# Patient Record
Sex: MALE | Race: WHITE | NOT HISPANIC OR LATINO | Employment: OTHER | URBAN - METROPOLITAN AREA
[De-identification: names, ages, dates, MRNs, and addresses within clinical notes are randomized per-mention and may not be internally consistent; named-entity substitution may affect disease eponyms.]

---

## 2024-05-20 ENCOUNTER — APPOINTMENT (OUTPATIENT)
Dept: GENERAL RADIOLOGY | Facility: HOSPITAL | Age: 44
DRG: 477 | End: 2024-05-20
Payer: COMMERCIAL

## 2024-05-20 ENCOUNTER — HOSPITAL ENCOUNTER (INPATIENT)
Facility: HOSPITAL | Age: 44
LOS: 33 days | Discharge: NURSING, REHAB OR LONG-TERM CARE | DRG: 477 | End: 2024-06-23
Attending: EMERGENCY MEDICINE | Admitting: STUDENT IN AN ORGANIZED HEALTH CARE EDUCATION/TRAINING PROGRAM
Payer: COMMERCIAL

## 2024-05-20 DIAGNOSIS — R13.12 DYSPHAGIA, OROPHARYNGEAL: ICD-10-CM

## 2024-05-20 DIAGNOSIS — R91.1 PULMONARY NODULE: ICD-10-CM

## 2024-05-20 DIAGNOSIS — Z78.9 DECREASED ACTIVITIES OF DAILY LIVING (ADL): ICD-10-CM

## 2024-05-20 DIAGNOSIS — R26.2 DIFFICULTY IN WALKING: ICD-10-CM

## 2024-05-20 DIAGNOSIS — S72.141A CLOSED DISPLACED INTERTROCHANTERIC FRACTURE OF RIGHT FEMUR, INITIAL ENCOUNTER: Primary | ICD-10-CM

## 2024-05-20 DIAGNOSIS — R26.2 DIFFICULTY WALKING: ICD-10-CM

## 2024-05-20 LAB
ALBUMIN SERPL-MCNC: 3.3 G/DL (ref 3.5–5.2)
ALBUMIN/GLOB SERPL: 0.7 G/DL
ALP SERPL-CCNC: 142 U/L (ref 39–117)
ALT SERPL W P-5'-P-CCNC: 23 U/L (ref 1–41)
ANION GAP SERPL CALCULATED.3IONS-SCNC: 17.6 MMOL/L (ref 5–15)
APTT PPP: 34.7 SECONDS (ref 24.2–34.2)
AST SERPL-CCNC: 20 U/L (ref 1–40)
BASOPHILS # BLD AUTO: 0.02 10*3/MM3 (ref 0–0.2)
BASOPHILS NFR BLD AUTO: 0.1 % (ref 0–1.5)
BILIRUB SERPL-MCNC: 0.4 MG/DL (ref 0–1.2)
BUN SERPL-MCNC: 21 MG/DL (ref 6–20)
BUN/CREAT SERPL: 12.2 (ref 7–25)
CALCIUM SPEC-SCNC: 10.5 MG/DL (ref 8.6–10.5)
CHLORIDE SERPL-SCNC: 95 MMOL/L (ref 98–107)
CK SERPL-CCNC: 269 U/L (ref 20–200)
CO2 SERPL-SCNC: 24.4 MMOL/L (ref 22–29)
CREAT SERPL-MCNC: 1.72 MG/DL (ref 0.76–1.27)
DEPRECATED RDW RBC AUTO: 40.4 FL (ref 37–54)
EGFRCR SERPLBLD CKD-EPI 2021: 49.6 ML/MIN/1.73
EOSINOPHIL # BLD AUTO: 0.01 10*3/MM3 (ref 0–0.4)
EOSINOPHIL NFR BLD AUTO: 0.1 % (ref 0.3–6.2)
ERYTHROCYTE [DISTWIDTH] IN BLOOD BY AUTOMATED COUNT: 12.7 % (ref 12.3–15.4)
GLOBULIN UR ELPH-MCNC: 4.8 GM/DL
GLUCOSE SERPL-MCNC: 183 MG/DL (ref 65–99)
HCT VFR BLD AUTO: 37 % (ref 37.5–51)
HGB BLD-MCNC: 11.9 G/DL (ref 13–17.7)
HOLD SPECIMEN: NORMAL
HOLD SPECIMEN: NORMAL
IMM GRANULOCYTES # BLD AUTO: 0.1 10*3/MM3 (ref 0–0.05)
IMM GRANULOCYTES NFR BLD AUTO: 0.5 % (ref 0–0.5)
INR PPP: 1.38 (ref 0.86–1.15)
LYMPHOCYTES # BLD AUTO: 0.52 10*3/MM3 (ref 0.7–3.1)
LYMPHOCYTES NFR BLD AUTO: 2.7 % (ref 19.6–45.3)
MAGNESIUM SERPL-MCNC: 2.4 MG/DL (ref 1.6–2.6)
MCH RBC QN AUTO: 28.1 PG (ref 26.6–33)
MCHC RBC AUTO-ENTMCNC: 32.2 G/DL (ref 31.5–35.7)
MCV RBC AUTO: 87.5 FL (ref 79–97)
MONOCYTES # BLD AUTO: 1.05 10*3/MM3 (ref 0.1–0.9)
MONOCYTES NFR BLD AUTO: 5.4 % (ref 5–12)
NEUTROPHILS NFR BLD AUTO: 17.84 10*3/MM3 (ref 1.7–7)
NEUTROPHILS NFR BLD AUTO: 91.2 % (ref 42.7–76)
NRBC BLD AUTO-RTO: 0 /100 WBC (ref 0–0.2)
PHOSPHATE SERPL-MCNC: 4 MG/DL (ref 2.5–4.5)
PLATELET # BLD AUTO: 410 10*3/MM3 (ref 140–450)
PMV BLD AUTO: 9.9 FL (ref 6–12)
POTASSIUM SERPL-SCNC: 3.9 MMOL/L (ref 3.5–5.2)
PROT SERPL-MCNC: 8.1 G/DL (ref 6–8.5)
PROTHROMBIN TIME: 17.2 SECONDS (ref 11.8–14.9)
RBC # BLD AUTO: 4.23 10*6/MM3 (ref 4.14–5.8)
SODIUM SERPL-SCNC: 137 MMOL/L (ref 136–145)
WBC NRBC COR # BLD AUTO: 19.54 10*3/MM3 (ref 3.4–10.8)
WHOLE BLOOD HOLD COAG: NORMAL
WHOLE BLOOD HOLD SPECIMEN: NORMAL

## 2024-05-20 PROCEDURE — 72170 X-RAY EXAM OF PELVIS: CPT

## 2024-05-20 PROCEDURE — 85025 COMPLETE CBC W/AUTO DIFF WBC: CPT | Performed by: EMERGENCY MEDICINE

## 2024-05-20 PROCEDURE — 83735 ASSAY OF MAGNESIUM: CPT | Performed by: STUDENT IN AN ORGANIZED HEALTH CARE EDUCATION/TRAINING PROGRAM

## 2024-05-20 PROCEDURE — 71045 X-RAY EXAM CHEST 1 VIEW: CPT

## 2024-05-20 PROCEDURE — 73552 X-RAY EXAM OF FEMUR 2/>: CPT

## 2024-05-20 PROCEDURE — 25010000002 MORPHINE PER 10 MG: Performed by: EMERGENCY MEDICINE

## 2024-05-20 PROCEDURE — 99222 1ST HOSP IP/OBS MODERATE 55: CPT | Performed by: STUDENT IN AN ORGANIZED HEALTH CARE EDUCATION/TRAINING PROGRAM

## 2024-05-20 PROCEDURE — 99285 EMERGENCY DEPT VISIT HI MDM: CPT

## 2024-05-20 PROCEDURE — 84100 ASSAY OF PHOSPHORUS: CPT | Performed by: STUDENT IN AN ORGANIZED HEALTH CARE EDUCATION/TRAINING PROGRAM

## 2024-05-20 PROCEDURE — 85610 PROTHROMBIN TIME: CPT | Performed by: EMERGENCY MEDICINE

## 2024-05-20 PROCEDURE — 85730 THROMBOPLASTIN TIME PARTIAL: CPT | Performed by: EMERGENCY MEDICINE

## 2024-05-20 PROCEDURE — 80053 COMPREHEN METABOLIC PANEL: CPT | Performed by: EMERGENCY MEDICINE

## 2024-05-20 PROCEDURE — 25810000003 SODIUM CHLORIDE 0.9 % SOLUTION: Performed by: EMERGENCY MEDICINE

## 2024-05-20 PROCEDURE — 82550 ASSAY OF CK (CPK): CPT | Performed by: STUDENT IN AN ORGANIZED HEALTH CARE EDUCATION/TRAINING PROGRAM

## 2024-05-20 RX ORDER — SODIUM CHLORIDE 0.9 % (FLUSH) 0.9 %
10 SYRINGE (ML) INJECTION AS NEEDED
Status: DISCONTINUED | OUTPATIENT
Start: 2024-05-20 | End: 2024-06-23 | Stop reason: HOSPADM

## 2024-05-20 RX ADMIN — MORPHINE SULFATE 4 MG: 4 INJECTION, SOLUTION INTRAMUSCULAR; INTRAVENOUS at 23:15

## 2024-05-20 RX ADMIN — SODIUM CHLORIDE 1000 ML: 9 INJECTION, SOLUTION INTRAVENOUS at 23:15

## 2024-05-21 ENCOUNTER — APPOINTMENT (OUTPATIENT)
Dept: CT IMAGING | Facility: HOSPITAL | Age: 44
DRG: 477 | End: 2024-05-21
Payer: COMMERCIAL

## 2024-05-21 ENCOUNTER — APPOINTMENT (OUTPATIENT)
Dept: GENERAL RADIOLOGY | Facility: HOSPITAL | Age: 44
DRG: 477 | End: 2024-05-21
Payer: COMMERCIAL

## 2024-05-21 ENCOUNTER — ANESTHESIA (OUTPATIENT)
Dept: PERIOP | Facility: HOSPITAL | Age: 44
End: 2024-05-21
Payer: COMMERCIAL

## 2024-05-21 ENCOUNTER — ANESTHESIA EVENT (OUTPATIENT)
Dept: PERIOP | Facility: HOSPITAL | Age: 44
End: 2024-05-21
Payer: COMMERCIAL

## 2024-05-21 ENCOUNTER — PREP FOR SURGERY (OUTPATIENT)
Dept: OTHER | Facility: HOSPITAL | Age: 44
End: 2024-05-21
Payer: COMMERCIAL

## 2024-05-21 DIAGNOSIS — S72.141A CLOSED DISPLACED INTERTROCHANTERIC FRACTURE OF RIGHT FEMUR, INITIAL ENCOUNTER: Primary | ICD-10-CM

## 2024-05-21 LAB
ABO GROUP BLD: NORMAL
ABO GROUP BLD: NORMAL
ALBUMIN SERPL-MCNC: 2.9 G/DL (ref 3.5–5.2)
ALBUMIN/GLOB SERPL: 0.7 G/DL
ALP SERPL-CCNC: 122 U/L (ref 39–117)
ALT SERPL W P-5'-P-CCNC: 19 U/L (ref 1–41)
ANION GAP SERPL CALCULATED.3IONS-SCNC: 12 MMOL/L (ref 5–15)
AST SERPL-CCNC: 19 U/L (ref 1–40)
BASOPHILS # BLD AUTO: 0.01 10*3/MM3 (ref 0–0.2)
BASOPHILS NFR BLD AUTO: 0.1 % (ref 0–1.5)
BILIRUB SERPL-MCNC: 0.3 MG/DL (ref 0–1.2)
BLD GP AB SCN SERPL QL: NEGATIVE
BUN SERPL-MCNC: 22 MG/DL (ref 6–20)
BUN/CREAT SERPL: 24.2 (ref 7–25)
CALCIUM SPEC-SCNC: 9.2 MG/DL (ref 8.6–10.5)
CHLORIDE SERPL-SCNC: 100 MMOL/L (ref 98–107)
CO2 SERPL-SCNC: 24 MMOL/L (ref 22–29)
CREAT SERPL-MCNC: 0.91 MG/DL (ref 0.76–1.27)
DEPRECATED RDW RBC AUTO: 40.8 FL (ref 37–54)
EGFRCR SERPLBLD CKD-EPI 2021: 106.6 ML/MIN/1.73
EOSINOPHIL # BLD AUTO: 0 10*3/MM3 (ref 0–0.4)
EOSINOPHIL NFR BLD AUTO: 0 % (ref 0.3–6.2)
ERYTHROCYTE [DISTWIDTH] IN BLOOD BY AUTOMATED COUNT: 12.6 % (ref 12.3–15.4)
GLOBULIN UR ELPH-MCNC: 3.9 GM/DL
GLUCOSE BLDC GLUCOMTR-MCNC: 138 MG/DL (ref 70–99)
GLUCOSE SERPL-MCNC: 125 MG/DL (ref 65–99)
HCT VFR BLD AUTO: 31.3 % (ref 37.5–51)
HGB BLD-MCNC: 10 G/DL (ref 13–17.7)
IMM GRANULOCYTES # BLD AUTO: 0.07 10*3/MM3 (ref 0–0.05)
IMM GRANULOCYTES NFR BLD AUTO: 0.5 % (ref 0–0.5)
LYMPHOCYTES # BLD AUTO: 1.07 10*3/MM3 (ref 0.7–3.1)
LYMPHOCYTES NFR BLD AUTO: 7.3 % (ref 19.6–45.3)
MAGNESIUM SERPL-MCNC: 2.3 MG/DL (ref 1.6–2.6)
MCH RBC QN AUTO: 28.4 PG (ref 26.6–33)
MCHC RBC AUTO-ENTMCNC: 31.9 G/DL (ref 31.5–35.7)
MCV RBC AUTO: 88.9 FL (ref 79–97)
MONOCYTES # BLD AUTO: 1.08 10*3/MM3 (ref 0.1–0.9)
MONOCYTES NFR BLD AUTO: 7.4 % (ref 5–12)
NEUTROPHILS NFR BLD AUTO: 12.43 10*3/MM3 (ref 1.7–7)
NEUTROPHILS NFR BLD AUTO: 84.7 % (ref 42.7–76)
NRBC BLD AUTO-RTO: 0 /100 WBC (ref 0–0.2)
PHOSPHATE SERPL-MCNC: 3.5 MG/DL (ref 2.5–4.5)
PLATELET # BLD AUTO: 351 10*3/MM3 (ref 140–450)
PMV BLD AUTO: 9.9 FL (ref 6–12)
POTASSIUM SERPL-SCNC: 3.5 MMOL/L (ref 3.5–5.2)
PROT SERPL-MCNC: 6.8 G/DL (ref 6–8.5)
QT INTERVAL: 361 MS
QTC INTERVAL: 476 MS
RBC # BLD AUTO: 3.52 10*6/MM3 (ref 4.14–5.8)
RH BLD: POSITIVE
RH BLD: POSITIVE
SODIUM SERPL-SCNC: 136 MMOL/L (ref 136–145)
T&S EXPIRATION DATE: NORMAL
WBC NRBC COR # BLD AUTO: 14.66 10*3/MM3 (ref 3.4–10.8)

## 2024-05-21 PROCEDURE — 27245 TREAT THIGH FRACTURE: CPT | Performed by: ORTHOPAEDIC SURGERY

## 2024-05-21 PROCEDURE — 25810000003 LACTATED RINGERS PER 1000 ML

## 2024-05-21 PROCEDURE — 94799 UNLISTED PULMONARY SVC/PX: CPT

## 2024-05-21 PROCEDURE — 86901 BLOOD TYPING SEROLOGIC RH(D): CPT | Performed by: STUDENT IN AN ORGANIZED HEALTH CARE EDUCATION/TRAINING PROGRAM

## 2024-05-21 PROCEDURE — 71260 CT THORAX DX C+: CPT

## 2024-05-21 PROCEDURE — 25010000002 ONDANSETRON PER 1 MG

## 2024-05-21 PROCEDURE — 25010000002 CEFAZOLIN PER 500 MG

## 2024-05-21 PROCEDURE — 25510000001 IOPAMIDOL PER 1 ML: Performed by: INTERNAL MEDICINE

## 2024-05-21 PROCEDURE — C1713 ANCHOR/SCREW BN/BN,TIS/BN: HCPCS | Performed by: ORTHOPAEDIC SURGERY

## 2024-05-21 PROCEDURE — 25810000003 SODIUM CHLORIDE 0.9 % SOLUTION: Performed by: STUDENT IN AN ORGANIZED HEALTH CARE EDUCATION/TRAINING PROGRAM

## 2024-05-21 PROCEDURE — C1769 GUIDE WIRE: HCPCS | Performed by: ORTHOPAEDIC SURGERY

## 2024-05-21 PROCEDURE — 25010000002 DEXAMETHASONE PER 1 MG

## 2024-05-21 PROCEDURE — 84100 ASSAY OF PHOSPHORUS: CPT | Performed by: STUDENT IN AN ORGANIZED HEALTH CARE EDUCATION/TRAINING PROGRAM

## 2024-05-21 PROCEDURE — 93005 ELECTROCARDIOGRAM TRACING: CPT | Performed by: STUDENT IN AN ORGANIZED HEALTH CARE EDUCATION/TRAINING PROGRAM

## 2024-05-21 PROCEDURE — 80053 COMPREHEN METABOLIC PANEL: CPT | Performed by: STUDENT IN AN ORGANIZED HEALTH CARE EDUCATION/TRAINING PROGRAM

## 2024-05-21 PROCEDURE — 86900 BLOOD TYPING SEROLOGIC ABO: CPT | Performed by: STUDENT IN AN ORGANIZED HEALTH CARE EDUCATION/TRAINING PROGRAM

## 2024-05-21 PROCEDURE — 86901 BLOOD TYPING SEROLOGIC RH(D): CPT

## 2024-05-21 PROCEDURE — 76000 FLUOROSCOPY <1 HR PHYS/QHP: CPT

## 2024-05-21 PROCEDURE — 25010000002 MORPHINE PER 10 MG: Performed by: STUDENT IN AN ORGANIZED HEALTH CARE EDUCATION/TRAINING PROGRAM

## 2024-05-21 PROCEDURE — 0QS804Z REPOSITION RIGHT FEMORAL SHAFT WITH INTERNAL FIXATION DEVICE, OPEN APPROACH: ICD-10-PCS | Performed by: ORTHOPAEDIC SURGERY

## 2024-05-21 PROCEDURE — 82948 REAGENT STRIP/BLOOD GLUCOSE: CPT

## 2024-05-21 PROCEDURE — 99222 1ST HOSP IP/OBS MODERATE 55: CPT | Performed by: ORTHOPAEDIC SURGERY

## 2024-05-21 PROCEDURE — 93010 ELECTROCARDIOGRAM REPORT: CPT | Performed by: INTERNAL MEDICINE

## 2024-05-21 PROCEDURE — 86850 RBC ANTIBODY SCREEN: CPT | Performed by: STUDENT IN AN ORGANIZED HEALTH CARE EDUCATION/TRAINING PROGRAM

## 2024-05-21 PROCEDURE — 25010000002 SUGAMMADEX 200 MG/2ML SOLUTION

## 2024-05-21 PROCEDURE — 25810000003 SODIUM CHLORIDE 0.9 % SOLUTION: Performed by: ORTHOPAEDIC SURGERY

## 2024-05-21 PROCEDURE — 99233 SBSQ HOSP IP/OBS HIGH 50: CPT | Performed by: INTERNAL MEDICINE

## 2024-05-21 PROCEDURE — 85025 COMPLETE CBC W/AUTO DIFF WBC: CPT | Performed by: STUDENT IN AN ORGANIZED HEALTH CARE EDUCATION/TRAINING PROGRAM

## 2024-05-21 PROCEDURE — 83735 ASSAY OF MAGNESIUM: CPT | Performed by: STUDENT IN AN ORGANIZED HEALTH CARE EDUCATION/TRAINING PROGRAM

## 2024-05-21 PROCEDURE — 86900 BLOOD TYPING SEROLOGIC ABO: CPT

## 2024-05-21 PROCEDURE — 25010000002 THIAMINE PER 100 MG: Performed by: STUDENT IN AN ORGANIZED HEALTH CARE EDUCATION/TRAINING PROGRAM

## 2024-05-21 PROCEDURE — 25010000002 FENTANYL CITRATE (PF) 50 MCG/ML SOLUTION

## 2024-05-21 PROCEDURE — 25010000002 PROPOFOL 10 MG/ML EMULSION

## 2024-05-21 DEVICE — ZNN CMN NAIL 11.5MMX40CM 125 R
Type: IMPLANTABLE DEVICE | Site: HIP | Status: FUNCTIONAL
Brand: ZIMMER® NATURAL NAIL® SYSTEM

## 2024-05-21 DEVICE — IMPLANTABLE DEVICE: Type: IMPLANTABLE DEVICE | Site: HIP | Status: FUNCTIONAL

## 2024-05-21 DEVICE — ZNN CMN LAG SCREW 10.5X105
Type: IMPLANTABLE DEVICE | Site: HIP | Status: FUNCTIONAL
Brand: ZIMMER® NATURAL NAIL® SYSTEM

## 2024-05-21 RX ORDER — NICOTINE POLACRILEX 4 MG
15 LOZENGE BUCCAL
Status: DISCONTINUED | OUTPATIENT
Start: 2024-05-21 | End: 2024-06-23 | Stop reason: HOSPADM

## 2024-05-21 RX ORDER — BISACODYL 10 MG
10 SUPPOSITORY, RECTAL RECTAL DAILY PRN
Status: DISCONTINUED | OUTPATIENT
Start: 2024-05-21 | End: 2024-05-24 | Stop reason: SDUPTHER

## 2024-05-21 RX ORDER — ACETAMINOPHEN 325 MG/1
650 TABLET ORAL EVERY 6 HOURS PRN
Status: DISCONTINUED | OUTPATIENT
Start: 2024-05-21 | End: 2024-06-23 | Stop reason: HOSPADM

## 2024-05-21 RX ORDER — SODIUM CHLORIDE, SODIUM LACTATE, POTASSIUM CHLORIDE, CALCIUM CHLORIDE 600; 310; 30; 20 MG/100ML; MG/100ML; MG/100ML; MG/100ML
INJECTION, SOLUTION INTRAVENOUS CONTINUOUS PRN
Status: DISCONTINUED | OUTPATIENT
Start: 2024-05-21 | End: 2024-05-21 | Stop reason: SURG

## 2024-05-21 RX ORDER — SODIUM CHLORIDE 9 MG/ML
100 INJECTION, SOLUTION INTRAVENOUS CONTINUOUS
Status: ACTIVE | OUTPATIENT
Start: 2024-05-21 | End: 2024-05-21

## 2024-05-21 RX ORDER — PROPOFOL 10 MG/ML
VIAL (ML) INTRAVENOUS AS NEEDED
Status: DISCONTINUED | OUTPATIENT
Start: 2024-05-21 | End: 2024-05-21 | Stop reason: SURG

## 2024-05-21 RX ORDER — CALCIUM CARBONATE 500 MG/1
1 TABLET, CHEWABLE ORAL 2 TIMES DAILY PRN
Status: DISCONTINUED | OUTPATIENT
Start: 2024-05-21 | End: 2024-06-23 | Stop reason: HOSPADM

## 2024-05-21 RX ORDER — ONDANSETRON 2 MG/ML
4 INJECTION INTRAMUSCULAR; INTRAVENOUS EVERY 6 HOURS PRN
Status: DISCONTINUED | OUTPATIENT
Start: 2024-05-21 | End: 2024-05-24 | Stop reason: SDUPTHER

## 2024-05-21 RX ORDER — NALOXONE HCL 0.4 MG/ML
0.4 VIAL (ML) INJECTION
Status: DISCONTINUED | OUTPATIENT
Start: 2024-05-21 | End: 2024-05-21

## 2024-05-21 RX ORDER — ONDANSETRON 2 MG/ML
INJECTION INTRAMUSCULAR; INTRAVENOUS AS NEEDED
Status: DISCONTINUED | OUTPATIENT
Start: 2024-05-21 | End: 2024-05-21 | Stop reason: SURG

## 2024-05-21 RX ORDER — HYDROCODONE BITARTRATE AND ACETAMINOPHEN 5; 325 MG/1; MG/1
1 TABLET ORAL EVERY 6 HOURS PRN
Status: DISPENSED | OUTPATIENT
Start: 2024-05-21 | End: 2024-06-03

## 2024-05-21 RX ORDER — AMOXICILLIN 250 MG
2 CAPSULE ORAL 2 TIMES DAILY
Status: DISCONTINUED | OUTPATIENT
Start: 2024-05-21 | End: 2024-06-23 | Stop reason: HOSPADM

## 2024-05-21 RX ORDER — SODIUM CHLORIDE 9 MG/ML
40 INJECTION, SOLUTION INTRAVENOUS AS NEEDED
Status: DISCONTINUED | OUTPATIENT
Start: 2024-05-21 | End: 2024-06-04

## 2024-05-21 RX ORDER — SODIUM CHLORIDE 0.9 % (FLUSH) 0.9 %
10 SYRINGE (ML) INJECTION AS NEEDED
Status: DISCONTINUED | OUTPATIENT
Start: 2024-05-21 | End: 2024-05-21

## 2024-05-21 RX ORDER — NALOXONE HCL 0.4 MG/ML
0.4 VIAL (ML) INJECTION
Status: DISCONTINUED | OUTPATIENT
Start: 2024-05-21 | End: 2024-06-23 | Stop reason: HOSPADM

## 2024-05-21 RX ORDER — ALUMINA, MAGNESIA, AND SIMETHICONE 2400; 2400; 240 MG/30ML; MG/30ML; MG/30ML
15 SUSPENSION ORAL EVERY 6 HOURS PRN
Status: DISCONTINUED | OUTPATIENT
Start: 2024-05-21 | End: 2024-06-23 | Stop reason: HOSPADM

## 2024-05-21 RX ORDER — OXYCODONE HYDROCHLORIDE 5 MG/1
5 TABLET ORAL
Status: DISCONTINUED | OUTPATIENT
Start: 2024-05-21 | End: 2024-05-21

## 2024-05-21 RX ORDER — FENTANYL CITRATE 50 UG/ML
INJECTION, SOLUTION INTRAMUSCULAR; INTRAVENOUS AS NEEDED
Status: DISCONTINUED | OUTPATIENT
Start: 2024-05-21 | End: 2024-05-21 | Stop reason: SURG

## 2024-05-21 RX ORDER — ONDANSETRON 2 MG/ML
4 INJECTION INTRAMUSCULAR; INTRAVENOUS ONCE AS NEEDED
Status: DISCONTINUED | OUTPATIENT
Start: 2024-05-21 | End: 2024-05-21

## 2024-05-21 RX ORDER — HYDROCODONE BITARTRATE AND ACETAMINOPHEN 10; 325 MG/1; MG/1
1 TABLET ORAL EVERY 6 HOURS PRN
Status: DISPENSED | OUTPATIENT
Start: 2024-05-21 | End: 2024-06-03

## 2024-05-21 RX ORDER — SODIUM CHLORIDE 9 MG/ML
40 INJECTION, SOLUTION INTRAVENOUS AS NEEDED
Status: DISCONTINUED | OUTPATIENT
Start: 2024-05-21 | End: 2024-06-23 | Stop reason: HOSPADM

## 2024-05-21 RX ORDER — MORPHINE SULFATE 2 MG/ML
1 INJECTION, SOLUTION INTRAMUSCULAR; INTRAVENOUS EVERY 4 HOURS PRN
Status: DISCONTINUED | OUTPATIENT
Start: 2024-05-21 | End: 2024-05-27

## 2024-05-21 RX ORDER — DEXMEDETOMIDINE HYDROCHLORIDE 100 UG/ML
INJECTION, SOLUTION INTRAVENOUS AS NEEDED
Status: DISCONTINUED | OUTPATIENT
Start: 2024-05-21 | End: 2024-05-21 | Stop reason: SURG

## 2024-05-21 RX ORDER — UREA 10 %
5 LOTION (ML) TOPICAL NIGHTLY PRN
Status: DISCONTINUED | OUTPATIENT
Start: 2024-05-21 | End: 2024-06-23 | Stop reason: HOSPADM

## 2024-05-21 RX ORDER — CEFAZOLIN SODIUM 1 G/3ML
INJECTION, POWDER, FOR SOLUTION INTRAMUSCULAR; INTRAVENOUS AS NEEDED
Status: DISCONTINUED | OUTPATIENT
Start: 2024-05-21 | End: 2024-05-21 | Stop reason: SURG

## 2024-05-21 RX ORDER — POLYETHYLENE GLYCOL 3350 17 G/17G
17 POWDER, FOR SOLUTION ORAL DAILY PRN
Status: DISCONTINUED | OUTPATIENT
Start: 2024-05-21 | End: 2024-05-24 | Stop reason: SDUPTHER

## 2024-05-21 RX ORDER — THIAMINE HYDROCHLORIDE 100 MG/ML
100 INJECTION, SOLUTION INTRAMUSCULAR; INTRAVENOUS DAILY
Status: DISCONTINUED | OUTPATIENT
Start: 2024-05-21 | End: 2024-05-22

## 2024-05-21 RX ORDER — SODIUM CHLORIDE 0.9 % (FLUSH) 0.9 %
10 SYRINGE (ML) INJECTION EVERY 12 HOURS SCHEDULED
Status: DISCONTINUED | OUTPATIENT
Start: 2024-05-21 | End: 2024-05-22

## 2024-05-21 RX ORDER — PROMETHAZINE HYDROCHLORIDE 12.5 MG/1
25 TABLET ORAL ONCE AS NEEDED
Status: DISCONTINUED | OUTPATIENT
Start: 2024-05-21 | End: 2024-05-21

## 2024-05-21 RX ORDER — BISACODYL 10 MG
10 SUPPOSITORY, RECTAL RECTAL DAILY PRN
Status: DISCONTINUED | OUTPATIENT
Start: 2024-05-21 | End: 2024-06-23 | Stop reason: HOSPADM

## 2024-05-21 RX ORDER — IBUPROFEN 600 MG/1
1 TABLET ORAL
Status: DISCONTINUED | OUTPATIENT
Start: 2024-05-21 | End: 2024-06-23 | Stop reason: HOSPADM

## 2024-05-21 RX ORDER — NICOTINE 21 MG/24HR
1 PATCH, TRANSDERMAL 24 HOURS TRANSDERMAL DAILY PRN
Status: DISCONTINUED | OUTPATIENT
Start: 2024-05-21 | End: 2024-06-23 | Stop reason: HOSPADM

## 2024-05-21 RX ORDER — ONDANSETRON 4 MG/1
4 TABLET, ORALLY DISINTEGRATING ORAL EVERY 6 HOURS PRN
Status: DISCONTINUED | OUTPATIENT
Start: 2024-05-21 | End: 2024-06-23 | Stop reason: HOSPADM

## 2024-05-21 RX ORDER — SODIUM CHLORIDE 0.9 % (FLUSH) 0.9 %
10 SYRINGE (ML) INJECTION AS NEEDED
Status: DISCONTINUED | OUTPATIENT
Start: 2024-05-21 | End: 2024-06-04

## 2024-05-21 RX ORDER — LIDOCAINE HYDROCHLORIDE 20 MG/ML
INJECTION, SOLUTION EPIDURAL; INFILTRATION; INTRACAUDAL; PERINEURAL AS NEEDED
Status: DISCONTINUED | OUTPATIENT
Start: 2024-05-21 | End: 2024-05-21 | Stop reason: SURG

## 2024-05-21 RX ORDER — PROMETHAZINE HYDROCHLORIDE 25 MG/1
25 SUPPOSITORY RECTAL ONCE AS NEEDED
Status: DISCONTINUED | OUTPATIENT
Start: 2024-05-21 | End: 2024-05-21

## 2024-05-21 RX ORDER — ONDANSETRON 2 MG/ML
4 INJECTION INTRAMUSCULAR; INTRAVENOUS EVERY 4 HOURS PRN
Status: DISCONTINUED | OUTPATIENT
Start: 2024-05-21 | End: 2024-06-23 | Stop reason: HOSPADM

## 2024-05-21 RX ORDER — DEXTROSE MONOHYDRATE 25 G/50ML
25 INJECTION, SOLUTION INTRAVENOUS
Status: DISCONTINUED | OUTPATIENT
Start: 2024-05-21 | End: 2024-06-23 | Stop reason: HOSPADM

## 2024-05-21 RX ORDER — BISACODYL 5 MG/1
5 TABLET, DELAYED RELEASE ORAL DAILY PRN
Status: DISCONTINUED | OUTPATIENT
Start: 2024-05-21 | End: 2024-05-24 | Stop reason: SDUPTHER

## 2024-05-21 RX ORDER — DEXAMETHASONE SODIUM PHOSPHATE 4 MG/ML
INJECTION, SOLUTION INTRA-ARTICULAR; INTRALESIONAL; INTRAMUSCULAR; INTRAVENOUS; SOFT TISSUE AS NEEDED
Status: DISCONTINUED | OUTPATIENT
Start: 2024-05-21 | End: 2024-05-21 | Stop reason: SURG

## 2024-05-21 RX ORDER — SODIUM CHLORIDE 9 MG/ML
100 INJECTION, SOLUTION INTRAVENOUS CONTINUOUS
Status: DISCONTINUED | OUTPATIENT
Start: 2024-05-21 | End: 2024-05-22

## 2024-05-21 RX ORDER — AMOXICILLIN 250 MG
2 CAPSULE ORAL 2 TIMES DAILY PRN
Status: DISCONTINUED | OUTPATIENT
Start: 2024-05-21 | End: 2024-05-24 | Stop reason: SDUPTHER

## 2024-05-21 RX ORDER — MORPHINE SULFATE 2 MG/ML
2 INJECTION, SOLUTION INTRAMUSCULAR; INTRAVENOUS
Status: DISCONTINUED | OUTPATIENT
Start: 2024-05-21 | End: 2024-05-21 | Stop reason: SDUPTHER

## 2024-05-21 RX ORDER — MAGNESIUM HYDROXIDE 1200 MG/15ML
LIQUID ORAL AS NEEDED
Status: DISCONTINUED | OUTPATIENT
Start: 2024-05-21 | End: 2024-05-21 | Stop reason: HOSPADM

## 2024-05-21 RX ORDER — MEPERIDINE HYDROCHLORIDE 25 MG/ML
12.5 INJECTION INTRAMUSCULAR; INTRAVENOUS; SUBCUTANEOUS
Status: DISCONTINUED | OUTPATIENT
Start: 2024-05-21 | End: 2024-05-21

## 2024-05-21 RX ORDER — HYDROXYZINE HYDROCHLORIDE 25 MG/1
25 TABLET, FILM COATED ORAL 3 TIMES DAILY PRN
Status: DISCONTINUED | OUTPATIENT
Start: 2024-05-21 | End: 2024-06-23 | Stop reason: HOSPADM

## 2024-05-21 RX ORDER — ROCURONIUM BROMIDE 10 MG/ML
INJECTION, SOLUTION INTRAVENOUS AS NEEDED
Status: DISCONTINUED | OUTPATIENT
Start: 2024-05-21 | End: 2024-05-21 | Stop reason: SURG

## 2024-05-21 RX ORDER — LIDOCAINE 4 G/G
1 PATCH TOPICAL DAILY PRN
Status: DISCONTINUED | OUTPATIENT
Start: 2024-05-21 | End: 2024-06-23 | Stop reason: HOSPADM

## 2024-05-21 RX ORDER — NALOXONE HCL 0.4 MG/ML
0.4 VIAL (ML) INJECTION
Status: DISCONTINUED | OUTPATIENT
Start: 2024-05-21 | End: 2024-05-24 | Stop reason: SDUPTHER

## 2024-05-21 RX ORDER — PROCHLORPERAZINE EDISYLATE 5 MG/ML
5 INJECTION INTRAMUSCULAR; INTRAVENOUS EVERY 6 HOURS PRN
Status: DISCONTINUED | OUTPATIENT
Start: 2024-05-21 | End: 2024-06-23 | Stop reason: HOSPADM

## 2024-05-21 RX ORDER — MORPHINE SULFATE 2 MG/ML
1 INJECTION, SOLUTION INTRAMUSCULAR; INTRAVENOUS EVERY 4 HOURS PRN
Status: DISCONTINUED | OUTPATIENT
Start: 2024-05-21 | End: 2024-05-21

## 2024-05-21 RX ORDER — SODIUM CHLORIDE 0.9 % (FLUSH) 0.9 %
10 SYRINGE (ML) INJECTION EVERY 12 HOURS SCHEDULED
Status: DISCONTINUED | OUTPATIENT
Start: 2024-05-21 | End: 2024-06-23 | Stop reason: HOSPADM

## 2024-05-21 RX ORDER — PHENYLEPHRINE HCL IN 0.9% NACL 1 MG/10 ML
SYRINGE (ML) INTRAVENOUS AS NEEDED
Status: DISCONTINUED | OUTPATIENT
Start: 2024-05-21 | End: 2024-05-21 | Stop reason: SURG

## 2024-05-21 RX ORDER — MULTIPLE VITAMINS W/ MINERALS TAB 9MG-400MCG
1 TAB ORAL DAILY
Status: DISCONTINUED | OUTPATIENT
Start: 2024-05-21 | End: 2024-06-23 | Stop reason: HOSPADM

## 2024-05-21 RX ORDER — POLYETHYLENE GLYCOL 3350 17 G/17G
17 POWDER, FOR SOLUTION ORAL DAILY PRN
Status: DISCONTINUED | OUTPATIENT
Start: 2024-05-21 | End: 2024-06-23 | Stop reason: HOSPADM

## 2024-05-21 RX ORDER — BISACODYL 5 MG/1
5 TABLET, DELAYED RELEASE ORAL DAILY PRN
Status: DISCONTINUED | OUTPATIENT
Start: 2024-05-21 | End: 2024-06-23 | Stop reason: HOSPADM

## 2024-05-21 RX ADMIN — DEXMEDETOMIDINE 10 MCG: 100 INJECTION, SOLUTION INTRAVENOUS at 16:03

## 2024-05-21 RX ADMIN — Medication 1 TABLET: at 09:34

## 2024-05-21 RX ADMIN — SODIUM CHLORIDE, POTASSIUM CHLORIDE, SODIUM LACTATE AND CALCIUM CHLORIDE: 600; 310; 30; 20 INJECTION, SOLUTION INTRAVENOUS at 15:09

## 2024-05-21 RX ADMIN — ONDANSETRON HYDROCHLORIDE 4 MG: 2 SOLUTION INTRAMUSCULAR; INTRAVENOUS at 15:32

## 2024-05-21 RX ADMIN — Medication 10 ML: at 02:02

## 2024-05-21 RX ADMIN — THIAMINE HYDROCHLORIDE 100 MG: 100 INJECTION, SOLUTION INTRAMUSCULAR; INTRAVENOUS at 09:33

## 2024-05-21 RX ADMIN — FENTANYL CITRATE 50 MCG: 50 INJECTION, SOLUTION INTRAMUSCULAR; INTRAVENOUS at 15:13

## 2024-05-21 RX ADMIN — Medication 10 ML: at 09:34

## 2024-05-21 RX ADMIN — MORPHINE SULFATE 2 MG: 2 INJECTION, SOLUTION INTRAMUSCULAR; INTRAVENOUS at 02:01

## 2024-05-21 RX ADMIN — DEXAMETHASONE SODIUM PHOSPHATE 4 MG: 4 INJECTION, SOLUTION INTRAMUSCULAR; INTRAVENOUS at 15:32

## 2024-05-21 RX ADMIN — Medication 10 ML: at 21:23

## 2024-05-21 RX ADMIN — PROPOFOL 180 MG: 10 INJECTION, EMULSION INTRAVENOUS at 15:13

## 2024-05-21 RX ADMIN — LIDOCAINE HYDROCHLORIDE 60 MG: 20 INJECTION, SOLUTION INTRAVENOUS at 15:13

## 2024-05-21 RX ADMIN — DEXMEDETOMIDINE 10 MCG: 100 INJECTION, SOLUTION INTRAVENOUS at 15:49

## 2024-05-21 RX ADMIN — IOPAMIDOL 75 ML: 755 INJECTION, SOLUTION INTRAVENOUS at 21:07

## 2024-05-21 RX ADMIN — MORPHINE SULFATE 2 MG: 2 INJECTION, SOLUTION INTRAMUSCULAR; INTRAVENOUS at 06:01

## 2024-05-21 RX ADMIN — CEFAZOLIN 2 G: 1 INJECTION, POWDER, FOR SOLUTION INTRAMUSCULAR; INTRAVENOUS at 15:18

## 2024-05-21 RX ADMIN — SODIUM CHLORIDE 100 ML/HR: 9 INJECTION, SOLUTION INTRAVENOUS at 18:41

## 2024-05-21 RX ADMIN — SENNOSIDES AND DOCUSATE SODIUM 2 TABLET: 50; 8.6 TABLET ORAL at 21:22

## 2024-05-21 RX ADMIN — ROCURONIUM BROMIDE 20 MG: 10 INJECTION, SOLUTION INTRAVENOUS at 15:40

## 2024-05-21 RX ADMIN — ROCURONIUM BROMIDE 50 MG: 10 INJECTION, SOLUTION INTRAVENOUS at 15:14

## 2024-05-21 RX ADMIN — FOLIC ACID 1 MG: 5 INJECTION, SOLUTION INTRAMUSCULAR; INTRAVENOUS; SUBCUTANEOUS at 10:18

## 2024-05-21 RX ADMIN — SODIUM CHLORIDE 100 ML/HR: 9 INJECTION, SOLUTION INTRAVENOUS at 12:33

## 2024-05-21 RX ADMIN — SODIUM CHLORIDE 100 ML/HR: 9 INJECTION, SOLUTION INTRAVENOUS at 02:18

## 2024-05-21 RX ADMIN — SUGAMMADEX 200 MG: 100 INJECTION, SOLUTION INTRAVENOUS at 16:06

## 2024-05-21 RX ADMIN — Medication 100 MCG: at 15:52

## 2024-05-21 RX ADMIN — Medication 100 MCG: at 15:37

## 2024-05-21 RX ADMIN — FENTANYL CITRATE 50 MCG: 50 INJECTION, SOLUTION INTRAMUSCULAR; INTRAVENOUS at 15:40

## 2024-05-21 RX ADMIN — OXYCODONE 5 MG: 5 TABLET ORAL at 16:47

## 2024-05-21 NOTE — ANESTHESIA PREPROCEDURE EVALUATION
Anesthesia Evaluation     Patient summary reviewed and Nursing notes reviewed   no history of anesthetic complications:   NPO Solid Status: > 8 hours  NPO Liquid Status: > 2 hours           Airway   Mallampati: I  TM distance: >3 FB  Neck ROM: full  No difficulty expected  Dental      Pulmonary - normal exam    breath sounds clear to auscultation  (+) a smoker Current, Abstained day of surgery, cigarettes,  Cardiovascular - negative cardio ROS and normal exam  Exercise tolerance: good (4-7 METS)    Rhythm: regular  Rate: normal        Neuro/Psych  (+) psychiatric history Depression  GI/Hepatic/Renal/Endo - negative ROS     Musculoskeletal (-) negative ROS    Abdominal    Substance History - negative use     OB/GYN negative ob/gyn ROS         Other - negative ROS       ROS/Med Hx Other: PAT Nursing Notes unavailable.          Cxr 5/20/24   Impression:        Impression:     1. Questionable 14 mm nodule projecting in the left perihilar region.  Follow-up CT scan of the chest would be recommended when clinically  feasible.     BORDERLINE ECG -  Sinus tachycardia  Borderline prolonged QT interval     Latest Reference Range & Units 05/21/24 05:28   Sodium 136 - 145 mmol/L 136   Potassium 3.5 - 5.2 mmol/L 3.5   Chloride 98 - 107 mmol/L 100   CO2 22.0 - 29.0 mmol/L 24.0   Anion Gap 5.0 - 15.0 mmol/L 12.0   BUN 6 - 20 mg/dL 22 (H)   Creatinine 0.76 - 1.27 mg/dL 0.91   BUN/Creatinine Ratio 7.0 - 25.0  24.2   eGFR >60.0 mL/min/1.73 106.6   Glucose 65 - 99 mg/dL 125 (H)   Calcium 8.6 - 10.5 mg/dL 9.2   Magnesium 1.6 - 2.6 mg/dL 2.3   Phosphorus 2.5 - 4.5 mg/dL 3.5   Alkaline Phosphatase 39 - 117 U/L 122 (H)   Total Protein 6.0 - 8.5 g/dL 6.8   Albumin 3.5 - 5.2 g/dL 2.9 (L)   Globulin gm/dL 3.9   A/G Ratio g/dL 0.7   AST (SGOT) 1 - 40 U/L 19   ALT (SGPT) 1 - 41 U/L 19   Total Bilirubin 0.0 - 1.2 mg/dL 0.3   (H): Data is abnormally high  (L): Data is abnormally low     Latest Reference Range & Units 05/21/24 05:28   Hemoglobin  13.0 - 17.7 g/dL 10.0 (L)   Hematocrit 37.5 - 51.0 % 31.3 (L)   (L): Data is abnormally low           Anesthesia Plan    ASA 3 - emergent     general     Reason for not using neuraxial anesthesia or peripheral nerve block: Patient Preference  intravenous induction     Anesthetic plan, risks, benefits, and alternatives have been provided, discussed and informed consent has been obtained with: patient.    CODE STATUS:    Level Of Support Discussed With: Patient  Code Status (Patient has no pulse and is not breathing): CPR (Attempt to Resuscitate)  Medical Interventions (Patient has pulse or is breathing): Full Support

## 2024-05-21 NOTE — PLAN OF CARE
Problem: Adult Inpatient Plan of Care  Goal: Plan of Care Review  Outcome: Ongoing, Progressing  Flowsheets  Taken 5/21/2024 1808 by Danae Benitez RN  Progress: improving  Outcome Evaluation: VSS. pt npo today for scheduled surgery of right hip. pt rested well during shift. no new concerns at this time.  Taken 5/21/2024 1700 by Jeanette Du RN  Plan of Care Reviewed With: patient  Goal: Patient-Specific Goal (Individualized)  Outcome: Ongoing, Progressing  Goal: Absence of Hospital-Acquired Illness or Injury  Outcome: Ongoing, Progressing  Intervention: Identify and Manage Fall Risk  Recent Flowsheet Documentation  Taken 5/21/2024 1734 by Danae Benitez RN  Safety Promotion/Fall Prevention:   safety round/check completed   clutter free environment maintained   assistive device/personal items within reach   fall prevention program maintained  Taken 5/21/2024 1302 by Danae Benitez RN  Safety Promotion/Fall Prevention:   safety round/check completed   clutter free environment maintained   assistive device/personal items within reach   fall prevention program maintained  Taken 5/21/2024 1126 by Danae Benitez RN  Safety Promotion/Fall Prevention:   safety round/check completed   clutter free environment maintained   assistive device/personal items within reach   fall prevention program maintained  Taken 5/21/2024 0952 by Danae Benitez RN  Safety Promotion/Fall Prevention:   safety round/check completed   assistive device/personal items within reach   clutter free environment maintained   fall prevention program maintained  Taken 5/21/2024 0749 by Danae Benitez RN  Safety Promotion/Fall Prevention:   safety round/check completed   assistive device/personal items within reach   clutter free environment maintained   fall prevention program maintained  Intervention: Prevent and Manage VTE (Venous Thromboembolism) Risk  Recent Flowsheet Documentation  Taken 5/21/2024 0749 by Emmanuel  GRIS Fink  Range of Motion: active ROM (range of motion) encouraged  Intervention: Prevent Infection  Recent Flowsheet Documentation  Taken 5/21/2024 1734 by Danae Benitez RN  Infection Prevention:   rest/sleep promoted   personal protective equipment utilized   equipment surfaces disinfected   hand hygiene promoted  Taken 5/21/2024 1302 by Danae Benitez RN  Infection Prevention:   rest/sleep promoted   personal protective equipment utilized  Taken 5/21/2024 1126 by Danae Benitez RN  Infection Prevention:   rest/sleep promoted   personal protective equipment utilized   hand hygiene promoted   equipment surfaces disinfected  Taken 5/21/2024 0952 by Danae Benitez RN  Infection Prevention:   rest/sleep promoted   personal protective equipment utilized   hand hygiene promoted   equipment surfaces disinfected  Taken 5/21/2024 0749 by Danae Benitez RN  Infection Prevention:   rest/sleep promoted   personal protective equipment utilized   hand hygiene promoted   equipment surfaces disinfected  Goal: Optimal Comfort and Wellbeing  Outcome: Ongoing, Progressing  Goal: Readiness for Transition of Care  Outcome: Ongoing, Progressing     Problem: Skin Injury Risk Increased  Goal: Skin Health and Integrity  Outcome: Ongoing, Progressing     Problem: Fall Injury Risk  Goal: Absence of Fall and Fall-Related Injury  Outcome: Ongoing, Progressing  Intervention: Promote Injury-Free Environment  Recent Flowsheet Documentation  Taken 5/21/2024 1734 by Danae Benitez RN  Safety Promotion/Fall Prevention:   safety round/check completed   clutter free environment maintained   assistive device/personal items within reach   fall prevention program maintained  Taken 5/21/2024 1302 by Danae Benitez RN  Safety Promotion/Fall Prevention:   safety round/check completed   clutter free environment maintained   assistive device/personal items within reach   fall prevention program maintained  Taken  5/21/2024 1126 by Benitez, Danae, RN  Safety Promotion/Fall Prevention:   safety round/check completed   clutter free environment maintained   assistive device/personal items within reach   fall prevention program maintained  Taken 5/21/2024 0952 by Danae Benitez RN  Safety Promotion/Fall Prevention:   safety round/check completed   assistive device/personal items within reach   clutter free environment maintained   fall prevention program maintained  Taken 5/21/2024 0749 by Danae Benitez RN  Safety Promotion/Fall Prevention:   safety round/check completed   assistive device/personal items within reach   clutter free environment maintained   fall prevention program maintained   Goal Outcome Evaluation:           Progress: improving  Outcome Evaluation: VSS. pt npo today for scheduled surgery of right hip. pt rested well during shift. no new concerns at this time.

## 2024-05-21 NOTE — OP NOTE
HIP INTERTROCHANTERIC NAILING  Procedure Report    Patient Name:  Oswaldo Bowen  YOB: 1980    Date of Surgery:  5/21/2024       Pre-op Diagnosis:   Closed displaced intertrochanteric fracture of right femur, initial encounter [S72.141A]       Post-Op Diagnosis Codes:     * Closed displaced intertrochanteric fracture of right femur, initial encounter [S72.141A]    Procedure/CPT® Codes:      Procedure(s):  Right HIP IsubtrochantericNAILING    Staff:  Surgeon(s):  Molina Clayton MD    Assistant: Chelsey Garnett    Anesthesia: General    Estimated Blood Loss: 100ml    Implants:    Implant Name Type Inv. Item Serial No.  Lot No. LRB No. Used Action   NAIL IM/FEM NATURALNAIL LNG TI 11.5MM 40CM 125DEG LT - [id number] Implant NAIL IM/FEM NATURALNAIL LNG TI 11.5MM 40CM 125DEG LT  [employer] [id number] Right 1 Implanted   SCRW LAG CEPH TI [id number] - [id number] Implant SCRW LAG CEPH TI [id number]  [employer] [id number] Right 1 Implanted   SCRW CASIMIRO FA FUL/THRD HEX3.5 TI 5X55MM - [id number] Implant SCRW CASIMIRO FA FUL/THRD HEX3.5 TI 5X55MM  [employer] [id number] Right 1 Implanted       Specimen:          None          Complications: none    Description of Procedure: See H&P for risks and benefits. The patient was taken to the operating room and placed supine on the fracture table. After general anesthesia was established, a closed reduction was done of the right hip using traction and internal rotation.  Then the right hip and lower extremity were prepped and draped in a standard fashion using alcohol and ChloraPrep in a standard fashion.  An incision was made for the gamma nail, just proximal to the greater trochanter, 3 cm in length. A soft tissue portal was created down to the tip of the greater trochanter, and the awl was placed in the appropriate position, followed by passage of the guide wire, down to the distal femur. The correct size nail was measured and the canal was sequentially reamed to  accommodate the appropriate length long CM nail, which was passed over the guide wire.  Then, the gamma CM screw was placed through a separate stab incision using a standard technique, after making sure that the drill pins were in appropriate position on AP and lateral views. Then, the target guide was removed after placing the set screw and then the nail was locked distally with a free hand technique with appropriate length screw through a separate stab incision.  C-arm shots showed anatomic reduction and fixation of the fracture with gamma CM nail with no complication from the implants and all three wounds were copiously irrigated and closed with deep 0-Vicryl, subcutaneous 2-0 Vicryl and all the skin was closed with staples.  The incisions were all washed and dried and sterile dressings were applied.  The patient tolerated the procedure well and was taken off the fracture table safely, and transferred back to the hospital bed, extubated and taken to the recovery room.      Molina Clayton MD     Date: 5/21/2024  Time: 16:05 EDT

## 2024-05-21 NOTE — CONSULTS
[facility]   Consult Note    Patient Name: Oswaldo Bowen  : 1980  MRN: [id number]  Primary Care Physician:  Provider, No Known  Referring Physician: No ref. provider found  Date of admission: 2024    Subjective   Subjective     Reason for Consult/ Chief Complaint: Right hip fracture    HPI:  Oswaldo Bowen is a 44 y.o. male history of depression intellectual disability had a fall at home and sustained a right hip fracture not sure when the injury actually occurred or occurred according to the family.  Was consulted by hospitalist for surgical intervention    Review of Systems   14 Point ROS is negative except as noted above.     Personal History     History reviewed. No pertinent past medical history.    History reviewed. No pertinent surgical history.    Family History: family history is not on file. Otherwise pertinent FHx was reviewed and not pertinent to current issue.    Social History:  reports that he has been smoking cigarettes. He started smoking about 24 years ago. He has a 36.6 pack-year smoking history. He has quit using smokeless tobacco. He reports that he does not drink alcohol and does not use drugs.    Home Medications:       Allergies:  No Known Allergies    Objective    Objective     Vitals:   Temp:  [97.4 °F (36.3 °C)-98.1 °F (36.7 °C)] 98.1 °F (36.7 °C)  Heart Rate:  [] 103  Resp:  [16-18] 18  BP: (112-132)/(71-89) 118/71    Physical Exam:   Constitutional: Awake, alert   HENT: Atraumatic, Normocephalic   Respiratory: Nonlabored respirations    Cardiovascular: Intact peripheral pulses    Musculoskeletal: Apical shoulders elbows wrist and hand are nontender full range of motion right lower extremity shortened externally rotated compared to left left hip knee and ankles nontender right knee and ankles nontender calves are soft no signs of DVT     Imaging:  Imaging Results (Last 24 Hours)       Procedure Component Value Units Date/Time    XR Chest 1 View [[id number]]  Collected: 05/20/24 2220     Updated: 05/20/24 2225    Narrative:      XR CHEST 1 VW-     Date of Exam: 5/20/2024 10:00 PM     Indication: Cough, persistent     Comparison: None available.     Findings:  Heart size and pulmonary vessels are within normal limits. There is a 14  mm left perihilar nodular density. Follow-up CT scan of the chest would  be recommended when clinically feasible. The lungs are otherwise clear.  No pleural effusion. No pneumothorax. Bony structures are unremarkable.       Impression:      Impression:     1. Questionable 14 mm nodule projecting in the left perihilar region.  Follow-up CT scan of the chest would be recommended when clinically  feasible.        Electronically Signed By-Eliecer Jara MD On:5/20/2024 10:23 PM       XR Femur 2 View Right [[id number]] Collected: 05/20/24 2215     Updated: 05/20/24 2223    Narrative:      XR FEMUR 2 VW RIGHT-, XR PELVIS 1 OR 2 VW-     (COMBINED REPORT)     Date of Exam: 5/20/2024 10:00 PM     Indication: 44-year-old male who fell; c/o pelvic & right leg pain;  trauma/injury; initial encounter.     Comparison: None available.     Findings:     PELVIS (1V): A single AP view of the pelvis reveals an acute displaced  comminuted intertrochanteric extra-articular probably closed fracture of  the proximal right femur (probably a two-part fracture). There is varus  (medial) displacement and angulation of large distal fracture fragment.  No other fractures are seen. No dislocation. External artifacts obscure  detail. Bowel gas and formed stool also obscure detail.     RIGHT FEMUR (5V): 5 views of the right femur reveal an acute displaced  comminuted extra-articular closed intertrochanteric fracture of the  right femur. It is probably a 2-part fracture. There is approximately  1.5 cm of medial, or varus, displacement of the large distal right  femoral fracture fragment. No other fractures are seen. No dislocation.  External artifacts obscure detail, limiting  assessment for retained  foreign bodies.          Impression:      (COMBINED)  Acute comminuted displaced intertrochanteric right femoral fracture is  noted, as discussed. No dislocation.              Please note that portions of this note were completed with a voice  recognition program.                 Electronically Signed By-Panda Howard MD On:5/20/2024 10:21 PM       XR Pelvis 1 or 2 View [[id number]] Collected: 05/20/24 2215     Updated: 05/20/24 2223    Narrative:      XR FEMUR 2 VW RIGHT-, XR PELVIS 1 OR 2 VW-     (COMBINED REPORT)     Date of Exam: 5/20/2024 10:00 PM     Indication: 44-year-old male who fell; c/o pelvic & right leg pain;  trauma/injury; initial encounter.     Comparison: None available.     Findings:     PELVIS (1V): A single AP view of the pelvis reveals an acute displaced  comminuted intertrochanteric extra-articular probably closed fracture of  the proximal right femur (probably a two-part fracture). There is varus  (medial) displacement and angulation of large distal fracture fragment.  No other fractures are seen. No dislocation. External artifacts obscure  detail. Bowel gas and formed stool also obscure detail.     RIGHT FEMUR (5V): 5 views of the right femur reveal an acute displaced  comminuted extra-articular closed intertrochanteric fracture of the  right femur. It is probably a 2-part fracture. There is approximately  1.5 cm of medial, or varus, displacement of the large distal right  femoral fracture fragment. No other fractures are seen. No dislocation.  External artifacts obscure detail, limiting assessment for retained  foreign bodies.          Impression:      (COMBINED)  Acute comminuted displaced intertrochanteric right femoral fracture is  noted, as discussed. No dislocation.              Please note that portions of this note were completed with a voice  recognition program.                 Electronically Signed By-aPnda Howard MD On:5/20/2024 10:21 PM                 Result Review    Result Review:  I have personally reviewed the results from the time of this admission to 5/21/2024 12:47 EDT and agree with these findings:  []  Laboratory  []  Microbiology  []  Radiology  []  EKG/Telemetry   []  Cardiology/Vascular   []  Pathology  []  Old records  []  Other:      Assessment & Plan   Assessment / Plan     Brief Patient Summary:  Oswaldo Bowen is a 44 y.o. male who sustained a displaced right intertrochanteric fracture of the femur    Active Hospital Problems:  Active Hospital Problems    Diagnosis     **Closed intertrochanteric fracture of right femur        Plan: Discussed with him the risk and benefits of proceeding with reduction of fixation bleeding infection death blood clots lung problems heart attacks possible need for future surgery possible damage neurovascular structures among others and he wished to proceed.      Electronically signed by Molina Clayton MD, 05/21/24, 12:47 PM EDT.

## 2024-05-21 NOTE — SIGNIFICANT NOTE
05/21/24 1300   Physical Therapy Time and Intention   Session Not Performed other (see comments)  (pending ortho surgery)

## 2024-05-21 NOTE — PLAN OF CARE
Goal Outcome Evaluation:                              Admit from ED overnight. Surgical consult with possible surgery this morning. NPO since midnight. Alert and oriented x4. No family at bedside. Pain control per MAR.

## 2024-05-21 NOTE — ANESTHESIA POSTPROCEDURE EVALUATION
Patient: Oswaldo Bowen    Procedure Summary       Date: 05/21/24 Room / Location: [facility] OR 03 / [facility] MAIN OR    Anesthesia Start: 1509 Anesthesia Stop: 1609    Procedure: HIP INTERTROCHANTERIC NAILING (Right: Hip) Diagnosis:       Closed displaced intertrochanteric fracture of right femur, initial encounter      (Closed displaced intertrochanteric fracture of right femur, initial encounter [S72.141A])    Surgeons: Molina Clayton MD Provider: Vicente Reynolds MD    Anesthesia Type: general ASA Status: 3 - Emergent            Anesthesia Type: No value filed.    Vitals  Vitals Value Taken Time   /77 05/21/24 1634   Temp 36.2 °C (97.2 °F) 05/21/24 1615   Pulse 92 05/21/24 1635   Resp 16 05/21/24 1630   SpO2 96 % 05/21/24 1635   Vitals shown include unfiled device data.        Post Anesthesia Care and Evaluation    Patient location during evaluation: bedside  Patient participation: complete - patient participated  Level of consciousness: awake    Airway patency: patent  PONV Status: none  Cardiovascular status: acceptable  Respiratory status: acceptable  Hydration status: acceptable

## 2024-05-21 NOTE — H&P
[facility]  Date: 2024   Patient Name: Oswaldo Bowen  : 1980  MRN: [id number]  Primary Care Physician:  Provider, No Known  Date of admission: 2024    Subjective   Subjective     Chief Complaint: Fall, right hip pain    HPI:    Oswaldo Bowen is a 44 y.o. male  with a past medical history of depression, intellectual disability presented to the ED for evaluation of right leg pain after a fall.  Most of the history has been presented by the patient's family members at the bedside, states that patient has been limping due to pain in his right lower extremity since last 1 month.  Family member states that patient had a fall on the ramp outside of the house at some point but they were not sure when it was and thinks that probably he has been limping from the pain from that.  Today patient was outside in the yard when he was turning around and slipped and fell onto the concrete floor, did not hit his head or lost consciousness.  After the fall he could not get up and has severe pain in his right lower extremity.    As per the family members patient has very low appetite and eats very less.  Smokes up to 1 to 2 packs a day since many years.  Has lost significant amount of weight in the last few months.  Patient appears to be cachectic.  Denies any other complaints.  Patient has mild dysarthria and as per the family members that appears to be normal.    In the ED, vital signs temperature 97.8, pulse 113, respiratory 18, blood pressure 112/88 on room air saturating around 99%.  Labs showed sodium 137, potassium 3.9, chloride 95, creatinine 1.72, unknown baseline, , rest of the CMP with no significant findings, WBC 19.54, hemoglobin 11.9, platelets 410.  Chest x-ray showed questionable 14 mm nodule projecting in the left perihilar region.  Recommended follow-up CT.  X-ray pelvis showed acute comminuted displaced intertrochanteric right femoral fracture.  Case has  been discussed by the ED physician with orthopedic surgeon on-call, agreed to consult.  Patient has been admitted for further evaluation and management of acute comminuted displaced intertrochanteric right femoral fracture, elevated creatinine      Personal History     Past medical history  Depression  Intellectual disability    No family history on file.      Social History     Socioeconomic History    Marital status: Single         Home Medications:       Allergies:  No Known Allergies    Review of Systems   Unable to obtain the complete review of systems as the patient not able to answer all the questions appropriately.    Objective   Objective     Vitals:   Temp:  [97.8 °F (36.6 °C)] 97.8 °F (36.6 °C)  Heart Rate:  [102-115] 102  Resp:  [16-18] 16  BP: (112-132)/(87-89) 124/87    Physical Exam    Constitutional: Awake, alert, no acute distress, cachectic with muscle loss, disheveled   Eyes: Pupils equal, sclerae anicteric, no conjunctival injection   HENT: NCAT, mucous membranes dry   Respiratory: Clear to auscultation bilaterally, nonlabored respirations    Cardiovascular: RRR, no murmurs   Gastrointestinal: Positive bowel sounds, soft, nontender, nondistended   Musculoskeletal: No bilateral ankle edema, no clubbing or cyanosis to extremities, right lower extremity warm to touch, externally rotated   Neurologic: Oriented x 3, mild dysarthria(at baseline)   Skin: No rashes     Result Review    Result Review:  I have personally reviewed the results from the time of this admission to 5/21/2024 00:58 EDT and agree with these findings:  [x]  Laboratory  []  Microbiology  [x]  Radiology  [x]  EKG/Telemetry   []  Cardiology/Vascular   []  Pathology  []  Old records  []  Other:        Imaging Results (Last 24 Hours)       Procedure Component Value Units Date/Time    XR Chest 1 View [[id number]] Collected: 05/20/24 2220     Updated: 05/20/24 2225    Narrative:      XR CHEST 1 VW-     Date of Exam: 5/20/2024 10:00 PM      Indication: Cough, persistent     Comparison: None available.     Findings:  Heart size and pulmonary vessels are within normal limits. There is a 14  mm left perihilar nodular density. Follow-up CT scan of the chest would  be recommended when clinically feasible. The lungs are otherwise clear.  No pleural effusion. No pneumothorax. Bony structures are unremarkable.       Impression:      Impression:     1. Questionable 14 mm nodule projecting in the left perihilar region.  Follow-up CT scan of the chest would be recommended when clinically  feasible.        Electronically Signed By-Eliecer Jara MD On:5/20/2024 10:23 PM       XR Femur 2 View Right [[id number]] Collected: 05/20/24 2215     Updated: 05/20/24 2223    Narrative:      XR FEMUR 2 VW RIGHT-, XR PELVIS 1 OR 2 VW-     (COMBINED REPORT)     Date of Exam: 5/20/2024 10:00 PM     Indication: 44-year-old male who fell; c/o pelvic & right leg pain;  trauma/injury; initial encounter.     Comparison: None available.     Findings:     PELVIS (1V): A single AP view of the pelvis reveals an acute displaced  comminuted intertrochanteric extra-articular probably closed fracture of  the proximal right femur (probably a two-part fracture). There is varus  (medial) displacement and angulation of large distal fracture fragment.  No other fractures are seen. No dislocation. External artifacts obscure  detail. Bowel gas and formed stool also obscure detail.     RIGHT FEMUR (5V): 5 views of the right femur reveal an acute displaced  comminuted extra-articular closed intertrochanteric fracture of the  right femur. It is probably a 2-part fracture. There is approximately  1.5 cm of medial, or varus, displacement of the large distal right  femoral fracture fragment. No other fractures are seen. No dislocation.  External artifacts obscure detail, limiting assessment for retained  foreign bodies.          Impression:      (COMBINED)  Acute comminuted displaced intertrochanteric  right femoral fracture is  noted, as discussed. No dislocation.              Please note that portions of this note were completed with a voice  recognition program.                 Electronically Signed By-Panda Howard MD On:5/20/2024 10:21 PM       XR Pelvis 1 or 2 View [[id number]] Collected: 05/20/24 2215     Updated: 05/20/24 2223    Narrative:      XR FEMUR 2 VW RIGHT-, XR PELVIS 1 OR 2 VW-     (COMBINED REPORT)     Date of Exam: 5/20/2024 10:00 PM     Indication: 44-year-old male who fell; c/o pelvic & right leg pain;  trauma/injury; initial encounter.     Comparison: None available.     Findings:     PELVIS (1V): A single AP view of the pelvis reveals an acute displaced  comminuted intertrochanteric extra-articular probably closed fracture of  the proximal right femur (probably a two-part fracture). There is varus  (medial) displacement and angulation of large distal fracture fragment.  No other fractures are seen. No dislocation. External artifacts obscure  detail. Bowel gas and formed stool also obscure detail.     RIGHT FEMUR (5V): 5 views of the right femur reveal an acute displaced  comminuted extra-articular closed intertrochanteric fracture of the  right femur. It is probably a 2-part fracture. There is approximately  1.5 cm of medial, or varus, displacement of the large distal right  femoral fracture fragment. No other fractures are seen. No dislocation.  External artifacts obscure detail, limiting assessment for retained  foreign bodies.          Impression:      (COMBINED)  Acute comminuted displaced intertrochanteric right femoral fracture is  noted, as discussed. No dislocation.              Please note that portions of this note were completed with a voice  recognition program.                 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 after midnight  Pain control with IV analgesics  IV fluids  Orthopedics consulted Dr. Clayton  Type and screen  EKG preop  Monitor electrolytes, renal function with a.m. labs  Monitor urine output  Once the baseline creatinine is established, get CT chest abdomen pelvis with contrast to further evaluate for nodule noted in the left perihilar region and for any underlying malignancy  Nursing dysphagia screen  IV thiamine  IV folic acid  Multivitamin  Hypoglycemic protocol  Precautions  PT OT consult    DVT prophylaxis:  Mechanical DVT prophylaxis orders are signed and held.      CODE STATUS:    Level Of Support Discussed With: Patient  Code Status (Patient has no pulse and is not breathing): CPR (Attempt to Resuscitate)  Medical Interventions (Patient has pulse or is breathing): Full Support      Admission Status:  I believe this patient meets inpatient status.    Part of this note may be an electronic transcription/translation of spoken language to printed text using the Dragon Dictation System    Charlene Justice MD

## 2024-05-21 NOTE — PROGRESS NOTES
[facility]   Hospitalist Progress Note    Date of admission: 5/20/2024  Patient Name: Oswaldo Bowen  1980  Date: 5/21/2024      Subjective     Chief Complaint   Patient presents with    Fall    Leg Pain       Interval Followup: tired postop, wants to be left alone/to sleep      Objective     Vitals:   Temp:  [97.4 °F (36.3 °C)-97.8 °F (36.6 °C)] 97.4 °F (36.3 °C)  Heart Rate:  [] 97  Resp:  [16-18] 18  BP: (112-132)/(76-89) 116/76    Physical Exam  Tired, in bed, appears older than stated age  Nonlabored respiration, no acute wheezing  Elev rate, rr  Abd soft  Dressing in place, distal perfusion appears intact    Result Review:  Vital signs, labs and recent relevant imaging reviewed.      CBC          5/20/2024    21:34 5/21/2024    05:28   CBC   WBC 19.54  14.66    RBC 4.23  3.52    Hemoglobin 11.9  10.0    Hematocrit 37.0  31.3    MCV 87.5  88.9    MCH 28.1  28.4    MCHC 32.2  31.9    RDW 12.7  12.6    Platelets 410  351      CMP          5/20/2024    21:34 5/21/2024    05:28   CMP   Glucose 183  125    BUN 21  22    Creatinine 1.72  0.91    EGFR 49.6  106.6    Sodium 137  136    Potassium 3.9  3.5    Chloride 95  100    Calcium 10.5  9.2    Total Protein 8.1  6.8    Albumin 3.3  2.9    Globulin 4.8  3.9    Total Bilirubin 0.4  0.3    Alkaline Phosphatase 142  122    AST (SGOT) 20  19    ALT (SGPT) 23  19    Albumin/Globulin Ratio 0.7  0.7    BUN/Creatinine Ratio 12.2  24.2    Anion Gap 17.6  12.0          acetaminophen    aluminum-magnesium hydroxide-simethicone    senna-docusate sodium **AND** polyethylene glycol **AND** bisacodyl **AND** bisacodyl    dextrose    dextrose    Diclofenac Sodium    glucagon (human recombinant)    HYDROcodone-acetaminophen    HYDROcodone-acetaminophen    hydrOXYzine    Lidocaine    melatonin    Morphine **AND** [DISCONTINUED] naloxone    Morphine **AND** naloxone    nicotine    ondansetron    prochlorperazine    sodium chloride    sodium chloride    folic acid 1 mg in  sodium chloride 0.9 % 50 mL IVPB, 1 mg, Intravenous, Daily  multivitamin with minerals, 1 tablet, Oral, Daily  sodium chloride, 10 mL, Intravenous, Q12H  thiamine (B-1) IV, 100 mg, Intravenous, Daily        XR Chest 1 View    Result Date: 5/20/2024  Impression:  1. Questionable 14 mm nodule projecting in the left perihilar region. Follow-up CT scan of the chest would be recommended when clinically feasible.   Electronically Signed By-Eliecer Jara MD On:5/20/2024 10:23 PM      XR Femur 2 View Right    Result Date: 5/20/2024  (COMBINED) Acute comminuted displaced intertrochanteric right femoral fracture is noted, as discussed. No dislocation.     Please note that portions of this note were completed with a voice recognition program.      Electronically Signed By-Panda Howard MD On:5/20/2024 10:21 PM      XR Pelvis 1 or 2 View    Result Date: 5/20/2024  (COMBINED) Acute comminuted displaced intertrochanteric right femoral fracture is noted, as discussed. No dislocation.     Please note that portions of this note were completed with a voice recognition program.      Electronically Signed By-Panda Howard MD On:5/20/2024 10:21 PM       Assessment / Plan     Summary: 44M with intellectual disability, 1-2PPD smoking, depression, who presented after a fall (possibly 1 month ago but may have been more recently), found to have right femoral fracture and VLAD.  Ortho assisting     Assessment/Plan (clinically significant if listed here)  Mechanical fall with acute comminuted displaced intertrochanteric right femoral fracture  S/p 5/21 right hip subtrochanteric nailing of closed displaced right femur fracture by Dr. Nelson  VLAD creatinine 1.7 leukocytosis suspect reactive in setting of acute fracture  14 mm nodule left perihilar region in setting of smoking history  1 to 2 pack/day smoker, chronic  Intellectual disability  Depression  Normocytic anemia    Orthopedic surgery consult for fracture, s/p surgery today   Iv and po  prn pain meds, monitor sedation closely, schedule and prn bowel regimen  Pt/ot  Periop abx/dvt ppx as per ortho  check anemia studies  repeat inr in am  Nrt prn / smoking cessation   check ct chest given lung nodule / smoking hx   Iv fluids   Assess for rehab needs postop   PT/OT  Check a.m. CBC, BMP, magnesium, phosphorus and as above  Continue hospitalization at current level of care    Dispo: possibly rehab once stable  D/w clinical      DVT prophylaxis:  Mechanical DVT prophylaxis orders are present.      Level Of Support Discussed With: Patient  Code Status (Patient has no pulse and is not breathing): CPR (Attempt to Resuscitate)  Medical Interventions (Patient has pulse or is breathing): Full Support

## 2024-05-21 NOTE — ED PROVIDER NOTES
"Time: 11:10 PM EDT  Date of encounter:  5/20/2024  Independent Historian/Clinical History and Information was obtained by:   Patient    History is limited by: N/A    Chief Complaint: hip pain      History of Present Illness:  Patient is a 44 y.o. year old male who presents to the emergency department for evaluation of Right hip pain after a fall.  Family said he has been having some leg pain for quite a while and then today had a fall and was unable to bear weight.  Family said he has not seen a doctor in several years.  They have also noticed that he is lost some weight.  They states he is not eating well.  He denies any other injury.  He did not hit his head or lose consciousness.    HPI    Patient Care Team  Primary Care Provider: Provider, No Known    Past Medical History:     No Known Allergies  No past medical history on file.  No past surgical history on file.  No family history on file.    Home Medications:  Prior to Admission medications    Not on File        Social History:          Review of Systems:  Review of Systems   Constitutional:  Positive for appetite change and unexpected weight change. Negative for chills and fever.   HENT:  Negative for congestion, ear pain and sore throat.    Eyes:  Negative for pain.   Respiratory:  Negative for cough, chest tightness and shortness of breath.    Cardiovascular:  Negative for chest pain.   Gastrointestinal:  Negative for abdominal pain, diarrhea, nausea and vomiting.   Genitourinary:  Negative for flank pain and hematuria.   Musculoskeletal:  Positive for arthralgias. Negative for joint swelling.   Skin:  Negative for pallor.   Neurological:  Negative for seizures and headaches.   All other systems reviewed and are negative.       Physical Exam:  /88 (BP Location: Left arm, Patient Position: Sitting)   Pulse 115   Temp 97.8 °F (36.6 °C) (Oral)   Resp 18   Ht 182.9 cm (72\")   Wt 66 kg (145 lb 8.1 oz)   SpO2 99%   BMI 19.73 kg/m²     Physical " Exam  Constitutional:       Appearance: Normal appearance.   HENT:      Head: Normocephalic and atraumatic.      Nose: Nose normal.      Mouth/Throat:      Mouth: Mucous membranes are moist.   Eyes:      Extraocular Movements: Extraocular movements intact.      Conjunctiva/sclera: Conjunctivae normal.      Pupils: Pupils are equal, round, and reactive to light.   Cardiovascular:      Rate and Rhythm: Normal rate and regular rhythm.      Pulses: Normal pulses.      Heart sounds: Normal heart sounds.   Pulmonary:      Effort: Pulmonary effort is normal.      Breath sounds: Normal breath sounds.   Abdominal:      General: There is no distension.      Palpations: Abdomen is soft.      Tenderness: There is no abdominal tenderness.   Musculoskeletal:         General: Normal range of motion.      Cervical back: Normal range of motion.      Comments: Unable to range right hip secondary to pain.  Right leg neurovascularly intact.  No pain over knee or ankle.   Skin:     General: Skin is warm and dry.      Capillary Refill: Capillary refill takes less than 2 seconds.   Neurological:      General: No focal deficit present.      Mental Status: He is alert and oriented to person, place, and time. Mental status is at baseline.   Psychiatric:         Mood and Affect: Mood normal.         Behavior: Behavior normal.                  Procedures:  Procedures      Medical Decision Making:      Comorbidities that affect care:    None    External Notes reviewed:    None      The following orders were placed and all results were independently analyzed by me:  Orders Placed This Encounter   Procedures    XR Femur 2 View Right    XR Pelvis 1 or 2 View    XR Chest 1 View    [place] Draw    Comprehensive Metabolic Panel    CBC Auto Differential    Protime-INR    aPTT    Magnesium    Phosphorus    CK    Inpatient Hospitalist Consult    IP Consult to Orthopedic Surgery    Insert peripheral IV    CBC & Differential    Green Top (Gel)    Lavender  Top    Gold Top - SST    Light Blue Top       Medications Given in the Emergency Department:  Medications   sodium chloride 0.9 % flush 10 mL (has no administration in time range)   morphine injection 4 mg (has no administration in time range)   sodium chloride 0.9 % bolus 1,000 mL (has no administration in time range)        ED Course:         Labs:    Lab Results (last 24 hours)       Procedure Component Value Units Date/Time    CBC & Differential [[id number]]  (Abnormal) Collected: 05/20/24 2134    Specimen: Blood Updated: 05/20/24 2152    Narrative:      The following orders were created for panel order CBC & Differential.  Procedure                               Abnormality         Status                     ---------                               -----------         ------                     CBC Auto Differential[[id number]]        Abnormal            Final result                 Please view results for these tests on the individual orders.    Comprehensive Metabolic Panel [[id number]]  (Abnormal) Collected: 05/20/24 2134    Specimen: Blood Updated: 05/20/24 2211     Glucose 183 mg/dL      BUN 21 mg/dL      Creatinine 1.72 mg/dL      Sodium 137 mmol/L      Potassium 3.9 mmol/L      Chloride 95 mmol/L      CO2 24.4 mmol/L      Calcium 10.5 mg/dL      Total Protein 8.1 g/dL      Albumin 3.3 g/dL      ALT (SGPT) 23 U/L      AST (SGOT) 20 U/L      Alkaline Phosphatase 142 U/L      Total Bilirubin 0.4 mg/dL      Globulin 4.8 gm/dL      A/G Ratio 0.7 g/dL      BUN/Creatinine Ratio 12.2     Anion Gap 17.6 mmol/L      eGFR 49.6 mL/min/1.73     Narrative:      GFR Normal >60  Chronic Kidney Disease <60  Kidney Failure <15      CBC Auto Differential [[id number]]  (Abnormal) Collected: 05/20/24 2134    Specimen: Blood Updated: 05/20/24 2152     WBC 19.54 10*3/mm3      RBC 4.23 10*6/mm3      Hemoglobin 11.9 g/dL      Hematocrit 37.0 %      MCV 87.5 fL      MCH 28.1 pg      MCHC 32.2 g/dL      RDW 12.7 %      RDW-SD 40.4 fl       MPV 9.9 fL      Platelets 410 10*3/mm3      Neutrophil % 91.2 %      Lymphocyte % 2.7 %      Monocyte % 5.4 %      Eosinophil % 0.1 %      Basophil % 0.1 %      Immature Grans % 0.5 %      Neutrophils, Absolute 17.84 10*3/mm3      Lymphocytes, Absolute 0.52 10*3/mm3      Monocytes, Absolute 1.05 10*3/mm3      Eosinophils, Absolute 0.01 10*3/mm3      Basophils, Absolute 0.02 10*3/mm3      Immature Grans, Absolute 0.10 10*3/mm3      nRBC 0.0 /100 WBC     Protime-INR [[id number]] Collected: 05/20/24 2134    Specimen: Blood Updated: 05/20/24 2259    aPTT [[id number]] Collected: 05/20/24 2134    Specimen: Blood Updated: 05/20/24 2259    Magnesium [[id number]] Collected: 05/20/24 2134    Specimen: Blood Updated: 05/20/24 2310    Phosphorus [[id number]] Collected: 05/20/24 2134    Specimen: Blood Updated: 05/20/24 2310    CK [[id number]] Collected: 05/20/24 2134    Specimen: Blood Updated: 05/20/24 2310             Imaging:    XR Chest 1 View    Result Date: 5/20/2024  XR CHEST 1 VW-  Date of Exam: 5/20/2024 10:00 PM  Indication: Cough, persistent  Comparison: None available.  Findings: Heart size and pulmonary vessels are within normal limits. There is a 14 mm left perihilar nodular density. Follow-up CT scan of the chest would be recommended when clinically feasible. The lungs are otherwise clear. No pleural effusion. No pneumothorax. Bony structures are unremarkable.      Impression:  1. Questionable 14 mm nodule projecting in the left perihilar region. Follow-up CT scan of the chest would be recommended when clinically feasible.   Electronically Signed By-Eliecer Jara MD On:5/20/2024 10:23 PM      XR Femur 2 View Right, XR Pelvis 1 or 2 View    Result Date: 5/20/2024  XR FEMUR 2 VW RIGHT-, XR PELVIS 1 OR 2 VW-  (COMBINED REPORT)  Date of Exam: 5/20/2024 10:00 PM  Indication: 44-year-old male who fell; c/o pelvic & right leg pain; trauma/injury; initial encounter.  Comparison: None available.  Findings:  PELVIS (1V): A  single AP view of the pelvis reveals an acute displaced comminuted intertrochanteric extra-articular probably closed fracture of the proximal right femur (probably a two-part fracture). There is varus (medial) displacement and angulation of large distal fracture fragment. No other fractures are seen. No dislocation. External artifacts obscure detail. Bowel gas and formed stool also obscure detail.  RIGHT FEMUR (5V): 5 views of the right femur reveal an acute displaced comminuted extra-articular closed intertrochanteric fracture of the right femur. It is probably a 2-part fracture. There is approximately 1.5 cm of medial, or varus, displacement of the large distal right femoral fracture fragment. No other fractures are seen. No dislocation. External artifacts obscure detail, limiting assessment for retained foreign bodies.       (COMBINED) Acute comminuted displaced intertrochanteric right femoral fracture is noted, as discussed. No dislocation.     Please note that portions of this note were completed with a voice recognition program.      Electronically Signed By-Panda Howard MD On:5/20/2024 10:21 PM         Differential Diagnosis and Discussion:    Orthopedic Injuries: Differential diagnosis includes but is not limited to fractures, soft tissue injuries, dislocations, contusions, ligamentous injuries, tendon injuries, nerve injuries, compartment syndrome, bursitis, and vascular injuries.    All labs were reviewed and interpreted by me.  All X-rays impressions were independently interpreted by me.    MDM  Number of Diagnoses or Management Options  Diagnosis management comments: Patient presented to the emergency department after a fall.  X-ray was obtained that showed a right intertrochanteric fracture.  I discussed this with orthopedic surgeon Dr. Clayton who will see him in the hospital.  Discussed patient with hospitalist and will admit for further care.       Amount and/or Complexity of Data Reviewed  Clinical  lab tests: reviewed  Tests in the radiology section of CPT®: reviewed  Review and summarize past medical records: yes  Independent visualization of images, tracings, or specimens: yes    Risk of Complications, Morbidity, and/or Mortality  Presenting problems: moderate  Management options: moderate                 Patient Care Considerations:    CT HEAD: I considered ordering a noncontrast CT of the head, however no head injury      Consultants/Shared Management Plan:    Hospitalist: I have discussed the case with Dr Justice who agrees to accept the patient for admission.  Consultant: I have discussed the case with Dr Clayton who agrees to consult on the patient.    Social Determinants of Health:    Patient has presented with family members who are responsible, reliable and will ensure follow up care.      Disposition and Care Coordination:    Admit:   Through independent evaluation of the patient's history, physical, and imperical data, the patient meets criteria for inpatient admission to the hospital.        Final diagnoses:   Closed displaced intertrochanteric fracture of right femur, initial encounter   Pulmonary nodule        ED Disposition       ED Disposition   Decision to Admit    Condition   --    Comment   --               This medical record created using voice recognition software.             Prakash Watts MD  05/20/24 0940

## 2024-05-21 NOTE — PAYOR COMM NOTE
"UR DEPARTMENT    Shahla Comer RN  Phone [phone]  Fax [phone]    [address] [address]    NPI [id number]  TAX ID [id number]    PHYSICIAN NAME AND NPI    PABLODANIEL ROSASDE   [id number]    BED TYPE  INPATIENT MEDICAL    TYPE OF ADMISSION: ED ADMISSION MEDICAL    ICD 10 CODE  S72.141A    DATE OF ADMISSION 05/21/2024      Beba Bowen (44 y.o. Male)       Date of Birth   1980    Social Security Number       Address   [address] [place] [id number]    Home Phone       MRN   [id number]       Rastafari   None    Marital Status   Single                            Admission Date   5/20/24    Admission Type   Emergency    Admitting Provider   Charlene Justice MD    Attending Provider   Michael Benitez MD    Department, Room/Bed   [facility], 3009/1       Discharge Date       Discharge Disposition       Discharge Destination                                 Attending Provider: Michael Benitez MD    Allergies: No Known Allergies    Isolation: None   Infection: None   Code Status: CPR    Ht: 182.9 cm (72.01\")   Wt: 65 kg (143 lb 4.8 oz)    Admission Cmt: None   Principal Problem: Closed intertrochanteric fracture of right femur [S72.141A]                   Active Insurance as of 5/20/2024       Primary Coverage       Payor Plan Insurance Group Employer/Plan Group    [facility] BY JUAN ALBERTO [id number]       Payor Plan Address Payor Plan Phone Number Payor Plan Fax Number Effective Dates    [address] [address]   1/1/2021 - None Entered    [facility] [address]         Subscriber Name Subscriber Birth Date Member ID       BEBA BOWEN 1980 [id number]                     Emergency Contacts        (Rel.) Home Phone Work Phone Mobile Phone    jimbo ramos (Other) -- -- [phone]    faye ramos (Relative) -- -- [phone]           Hip Fracture, Open Repair RRG Clinical Indications for " Procedure and Care       Indications Met   Last updated by hSahla Comer on 5/21/2024 1014     Review Status Created By   Primary Completed Shahla Comer      Criteria Review   Hip Fracture, Open Repair RRG Clinical Indications for Procedure and Care     Overall Determination: Indications Met     Criteria:  [×] Procedure is indicated for  1 or more  of the following :      [×] Hip fracture [A]          5/21/2024 10:14 AM              -- 5/21/2024 10:14 AM by Shahla Comer --                  Acute comminuted displaced intertrochanteric right femoral fracture     Notes:  -- 5/21/2024 10:14 AM by Shahla Comer --      44 y.o. male with a past medical history of depression, intellectual disability presented to the ED for evaluation of right leg pain after a fall. Most of the history has been presented by the patient's family members at the bedside, states that patient has been limping due to pain in his right lower extremity since last 1 month. Family member states that patient had a fall on the ramp outside of the house at some point but they were not sure when it was and thinks that probably he has been limping from the pain from that. Today patient was outside in the yard when he was turning around and slipped and fell onto the concrete floor, did not hit his head or lost consciousness. After the fall he could not get up and has severe pain in his right lower extremity.  Chest x-ray showed questionable 14 mm nodule projecting in the left perihilar region. Recommended follow-up CT. X-ray pelvis showed acute comminuted displaced intertrochanteric right femoral fracture. Case has been discussed by the ED physician with orthopedic surgeon on-call, agreed to consult. Patient has been admitted for further evaluation and management of acute comminuted displaced intertrochanteric right femoral fracture, elevated creatinine. Creatinine 1.72.  -115. Received Morphine IV, NS IV fluid bolus in ER.                                     Plan      Admit to inpatient, remote telemetry      N.p.o. after midnight      Pain control with IV analgesics      IV fluids      Orthopedics consulted Dr. Clayton      Type and screen      EKG preop      Monitor electrolytes, renal function with a.m. labs      Monitor urine output      Once the baseline creatinine is established, get CT chest abdomen pelvis with contrast to further evaluate for nodule noted in the left perihilar region and for any underlying malignancy      Nursing dysphagia screen      IV thiamine      IV folic acid      Multivitamin      Hypoglycemic protocol      Precautions      PT OT consult                           Sellers: NPI [id number] Tax ID [id number]     History & Physical        Charlene Justice MD at 24 2307           [facility] HISTORY AND PHYSICAL  Date: 2024   Patient Name: Oswaldo Bowen  : 1980  MRN: [id number]  Primary Care Physician:  Provider, No Known  Date of admission: 2024    Subjective  Subjective     Chief Complaint: Fall, right hip pain    HPI:    Oswaldo Bowen is a 44 y.o. male  with a past medical history of depression, intellectual disability presented to the ED for evaluation of right leg pain after a fall.  Most of the history has been presented by the patient's family members at the bedside, states that patient has been limping due to pain in his right lower extremity since last 1 month.  Family member states that patient had a fall on the ramp outside of the house at some point but they were not sure when it was and thinks that probably he has been limping from the pain from that.  Today patient was outside in the yard when he was turning around and slipped and fell onto the concrete floor, did not hit his head or lost consciousness.  After the fall he could not get up and has severe pain in his right lower extremity.    As per the family members patient has very low appetite and eats very less.  Smokes up to 1 to 2 packs a  day since many years.  Has lost significant amount of weight in the last few months.  Patient appears to be cachectic.  Denies any other complaints.  Patient has mild dysarthria and as per the family members that appears to be normal.    In the ED, vital signs temperature 97.8, pulse 113, respiratory 18, blood pressure 112/88 on room air saturating around 99%.  Labs showed sodium 137, potassium 3.9, chloride 95, creatinine 1.72, unknown baseline, , rest of the CMP with no significant findings, WBC 19.54, hemoglobin 11.9, platelets 410.  Chest x-ray showed questionable 14 mm nodule projecting in the left perihilar region.  Recommended follow-up CT.  X-ray pelvis showed acute comminuted displaced intertrochanteric right femoral fracture.  Case has been discussed by the ED physician with orthopedic surgeon on-call, agreed to consult.  Patient has been admitted for further evaluation and management of acute comminuted displaced intertrochanteric right femoral fracture, elevated creatinine      Personal History     Past medical history  Depression  Intellectual disability    No family history on file.      Social History     Socioeconomic History   • Marital status: Single         Home Medications:       Allergies:  No Known Allergies    Review of Systems   Unable to obtain the complete review of systems as the patient not able to answer all the questions appropriately.    Objective  Objective     Vitals:   Temp:  [97.8 °F (36.6 °C)] 97.8 °F (36.6 °C)  Heart Rate:  [102-115] 102  Resp:  [16-18] 16  BP: (112-132)/(87-89) 124/87    Physical Exam    Constitutional: Awake, alert, no acute distress, cachectic with muscle loss, disheveled   Eyes: Pupils equal, sclerae anicteric, no conjunctival injection   HENT: NCAT, mucous membranes dry   Respiratory: Clear to auscultation bilaterally, nonlabored respirations    Cardiovascular: RRR, no murmurs   Gastrointestinal: Positive bowel sounds, soft, nontender,  nondistended   Musculoskeletal: No bilateral ankle edema, no clubbing or cyanosis to extremities, right lower extremity warm to touch, externally rotated   Neurologic: Oriented x 3, mild dysarthria(at baseline)   Skin: No rashes     Result Review   Result Review:  I have personally reviewed the results from the time of this admission to 5/21/2024 00:58 EDT and agree with these findings:  [x]  Laboratory  []  Microbiology  [x]  Radiology  [x]  EKG/Telemetry   []  Cardiology/Vascular   []  Pathology  []  Old records  []  Other:        Imaging Results (Last 24 Hours)       Procedure Component Value Units Date/Time    XR Chest 1 View [[id number]] Collected: 05/20/24 2220     Updated: 05/20/24 2225    Narrative:      XR CHEST 1 VW-     Date of Exam: 5/20/2024 10:00 PM     Indication: Cough, persistent     Comparison: None available.     Findings:  Heart size and pulmonary vessels are within normal limits. There is a 14  mm left perihilar nodular density. Follow-up CT scan of the chest would  be recommended when clinically feasible. The lungs are otherwise clear.  No pleural effusion. No pneumothorax. Bony structures are unremarkable.       Impression:      Impression:     1. Questionable 14 mm nodule projecting in the left perihilar region.  Follow-up CT scan of the chest would be recommended when clinically  feasible.        Electronically Signed By-Eliecer Jara MD On:5/20/2024 10:23 PM       XR Femur 2 View Right [[id number]] Collected: 05/20/24 2215     Updated: 05/20/24 2223    Narrative:      XR FEMUR 2 VW RIGHT-, XR PELVIS 1 OR 2 VW-     (COMBINED REPORT)     Date of Exam: 5/20/2024 10:00 PM     Indication: 44-year-old male who fell; c/o pelvic & right leg pain;  trauma/injury; initial encounter.     Comparison: None available.     Findings:     PELVIS (1V): A single AP view of the pelvis reveals an acute displaced  comminuted intertrochanteric extra-articular probably closed fracture of  the proximal right femur  (probably a two-part fracture). There is varus  (medial) displacement and angulation of large distal fracture fragment.  No other fractures are seen. No dislocation. External artifacts obscure  detail. Bowel gas and formed stool also obscure detail.     RIGHT FEMUR (5V): 5 views of the right femur reveal an acute displaced  comminuted extra-articular closed intertrochanteric fracture of the  right femur. It is probably a 2-part fracture. There is approximately  1.5 cm of medial, or varus, displacement of the large distal right  femoral fracture fragment. No other fractures are seen. No dislocation.  External artifacts obscure detail, limiting assessment for retained  foreign bodies.          Impression:      (COMBINED)  Acute comminuted displaced intertrochanteric right femoral fracture is  noted, as discussed. No dislocation.              Please note that portions of this note were completed with a voice  recognition program.                 Electronically Signed By-Panda Howard MD On:5/20/2024 10:21 PM       XR Pelvis 1 or 2 View [[id number]] Collected: 05/20/24 2215     Updated: 05/20/24 2223    Narrative:      XR FEMUR 2 VW RIGHT-, XR PELVIS 1 OR 2 VW-     (COMBINED REPORT)     Date of Exam: 5/20/2024 10:00 PM     Indication: 44-year-old male who fell; c/o pelvic & right leg pain;  trauma/injury; initial encounter.     Comparison: None available.     Findings:     PELVIS (1V): A single AP view of the pelvis reveals an acute displaced  comminuted intertrochanteric extra-articular probably closed fracture of  the proximal right femur (probably a two-part fracture). There is varus  (medial) displacement and angulation of large distal fracture fragment.  No other fractures are seen. No dislocation. External artifacts obscure  detail. Bowel gas and formed stool also obscure detail.     RIGHT FEMUR (5V): 5 views of the right femur reveal an acute displaced  comminuted extra-articular closed intertrochanteric fracture  of the  right femur. It is probably a 2-part fracture. There is approximately  1.5 cm of medial, or varus, displacement of the large distal right  femoral fracture fragment. No other fractures are seen. No dislocation.  External artifacts obscure detail, limiting assessment for retained  foreign bodies.          Impression:      (COMBINED)  Acute comminuted displaced intertrochanteric right femoral fracture is  noted, as discussed. No dislocation.              Please note that portions of this note were completed with a voice  recognition program.                 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 after midnight  Pain control with IV analgesics  IV fluids  Orthopedics consulted Dr. Clayton  Type and screen  EKG preop  Monitor electrolytes, renal function with a.m. labs  Monitor urine output  Once the baseline creatinine is established, get CT chest abdomen pelvis with contrast to further evaluate for nodule noted in the left perihilar region and for any underlying malignancy  Nursing dysphagia screen  IV thiamine  IV folic acid  Multivitamin  Hypoglycemic protocol  Precautions  PT OT consult    DVT prophylaxis:  Mechanical DVT prophylaxis orders are signed and held.      CODE STATUS:    Level Of Support Discussed With: Patient  Code Status (Patient has no pulse and is not breathing): CPR (Attempt to Resuscitate)  Medical Interventions (Patient has pulse or is breathing): Full Support      Admission Status:  I believe this patient meets inpatient  status.    Part of this note may be an electronic transcription/translation of spoken language to printed text using the Dragon Dictation System    Charlene Justice MD               Electronically signed by Charlene Justice MD at 05/21/24 0058          Emergency Department Notes        Prakash Watts MD at 05/20/24 8757          Time: 11:10 PM EDT  Date of encounter:  5/20/2024  Independent Historian/Clinical History and Information was obtained by:   Patient    History is limited by: N/A    Chief Complaint: hip pain      History of Present Illness:  Patient is a 44 y.o. year old male who presents to the emergency department for evaluation of Right hip pain after a fall.  Family said he has been having some leg pain for quite a while and then today had a fall and was unable to bear weight.  Family said he has not seen a doctor in several years.  They have also noticed that he is lost some weight.  They states he is not eating well.  He denies any other injury.  He did not hit his head or lose consciousness.    HPI    Patient Care Team  Primary Care Provider: Provider, No Known    Past Medical History:     No Known Allergies  No past medical history on file.  No past surgical history on file.  No family history on file.    Home Medications:  Prior to Admission medications    Not on File        Social History:          Review of Systems:  Review of Systems   Constitutional:  Positive for appetite change and unexpected weight change. Negative for chills and fever.   HENT:  Negative for congestion, ear pain and sore throat.    Eyes:  Negative for pain.   Respiratory:  Negative for cough, chest tightness and shortness of breath.    Cardiovascular:  Negative for chest pain.   Gastrointestinal:  Negative for abdominal pain, diarrhea, nausea and vomiting.   Genitourinary:  Negative for flank pain and hematuria.   Musculoskeletal:  Positive for arthralgias. Negative for joint swelling.   Skin:  Negative for  "pallor.   Neurological:  Negative for seizures and headaches.   All other systems reviewed and are negative.       Physical Exam:  /88 (BP Location: Left arm, Patient Position: Sitting)   Pulse 115   Temp 97.8 °F (36.6 °C) (Oral)   Resp 18   Ht 182.9 cm (72\")   Wt 66 kg (145 lb 8.1 oz)   SpO2 99%   BMI 19.73 kg/m²     Physical Exam  Constitutional:       Appearance: Normal appearance.   HENT:      Head: Normocephalic and atraumatic.      Nose: Nose normal.      Mouth/Throat:      Mouth: Mucous membranes are moist.   Eyes:      Extraocular Movements: Extraocular movements intact.      Conjunctiva/sclera: Conjunctivae normal.      Pupils: Pupils are equal, round, and reactive to light.   Cardiovascular:      Rate and Rhythm: Normal rate and regular rhythm.      Pulses: Normal pulses.      Heart sounds: Normal heart sounds.   Pulmonary:      Effort: Pulmonary effort is normal.      Breath sounds: Normal breath sounds.   Abdominal:      General: There is no distension.      Palpations: Abdomen is soft.      Tenderness: There is no abdominal tenderness.   Musculoskeletal:         General: Normal range of motion.      Cervical back: Normal range of motion.      Comments: Unable to range right hip secondary to pain.  Right leg neurovascularly intact.  No pain over knee or ankle.   Skin:     General: Skin is warm and dry.      Capillary Refill: Capillary refill takes less than 2 seconds.   Neurological:      General: No focal deficit present.      Mental Status: He is alert and oriented to person, place, and time. Mental status is at baseline.   Psychiatric:         Mood and Affect: Mood normal.         Behavior: Behavior normal.                  Procedures:  Procedures      Medical Decision Making:      Comorbidities that affect care:    None    External Notes reviewed:    None      The following orders were placed and all results were independently analyzed by me:  Orders Placed This Encounter   Procedures "   • XR Femur 2 View Right   • XR Pelvis 1 or 2 View   • XR Chest 1 View   • [place] Draw   • Comprehensive Metabolic Panel   • CBC Auto Differential   • Protime-INR   • aPTT   • Magnesium   • Phosphorus   • CK   • Inpatient Hospitalist Consult   • IP Consult to Orthopedic Surgery   • Insert peripheral IV   • CBC & Differential   • Green Top (Gel)   • Lavender Top   • Gold Top - SST   • Light Blue Top       Medications Given in the Emergency Department:  Medications   sodium chloride 0.9 % flush 10 mL (has no administration in time range)   morphine injection 4 mg (has no administration in time range)   sodium chloride 0.9 % bolus 1,000 mL (has no administration in time range)        ED Course:         Labs:    Lab Results (last 24 hours)       Procedure Component Value Units Date/Time    CBC & Differential [[id number]]  (Abnormal) Collected: 05/20/24 2134    Specimen: Blood Updated: 05/20/24 2152    Narrative:      The following orders were created for panel order CBC & Differential.  Procedure                               Abnormality         Status                     ---------                               -----------         ------                     CBC Auto Differential[[id number]]        Abnormal            Final result                 Please view results for these tests on the individual orders.    Comprehensive Metabolic Panel [[id number]]  (Abnormal) Collected: 05/20/24 2134    Specimen: Blood Updated: 05/20/24 2211     Glucose 183 mg/dL      BUN 21 mg/dL      Creatinine 1.72 mg/dL      Sodium 137 mmol/L      Potassium 3.9 mmol/L      Chloride 95 mmol/L      CO2 24.4 mmol/L      Calcium 10.5 mg/dL      Total Protein 8.1 g/dL      Albumin 3.3 g/dL      ALT (SGPT) 23 U/L      AST (SGOT) 20 U/L      Alkaline Phosphatase 142 U/L      Total Bilirubin 0.4 mg/dL      Globulin 4.8 gm/dL      A/G Ratio 0.7 g/dL      BUN/Creatinine Ratio 12.2     Anion Gap 17.6 mmol/L      eGFR 49.6 mL/min/1.73     Narrative:      GFR  Normal >60  Chronic Kidney Disease <60  Kidney Failure <15      CBC Auto Differential [[id number]]  (Abnormal) Collected: 05/20/24 2134    Specimen: Blood Updated: 05/20/24 2152     WBC 19.54 10*3/mm3      RBC 4.23 10*6/mm3      Hemoglobin 11.9 g/dL      Hematocrit 37.0 %      MCV 87.5 fL      MCH 28.1 pg      MCHC 32.2 g/dL      RDW 12.7 %      RDW-SD 40.4 fl      MPV 9.9 fL      Platelets 410 10*3/mm3      Neutrophil % 91.2 %      Lymphocyte % 2.7 %      Monocyte % 5.4 %      Eosinophil % 0.1 %      Basophil % 0.1 %      Immature Grans % 0.5 %      Neutrophils, Absolute 17.84 10*3/mm3      Lymphocytes, Absolute 0.52 10*3/mm3      Monocytes, Absolute 1.05 10*3/mm3      Eosinophils, Absolute 0.01 10*3/mm3      Basophils, Absolute 0.02 10*3/mm3      Immature Grans, Absolute 0.10 10*3/mm3      nRBC 0.0 /100 WBC     Protime-INR [[id number]] Collected: 05/20/24 2134    Specimen: Blood Updated: 05/20/24 2259    aPTT [[id number]] Collected: 05/20/24 2134    Specimen: Blood Updated: 05/20/24 2259    Magnesium [[id number]] Collected: 05/20/24 2134    Specimen: Blood Updated: 05/20/24 2310    Phosphorus [[id number]] Collected: 05/20/24 2134    Specimen: Blood Updated: 05/20/24 2310    CK [[id number]] Collected: 05/20/24 2134    Specimen: Blood Updated: 05/20/24 2310             Imaging:    XR Chest 1 View    Result Date: 5/20/2024  XR CHEST 1 VW-  Date of Exam: 5/20/2024 10:00 PM  Indication: Cough, persistent  Comparison: None available.  Findings: Heart size and pulmonary vessels are within normal limits. There is a 14 mm left perihilar nodular density. Follow-up CT scan of the chest would be recommended when clinically feasible. The lungs are otherwise clear. No pleural effusion. No pneumothorax. Bony structures are unremarkable.      Impression:  1. Questionable 14 mm nodule projecting in the left perihilar region. Follow-up CT scan of the chest would be recommended when clinically feasible.   Electronically Signed ByRenee  MD Marek On:5/20/2024 10:23 PM      XR Femur 2 View Right, XR Pelvis 1 or 2 View    Result Date: 5/20/2024  XR FEMUR 2 VW RIGHT-, XR PELVIS 1 OR 2 VW-  (COMBINED REPORT)  Date of Exam: 5/20/2024 10:00 PM  Indication: 44-year-old male who fell; c/o pelvic & right leg pain; trauma/injury; initial encounter.  Comparison: None available.  Findings:  PELVIS (1V): A single AP view of the pelvis reveals an acute displaced comminuted intertrochanteric extra-articular probably closed fracture of the proximal right femur (probably a two-part fracture). There is varus (medial) displacement and angulation of large distal fracture fragment. No other fractures are seen. No dislocation. External artifacts obscure detail. Bowel gas and formed stool also obscure detail.  RIGHT FEMUR (5V): 5 views of the right femur reveal an acute displaced comminuted extra-articular closed intertrochanteric fracture of the right femur. It is probably a 2-part fracture. There is approximately 1.5 cm of medial, or varus, displacement of the large distal right femoral fracture fragment. No other fractures are seen. No dislocation. External artifacts obscure detail, limiting assessment for retained foreign bodies.       (COMBINED) Acute comminuted displaced intertrochanteric right femoral fracture is noted, as discussed. No dislocation.     Please note that portions of this note were completed with a voice recognition program.      Electronically Signed By-Panda Howard MD On:5/20/2024 10:21 PM         Differential Diagnosis and Discussion:    Orthopedic Injuries: Differential diagnosis includes but is not limited to fractures, soft tissue injuries, dislocations, contusions, ligamentous injuries, tendon injuries, nerve injuries, compartment syndrome, bursitis, and vascular injuries.    All labs were reviewed and interpreted by me.  All X-rays impressions were independently interpreted by me.    MDM  Number of Diagnoses or Management  Options  Diagnosis management comments: Patient presented to the emergency department after a fall.  X-ray was obtained that showed a right intertrochanteric fracture.  I discussed this with orthopedic surgeon Dr. Clayton who will see him in the hospital.  Discussed patient with hospitalist and will admit for further care.       Amount and/or Complexity of Data Reviewed  Clinical lab tests: reviewed  Tests in the radiology section of CPT®: reviewed  Review and summarize past medical records: yes  Independent visualization of images, tracings, or specimens: yes    Risk of Complications, Morbidity, and/or Mortality  Presenting problems: moderate  Management options: moderate                 Patient Care Considerations:    CT HEAD: I considered ordering a noncontrast CT of the head, however no head injury      Consultants/Shared Management Plan:    Hospitalist: I have discussed the case with Dr Justice who agrees to accept the patient for admission.  Consultant: I have discussed the case with Dr Clayton who agrees to consult on the patient.    Social Determinants of Health:    Patient has presented with family members who are responsible, reliable and will ensure follow up care.      Disposition and Care Coordination:    Admit:   Through independent evaluation of the patient's history, physical, and imperical data, the patient meets criteria for inpatient admission to the hospital.        Final diagnoses:   Closed displaced intertrochanteric fracture of right femur, initial encounter   Pulmonary nodule        ED Disposition       ED Disposition   Decision to Admit    Condition   --    Comment   --               This medical record created using voice recognition software.             Prakash Watts MD  05/20/24 [id number]      Electronically signed by Prakash Watts MD at 05/20/24 2374       Current Facility-Administered Medications   Medication Dose Route Frequency Provider Last Rate Last Admin   • sennosides-docusate  (PERICOLACE) 8.6-50 MG per tablet 2 tablet  2 tablet Oral BID PRN Charlene Justice MD        And   • polyethylene glycol (MIRALAX) packet 17 g  17 g Oral Daily PRN Charlene Justice MD        And   • bisacodyl (DULCOLAX) EC tablet 5 mg  5 mg Oral Daily PRN Charlene Justice MD        And   • bisacodyl (DULCOLAX) suppository 10 mg  10 mg Rectal Daily PRN Charlene Justice MD       • dextrose (D50W) (25 g/50 mL) IV injection 25 g  25 g Intravenous Q15 Min PRN Charlene Justice MD       • dextrose (GLUTOSE) oral gel 15 g  15 g Oral Q15 Min PRN Charlene Justice MD       • folic acid 1 mg in sodium chloride 0.9 % 50 mL IVPB  1 mg Intravenous Daily Charlene Justice  mL/hr at 05/21/24 1018 1 mg at 05/21/24 1018   • glucagon (GLUCAGEN) injection 1 mg  1 mg Intramuscular Q15 Min PRN Charlene Justice MD       • morphine injection 1 mg  1 mg Intravenous Q4H PRN Charlene Justice MD        And   • naloxone (NARCAN) injection 0.4 mg  0.4 mg Intravenous Q5 Min PRN Charlene Justice MD       • morphine injection 2 mg  2 mg Intravenous Q3H PRN Charlene Justice MD   2 mg at 05/21/24 0601    And   • naloxone (NARCAN) injection 0.4 mg  0.4 mg Intravenous Q5 Min PRN Charlene Justice MD       • multivitamin with minerals 1 tablet  1 tablet Oral Daily Charlene Justice MD   1 tablet at 05/21/24 0934   • sodium chloride 0.9 % flush 10 mL  10 mL Intravenous PRN Charlene Justice MD       • sodium chloride 0.9 % flush 10 mL  10 mL Intravenous Q12H Charlene Justice MD   10 mL at 05/21/24 0934   • sodium chloride 0.9 % flush 10 mL  10 mL Intravenous PRN Charlene Justice MD       • sodium chloride 0.9 % infusion 40 mL  40 mL Intravenous PRN Charlene Justice MD       • sodium chloride 0.9 % infusion  100 mL/hr Intravenous Continuous Charlene Justice  mL/hr at 05/21/24 0218 100 mL/hr at 05/21/24 0218   • thiamine (B-1) injection 100 mg  100 mg Intravenous Daily Charlene Justice MD    100 mg at 05/21/24 0933     Physician Progress Notes (last 24 hours)  Notes from 05/20/24 1019 through 05/21/24 1019   No notes of this type exist for this encounter.       Consult Notes (last 24 hours)  Notes from 05/20/24 1019 through 05/21/24 1019   No notes of this type exist for this encounter.

## 2024-05-22 ENCOUNTER — APPOINTMENT (OUTPATIENT)
Dept: MRI IMAGING | Facility: HOSPITAL | Age: 44
DRG: 477 | End: 2024-05-22
Payer: COMMERCIAL

## 2024-05-22 ENCOUNTER — APPOINTMENT (OUTPATIENT)
Dept: CT IMAGING | Facility: HOSPITAL | Age: 44
DRG: 477 | End: 2024-05-22
Payer: COMMERCIAL

## 2024-05-22 PROBLEM — R91.1 PULMONARY NODULE: Status: ACTIVE | Noted: 2024-05-20

## 2024-05-22 LAB
ANION GAP SERPL CALCULATED.3IONS-SCNC: 8.8 MMOL/L (ref 5–15)
B2 MICROGLOB SERPL-MCNC: 1.5 MG/L (ref 0.8–2.2)
BASOPHILS # BLD AUTO: 0.01 10*3/MM3 (ref 0–0.2)
BASOPHILS NFR BLD AUTO: 0.1 % (ref 0–1.5)
BUN SERPL-MCNC: 16 MG/DL (ref 6–20)
BUN/CREAT SERPL: 29.1 (ref 7–25)
CALCIUM SPEC-SCNC: 8.4 MG/DL (ref 8.6–10.5)
CHLORIDE SERPL-SCNC: 99 MMOL/L (ref 98–107)
CO2 SERPL-SCNC: 25.2 MMOL/L (ref 22–29)
CREAT SERPL-MCNC: 0.55 MG/DL (ref 0.76–1.27)
DEPRECATED RDW RBC AUTO: 41.2 FL (ref 37–54)
EGFRCR SERPLBLD CKD-EPI 2021: 125.3 ML/MIN/1.73
EOSINOPHIL # BLD AUTO: 0 10*3/MM3 (ref 0–0.4)
EOSINOPHIL NFR BLD AUTO: 0 % (ref 0.3–6.2)
ERYTHROCYTE [DISTWIDTH] IN BLOOD BY AUTOMATED COUNT: 12.7 % (ref 12.3–15.4)
FERRITIN SERPL-MCNC: 2768 NG/ML (ref 30–400)
FOLATE SERPL-MCNC: 7.7 NG/ML (ref 4.78–24.2)
GLUCOSE SERPL-MCNC: 125 MG/DL (ref 65–99)
HCT VFR BLD AUTO: 29.3 % (ref 37.5–51)
HGB BLD-MCNC: 9.2 G/DL (ref 13–17.7)
IMM GRANULOCYTES # BLD AUTO: 0.04 10*3/MM3 (ref 0–0.05)
IMM GRANULOCYTES NFR BLD AUTO: 0.3 % (ref 0–0.5)
INR PPP: 1.26 (ref 0.86–1.15)
IRON 24H UR-MRATE: 16 MCG/DL (ref 59–158)
IRON SATN MFR SERPL: 11 % (ref 20–50)
LDH SERPL-CCNC: 736 U/L (ref 135–225)
LYMPHOCYTES # BLD AUTO: 0.97 10*3/MM3 (ref 0.7–3.1)
LYMPHOCYTES NFR BLD AUTO: 7.6 % (ref 19.6–45.3)
MAGNESIUM SERPL-MCNC: 2.1 MG/DL (ref 1.6–2.6)
MCH RBC QN AUTO: 28 PG (ref 26.6–33)
MCHC RBC AUTO-ENTMCNC: 31.4 G/DL (ref 31.5–35.7)
MCV RBC AUTO: 89.1 FL (ref 79–97)
MONOCYTES # BLD AUTO: 0.82 10*3/MM3 (ref 0.1–0.9)
MONOCYTES NFR BLD AUTO: 6.4 % (ref 5–12)
NEUTROPHILS NFR BLD AUTO: 11 10*3/MM3 (ref 1.7–7)
NEUTROPHILS NFR BLD AUTO: 85.6 % (ref 42.7–76)
NRBC BLD AUTO-RTO: 0 /100 WBC (ref 0–0.2)
PHOSPHATE SERPL-MCNC: 2.7 MG/DL (ref 2.5–4.5)
PLATELET # BLD AUTO: 359 10*3/MM3 (ref 140–450)
PMV BLD AUTO: 10.2 FL (ref 6–12)
POTASSIUM SERPL-SCNC: 3.4 MMOL/L (ref 3.5–5.2)
PROTHROMBIN TIME: 16 SECONDS (ref 11.8–14.9)
RBC # BLD AUTO: 3.29 10*6/MM3 (ref 4.14–5.8)
RETICS # AUTO: 0.04 10*6/MM3 (ref 0.02–0.13)
RETICS/RBC NFR AUTO: 1.26 % (ref 0.7–1.9)
SODIUM SERPL-SCNC: 133 MMOL/L (ref 136–145)
TIBC SERPL-MCNC: 150 MCG/DL (ref 298–536)
TRANSFERRIN SERPL-MCNC: 101 MG/DL (ref 200–360)
VIT B12 BLD-MCNC: 265 PG/ML (ref 211–946)
WBC NRBC COR # BLD AUTO: 12.84 10*3/MM3 (ref 3.4–10.8)

## 2024-05-22 PROCEDURE — 82232 ASSAY OF BETA-2 PROTEIN: CPT | Performed by: INTERNAL MEDICINE

## 2024-05-22 PROCEDURE — 82746 ASSAY OF FOLIC ACID SERUM: CPT | Performed by: ORTHOPAEDIC SURGERY

## 2024-05-22 PROCEDURE — 97165 OT EVAL LOW COMPLEX 30 MIN: CPT

## 2024-05-22 PROCEDURE — 83615 LACTATE (LD) (LDH) ENZYME: CPT | Performed by: INTERNAL MEDICINE

## 2024-05-22 PROCEDURE — 85610 PROTHROMBIN TIME: CPT | Performed by: ORTHOPAEDIC SURGERY

## 2024-05-22 PROCEDURE — 0 GADOBENATE DIMEGLUMINE 529 MG/ML SOLUTION: Performed by: INTERNAL MEDICINE

## 2024-05-22 PROCEDURE — 82728 ASSAY OF FERRITIN: CPT | Performed by: ORTHOPAEDIC SURGERY

## 2024-05-22 PROCEDURE — 83521 IG LIGHT CHAINS FREE EACH: CPT | Performed by: INTERNAL MEDICINE

## 2024-05-22 PROCEDURE — 0 IOHEXOL 12 MG/ML SOLUTION: Performed by: INTERNAL MEDICINE

## 2024-05-22 PROCEDURE — 74177 CT ABD & PELVIS W/CONTRAST: CPT

## 2024-05-22 PROCEDURE — 99222 1ST HOSP IP/OBS MODERATE 55: CPT | Performed by: INTERNAL MEDICINE

## 2024-05-22 PROCEDURE — A9577 INJ MULTIHANCE: HCPCS | Performed by: INTERNAL MEDICINE

## 2024-05-22 PROCEDURE — 82607 VITAMIN B-12: CPT | Performed by: ORTHOPAEDIC SURGERY

## 2024-05-22 PROCEDURE — 86334 IMMUNOFIX E-PHORESIS SERUM: CPT | Performed by: INTERNAL MEDICINE

## 2024-05-22 PROCEDURE — 99255 IP/OBS CONSLTJ NEW/EST HI 80: CPT | Performed by: INTERNAL MEDICINE

## 2024-05-22 PROCEDURE — 25510000001 IOPAMIDOL PER 1 ML: Performed by: INTERNAL MEDICINE

## 2024-05-22 PROCEDURE — 97161 PT EVAL LOW COMPLEX 20 MIN: CPT

## 2024-05-22 PROCEDURE — 70553 MRI BRAIN STEM W/O & W/DYE: CPT

## 2024-05-22 PROCEDURE — 85045 AUTOMATED RETICULOCYTE COUNT: CPT | Performed by: ORTHOPAEDIC SURGERY

## 2024-05-22 PROCEDURE — 25810000003 SODIUM CHLORIDE 0.9 % SOLUTION: Performed by: ORTHOPAEDIC SURGERY

## 2024-05-22 PROCEDURE — 83735 ASSAY OF MAGNESIUM: CPT | Performed by: ORTHOPAEDIC SURGERY

## 2024-05-22 PROCEDURE — 82784 ASSAY IGA/IGD/IGG/IGM EACH: CPT | Performed by: INTERNAL MEDICINE

## 2024-05-22 PROCEDURE — 84165 PROTEIN E-PHORESIS SERUM: CPT | Performed by: INTERNAL MEDICINE

## 2024-05-22 PROCEDURE — 84100 ASSAY OF PHOSPHORUS: CPT | Performed by: ORTHOPAEDIC SURGERY

## 2024-05-22 PROCEDURE — 80048 BASIC METABOLIC PNL TOTAL CA: CPT | Performed by: ORTHOPAEDIC SURGERY

## 2024-05-22 PROCEDURE — 25010000002 THIAMINE PER 100 MG: Performed by: ORTHOPAEDIC SURGERY

## 2024-05-22 PROCEDURE — 84466 ASSAY OF TRANSFERRIN: CPT | Performed by: ORTHOPAEDIC SURGERY

## 2024-05-22 PROCEDURE — 83540 ASSAY OF IRON: CPT | Performed by: ORTHOPAEDIC SURGERY

## 2024-05-22 PROCEDURE — 97530 THERAPEUTIC ACTIVITIES: CPT

## 2024-05-22 PROCEDURE — 99233 SBSQ HOSP IP/OBS HIGH 50: CPT | Performed by: INTERNAL MEDICINE

## 2024-05-22 PROCEDURE — 85025 COMPLETE CBC W/AUTO DIFF WBC: CPT | Performed by: ORTHOPAEDIC SURGERY

## 2024-05-22 PROCEDURE — 84155 ASSAY OF PROTEIN SERUM: CPT | Performed by: INTERNAL MEDICINE

## 2024-05-22 PROCEDURE — 25010000002 MORPHINE PER 10 MG: Performed by: ORTHOPAEDIC SURGERY

## 2024-05-22 RX ORDER — UREA 10 %
1000 LOTION (ML) TOPICAL DAILY
Status: DISCONTINUED | OUTPATIENT
Start: 2024-05-23 | End: 2024-06-23 | Stop reason: HOSPADM

## 2024-05-22 RX ORDER — UREA 10 %
1000 LOTION (ML) TOPICAL DAILY
Status: DISCONTINUED | OUTPATIENT
Start: 2024-05-22 | End: 2024-05-22

## 2024-05-22 RX ADMIN — HYDROXYZINE HYDROCHLORIDE 25 MG: 25 TABLET, FILM COATED ORAL at 20:46

## 2024-05-22 RX ADMIN — SODIUM CHLORIDE 100 ML/HR: 9 INJECTION, SOLUTION INTRAVENOUS at 17:53

## 2024-05-22 RX ADMIN — HYDROCODONE BITARTRATE AND ACETAMINOPHEN 1 TABLET: 10; 325 TABLET ORAL at 20:46

## 2024-05-22 RX ADMIN — SODIUM CHLORIDE 100 ML/HR: 9 INJECTION, SOLUTION INTRAVENOUS at 13:25

## 2024-05-22 RX ADMIN — THIAMINE HYDROCHLORIDE 100 MG: 100 INJECTION, SOLUTION INTRAMUSCULAR; INTRAVENOUS at 09:00

## 2024-05-22 RX ADMIN — HYDROCODONE BITARTRATE AND ACETAMINOPHEN 1 TABLET: 5; 325 TABLET ORAL at 00:18

## 2024-05-22 RX ADMIN — IOPAMIDOL 80 ML: 755 INJECTION, SOLUTION INTRAVENOUS at 21:10

## 2024-05-22 RX ADMIN — HYDROCODONE BITARTRATE AND ACETAMINOPHEN 1 TABLET: 10; 325 TABLET ORAL at 13:17

## 2024-05-22 RX ADMIN — CALCIUM CARBONATE 1 TABLET: 500 TABLET, CHEWABLE ORAL at 16:19

## 2024-05-22 RX ADMIN — SENNOSIDES AND DOCUSATE SODIUM 2 TABLET: 50; 8.6 TABLET ORAL at 20:46

## 2024-05-22 RX ADMIN — SENNOSIDES AND DOCUSATE SODIUM 2 TABLET: 50; 8.6 TABLET ORAL at 09:00

## 2024-05-22 RX ADMIN — NICOTINE 1 PATCH: 21 PATCH, EXTENDED RELEASE TRANSDERMAL at 13:17

## 2024-05-22 RX ADMIN — FOLIC ACID 1 MG: 5 INJECTION, SOLUTION INTRAMUSCULAR; INTRAVENOUS; SUBCUTANEOUS at 10:22

## 2024-05-22 RX ADMIN — GADOBENATE DIMEGLUMINE 13 ML: 529 INJECTION, SOLUTION INTRAVENOUS at 22:18

## 2024-05-22 RX ADMIN — Medication 1 TABLET: at 09:00

## 2024-05-22 RX ADMIN — IOHEXOL 500 ML: 12 SOLUTION ORAL at 16:08

## 2024-05-22 RX ADMIN — MORPHINE SULFATE 1 MG: 2 INJECTION, SOLUTION INTRAMUSCULAR; INTRAVENOUS at 05:41

## 2024-05-22 RX ADMIN — Medication 10 ML: at 20:46

## 2024-05-22 RX ADMIN — Medication 10 ML: at 09:01

## 2024-05-22 RX ADMIN — SODIUM CHLORIDE 100 ML/HR: 9 INJECTION, SOLUTION INTRAVENOUS at 03:44

## 2024-05-22 NOTE — PLAN OF CARE
Goal Outcome Evaluation:  Plan of Care Reviewed With: patient           Outcome Evaluation: Patient VSS. Patient on regular diet. Pt resting well throughout. Prn norco given once for c/o hip pain 5/10.Continue plan of care.

## 2024-05-22 NOTE — PLAN OF CARE
Goal Outcome Evaluation:           Progress: no change  Outcome Evaluation: VSS. pt sat up in chair during shift. family at bedside during shift. contrast given per orders. pt A&Ox4. pt rested well during shift. no new concerns at this time. Prn tums, nicotine patch, and norco given x1 during shift.    Problem: Adult Inpatient Plan of Care  Goal: Plan of Care Review  5/22/2024 1714 by Danae Benitez RN  Flowsheets (Taken 5/22/2024 1712)  Outcome Evaluation: VSS. pt sat up in chair during shift. family at bedside during shift. contrast given per orders. pt A&Ox4. pt rested well during shift. no new concerns at this time.  5/22/2024 1712 by Danae Benitez RN  Outcome: Ongoing, Progressing  Flowsheets  Taken 5/22/2024 1712 by Danae Benitez RN  Progress: no change  Outcome Evaluation: VSS. pt sat up in chair during shift. family at bedside during shift. contrast given per orders. pt A&Ox4. pt rested well during shift. no new concerns at this time.  Taken 5/22/2024 0915 by Felix Villasenor OT  Plan of Care Reviewed With: patient  Goal: Patient-Specific Goal (Individualized)  Outcome: Ongoing, Progressing  Goal: Absence of Hospital-Acquired Illness or Injury  Outcome: Ongoing, Progressing  Intervention: Identify and Manage Fall Risk  Recent Flowsheet Documentation  Taken 5/22/2024 1712 by Danae Benitez RN  Safety Promotion/Fall Prevention:   safety round/check completed   clutter free environment maintained   assistive device/personal items within reach   fall prevention program maintained  Taken 5/22/2024 1536 by Danae Benitez RN  Safety Promotion/Fall Prevention:   safety round/check completed   clutter free environment maintained   assistive device/personal items within reach   fall prevention program maintained  Taken 5/22/2024 1325 by Danae Benitez RN  Safety Promotion/Fall Prevention:   safety round/check completed   clutter free environment maintained   assistive device/personal  items within reach   fall prevention program maintained  Taken 5/22/2024 1101 by Danae Benitez RN  Safety Promotion/Fall Prevention:   safety round/check completed   assistive device/personal items within reach   clutter free environment maintained   fall prevention program maintained  Taken 5/22/2024 0912 by Danae Benitez RN  Safety Promotion/Fall Prevention:   safety round/check completed   clutter free environment maintained   assistive device/personal items within reach   fall prevention program maintained  Taken 5/22/2024 0707 by aDnae Benitez RN  Safety Promotion/Fall Prevention:   safety round/check completed   clutter free environment maintained   assistive device/personal items within reach   fall prevention program maintained  Intervention: Prevent and Manage VTE (Venous Thromboembolism) Risk  Recent Flowsheet Documentation  Taken 5/22/2024 0707 by Danae Benitez RN  Range of Motion: ROM (range of motion) performed  Intervention: Prevent Infection  Recent Flowsheet Documentation  Taken 5/22/2024 1712 by Danae Benitez RN  Infection Prevention:   rest/sleep promoted   personal protective equipment utilized   hand hygiene promoted   equipment surfaces disinfected  Taken 5/22/2024 1536 by Danae Benitez RN  Infection Prevention:   rest/sleep promoted   personal protective equipment utilized   hand hygiene promoted   equipment surfaces disinfected  Taken 5/22/2024 1325 by Danae Benitez RN  Infection Prevention:   rest/sleep promoted   personal protective equipment utilized   hand hygiene promoted   equipment surfaces disinfected  Taken 5/22/2024 1101 by Danae Benitez RN  Infection Prevention:   rest/sleep promoted   personal protective equipment utilized   hand hygiene promoted   equipment surfaces disinfected  Taken 5/22/2024 0912 by Danae Benitez RN  Infection Prevention:   rest/sleep promoted   personal protective equipment utilized   hand hygiene  promoted   equipment surfaces disinfected  Taken 5/22/2024 0707 by Danae Benitez RN  Infection Prevention:   rest/sleep promoted   personal protective equipment utilized   hand hygiene promoted   equipment surfaces disinfected  Goal: Optimal Comfort and Wellbeing  Outcome: Ongoing, Progressing  Goal: Readiness for Transition of Care  Outcome: Ongoing, Progressing     Problem: Skin Injury Risk Increased  Goal: Skin Health and Integrity  Outcome: Ongoing, Progressing     Problem: Fall Injury Risk  Goal: Absence of Fall and Fall-Related Injury  Outcome: Ongoing, Progressing  Intervention: Promote Injury-Free Environment  Recent Flowsheet Documentation  Taken 5/22/2024 1712 by Danae Benitez RN  Safety Promotion/Fall Prevention:   safety round/check completed   clutter free environment maintained   assistive device/personal items within reach   fall prevention program maintained  Taken 5/22/2024 1536 by Danae Benitez RN  Safety Promotion/Fall Prevention:   safety round/check completed   clutter free environment maintained   assistive device/personal items within reach   fall prevention program maintained  Taken 5/22/2024 1325 by Danae Benitez RN  Safety Promotion/Fall Prevention:   safety round/check completed   clutter free environment maintained   assistive device/personal items within reach   fall prevention program maintained  Taken 5/22/2024 1101 by Danae Benitez RN  Safety Promotion/Fall Prevention:   safety round/check completed   assistive device/personal items within reach   clutter free environment maintained   fall prevention program maintained  Taken 5/22/2024 0912 by Danae Benitez RN  Safety Promotion/Fall Prevention:   safety round/check completed   clutter free environment maintained   assistive device/personal items within reach   fall prevention program maintained  Taken 5/22/2024 0707 by Danae Benitez RN  Safety Promotion/Fall Prevention:   safety round/check  completed   clutter free environment maintained   assistive device/personal items within reach   fall prevention program maintained

## 2024-05-22 NOTE — PROGRESS NOTES
[facility]   Hospitalist Progress Note    Date of admission: 5/20/2024  Patient Name: Oswaldo Bowen  1980  Date: 5/22/2024      Subjective     Chief Complaint   Patient presents with    Fall    Leg Pain       Interval Followup: Had extended discussion with patient and his sister at bedside today regarding cancer diagnosis and the workup/evaluation with biopsy.  Very anxious about biopsy but also want to undergo treatment and is now with the diagnosis is.  Ultimately agrees to further biopsy  Pain doing okay from surgical site of things like wanting to rehab       Objective     Vitals:   Temp:  [97.7 °F (36.5 °C)-98.8 °F (37.1 °C)] 98.8 °F (37.1 °C)  Heart Rate:  [] 113  Resp:  [16] 16  BP: (114-137)/(75-89) 119/75    Physical Exam  Awake conversant, poor insight/poor health literacy consistent with patient's history of intellectual disability, intermittently anxious  No rhonchi breathing comfortably on room air  elevated rate regular rhythm  Abdomen soft  Dressing in place      Result Review:  Vital signs, labs and recent relevant imaging reviewed.      CBC          5/20/2024    21:34 5/21/2024    05:28 5/22/2024    05:04   CBC   WBC 19.54  14.66  12.84    RBC 4.23  3.52  3.29    Hemoglobin 11.9  10.0  9.2    Hematocrit 37.0  31.3  29.3    MCV 87.5  88.9  89.1    MCH 28.1  28.4  28.0    MCHC 32.2  31.9  31.4    RDW 12.7  12.6  12.7    Platelets 410  351  359      CMP          5/20/2024    21:34 5/21/2024    05:28 5/22/2024    05:04   CMP   Glucose 183  125  125    BUN 21  22  16    Creatinine 1.72  0.91  0.55    EGFR 49.6  106.6  125.3    Sodium 137  136  133    Potassium 3.9  3.5  3.4    Chloride 95  100  99    Calcium 10.5  9.2  8.4    Total Protein 8.1  6.8     Albumin 3.3  2.9     Globulin 4.8  3.9     Total Bilirubin 0.4  0.3     Alkaline Phosphatase 142  122     AST (SGOT) 20  19     ALT (SGPT) 23  19     Albumin/Globulin Ratio 0.7  0.7     BUN/Creatinine Ratio 12.2  24.2  29.1    Anion Gap 17.6   12.0  8.8          acetaminophen    aluminum-magnesium hydroxide-simethicone    senna-docusate sodium **AND** polyethylene glycol **AND** bisacodyl **AND** bisacodyl    senna-docusate sodium **AND** polyethylene glycol **AND** bisacodyl **AND** bisacodyl    calcium carbonate    dextrose    dextrose    Diclofenac Sodium    glucagon (human recombinant)    HYDROcodone-acetaminophen    HYDROcodone-acetaminophen    hydrOXYzine    Lidocaine    melatonin    Morphine **AND** naloxone    Morphine **AND** naloxone    nicotine    ondansetron    ondansetron ODT **OR** ondansetron    prochlorperazine    sodium chloride    sodium chloride    sodium chloride    sodium chloride    folic acid 1 mg in sodium chloride 0.9 % 50 mL IVPB, 1 mg, Intravenous, Daily  multivitamin with minerals, 1 tablet, Oral, Daily  senna-docusate sodium, 2 tablet, Oral, BID  sodium chloride, 10 mL, Intravenous, Q12H  sodium chloride, 10 mL, Intravenous, Q12H  thiamine (B-1) IV, 100 mg, Intravenous, Daily        CT Chest With Contrast Diagnostic    Result Date: 5/21/2024  Impression:  1. Left upper lobe noncalcified pulmonary nodule measuring 2.1 cm in size suspicious for primary malignancy. There is mild bilateral hilar adenopathy. Additionally there are multiple lytic bone lesions throughout the spine ribs and scapula consistent with metastatic disease.    Electronically Signed By-Eliecer Jara MD On:5/21/2024 10:11 PM      XR Chest 1 View    Result Date: 5/20/2024  Impression:  1. Questionable 14 mm nodule projecting in the left perihilar region. Follow-up CT scan of the chest would be recommended when clinically feasible.   Electronically Signed By-Eliecer Jara MD On:5/20/2024 10:23 PM      XR Femur 2 View Right    Result Date: 5/20/2024  (COMBINED) Acute comminuted displaced intertrochanteric right femoral fracture is noted, as discussed. No dislocation.     Please note that portions of this note were completed with a voice recognition program.       Electronically Signed By-Panda Howard MD On:5/20/2024 10:21 PM      XR Pelvis 1 or 2 View    Result Date: 5/20/2024  (COMBINED) Acute comminuted displaced intertrochanteric right femoral fracture is noted, as discussed. No dislocation.     Please note that portions of this note were completed with a voice recognition program.      Electronically Signed By-Panda Howard MD On:5/20/2024 10:21 PM       Assessment / Plan     Summary: 44M with intellectual disability, 1-2PPD smoking, depression, who presented after a fall (possibly 1 month ago but may have been more recently), found to have right femoral fracture and VLAD.  Ortho assisting     Assessment/Plan (clinically significant if listed here)  Mechanical fall with acute comminuted displaced intertrochanteric right femoral fracture  S/p 5/21 right hip subtrochanteric nailing of closed displaced right femur fracture by Dr. Nelson  VLAD creatinine 1.7 leukocytosis suspect reactive in setting of acute fracture  Suspected new metastatic lung cancer with mets to the bone  Mediastinal lymphadenopathy  14 mm nodule left perihilar region in setting of smoking history  1 to 2 pack/day smoker, chronic  Intellectual disability  Depression  Normocytic anemia    Imaging results with metastatic disease suspected, consulted pulmonology for lung nodule biopsy appreciate assistance, consulted oncology for establishment of care and further treatment workup, additional imaging pending for staging  Tolerated surgery well, monitor postoperative pain, bowel regimen  As needed pain medications IV and p.o., bowel regimen, PT and OT  Additional postoperative care as per orthopedic surgery appreciate assistance  Hemoglobin slight downtrend, likely postoperative blood loss IntraOp blood draws, anemia studies with B12 on the lower end, placed on multivitamin, TSAT low with notably elevated ferritin  Nrt prn / smoking cessation   VLAD improving with fluids, can discontinue encouraging  p.o.  PT/OT  Check a.m. CBC, BMP, magnesium, phosphorus and as above  Continue hospitalization at current level of care    Dispo: possibly rehab once stable obese.  D/w clinical      DVT prophylaxis:  Mechanical DVT prophylaxis orders are present.      Level Of Support Discussed With: Patient  Code Status (Patient has no pulse and is not breathing): CPR (Attempt to Resuscitate)  Medical Interventions (Patient has pulse or is breathing): Full Support      Greater than 50 minutes on this encounter including review of labs, imaging, documentation, orders, vitals, monitoring and adjusting treatment and orders as indicated, discussion with the patient pulm and ortho, and documenting.

## 2024-05-22 NOTE — CONSULTS
[facility]   Hematology/Oncology  Consult Note    Patient Name: Oswaldo Bowen  : 1980  MRN: [id number]  Primary Care Physician:  Provider, No Known  Referring Physician: No ref. provider found  Date of admission: 2024    Subjective   Subjective     Reason for Consult/ Chief Complaint:     HPI:  Oswaldo Bowen is a 44 y.o. male with past medical history of intellectual disability, tobacco abuse who presented to [facility] on 24 after a fall and several weeks of right lower leg pain associated with change in gait. He then slipped and fell onto concrete the day of presentation and was not able to get up. He was taken to the ED here and was found to have closed  intertrochanteric fracture of the right femur. 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 He was evaluated by orthopedic surgery and taken for right hip subtrochanteric nailing of fracture on 24.  Admission CXR showed 14 mm left perihilar nodule. Follow up CT chest with contrast showed 2.1 cm RUL nodule with bilateral hilar adenopathy. Also seen were multiple lytic bone lesions throughout the spine, ribs and scapula consistent with metastatic disease. He has been seen by pulmonology with plans for bronchoscopy with biopsy and lymph node sampling, likely tomorrow. He denies any major pain currently. His sister is present at bedside. Per notes, patient has not been eating well at home with associated weight loss over the past several months. No fevers, chills, night sweats noted.    Review of Systems   All systems were reviewed and negative except for as mentioned above.     Personal History     History reviewed. No pertinent past medical history.    Past Surgical History:   Procedure Laterality Date    HIP INTERTROCHANTERIC NAILING Right 2024    Procedure: HIP INTERTROCHANTERIC NAILING;  Surgeon: Molina Clayton MD;  Location: [facility] MAIN OR;  Service: Orthopedics;  Laterality: Right;       Family History: family history is not on file. Otherwise pertinent  FHx was reviewed and not pertinent to current issue.    Social History:  reports that he has been smoking cigarettes. He started smoking about 24 years ago. He has a 36.6 pack-year smoking history. He has quit using smokeless tobacco. He reports that he does not drink alcohol and does not use drugs.    Home Medications:       Allergies:  No Known Allergies    Objective    Objective     Vitals:   Temp:  [97.1 °F (36.2 °C)-98.8 °F (37.1 °C)] 98.8 °F (37.1 °C)  Heart Rate:  [] 116  Resp:  [16-18] 16  BP: ()/(72-89) 114/78  Flow (L/min):  [4-6] 4    Physical Exam:   Constitutional: Awake, alert, sitting in bedside chair, appears older than stated age, slightly cachectic   Eyes: PERRLA, sclerae anicteric, no conjunctival injection   HENT: NCAT, mucous membranes moist   Respiratory: Clear to auscultation bilaterally, nonlabored respirations    Cardiovascular: RRR, no murmurs, no edema in the extremities   Gastrointestinal: Positive bowel sounds, soft, nontender, nondistended   Musculoskeletal: Normal strength and tone, no joint swelling   Psychiatric: Appropriate affect, cooperative   Neurologic: Awake, alert, speech clear   Skin: Normal tone, no rashes    Result Review    Result Review:  I have personally reviewed the results from the time of this admission to 5/22/2024 13:13 EDT and agree with these findings:  [x]  Laboratory  []  Microbiology  [x]  Radiology  []  EKG/Telemetry   []  Cardiology/Vascular   []  Pathology  []  Old records  []  Other:  Most notable findings include: elevated creatinine on presentation now resolved, improving leukocytosis, worsening anemia, elevated ferritin, low iron sat, normal B12 and folate. CXR personally reviewed and notable for 1.5 mm left sided pulmonary nodule. H and P personally reviewed, pulmonary note personally reviewed      Assessment & Plan   Assessment / Plan     Brief Patient Summary:  Oswaldo Bowen is a 44 y.o. male intellectual disability, 1-2PPD smoking,  depression, who presented after a fall (possibly 1 month ago but may have been more recently), found to have right femoral fracture and VLAD. S/p fixation in OR on 5/21/24. CT chest showed 2.1 cm RUL nodule, bilateral hilar adenopathy and multiple lytic lesions throughout the spine, ribs and scapula consistent with metastatic disease.     Active Hospital Problems:  Active Hospital Problems    Diagnosis     **Closed intertrochanteric fracture of right femur        Plan:   Metastatic lung cancer, likely  CT chest obtained due to left sided pulmonary nodule on chest x-ray showed 2.1 cm RUL nodule, bilateral hilar adenopathy and multiple lytic lesions throughout the spine, ribs and scapula consistent with metastatic disease.  Plan for CT abdomen/pelvis with contrast while admitted to complete staging; ordered.   Will also get MRI brain w/o contrast. Will send for multiple myeloma labs to rule this out as cause of lytic lesions in setting of anemia.   Pulm planning biopsy. This will need to be sent for PDL1, EGFR, ROS, ALK as well as NGS testing if positive.   Recommend follow up with me in clinic after rehab.     Anemia  Worse after surgery likely related to blood loss. With elevated ferritin, low iron sat and low TIBC, labs consistent with anemia of chronic disease. Recommend to trend daily. B12 and folate wnl.     Right femoral fracture  After fall. S/p right subtrochanteric nailing 5/21/24. Ortho following, appreciate assistance. Plan for bone modifying agent as outpatient due to metastatic cancer to bone. Recommend pain medication provided on discharge.         Electronically signed by Sukhwinder Johnson MD, 05/22/24, 1:13 PM EDT.

## 2024-05-22 NOTE — THERAPY EVALUATION
Acute Care - Physical Therapy Initial Evaluation   [place]     Patient Name: Oswaldo Bowen  : 1980  MRN: [id number]  Today's Date: 2024      Visit Dx:     ICD-10-CM ICD-9-CM   1. Closed displaced intertrochanteric fracture of right femur, initial encounter  S72.141A 820.21   2. Pulmonary nodule  R91.1 793.11   3. Difficulty walking  R26.2 719.7     Patient Active Problem List   Diagnosis    Closed intertrochanteric fracture of right femur     History reviewed. No pertinent past medical history.  Past Surgical History:   Procedure Laterality Date    HIP INTERTROCHANTERIC NAILING Right 2024    Procedure: HIP INTERTROCHANTERIC NAILING;  Surgeon: Molina Clayton MD;  Location: [facility] MAIN OR;  Service: Orthopedics;  Laterality: Right;     PT Assessment (Last 12 Hours)       PT Evaluation and Treatment       Row Name 24 0815          Physical Therapy Time and Intention    Document Type evaluation  -AV     Mode of Treatment individual therapy;physical therapy  -AV       Row Name 24 0815          General Information    Patient Profile Reviewed yes  -AV     Prior Level of Function --  Reports (I) with ADLs. Ambulated with STC. No home O2. Hx of intellectual disability. No family at bedside to verify prior level.  -AV     Existing Precautions/Restrictions fall;weight bearing  WBAT RLE  -AV       Row Name 24 0815          Living Environment    Current Living Arrangements home  -AV     Home Accessibility stairs to enter home  -AV     People in Home sibling(s)  Sister and brother-in-law  -AV       Row Name 24 0815          Home Main Entrance    Number of Stairs, Main Entrance other (see comments)  Ramp  -AV       Row Name 24 0815          Range of Motion (ROM)    Range of Motion bilateral lower extremities;ROM is WFL  -AV       Row Name 24 0815          Mobility    Extremity Weight-bearing Status right lower extremity  -AV     Right Lower Extremity (Weight-bearing Status)  weight-bearing as tolerated (WBAT)  -AV       Row Name 05/22/24 0815          Bed Mobility    Bed Mobility supine-sit  -AV     Supine-Sit [place] (Bed Mobility) maximum assist (25% patient effort);2 person assist  -AV     Comment, (Bed Mobility) Patient initially very anxious/fearful regarding activity. Max encouragement for bed mobility. Patient demonstrated less anxiety regarding mobility once seated EOB.  -AV       Row Name 05/22/24 0815          Transfers    Transfers sit-stand transfer;stand-sit transfer  -AV       Row Name 05/22/24 0815          Sit-Stand Transfer    Sit-Stand [place] (Transfers) contact guard;minimum assist (75% patient effort);2 person assist  -AV     Assistive Device (Sit-Stand Transfers) walker, front-wheeled  -AV       Row Name 05/22/24 0815          Stand-Sit Transfer    Stand-Sit [place] (Transfers) contact guard;minimum assist (75% patient effort);2 person assist  -AV     Assistive Device (Stand-Sit Transfers) walker, front-wheeled  -AV       Row Name 05/22/24 0815          Gait/Stairs (Locomotion)    Gait/Stairs Locomotion gait/ambulation independence;gait/ambulation assistive device;distance ambulated  -AV     [place] Level (Gait) contact guard  -AV     Assistive Device (Gait) walker, front-wheeled  -AV     Patient was able to Ambulate yes  -AV     Distance in Feet (Gait) 3  bed to recliner; patient deferred further  -AV       Row Name 05/22/24 0815          Safety Issues, Functional Mobility    Impairments Affecting Function (Mobility) balance;cognition;endurance/activity tolerance;pain;strength  -AV       Row Name 05/22/24 0815          Balance    Balance Assessment standing dynamic balance  -AV     Dynamic Standing Balance contact guard  -AV     Position/Device Used, Standing Balance supported;walker, front-wheeled  -AV       Row Name             Wound 05/21/24 0251 Bilateral coccyx    Wound - Properties Group Placement Date: 05/21/24  -SR Placement Time: 0251   -SR Present on Original Admission: Y  -SR Side: Bilateral  -SR Location: coccyx  -SR    Retired Wound - Properties Group Placement Date: 05/21/24  -SR Placement Time: 0251 -SR Present on Original Admission: Y  -SR Side: Bilateral  -SR Location: coccyx  -SR    Retired Wound - Properties Group Date first assessed: 05/21/24  -SR Time first assessed: 0251  -SR Present on Original Admission: Y  -SR Side: Bilateral  -SR Location: coccyx  -SR      Row Name             Wound 05/21/24 Right lateral thigh Incision    Wound - Properties Group Placement Date: 05/21/24  -SF Present on Original Admission: N  -SF Side: Right  -SF Orientation: lateral  -SF Location: thigh  -SF Primary Wound Type: Incision  -SF Additional Comments: incision sites x 3 to lateral right thigh  -SF    Retired Wound - Properties Group Placement Date: 05/21/24  -SF Present on Original Admission: N  -SF Side: Right  -SF Orientation: lateral  -SF Location: thigh  -SF Primary Wound Type: Incision  -SF Additional Comments: incision sites x 3 to lateral right thigh  -SF    Retired Wound - Properties Group Date first assessed: 05/21/24  -SF Present on Original Admission: N  -SF Side: Right  -SF Location: thigh  -SF Primary Wound Type: Incision  -SF Additional Comments: incision sites x 3 to lateral right thigh  -SF      Row Name 05/22/24 0815          Plan of Care Review    Plan of Care Reviewed With patient  -AV     Progress no change  -AV     Outcome Evaluation Patient presents with deficits in balance, strength, transfers, and ambulation. Patient will benefit from skilled PT services to address these mobility deficits and decrease risk of falls.  -AV       Row Name 05/22/24 0815          Positioning and Restraints    Pre-Treatment Position in bed  -AV     Post Treatment Position chair  -AV     In Chair reclined;call light within reach;encouraged to call for assist;exit alarm on  -AV       Row Name 05/22/24 0815          Therapy Assessment/Plan (PT)    Rehab  Potential (PT) good, to achieve stated therapy goals  -AV     Criteria for Skilled Interventions Met (PT) yes;meets criteria  -AV     Therapy Frequency (PT) daily  -AV     Predicted Duration of Therapy Intervention (PT) 10 days  -AV     Problem List (PT) problems related to;balance;cognition;mobility;strength;pain  -AV     Activity Limitations Related to Problem List (PT) unable to transfer safely;unable to ambulate safely  -AV       Row Name 05/22/24 0815          PT Evaluation Complexity    History, PT Evaluation Complexity 1-2 personal factors and/or comorbidities  -AV     Examination of Body Systems (PT Eval Complexity) total of 4 or more elements  -AV     Clinical Presentation (PT Evaluation Complexity) stable  -AV     Clinical Decision Making (PT Evaluation Complexity) low complexity  -AV     Overall Complexity (PT Evaluation Complexity) low complexity  -AV       Row Name 05/22/24 0815          Therapy Plan Review/Discharge Plan (PT)    Therapy Plan Review (PT) evaluation/treatment results reviewed;patient  -AV       Row Name 05/22/24 0815          Physical Therapy Goals    Bed Mobility Goal Selection (PT) bed mobility, PT goal 1  -AV     Transfer Goal Selection (PT) transfer, PT goal 1  -AV     Gait Training Goal Selection (PT) gait training, PT goal 1  -AV       Row Name 05/22/24 0815          Bed Mobility Goal 1 (PT)    Activity/Assistive Device (Bed Mobility Goal 1, PT) sit to supine/supine to sit  -AV     [place] Level/Cues Needed (Bed Mobility Goal 1, PT) modified independence  -AV     Time Frame (Bed Mobility Goal 1, PT) 10 days  -AV       Row Name 05/22/24 0815          Transfer Goal 1 (PT)    Activity/Assistive Device (Transfer Goal 1, PT) sit-to-stand/stand-to-sit;bed-to-chair/chair-to-bed;walker, rolling  -AV     [place] Level/Cues Needed (Transfer Goal 1, PT) modified independence  -AV     Time Frame (Transfer Goal 1, PT) 10 days  -AV       Row Name 05/22/24 0815          Gait Training  Goal 1 (PT)    Activity/Assistive Device (Gait Training Goal 1, PT) gait (walking locomotion);assistive device use;walker, rolling  -AV     [place] Level (Gait Training Goal 1, PT) modified independence  -AV     Distance (Gait Training Goal 1, PT) 200  -AV     Time Frame (Gait Training Goal 1, PT) 10 days  -AV               User Key  (r) = Recorded By, (t) = Taken By, (c) = Cosigned By      Initials Name Provider Type    SF Saritha Ramirez, RN Registered Nurse    Eh Zepeda, JERROD Physical Therapist    Saritha Becker RN Registered Nurse                    Physical Therapy Education       Title: PT OT SLP Therapies (In Progress)       Topic: Physical Therapy (In Progress)       Point: Mobility training (Done)       Learning Progress Summary             Patient Acceptance, E,TB, VU by AV at 5/22/2024 1339                         Point: Home exercise program (Not Started)       Learner Progress:  Not documented in this visit.              Point: Body mechanics (Done)       Learning Progress Summary             Patient Acceptance, E,TB, VU by AV at 5/22/2024 1339                         Point: Precautions (Done)       Learning Progress Summary             Patient Acceptance, E,TB, VU by AV at 5/22/2024 1339                                         User Key       Initials Effective Dates Name Provider Type Discipline    AV 06/11/21 -  Eh Martínez, JERROD Physical Therapist PT                  PT Recommendation and Plan  Anticipated Discharge Disposition (PT): inpatient rehabilitation facility  Planned Therapy Interventions (PT): balance training, bed mobility training, gait training, home exercise program, neuromuscular re-education, strengthening, transfer training  Therapy Frequency (PT): daily  Plan of Care Reviewed With: patient  Progress: no change  Outcome Evaluation: Patient presents with deficits in balance, strength, transfers, and ambulation. Patient will benefit from skilled PT services to  address these mobility deficits and decrease risk of falls.   Outcome Measures       Row Name 05/22/24 0815             How much help from another person do you currently need...    Turning from your back to your side while in flat bed without using bedrails? 2  -AV      Moving from lying on back to sitting on the side of a flat bed without bedrails? 2  -AV      Moving to and from a bed to a chair (including a wheelchair)? 3  -AV      Standing up from a chair using your arms (e.g., wheelchair, bedside chair)? 3  -AV      Climbing 3-5 steps with a railing? 2  -AV      To walk in hospital room? 3  -AV      AM-PAC 6 Clicks Score (PT) 15  -AV      Highest Level of Mobility Goal 4 --> Transfer to chair/commode  -AV         Functional Assessment    Outcome Measure Options AM-PAC 6 Clicks Basic Mobility (PT)  -AV                User Key  (r) = Recorded By, (t) = Taken By, (c) = Cosigned By      Initials Name Provider Type    AV Eh Martínez, PT Physical Therapist                     Time Calculation:    PT Charges       Row Name 05/22/24 1338             Time Calculation    PT Received On 05/22/24  -AV      PT Goal Re-Cert Due Date 05/31/24  -AV         Untimed Charges    PT Eval/Re-eval Minutes 38  -AV         Total Minutes    Untimed Charges Total Minutes 38  -AV       Total Minutes 38  -AV                User Key  (r) = Recorded By, (t) = Taken By, (c) = Cosigned By      Initials Name Provider Type    AV Eh Martínez, PT Physical Therapist                  Therapy Charges for Today       Code Description Service Date Service Provider Modifiers Qty    57800804610 HC PT EVAL LOW COMPLEXITY 3 5/22/2024 Eh Martínez, PT GP 1            PT G-Codes  Outcome Measure Options: AM-PAC 6 Clicks Daily Activity (OT), Optimal Instrument  AM-PAC 6 Clicks Score (PT): 15  AM-PAC 6 Clicks Score (OT): 12    Eh Martínez, JERROD  5/22/2024

## 2024-05-22 NOTE — CONSULTS
Pulmonary / Critical Care Consult Note      Patient Name: Oswaldo Bowen  : 1980  MRN: [id number]  Primary Care Physician:  Provider, No Known  Referring Physician: No ref. provider found  Date of admission: 2024    Subjective   Subjective     Reason for Consult/ Chief Complaint: Lung nodule, mediastinal lymphadenopathy    HPI:  Oswaldo Bowen is a 44 y.o. male with history of depression, intellectual disability, presented to the hospital with right leg pain after a fall.  He was found to have acute comminuted displaced intertrochanteric right femoral fracture.  He was admitted for further evaluation and management of right femoral fracture and was found to have renal failure.  He underwent hip intertrochanteric nailing procedure yesterday.  CT scan of the chest done yesterday showed left upper lobe noncalcified pulmonary nodule 2.1 cm in size suspicious for primary malignancy.  There is mild bilateral hilar adenopathy.  There was also multiple lytic bone lesions throughout the spine and ribs and scapula consistent with metastatic disease.  Pulmonary services consulted for extensive management of his abnormal CT scan findings.  He is a chronic heavy smoker, smoking 2 packs a day for more than 30 years.  He has been losing weight, more than 30 pounds over the last several months.  I discussed the CT scan finding with him.  He wants to talk to his family regarding possible biopsy.    Review of Systems  General:  No Fatigue, No Fever  HEENT: No dysphagia, No Visual Changes, no rhinorrhea  Respiratory:  + cough,+Dyspnea, + phlegm, No Pleuritic Pain, + wheezing, no hemoptysis  Cardiovascular: Denies chest pain, denies palpitations,+ALEXANDRA, No Chest Pressure  Gastrointestinal:  No Abdominal Pain, No Nausea, No Vomiting, No Diarrhea  Genitourinary:  No Dysuria, No Frequency, No Hesitancy  Musculoskeletal: No muscle pain or swelling, right hip pain status post fracture, now postsurgery  Endocrine:  No Heat  Intolerance, No Cold Intolerance  Hematologic:  No Bleeding, No Bruising  Psychiatric:  No Anxiety, No Depression  Neurologic:  No Confusion, no Dysarthria, No Headaches  Skin:  No Rash, No Open Wounds        Personal History     History reviewed. No pertinent past medical history.    History reviewed. No pertinent surgical history.    Family History: No known family history of chronic lung disease or lung cancer.    Social History:  reports that he has been smoking cigarettes. He started smoking about 24 years ago. He has a 36.6 pack-year smoking history. He has quit using smokeless tobacco. He reports that he does not drink alcohol and does not use drugs.    Home Medications:       Allergies:  No Known Allergies    Objective    Objective     Vitals:   Temp:  [97.1 °F (36.2 °C)-98.3 °F (36.8 °C)] 98.3 °F (36.8 °C)  Heart Rate:  [] 101  Resp:  [16-18] 16  BP: ()/(71-85) 123/83  Flow (L/min):  [4-6] 4    Physical Exam:  Vital Signs Reviewed   General: In recliner, mild distress, has conversational dyspnea, tangential in conversation  HEENT:  PERRL, EOMI.  OP, nares clear, no sinus tenderness  Neck:  Supple, no JVD, no thyromegaly  Lymph: no axillary, cervical, supraclavicular lymphadenopathy noted bilaterally  Chest: Poor aeration, bilateral and expiratory wheezing, no crackles or rhonchi, tympanic to percussion bilaterally, no work of breathing noted  CV: RRR, no MGR, pulses 2+, equal.  Abd:  Soft, NT, ND, + BS, no HSM  EXT:  no clubbing, no cyanosis, no edema, no joint tenderness  Neuro:  A&Ox2, CN grossly intact, no focal deficits.  Skin: No rashes or lesions noted      Result Review    Result Review:  I have personally reviewed the results from the time of this admission to 5/22/2024 08:08 EDT and agree with these findings:  [x]  Laboratory  [x]  Microbiology  [x]  Radiology  [x]  EKG/Telemetry   [x]  Cardiology/Vascular   []  Pathology  []  Old records  []  Other:  Most notable findings include:          Lab 05/22/24  0504 05/21/24  0528 05/20/24  2134   WBC 12.84* 14.66* 19.54*   HEMOGLOBIN 9.2* 10.0* 11.9*   HEMATOCRIT 29.3* 31.3* 37.0*   PLATELETS 359 351 410   SODIUM 133* 136 137   POTASSIUM 3.4* 3.5 3.9   CHLORIDE 99 100 95*   CO2 25.2 24.0 24.4   BUN 16 22* 21*   CREATININE 0.55* 0.91 1.72*   GLUCOSE 125* 125* 183*   CALCIUM 8.4* 9.2 10.5   PHOSPHORUS 2.7 3.5 4.0   TOTAL PROTEIN  --  6.8 8.1   ALBUMIN  --  2.9* 3.3*   GLOBULIN  --  3.9 4.8     XR Chest 1 View    Result Date: 5/20/2024  XR CHEST 1 VW-  Date of Exam: 5/20/2024 10:00 PM  Indication: Cough, persistent  Comparison: None available.  Findings: Heart size and pulmonary vessels are within normal limits. There is a 14 mm left perihilar nodular density. Follow-up CT scan of the chest would be recommended when clinically feasible. The lungs are otherwise clear. No pleural effusion. No pneumothorax. Bony structures are unremarkable.      Impression: Impression:  1. Questionable 14 mm nodule projecting in the left perihilar region. Follow-up CT scan of the chest would be recommended when clinically feasible.   Electronically Signed By-Eliecer Jara MD On:5/20/2024 10:23 PM       CT Chest With Contrast Diagnostic    Result Date: 5/21/2024  CT CHEST W CONTRAST DIAGNOSTIC-  Date of Exam: 5/21/2024 9:01 PM  Indication: 14mm perihilar nodule, 1-2 ppd extensive smoking hx; S72.141A-Displaced intertrochanteric fracture of right femur, initial encounter for closed fracture; R91.1-Solitary pulmonary nodule.  Comparison: Chest radiograph 5/20/2024  Technique: Axial CT images were obtained of the chest after the uneventful intravenous administration of 75 cc Isovue-370 . Reconstructed coronal and sagittal images were also obtained. Automated exposure control and iterative construction methods were used.   Findings: No coronary artery calcification. There is no mediastinal adenopathy. There is a right hilar node measuring 2.5 cm and a left hilar node measuring 2.1  cm. No axillary adenopathy. There are no pleural effusions. Within the perihilar region of the left upper lobe there is a noncalcified macro lobular 2.1 x 1.9 cm noncalcified pulmonary nodule suspicious for malignancy. Within the posterior right lower lobe there is a subpleural 12 x 5 mm nodule which is nonspecific. No other pulmonary nodules. No other focal airspace consolidation.  Bilateral adrenal glands are within normal limits.  There are multiple lytic bone lesions throughout the spine and ribs. Findings would be consistent with metastatic disease or myeloma. There are additional lytic lesions within both scapula.      Impression: Impression:  1. Left upper lobe noncalcified pulmonary nodule measuring 2.1 cm in size suspicious for primary malignancy. There is mild bilateral hilar adenopathy. Additionally there are multiple lytic bone lesions throughout the spine ribs and scapula consistent with metastatic disease.    Electronically Signed By-Eliecer Jara MD On:5/21/2024 10:11 PM         Assessment & Plan   Assessment / Plan     Active Hospital Problems:  Active Hospital Problems    Diagnosis     **Closed intertrochanteric fracture of right femur          Impression:  Acute comminuted displaced intertrochanteric right femoral fracture  Status post IM nailing  Left upper lobe lung nodule with mediastinal lymphadenopathy  Chronic heavy smoking  Unintentional weight loss      Plan:  Discussed bronchoscopy with endobronchial ultrasound-guided lymph node biopsy, bronchoalveolar lavage, bronchial washing, possible transbronchial biopsy, endobronchial brushing, airway inspection.  Risks and benefits of the procedure were discussed in detail.  Alternatives and options were reviewed.  Risks including pneumothorax, pneumonia, bleeding, respiratory depression and that it could be fatal were all reviewed.  Patient understands, however wants to discuss with his family regarding whether he wants to proceed with  bronchoscopy.  Start Symbicort and Spiriva.  As needed albuterol.  Pain control per primary service.  Postop surgical care per Ortho service.    I have reviewed labs, imaging, pertinent clinical data and provider notes.   I have discussed with bedside nurse and primary service.         Electronically signed by Kendell Stern MD, 5/22/2024, 08:08 EDT.

## 2024-05-22 NOTE — NURSING NOTE
Prior to consent being signed for pt surgery, faye ramos was called by nurse and informed of the pts status and that surgery was scheduled for pt and the consent needed to be signed for the pt before performing procedure. Family informed rn that pt did not have a POA and that if pt was in sound mind pt could sign for himself. Rn went and assessed pt and pt was alert and oriented x4, rn educated pt on status and procedure that was scheduled. Pt stated he understood his condition and he understood the procedure and pt signed consent.

## 2024-05-22 NOTE — THERAPY EVALUATION
Patient Name: Oswaldo Bowen  : 1980    MRN: [id number]                              Today's Date: 2024       Admit Date: 2024    Visit Dx:     ICD-10-CM ICD-9-CM   1. Closed displaced intertrochanteric fracture of right femur, initial encounter  S72.141A 820.21   2. Pulmonary nodule  R91.1 793.11     Patient Active Problem List   Diagnosis    Closed intertrochanteric fracture of right femur     History reviewed. No pertinent past medical history.  History reviewed. No pertinent surgical history.   General Information       Row Name 24 0917 24       OT Time and Intention    Document Type therapy note (daily note)  -PG evaluation  -PG    Mode of Treatment individual therapy;occupational therapy  -PG individual therapy;occupational therapy  -PG      Row Name 24 09          General Information    Patient Profile Reviewed yes  Patient reports he lives with his sister and brother-in-law.  Patient reports he has been independent with functional mobility and dressing/bathing activities  -PG     Prior Level of Function independent:;transfer;ADL's  -PG     Existing Precautions/Restrictions fall  -PG     Barriers to Rehab none identified  -PG       Row Name 24          Occupational Profile    Reason for Services/Referral (Occupational Profile) Patient is a 44-year-old male who fell at home and sustained a closed intertrochanteric fracture of the right femur.  Patient being evaluated by Occupational Therapy due to recent decline in ADL function.  No previous OT services identified  -PG       Row Name 24          Living Environment    People in Home sibling(s)  -PG       Row Name 24          Cognition    Orientation Status (Cognition) oriented x 3  -PG       Row Name 24          Safety Issues, Functional Mobility    Impairments Affecting Function (Mobility) balance;cognition;endurance/activity tolerance;strength;pain;range of motion (ROM)  -PG      Cognitive Impairments, Mobility Safety/Performance judgment;problem-solving/reasoning;insight into deficits/self-awareness  -PG               User Key  (r) = Recorded By, (t) = Taken By, (c) = Cosigned By      Initials Name Provider Type    PG Felix Villasenor, OT Occupational Therapist                     Mobility/ADL's       Row Name 05/22/24 0917 05/22/24 0913       Bed Mobility    Bed Mobility supine-sit  -PG supine-sit  -PG    Supine-Sit [place] (Bed Mobility) maximum assist (25% patient effort);2 person assist  -PG maximum assist (25% patient effort);2 person assist  -PG      Row Name 05/22/24 0913          Transfers    Transfers bed-chair transfer  -PG       Row Name 05/22/24 0917 05/22/24 0913       Bed-Chair Transfer    Bed-Chair [place] (Transfers) contact guard;minimum assist (75% patient effort);verbal cues;2 person assist  -PG contact guard;minimum assist (75% patient effort);verbal cues;2 person assist  -PG    Assistive Device (Bed-Chair Transfers) walker, front-wheeled  -PG walker, front-wheeled  -PG      Row Name 05/22/24 0913          Activities of Daily Living    BADL Assessment/Intervention bathing;upper body dressing;lower body dressing;grooming;toileting  -PG       Row Name 05/22/24 0913          Bathing Assessment/Intervention    [place] Level (Bathing) bathing skills;maximum assist (25% patient effort)  -PG       Row Name 05/22/24 0913          Upper Body Dressing Assessment/Training    [place] Level (Upper Body Dressing) upper body dressing skills;set up;moderate assist (50% patient effort)  -PG       Row Name 05/22/24 0913          Lower Body Dressing Assessment/Training    [place] Level (Lower Body Dressing) lower body dressing skills;dependent (less than 25% patient effort)  -PG       Row Name 05/22/24 0913          Grooming Assessment/Training    [place] Level (Grooming) grooming skills;set up  -PG       Row Name 05/22/24 0913          Toileting  Assessment/Training    [place] Level (Toileting) toileting skills;dependent (less than 25% patient effort)  -PG               User Key  (r) = Recorded By, (t) = Taken By, (c) = Cosigned By      Initials Name Provider Type    Felix Taylor OT Occupational Therapist                   Obj/Interventions       Row Name 05/22/24 0914          Sensory Assessment (Somatosensory)    Sensory Assessment (Somatosensory) sensation intact  -PG       Row Name 05/22/24 0914          Vision Assessment/Intervention    Visual Impairment/Limitations WFL  -PG       Row Name 05/22/24 0914          Range of Motion Comprehensive    General Range of Motion no range of motion deficits identified  -PG       Row Name 05/22/24 0914          Strength Comprehensive (MMT)    General Manual Muscle Testing (MMT) Assessment no strength deficits identified  -PG       Row Name 05/22/24 0914          Motor Skills    Motor Skills coordination;functional endurance  -PG     Coordination WFL  -PG     Functional Endurance Fair minus  -PG               User Key  (r) = Recorded By, (t) = Taken By, (c) = Cosigned By      Initials Name Provider Type    Felix Taylor OT Occupational Therapist                   Goals/Plan       Row Name 05/22/24 0916          Transfer Goal 1 (OT)    Activity/Assistive Device (Transfer Goal 1, OT) transfers, all  -PG     [place] Level/Cues Needed (Transfer Goal 1, OT) modified independence  -PG     Time Frame (Transfer Goal 1, OT) long term goal (LTG);10 days  -PG       Row Name 05/22/24 0916          Bathing Goal 1 (OT)    Activity/Device (Bathing Goal 1, OT) bathing skills, all  -PG     [place] Level/Cues Needed (Bathing Goal 1, OT) modified independence  -PG     Time Frame (Bathing Goal 1, OT) long term goal (LTG);10 days  -PG       Row Name 05/22/24 0916          Dressing Goal 1 (OT)    Activity/Device (Dressing Goal 1, OT) dressing skills, all  -PG     [place]/Cues Needed (Dressing Goal 1, OT)  modified independence  -PG     Time Frame (Dressing Goal 1, OT) long term goal (LTG);10 days  -PG       Row Name 05/22/24 0916          Toileting Goal 1 (OT)    Activity/Device (Toileting Goal 1, OT) toileting skills, all  -PG     [place] Level/Cues Needed (Toileting Goal 1, OT) modified independence  -PG     Time Frame (Toileting Goal 1, OT) long term goal (LTG);10 days  -PG       Row Name 05/22/24 0916          Grooming Goal 1 (OT)    Activity/Device (Grooming Goal 1, OT) grooming skills, all  -PG     [place] (Grooming Goal 1, OT) modified independence  -PG     Time Frame (Grooming Goal 1, OT) long term goal (LTG);10 days  -PG       Row Name 05/22/24 0916          Problem Specific Goal 1 (OT)    Problem Specific Goal 1 (OT) Patient will improve activity tolerance to fair plus to support independence and engagement with ADL activities  -PG     Time Frame (Problem Specific Goal 1, OT) long term goal (LTG);10 days  -PG       Row Name 05/22/24 0916          Therapy Assessment/Plan (OT)    Planned Therapy Interventions (OT) activity tolerance training;BADL retraining;strengthening exercise;transfer/mobility retraining;occupation/activity based interventions;patient/caregiver education/training  -PG               User Key  (r) = Recorded By, (t) = Taken By, (c) = Cosigned By      Initials Name Provider Type    PG Felix Villasenor OT Occupational Therapist                   Clinical Impression       Row Name 05/22/24 0915          Pain Assessment    Pain Location - Side/Orientation Right  -PG     Pain Location - hip  -PG     Pain Intervention(s) Nursing Notified  -PG     Additional Documentation Pain Scale: FACES Pre/Post-Treatment (Group)  -PG       Row Name 05/22/24 0915          Pain Scale: FACES Pre/Post-Treatment    Pain: FACES Scale, Pretreatment 8-->hurts whole lot  -PG     Posttreatment Pain Rating 8-->hurts whole lot  -PG     Pain Location - Side/Orientation Right  -PG     Pain Location - hip  -PG        Row Name 05/22/24 0915          Plan of Care Review    Plan of Care Reviewed With patient  -PG     Progress no change  -PG     Outcome Evaluation Patient presents with limitations affecting strength, activity tolerance, and balance impacting patient's ability to return home safely and independently.  The skills of a therapist will be required to safely and effectively implement the following treatment plan to restore maximal level of function  -PG       Row Name 05/22/24 0915          Therapy Assessment/Plan (OT)    Patient/Family Therapy Goal Statement (OT) Get stronger and return home with his sister  -PG     Rehab Potential (OT) good, to achieve stated therapy goals  -PG     Criteria for Skilled Therapeutic Interventions Met (OT) yes;meets criteria;skilled treatment is necessary  -PG     Therapy Frequency (OT) 5 times/wk  -PG       Row Name 05/22/24 0915          Therapy Plan Review/Discharge Plan (OT)    Anticipated Discharge Disposition (OT) inpatient rehabilitation facility;sub acute care setting  -PG               User Key  (r) = Recorded By, (t) = Taken By, (c) = Cosigned By      Initials Name Provider Type    PG Felix Villasenor, OT Occupational Therapist                   Outcome Measures       Row Name 05/22/24 0916          How much help from another is currently needed...    Putting on and taking off regular lower body clothing? 1  -PG     Bathing (including washing, rinsing, and drying) 1  -PG     Toileting (which includes using toilet bed pan or urinal) 1  -PG     Putting on and taking off regular upper body clothing 2  -PG     Taking care of personal grooming (such as brushing teeth) 3  -PG     Eating meals 4  -PG     AM-PAC 6 Clicks Score (OT) 12  -PG       Row Name 05/22/24 0916          Functional Assessment    Outcome Measure Options AM-PAC 6 Clicks Daily Activity (OT);Optimal Instrument  -PG       Row Name 05/22/24 0916          Optimal Instrument    Optimal Instrument Optimal - 3  -PG      Bending/Stooping 4  -PG     Standing 3  -PG     Reaching 2  -PG     From the list, choose the 3 activities you would most like to be able to do without any difficulty Bending/stooping;Standing;Reaching  -PG     Total Score Optimal - 3 9  -PG               User Key  (r) = Recorded By, (t) = Taken By, (c) = Cosigned By      Initials Name Provider Type    PG Felix Villasenor OT Occupational Therapist                    Occupational Therapy Education       Title: PT OT SLP Therapies (In Progress)       Topic: Occupational Therapy (In Progress)       Point: ADL training (In Progress)       Description:   Instruct learner(s) on proper safety adaptation and remediation techniques during self care or transfers.   Instruct in proper use of assistive devices.                  Learning Progress Summary             Patient Acceptance, E,D, NR by PG at 5/22/2024 0917                         Point: Home exercise program (In Progress)       Description:   Instruct learner(s) on appropriate technique for monitoring, assisting and/or progressing therapeutic exercises/activities.                  Learning Progress Summary             Patient Acceptance, E,D, NR by PG at 5/22/2024 0917                         Point: Precautions (In Progress)       Description:   Instruct learner(s) on prescribed precautions during self-care and functional transfers.                  Learning Progress Summary             Patient Acceptance, E,D, NR by PG at 5/22/2024 0917                         Point: Body mechanics (In Progress)       Description:   Instruct learner(s) on proper positioning and spine alignment during self-care, functional mobility activities and/or exercises.                  Learning Progress Summary             Patient Acceptance, E,D, NR by PG at 5/22/2024 0917                                         User Key       Initials Effective Dates Name Provider Type Discipline    PG 06/16/21 -  Felix Villasenor OT Occupational Therapist OT                   OT Recommendation and Plan  Planned Therapy Interventions (OT): activity tolerance training, BADL retraining, strengthening exercise, transfer/mobility retraining, occupation/activity based interventions, patient/caregiver education/training  Therapy Frequency (OT): 5 times/wk  Plan of Care Review  Plan of Care Reviewed With: patient  Progress: no change  Outcome Evaluation: Patient presents with limitations affecting strength, activity tolerance, and balance impacting patient's ability to return home safely and independently.  The skills of a therapist will be required to safely and effectively implement the following treatment plan to restore maximal level of function     Time Calculation:   Evaluation Complexity (OT)  Review Occupational Profile/Medical/Therapy History Complexity: brief/low complexity  Assessment, Occupational Performance/Identification of Deficit Complexity: 3-5 performance deficits  Clinical Decision Making Complexity (OT): problem focused assessment/low complexity  Overall Complexity of Evaluation (OT): low complexity     Time Calculation- OT       Row Name 05/22/24 0918             Time Calculation- OT    OT Received On 05/22/24  -PG      OT Goal Re-Cert Due Date 05/31/24  -PG         Timed Charges    92151 - OT Therapeutic Activity Minutes 10  -PG         Untimed Charges    OT Eval/Re-eval Minutes 35  -PG         Total Minutes    Timed Charges Total Minutes 10  -PG      Untimed Charges Total Minutes 35  -PG       Total Minutes 45  -PG                User Key  (r) = Recorded By, (t) = Taken By, (c) = Cosigned By      Initials Name Provider Type    PG Felix Villasenor OT Occupational Therapist                  Therapy Charges for Today       Code Description Service Date Service Provider Modifiers Qty    [id number]  OT THERAPEUTIC ACT EA 15 MIN 5/22/2024 Felix Villasenor OT GO 1    [id number] HC OT EVAL LOW COMPLEXITY 3 5/22/2024 Felix Villasenor OT GO 1                 Felix  Hamilton, OT  5/22/2024

## 2024-05-22 NOTE — PLAN OF CARE
Goal Outcome Evaluation:  Plan of Care Reviewed With: patient        Progress: no change  Outcome Evaluation: Patient presents with limitations affecting strength, activity tolerance, and balance impacting patient's ability to return home safely and independently.  The skills of a therapist will be required to safely and effectively implement the following treatment plan to restore maximal level of function      Anticipated Discharge Disposition (OT): inpatient rehabilitation facility, sub acute care setting

## 2024-05-23 ENCOUNTER — ANESTHESIA (OUTPATIENT)
Dept: GASTROENTEROLOGY | Facility: HOSPITAL | Age: 44
End: 2024-05-23
Payer: COMMERCIAL

## 2024-05-23 ENCOUNTER — ANESTHESIA EVENT (OUTPATIENT)
Dept: GASTROENTEROLOGY | Facility: HOSPITAL | Age: 44
End: 2024-05-23
Payer: COMMERCIAL

## 2024-05-23 LAB
ACB CMPLX DNA BAL NAA+NON-PRB-NCNCRNG: NOT DETECTED
ALBUMIN SERPL ELPH-MCNC: 2.2 G/DL (ref 2.9–4.4)
ALBUMIN/GLOB SERPL: 0.7 {RATIO} (ref 0.7–1.7)
ALPHA1 GLOB SERPL ELPH-MCNC: 0.6 G/DL (ref 0–0.4)
ALPHA2 GLOB SERPL ELPH-MCNC: 1.1 G/DL (ref 0.4–1)
ANION GAP SERPL CALCULATED.3IONS-SCNC: 11.6 MMOL/L (ref 5–15)
B-GLOBULIN SERPL ELPH-MCNC: 1 G/DL (ref 0.7–1.3)
BASOPHILS # BLD AUTO: 0.02 10*3/MM3 (ref 0–0.2)
BASOPHILS NFR BLD AUTO: 0.2 % (ref 0–1.5)
BLACTX-M ISLT/SPM QL: ABNORMAL
BLAIMP ISLT/SPM QL: ABNORMAL
BLAKPC ISLT/SPM QL: ABNORMAL
BLAOXA-48-LIKE ISLT/SPM QL: ABNORMAL
BLAVIM ISLT/SPM QL: ABNORMAL
BUN SERPL-MCNC: 9 MG/DL (ref 6–20)
BUN/CREAT SERPL: 18.4 (ref 7–25)
C PNEUM DNA NPH QL NAA+NON-PROBE: NOT DETECTED
CALCIUM SPEC-SCNC: 8.3 MG/DL (ref 8.6–10.5)
CHLORIDE SERPL-SCNC: 98 MMOL/L (ref 98–107)
CILIATED BAL QL: 17 %
CO2 SERPL-SCNC: 25.4 MMOL/L (ref 22–29)
CREAT SERPL-MCNC: 0.49 MG/DL (ref 0.76–1.27)
DEPRECATED RDW RBC AUTO: 41.1 FL (ref 37–54)
E CLOAC COMP DNA BAL NAA+NON-PRB-NCNCRNG: NOT DETECTED
E COLI DNA BAL NAA+NON-PRB-NCNCRNG: NOT DETECTED
EGFRCR SERPLBLD CKD-EPI 2021: 129.8 ML/MIN/1.73
EOSINOPHIL # BLD AUTO: 0.03 10*3/MM3 (ref 0–0.4)
EOSINOPHIL NFR BLD AUTO: 0.3 % (ref 0.3–6.2)
ERYTHROCYTE [DISTWIDTH] IN BLOOD BY AUTOMATED COUNT: 12.6 % (ref 12.3–15.4)
FLUAV SUBTYP SPEC NAA+PROBE: NOT DETECTED
FLUBV RNA ISLT QL NAA+PROBE: NOT DETECTED
GAMMA GLOB SERPL ELPH-MCNC: 0.8 G/DL (ref 0.4–1.8)
GLOBULIN SER-MCNC: 3.4 G/DL (ref 2.2–3.9)
GLUCOSE SERPL-MCNC: 112 MG/DL (ref 65–99)
GP B STREP DNA BAL NAA+NON-PRB-NCNCRNG: NOT DETECTED
GRAM STN SPEC: NORMAL
HADV DNA SPEC NAA+PROBE: NOT DETECTED
HAEM INFLU DNA BAL NAA+NON-PRB-NCNCRNG: DETECTED
HCT VFR BLD AUTO: 29.9 % (ref 37.5–51)
HGB BLD-MCNC: 9.5 G/DL (ref 13–17.7)
HMPV RNA NPH QL NAA+NON-PROBE: NOT DETECTED
HPIV RNA LOWER RESP QL NAA+NON-PROBE: NOT DETECTED
IGA SERPL-MCNC: 276 MG/DL (ref 90–386)
IGG SERPL-MCNC: 957 MG/DL (ref 603–1613)
IGM SERPL-MCNC: 66 MG/DL (ref 20–172)
IMM GRANULOCYTES # BLD AUTO: 0.05 10*3/MM3 (ref 0–0.05)
IMM GRANULOCYTES NFR BLD AUTO: 0.5 % (ref 0–0.5)
INTERPRETATION SERPL IEP-IMP: ABNORMAL
K AEROGENES DNA BAL NAA+NON-PRB-NCNCRNG: NOT DETECTED
K OXYTOCA DNA BAL NAA+NON-PRB-NCNCRNG: NOT DETECTED
K PNEU GRP DNA BAL NAA+NON-PRB-NCNCRNG: NOT DETECTED
KAPPA LC FREE SER-MCNC: 35.9 MG/L (ref 3.3–19.4)
KAPPA LC FREE/LAMBDA FREE SER: 0.68 {RATIO} (ref 0.26–1.65)
L PNEUMO DNA LOWER RESP QL NAA+NON-PROBE: NOT DETECTED
LABORATORY COMMENT REPORT: ABNORMAL
LAMBDA LC FREE SERPL-MCNC: 53 MG/L (ref 5.7–26.3)
LYMPHOCYTES # BLD AUTO: 1.3 10*3/MM3 (ref 0.7–3.1)
LYMPHOCYTES NFR BLD AUTO: 13.6 % (ref 19.6–45.3)
LYMPHOCYTES NFR FLD MANUAL: 17 %
M CATARRHALIS DNA BAL NAA+NON-PRB-NCNCRNG: NOT DETECTED
M PNEUMO IGG SER IA-ACNC: NOT DETECTED
M PROTEIN SERPL ELPH-MCNC: ABNORMAL G/DL
MACROPHAGE FLUID: 9 %
MAGNESIUM SERPL-MCNC: 1.8 MG/DL (ref 1.6–2.6)
MCH RBC QN AUTO: 28.2 PG (ref 26.6–33)
MCHC RBC AUTO-ENTMCNC: 31.8 G/DL (ref 31.5–35.7)
MCV RBC AUTO: 88.7 FL (ref 79–97)
MECA+MECC ISLT/SPM QL: ABNORMAL
MONOCYTES # BLD AUTO: 0.67 10*3/MM3 (ref 0.1–0.9)
MONOCYTES NFR BLD AUTO: 7 % (ref 5–12)
NDM GENE: ABNORMAL
NEUTROPHILS NFR BLD AUTO: 7.46 10*3/MM3 (ref 1.7–7)
NEUTROPHILS NFR BLD AUTO: 78.4 % (ref 42.7–76)
NEUTROPHILS NFR FLD MANUAL: 57 %
NRBC BLD AUTO-RTO: 0 /100 WBC (ref 0–0.2)
P AERUGINOSA DNA BAL NAA+NON-PRB-NCNCRNG: NOT DETECTED
PHOSPHATE SERPL-MCNC: 3.4 MG/DL (ref 2.5–4.5)
PLATELET # BLD AUTO: 357 10*3/MM3 (ref 140–450)
PMV BLD AUTO: 9.8 FL (ref 6–12)
POTASSIUM SERPL-SCNC: 3.1 MMOL/L (ref 3.5–5.2)
PROT SERPL-MCNC: 5.6 G/DL (ref 6–8.5)
PROTEUS SP DNA BAL NAA+NON-PRB-NCNCRNG: NOT DETECTED
RBC # BLD AUTO: 3.37 10*6/MM3 (ref 4.14–5.8)
RHINOVIRUS RNA SPEC NAA+PROBE: NOT DETECTED
RSV RNA NPH QL NAA+NON-PROBE: NOT DETECTED
S AUREUS DNA BAL NAA+NON-PRB-NCNCRNG: NOT DETECTED
S MARCESCENS DNA BAL NAA+NON-PRB-NCNCRNG: NOT DETECTED
S PNEUM DNA BAL NAA+NON-PRB-NCNCRNG: NOT DETECTED
S PYO DNA BAL NAA+NON-PRB-NCNCRNG: NOT DETECTED
SODIUM SERPL-SCNC: 135 MMOL/L (ref 136–145)
VISUAL PRESENCE OF BLOOD: NORMAL
WBC NRBC COR # BLD AUTO: 9.53 10*3/MM3 (ref 3.4–10.8)

## 2024-05-23 PROCEDURE — 88104 CYTOPATH FL NONGYN SMEARS: CPT | Performed by: INTERNAL MEDICINE

## 2024-05-23 PROCEDURE — 94799 UNLISTED PULMONARY SVC/PX: CPT

## 2024-05-23 PROCEDURE — 31653 BRONCH EBUS SAMPLNG 3/> NODE: CPT | Performed by: INTERNAL MEDICINE

## 2024-05-23 PROCEDURE — 87633 RESP VIRUS 12-25 TARGETS: CPT | Performed by: INTERNAL MEDICINE

## 2024-05-23 PROCEDURE — 0B9G8ZX DRAINAGE OF LEFT UPPER LUNG LOBE, VIA NATURAL OR ARTIFICIAL OPENING ENDOSCOPIC, DIAGNOSTIC: ICD-10-PCS | Performed by: INTERNAL MEDICINE

## 2024-05-23 PROCEDURE — 83735 ASSAY OF MAGNESIUM: CPT | Performed by: ORTHOPAEDIC SURGERY

## 2024-05-23 PROCEDURE — 85025 COMPLETE CBC W/AUTO DIFF WBC: CPT | Performed by: ORTHOPAEDIC SURGERY

## 2024-05-23 PROCEDURE — C1726 CATH, BAL DIL, NON-VASCULAR: HCPCS | Performed by: INTERNAL MEDICINE

## 2024-05-23 PROCEDURE — 87071 CULTURE AEROBIC QUANT OTHER: CPT | Performed by: INTERNAL MEDICINE

## 2024-05-23 PROCEDURE — 84100 ASSAY OF PHOSPHORUS: CPT | Performed by: ORTHOPAEDIC SURGERY

## 2024-05-23 PROCEDURE — 25010000002 ACETAMINOPHEN 10 MG/ML SOLUTION

## 2024-05-23 PROCEDURE — 87070 CULTURE OTHR SPECIMN AEROBIC: CPT | Performed by: INTERNAL MEDICINE

## 2024-05-23 PROCEDURE — 88173 CYTOPATH EVAL FNA REPORT: CPT | Performed by: INTERNAL MEDICINE

## 2024-05-23 PROCEDURE — 25810000003 LACTATED RINGERS PER 1000 ML

## 2024-05-23 PROCEDURE — 87205 SMEAR GRAM STAIN: CPT | Performed by: INTERNAL MEDICINE

## 2024-05-23 PROCEDURE — 89051 BODY FLUID CELL COUNT: CPT | Performed by: INTERNAL MEDICINE

## 2024-05-23 PROCEDURE — 87102 FUNGUS ISOLATION CULTURE: CPT | Performed by: INTERNAL MEDICINE

## 2024-05-23 PROCEDURE — 31645 BRNCHSC W/THER ASPIR 1ST: CPT | Performed by: INTERNAL MEDICINE

## 2024-05-23 PROCEDURE — 97110 THERAPEUTIC EXERCISES: CPT

## 2024-05-23 PROCEDURE — 31624 DX BRONCHOSCOPE/LAVAGE: CPT | Performed by: INTERNAL MEDICINE

## 2024-05-23 PROCEDURE — 0BBJ8ZX EXCISION OF LEFT LOWER LUNG LOBE, VIA NATURAL OR ARTIFICIAL OPENING ENDOSCOPIC, DIAGNOSTIC: ICD-10-PCS | Performed by: INTERNAL MEDICINE

## 2024-05-23 PROCEDURE — 94640 AIRWAY INHALATION TREATMENT: CPT

## 2024-05-23 PROCEDURE — 87206 SMEAR FLUORESCENT/ACID STAI: CPT | Performed by: INTERNAL MEDICINE

## 2024-05-23 PROCEDURE — 88108 CYTOPATH CONCENTRATE TECH: CPT | Performed by: INTERNAL MEDICINE

## 2024-05-23 PROCEDURE — 31623 DX BRONCHOSCOPE/BRUSH: CPT | Performed by: INTERNAL MEDICINE

## 2024-05-23 PROCEDURE — 99233 SBSQ HOSP IP/OBS HIGH 50: CPT | Performed by: INTERNAL MEDICINE

## 2024-05-23 PROCEDURE — 80048 BASIC METABOLIC PNL TOTAL CA: CPT | Performed by: ORTHOPAEDIC SURGERY

## 2024-05-23 PROCEDURE — 63710000001 LEVALBUTEROL PER 0.5 MG: Performed by: NURSE ANESTHETIST, CERTIFIED REGISTERED

## 2024-05-23 PROCEDURE — 87116 MYCOBACTERIA CULTURE: CPT | Performed by: INTERNAL MEDICINE

## 2024-05-23 PROCEDURE — 31628 BRONCHOSCOPY/LUNG BX EACH: CPT | Performed by: INTERNAL MEDICINE

## 2024-05-23 PROCEDURE — 88305 TISSUE EXAM BY PATHOLOGIST: CPT | Performed by: INTERNAL MEDICINE

## 2024-05-23 PROCEDURE — 25010000002 PROPOFOL 500 MG/50ML EMULSION

## 2024-05-23 RX ORDER — MAGNESIUM HYDROXIDE 1200 MG/15ML
LIQUID ORAL AS NEEDED
Status: DISCONTINUED | OUTPATIENT
Start: 2024-05-23 | End: 2024-05-23 | Stop reason: HOSPADM

## 2024-05-23 RX ORDER — BUDESONIDE AND FORMOTEROL FUMARATE DIHYDRATE 160; 4.5 UG/1; UG/1
2 AEROSOL RESPIRATORY (INHALATION)
Status: DISCONTINUED | OUTPATIENT
Start: 2024-05-23 | End: 2024-06-23 | Stop reason: HOSPADM

## 2024-05-23 RX ORDER — SODIUM CHLORIDE, SODIUM LACTATE, POTASSIUM CHLORIDE, CALCIUM CHLORIDE 600; 310; 30; 20 MG/100ML; MG/100ML; MG/100ML; MG/100ML
INJECTION, SOLUTION INTRAVENOUS CONTINUOUS PRN
Status: DISCONTINUED | OUTPATIENT
Start: 2024-05-23 | End: 2024-05-23 | Stop reason: SURG

## 2024-05-23 RX ORDER — LIDOCAINE HYDROCHLORIDE 40 MG/ML
INJECTION, SOLUTION RETROBULBAR AS NEEDED
Status: DISCONTINUED | OUTPATIENT
Start: 2024-05-23 | End: 2024-05-23 | Stop reason: HOSPADM

## 2024-05-23 RX ORDER — PHENYLEPHRINE HCL IN 0.9% NACL 1 MG/10 ML
SYRINGE (ML) INTRAVENOUS AS NEEDED
Status: DISCONTINUED | OUTPATIENT
Start: 2024-05-23 | End: 2024-05-23 | Stop reason: SURG

## 2024-05-23 RX ORDER — ACETAMINOPHEN 500 MG
1000 TABLET ORAL 3 TIMES DAILY
Status: DISPENSED | OUTPATIENT
Start: 2024-05-23 | End: 2024-05-28

## 2024-05-23 RX ORDER — POTASSIUM CHLORIDE 750 MG/1
40 CAPSULE, EXTENDED RELEASE ORAL ONCE
Status: DISCONTINUED | OUTPATIENT
Start: 2024-05-23 | End: 2024-05-25

## 2024-05-23 RX ORDER — DEXMEDETOMIDINE HYDROCHLORIDE 100 UG/ML
INJECTION, SOLUTION INTRAVENOUS AS NEEDED
Status: DISCONTINUED | OUTPATIENT
Start: 2024-05-23 | End: 2024-05-23 | Stop reason: SURG

## 2024-05-23 RX ORDER — ACETAMINOPHEN 10 MG/ML
1000 INJECTION, SOLUTION INTRAVENOUS ONCE
Status: COMPLETED | OUTPATIENT
Start: 2024-05-23 | End: 2024-05-23

## 2024-05-23 RX ORDER — PROPOFOL 10 MG/ML
INJECTION, EMULSION INTRAVENOUS AS NEEDED
Status: DISCONTINUED | OUTPATIENT
Start: 2024-05-23 | End: 2024-05-23 | Stop reason: SURG

## 2024-05-23 RX ORDER — POLYETHYLENE GLYCOL 3350 17 G/17G
17 POWDER, FOR SOLUTION ORAL 2 TIMES DAILY
Status: DISCONTINUED | OUTPATIENT
Start: 2024-05-23 | End: 2024-05-25

## 2024-05-23 RX ORDER — OXYCODONE HYDROCHLORIDE 5 MG/1
5 TABLET ORAL
Status: DISCONTINUED | OUTPATIENT
Start: 2024-05-23 | End: 2024-05-23

## 2024-05-23 RX ORDER — ALBUTEROL SULFATE 2.5 MG/3ML
0.62 SOLUTION RESPIRATORY (INHALATION) EVERY 6 HOURS PRN
Status: DISCONTINUED | OUTPATIENT
Start: 2024-05-23 | End: 2024-05-23

## 2024-05-23 RX ORDER — LEVALBUTEROL INHALATION SOLUTION 0.63 MG/3ML
0.63 SOLUTION RESPIRATORY (INHALATION) EVERY 6 HOURS PRN
Status: DISCONTINUED | OUTPATIENT
Start: 2024-05-23 | End: 2024-06-23 | Stop reason: HOSPADM

## 2024-05-23 RX ORDER — LIDOCAINE 4 G/G
1 PATCH TOPICAL
Status: DISCONTINUED | OUTPATIENT
Start: 2024-05-23 | End: 2024-06-23 | Stop reason: HOSPADM

## 2024-05-23 RX ADMIN — LEVALBUTEROL HYDROCHLORIDE 0.63 MG: 0.63 SOLUTION RESPIRATORY (INHALATION) at 14:01

## 2024-05-23 RX ADMIN — BUDESONIDE AND FORMOTEROL FUMARATE DIHYDRATE 2 PUFF: 160; 4.5 AEROSOL RESPIRATORY (INHALATION) at 19:41

## 2024-05-23 RX ADMIN — DEXMEDETOMIDINE 10 MCG: 100 INJECTION, SOLUTION INTRAVENOUS at 15:46

## 2024-05-23 RX ADMIN — POLYETHYLENE GLYCOL 3350 17 G: 17 POWDER, FOR SOLUTION ORAL at 20:31

## 2024-05-23 RX ADMIN — ACETAMINOPHEN 650 MG: 325 TABLET ORAL at 23:43

## 2024-05-23 RX ADMIN — Medication 10 ML: at 08:30

## 2024-05-23 RX ADMIN — Medication 200 MCG: at 16:09

## 2024-05-23 RX ADMIN — Medication 10 ML: at 20:36

## 2024-05-23 RX ADMIN — ACETAMINOPHEN 1000 MG: 10 INJECTION INTRAVENOUS at 14:46

## 2024-05-23 RX ADMIN — ACETAMINOPHEN 1000 MG: 500 TABLET ORAL at 20:31

## 2024-05-23 RX ADMIN — PROPOFOL 150 MCG/KG/MIN: 10 INJECTION, EMULSION INTRAVENOUS at 14:53

## 2024-05-23 RX ADMIN — SENNOSIDES AND DOCUSATE SODIUM 2 TABLET: 50; 8.6 TABLET ORAL at 20:32

## 2024-05-23 RX ADMIN — DEXMEDETOMIDINE 20 MCG: 100 INJECTION, SOLUTION INTRAVENOUS at 14:49

## 2024-05-23 RX ADMIN — SODIUM CHLORIDE, POTASSIUM CHLORIDE, SODIUM LACTATE AND CALCIUM CHLORIDE: 600; 310; 30; 20 INJECTION, SOLUTION INTRAVENOUS at 14:45

## 2024-05-23 RX ADMIN — PROPOFOL 150 MG: 10 INJECTION, EMULSION INTRAVENOUS at 14:51

## 2024-05-23 NOTE — CONSULTS
Palliative care visit for goals of care discussions- at time of visit, patient in endo. Palliative care will follow up tomorrow.    Kimmie AGUIRRE, RN, CHPN  Palliative Care

## 2024-05-23 NOTE — DISCHARGE PLACEMENT REQUEST
"Beba Bowen (44 y.o. Male)       Date of Birth   1980    Social Security Number       Address   [address] [facility] [address]    Home Phone       MRN   [id number]       Taoism   None    Marital Status   Single                            Admission Date   24    Admission Type   Emergency    Admitting Provider   Charlene Justice MD    Attending Provider   Michael Benitez MD    Department, Room/Bed   [address], 3009/1       Discharge Date       Discharge Disposition       Discharge Destination                                 Attending Provider: Michael Benitez MD    Allergies: No Known Allergies    Isolation: None   Infection: None   Code Status: CPR    Ht: 182.9 cm (72.01\")   Wt: 65 kg (143 lb 4.8 oz)    Admission Cmt: None   Principal Problem: Closed intertrochanteric fracture of right femur [S72.141A]                   Active Insurance as of 2024       Primary Coverage       Payor Plan Insurance Group Employer/Plan Group    [email or web address] BY AVENDANO [id number]       Payor Plan Address Payor Plan Phone Number Payor Plan Fax Number Effective Dates    [address] [address]   2021 - None Entered    [facility] [id number]         Subscriber Name Subscriber Birth Date Member ID       BEBA BOWEN 1980 [id number]                     Emergency Contacts        (Rel.) Home Phone Work Phone Mobile Phone    jimbo ramos (Other) -- -- [phone]    faye ramos (Relative) -- -- [phone]              Insurance Information                  [employer] BY JUAN ALBERTO[employer] BY JUAN ALBERTO Phone: --    Subscriber: Beba Bowen ROSA M Subscriber#: [id number]    Group#: [id number] Precert#: [id number]             History & Physical        Charlene Justice MD at 24 2307           [facility] HISTORY AND PHYSICAL  Date: 2024   Patient Name: Beba Bowen  : 1980  MRN: [id number]  Primary " Care Physician:  Provider, No Known  Date of admission: 5/20/2024    Subjective  Subjective     Chief Complaint: Fall, right hip pain    HPI:    Oswaldo Bowen is a 44 y.o. male  with a past medical history of depression, intellectual disability presented to the ED for evaluation of right leg pain after a fall.  Most of the history has been presented by the patient's family members at the bedside, states that patient has been limping due to pain in his right lower extremity since last 1 month.  Family member states that patient had a fall on the ramp outside of the house at some point but they were not sure when it was and thinks that probably he has been limping from the pain from that.  Today patient was outside in the yard when he was turning around and slipped and fell onto the concrete floor, did not hit his head or lost consciousness.  After the fall he could not get up and has severe pain in his right lower extremity.    As per the family members patient has very low appetite and eats very less.  Smokes up to 1 to 2 packs a day since many years.  Has lost significant amount of weight in the last few months.  Patient appears to be cachectic.  Denies any other complaints.  Patient has mild dysarthria and as per the family members that appears to be normal.    In the ED, vital signs temperature 97.8, pulse 113, respiratory 18, blood pressure 112/88 on room air saturating around 99%.  Labs showed sodium 137, potassium 3.9, chloride 95, creatinine 1.72, unknown baseline, , rest of the CMP with no significant findings, WBC 19.54, hemoglobin 11.9, platelets 410.  Chest x-ray showed questionable 14 mm nodule projecting in the left perihilar region.  Recommended follow-up CT.  X-ray pelvis showed acute comminuted displaced intertrochanteric right femoral fracture.  Case has been discussed by the ED physician with orthopedic surgeon on-call, agreed to consult.  Patient has been admitted for further evaluation and  management of acute comminuted displaced intertrochanteric right femoral fracture, elevated creatinine      Personal History     Past medical history  Depression  Intellectual disability    No family history on file.      Social History     Socioeconomic History    Marital status: Single         Home Medications:       Allergies:  No Known Allergies    Review of Systems   Unable to obtain the complete review of systems as the patient not able to answer all the questions appropriately.    Objective  Objective     Vitals:   Temp:  [97.8 °F (36.6 °C)] 97.8 °F (36.6 °C)  Heart Rate:  [102-115] 102  Resp:  [16-18] 16  BP: (112-132)/(87-89) 124/87    Physical Exam    Constitutional: Awake, alert, no acute distress, cachectic with muscle loss, disheveled   Eyes: Pupils equal, sclerae anicteric, no conjunctival injection   HENT: NCAT, mucous membranes dry   Respiratory: Clear to auscultation bilaterally, nonlabored respirations    Cardiovascular: RRR, no murmurs   Gastrointestinal: Positive bowel sounds, soft, nontender, nondistended   Musculoskeletal: No bilateral ankle edema, no clubbing or cyanosis to extremities, right lower extremity warm to touch, externally rotated   Neurologic: Oriented x 3, mild dysarthria(at baseline)   Skin: No rashes     Result Review   Result Review:  I have personally reviewed the results from the time of this admission to 5/21/2024 00:58 EDT and agree with these findings:  [x]  Laboratory  []  Microbiology  [x]  Radiology  [x]  EKG/Telemetry   []  Cardiology/Vascular   []  Pathology  []  Old records  []  Other:        Imaging Results (Last 24 Hours)       Procedure Component Value Units Date/Time    XR Chest 1 View [[id number]] Collected: 05/20/24 2220     Updated: 05/20/24 2225    Narrative:      XR CHEST 1 VW-     Date of Exam: 5/20/2024 10:00 PM     Indication: Cough, persistent     Comparison: None available.     Findings:  Heart size and pulmonary vessels are within normal limits. There  is a 14  mm left perihilar nodular density. Follow-up CT scan of the chest would  be recommended when clinically feasible. The lungs are otherwise clear.  No pleural effusion. No pneumothorax. Bony structures are unremarkable.       Impression:      Impression:     1. Questionable 14 mm nodule projecting in the left perihilar region.  Follow-up CT scan of the chest would be recommended when clinically  feasible.        Electronically Signed By-Eliecer Jara MD On:5/20/2024 10:23 PM       XR Femur 2 View Right [[id number]] Collected: 05/20/24 2215     Updated: 05/20/24 2223    Narrative:      XR FEMUR 2 VW RIGHT-, XR PELVIS 1 OR 2 VW-     (COMBINED REPORT)     Date of Exam: 5/20/2024 10:00 PM     Indication: 44-year-old male who fell; c/o pelvic & right leg pain;  trauma/injury; initial encounter.     Comparison: None available.     Findings:     PELVIS (1V): A single AP view of the pelvis reveals an acute displaced  comminuted intertrochanteric extra-articular probably closed fracture of  the proximal right femur (probably a two-part fracture). There is varus  (medial) displacement and angulation of large distal fracture fragment.  No other fractures are seen. No dislocation. External artifacts obscure  detail. Bowel gas and formed stool also obscure detail.     RIGHT FEMUR (5V): 5 views of the right femur reveal an acute displaced  comminuted extra-articular closed intertrochanteric fracture of the  right femur. It is probably a 2-part fracture. There is approximately  1.5 cm of medial, or varus, displacement of the large distal right  femoral fracture fragment. No other fractures are seen. No dislocation.  External artifacts obscure detail, limiting assessment for retained  foreign bodies.          Impression:      (COMBINED)  Acute comminuted displaced intertrochanteric right femoral fracture is  noted, as discussed. No dislocation.              Please note that portions of this note were completed with a  voice  recognition program.                 Electronically Signed By-Panda Howard MD On:5/20/2024 10:21 PM       XR Pelvis 1 or 2 View [[id number]] Collected: 05/20/24 2215     Updated: 05/20/24 2223    Narrative:      XR FEMUR 2 VW RIGHT-, XR PELVIS 1 OR 2 VW-     (COMBINED REPORT)     Date of Exam: 5/20/2024 10:00 PM     Indication: 44-year-old male who fell; c/o pelvic & right leg pain;  trauma/injury; initial encounter.     Comparison: None available.     Findings:     PELVIS (1V): A single AP view of the pelvis reveals an acute displaced  comminuted intertrochanteric extra-articular probably closed fracture of  the proximal right femur (probably a two-part fracture). There is varus  (medial) displacement and angulation of large distal fracture fragment.  No other fractures are seen. No dislocation. External artifacts obscure  detail. Bowel gas and formed stool also obscure detail.     RIGHT FEMUR (5V): 5 views of the right femur reveal an acute displaced  comminuted extra-articular closed intertrochanteric fracture of the  right femur. It is probably a 2-part fracture. There is approximately  1.5 cm of medial, or varus, displacement of the large distal right  femoral fracture fragment. No other fractures are seen. No dislocation.  External artifacts obscure detail, limiting assessment for retained  foreign bodies.          Impression:      (COMBINED)  Acute comminuted displaced intertrochanteric right femoral fracture is  noted, as discussed. No dislocation.              Please note that portions of this note were completed with a voice  recognition program.                 Electronically Signed By-Panda Howard MD On:5/20/2024 10:21 PM                        Assessment & Plan  Assessment / Plan     Assessment/Plan:   Fall  Acute comminuted displaced intertrochanteric right femoral fracture  Elevated creatinine likely VLAD, unclear baseline  14 mm nodule in the left perihilar region  Mild intellectual  disability  Depression  Previous history of suicide attempts, currently no ideations  At risk for refeeding syndrome     Plan  Admit to inpatient, remote telemetry  N.p.o. after midnight  Pain control with IV analgesics  IV fluids  Orthopedics consulted Dr. Clayton  Type and screen  EKG preop  Monitor electrolytes, renal function with a.m. labs  Monitor urine output  Once the baseline creatinine is established, get CT chest abdomen pelvis with contrast to further evaluate for nodule noted in the left perihilar region and for any underlying malignancy  Nursing dysphagia screen  IV thiamine  IV folic acid  Multivitamin  Hypoglycemic protocol  Precautions  PT OT consult    DVT prophylaxis:  Mechanical DVT prophylaxis orders are signed and held.      CODE STATUS:    Level Of Support Discussed With: Patient  Code Status (Patient has no pulse and is not breathing): CPR (Attempt to Resuscitate)  Medical Interventions (Patient has pulse or is breathing): Full Support      Admission Status:  I believe this patient meets inpatient status.    Part of this note may be an electronic transcription/translation of spoken language to printed text using the Dragon Dictation System    Charlene Justice MD               Electronically signed by Charlene Justice MD at 05/21/24 0058       Current Facility-Administered Medications   Medication Dose Route Frequency Provider Last Rate Last Admin    acetaminophen (TYLENOL) tablet 1,000 mg  1,000 mg Oral TID Michael Benitez MD        acetaminophen (TYLENOL) tablet 650 mg  650 mg Oral Q6H PRN Molina Clayton MD        aluminum-magnesium hydroxide-simethicone (MAALOX MAX) 400-400-40 MG/5ML suspension 15 mL  15 mL Oral Q6H PRN Molina Clayton MD        sennosides-docusate (PERICOLACE) 8.6-50 MG per tablet 2 tablet  2 tablet Oral BID PRN Molina Clayton MD        And    polyethylene glycol (MIRALAX) packet 17 g  17 g Oral Daily PRN Molina Clayton MD        And    bisacodyl (DULCOLAX) EC  tablet 5 mg  5 mg Oral Daily PRN Been, Molina JUSTICE MD        And    bisacodyl (DULCOLAX) suppository 10 mg  10 mg Rectal Daily PRN Been, Molina JUSTICE MD        sennosides-docusate (PERICOLACE) 8.6-50 MG per tablet 2 tablet  2 tablet Oral BID Been, Molina JUSTICE MD   2 tablet at 05/22/24 2046    And    polyethylene glycol (MIRALAX) packet 17 g  17 g Oral Daily PRN Been, Molina JUSTICE MD        And    bisacodyl (DULCOLAX) EC tablet 5 mg  5 mg Oral Daily PRN Been, Molina JUSTICE MD        And    bisacodyl (DULCOLAX) suppository 10 mg  10 mg Rectal Daily PRN Been, Molina JUSTICE MD        calcium carbonate (TUMS) chewable tablet 500 mg (200 mg elemental)  1 tablet Oral BID PRN Been, Molina JUSTICE MD   1 tablet at 05/22/24 1619    dextrose (D50W) (25 g/50 mL) IV injection 25 g  25 g Intravenous Q15 Min PRN Been, Molina JUSTICE MD        dextrose (GLUTOSE) oral gel 15 g  15 g Oral Q15 Min PRN Been, Molina JUSTICE MD        Diclofenac Sodium (VOLTAREN) 1 % gel 2 g  2 g Topical 4x Daily PRN Been, Molina JUSTICE MD        glucagon (GLUCAGEN) injection 1 mg  1 mg Intramuscular Q15 Min PRN Been, Molina JUSTICE MD        HYDROcodone-acetaminophen (NORCO)  MG per tablet 1 tablet  1 tablet Oral Q6H PRN Been, Molina JUSTICE MD   1 tablet at 05/22/24 2046    HYDROcodone-acetaminophen (NORCO) 5-325 MG per tablet 1 tablet  1 tablet Oral Q6H PRN Been, Molina JUSTICE MD   1 tablet at 05/22/24 0018    hydrOXYzine (ATARAX) tablet 25 mg  25 mg Oral TID PRN Been, Molina JUSTICE MD   25 mg at 05/22/24 2046    Lidocaine 4 % 1 patch  1 patch Transdermal Daily PRN Been, Molina JUSTICE MD        Lidocaine 4 % 1 patch  1 patch Transdermal Q24H Michael Benitez MD        magnesium oxide (MAG-OX) tablet 400 mg  400 mg Oral Daily Michael Benitez MD        melatonin tablet 5 mg  5 mg Oral Nightly PRN Been, Molina JUSTICE MD        morphine injection 1 mg  1 mg Intravenous Q4H PRN Molina Clayton MD   1 mg at 05/22/24 0541    And    naloxone (NARCAN) injection 0.4 mg  0.4 mg Intravenous Q5 Min PRN  Been, Molina JUSTICE MD        morphine injection 3 mg  3 mg Intravenous Q4H PRN Been, Molina JUSTICE MD        And    naloxone (NARCAN) injection 0.4 mg  0.4 mg Intravenous Q5 Min PRN Been, Molina JUSTICE MD        multivitamin with minerals 1 tablet  1 tablet Oral Daily Been, Molina JUSTICE MD   1 tablet at 05/22/24 0900    nicotine (NICODERM CQ) 21 MG/24HR patch 1 patch  1 patch Transdermal Daily PRN Been, Molina JUSTICE MD   1 patch at 05/22/24 1317    ondansetron (ZOFRAN) injection 4 mg  4 mg Intravenous Q4H PRN Been, Molina JUSTICE MD        ondansetron ODT (ZOFRAN-ODT) disintegrating tablet 4 mg  4 mg Oral Q6H PRN Been, Molina JUSTICE MD        Or    ondansetron (ZOFRAN) injection 4 mg  4 mg Intravenous Q6H PRN Been, Molina JUSTICE MD        polyethylene glycol (MIRALAX) packet 17 g  17 g Oral BID Michael Benitez MD        potassium chloride (MICRO-K/KLOR-CON) CR capsule  40 mEq Oral Once Michael Benitez MD        prochlorperazine (COMPAZINE) injection 5 mg  5 mg Intravenous Q6H PRN Been, Molina JUSTICE MD        sodium chloride 0.9 % flush 10 mL  10 mL Intravenous PRN BeenMolina MD        sodium chloride 0.9 % flush 10 mL  10 mL Intravenous Q12H Been, Molina JUSTICE MD   10 mL at 05/22/24 2046    sodium chloride 0.9 % flush 10 mL  10 mL Intravenous PRN BeenMolina MD        sodium chloride 0.9 % infusion 40 mL  40 mL Intravenous PRN BeenMolina MD        sodium chloride 0.9 % infusion 40 mL  40 mL Intravenous PRN Been, Molina JUSTICE MD        thiamine (VITAMIN B-1) tablet 100 mg  100 mg Oral Daily Michael Benitez MD        vitamin B-12 (CYANOCOBALAMIN) tablet 1,000 mcg  1,000 mcg Oral Daily Michael Benitez MD         Lab Results (last 24 hours)       Procedure Component Value Units Date/Time    Phosphorus [[id number]]  (Normal) Collected: 05/23/24 0656    Specimen: Blood from Arm, Left Updated: 05/23/24 0734     Phosphorus 3.4 mg/dL     Basic Metabolic Panel [[id number]]  (Abnormal) Collected: 05/23/24 0656    Specimen: Blood from Arm,  Left Updated: 05/23/24 0732     Glucose 112 mg/dL      BUN 9 mg/dL      Creatinine 0.49 mg/dL      Sodium 135 mmol/L      Potassium 3.1 mmol/L      Chloride 98 mmol/L      CO2 25.4 mmol/L      Calcium 8.3 mg/dL      BUN/Creatinine Ratio 18.4     Anion Gap 11.6 mmol/L      eGFR 129.8 mL/min/1.73     Narrative:      GFR Normal >60  Chronic Kidney Disease <60  Kidney Failure <15      Magnesium [[id number]]  (Normal) Collected: 05/23/24 0656    Specimen: Blood from Arm, Left Updated: 05/23/24 0732     Magnesium 1.8 mg/dL     CBC & Differential [[id number]]  (Abnormal) Collected: 05/23/24 0656    Specimen: Blood from Arm, Left Updated: 05/23/24 0714    Narrative:      The following orders were created for panel order CBC & Differential.  Procedure                               Abnormality         Status                     ---------                               -----------         ------                     CBC Auto Differential[[id number]]        Abnormal            Final result                 Please view results for these tests on the individual orders.    CBC Auto Differential [[id number]]  (Abnormal) Collected: 05/23/24 0656    Specimen: Blood from Arm, Left Updated: 05/23/24 0714     WBC 9.53 10*3/mm3      RBC 3.37 10*6/mm3      Hemoglobin 9.5 g/dL      Hematocrit 29.9 %      MCV 88.7 fL      MCH 28.2 pg      MCHC 31.8 g/dL      RDW 12.6 %      RDW-SD 41.1 fl      MPV 9.8 fL      Platelets 357 10*3/mm3      Neutrophil % 78.4 %      Lymphocyte % 13.6 %      Monocyte % 7.0 %      Eosinophil % 0.3 %      Basophil % 0.2 %      Immature Grans % 0.5 %      Neutrophils, Absolute 7.46 10*3/mm3      Lymphocytes, Absolute 1.30 10*3/mm3      Monocytes, Absolute 0.67 10*3/mm3      Eosinophils, Absolute 0.03 10*3/mm3      Basophils, Absolute 0.02 10*3/mm3      Immature Grans, Absolute 0.05 10*3/mm3      nRBC 0.0 /100 WBC     Beta 2 Microglobulin, Serum [[id number]]  (Normal) Collected: 05/22/24 1535    Specimen: Blood from Arm,  Left Updated: 05/22/24 2239     Beta-2 Microglobulin 1.5 mg/L     MARTINEZ,PE and FLC, Serum [[id number]] Collected: 05/22/24 1535    Specimen: Blood from Arm, Left Updated: 05/22/24 1555    Lactate Dehydrogenase [[id number]]  (Abnormal) Collected: 05/22/24 0504    Specimen: Blood from Arm, Left Updated: 05/22/24 1448      U/L     Vitamin B12 [[id number]]  (Normal) Collected: 05/22/24 0504    Specimen: Blood from Arm, Left Updated: 05/22/24 1050     Vitamin B-12 265 pg/mL     Narrative:      Results may be falsely increased if patient taking Biotin.      Folate [[id number]]  (Normal) Collected: 05/22/24 0504    Specimen: Blood from Arm, Left Updated: 05/22/24 1050     Folate 7.70 ng/mL     Narrative:      Results may be falsely increased if patient taking Biotin.               Physician Progress Notes (most recent note)        Michael Benitez MD at 05/22/24 1846           [facility]    Date of admission: 5/20/2024  Patient Name: Oswaldo Bowen  1980  Date: 5/22/2024      Subjective     Chief Complaint   Patient presents with    Fall    Leg Pain       Interval Followup: Had extended discussion with patient and his sister at bedside today regarding cancer diagnosis and the workup/evaluation with biopsy.  Very anxious about biopsy but also want to undergo treatment and is now with the diagnosis is.  Ultimately agrees to further biopsy  Pain doing okay from surgical site of things like wanting to rehab       Objective     Vitals:   Temp:  [97.7 °F (36.5 °C)-98.8 °F (37.1 °C)] 98.8 °F (37.1 °C)  Heart Rate:  [] 113  Resp:  [16] 16  BP: (114-137)/(75-89) 119/75    Physical Exam  Awake conversant, poor insight/poor health literacy consistent with patient's history of intellectual disability, intermittently anxious  No rhonchi breathing comfortably on room air  elevated rate regular rhythm  Abdomen soft  Dressing in place      Result Review:  Vital signs, labs and recent relevant  imaging reviewed.      CBC          5/20/2024    21:34 5/21/2024    05:28 5/22/2024    05:04   CBC   WBC 19.54  14.66  12.84    RBC 4.23  3.52  3.29    Hemoglobin 11.9  10.0  9.2    Hematocrit 37.0  31.3  29.3    MCV 87.5  88.9  89.1    MCH 28.1  28.4  28.0    MCHC 32.2  31.9  31.4    RDW 12.7  12.6  12.7    Platelets 410  351  359      CMP          5/20/2024    21:34 5/21/2024    05:28 5/22/2024    05:04   CMP   Glucose 183  125  125    BUN 21  22  16    Creatinine 1.72  0.91  0.55    EGFR 49.6  106.6  125.3    Sodium 137  136  133    Potassium 3.9  3.5  3.4    Chloride 95  100  99    Calcium 10.5  9.2  8.4    Total Protein 8.1  6.8     Albumin 3.3  2.9     Globulin 4.8  3.9     Total Bilirubin 0.4  0.3     Alkaline Phosphatase 142  122     AST (SGOT) 20  19     ALT (SGPT) 23  19     Albumin/Globulin Ratio 0.7  0.7     BUN/Creatinine Ratio 12.2  24.2  29.1    Anion Gap 17.6  12.0  8.8          acetaminophen    aluminum-magnesium hydroxide-simethicone    senna-docusate sodium **AND** polyethylene glycol **AND** bisacodyl **AND** bisacodyl    senna-docusate sodium **AND** polyethylene glycol **AND** bisacodyl **AND** bisacodyl    calcium carbonate    dextrose    dextrose    Diclofenac Sodium    glucagon (human recombinant)    HYDROcodone-acetaminophen    HYDROcodone-acetaminophen    hydrOXYzine    Lidocaine    melatonin    Morphine **AND** naloxone    Morphine **AND** naloxone    nicotine    ondansetron    ondansetron ODT **OR** ondansetron    prochlorperazine    sodium chloride    sodium chloride    sodium chloride    sodium chloride    folic acid 1 mg in sodium chloride 0.9 % 50 mL IVPB, 1 mg, Intravenous, Daily  multivitamin with minerals, 1 tablet, Oral, Daily  senna-docusate sodium, 2 tablet, Oral, BID  sodium chloride, 10 mL, Intravenous, Q12H  sodium chloride, 10 mL, Intravenous, Q12H  thiamine (B-1) IV, 100 mg, Intravenous, Daily        CT Chest With Contrast Diagnostic    Result Date: 5/21/2024  Impression:   1. Left upper lobe noncalcified pulmonary nodule measuring 2.1 cm in size suspicious for primary malignancy. There is mild bilateral hilar adenopathy. Additionally there are multiple lytic bone lesions throughout the spine ribs and scapula consistent with metastatic disease.    Electronically Signed By-Eliecer Jara MD On:5/21/2024 10:11 PM      XR Chest 1 View    Result Date: 5/20/2024  Impression:  1. Questionable 14 mm nodule projecting in the left perihilar region. Follow-up CT scan of the chest would be recommended when clinically feasible.   Electronically Signed By-Eliecer Jara MD On:5/20/2024 10:23 PM      XR Femur 2 View Right    Result Date: 5/20/2024  (COMBINED) Acute comminuted displaced intertrochanteric right femoral fracture is noted, as discussed. No dislocation.     Please note that portions of this note were completed with a voice recognition program.      Electronically Signed By-Panda Howard MD On:5/20/2024 10:21 PM      XR Pelvis 1 or 2 View    Result Date: 5/20/2024  (COMBINED) Acute comminuted displaced intertrochanteric right femoral fracture is noted, as discussed. No dislocation.     Please note that portions of this note were completed with a voice recognition program.      Electronically Signed BySyl Howard MD On:5/20/2024 10:21 PM       Assessment / Plan     Summary: 44M with intellectual disability, 1-2PPD smoking, depression, who presented after a fall (possibly 1 month ago but may have been more recently), found to have right femoral fracture and VLAD.  Ortho assisting     Assessment/Plan (clinically significant if listed here)  Mechanical fall with acute comminuted displaced intertrochanteric right femoral fracture  S/p 5/21 right hip subtrochanteric nailing of closed displaced right femur fracture by Dr. Nelson  VLAD creatinine 1.7 leukocytosis suspect reactive in setting of acute fracture  Suspected new metastatic lung cancer with mets to the bone  Mediastinal  lymphadenopathy  14 mm nodule left perihilar region in setting of smoking history  1 to 2 pack/day smoker, chronic  Intellectual disability  Depression  Normocytic anemia    Imaging results with metastatic disease suspected, consulted pulmonology for lung nodule biopsy appreciate assistance, consulted oncology for establishment of care and further treatment workup, additional imaging pending for staging  Tolerated surgery well, monitor postoperative pain, bowel regimen  As needed pain medications IV and p.o., bowel regimen, PT and OT  Additional postoperative care as per orthopedic surgery appreciate assistance  Hemoglobin slight downtrend, likely postoperative blood loss IntraOp blood draws, anemia studies with B12 on the lower end, placed on multivitamin, TSAT low with notably elevated ferritin  Nrt prn / smoking cessation   VLAD improving with fluids, can discontinue encouraging p.o.  PT/OT  Check a.m. CBC, BMP, magnesium, phosphorus and as above  Continue hospitalization at current level of care    Dispo: possibly rehab once stable obese.  D/w clinical      DVT prophylaxis:  Mechanical DVT prophylaxis orders are present.      Level Of Support Discussed With: Patient  Code Status (Patient has no pulse and is not breathing): CPR (Attempt to Resuscitate)  Medical Interventions (Patient has pulse or is breathing): Full Support      Greater than 50 minutes on this encounter including review of labs, imaging, documentation, orders, vitals, monitoring and adjusting treatment and orders as indicated, discussion with the patient pulm and ortho, and documenting.             Electronically signed by Michael Benitez MD at 05/22/24 8301          Consult Notes (most recent note)        Sukhwinder Johnson MD at 05/22/24 1313        Consult Orders    1. Hematology & Oncology Inpatient Consult [[id number]] ordered by Michael Benitez MD at 05/22/24 0730                   [facility]    Hematology/Oncology  Consult Note    Patient Name: Oswaldo Bowen  : 1980  MRN: [id number]  Primary Care Physician:  Provider, No Known  Referring Physician: No ref. provider found  Date of admission: 2024    Subjective   Subjective     Reason for Consult/ Chief Complaint:     HPI:  Oswaldo Bowen is a 44 y.o. male with past medical history of intellectual disability, tobacco abuse who presented to [facility] on 24 after a fall and several weeks of right lower leg pain associated with change in gait. He then slipped and fell onto concrete the day of presentation and was not able to get up. He was taken to the ED here and was found to have closed  intertrochanteric fracture of the right femur. 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 He was evaluated by orthopedic surgery and taken for right hip subtrochanteric nailing of fracture on 24.  Admission CXR showed 14 mm left perihilar nodule. Follow up CT chest with contrast showed 2.1 cm RUL nodule with bilateral hilar adenopathy. Also seen were multiple lytic bone lesions throughout the spine, ribs and scapula consistent with metastatic disease. He has been seen by pulmonology with plans for bronchoscopy with biopsy and lymph node sampling, likely tomorrow. He denies any major pain currently. His sister is present at bedside. Per notes, patient has not been eating well at home with associated weight loss over the past several months. No fevers, chills, night sweats noted.    Review of Systems   All systems were reviewed and negative except for as mentioned above.     Personal History     History reviewed. No pertinent past medical history.    Past Surgical History:   Procedure Laterality Date    HIP INTERTROCHANTERIC NAILING Right 2024    Procedure: HIP INTERTROCHANTERIC NAILING;  Surgeon: Molina Clayton MD;  Location: [facility] MAIN OR;  Service: Orthopedics;  Laterality: Right;       Family History: family history is not on file. Otherwise pertinent FHx was reviewed  and not pertinent to current issue.    Social History:  reports that he has been smoking cigarettes. He started smoking about 24 years ago. He has a 36.6 pack-year smoking history. He has quit using smokeless tobacco. He reports that he does not drink alcohol and does not use drugs.    Home Medications:       Allergies:  No Known Allergies    Objective    Objective     Vitals:   Temp:  [97.1 °F (36.2 °C)-98.8 °F (37.1 °C)] 98.8 °F (37.1 °C)  Heart Rate:  [] 116  Resp:  [16-18] 16  BP: ()/(72-89) 114/78  Flow (L/min):  [4-6] 4    Physical Exam:   Constitutional: Awake, alert, sitting in bedside chair, appears older than stated age, slightly cachectic   Eyes: PERRLA, sclerae anicteric, no conjunctival injection   HENT: NCAT, mucous membranes moist   Respiratory: Clear to auscultation bilaterally, nonlabored respirations    Cardiovascular: RRR, no murmurs, no edema in the extremities   Gastrointestinal: Positive bowel sounds, soft, nontender, nondistended   Musculoskeletal: Normal strength and tone, no joint swelling   Psychiatric: Appropriate affect, cooperative   Neurologic: Awake, alert, speech clear   Skin: Normal tone, no rashes    Result Review    Result Review:  I have personally reviewed the results from the time of this admission to 5/22/2024 13:13 EDT and agree with these findings:  [x]  Laboratory  []  Microbiology  [x]  Radiology  []  EKG/Telemetry   []  Cardiology/Vascular   []  Pathology  []  Old records  []  Other:  Most notable findings include: elevated creatinine on presentation now resolved, improving leukocytosis, worsening anemia, elevated ferritin, low iron sat, normal B12 and folate. CXR personally reviewed and notable for 1.5 mm left sided pulmonary nodule. H and P personally reviewed, pulmonary note personally reviewed      Assessment & Plan   Assessment / Plan     Brief Patient Summary:  Oswaldo Bowen is a 44 y.o. male intellectual disability, 1-2PPD smoking, depression, who  presented after a fall (possibly 1 month ago but may have been more recently), found to have right femoral fracture and VLAD. S/p fixation in OR on 24. CT chest showed 2.1 cm RUL nodule, bilateral hilar adenopathy and multiple lytic lesions throughout the spine, ribs and scapula consistent with metastatic disease.     Active Hospital Problems:  Active Hospital Problems    Diagnosis     **Closed intertrochanteric fracture of right femur        Plan:   Metastatic lung cancer, likely  CT chest obtained due to left sided pulmonary nodule on chest x-ray showed 2.1 cm RUL nodule, bilateral hilar adenopathy and multiple lytic lesions throughout the spine, ribs and scapula consistent with metastatic disease.  Plan for CT abdomen/pelvis with contrast while admitted to complete staging; ordered.   Will also get MRI brain w/o contrast. Will send for multiple myeloma labs to rule this out as cause of lytic lesions in setting of anemia.   Pulm planning biopsy. This will need to be sent for PDL1, EGFR, ROS, ALK as well as NGS testing if positive.   Recommend follow up with me in clinic after rehab.     Anemia  Worse after surgery likely related to blood loss. With elevated ferritin, low iron sat and low TIBC, labs consistent with anemia of chronic disease. Recommend to trend daily. B12 and folate wnl.     Right femoral fracture  After fall. S/p right subtrochanteric nailing 24. Ortho following, appreciate assistance. Plan for bone modifying agent as outpatient due to metastatic cancer to bone. Recommend pain medication provided on discharge.         Electronically signed by Sukhwinder Johnson MD, 24, 1:13 PM EDT.      Electronically signed by Sukhwinder Johnson MD at 24 1440          Physical Therapy Notes (most recent note)        Eh Martínez, PT at 24 1340  Version 1 of 1         Acute Care - Physical Therapy Initial Evaluation  MAIKEL Sellers     Patient Name: Oswaldo Bowen  :  1980  MRN: [id number]  Today's Date: 5/22/2024      Visit Dx:     ICD-10-CM ICD-9-CM   1. Closed displaced intertrochanteric fracture of right femur, initial encounter  S72.141A 820.21   2. Pulmonary nodule  R91.1 793.11   3. Difficulty walking  R26.2 719.7     Patient Active Problem List   Diagnosis    Closed intertrochanteric fracture of right femur     History reviewed. No pertinent past medical history.  Past Surgical History:   Procedure Laterality Date    HIP INTERTROCHANTERIC NAILING Right 5/21/2024    Procedure: HIP INTERTROCHANTERIC NAILING;  Surgeon: Molina Clayton MD;  Location: [facility] MAIN OR;  Service: Orthopedics;  Laterality: Right;     PT Assessment (Last 12 Hours)       PT Evaluation and Treatment       Row Name 05/22/24 0815          Physical Therapy Time and Intention    Document Type evaluation  -AV     Mode of Treatment individual therapy;physical therapy  -AV       Row Name 05/22/24 0815          General Information    Patient Profile Reviewed yes  -AV     Prior Level of Function --  Reports (I) with ADLs. Ambulated with STC. No home O2. Hx of intellectual disability. No family at bedside to verify prior level.  -AV     Existing Precautions/Restrictions fall;weight bearing  WBAT RLE  -AV       Row Name 05/22/24 0815          Living Environment    Current Living Arrangements home  -AV     Home Accessibility stairs to enter home  -AV     People in Home sibling(s)  Sister and brother-in-law  -AV       Row Name 05/22/24 0815          Home Main Entrance    Number of Stairs, Main Entrance other (see comments)  Ramp  -AV       Row Name 05/22/24 0815          Range of Motion (ROM)    Range of Motion bilateral lower extremities;ROM is WFL  -AV       Row Name 05/22/24 0815          Mobility    Extremity Weight-bearing Status right lower extremity  -AV     Right Lower Extremity (Weight-bearing Status) weight-bearing as tolerated (WBAT)  -AV       Row Name 05/22/24 0815          Bed Mobility    Bed  Mobility supine-sit  -AV     Supine-Sit [place] (Bed Mobility) maximum assist (25% patient effort);2 person assist  -AV     Comment, (Bed Mobility) Patient initially very anxious/fearful regarding activity. Max encouragement for bed mobility. Patient demonstrated less anxiety regarding mobility once seated EOB.  -AV       Row Name 05/22/24 0815          Transfers    Transfers sit-stand transfer;stand-sit transfer  -AV       Row Name 05/22/24 0815          Sit-Stand Transfer    Sit-Stand [place] (Transfers) contact guard;minimum assist (75% patient effort);2 person assist  -AV     Assistive Device (Sit-Stand Transfers) walker, front-wheeled  -AV       Row Name 05/22/24 0815          Stand-Sit Transfer    Stand-Sit [place] (Transfers) contact guard;minimum assist (75% patient effort);2 person assist  -AV     Assistive Device (Stand-Sit Transfers) walker, front-wheeled  -AV       Row Name 05/22/24 0815          Gait/Stairs (Locomotion)    Gait/Stairs Locomotion gait/ambulation independence;gait/ambulation assistive device;distance ambulated  -AV     [place] Level (Gait) contact guard  -AV     Assistive Device (Gait) walker, front-wheeled  -AV     Patient was able to Ambulate yes  -AV     Distance in Feet (Gait) 3  bed to recliner; patient deferred further  -AV       Row Name 05/22/24 0815          Safety Issues, Functional Mobility    Impairments Affecting Function (Mobility) balance;cognition;endurance/activity tolerance;pain;strength  -AV       Row Name 05/22/24 0815          Balance    Balance Assessment standing dynamic balance  -AV     Dynamic Standing Balance contact guard  -AV     Position/Device Used, Standing Balance supported;walker, front-wheeled  -AV       Row Name             Wound 05/21/24 0251 Bilateral coccyx    Wound - Properties Group Placement Date: 05/21/24  -SR Placement Time: 0251  -SR Present on Original Admission: Y  -SR Side: Bilateral  -SR Location: coccyx  -SR    Retired  Wound - Properties Group Placement Date: 05/21/24  -SR Placement Time: 0251 -SR Present on Original Admission: Y  -SR Side: Bilateral  -SR Location: coccyx  -SR    Retired Wound - Properties Group Date first assessed: 05/21/24  -SR Time first assessed: 0251  -SR Present on Original Admission: Y  -SR Side: Bilateral  -SR Location: coccyx  -SR      Row Name             Wound 05/21/24 Right lateral thigh Incision    Wound - Properties Group Placement Date: 05/21/24  -SF Present on Original Admission: N  -SF Side: Right  -SF Orientation: lateral  -SF Location: thigh  -SF Primary Wound Type: Incision  -SF Additional Comments: incision sites x 3 to lateral right thigh  -SF    Retired Wound - Properties Group Placement Date: 05/21/24  -SF Present on Original Admission: N  -SF Side: Right  -SF Orientation: lateral  -SF Location: thigh  -SF Primary Wound Type: Incision  -SF Additional Comments: incision sites x 3 to lateral right thigh  -SF    Retired Wound - Properties Group Date first assessed: 05/21/24  -SF Present on Original Admission: N  -SF Side: Right  -SF Location: thigh  -SF Primary Wound Type: Incision  -SF Additional Comments: incision sites x 3 to lateral right thigh  -SF      Row Name 05/22/24 0815          Plan of Care Review    Plan of Care Reviewed With patient  -AV     Progress no change  -AV     Outcome Evaluation Patient presents with deficits in balance, strength, transfers, and ambulation. Patient will benefit from skilled PT services to address these mobility deficits and decrease risk of falls.  -AV       Row Name 05/22/24 0815          Positioning and Restraints    Pre-Treatment Position in bed  -AV     Post Treatment Position chair  -AV     In Chair reclined;call light within reach;encouraged to call for assist;exit alarm on  -AV       Row Name 05/22/24 0815          Therapy Assessment/Plan (PT)    Rehab Potential (PT) good, to achieve stated therapy goals  -AV     Criteria for Skilled  Interventions Met (PT) yes;meets criteria  -AV     Therapy Frequency (PT) daily  -AV     Predicted Duration of Therapy Intervention (PT) 10 days  -AV     Problem List (PT) problems related to;balance;cognition;mobility;strength;pain  -AV     Activity Limitations Related to Problem List (PT) unable to transfer safely;unable to ambulate safely  -AV       Row Name 05/22/24 0815          PT Evaluation Complexity    History, PT Evaluation Complexity 1-2 personal factors and/or comorbidities  -AV     Examination of Body Systems (PT Eval Complexity) total of 4 or more elements  -AV     Clinical Presentation (PT Evaluation Complexity) stable  -AV     Clinical Decision Making (PT Evaluation Complexity) low complexity  -AV     Overall Complexity (PT Evaluation Complexity) low complexity  -AV       Row Name 05/22/24 0815          Therapy Plan Review/Discharge Plan (PT)    Therapy Plan Review (PT) evaluation/treatment results reviewed;patient  -AV       Row Name 05/22/24 0815          Physical Therapy Goals    Bed Mobility Goal Selection (PT) bed mobility, PT goal 1  -AV     Transfer Goal Selection (PT) transfer, PT goal 1  -AV     Gait Training Goal Selection (PT) gait training, PT goal 1  -AV       Row Name 05/22/24 0815          Bed Mobility Goal 1 (PT)    Activity/Assistive Device (Bed Mobility Goal 1, PT) sit to supine/supine to sit  -AV     [place] Level/Cues Needed (Bed Mobility Goal 1, PT) modified independence  -AV     Time Frame (Bed Mobility Goal 1, PT) 10 days  -AV       Row Name 05/22/24 0815          Transfer Goal 1 (PT)    Activity/Assistive Device (Transfer Goal 1, PT) sit-to-stand/stand-to-sit;bed-to-chair/chair-to-bed;walker, rolling  -AV     [place] Level/Cues Needed (Transfer Goal 1, PT) modified independence  -AV     Time Frame (Transfer Goal 1, PT) 10 days  -AV       Row Name 05/22/24 0815          Gait Training Goal 1 (PT)    Activity/Assistive Device (Gait Training Goal 1, PT) gait (walking  locomotion);assistive device use;walker, rolling  -AV     [place] Level (Gait Training Goal 1, PT) modified independence  -AV     Distance (Gait Training Goal 1, PT) 200  -AV     Time Frame (Gait Training Goal 1, PT) 10 days  -AV               User Key  (r) = Recorded By, (t) = Taken By, (c) = Cosigned By      Initials Name Provider Type    SF Saritha Ramirez, RN Registered Nurse    Eh Zepeda, JERROD Physical Therapist    Saritha Becker RN Registered Nurse                    Physical Therapy Education       Title: PT OT SLP Therapies (In Progress)       Topic: Physical Therapy (In Progress)       Point: Mobility training (Done)       Learning Progress Summary             Patient Acceptance, E,TB, VU by AV at 5/22/2024 1339                         Point: Home exercise program (Not Started)       Learner Progress:  Not documented in this visit.              Point: Body mechanics (Done)       Learning Progress Summary             Patient Acceptance, E,TB, VU by AV at 5/22/2024 1339                         Point: Precautions (Done)       Learning Progress Summary             Patient Acceptance, E,TB, VU by AV at 5/22/2024 1339                                         User Key       Initials Effective Dates Name Provider Type Discipline    AV 06/11/21 -  Eh Martínez, PT Physical Therapist PT                  PT Recommendation and Plan  Anticipated Discharge Disposition (PT): inpatient rehabilitation facility  Planned Therapy Interventions (PT): balance training, bed mobility training, gait training, home exercise program, neuromuscular re-education, strengthening, transfer training  Therapy Frequency (PT): daily  Plan of Care Reviewed With: patient  Progress: no change  Outcome Evaluation: Patient presents with deficits in balance, strength, transfers, and ambulation. Patient will benefit from skilled PT services to address these mobility deficits and decrease risk of falls.   Outcome Measures        Row Name 05/22/24 0815             How much help from another person do you currently need...    Turning from your back to your side while in flat bed without using bedrails? 2  -AV      Moving from lying on back to sitting on the side of a flat bed without bedrails? 2  -AV      Moving to and from a bed to a chair (including a wheelchair)? 3  -AV      Standing up from a chair using your arms (e.g., wheelchair, bedside chair)? 3  -AV      Climbing 3-5 steps with a railing? 2  -AV      To walk in hospital room? 3  -AV      AM-PAC 6 Clicks Score (PT) 15  -AV      Highest Level of Mobility Goal 4 --> Transfer to chair/commode  -AV         Functional Assessment    Outcome Measure Options AM-PAC 6 Clicks Basic Mobility (PT)  -AV                User Key  (r) = Recorded By, (t) = Taken By, (c) = Cosigned By      Initials Name Provider Type    AV Eh Martínez, PT Physical Therapist                     Time Calculation:    PT Charges       Row Name 05/22/24 1338             Time Calculation    PT Received On 05/22/24  -AV      PT Goal Re-Cert Due Date 05/31/24  -AV         Untimed Charges    PT Eval/Re-eval Minutes 38  -AV         Total Minutes    Untimed Charges Total Minutes 38  -AV       Total Minutes 38  -AV                User Key  (r) = Recorded By, (t) = Taken By, (c) = Cosigned By      Initials Name Provider Type    AV Eh Martínez, PT Physical Therapist                  Therapy Charges for Today       Code Description Service Date Service Provider Modifiers Qty    [id number] HC PT EVAL LOW COMPLEXITY 3 5/22/2024 Eh Martínez, PT GP 1            PT G-Codes  Outcome Measure Options: AM-PAC 6 Clicks Daily Activity (OT), Optimal Instrument  AM-PAC 6 Clicks Score (PT): 15  AM-PAC 6 Clicks Score (OT): 12    Eh Martínez PT  5/22/2024      Electronically signed by Eh Martínez, PT at 05/22/24 1340          Occupational Therapy Notes (most recent note)        Felix Villasenor, OT at 05/22/24 0925           Patient Name: Oswaldo Bowen  : 1980    MRN: [id number]                              Today's Date: 2024       Admit Date: 2024    Visit Dx:     ICD-10-CM ICD-9-CM   1. Closed displaced intertrochanteric fracture of right femur, initial encounter  S72.141A 820.21   2. Pulmonary nodule  R91.1 793.11     Patient Active Problem List   Diagnosis    Closed intertrochanteric fracture of right femur     History reviewed. No pertinent past medical history.  History reviewed. No pertinent surgical history.   General Information       Row Name 24 0917 24       OT Time and Intention    Document Type therapy note (daily note)  -PG evaluation  -PG    Mode of Treatment individual therapy;occupational therapy  -PG individual therapy;occupational therapy  -PG      Row Name 24 09          General Information    Patient Profile Reviewed yes  Patient reports he lives with his sister and brother-in-law.  Patient reports he has been independent with functional mobility and dressing/bathing activities  -PG     Prior Level of Function independent:;transfer;ADL's  -PG     Existing Precautions/Restrictions fall  -PG     Barriers to Rehab none identified  -PG       Row Name 24          Occupational Profile    Reason for Services/Referral (Occupational Profile) Patient is a 44-year-old male who fell at home and sustained a closed intertrochanteric fracture of the right femur.  Patient being evaluated by Occupational Therapy due to recent decline in ADL function.  No previous OT services identified  -PG       Row Name 24          Living Environment    People in Home sibling(s)  -PG       Row Name 24          Cognition    Orientation Status (Cognition) oriented x 3  -PG       Row Name 24          Safety Issues, Functional Mobility    Impairments Affecting Function (Mobility) balance;cognition;endurance/activity tolerance;strength;pain;range of motion  (ROM)  -PG     Cognitive Impairments, Mobility Safety/Performance judgment;problem-solving/reasoning;insight into deficits/self-awareness  -PG               User Key  (r) = Recorded By, (t) = Taken By, (c) = Cosigned By      Initials Name Provider Type    PG Felix Villasenor, OT Occupational Therapist                     Mobility/ADL's       Row Name 05/22/24 0917 05/22/24 0913       Bed Mobility    Bed Mobility supine-sit  -PG supine-sit  -PG    Supine-Sit Person (Bed Mobility) maximum assist (25% patient effort);2 person assist  -PG maximum assist (25% patient effort);2 person assist  -PG      Row Name 05/22/24 0913          Transfers    Transfers bed-chair transfer  -PG       Row Name 05/22/24 0917 05/22/24 0913       Bed-Chair Transfer    Bed-Chair Person (Transfers) contact guard;minimum assist (75% patient effort);verbal cues;2 person assist  -PG contact guard;minimum assist (75% patient effort);verbal cues;2 person assist  -PG    Assistive Device (Bed-Chair Transfers) walker, front-wheeled  -PG walker, front-wheeled  -PG      Row Name 05/22/24 0913          Activities of Daily Living    BADL Assessment/Intervention bathing;upper body dressing;lower body dressing;grooming;toileting  -PG       Row Name 05/22/24 0913          Bathing Assessment/Intervention    Person Level (Bathing) bathing skills;maximum assist (25% patient effort)  -PG       Row Name 05/22/24 0913          Upper Body Dressing Assessment/Training    Person Level (Upper Body Dressing) upper body dressing skills;set up;moderate assist (50% patient effort)  -PG       Row Name 05/22/24 0913          Lower Body Dressing Assessment/Training    Person Level (Lower Body Dressing) lower body dressing skills;dependent (less than 25% patient effort)  -PG       Row Name 05/22/24 0913          Grooming Assessment/Training    Person Level (Grooming) grooming skills;set up  -PG       Row Name 05/22/24 0913          Toileting  Assessment/Training    [place] Level (Toileting) toileting skills;dependent (less than 25% patient effort)  -PG               User Key  (r) = Recorded By, (t) = Taken By, (c) = Cosigned By      Initials Name Provider Type    Felix Taylor OT Occupational Therapist                   Obj/Interventions       Row Name 05/22/24 0914          Sensory Assessment (Somatosensory)    Sensory Assessment (Somatosensory) sensation intact  -PG       Row Name 05/22/24 0914          Vision Assessment/Intervention    Visual Impairment/Limitations WFL  -PG       Row Name 05/22/24 0914          Range of Motion Comprehensive    General Range of Motion no range of motion deficits identified  -PG       Row Name 05/22/24 0914          Strength Comprehensive (MMT)    General Manual Muscle Testing (MMT) Assessment no strength deficits identified  -PG       Row Name 05/22/24 0914          Motor Skills    Motor Skills coordination;functional endurance  -PG     Coordination WFL  -PG     Functional Endurance Fair minus  -PG               User Key  (r) = Recorded By, (t) = Taken By, (c) = Cosigned By      Initials Name Provider Type    Felix Taylor OT Occupational Therapist                   Goals/Plan       Row Name 05/22/24 0916          Transfer Goal 1 (OT)    Activity/Assistive Device (Transfer Goal 1, OT) transfers, all  -PG     [place] Level/Cues Needed (Transfer Goal 1, OT) modified independence  -PG     Time Frame (Transfer Goal 1, OT) long term goal (LTG);10 days  -PG       Row Name 05/22/24 0916          Bathing Goal 1 (OT)    Activity/Device (Bathing Goal 1, OT) bathing skills, all  -PG     [place] Level/Cues Needed (Bathing Goal 1, OT) modified independence  -PG     Time Frame (Bathing Goal 1, OT) long term goal (LTG);10 days  -PG       Row Name 05/22/24 0916          Dressing Goal 1 (OT)    Activity/Device (Dressing Goal 1, OT) dressing skills, all  -PG     [place]/Cues Needed (Dressing Goal 1, OT)  modified independence  -PG     Time Frame (Dressing Goal 1, OT) long term goal (LTG);10 days  -PG       Row Name 05/22/24 0916          Toileting Goal 1 (OT)    Activity/Device (Toileting Goal 1, OT) toileting skills, all  -PG     [place] Level/Cues Needed (Toileting Goal 1, OT) modified independence  -PG     Time Frame (Toileting Goal 1, OT) long term goal (LTG);10 days  -PG       Row Name 05/22/24 0916          Grooming Goal 1 (OT)    Activity/Device (Grooming Goal 1, OT) grooming skills, all  -PG     [place] (Grooming Goal 1, OT) modified independence  -PG     Time Frame (Grooming Goal 1, OT) long term goal (LTG);10 days  -PG       Row Name 05/22/24 0916          Problem Specific Goal 1 (OT)    Problem Specific Goal 1 (OT) Patient will improve activity tolerance to fair plus to support independence and engagement with ADL activities  -PG     Time Frame (Problem Specific Goal 1, OT) long term goal (LTG);10 days  -PG       Row Name 05/22/24 0916          Therapy Assessment/Plan (OT)    Planned Therapy Interventions (OT) activity tolerance training;BADL retraining;strengthening exercise;transfer/mobility retraining;occupation/activity based interventions;patient/caregiver education/training  -PG               User Key  (r) = Recorded By, (t) = Taken By, (c) = Cosigned By      Initials Name Provider Type    PG Felix Villasenor OT Occupational Therapist                   Clinical Impression       Row Name 05/22/24 0915          Pain Assessment    Pain Location - Side/Orientation Right  -PG     Pain Location - hip  -PG     Pain Intervention(s) Nursing Notified  -PG     Additional Documentation Pain Scale: FACES Pre/Post-Treatment (Group)  -PG       Row Name 05/22/24 0915          Pain Scale: FACES Pre/Post-Treatment    Pain: FACES Scale, Pretreatment 8-->hurts whole lot  -PG     Posttreatment Pain Rating 8-->hurts whole lot  -PG     Pain Location - Side/Orientation Right  -PG     Pain Location - hip  -PG        Row Name 05/22/24 0915          Plan of Care Review    Plan of Care Reviewed With patient  -PG     Progress no change  -PG     Outcome Evaluation Patient presents with limitations affecting strength, activity tolerance, and balance impacting patient's ability to return home safely and independently.  The skills of a therapist will be required to safely and effectively implement the following treatment plan to restore maximal level of function  -PG       Row Name 05/22/24 0915          Therapy Assessment/Plan (OT)    Patient/Family Therapy Goal Statement (OT) Get stronger and return home with his sister  -PG     Rehab Potential (OT) good, to achieve stated therapy goals  -PG     Criteria for Skilled Therapeutic Interventions Met (OT) yes;meets criteria;skilled treatment is necessary  -PG     Therapy Frequency (OT) 5 times/wk  -PG       Row Name 05/22/24 0915          Therapy Plan Review/Discharge Plan (OT)    Anticipated Discharge Disposition (OT) inpatient rehabilitation facility;sub acute care setting  -PG               User Key  (r) = Recorded By, (t) = Taken By, (c) = Cosigned By      Initials Name Provider Type    PG Felix Villaseonr, OT Occupational Therapist                   Outcome Measures       Row Name 05/22/24 0916          How much help from another is currently needed...    Putting on and taking off regular lower body clothing? 1  -PG     Bathing (including washing, rinsing, and drying) 1  -PG     Toileting (which includes using toilet bed pan or urinal) 1  -PG     Putting on and taking off regular upper body clothing 2  -PG     Taking care of personal grooming (such as brushing teeth) 3  -PG     Eating meals 4  -PG     AM-PAC 6 Clicks Score (OT) 12  -PG       Row Name 05/22/24 0916          Functional Assessment    Outcome Measure Options AM-PAC 6 Clicks Daily Activity (OT);Optimal Instrument  -PG       Row Name 05/22/24 0916          Optimal Instrument    Optimal Instrument Optimal - 3  -PG      Bending/Stooping 4  -PG     Standing 3  -PG     Reaching 2  -PG     From the list, choose the 3 activities you would most like to be able to do without any difficulty Bending/stooping;Standing;Reaching  -PG     Total Score Optimal - 3 9  -PG               User Key  (r) = Recorded By, (t) = Taken By, (c) = Cosigned By      Initials Name Provider Type    PG Felix Villasenor OT Occupational Therapist                    Occupational Therapy Education       Title: PT OT SLP Therapies (In Progress)       Topic: Occupational Therapy (In Progress)       Point: ADL training (In Progress)       Description:   Instruct learner(s) on proper safety adaptation and remediation techniques during self care or transfers.   Instruct in proper use of assistive devices.                  Learning Progress Summary             Patient Acceptance, E,D, NR by PG at 5/22/2024 0917                         Point: Home exercise program (In Progress)       Description:   Instruct learner(s) on appropriate technique for monitoring, assisting and/or progressing therapeutic exercises/activities.                  Learning Progress Summary             Patient Acceptance, E,D, NR by PG at 5/22/2024 0917                         Point: Precautions (In Progress)       Description:   Instruct learner(s) on prescribed precautions during self-care and functional transfers.                  Learning Progress Summary             Patient Acceptance, E,D, NR by PG at 5/22/2024 0917                         Point: Body mechanics (In Progress)       Description:   Instruct learner(s) on proper positioning and spine alignment during self-care, functional mobility activities and/or exercises.                  Learning Progress Summary             Patient Acceptance, E,D, NR by PG at 5/22/2024 0917                                         User Key       Initials Effective Dates Name Provider Type Discipline    PG 06/16/21 -  Felix Villasenor OT Occupational Therapist OT                   OT Recommendation and Plan  Planned Therapy Interventions (OT): activity tolerance training, BADL retraining, strengthening exercise, transfer/mobility retraining, occupation/activity based interventions, patient/caregiver education/training  Therapy Frequency (OT): 5 times/wk  Plan of Care Review  Plan of Care Reviewed With: patient  Progress: no change  Outcome Evaluation: Patient presents with limitations affecting strength, activity tolerance, and balance impacting patient's ability to return home safely and independently.  The skills of a therapist will be required to safely and effectively implement the following treatment plan to restore maximal level of function     Time Calculation:   Evaluation Complexity (OT)  Review Occupational Profile/Medical/Therapy History Complexity: brief/low complexity  Assessment, Occupational Performance/Identification of Deficit Complexity: 3-5 performance deficits  Clinical Decision Making Complexity (OT): problem focused assessment/low complexity  Overall Complexity of Evaluation (OT): low complexity     Time Calculation- OT       Row Name 05/22/24 0918             Time Calculation- OT    OT Received On 05/22/24  -PG      OT Goal Re-Cert Due Date 05/31/24  -PG         Timed Charges    40949 - OT Therapeutic Activity Minutes 10  -PG         Untimed Charges    OT Eval/Re-eval Minutes 35  -PG         Total Minutes    Timed Charges Total Minutes 10  -PG      Untimed Charges Total Minutes 35  -PG       Total Minutes 45  -PG                User Key  (r) = Recorded By, (t) = Taken By, (c) = Cosigned By      Initials Name Provider Type    PG Felix Villasenor OT Occupational Therapist                  Therapy Charges for Today       Code Description Service Date Service Provider Modifiers Qty    21718780137  OT THERAPEUTIC ACT EA 15 MIN 5/22/2024 Felix Villasenor OT GO 1    61424802116 HC OT EVAL LOW COMPLEXITY 3 5/22/2024 Felix Villasenor OT GO 1                 Felix  RAJIV Villasenor  5/22/2024    Electronically signed by Felix Villasenor OT at 05/22/24 0919       Speech Language Pathology Notes (most recent note)    No notes exist for this encounter.

## 2024-05-23 NOTE — PLAN OF CARE
Goal Outcome Evaluation:  Plan of Care Reviewed With: patient        Progress: no change  Outcome Evaluation: vss, though HR remains tachy, 105-1teens. Brain MRI and abdominal CT completed tonight-results to chart. pt c/o right hip pain x 1 tonight. norco given with relief. pt will not allow staff to get him oob or elevate head of bed more than about 20 degrees. says this hurts his right hip and refuses. npo since mn for planned Bronch with Dr Stern today, 5/23. slept well.

## 2024-05-23 NOTE — ANESTHESIA POSTPROCEDURE EVALUATION
Patient: Oswaldo Bowen    Procedure Summary       Date: 05/23/24 Room / Location: [facility] ENDOSCOPY 3 / [facility] ENDOSCOPY    Anesthesia Start: 1445 Anesthesia Stop: 1610    Procedure: BRONCHOSCOPY WITH ENDOBRONCHIAL ULTRASOUND, FINE NEEDLE ASPIRATE, BIOPSIES, BRUSHINGS, BRONCHOALVEOLAR LAVAGE (Bronchus) Diagnosis:       Closed displaced intertrochanteric fracture of right femur, initial encounter      Pulmonary nodule      (Closed displaced intertrochanteric fracture of right femur, initial encounter [S72.141A])      (Pulmonary nodule [R91.1])    Surgeons: Kendell Stern MD Provider: Feli Morgan CRNA    Anesthesia Type: general ASA Status: 3            Anesthesia Type: general    Vitals  Vitals Value Taken Time   BP 91/64 05/23/24 1623   Temp 36.5 °C (97.7 °F) 05/23/24 1623   Pulse 105 05/23/24 1625   Resp 23 05/23/24 1623   SpO2 93 % 05/23/24 1625   Vitals shown include unfiled device data.        Post Anesthesia Care and Evaluation    Post-procedure mental status: acceptable.  Pain management: satisfactory to patient    Airway patency: patent  Anesthetic complications: No anesthetic complications    Cardiovascular status: acceptable  Respiratory status: acceptable    Comments: Per chart review

## 2024-05-23 NOTE — SIGNIFICANT NOTE
05/23/24 1331   OTHER   Discipline occupational therapist   Rehab Time/Intention   Session Not Performed patient unavailable for treatment  (endo)

## 2024-05-23 NOTE — PLAN OF CARE
Goal Outcome Evaluation:           Progress: no change  Outcome Evaluation: VSS. Bronchoscopy done this shift. Patient tolerated well with no issues. No complaints of pain at, but continues with minimal movement of right hip while in bed.  Bladder scanned this shift, PVR 200ml after unination. No issues at this time, plan of care continues.                                Airway patent, Nasal mucosa clear. Mouth with normal mucosa. Throat has no vesicles, no oropharyngeal exudates and uvula is midline.

## 2024-05-23 NOTE — PROGRESS NOTES
Pulmonary / Critical Care Progress Note      Patient Name: Oswaldo Bowen  : 1980  MRN: [id number]  Primary Care Physician:  Provider, No Known  Date of admission: 2024    Subjective   Subjective   Follow-up for left upper lobe lung nodule with mediastinal adenopathy, chronic heavy smoking, unintentional weight loss.    Over past 24 hours:  Patient had CT scan of the chest.  Reviewed the finding of CT scan with the patient.  Was started on Symbicort and Spiriva.    No acute events overnight.     This morning,   Patient is in bed, in no acute distress.  Has mild conversational dyspnea.  Has cough, does not produce much phlegm.  No fever or chills.  No nausea or vomiting.      Review of Systems  General:  Fatigue, No Fever  HEENT: No dysphagia, No Visual Changes, no rhinorrhea  Respiratory:  +cough,+Dyspnea, scant phlegm, No Pleuritic Pain, + wheezing  Cardiovascular: Denies chest pain, denies palpitations,+ALEXANDRA, + Chest Pressure  Gastrointestinal:  No Abdominal Pain, No Nausea, No Vomiting, No Diarrhea  Genitourinary:  No Dysuria, No Frequency, No Hesitancy  Musculoskeletal: No muscle pain or swelling  Endocrine:  No Heat Intolerance, No Cold Intolerance  Hematologic:  No Bleeding, No Bruising  Psychiatric:  No Anxiety, No Depression  Neurologic:  No Confusion, no Dysarthria, No Headaches  Skin:  No Rash, No Open Wounds          Objective   Objective     Vitals:   Temp:  [98.3 °F (36.8 °C)-98.9 °F (37.2 °C)] 98.3 °F (36.8 °C)  Heart Rate:  [106-116] 112  Resp:  [16-22] 22  BP: (112-137)/(69-89) 112/75  Physical Exam   Vital Signs Reviewed   General:  WDWN, Alert, in mild distress, has conversational dyspnea  HEENT:  PERRL, EOMI.  OP, nares clear, no sinus tenderness  Neck:  Supple, no JVD, no thyromegaly  Chest:  good aeration, clear to auscultation bilaterally, tympanic to percussion bilaterally, no work of breathing noted  CV: RRR, no MGR, pulses 2+, equal.  Abd:  Soft, NT, ND, + BS, no HSM  EXT:  no  clubbing, no cyanosis, no edema  Neuro:  A&Ox3, CN grossly intact, no focal deficits.  Skin: No rashes or lesions noted      Result Review    Result Review:  I have personally reviewed the results from the time of this admission to 5/23/2024 07:46 EDT and agree with these findings:  [x]  Laboratory  [x]  Microbiology  [x]  Radiology  [x]  EKG/Telemetry   [x]  Cardiology/Vascular   []  Pathology  []  Old records  []  Other:  Most notable findings include:         Lab 05/23/24  0656 05/22/24  0504 05/21/24  0528 05/20/24  2134   WBC 9.53 12.84* 14.66* 19.54*   HEMOGLOBIN 9.5* 9.2* 10.0* 11.9*   HEMATOCRIT 29.9* 29.3* 31.3* 37.0*   PLATELETS 357 359 351 410   SODIUM 135* 133* 136 137   POTASSIUM 3.1* 3.4* 3.5 3.9   CHLORIDE 98 99 100 95*   CO2 25.4 25.2 24.0 24.4   BUN 9 16 22* 21*   CREATININE 0.49* 0.55* 0.91 1.72*   GLUCOSE 112* 125* 125* 183*   CALCIUM 8.3* 8.4* 9.2 10.5   PHOSPHORUS 3.4 2.7 3.5 4.0   TOTAL PROTEIN  --   --  6.8 8.1   ALBUMIN  --   --  2.9* 3.3*   GLOBULIN  --   --  3.9 4.8       XR Chest 1 View    Result Date: 5/20/2024  XR CHEST 1 VW-  Date of Exam: 5/20/2024 10:00 PM  Indication: Cough, persistent  Comparison: None available.  Findings: Heart size and pulmonary vessels are within normal limits. There is a 14 mm left perihilar nodular density. Follow-up CT scan of the chest would be recommended when clinically feasible. The lungs are otherwise clear. No pleural effusion. No pneumothorax. Bony structures are unremarkable.      Impression: Impression:  1. Questionable 14 mm nodule projecting in the left perihilar region. Follow-up CT scan of the chest would be recommended when clinically feasible.   Electronically Signed By-Eliecer Jara MD On:5/20/2024 10:23 PM       CT Chest With Contrast Diagnostic    Result Date: 5/21/2024  CT CHEST W CONTRAST DIAGNOSTIC-  Date of Exam: 5/21/2024 9:01 PM  Indication: 14mm perihilar nodule, 1-2 ppd extensive smoking hx; S72.141A-Displaced intertrochanteric  fracture of right femur, initial encounter for closed fracture; R91.1-Solitary pulmonary nodule.  Comparison: Chest radiograph 5/20/2024  Technique: Axial CT images were obtained of the chest after the uneventful intravenous administration of 75 cc Isovue-370 . Reconstructed coronal and sagittal images were also obtained. Automated exposure control and iterative construction methods were used.   Findings: No coronary artery calcification. There is no mediastinal adenopathy. There is a right hilar node measuring 2.5 cm and a left hilar node measuring 2.1 cm. No axillary adenopathy. There are no pleural effusions. Within the perihilar region of the left upper lobe there is a noncalcified macro lobular 2.1 x 1.9 cm noncalcified pulmonary nodule suspicious for malignancy. Within the posterior right lower lobe there is a subpleural 12 x 5 mm nodule which is nonspecific. No other pulmonary nodules. No other focal airspace consolidation.  Bilateral adrenal glands are within normal limits.  There are multiple lytic bone lesions throughout the spine and ribs. Findings would be consistent with metastatic disease or myeloma. There are additional lytic lesions within both scapula.      Impression: Impression:  1. Left upper lobe noncalcified pulmonary nodule measuring 2.1 cm in size suspicious for primary malignancy. There is mild bilateral hilar adenopathy. Additionally there are multiple lytic bone lesions throughout the spine ribs and scapula consistent with metastatic disease.    Electronically Signed By-Eliecer Jara MD On:5/21/2024 10:11 PM         Assessment & Plan   Assessment / Plan     Active Hospital Problems:  Active Hospital Problems    Diagnosis     **Closed intertrochanteric fracture of right femur     Pulmonary nodule          Impression:   Acute comminuted displaced intertrochanteric right femoral fracture  Status post IM nailing  Left upper lobe lung nodule with mediastinal lymphadenopathy  Chronic heavy  smoking  Unintentional weight loss    Plan:   Discussed bronchoscopy with endobronchial ultrasound-guided lymph node biopsy, bronchoalveolar lavage, bronchial washing, possible transbronchial biopsy, endobronchial brushing, airway inspection.  Risks and benefits of the procedure were discussed in detail.  Alternatives and options were reviewed.  Risks including pneumothorax, pneumonia, bleeding, respiratory depression and that it could be fatal were all reviewed.    Agreeable for bronchoscopy today.  We will proceed with EBUS today.  Start Symbicort and Spiriva.  As needed albuterol.  Pain control per primary service.  Postop surgical care per Ortho service.       DVT prophylaxis:  Mechanical DVT prophylaxis orders are present.        CODE STATUS:   Level Of Support Discussed With: Patient  Code Status (Patient has no pulse and is not breathing): CPR (Attempt to Resuscitate)  Medical Interventions (Patient has pulse or is breathing): Full Support      I personally reviewed pertinent labs, imaging and provider notes. Discussed with bedside nurse and will discuss with primary service.       Electronically signed by Kendell Stern MD, 5/23/2024, 07:46 EDT.

## 2024-05-23 NOTE — ANESTHESIA PREPROCEDURE EVALUATION
Anesthesia Evaluation     Patient summary reviewed and Nursing notes reviewed   NPO Solid Status: > 8 hours  NPO Liquid Status: > 2 hours           Airway   Mallampati: III  Neck ROM: full  Dental    (+) poor dentition    Pulmonary    (+) a smoker Current, Abstained day of surgery,wheezes, rales  Cardiovascular     ECG reviewed  Rhythm: regular  Rate: normal        Neuro/Psych  (+) psychiatric history Depression  GI/Hepatic/Renal/Endo      Musculoskeletal     Abdominal  - normal exam    Abdomen: soft.  Bowel sounds: normal.   Substance History      OB/GYN          Other        ROS/Med Hx Other: EKG:  HEART RATE= 104  bpm  RR Interval= 576  ms  [place] Interval= 128  ms  P Horizontal Axis= -16  deg  P Front Axis= 62  deg  QRSD Interval= 95  ms  QT Interval= 361  ms  QTcB= 476  ms  QRS Axis= 32  deg  T Wave Axis= 61  deg  - BORDERLINE ECG -  Sinus tachycardia  Borderline prolonged QT interval  No previous ECG available for comparison  Electronically Signed By: Joseph Kumar ([facility]) 21-May-2024 17:16:44  Date and Time of Study: 2024-05-21 02:22:24    Questionable 14 mm nodule projecting in the left perihilar region.    CT Scan 5/21/24:  Left upper lobe noncalcified pulmonary nodule measuring 2.1 cm in size suspicious for primary malignancy. There is mild bilateral hilar adenopathy. Additionally there are multiple lytic bone lesions throughout the spine ribs and scapula consistent with metastatic disease.     S/P IM nail Right Hip 5/21/24      Phys Exam Other: Xopenex nebulizer tx ordered d/t tachycardia and rales/wheezing.                   Anesthesia Plan    ASA 3     general   total IV anesthesia  (Total IV Anesthesia    Patient understands anesthesia not responsible for dental damage.  )  intravenous induction     Anesthetic plan, risks, benefits, and alternatives have been provided, discussed and informed consent has been obtained with: patient.  Pre-procedure education provided  Plan discussed with CRNA.        CODE  STATUS:    Level Of Support Discussed With: Patient  Code Status (Patient has no pulse and is not breathing): CPR (Attempt to Resuscitate)  Medical Interventions (Patient has pulse or is breathing): Full Support

## 2024-05-23 NOTE — OP NOTE
Bronchoscopy Procedure Note  Procedure:    1. Endobronchial ultrasound guided lymph node aspiration.  2. Transbronchial biopsy left upper lobe  3.  Endobronchial brushing left upper lobe  4. Bronchoalveolar lavage 40 lobe  5. Bronchial washings tracheobronchial tree  6. Airway inspection    Pre-Operative Diagnosis: Lung nodule, mediastinal adenopathy    Post-Operative Diagnosis: Same, significant mucous plugging    Indication: Lung nodule, mediastinal lymphadenopathy    Anesthesia: MAC anesthesia.    Procedure Details: Patient was consented for the procedure with all risks and benefits of the procedure explained in detail. Patient was given the opportunity to ask questions and all concerns were answered.  Regular bronchoscope was used to clear mucus in multiple attempts.  The Endobronchial ultrasound bronchoscope (EBUS) was inserted into the main airway via the mouth. An anatomical and mediastinal survey was done of the main airways and the subsegmental bronchus with EBUS scope. Enlarged left hilar, right paratracheal and right hilar lymph nodes were seen. Transbronchial aspiration biopsy of lymph nodes were done starting at right hilar (10 R station) with 4 passes, then right paratracheal (4R station) with 5 passes, then left hilar (10 L station) with 13 passes. Then scope was changed to regular bronchoscope, airway inspection was done.  Some mucus plugging was suctioned out again.  A bronchoalveolar lavage was performed using 60 cc aliquot of normal saline x 2, with 40 ml serosanguineous fluid in return from left upper lobe, anterior segment of lung. An endobronchial brushing and transbronchial biopsy of left upper lobe, anterior segment was done with several blind passes each.  Post procedure no active bleeding was seen.    Estimated Blood Loss: Insignificant      Specimens:  - Cytology specimen from right upper, right paratracheal and left hilar lymph node stations  - Transbronchial biopsy left upper lobe  -  Endobronchial brushing left upper lobe  -40 cc bronchoalveolar lavage left upper lobe  - Bronchial washing      Complications:  No complications during or after the procedure.    Disposition: To [facility], once stable in recovery.    Patient tolerated the procedure well.      Electronically signed by Kendell Stern MD, 5/23/2024, 15:58 EDT.

## 2024-05-23 NOTE — THERAPY TREATMENT NOTE
Acute Care - Physical Therapy Treatment Note   [facility]     Patient Name: Oswaldo Bowen  : 1980  MRN: [id number]  Today's Date: 2024      Visit Dx:     ICD-10-CM ICD-9-CM   1. Closed displaced intertrochanteric fracture of right femur, initial encounter  S72.141A 820.21   2. Pulmonary nodule  R91.1 793.11   3. Difficulty walking  R26.2 719.7     Patient Active Problem List   Diagnosis    Closed intertrochanteric fracture of right femur    Pulmonary nodule     History reviewed. No pertinent past medical history.  Past Surgical History:   Procedure Laterality Date    HIP INTERTROCHANTERIC NAILING Right 2024    Procedure: HIP INTERTROCHANTERIC NAILING;  Surgeon: Molina Clayton MD;  Location: [facility] MAIN OR;  Service: Orthopedics;  Laterality: Right;     PT Assessment (Last 12 Hours)       PT Evaluation and Treatment       Row Name 24 1106          Physical Therapy Time and Intention    Subjective Information complains of;weakness;fatigue;pain  -DK     Document Type therapy note (daily note)  -DK     Mode of Treatment individual therapy;physical therapy  -DK     Patient Effort adequate  -DK     Symptoms Noted During/After Treatment fatigue;increased pain  -DK     Comment Pt is rather confused, slurred speech. Stiffens with attempts at movement, but was able to relax somewhat with cues.  He declined transfers this session.  -DK       Row Name 24 1106          Pain    Pretreatment Pain Rating 0/10 - no pain  -DK     Posttreatment Pain Rating 3/10  -DK     Pain Location - Side/Orientation Right  -DK     Pain Location generalized  -DK     Pain Location - hip  -DK     Pain Intervention(s) Distraction;Therapeutic presence  -DK       Row Name 24 1106          Cognition    Affect/Mental Status (Cognition) confused;anxious;flat/blunted affect  -DK     Behavioral Issues (Cognition) overwhelmed easily;difficulty managing stress  -DK     Orientation Status (Cognition) oriented to;person  -DK      Follows Commands (Cognition) physical/tactile prompts required;repetition of directions required;verbal cues/prompting required  -     Cognitive Function attention deficit  -DK     Attention Deficit (Cognition) minimal deficit;concentration;focused/sustained attention  -DK     Personal Safety Interventions nonskid shoes/slippers when out of bed;supervised activity;gait belt  -       Row Name 05/23/24 1106          Motor Skills    Motor Skills --  therapeutic exercises  -DK     Coordination WFL  -     Therapeutic Exercise hip;knee;ankle  -     Additional Documentation --  Pt declined transfers.  He does tend to stiffen with RLE movements.  -       Row Name 05/23/24 1106          Hip (Therapeutic Exercise)    Hip (Therapeutic Exercise) AAROM (active assistive range of motion)  -     Hip AAROM (Therapeutic Exercise) bilateral;flexion;extension;aBduction;aDduction;supine;10 repetitions;2 sets  -       Row Name 05/23/24 1106          Knee (Therapeutic Exercise)    Knee (Therapeutic Exercise) AAROM (active assistive range of motion)  -     Knee AAROM (Therapeutic Exercise) bilateral;flexion;extension;supine;10 repetitions;2 sets  -       Row Name 05/23/24 1106          Ankle (Therapeutic Exercise)    Ankle (Therapeutic Exercise) AAROM (active assistive range of motion)  -     Ankle AAROM (Therapeutic Exercise) bilateral;dorsiflexion;plantarflexion;supine;10 repetitions;2 sets  -       Row Name             Wound 05/21/24 0251 Bilateral coccyx    Wound - Properties Group Placement Date: 05/21/24  -SR Placement Time: 0251  -SR Present on Original Admission: Y  -SR Side: Bilateral  -SR Location: coccyx  -SR    Retired Wound - Properties Group Placement Date: 05/21/24  -SR Placement Time: 0251  -SR Present on Original Admission: Y  -SR Side: Bilateral  -SR Location: coccyx  -SR    Retired Wound - Properties Group Date first assessed: 05/21/24  -SR Time first assessed: 0251  -SR Present on Original  Admission: Y  -SR Side: Bilateral  -SR Location: coccyx  -SR      Row Name             Wound 05/21/24 Right lateral thigh Incision    Wound - Properties Group Placement Date: 05/21/24  -SF Present on Original Admission: N  -SF Side: Right  -SF Orientation: lateral  -SF Location: thigh  -SF Primary Wound Type: Incision  -SF Additional Comments: incision sites x 3 to lateral right thigh  -SF    Retired Wound - Properties Group Placement Date: 05/21/24  -SF Present on Original Admission: N  -SF Side: Right  -SF Orientation: lateral  -SF Location: thigh  -SF Primary Wound Type: Incision  -SF Additional Comments: incision sites x 3 to lateral right thigh  -SF    Retired Wound - Properties Group Date first assessed: 05/21/24  -SF Present on Original Admission: N  -SF Side: Right  -SF Location: thigh  -SF Primary Wound Type: Incision  -SF Additional Comments: incision sites x 3 to lateral right thigh  -SF      Row Name 05/23/24 1106          Plan of Care Review    Plan of Care Reviewed With patient  -DK     Progress no change  -DK       Row Name 05/23/24 1106          Positioning and Restraints    Pre-Treatment Position in bed  -DK     Post Treatment Position bed  -DK     In Bed supine;call light within reach;encouraged to call for assist;exit alarm on;side rails up x3;legs elevated  -DK       Row Name 05/23/24 1106          Therapy Assessment/Plan (PT)    Rehab Potential (PT) fair, will monitor progress closely  -DK     Criteria for Skilled Interventions Met (PT) skilled treatment is necessary  -DK     Therapy Frequency (PT) daily  -DK     Problem List (PT) problems related to;balance;cognition;mobility;range of motion (ROM);strength;pain;hearing;communication  -DK     Activity Limitations Related to Problem List (PT) unable to ambulate safely;unable to transfer safely  -DK       Row Name 05/23/24 1106          Progress Summary (PT)    Progress Toward Functional Goals (PT) progress toward functional goals is fair  -DK                User Key  (r) = Recorded By, (t) = Taken By, (c) = Cosigned By      Initials Name Provider Type    SF Saritha Ramirez, RN Registered Nurse    Sheila Maharaj PTA Physical Therapist Assistant    Saritha Becker, RN Registered Nurse                    Physical Therapy Education       Title: PT OT SLP Therapies (In Progress)       Topic: Physical Therapy (In Progress)       Point: Mobility training (Done)       Learning Progress Summary             Patient Acceptance, E,TB, VU by AV at 5/22/2024 1339                         Point: Home exercise program (Not Started)       Learner Progress:  Not documented in this visit.              Point: Body mechanics (Done)       Learning Progress Summary             Patient Acceptance, E,TB, VU by AV at 5/22/2024 1339                         Point: Precautions (Done)       Learning Progress Summary             Patient Acceptance, E,TB, VU by AV at 5/22/2024 1339                                         User Key       Initials Effective Dates Name Provider Type Discipline    AV 06/11/21 -  Eh Martínez, PT Physical Therapist PT                  PT Recommendation and Plan  Planned Therapy Interventions (PT): balance training, bed mobility training, gait training, strengthening, transfer training  Therapy Frequency (PT): daily  Progress Summary (PT)  Progress Toward Functional Goals (PT): progress toward functional goals is fair  Plan of Care Reviewed With: patient  Progress: no change   Outcome Measures       Row Name 05/23/24 1105 05/22/24 0815          How much help from another person do you currently need...    Turning from your back to your side while in flat bed without using bedrails? 2  -DK 2  -AV     Moving from lying on back to sitting on the side of a flat bed without bedrails? 2  -DK 2  -AV     Moving to and from a bed to a chair (including a wheelchair)? 3  -DK 3  -AV     Standing up from a chair using your arms (e.g., wheelchair, bedside  chair)? 3  -DK 3  -AV     Climbing 3-5 steps with a railing? 2  -DK 2  -AV     To walk in hospital room? 3  -DK 3  -AV     AM-PAC 6 Clicks Score (PT) 15  -DK 15  -AV     Highest Level of Mobility Goal 4 --> Transfer to chair/commode  -DK 4 --> Transfer to chair/commode  -AV        Functional Assessment    Outcome Measure Options AM-PAC 6 Clicks Basic Mobility (PT)  -DK AM-PAC 6 Clicks Basic Mobility (PT)  -AV               User Key  (r) = Recorded By, (t) = Taken By, (c) = Cosigned By      Initials Name Provider Type    Sheila Maharaj PTA Physical Therapist Assistant    AV Eh Martínez PT Physical Therapist                     Time Calculation:    PT Charges       Row Name 05/23/24 1110             Time Calculation    PT Received On 05/23/24  -DK      PT Goal Re-Cert Due Date 05/31/24  -DK         Timed Charges    28968 - PT Therapeutic Exercise Minutes 14  -DK      14454 - PT Therapeutic Activity Minutes 4  -DK         Total Minutes    Timed Charges Total Minutes 18  -DK       Total Minutes 18  -DK                User Key  (r) = Recorded By, (t) = Taken By, (c) = Cosigned By      Initials Name Provider Type    Sheila Maharaj PTA Physical Therapist Assistant                  Therapy Charges for Today       Code Description Service Date Service Provider Modifiers Qty    73322384301 HC PT THER PROC EA 15 MIN 5/23/2024 Sheila Nicole PTA GP 1            PT G-Codes  Outcome Measure Options: AM-PAC 6 Clicks Basic Mobility (PT)  AM-PAC 6 Clicks Score (PT): 15  AM-PAC 6 Clicks Score (OT): 12    Sheila Nicole PTA  5/23/2024

## 2024-05-24 LAB
ANION GAP SERPL CALCULATED.3IONS-SCNC: 10.3 MMOL/L (ref 5–15)
BASOPHILS # BLD AUTO: 0.02 10*3/MM3 (ref 0–0.2)
BASOPHILS NFR BLD AUTO: 0.2 % (ref 0–1.5)
BUN SERPL-MCNC: 9 MG/DL (ref 6–20)
BUN/CREAT SERPL: 18.4 (ref 7–25)
CALCIUM SPEC-SCNC: 8.6 MG/DL (ref 8.6–10.5)
CHLORIDE SERPL-SCNC: 98 MMOL/L (ref 98–107)
CO2 SERPL-SCNC: 27.7 MMOL/L (ref 22–29)
CREAT SERPL-MCNC: 0.49 MG/DL (ref 0.76–1.27)
D-LACTATE SERPL-SCNC: 1.7 MMOL/L (ref 0.5–2)
DEPRECATED RDW RBC AUTO: 41.3 FL (ref 37–54)
EGFRCR SERPLBLD CKD-EPI 2021: 129.8 ML/MIN/1.73
EOSINOPHIL # BLD AUTO: 0.04 10*3/MM3 (ref 0–0.4)
EOSINOPHIL NFR BLD AUTO: 0.3 % (ref 0.3–6.2)
ERYTHROCYTE [DISTWIDTH] IN BLOOD BY AUTOMATED COUNT: 12.8 % (ref 12.3–15.4)
GLUCOSE SERPL-MCNC: 131 MG/DL (ref 65–99)
HCT VFR BLD AUTO: 27.8 % (ref 37.5–51)
HGB BLD-MCNC: 8.8 G/DL (ref 13–17.7)
IMM GRANULOCYTES # BLD AUTO: 0.07 10*3/MM3 (ref 0–0.05)
IMM GRANULOCYTES NFR BLD AUTO: 0.6 % (ref 0–0.5)
LYMPHOCYTES # BLD AUTO: 0.98 10*3/MM3 (ref 0.7–3.1)
LYMPHOCYTES NFR BLD AUTO: 8.3 % (ref 19.6–45.3)
MAGNESIUM SERPL-MCNC: 1.7 MG/DL (ref 1.6–2.6)
MCH RBC QN AUTO: 27.8 PG (ref 26.6–33)
MCHC RBC AUTO-ENTMCNC: 31.7 G/DL (ref 31.5–35.7)
MCV RBC AUTO: 87.7 FL (ref 79–97)
MONOCYTES # BLD AUTO: 0.57 10*3/MM3 (ref 0.1–0.9)
MONOCYTES NFR BLD AUTO: 4.8 % (ref 5–12)
NEUTROPHILS NFR BLD AUTO: 10.08 10*3/MM3 (ref 1.7–7)
NEUTROPHILS NFR BLD AUTO: 85.8 % (ref 42.7–76)
NIGHT BLUE STAIN TISS: NORMAL
NRBC BLD AUTO-RTO: 0 /100 WBC (ref 0–0.2)
PHOSPHATE SERPL-MCNC: 2.7 MG/DL (ref 2.5–4.5)
PLATELET # BLD AUTO: 383 10*3/MM3 (ref 140–450)
PMV BLD AUTO: 9.8 FL (ref 6–12)
POTASSIUM SERPL-SCNC: 3.8 MMOL/L (ref 3.5–5.2)
PROCALCITONIN SERPL-MCNC: 1.09 NG/ML (ref 0–0.25)
RBC # BLD AUTO: 3.17 10*6/MM3 (ref 4.14–5.8)
SODIUM SERPL-SCNC: 136 MMOL/L (ref 136–145)
WBC NRBC COR # BLD AUTO: 11.76 10*3/MM3 (ref 3.4–10.8)

## 2024-05-24 PROCEDURE — 25010000002 AMPICILLIN-SULBACTAM PER 1.5 G: Performed by: INTERNAL MEDICINE

## 2024-05-24 PROCEDURE — 94664 DEMO&/EVAL PT USE INHALER: CPT

## 2024-05-24 PROCEDURE — 99233 SBSQ HOSP IP/OBS HIGH 50: CPT | Performed by: INTERNAL MEDICINE

## 2024-05-24 PROCEDURE — 84145 PROCALCITONIN (PCT): CPT | Performed by: INTERNAL MEDICINE

## 2024-05-24 PROCEDURE — 97110 THERAPEUTIC EXERCISES: CPT

## 2024-05-24 PROCEDURE — 83735 ASSAY OF MAGNESIUM: CPT | Performed by: INTERNAL MEDICINE

## 2024-05-24 PROCEDURE — 99232 SBSQ HOSP IP/OBS MODERATE 35: CPT | Performed by: INTERNAL MEDICINE

## 2024-05-24 PROCEDURE — 94799 UNLISTED PULMONARY SVC/PX: CPT

## 2024-05-24 PROCEDURE — 97530 THERAPEUTIC ACTIVITIES: CPT

## 2024-05-24 PROCEDURE — 83605 ASSAY OF LACTIC ACID: CPT | Performed by: INTERNAL MEDICINE

## 2024-05-24 PROCEDURE — 84100 ASSAY OF PHOSPHORUS: CPT | Performed by: INTERNAL MEDICINE

## 2024-05-24 PROCEDURE — 80048 BASIC METABOLIC PNL TOTAL CA: CPT | Performed by: INTERNAL MEDICINE

## 2024-05-24 PROCEDURE — 85025 COMPLETE CBC W/AUTO DIFF WBC: CPT | Performed by: INTERNAL MEDICINE

## 2024-05-24 PROCEDURE — 87040 BLOOD CULTURE FOR BACTERIA: CPT | Performed by: INTERNAL MEDICINE

## 2024-05-24 RX ADMIN — BUDESONIDE AND FORMOTEROL FUMARATE DIHYDRATE 2 PUFF: 160; 4.5 AEROSOL RESPIRATORY (INHALATION) at 21:18

## 2024-05-24 RX ADMIN — AMPICILLIN SODIUM, SULBACTAM SODIUM 3 G: 2; 1 INJECTION, POWDER, FOR SOLUTION INTRAMUSCULAR; INTRAVENOUS at 22:31

## 2024-05-24 RX ADMIN — Medication 1 TABLET: at 09:25

## 2024-05-24 RX ADMIN — AMPICILLIN SODIUM, SULBACTAM SODIUM 3 G: 2; 1 INJECTION, POWDER, FOR SOLUTION INTRAMUSCULAR; INTRAVENOUS at 17:19

## 2024-05-24 RX ADMIN — TIOTROPIUM BROMIDE INHALATION SPRAY 2 PUFF: 3.12 SPRAY, METERED RESPIRATORY (INHALATION) at 09:10

## 2024-05-24 RX ADMIN — Medication 10 ML: at 09:25

## 2024-05-24 RX ADMIN — Medication 10 ML: at 22:30

## 2024-05-24 RX ADMIN — ACETAMINOPHEN 1000 MG: 500 TABLET ORAL at 17:19

## 2024-05-24 RX ADMIN — HYDROXYZINE HYDROCHLORIDE 25 MG: 25 TABLET, FILM COATED ORAL at 22:30

## 2024-05-24 RX ADMIN — Medication 400 MG: at 09:25

## 2024-05-24 RX ADMIN — ACETAMINOPHEN 1000 MG: 500 TABLET ORAL at 09:24

## 2024-05-24 RX ADMIN — ACETAMINOPHEN 1000 MG: 500 TABLET ORAL at 22:29

## 2024-05-24 RX ADMIN — HYDROXYZINE HYDROCHLORIDE 25 MG: 25 TABLET, FILM COATED ORAL at 02:21

## 2024-05-24 RX ADMIN — POLYETHYLENE GLYCOL 3350 17 G: 17 POWDER, FOR SOLUTION ORAL at 22:29

## 2024-05-24 RX ADMIN — AMPICILLIN SODIUM, SULBACTAM SODIUM 3 G: 2; 1 INJECTION, POWDER, FOR SOLUTION INTRAMUSCULAR; INTRAVENOUS at 11:58

## 2024-05-24 RX ADMIN — Medication 100 MG: at 09:25

## 2024-05-24 RX ADMIN — CYANOCOBALAMIN TAB 500 MCG 1000 MCG: 500 TAB at 09:25

## 2024-05-24 RX ADMIN — LIDOCAINE 1 PATCH: 560 PATCH PERCUTANEOUS; TOPICAL; TRANSDERMAL at 09:25

## 2024-05-24 RX ADMIN — BUDESONIDE AND FORMOTEROL FUMARATE DIHYDRATE 2 PUFF: 160; 4.5 AEROSOL RESPIRATORY (INHALATION) at 09:10

## 2024-05-24 RX ADMIN — SENNOSIDES AND DOCUSATE SODIUM 2 TABLET: 50; 8.6 TABLET ORAL at 22:29

## 2024-05-24 NOTE — THERAPY TREATMENT NOTE
Patient Name: Oswaldo Bowen  : 1980    MRN: [id number]                              Today's Date: 2024       Admit Date: 2024    Visit Dx:     ICD-10-CM ICD-9-CM   1. Closed displaced intertrochanteric fracture of right femur, initial encounter  S72.141A 820.21   2. Pulmonary nodule  R91.1 793.11   3. Difficulty walking  R26.2 719.7     Patient Active Problem List   Diagnosis    Closed intertrochanteric fracture of right femur    Pulmonary nodule     History reviewed. No pertinent past medical history.  Past Surgical History:   Procedure Laterality Date    HIP INTERTROCHANTERIC NAILING Right 2024    Procedure: HIP INTERTROCHANTERIC NAILING;  Surgeon: Molina Clayton MD;  Location: [facility] MAIN OR;  Service: Orthopedics;  Laterality: Right;      General Information       Row Name 24 0927          OT Time and Intention    Document Type therapy note (daily note)  -PG     Mode of Treatment occupational therapy;individual therapy  -PG               User Key  (r) = Recorded By, (t) = Taken By, (c) = Cosigned By      Initials Name Provider Type    PG Felix Villasenor OT Occupational Therapist                     Mobility/ADL's       Row Name 2427          Bed Mobility    Supine-Sit [place] (Bed Mobility) maximum assist (25% patient effort);2 person assist;moderate assist (50% patient effort);verbal cues  -PG       Row Name 2427          Transfers    Transfers bed-chair transfer  -PG       Row Name 2427          Bed-Chair Transfer    Bed-Chair [place] (Transfers) contact guard;verbal cues;2 person assist  -PG               User Key  (r) = Recorded By, (t) = Taken By, (c) = Cosigned By      Initials Name Provider Type    PG Felix Villasenor OT Occupational Therapist                   Obj/Interventions    No documentation.                  Goals/Plan    No documentation.                  Clinical Impression       Row Name 24 0928          Plan of Care Review     Progress no change  -PG               User Key  (r) = Recorded By, (t) = Taken By, (c) = Cosigned By      Initials Name Provider Type    PG Felix Villasenor OT Occupational Therapist                   Outcome Measures       Row Name 05/24/24 0928          How much help from another is currently needed...    Putting on and taking off regular lower body clothing? 1  -PG     Bathing (including washing, rinsing, and drying) 1  -PG     Toileting (which includes using toilet bed pan or urinal) 1  -PG     Putting on and taking off regular upper body clothing 2  -PG     Taking care of personal grooming (such as brushing teeth) 3  -PG     Eating meals 3  -PG     AM-PAC 6 Clicks Score (OT) 11  -PG       Row Name 05/23/24 2200          How much help from another person do you currently need...    Turning from your back to your side while in flat bed without using bedrails? 2  -AH     Moving from lying on back to sitting on the side of a flat bed without bedrails? 2  -AH     Moving to and from a bed to a chair (including a wheelchair)? 2  -AH     Standing up from a chair using your arms (e.g., wheelchair, bedside chair)? 2  -AH     Climbing 3-5 steps with a railing? 2  -AH     To walk in hospital room? 2  -AH     AM-PAC 6 Clicks Score (PT) 12  -AH     Highest Level of Mobility Goal 4 --> Transfer to chair/commode  -AH       Row Name 05/24/24 0928          Functional Assessment    Outcome Measure Options AM-PAC 6 Clicks Daily Activity (OT);Optimal Instrument  -PG       Row Name 05/24/24 0928          Optimal Instrument    Optimal Instrument Optimal - 3  -PG     Bending/Stooping 4  -PG     Standing 2  -PG     Reaching 2  -PG               User Key  (r) = Recorded By, (t) = Taken By, (c) = Cosigned By      Initials Name Provider Type    PG Felix Villasenor OT Occupational Therapist    Angela Lindquist RN Registered Nurse                    Occupational Therapy Education       Title: PT OT SLP Therapies (In Progress)        Topic: Occupational Therapy (Done)       Point: ADL training (Done)       Description:   Instruct learner(s) on proper safety adaptation and remediation techniques during self care or transfers.   Instruct in proper use of assistive devices.                  Learning Progress Summary             Patient Eager, E, VU by  at 5/23/2024 2223    Acceptance, E,D, NR by PG at 5/22/2024 0917                         Point: Home exercise program (Done)       Description:   Instruct learner(s) on appropriate technique for monitoring, assisting and/or progressing therapeutic exercises/activities.                  Learning Progress Summary             Patient Eager, E, VU by  at 5/23/2024 2223    Acceptance, E,D, NR by PG at 5/22/2024 0917                         Point: Precautions (Done)       Description:   Instruct learner(s) on prescribed precautions during self-care and functional transfers.                  Learning Progress Summary             Patient Eager, E, VU by  at 5/23/2024 2223    Acceptance, E,D, NR by PG at 5/22/2024 0917                         Point: Body mechanics (Done)       Description:   Instruct learner(s) on proper positioning and spine alignment during self-care, functional mobility activities and/or exercises.                  Learning Progress Summary             Patient Eager, E, VU by  at 5/23/2024 2223    Acceptance, E,D, NR by PG at 5/22/2024 0917                                         User Key       Initials Effective Dates Name Provider Type Discipline     06/16/21 -  Felix Villasenor OT Occupational Therapist OT     05/31/23 -  Angela Casillas RN Registered Nurse Nurse                  OT Recommendation and Plan  Planned Therapy Interventions (OT): activity tolerance training, BADL retraining, strengthening exercise, transfer/mobility retraining, occupation/activity based interventions, patient/caregiver education/training  Therapy Frequency (OT): 5 times/wk  Plan of Care  Review  Plan of Care Reviewed With: patient  Progress: no change  Outcome Evaluation: Patient presents with limitations affecting strength, activity tolerance, and balance impacting patient's ability to return home safely and independently.  The skills of a therapist will be required to safely and effectively implement the following treatment plan to restore maximal level of function     Time Calculation:   Evaluation Complexity (OT)  Review Occupational Profile/Medical/Therapy History Complexity: brief/low complexity  Assessment, Occupational Performance/Identification of Deficit Complexity: 3-5 performance deficits  Clinical Decision Making Complexity (OT): problem focused assessment/low complexity  Overall Complexity of Evaluation (OT): low complexity     Time Calculation- OT       Row Name 05/24/24 0928             Time Calculation- OT    OT Received On 05/24/24  -PG      OT Goal Re-Cert Due Date 05/31/24  -PG         Timed Charges    20890 - OT Therapeutic Activity Minutes 23  -PG         Total Minutes    Timed Charges Total Minutes 23  -PG       Total Minutes 23  -PG                User Key  (r) = Recorded By, (t) = Taken By, (c) = Cosigned By      Initials Name Provider Type    PG Felix Villasenor OT Occupational Therapist                  Therapy Charges for Today       Code Description Service Date Service Provider Modifiers Qty    52740804986  OT THERAPEUTIC ACT EA 15 MIN 5/24/2024 Felix Villasenor OT GO 2                 Felix Villasenor OT  5/24/2024

## 2024-05-24 NOTE — PLAN OF CARE
Goal Outcome Evaluation:  Plan of Care Reviewed With: patient           Outcome Evaluation: Patient  resting well. Prn tylenol given for temp 101.9  once this shift. Atarax given once for anxiety. HR tachy on flex. Patient on regular diet and tolerated well.  Continue plan of care.

## 2024-05-24 NOTE — PROGRESS NOTES
Pulmonary / Critical Care Progress Note      Patient Name: Oswaldo Bowen  : 1980  MRN: [id number]  Primary Care Physician:  Provider, No Known  Date of admission: 2024    Subjective   Subjective   Follow-up for left upper lobe lung nodule with mediastinal adenopathy, chronic heavy smoking, unintentional weight loss.    Over past 24 hours:  Patient underwent bronchoscopy with EBUS.  Continued with Symbicort and Spiriva.  Remained on antibiotics.    No acute events overnight.     This morning,   Patient is in bed, in no acute distress.  Has mild conversational dyspnea.  Has cough, does not produce much phlegm.  No fever or chills.  No nausea or vomiting.  Reviewed pneumonia panel results with patient.      Review of Systems  General:  Fatigue, No Fever  HEENT: No dysphagia, No Visual Changes, no rhinorrhea  Respiratory:  +cough,+Dyspnea, scant phlegm, No Pleuritic Pain, + wheezing  Cardiovascular: Denies chest pain, denies palpitations,+ALEXANDRA, + Chest Pressure  Gastrointestinal:  No Abdominal Pain, No Nausea, No Vomiting, No Diarrhea  Genitourinary:  No Dysuria, No Frequency, No Hesitancy  Musculoskeletal: No muscle pain or swelling  Neurologic:  No Confusion, no Dysarthria, No Headaches  Skin:  No Rash, No Open Wounds          Objective   Objective     Vitals:   Temp:  [97.4 °F (36.3 °C)-101.9 °F (38.8 °C)] 98.9 °F (37.2 °C)  Heart Rate:  [101-124] 124  Resp:  [12-24] 22  BP: ()/(56-93) 119/86  Flow (L/min):  [2-5] 2  Physical Exam   Vital Signs Reviewed   General:  WDWN, Alert, in mild distress, has conversational dyspnea  HEENT:  PERRL, EOMI.  OP, nares clear, no sinus tenderness  Neck:  Supple, no JVD, no thyromegaly  Chest:  good aeration, clear to auscultation bilaterally, tympanic to percussion bilaterally, no work of breathing noted  CV: RRR, no MGR, pulses 2+, equal.  Abd:  Soft, NT, ND, + BS, no HSM  EXT:  no clubbing, no cyanosis, no edema  Neuro:  A&Ox3, CN grossly intact, no focal  deficits.  Skin: No rashes or lesions noted      Result Review    Result Review:  I have personally reviewed the results from the time of this admission to 5/24/2024 07:13 EDT and agree with these findings:  [x]  Laboratory  [x]  Microbiology  [x]  Radiology  [x]  EKG/Telemetry   [x]  Cardiology/Vascular   []  Pathology  []  Old records  []  Other:  Most notable findings include:         Lab 05/23/24  0656 05/22/24  1535 05/22/24  0504 05/21/24  0528 05/20/24  2134   WBC 9.53  --  12.84* 14.66* 19.54*   HEMOGLOBIN 9.5*  --  9.2* 10.0* 11.9*   HEMATOCRIT 29.9*  --  29.3* 31.3* 37.0*   PLATELETS 357  --  359 351 410   SODIUM 135*  --  133* 136 137   POTASSIUM 3.1*  --  3.4* 3.5 3.9   CHLORIDE 98  --  99 100 95*   CO2 25.4  --  25.2 24.0 24.4   BUN 9  --  16 22* 21*   CREATININE 0.49*  --  0.55* 0.91 1.72*   GLUCOSE 112*  --  125* 125* 183*   CALCIUM 8.3*  --  8.4* 9.2 10.5   PHOSPHORUS 3.4  --  2.7 3.5 4.0   TOTAL PROTEIN  --   --   --  6.8 8.1   ALBUMIN  --  2.2*  --  2.9* 3.3*   GLOBULIN  --   --   --  3.9 4.8            CT Chest With Contrast Diagnostic    Result Date: 5/21/2024  CT CHEST W CONTRAST DIAGNOSTIC-  Date of Exam: 5/21/2024 9:01 PM  Indication: 14mm perihilar nodule, 1-2 ppd extensive smoking hx; S72.141A-Displaced intertrochanteric fracture of right femur, initial encounter for closed fracture; R91.1-Solitary pulmonary nodule.  Comparison: Chest radiograph 5/20/2024  Technique: Axial CT images were obtained of the chest after the uneventful intravenous administration of 75 cc Isovue-370 . Reconstructed coronal and sagittal images were also obtained. Automated exposure control and iterative construction methods were used.   Findings: No coronary artery calcification. There is no mediastinal adenopathy. There is a right hilar node measuring 2.5 cm and a left hilar node measuring 2.1 cm. No axillary adenopathy. There are no pleural effusions. Within the perihilar region of the left upper lobe there is a  noncalcified macro lobular 2.1 x 1.9 cm noncalcified pulmonary nodule suspicious for malignancy. Within the posterior right lower lobe there is a subpleural 12 x 5 mm nodule which is nonspecific. No other pulmonary nodules. No other focal airspace consolidation.  Bilateral adrenal glands are within normal limits.  There are multiple lytic bone lesions throughout the spine and ribs. Findings would be consistent with metastatic disease or myeloma. There are additional lytic lesions within both scapula.      Impression: Impression:  1. Left upper lobe noncalcified pulmonary nodule measuring 2.1 cm in size suspicious for primary malignancy. There is mild bilateral hilar adenopathy. Additionally there are multiple lytic bone lesions throughout the spine ribs and scapula consistent with metastatic disease.    Electronically Signed By-Eliecer Jara MD On:5/21/2024 10:11 PM         Assessment & Plan   Assessment / Plan     Active Hospital Problems:  Active Hospital Problems    Diagnosis     **Closed intertrochanteric fracture of right femur     Pulmonary nodule          Impression:   Acute comminuted displaced intertrochanteric right femoral fracture  Status post IM nailing  Left upper lobe lung nodule with mediastinal lymphadenopathy  Chronic heavy smoking  Unintentional weight loss    Plan:   Status post bronchoscopy with EBUS.  Pathology is pending.  Pneumonia panel is showing haemophilus influenza.  Continue with Unasyn IV.  Continue Symbicort and Spiriva.  As needed albuterol.  Pain control per primary service.  Postop surgical care per Ortho service.       DVT prophylaxis:  Mechanical DVT prophylaxis orders are present.        CODE STATUS:   Level Of Support Discussed With: Patient  Code Status (Patient has no pulse and is not breathing): CPR (Attempt to Resuscitate)  Medical Interventions (Patient has pulse or is breathing): Full Support      I personally reviewed pertinent labs, imaging and provider notes.  Discussed with bedside nurse and will discuss with primary service.       Electronically signed by Kendell Stern MD, 5/24/2024, 07:13 EDT.

## 2024-05-24 NOTE — PROGRESS NOTES
[facility]   Hematology/Oncology  Progress Note    Patient Name: Oswaldo Bowen  : 1980  MRN: [id number]  Primary Care Physician:  Provider, No Known  Date of admission: 2024    Subjective   Subjective     Chief Complaint: fall    HPI:  Patient Reports being tired. Bronchoscopy performed yesterday. No fevers or chills. No acute events overnight. Has some mild shortness of breath. Cough is minimal. No nausea. CT scan and MRI results discussed with patient. Not on supplemental oxygen.     Review of Systems   All systems were reviewed and negative except for: as stated above    Objective   Objective     Vitals:   Temp:  [97.3 °F (36.3 °C)-101.9 °F (38.8 °C)] 97.3 °F (36.3 °C)  Heart Rate:  [101-124] 116  Resp:  [16-24] 18  BP: ()/(62-86) 109/81  Flow (L/min):  [2] 2  Physical Exam    Constitutional: lethargic, lying in bed, appears older than stated age, slightly cachectic              Eyes: PERRLA, sclerae anicteric, no conjunctival injection              HENT: NCAT, mucous membranes moist              Respiratory: nonlabored respirations               Cardiovascular: no edema in the extremities              Gastrointestinal: nontender, nondistended              Musculoskeletal: Normal strength and tone, no joint swelling              Psychiatric: Appropriate affect, cooperative              Neurologic: lethargic, speech dysarthric              Skin: Normal tone, no rashes    Result Review    Result Review:  I have personally reviewed the results from the time of this admission to 2024 16:10 EDT and agree with these findings:  [x]  Laboratory  []  Microbiology  [x]  Radiology  []  EKG/Telemetry   []  Cardiology/Vascular   []  Pathology  []  Old records  []  Other:  Most notable findings include: CT abdomen without any visceral mets but bony mets seen per my review, anemia worse, WBC elevated, plts normal. Bronchoscopy results negative so far.     Assessment & Plan   Assessment / Plan      Brief Patient Summary:  Oswaldo Bowen is a 44 y.o. male with  intellectual disability, 1-2PPD smoking, depression, who presented after a fall (possibly 1 month ago but may have been more recently), found to have right femoral fracture and VLAD. S/p fixation in OR on 5/21/24. CT chest showed 2.1 cm RUL nodule, bilateral hilar adenopathy and multiple lytic lesions throughout the spine, ribs and scapula consistent with metastatic disease.     Active Hospital Problems:  Active Hospital Problems    Diagnosis     **Closed intertrochanteric fracture of right femur     Pulmonary nodule        Plan:   Metastatic lung cancer, likely  CT chest obtained due to left sided pulmonary nodule on chest x-ray showed 2.1 cm RUL nodule, bilateral hilar adenopathy and multiple lytic lesions throughout the spine, ribs and scapula consistent with metastatic disease.  CT abdomen/pelvis with contrast with multiple bony mets. No visceral mets seen. MRI brain without any intracranial pathology.  Multiple myeloma labs with elevated kappa and lambda FLC but normal ratio, so this is normal. M-spike not observed and no monoclonal protein on MARTINEZ. Therefore patient does not have any signs of multiple myeloma.   Pulm performed biopsy via bronchoscopy 5/23/24, results pending. This will need to be sent for PDL1, EGFR, ROS, ALK as well as NGS testing if positive.   Recommend follow up with me in clinic after rehab.      Anemia  Worse after surgery likely related to blood loss. With elevated ferritin, low iron sat and low TIBC, labs consistent with anemia of chronic disease. Recommend to trend daily. B12 and folate wnl.      Right femoral fracture  After fall. S/p right subtrochanteric nailing 5/21/24. Ortho following, appreciate assistance. Plan for bone modifying agent as outpatient due to metastatic cancer to bone. Recommend pain medication provided on discharge.    Electronically signed by Sukhwinder Johnson MD, 05/24/24, 4:10 PM EDT.

## 2024-05-24 NOTE — PLAN OF CARE
Goal Outcome Evaluation:           Progress: no change  Outcome Evaluation: VSS. Patient rested throughout shift without complaints of pain. Up to chair at beginning of shift. Patient tolerated well. No issues at this current time, plan of care continues.

## 2024-05-24 NOTE — NURSING NOTE
"Patient is currently on 3nt- at time of visit patient is alert and oriented to person, place, and situation during visit. No family at bedside. Updated by primary rn. Due to patients noted intelluctual disability, palliative care would prefer family to bedside to further discuss goals of care and care options. Call placed to patients sister listed. Introduced self and explained role and purpose of visit. Patients sister shares she is next of kin, patients mother and father have passed away, patient was never  or had children, and patient has only 1 sibling. 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 Discussed patients current condition. Patients sister reports awaiting for results of biopsy. Emotional support provided. Encouraged patients sister to have continued goals of care conversations with patient. Discussed patients documented guarded prognosis and discussed care options including education on Hospice services. Patients sister reports she will be coming to visit later this evening and will have discussions with patient in this regard. Emotional support provided. Provided palliative care contact information and encouraged to call with further questions/concerns. Palliative care will continue to follow to support and assist.    Kimmie AGUIRRE, RN, [facility]  Palliative Care    "

## 2024-05-24 NOTE — PROGRESS NOTES
[facility]   Hospitalist Progress Note    Date of admission: 5/20/2024  Patient Name: Oswaldo Bowen  1980  Date: 5/23/2024      Subjective     Chief Complaint   Patient presents with    Fall    Leg Pain       Interval Followup: Tired but still having pain in lower back as well.  Discussed results of additional testing.     Pending bronchoscopy    Objective     Vitals:   Temp:  [97.4 °F (36.3 °C)-98.9 °F (37.2 °C)] 98.9 °F (37.2 °C)  Heart Rate:  [101-112] 109  Resp:  [12-23] 22  BP: ()/(56-93) 91/64  Flow (L/min):  [2-5] 2    Physical Exam  Sleeping tired wakes fairly easily, conversant, poor insight/poor health literacy consistent with patient's history of intellectual disability  No rhonchi breathing comfortably on room air  elevated rate regular rhythm  Abdomen soft  Dressing in place      Result Review:  Vital signs, labs and recent relevant imaging reviewed.      CBC          5/21/2024    05:28 5/22/2024    05:04 5/23/2024    06:56   CBC   WBC 14.66  12.84  9.53    RBC 3.52  3.29  3.37    Hemoglobin 10.0  9.2  9.5    Hematocrit 31.3  29.3  29.9    MCV 88.9  89.1  88.7    MCH 28.4  28.0  28.2    MCHC 31.9  31.4  31.8    RDW 12.6  12.7  12.6    Platelets 351  359  357      CMP          5/21/2024    05:28 5/22/2024    05:04 5/22/2024    15:35 5/23/2024    06:56   CMP   Glucose 125  125   112    BUN 22  16   9    Creatinine 0.91  0.55   0.49    EGFR 106.6  125.3   129.8    Sodium 136  133   135    Potassium 3.5  3.4   3.1    Chloride 100  99   98    Calcium 9.2  8.4   8.3    Total Protein   5.6     Total Protein 6.8       Albumin 2.9   2.2     Globulin   3.4     Globulin 3.9       Total Bilirubin 0.3       Alkaline Phosphatase 122       AST (SGOT) 19       ALT (SGPT) 19       Albumin/Globulin Ratio 0.7       BUN/Creatinine Ratio 24.2  29.1   18.4    Anion Gap 12.0  8.8   11.6          acetaminophen    aluminum-magnesium hydroxide-simethicone    senna-docusate sodium **AND** polyethylene glycol  **AND** bisacodyl **AND** bisacodyl    senna-docusate sodium **AND** polyethylene glycol **AND** bisacodyl **AND** bisacodyl    calcium carbonate    dextrose    dextrose    Diclofenac Sodium    glucagon (human recombinant)    HYDROcodone-acetaminophen    HYDROcodone-acetaminophen    hydrOXYzine    levalbuterol    Lidocaine    melatonin    Morphine **AND** naloxone    Morphine **AND** naloxone    nicotine    ondansetron    ondansetron ODT **OR** ondansetron    prochlorperazine    sodium chloride    sodium chloride    sodium chloride    sodium chloride    acetaminophen, 1,000 mg, Oral, TID  budesonide-formoterol, 2 puff, Inhalation, BID - RT  Lidocaine, 1 patch, Transdermal, Q24H  magnesium oxide, 400 mg, Oral, Daily  multivitamin with minerals, 1 tablet, Oral, Daily  polyethylene glycol, 17 g, Oral, BID  potassium chloride, 40 mEq, Oral, Once  senna-docusate sodium, 2 tablet, Oral, BID  sodium chloride, 10 mL, Intravenous, Q12H  thiamine, 100 mg, Oral, Daily  tiotropium bromide monohydrate, 2 puff, Inhalation, Daily - RT  vitamin B-12, 1,000 mcg, Oral, Daily        MRI Brain With & Without Contrast    Result Date: 5/22/2024  Impression:  1. No acute intracranial abnormality. No evidence of intracranial metastatic disease. 2. No pathologic contrast enhancement. 3. Multiple enhancing lesions within the skull compatible with bony metastatic disease.    Electronically Signed By-Eliecer Jara MD On:5/22/2024 10:57 PM      CT Abdomen Pelvis With Contrast    Result Date: 5/22/2024   1. The study is ABNORMAL.  2. Diffuse osseous metastatic disease is seen. Impending pathologic fractures at L3 and involving the left femoral head are possible. Epidural extension of tumor involving the spine cannot be excluded. For instance, there may be epidural extension of tumor involving the spine, especially at T5, on the right at T11-12, on the left at L1, and probably at L3. Degenerative changes also involve the imaged spine, probably  greatest at L4-5 with moderate spinal canal narrowing.  3. There is a moderate-to-large stool burden. No mechanical bowel obstruction or pneumoperitoneum. No acute appendicitis. The oral contrast preparation is limited.  4. Bilateral nonobstructing renal calyceal stones are seen, which measure 5 mm or less in size. No obstructive uropathy is seen due to ureterolithiasis. No acute pyelonephritis. No definite suspicious renal mass.  5. There is nonspecific distention of the urinary bladder (566 mL estimated total volume). Please correlate clinically. Consider decompression of the bladder with catheter placement if clinically indicated. No urinary bladder wall thickening or urinary bladder calculi.  6. The patient has undergone recent open reduction and internal fixation (ORIF) of a comminuted intertrochanteric likely pathologic right femoral fracture with expected postoperative changes in the overlying soft tissues.  7. No definite hepatic metastases are seen. Hepatomegaly is suspected.  8. No adrenal mass is appreciated.  9. There are several artifacts on the study. The best possible images were obtained.  10. Please see above comments for further detail.    Please note that portions of this note were completed with a voice recognition program.        Electronically Signed By-Panda Howard MD On:5/22/2024 9:33 PM      CT Chest With Contrast Diagnostic    Result Date: 5/21/2024  Impression:  1. Left upper lobe noncalcified pulmonary nodule measuring 2.1 cm in size suspicious for primary malignancy. There is mild bilateral hilar adenopathy. Additionally there are multiple lytic bone lesions throughout the spine ribs and scapula consistent with metastatic disease.    Electronically Signed By-Eliecer Jara MD On:5/21/2024 10:11 PM      XR Chest 1 View    Result Date: 5/20/2024  Impression:  1. Questionable 14 mm nodule projecting in the left perihilar region. Follow-up CT scan of the chest would be recommended when  clinically feasible.   Electronically Signed By-Eliecer Jara MD On:5/20/2024 10:23 PM      XR Femur 2 View Right    Result Date: 5/20/2024  (COMBINED) Acute comminuted displaced intertrochanteric right femoral fracture is noted, as discussed. No dislocation.     Please note that portions of this note were completed with a voice recognition program.      Electronically Signed By-Panda Howard MD On:5/20/2024 10:21 PM      XR Pelvis 1 or 2 View    Result Date: 5/20/2024  (COMBINED) Acute comminuted displaced intertrochanteric right femoral fracture is noted, as discussed. No dislocation.     Please note that portions of this note were completed with a voice recognition program.      Electronically Signed By-Panda Howard MD On:5/20/2024 10:21 PM       Assessment / Plan     Summary: 44M with intellectual disability, 1-2PPD smoking, depression, who presented after a fall (possibly 1 month ago but may have been more recently), found to have right femoral fracture and VLAD.  Ortho assisting     Assessment/Plan (clinically significant if listed here)  Mechanical fall with acute comminuted displaced intertrochanteric right femoral fracture  S/p 5/21 right hip subtrochanteric nailing of closed displaced right femur fracture by Dr. Nelson  VLAD creatinine 1.7 leukocytosis suspect reactive in setting of acute fracture  Suspected new metastatic lung cancer with mets to the bone  Mediastinal lymphadenopathy  14 mm nodule left perihilar region in setting of smoking history  1 to 2 pack/day smoker, chronic  Intellectual disability  Depression  Normocytic anemia    Add scheduled Tylenol and lidocaine patch, may need scheduled doses throughout the day has diffuse metastatic disease noted,  CT abdomen pelvis with concern for impending pathologic fracture at L3 and involving the left femoral head possible line concern for possible epidural extension  scheduled bowel regimen notable stool burden on imaging  PVR distention on bladder on  MRI, check PVR, was 200 mL, monitor for retention did have VLAD on admission that improved with initial resuscitation  N.p.o. for bronchoscopy today, appreciate pulmonology assistance, initial case discussed  Appreciate oncology assistance, additional treatment monitoring depending on results of testing, may ultimately benefit from targeted radiation therapy depending  Inhalers added, respiratory hygiene  Replace potassium magnesium monitor electrolytes and refeeding  Continue postoperative monitoring for fracture appreciate orthopedic surgery assistance  Monitor hemoglobin, seems to be stabilizing near 9, as iron deficiency component with elevated ferritin  Continue B12 multivitamin  Nrt prn / smoking cessation   Palliative care consult, guarded prognosis given patient's advanced disease baseline debility, poor social support/intellectual disability  PT/OT  Check a.m. CBC, BMP, magnesium, phosphorus and as above  Continue hospitalization at current level of care    Dispo: possibly rehab once stable obese.  D/w clinical      DVT prophylaxis:  Mechanical DVT prophylaxis orders are present.      Level Of Support Discussed With: Patient  Code Status (Patient has no pulse and is not breathing): CPR (Attempt to Resuscitate)  Medical Interventions (Patient has pulse or is breathing): Full Support    Greater than 50 minutes on this encounter including review of labs, imaging, documentation, orders, vitals, monitoring and adjusting treatment and orders as indicated, discussion with the patient, pulm, stephie, rn, and documenting.

## 2024-05-24 NOTE — THERAPY TREATMENT NOTE
Acute Care - Physical Therapy Progress Note   [facility]     Patient Name: Oswaldo Bowen  : 1980  MRN: [id number]  Today's Date: 2024      Visit Dx:     ICD-10-CM ICD-9-CM   1. Closed displaced intertrochanteric fracture of right femur, initial encounter  S72.141A 820.21   2. Pulmonary nodule  R91.1 793.11   3. Difficulty walking  R26.2 719.7     Patient Active Problem List   Diagnosis    Closed intertrochanteric fracture of right femur    Pulmonary nodule     History reviewed. No pertinent past medical history.  Past Surgical History:   Procedure Laterality Date    BRONCHOSCOPY N/A 2024    Procedure: BRONCHOSCOPY WITH ENDOBRONCHIAL ULTRASOUND, FINE NEEDLE ASPIRATE, BIOPSIES, BRUSHINGS, BRONCHOALVEOLAR LAVAGE;  Surgeon: Kendell Stern MD;  Location: [facility] ENDOSCOPY;  Service: Pulmonary;  Laterality: N/A;  LUNG NODULE, MUCOUS PLUGGING    HIP INTERTROCHANTERIC NAILING Right 2024    Procedure: HIP INTERTROCHANTERIC NAILING;  Surgeon: Molina Clayton MD;  Location: [facility] MAIN OR;  Service: Orthopedics;  Laterality: Right;     PT Assessment (Last 12 Hours)       PT Evaluation and Treatment       Row Name 24 1600          Physical Therapy Time and Intention    Subjective Information complains of;fatigue  -CS     Document Type therapy note (daily note)  -CS     Mode of Treatment individual therapy;physical therapy  -CS     Patient Effort fair  -CS     Symptoms Noted During/After Treatment increased pain  -CS       Row Name 24 1600          Pain Scale: FACES Pre/Post-Treatment    Pain: FACES Scale, Pretreatment 0-->no hurt  -CS     Posttreatment Pain Rating 6-->hurts even more  -CS     Pain Location - Side/Orientation Right  -CS     Pain Location - hip  -CS       Row Name 24 1600          Bed Mobility    Comment, (Bed Mobility) Declined bed mobility today  -CS       Row Name 24 1600          Sit-Stand Transfer    Comment, (Sit-Stand Transfer) Declined transfers today  -CS        Row Name 05/24/24 1600          Gait/Stairs (Locomotion)    Comment, (Gait/Stairs) Declined ambulation today  -       Row Name 05/24/24 1600          Hip (Therapeutic Exercise)    Hip AAROM (Therapeutic Exercise) bilateral;supine;10 repetitions;3 sets  heel slides, hip abd/add  -       Row Name 05/24/24 1600          Knee (Therapeutic Exercise)    Knee AAROM (Therapeutic Exercise) bilateral;supine;10 repetitions;3 sets  SAQ's, SLR's  -       Row Name 05/24/24 1600          Ankle (Therapeutic Exercise)    Ankle AAROM (Therapeutic Exercise) bilateral;dorsiflexion;plantarflexion;supine;10 repetitions;3 sets  -       Row Name             Wound 05/21/24 0251 Bilateral coccyx    Wound - Properties Group Placement Date: 05/21/24  -SR Placement Time: 0251  -SR Present on Original Admission: Y  -SR Side: Bilateral  -SR Location: coccyx  -SR    Retired Wound - Properties Group Placement Date: 05/21/24  -SR Placement Time: 0251  -SR Present on Original Admission: Y  -SR Side: Bilateral  -SR Location: coccyx  -SR    Retired Wound - Properties Group Date first assessed: 05/21/24  -SR Time first assessed: 0251  -SR Present on Original Admission: Y  -SR Side: Bilateral  -SR Location: coccyx  -SR      Row Name             Wound 05/21/24 Right lateral thigh Incision    Wound - Properties Group Placement Date: 05/21/24  -SF Present on Original Admission: N  -SF Side: Right  -SF Orientation: lateral  -SF Location: thigh  -SF Primary Wound Type: Incision  -SF Additional Comments: incision sites x 3 to lateral right thigh  -SF    Retired Wound - Properties Group Placement Date: 05/21/24  -SF Present on Original Admission: N  -SF Side: Right  -SF Orientation: lateral  -SF Location: thigh  -SF Primary Wound Type: Incision  -SF Additional Comments: incision sites x 3 to lateral right thigh  -SF    Retired Wound - Properties Group Date first assessed: 05/21/24  -SF Present on Original Admission: N  -SF Side: Right  -SF  Location: thigh  -SF Primary Wound Type: Incision  -SF Additional Comments: incision sites x 3 to lateral right thigh  -SF      Row Name 05/24/24 1600          Positioning and Restraints    Pre-Treatment Position in bed  -CS     Post Treatment Position bed  -CS     In Bed fowlers;call light within reach;encouraged to call for assist;exit alarm on  -CS       Row Name 05/24/24 1600          Progress Summary (PT)    Progress Toward Functional Goals (PT) progress toward functional goals is gradual  -CS               User Key  (r) = Recorded By, (t) = Taken By, (c) = Cosigned By      Initials Name Provider Type    SF Saritha Ramirez, RN Registered Nurse    CS Phu Oquendo PTA Physical Therapist Assistant    SR Saritha Long, RN Registered Nurse                    Physical Therapy Education       Title: PT OT SLP Therapies (In Progress)       Topic: Physical Therapy (In Progress)       Point: Mobility training (Done)       Learning Progress Summary             Patient Eager, E, VU by  at 5/23/2024 2223    Acceptance, E,TB, VU by  at 5/22/2024 1339                         Point: Home exercise program (Not Started)       Learner Progress:  Not documented in this visit.              Point: Body mechanics (Done)       Learning Progress Summary             Patient Acceptance, E,TB, VU by  at 5/22/2024 1339                         Point: Precautions (Done)       Learning Progress Summary             Patient Acceptance, E,TB, VU by  at 5/22/2024 1339                                         User Key       Initials Effective Dates Name Provider Type Discipline     06/11/21 -  Eh Martínez, PT Physical Therapist PT     05/31/23 -  Angela Casillas, RN Registered Nurse Nurse                  PT Recommendation and Plan     Progress Summary (PT)  Progress Toward Functional Goals (PT): progress toward functional goals is gradual   Outcome Measures       Row Name 05/24/24 1600 05/23/24 1105 05/22/24 0815        How much help from another person do you currently need...    Turning from your back to your side while in flat bed without using bedrails? 2  -CS 2  -DK 2  -AV    Moving from lying on back to sitting on the side of a flat bed without bedrails? 2  -CS 2  -DK 2  -AV    Moving to and from a bed to a chair (including a wheelchair)? 2  -CS 3  -DK 3  -AV    Standing up from a chair using your arms (e.g., wheelchair, bedside chair)? 2  -CS 3  -DK 3  -AV    Climbing 3-5 steps with a railing? 1  -CS 2  -DK 2  -AV    To walk in hospital room? 2  -CS 3  -DK 3  -AV    AM-PAC 6 Clicks Score (PT) 11  -CS 15  -DK 15  -AV    Highest Level of Mobility Goal 4 --> Transfer to chair/commode  -CS 4 --> Transfer to chair/commode  -DK 4 --> Transfer to chair/commode  -AV       Functional Assessment    Outcome Measure Options AM-PAC 6 Clicks Basic Mobility (PT)  -CS AM-PAC 6 Clicks Basic Mobility (PT)  -DK AM-PAC 6 Clicks Basic Mobility (PT)  -AV              User Key  (r) = Recorded By, (t) = Taken By, (c) = Cosigned By      Initials Name Provider Type    Sheila Maharaj PTA Physical Therapist Assistant    AV Eh Martínez, PT Physical Therapist    Phu Hdez PTA Physical Therapist Assistant                     Time Calculation:    PT Charges       Row Name 05/24/24 1617             Time Calculation    Start Time 1339  -CS      PT Received On 05/24/24  -CS         Timed Charges    02968 - PT Therapeutic Exercise Minutes 18  -CS      87329 - PT Therapeutic Activity Minutes 5  -CS         Total Minutes    Timed Charges Total Minutes 23  -CS       Total Minutes 23  -CS                User Key  (r) = Recorded By, (t) = Taken By, (c) = Cosigned By      Initials Name Provider Type    Phu Hdez PTA Physical Therapist Assistant                  Therapy Charges for Today       Code Description Service Date Service Provider Modifiers Qty    95793277369 HC PT THER PROC EA 15 MIN 5/24/2024 Phu Oquendo PTA GP 1     [id number]  PT THERAPEUTIC ACT EA 15 MIN 5/24/2024 Phu Oquendo, RADHA GP 1            PT G-Codes  Outcome Measure Options: AM-PAC 6 Clicks Basic Mobility (PT)  AM-PAC 6 Clicks Score (PT): 11  AM-PAC 6 Clicks Score (OT): 11    Phu Oquendo PTA  5/24/2024

## 2024-05-25 LAB
ANION GAP SERPL CALCULATED.3IONS-SCNC: 11.2 MMOL/L (ref 5–15)
BACTERIA SPEC AEROBE CULT: NORMAL
BACTERIA SPEC RESP CULT: NORMAL
BASOPHILS # BLD AUTO: 0.04 10*3/MM3 (ref 0–0.2)
BASOPHILS NFR BLD AUTO: 0.4 % (ref 0–1.5)
BUN SERPL-MCNC: 10 MG/DL (ref 6–20)
BUN/CREAT SERPL: 20 (ref 7–25)
CALCIUM SPEC-SCNC: 8.5 MG/DL (ref 8.6–10.5)
CHLORIDE SERPL-SCNC: 97 MMOL/L (ref 98–107)
CO2 SERPL-SCNC: 27.8 MMOL/L (ref 22–29)
CREAT SERPL-MCNC: 0.5 MG/DL (ref 0.76–1.27)
DEPRECATED RDW RBC AUTO: 42.5 FL (ref 37–54)
EGFRCR SERPLBLD CKD-EPI 2021: 129 ML/MIN/1.73
EOSINOPHIL # BLD AUTO: 0.27 10*3/MM3 (ref 0–0.4)
EOSINOPHIL NFR BLD AUTO: 2.5 % (ref 0.3–6.2)
ERYTHROCYTE [DISTWIDTH] IN BLOOD BY AUTOMATED COUNT: 13.1 % (ref 12.3–15.4)
GLUCOSE SERPL-MCNC: 188 MG/DL (ref 65–99)
GRAM STN SPEC: NORMAL
HCT VFR BLD AUTO: 27.3 % (ref 37.5–51)
HGB BLD-MCNC: 8.6 G/DL (ref 13–17.7)
IMM GRANULOCYTES # BLD AUTO: 0.09 10*3/MM3 (ref 0–0.05)
IMM GRANULOCYTES NFR BLD AUTO: 0.8 % (ref 0–0.5)
LYMPHOCYTES # BLD AUTO: 1.11 10*3/MM3 (ref 0.7–3.1)
LYMPHOCYTES NFR BLD AUTO: 10.1 % (ref 19.6–45.3)
MAGNESIUM SERPL-MCNC: 1.8 MG/DL (ref 1.6–2.6)
MCH RBC QN AUTO: 28 PG (ref 26.6–33)
MCHC RBC AUTO-ENTMCNC: 31.5 G/DL (ref 31.5–35.7)
MCV RBC AUTO: 88.9 FL (ref 79–97)
MONOCYTES # BLD AUTO: 0.58 10*3/MM3 (ref 0.1–0.9)
MONOCYTES NFR BLD AUTO: 5.3 % (ref 5–12)
NEUTROPHILS NFR BLD AUTO: 8.93 10*3/MM3 (ref 1.7–7)
NEUTROPHILS NFR BLD AUTO: 80.9 % (ref 42.7–76)
NRBC BLD AUTO-RTO: 0 /100 WBC (ref 0–0.2)
PLATELET # BLD AUTO: 369 10*3/MM3 (ref 140–450)
PMV BLD AUTO: 9.9 FL (ref 6–12)
POTASSIUM SERPL-SCNC: 3.7 MMOL/L (ref 3.5–5.2)
RBC # BLD AUTO: 3.07 10*6/MM3 (ref 4.14–5.8)
SODIUM SERPL-SCNC: 136 MMOL/L (ref 136–145)
WBC NRBC COR # BLD AUTO: 11.02 10*3/MM3 (ref 3.4–10.8)

## 2024-05-25 PROCEDURE — 92610 EVALUATE SWALLOWING FUNCTION: CPT

## 2024-05-25 PROCEDURE — 94799 UNLISTED PULMONARY SVC/PX: CPT

## 2024-05-25 PROCEDURE — 94664 DEMO&/EVAL PT USE INHALER: CPT

## 2024-05-25 PROCEDURE — 97530 THERAPEUTIC ACTIVITIES: CPT

## 2024-05-25 PROCEDURE — 99232 SBSQ HOSP IP/OBS MODERATE 35: CPT | Performed by: INTERNAL MEDICINE

## 2024-05-25 PROCEDURE — 80048 BASIC METABOLIC PNL TOTAL CA: CPT | Performed by: INTERNAL MEDICINE

## 2024-05-25 PROCEDURE — 25010000002 AMPICILLIN-SULBACTAM PER 1.5 G: Performed by: INTERNAL MEDICINE

## 2024-05-25 PROCEDURE — 83735 ASSAY OF MAGNESIUM: CPT | Performed by: INTERNAL MEDICINE

## 2024-05-25 PROCEDURE — 85025 COMPLETE CBC W/AUTO DIFF WBC: CPT | Performed by: INTERNAL MEDICINE

## 2024-05-25 RX ADMIN — ACETAMINOPHEN 1000 MG: 500 TABLET ORAL at 18:27

## 2024-05-25 RX ADMIN — ACETAMINOPHEN 1000 MG: 500 TABLET ORAL at 22:41

## 2024-05-25 RX ADMIN — AMPICILLIN SODIUM, SULBACTAM SODIUM 3 G: 2; 1 INJECTION, POWDER, FOR SOLUTION INTRAMUSCULAR; INTRAVENOUS at 10:02

## 2024-05-25 RX ADMIN — AMPICILLIN SODIUM, SULBACTAM SODIUM 3 G: 2; 1 INJECTION, POWDER, FOR SOLUTION INTRAMUSCULAR; INTRAVENOUS at 22:40

## 2024-05-25 RX ADMIN — BUDESONIDE AND FORMOTEROL FUMARATE DIHYDRATE 2 PUFF: 160; 4.5 AEROSOL RESPIRATORY (INHALATION) at 19:19

## 2024-05-25 RX ADMIN — Medication 10 ML: at 09:45

## 2024-05-25 RX ADMIN — AMPICILLIN SODIUM, SULBACTAM SODIUM 3 G: 2; 1 INJECTION, POWDER, FOR SOLUTION INTRAMUSCULAR; INTRAVENOUS at 18:27

## 2024-05-25 RX ADMIN — Medication 400 MG: at 10:02

## 2024-05-25 RX ADMIN — Medication 10 ML: at 22:42

## 2024-05-25 RX ADMIN — HYDROCODONE BITARTRATE AND ACETAMINOPHEN 1 TABLET: 10; 325 TABLET ORAL at 10:30

## 2024-05-25 RX ADMIN — LIDOCAINE 1 PATCH: 560 PATCH PERCUTANEOUS; TOPICAL; TRANSDERMAL at 10:02

## 2024-05-25 RX ADMIN — Medication 100 MG: at 10:02

## 2024-05-25 RX ADMIN — ACETAMINOPHEN 1000 MG: 500 TABLET ORAL at 10:01

## 2024-05-25 RX ADMIN — AMPICILLIN SODIUM, SULBACTAM SODIUM 3 G: 2; 1 INJECTION, POWDER, FOR SOLUTION INTRAMUSCULAR; INTRAVENOUS at 05:57

## 2024-05-25 RX ADMIN — BUDESONIDE AND FORMOTEROL FUMARATE DIHYDRATE 2 PUFF: 160; 4.5 AEROSOL RESPIRATORY (INHALATION) at 10:10

## 2024-05-25 RX ADMIN — Medication 1 TABLET: at 10:01

## 2024-05-25 RX ADMIN — TIOTROPIUM BROMIDE INHALATION SPRAY 2 PUFF: 3.12 SPRAY, METERED RESPIRATORY (INHALATION) at 10:10

## 2024-05-25 RX ADMIN — CYANOCOBALAMIN TAB 500 MCG 1000 MCG: 500 TAB at 10:02

## 2024-05-25 NOTE — PLAN OF CARE
Goal Outcome Evaluation:                      FUNCTIONAL ASSESSMENT INSTRUMENT: Patient currently scored a level 5 of 7 on Functional Communication Measures for swallowing indicating a 20-39% limitation in function.    ASSESSMENT/ PLAN OF CARE:  Pt presents with limitations, noted below, that impede his ability to swallow safely. The skills of a therapist will be required to safely and effectively implement the following treatment plan to restore maximal level of function.    PROBLEMS:  1.  Dysphagia      TREATMENT: Dysphagia therapy to ensure diet tolerance, advance to least restrictive diet and analyze aspiration risk    FREQUENCY/DURATION: Daily 5 times a week    REHAB POTENTIAL:  Pt has good rehab potential.  The following limitations may influence improvement/ length of tx medical status.    RECOMMENDATIONS:   1.   DIET: Regular soft to chew, nectar thick liquids    2.  POSITION: 90 degrees upright for all intake    3.  COMPENSATORY STRATEGIES: Oral meds in applesauce, small bites/drinks, may have single sips of regular ice water between meals only    Pt/responsible party agrees with plan of care and has been informed of all alternatives, risks and benefits.    EDUCATION  The patient has been educated in the following areas:   Dysphagia (Swallowing Impairment).

## 2024-05-25 NOTE — THERAPY EVALUATION
Acute Care - Speech Language Pathology   Swallow Initial Evaluation  [place]     Patient Name: Oswaldo Bowen  : 1980  MRN: [id number]  Today's Date: 2024               Admit Date: 2024    Visit Dx:     ICD-10-CM ICD-9-CM   1. Closed displaced intertrochanteric fracture of right femur, initial encounter  S72.141A 820.21   2. Pulmonary nodule  R91.1 793.11   3. Difficulty walking  R26.2 719.7   4. Dysphagia, oropharyngeal  R13.12 787.22     Patient Active Problem List   Diagnosis    Closed intertrochanteric fracture of right femur    Pulmonary nodule     History reviewed. No pertinent past medical history.  Past Surgical History:   Procedure Laterality Date    BRONCHOSCOPY N/A 2024    Procedure: BRONCHOSCOPY WITH ENDOBRONCHIAL ULTRASOUND, FINE NEEDLE ASPIRATE, BIOPSIES, BRUSHINGS, BRONCHOALVEOLAR LAVAGE;  Surgeon: Kendell Stern MD;  Location: [facility] ENDOSCOPY;  Service: Pulmonary;  Laterality: N/A;  LUNG NODULE, MUCOUS PLUGGING    HIP INTERTROCHANTERIC NAILING Right 2024    Procedure: HIP INTERTROCHANTERIC NAILING;  Surgeon: Molina Clayton MD;  Location: [facility] MAIN OR;  Service: Orthopedics;  Laterality: Right;       PAIN SCALE: None noted    PRECAUTIONS/CONTRAINDICATIONS: None noted    SUSPECTED ABUSE/NEGLECT/EXPLOITATION: None noted    SOCIAL/PSYCHOLOGICAL NEEDS/BARRIERS: None noted    PAST SOCIAL HISTORY: Lives at home    PRIOR FUNCTION: Some dependence for care    PATIENT GOALS/EXPECTATIONS: Did not report     HISTORY: This patient is a 44-year-old male admitted with the above diagnosis.    CURRENT DIET LEVEL: Regular diet-thin liquids    OBJECTIVE:    TEST ADMINISTERED: Clinical dysphagia evaluation    COGNITION/SAFETY AWARENESS: Alert    BEHAVIORAL OBSERVATIONS: Cooperative    ORAL MOTOR EXAM: Lingual and labial strength and range of motion within functional limits    VOICE QUALITY: Within functional limits    REFLEX EXAM: Deferred    POSTURE: 90 degrees  upright    FEEDING/SWALLOWING FUNCTION: Assessed with thin liquids, nectar thick liquids puréed and soft solids    CLINICAL OBSERVATIONS: Oral stage is characterized by adequate bolus preparation and control for trials presented.  Pharyngeal phase appears mildly delayed with reduced laryngeal elevation per palpation.  Consistent wet throat clearing noted after completion of swallow with thin liquid trials.  Tolerated nectar thick liquids without clinical sign or symptom of aspiration tolerated purée and soft solids without clinical sign or symptom of aspiration.  Denies globus sensation after completion of swallow.    DYSPHAGIA CRITERIA: Risk of aspiration    FUNCTIONAL ASSESSMENT INSTRUMENT: Patient currently scored a level 5 of 7 on Functional Communication Measures for swallowing indicating a 20-39% limitation in function.    ASSESSMENT/ PLAN OF CARE:  Pt presents with limitations, noted below, that impede his ability to swallow safely. The skills of a therapist will be required to safely and effectively implement the following treatment plan to restore maximal level of function.    PROBLEMS:  1.  Dysphagia   LTG 1: (30 days) patient will increase ability to tolerate least restrictive diet and improve functional communication measure for swallowing to a 6 of 7   STG 1a: (14 days) patient will tolerate soft solids and nectar thick liquids without clinical sign or symptom of aspiration with 8 of 10 trials   STG 1b: (14 days) patient will tolerate therapeutic trials of thin liquids without clinical sign or symptom of aspiration with 8 of 10 trials.   STG 1c: (14 days) patient/family teaching regarding diet recommendations, safe swallow strategies and signs and symptoms of aspiration.      TREATMENT: Dysphagia therapy to ensure diet tolerance, advance to least restrictive diet and analyze aspiration risk    FREQUENCY/DURATION: Daily 5 times a week    REHAB POTENTIAL:  Pt has good rehab potential.  The following  limitations may influence improvement/ length of tx medical status.    RECOMMENDATIONS:   1.   DIET: Regular soft to chew, nectar thick liquids    2.  POSITION: 90 degrees upright for all intake    3.  COMPENSATORY STRATEGIES: Oral meds in applesauce, small bites/drinks, may have single sips of regular ice water between meals only    Pt/responsible party agrees with plan of care and has been informed of all alternatives, risks and benefits.    EDUCATION  The patient has been educated in the following areas:   Dysphagia (Swallowing Impairment).          Martha Abebe, SLP  5/25/2024

## 2024-05-25 NOTE — THERAPY TREATMENT NOTE
Acute Care - Physical Therapy Progress Note   [place]     Patient Name: Oswaldo Bowen  : 1980  MRN: [id number]  Today's Date: 2024      Visit Dx:     ICD-10-CM ICD-9-CM   1. Closed displaced intertrochanteric fracture of right femur, initial encounter  S72.141A 820.21   2. Pulmonary nodule  R91.1 793.11   3. Difficulty walking  R26.2 719.7   4. Dysphagia, oropharyngeal  R13.12 787.22     Patient Active Problem List   Diagnosis    Closed intertrochanteric fracture of right femur    Pulmonary nodule     History reviewed. No pertinent past medical history.  Past Surgical History:   Procedure Laterality Date    BRONCHOSCOPY N/A 2024    Procedure: BRONCHOSCOPY WITH ENDOBRONCHIAL ULTRASOUND, FINE NEEDLE ASPIRATE, BIOPSIES, BRUSHINGS, BRONCHOALVEOLAR LAVAGE;  Surgeon: Kendell Stern MD;  Location: [facility] ENDOSCOPY;  Service: Pulmonary;  Laterality: N/A;  LUNG NODULE, MUCOUS PLUGGING    HIP INTERTROCHANTERIC NAILING Right 2024    Procedure: HIP INTERTROCHANTERIC NAILING;  Surgeon: Molina Clayton MD;  Location: [facility] MAIN OR;  Service: Orthopedics;  Laterality: Right;     PT Assessment (Last 12 Hours)       PT Evaluation and Treatment       Row Name 24 1800          Physical Therapy Time and Intention    Subjective Information complains of;fatigue  -CS     Document Type therapy note (daily note)  -CS     Mode of Treatment individual therapy;physical therapy  -CS     Patient Effort adequate  -CS     Symptoms Noted During/After Treatment increased pain  -CS       Row Name 24 1800          Pain Scale: FACES Pre/Post-Treatment    Pain: FACES Scale, Pretreatment 0-->no hurt  -CS     Posttreatment Pain Rating 6-->hurts even more  -CS     Pain Location - Side/Orientation Right  -CS     Pain Location - hip  -CS       Row Name 24 1800          Bed Mobility    Supine-Sit [place] (Bed Mobility) verbal cues;maximum assist (25% patient effort);1 person assist  -CS     Bed Mobility, Safety  Issues cognitive deficits limit understanding;decreased use of arms for pushing/pulling;decreased use of legs for bridging/pushing  -     Assistive Device (Bed Mobility) bed rails;draw sheet;head of bed elevated  -CS       Row Name 05/25/24 1800          Transfers    Transfers stand pivot/stand step transfer  -CS       Row Name 05/25/24 1800          Sit-Stand Transfer    Sit-Stand [place] (Transfers) verbal cues;moderate assist (50% patient effort);1 person assist  bed elevated 6''  -CS     Assistive Device (Sit-Stand Transfers) walker, front-wheeled  -CS       Row Name 05/25/24 1800          Stand-Sit Transfer    Stand-Sit [place] (Transfers) verbal cues;minimum assist (75% patient effort);1 person assist  -CS     Assistive Device (Stand-Sit Transfers) walker, front-wheeled  -CS       Row Name 05/25/24 1800          Stand Pivot/Stand Step Transfer    Stand Pivot/Stand Step [place] (Transfers) verbal cues;moderate assist (50% patient effort);1 person assist  -CS     Assistive Device (Stand Pivot Stand Step Transfer) walker, front-wheeled  -CS       Row Name             Wound 05/21/24 0251 Bilateral coccyx    Wound - Properties Group Placement Date: 05/21/24  -SR Placement Time: 0251  -SR Present on Original Admission: Y  -SR Side: Bilateral  -SR Location: coccyx  -SR    Retired Wound - Properties Group Placement Date: 05/21/24  -SR Placement Time: 0251  -SR Present on Original Admission: Y  -SR Side: Bilateral  -SR Location: coccyx  -SR    Retired Wound - Properties Group Date first assessed: 05/21/24  -SR Time first assessed: 0251  -SR Present on Original Admission: Y  -SR Side: Bilateral  -SR Location: coccyx  -SR      Row Name             Wound 05/21/24 Right lateral thigh Incision    Wound - Properties Group Placement Date: 05/21/24  -SF Present on Original Admission: N  -SF Side: Right  -SF Orientation: lateral  -SF Location: thigh  -SF Primary Wound Type: Incision  -SF Additional Comments:  incision sites x 3 to lateral right thigh  -SF    Retired Wound - Properties Group Placement Date: 05/21/24  -SF Present on Original Admission: N  -SF Side: Right  -SF Orientation: lateral  -SF Location: thigh  -SF Primary Wound Type: Incision  -SF Additional Comments: incision sites x 3 to lateral right thigh  -SF    Retired Wound - Properties Group Date first assessed: 05/21/24  -SF Present on Original Admission: N  -SF Side: Right  -SF Location: thigh  -SF Primary Wound Type: Incision  -SF Additional Comments: incision sites x 3 to lateral right thigh  -SF      Row Name 05/25/24 1800          Positioning and Restraints    Pre-Treatment Position in bed  -CS     Post Treatment Position chair  -CS     In Chair reclined;call light within reach;encouraged to call for assist;exit alarm on;legs elevated;notified nsg  -CS       Row Name 05/25/24 1800          Progress Summary (PT)    Progress Toward Functional Goals (PT) progress toward functional goals is gradual  -CS               User Key  (r) = Recorded By, (t) = Taken By, (c) = Cosigned By      Initials Name Provider Type    SF Saritha Ramirez, RN Registered Nurse    Phu Hdez PTA Physical Therapist Assistant    SR Saritha Long, RN Registered Nurse                    Physical Therapy Education       Title: PT OT SLP Therapies (In Progress)       Topic: Physical Therapy (In Progress)       Point: Mobility training (Done)       Learning Progress Summary             Patient Eager, E, VU by AH at 5/23/2024 2223    Acceptance, E,TB, VU by AV at 5/22/2024 1339                         Point: Home exercise program (Not Started)       Learner Progress:  Not documented in this visit.              Point: Body mechanics (Done)       Learning Progress Summary             Patient Acceptance, E,TB, VU by AV at 5/22/2024 1339                         Point: Precautions (Done)       Learning Progress Summary             Patient Acceptance, E,TB, VU by AV at 5/22/2024  1339                                         User Key       Initials Effective Dates Name Provider Type Discipline    AV 06/11/21 -  Eh Martínez, PT Physical Therapist PT     05/31/23 -  Angela Casillas, RN Registered Nurse Nurse                  PT Recommendation and Plan     Progress Summary (PT)  Progress Toward Functional Goals (PT): progress toward functional goals is gradual   Outcome Measures       Row Name 05/25/24 1800 05/24/24 1600 05/23/24 1105       How much help from another person do you currently need...    Turning from your back to your side while in flat bed without using bedrails? 2  -CS 2  -CS 2  -DK    Moving from lying on back to sitting on the side of a flat bed without bedrails? 2  -CS 2  -CS 2  -DK    Moving to and from a bed to a chair (including a wheelchair)? 2  -CS 2  -CS 3  -DK    Standing up from a chair using your arms (e.g., wheelchair, bedside chair)? 2  -CS 2  -CS 3  -DK    Climbing 3-5 steps with a railing? 1  -CS 1  -CS 2  -DK    To walk in hospital room? 2  -CS 2  -CS 3  -DK    AM-PAC 6 Clicks Score (PT) 11  -CS 11  -CS 15  -DK    Highest Level of Mobility Goal 4 --> Transfer to chair/commode  -CS 4 --> Transfer to chair/commode  -CS 4 --> Transfer to chair/commode  -DK       Functional Assessment    Outcome Measure Options AM-PAC 6 Clicks Basic Mobility (PT)  -CS AM-PAC 6 Clicks Basic Mobility (PT)  -CS AM-PAC 6 Clicks Basic Mobility (PT)  -DK              User Key  (r) = Recorded By, (t) = Taken By, (c) = Cosigned By      Initials Name Provider Type    Sheila Maharaj PTA Physical Therapist Assistant    Phu Hdez PTA Physical Therapist Assistant                     Time Calculation:    PT Charges       Row Name 05/25/24 1803             Time Calculation    Start Time 1610  -CS      PT Received On 05/25/24  -CS         Timed Charges    87131 - PT Therapeutic Activity Minutes 16  -CS         Total Minutes    Timed Charges Total Minutes 16  -CS        Total Minutes 16  -CS                User Key  (r) = Recorded By, (t) = Taken By, (c) = Cosigned By      Initials Name Provider Type    CS Phu Oquendo PTA Physical Therapist Assistant                  Therapy Charges for Today       Code Description Service Date Service Provider Modifiers Qty    36402426810 HC PT THER PROC EA 15 MIN 5/24/2024 Phu Oquendo PTA GP 1    43481052545 HC PT THERAPEUTIC ACT EA 15 MIN 5/24/2024 Phu Oqeundo PTA GP 1    35103649784 HC PT THERAPEUTIC ACT EA 15 MIN 5/25/2024 Phu Oquendo PTA GP 1            PT G-Codes  Outcome Measure Options: AM-PAC 6 Clicks Basic Mobility (PT)  AM-PAC 6 Clicks Score (PT): 11  AM-PAC 6 Clicks Score (OT): 11    Phu Oquendo PTA  5/25/2024

## 2024-05-25 NOTE — PROGRESS NOTES
Pulmonary / Critical Care Progress Note      Patient Name: Oswaldo Bowen  : 1980  MRN: [id number]  Primary Care Physician:  Provider, No Known  Date of admission: 2024    Subjective   Subjective   Follow-up for left upper lobe lung nodule with mediastinal adenopathy, chronic heavy smoking, unintentional weight loss.    Bronchoscopy pathology pending  Has some mild dyspnea  No coughing  No fever or chills.  No nausea or vomiting.  On room air      Objective   Objective     Vitals:   Temp:  [97.3 °F (36.3 °C)-98.1 °F (36.7 °C)] 97.5 °F (36.4 °C)  Heart Rate:  [106-116] 114  Resp:  [16-18] 18  BP: (108-115)/(75-82) 114/82  Physical Exam   Vital Signs Reviewed   General:  WDWN, Alert, in no distress  Chest:  good aeration, clear to auscultation bilaterally, tympanic to percussion bilaterally, no work of breathing noted  CV: RRR, no MGR, pulses 2+, equal.  Abd:  Soft, NT, ND, + BS, no HSM  EXT:  no clubbing, no cyanosis, no edema  Neuro:  A&Ox3, CN grossly intact, no focal deficits.  Skin: No rashes or lesions noted      Result Review    Result Review:  I have personally reviewed the results from the time of this admission to 2024 11:56 EDT and agree with these findings:  [x]  Laboratory  [x]  Microbiology  [x]  Radiology  [x]  EKG/Telemetry   [x]  Cardiology/Vascular   []  Pathology  []  Old records  []  Other:  Most notable findings include:         Lab 24  0918 24  1030 24  0656 24  1535 24  0504 24  0528 24  2134   WBC 11.02* 11.76* 9.53  --  12.84* 14.66* 19.54*   HEMOGLOBIN 8.6* 8.8* 9.5*  --  9.2* 10.0* 11.9*   HEMATOCRIT 27.3* 27.8* 29.9*  --  29.3* 31.3* 37.0*   PLATELETS 369 383 357  --  359 351 410   SODIUM 136 136 135*  --  133* 136 137   POTASSIUM 3.7 3.8 3.1*  --  3.4* 3.5 3.9   CHLORIDE 97* 98 98  --  99 100 95*   CO2 27.8 27.7 25.4  --  25.2 24.0 24.4   BUN 10 9 9  --  16 22* 21*   CREATININE 0.50* 0.49* 0.49*  --  0.55* 0.91 1.72*   GLUCOSE 188*  131* 112*  --  125* 125* 183*   CALCIUM 8.5* 8.6 8.3*  --  8.4* 9.2 10.5   PHOSPHORUS  --  2.7 3.4  --  2.7 3.5 4.0   TOTAL PROTEIN  --   --   --   --   --  6.8 8.1   ALBUMIN  --   --   --  2.2*  --  2.9* 3.3*   GLOBULIN  --   --   --   --   --  3.9 4.8            CT Chest With Contrast Diagnostic    Result Date: 5/21/2024  CT CHEST W CONTRAST DIAGNOSTIC-  Date of Exam: 5/21/2024 9:01 PM  Indication: 14mm perihilar nodule, 1-2 ppd extensive smoking hx; S72.141A-Displaced intertrochanteric fracture of right femur, initial encounter for closed fracture; R91.1-Solitary pulmonary nodule.  Comparison: Chest radiograph 5/20/2024  Technique: Axial CT images were obtained of the chest after the uneventful intravenous administration of 75 cc Isovue-370 . Reconstructed coronal and sagittal images were also obtained. Automated exposure control and iterative construction methods were used.   Findings: No coronary artery calcification. There is no mediastinal adenopathy. There is a right hilar node measuring 2.5 cm and a left hilar node measuring 2.1 cm. No axillary adenopathy. There are no pleural effusions. Within the perihilar region of the left upper lobe there is a noncalcified macro lobular 2.1 x 1.9 cm noncalcified pulmonary nodule suspicious for malignancy. Within the posterior right lower lobe there is a subpleural 12 x 5 mm nodule which is nonspecific. No other pulmonary nodules. No other focal airspace consolidation.  Bilateral adrenal glands are within normal limits.  There are multiple lytic bone lesions throughout the spine and ribs. Findings would be consistent with metastatic disease or myeloma. There are additional lytic lesions within both scapula.      Impression: Impression:  1. Left upper lobe noncalcified pulmonary nodule measuring 2.1 cm in size suspicious for primary malignancy. There is mild bilateral hilar adenopathy. Additionally there are multiple lytic bone lesions throughout the spine ribs and  scapula consistent with metastatic disease.    Electronically Signed By-Eliecer Jara MD On:5/21/2024 10:11 PM         Assessment & Plan   Assessment / Plan     Active Hospital Problems:  Active Hospital Problems    Diagnosis     **Closed intertrochanteric fracture of right femur     Pulmonary nodule          Impression:   Acute comminuted displaced intertrochanteric right femoral fracture  Status post IM nailing  Left upper lobe lung nodule with mediastinal lymphadenopathy  Chronic heavy smoking  Unintentional weight loss    Plan:   Status post bronchoscopy with EBUS.  Pathology is pending.  Imaging consistent with metastatic cancer  Complete 5 days of Unasyn for haemophilus pneumonia growing on bronchoscopy BAL  Continue Symbicort and Spiriva with albuterol as needed  On room air  Encourage activity and incentive spirometer use  Pain control per primary service.  Postop surgical care per Ortho service.    DVT prophylaxis:  Mechanical DVT prophylaxis orders are present.    CODE STATUS:   Level Of Support Discussed With: Patient  Code Status (Patient has no pulse and is not breathing): CPR (Attempt to Resuscitate)  Medical Interventions (Patient has pulse or is breathing): Full Support      Labs, imaging, microbiology, notes and medications personally reviewed  Discussed with primary    Electronically signed by Manjinder Dash MD, 5/25/2024, 11:56 EDT.

## 2024-05-25 NOTE — PROGRESS NOTES
[facility]   Hospitalist Progress Note    Date of admission: 5/20/2024  Patient Name: Oswaldo Bowen  1980  Date: 5/25/2024      Subjective     Chief Complaint   Patient presents with    Fall    Leg Pain       Interval Followup: Tired, pain doing little better.  Denies overt shortness of air       Objective     Vitals:   Temp:  [97.5 °F (36.4 °C)-98.1 °F (36.7 °C)] 97.5 °F (36.4 °C)  Heart Rate:  [106-114] 114  Resp:  [16-18] 18  BP: (108-115)/(75-82) 114/82    Physical Exam  Sleeping tired wakes easily, answering basic questions okay, flat affect, baseline intellectual disability   Somewhat poor effort, no acute wheezing on room air  Elevated rate regular rhythm   Abdomen soft      Result Review:  Vital signs, labs and recent relevant imaging reviewed.      CBC          5/23/2024    06:56 5/24/2024    10:30 5/25/2024    09:18   CBC   WBC 9.53  11.76  11.02    RBC 3.37  3.17  3.07    Hemoglobin 9.5  8.8  8.6    Hematocrit 29.9  27.8  27.3    MCV 88.7  87.7  88.9    MCH 28.2  27.8  28.0    MCHC 31.8  31.7  31.5    RDW 12.6  12.8  13.1    Platelets 357  383  369      CMP          5/23/2024    06:56 5/24/2024    10:30 5/25/2024    09:18   CMP   Glucose 112  131  188    BUN 9  9  10    Creatinine 0.49  0.49  0.50    EGFR 129.8  129.8  129.0    Sodium 135  136  136    Potassium 3.1  3.8  3.7    Chloride 98  98  97    Calcium 8.3  8.6  8.5    BUN/Creatinine Ratio 18.4  18.4  20.0    Anion Gap 11.6  10.3  11.2          acetaminophen    aluminum-magnesium hydroxide-simethicone    senna-docusate sodium **AND** polyethylene glycol **AND** bisacodyl **AND** bisacodyl    calcium carbonate    dextrose    dextrose    Diclofenac Sodium    glucagon (human recombinant)    HYDROcodone-acetaminophen    HYDROcodone-acetaminophen    hydrOXYzine    levalbuterol    Lidocaine    melatonin    Morphine **AND** naloxone    nicotine    ondansetron    ondansetron ODT **OR** [DISCONTINUED] ondansetron    Pharmacy Consult - Pharmacy to  dose    prochlorperazine    sodium chloride    sodium chloride    sodium chloride    sodium chloride    acetaminophen, 1,000 mg, Oral, TID  ampicillin-sulbactam, 3 g, Intravenous, Q6H  budesonide-formoterol, 2 puff, Inhalation, BID - RT  Lidocaine, 1 patch, Transdermal, Q24H  magnesium oxide, 400 mg, Oral, Daily  multivitamin with minerals, 1 tablet, Oral, Daily  polyethylene glycol, 17 g, Oral, BID  potassium chloride, 40 mEq, Oral, Once  senna-docusate sodium, 2 tablet, Oral, BID  sodium chloride, 10 mL, Intravenous, Q12H  thiamine, 100 mg, Oral, Daily  tiotropium bromide monohydrate, 2 puff, Inhalation, Daily - RT  vitamin B-12, 1,000 mcg, Oral, Daily        MRI Brain With & Without Contrast    Result Date: 5/22/2024  Impression:  1. No acute intracranial abnormality. No evidence of intracranial metastatic disease. 2. No pathologic contrast enhancement. 3. Multiple enhancing lesions within the skull compatible with bony metastatic disease.    Electronically Signed By-Eliecer Jara MD On:5/22/2024 10:57 PM      CT Abdomen Pelvis With Contrast    Result Date: 5/22/2024   1. The study is ABNORMAL.  2. Diffuse osseous metastatic disease is seen. Impending pathologic fractures at L3 and involving the left femoral head are possible. Epidural extension of tumor involving the spine cannot be excluded. For instance, there may be epidural extension of tumor involving the spine, especially at T5, on the right at T11-12, on the left at L1, and probably at L3. Degenerative changes also involve the imaged spine, probably greatest at L4-5 with moderate spinal canal narrowing.  3. There is a moderate-to-large stool burden. No mechanical bowel obstruction or pneumoperitoneum. No acute appendicitis. The oral contrast preparation is limited.  4. Bilateral nonobstructing renal calyceal stones are seen, which measure 5 mm or less in size. No obstructive uropathy is seen due to ureterolithiasis. No acute pyelonephritis. No definite  suspicious renal mass.  5. There is nonspecific distention of the urinary bladder (566 mL estimated total volume). Please correlate clinically. Consider decompression of the bladder with catheter placement if clinically indicated. No urinary bladder wall thickening or urinary bladder calculi.  6. The patient has undergone recent open reduction and internal fixation (ORIF) of a comminuted intertrochanteric likely pathologic right femoral fracture with expected postoperative changes in the overlying soft tissues.  7. No definite hepatic metastases are seen. Hepatomegaly is suspected.  8. No adrenal mass is appreciated.  9. There are several artifacts on the study. The best possible images were obtained.  10. Please see above comments for further detail.    Please note that portions of this note were completed with a voice recognition program.        Electronically Signed By-Panda Howard MD On:5/22/2024 9:33 PM      CT Chest With Contrast Diagnostic    Result Date: 5/21/2024  Impression:  1. Left upper lobe noncalcified pulmonary nodule measuring 2.1 cm in size suspicious for primary malignancy. There is mild bilateral hilar adenopathy. Additionally there are multiple lytic bone lesions throughout the spine ribs and scapula consistent with metastatic disease.    Electronically Signed By-Eliecer Jara MD On:5/21/2024 10:11 PM      XR Chest 1 View    Result Date: 5/20/2024  Impression:  1. Questionable 14 mm nodule projecting in the left perihilar region. Follow-up CT scan of the chest would be recommended when clinically feasible.   Electronically Signed By-Eliecer Jara MD On:5/20/2024 10:23 PM      XR Femur 2 View Right    Result Date: 5/20/2024  (COMBINED) Acute comminuted displaced intertrochanteric right femoral fracture is noted, as discussed. No dislocation.     Please note that portions of this note were completed with a voice recognition program.      Electronically Signed BySyl Howard MD On:5/20/2024  10:21 PM      XR Pelvis 1 or 2 View    Result Date: 5/20/2024  (COMBINED) Acute comminuted displaced intertrochanteric right femoral fracture is noted, as discussed. No dislocation.     Please note that portions of this note were completed with a voice recognition program.      Electronically Signed By-Panda Howard MD On:5/20/2024 10:21 PM       Assessment / Plan     Summary: 44M with intellectual disability, 1-2PPD smoking, depression, who presented after a fall (possibly 1 month ago but may have been more recently), found to have right femoral fracture and VLAD.  Ortho assisting     Assessment/Plan (clinically significant if listed here)  Mechanical fall with acute comminuted displaced intertrochanteric right femoral fracture  S/p 5/21 right hip subtrochanteric nailing of closed displaced right femur fracture by Dr. Nelson  VLAD creatinine 1.7 leukocytosis suspect reactive in setting of acute fracture  Suspected new metastatic lung cancer with mets to the bone  Mediastinal lymphadenopathy  14 mm nodule left perihilar region in setting of smoking history  Haemophilus influenzae pneumonia   1 to 2 pack/day smoker, chronic  Intellectual disability  Depression  Normocytic anemia    Unasyn 5/24 for haemophilus influenza, treat for 5-day course, discussed with pulmonology  scheduled Tylenol and lidocaine patch, as needed IV and p.o. pain medication  Multiple myeloma labs negative, biopsy results still pending, will need follow-up with oncology after rehab   Monitor for urinary retention, has some distention on imaging mildly elevated PVRs  Monitor for aspiration  Appreciate oncology assistance  Inhalers, respiratory hygiene, aspiration precautions  Replace potassium magnesium monitor electrolytes and refeeding  Continue postoperative monitoring for fracture appreciate orthopedic surgery assistance  No overt bleeding, hb slight downtrend, Continue B12 multivitamin, recheck in am   Nrt prn / smoking cessation   Palliative  care consult, guarded prognosis given patient's advanced disease baseline debility, poor social support/intellectual disability  PT/OT  Check a.m. CBC, BMP, magnesium, phosphorus and as above  Continue hospitalization at current level of care    Dispo: Inpatient rehab referral sent   D/w clinical      DVT prophylaxis:  Mechanical DVT prophylaxis orders are present.      Level Of Support Discussed With: Patient  Code Status (Patient has no pulse and is not breathing): CPR (Attempt to Resuscitate)  Medical Interventions (Patient has pulse or is breathing): Full Support

## 2024-05-25 NOTE — PROGRESS NOTES
[facility]   Hospitalist Progress Note    Date of admission: 5/20/2024  Patient Name: Oswaldo Bowen  1980  Date: 5/24/2024      Subjective     Chief Complaint   Patient presents with    Fall    Leg Pain       Interval Followup: Tired, still with pain, denies overt fevers.  Pain mainly in his elbow today notes, does have some lower back pain still but now his main focus of concern.       Objective     Vitals:   Temp:  [97.3 °F (36.3 °C)-101.9 °F (38.8 °C)] 97.5 °F (36.4 °C)  Heart Rate:  [112-124] 112  Resp:  [16-24] 16  BP: (109-131)/(75-86) 115/81    Physical Exam  Sleeping tired wakes fairly easily, conversant, poor insight/poor health literacy consistent with patient's history of intellectual disability, appears older than stated age frail  Somewhat poor effort, no acute wheezing on room air  Elevated rate regular rhythm   elevated rate regular rhythm  Abdomen soft      Result Review:  Vital signs, labs and recent relevant imaging reviewed.      CBC          5/22/2024    05:04 5/23/2024    06:56 5/24/2024    10:30   CBC   WBC 12.84  9.53  11.76    RBC 3.29  3.37  3.17    Hemoglobin 9.2  9.5  8.8    Hematocrit 29.3  29.9  27.8    MCV 89.1  88.7  87.7    MCH 28.0  28.2  27.8    MCHC 31.4  31.8  31.7    RDW 12.7  12.6  12.8    Platelets 359  357  383      CMP          5/22/2024    05:04 5/22/2024    15:35 5/23/2024    06:56 5/24/2024    10:30   CMP   Glucose 125   112  131    BUN 16   9  9    Creatinine 0.55   0.49  0.49    EGFR 125.3   129.8  129.8    Sodium 133   135  136    Potassium 3.4   3.1  3.8    Chloride 99   98  98    Calcium 8.4   8.3  8.6    Total Protein  5.6      Albumin  2.2      Globulin  3.4      BUN/Creatinine Ratio 29.1   18.4  18.4    Anion Gap 8.8   11.6  10.3          acetaminophen    aluminum-magnesium hydroxide-simethicone    senna-docusate sodium **AND** polyethylene glycol **AND** bisacodyl **AND** bisacodyl    calcium carbonate    dextrose    dextrose    Diclofenac Sodium     glucagon (human recombinant)    HYDROcodone-acetaminophen    HYDROcodone-acetaminophen    hydrOXYzine    levalbuterol    Lidocaine    melatonin    Morphine **AND** naloxone    nicotine    ondansetron    ondansetron ODT **OR** [DISCONTINUED] ondansetron    Pharmacy Consult - Pharmacy to dose    prochlorperazine    sodium chloride    sodium chloride    sodium chloride    sodium chloride    acetaminophen, 1,000 mg, Oral, TID  ampicillin-sulbactam, 3 g, Intravenous, Q6H  budesonide-formoterol, 2 puff, Inhalation, BID - RT  Lidocaine, 1 patch, Transdermal, Q24H  magnesium oxide, 400 mg, Oral, Daily  multivitamin with minerals, 1 tablet, Oral, Daily  polyethylene glycol, 17 g, Oral, BID  potassium chloride, 40 mEq, Oral, Once  senna-docusate sodium, 2 tablet, Oral, BID  sodium chloride, 10 mL, Intravenous, Q12H  thiamine, 100 mg, Oral, Daily  tiotropium bromide monohydrate, 2 puff, Inhalation, Daily - RT  vitamin B-12, 1,000 mcg, Oral, Daily        MRI Brain With & Without Contrast    Result Date: 5/22/2024  Impression:  1. No acute intracranial abnormality. No evidence of intracranial metastatic disease. 2. No pathologic contrast enhancement. 3. Multiple enhancing lesions within the skull compatible with bony metastatic disease.    Electronically Signed By-Eliecer Jara MD On:5/22/2024 10:57 PM      CT Abdomen Pelvis With Contrast    Result Date: 5/22/2024   1. The study is ABNORMAL.  2. Diffuse osseous metastatic disease is seen. Impending pathologic fractures at L3 and involving the left femoral head are possible. Epidural extension of tumor involving the spine cannot be excluded. For instance, there may be epidural extension of tumor involving the spine, especially at T5, on the right at T11-12, on the left at L1, and probably at L3. Degenerative changes also involve the imaged spine, probably greatest at L4-5 with moderate spinal canal narrowing.  3. There is a moderate-to-large stool burden. No mechanical bowel  obstruction or pneumoperitoneum. No acute appendicitis. The oral contrast preparation is limited.  4. Bilateral nonobstructing renal calyceal stones are seen, which measure 5 mm or less in size. No obstructive uropathy is seen due to ureterolithiasis. No acute pyelonephritis. No definite suspicious renal mass.  5. There is nonspecific distention of the urinary bladder (566 mL estimated total volume). Please correlate clinically. Consider decompression of the bladder with catheter placement if clinically indicated. No urinary bladder wall thickening or urinary bladder calculi.  6. The patient has undergone recent open reduction and internal fixation (ORIF) of a comminuted intertrochanteric likely pathologic right femoral fracture with expected postoperative changes in the overlying soft tissues.  7. No definite hepatic metastases are seen. Hepatomegaly is suspected.  8. No adrenal mass is appreciated.  9. There are several artifacts on the study. The best possible images were obtained.  10. Please see above comments for further detail.    Please note that portions of this note were completed with a voice recognition program.        Electronically Signed By-Panda Howard MD On:5/22/2024 9:33 PM      CT Chest With Contrast Diagnostic    Result Date: 5/21/2024  Impression:  1. Left upper lobe noncalcified pulmonary nodule measuring 2.1 cm in size suspicious for primary malignancy. There is mild bilateral hilar adenopathy. Additionally there are multiple lytic bone lesions throughout the spine ribs and scapula consistent with metastatic disease.    Electronically Signed By-Eliecer Jara MD On:5/21/2024 10:11 PM      XR Chest 1 View    Result Date: 5/20/2024  Impression:  1. Questionable 14 mm nodule projecting in the left perihilar region. Follow-up CT scan of the chest would be recommended when clinically feasible.   Electronically Signed By-Eliecer Jara MD On:5/20/2024 10:23 PM      XR Femur 2 View Right    Result  Date: 5/20/2024  (COMBINED) Acute comminuted displaced intertrochanteric right femoral fracture is noted, as discussed. No dislocation.     Please note that portions of this note were completed with a voice recognition program.      Electronically Signed BySyl Howard MD On:5/20/2024 10:21 PM      XR Pelvis 1 or 2 View    Result Date: 5/20/2024  (COMBINED) Acute comminuted displaced intertrochanteric right femoral fracture is noted, as discussed. No dislocation.     Please note that portions of this note were completed with a voice recognition program.      Electronically Signed By-Panda Howard MD On:5/20/2024 10:21 PM       Assessment / Plan     Summary: 44M with intellectual disability, 1-2PPD smoking, depression, who presented after a fall (possibly 1 month ago but may have been more recently), found to have right femoral fracture and VLAD.  Ortho assisting     Assessment/Plan (clinically significant if listed here)  Mechanical fall with acute comminuted displaced intertrochanteric right femoral fracture  S/p 5/21 right hip subtrochanteric nailing of closed displaced right femur fracture by Dr. Nelson  VLAD creatinine 1.7 leukocytosis suspect reactive in setting of acute fracture  Suspected new metastatic lung cancer with mets to the bone  Mediastinal lymphadenopathy  14 mm nodule left perihilar region in setting of smoking history  Haemophilus influenzae pneumonia   1 to 2 pack/day smoker, chronic  Intellectual disability  Depression  Normocytic anemia    Start Unasyn 5/24 for haemophilus this and results of bronchoscopy   scheduled Tylenol and lidocaine patch, as needed IV and p.o. pain medication  Multiple myeloma labs negative, biopsy results still pending, will need follow-up with oncology after rehab   Monitor for urinary retention, has some distention on imaging mildly elevated PVRs  Monitor for aspiration  Appreciate pulmonology oncology assistance  Inhalers, respiratory hygiene, aspiration  precautions  Replace potassium magnesium monitor electrolytes and refeeding  Continue postoperative monitoring for fracture appreciate orthopedic surgery assistance  No overt bleeding, hb slight downtrend, Continue B12 multivitamin, recheck in am   Nrt prn / smoking cessation   Palliative care consult, guarded prognosis given patient's advanced disease baseline debility, poor social support/intellectual disability  PT/OT  Check a.m. CBC, BMP, magnesium, phosphorus and as above  Continue hospitalization at current level of care    Dispo: possibly rehab once stable obese.  D/w clinical      DVT prophylaxis:  Mechanical DVT prophylaxis orders are present.      Level Of Support Discussed With: Patient  Code Status (Patient has no pulse and is not breathing): CPR (Attempt to Resuscitate)  Medical Interventions (Patient has pulse or is breathing): Full Support    Greater than 50 minutes on this encounter including review of labs, imaging, documentation, orders, vitals, monitoring and adjusting treatment and orders as indicated, discussion with the patient stephie, rn, pulm, and documenting.

## 2024-05-26 LAB
ANION GAP SERPL CALCULATED.3IONS-SCNC: 9.4 MMOL/L (ref 5–15)
BASOPHILS # BLD AUTO: 0.04 10*3/MM3 (ref 0–0.2)
BASOPHILS NFR BLD AUTO: 0.4 % (ref 0–1.5)
BUN SERPL-MCNC: 12 MG/DL (ref 6–20)
BUN/CREAT SERPL: 30.8 (ref 7–25)
CALCIUM SPEC-SCNC: 8.6 MG/DL (ref 8.6–10.5)
CHLORIDE SERPL-SCNC: 100 MMOL/L (ref 98–107)
CO2 SERPL-SCNC: 27.6 MMOL/L (ref 22–29)
CREAT SERPL-MCNC: 0.39 MG/DL (ref 0.76–1.27)
DEPRECATED RDW RBC AUTO: 43.9 FL (ref 37–54)
EGFRCR SERPLBLD CKD-EPI 2021: 139 ML/MIN/1.73
EOSINOPHIL # BLD AUTO: 0.3 10*3/MM3 (ref 0–0.4)
EOSINOPHIL NFR BLD AUTO: 2.7 % (ref 0.3–6.2)
ERYTHROCYTE [DISTWIDTH] IN BLOOD BY AUTOMATED COUNT: 13.2 % (ref 12.3–15.4)
GLUCOSE SERPL-MCNC: 113 MG/DL (ref 65–99)
HCT VFR BLD AUTO: 26.9 % (ref 37.5–51)
HGB BLD-MCNC: 8.2 G/DL (ref 13–17.7)
IMM GRANULOCYTES # BLD AUTO: 0.08 10*3/MM3 (ref 0–0.05)
IMM GRANULOCYTES NFR BLD AUTO: 0.7 % (ref 0–0.5)
LYMPHOCYTES # BLD AUTO: 1.14 10*3/MM3 (ref 0.7–3.1)
LYMPHOCYTES NFR BLD AUTO: 10.3 % (ref 19.6–45.3)
MAGNESIUM SERPL-MCNC: 2 MG/DL (ref 1.6–2.6)
MCH RBC QN AUTO: 27.7 PG (ref 26.6–33)
MCHC RBC AUTO-ENTMCNC: 30.5 G/DL (ref 31.5–35.7)
MCV RBC AUTO: 90.9 FL (ref 79–97)
MONOCYTES # BLD AUTO: 0.78 10*3/MM3 (ref 0.1–0.9)
MONOCYTES NFR BLD AUTO: 7 % (ref 5–12)
NEUTROPHILS NFR BLD AUTO: 78.9 % (ref 42.7–76)
NEUTROPHILS NFR BLD AUTO: 8.74 10*3/MM3 (ref 1.7–7)
NRBC BLD AUTO-RTO: 0 /100 WBC (ref 0–0.2)
PHOSPHATE SERPL-MCNC: 3.8 MG/DL (ref 2.5–4.5)
PLATELET # BLD AUTO: 394 10*3/MM3 (ref 140–450)
PMV BLD AUTO: 9.7 FL (ref 6–12)
POTASSIUM SERPL-SCNC: 3.9 MMOL/L (ref 3.5–5.2)
RBC # BLD AUTO: 2.96 10*6/MM3 (ref 4.14–5.8)
SODIUM SERPL-SCNC: 137 MMOL/L (ref 136–145)
WBC NRBC COR # BLD AUTO: 11.08 10*3/MM3 (ref 3.4–10.8)

## 2024-05-26 PROCEDURE — 94664 DEMO&/EVAL PT USE INHALER: CPT

## 2024-05-26 PROCEDURE — 25010000002 CYANOCOBALAMIN PER 1000 MCG: Performed by: INTERNAL MEDICINE

## 2024-05-26 PROCEDURE — 83735 ASSAY OF MAGNESIUM: CPT | Performed by: INTERNAL MEDICINE

## 2024-05-26 PROCEDURE — 85025 COMPLETE CBC W/AUTO DIFF WBC: CPT | Performed by: INTERNAL MEDICINE

## 2024-05-26 PROCEDURE — 99232 SBSQ HOSP IP/OBS MODERATE 35: CPT | Performed by: INTERNAL MEDICINE

## 2024-05-26 PROCEDURE — 94799 UNLISTED PULMONARY SVC/PX: CPT

## 2024-05-26 PROCEDURE — 25010000002 AMPICILLIN-SULBACTAM PER 1.5 G: Performed by: INTERNAL MEDICINE

## 2024-05-26 PROCEDURE — 84100 ASSAY OF PHOSPHORUS: CPT | Performed by: INTERNAL MEDICINE

## 2024-05-26 PROCEDURE — 80048 BASIC METABOLIC PNL TOTAL CA: CPT | Performed by: INTERNAL MEDICINE

## 2024-05-26 RX ORDER — CYANOCOBALAMIN 1000 UG/ML
1000 INJECTION, SOLUTION INTRAMUSCULAR; SUBCUTANEOUS
Status: COMPLETED | OUTPATIENT
Start: 2024-05-26 | End: 2024-05-26

## 2024-05-26 RX ADMIN — TIOTROPIUM BROMIDE INHALATION SPRAY 2 PUFF: 3.12 SPRAY, METERED RESPIRATORY (INHALATION) at 09:46

## 2024-05-26 RX ADMIN — ACETAMINOPHEN 1000 MG: 500 TABLET ORAL at 10:19

## 2024-05-26 RX ADMIN — Medication 100 MG: at 10:20

## 2024-05-26 RX ADMIN — AMPICILLIN SODIUM, SULBACTAM SODIUM 3 G: 2; 1 INJECTION, POWDER, FOR SOLUTION INTRAMUSCULAR; INTRAVENOUS at 22:33

## 2024-05-26 RX ADMIN — AMPICILLIN SODIUM, SULBACTAM SODIUM 3 G: 2; 1 INJECTION, POWDER, FOR SOLUTION INTRAMUSCULAR; INTRAVENOUS at 04:29

## 2024-05-26 RX ADMIN — ACETAMINOPHEN 1000 MG: 500 TABLET ORAL at 18:30

## 2024-05-26 RX ADMIN — ALUMINUM HYDROXIDE, MAGNESIUM HYDROXIDE, AND DIMETHICONE 15 ML: 400; 400; 40 SUSPENSION ORAL at 16:46

## 2024-05-26 RX ADMIN — Medication 10 ML: at 21:01

## 2024-05-26 RX ADMIN — BUDESONIDE AND FORMOTEROL FUMARATE DIHYDRATE 2 PUFF: 160; 4.5 AEROSOL RESPIRATORY (INHALATION) at 21:19

## 2024-05-26 RX ADMIN — AMPICILLIN SODIUM, SULBACTAM SODIUM 3 G: 2; 1 INJECTION, POWDER, FOR SOLUTION INTRAMUSCULAR; INTRAVENOUS at 16:40

## 2024-05-26 RX ADMIN — ACETAMINOPHEN 1000 MG: 500 TABLET ORAL at 20:59

## 2024-05-26 RX ADMIN — LIDOCAINE 1 PATCH: 560 PATCH PERCUTANEOUS; TOPICAL; TRANSDERMAL at 10:17

## 2024-05-26 RX ADMIN — CYANOCOBALAMIN TAB 500 MCG 1000 MCG: 500 TAB at 10:20

## 2024-05-26 RX ADMIN — Medication 400 MG: at 10:20

## 2024-05-26 RX ADMIN — SENNOSIDES AND DOCUSATE SODIUM 2 TABLET: 50; 8.6 TABLET ORAL at 21:00

## 2024-05-26 RX ADMIN — CYANOCOBALAMIN 1000 MCG: 1000 INJECTION, SOLUTION INTRAMUSCULAR at 10:30

## 2024-05-26 RX ADMIN — Medication 10 ML: at 09:00

## 2024-05-26 RX ADMIN — HYDROCODONE BITARTRATE AND ACETAMINOPHEN 1 TABLET: 10; 325 TABLET ORAL at 10:30

## 2024-05-26 RX ADMIN — AMPICILLIN SODIUM, SULBACTAM SODIUM 3 G: 2; 1 INJECTION, POWDER, FOR SOLUTION INTRAMUSCULAR; INTRAVENOUS at 10:20

## 2024-05-26 RX ADMIN — Medication 1 TABLET: at 10:20

## 2024-05-26 RX ADMIN — BUDESONIDE AND FORMOTEROL FUMARATE DIHYDRATE 2 PUFF: 160; 4.5 AEROSOL RESPIRATORY (INHALATION) at 09:46

## 2024-05-26 RX ADMIN — HYDROCODONE BITARTRATE AND ACETAMINOPHEN 1 TABLET: 10; 325 TABLET ORAL at 04:29

## 2024-05-26 NOTE — PROGRESS NOTES
[facility]   Hospitalist Progress Note    Date of admission: 5/20/2024  Patient Name: Oswaldo Bowen  1980  Date: 5/26/2024      Subjective     Chief Complaint   Patient presents with   • Fall   • Leg Pain       Interval Followup: Pain doing okay overall.  Is asking about having some vanilla on ice cream.  Is anxious about the results of his testing         Objective     Vitals:   Temp:  [97.5 °F (36.4 °C)-98 °F (36.7 °C)] 97.9 °F (36.6 °C)  Heart Rate:  [103-113] 103  Resp:  [14-18] 18  BP: (112-132)/(77-92) 118/85    Physical Exam  Wakes easily, answering basic questions okay, flat affect, baseline intellectual disability   Somewhat poor effort, no acute wheezing on room air  Elevated rate regular rhythm   Abdomen soft      Result Review:  Vital signs, labs and recent relevant imaging reviewed.      CBC          5/24/2024    10:30 5/25/2024    09:18 5/26/2024    05:44   CBC   WBC 11.76  11.02  11.08    RBC 3.17  3.07  2.96    Hemoglobin 8.8  8.6  8.2    Hematocrit 27.8  27.3  26.9    MCV 87.7  88.9  90.9    MCH 27.8  28.0  27.7    MCHC 31.7  31.5  30.5    RDW 12.8  13.1  13.2    Platelets 383  369  394      CMP          5/24/2024    10:30 5/25/2024    09:18 5/26/2024    05:44   CMP   Glucose 131  188  113    BUN 9  10  12    Creatinine 0.49  0.50  0.39    EGFR 129.8  129.0  139.0    Sodium 136  136  137    Potassium 3.8  3.7  3.9    Chloride 98  97  100    Calcium 8.6  8.5  8.6    BUN/Creatinine Ratio 18.4  20.0  30.8    Anion Gap 10.3  11.2  9.4        •  acetaminophen  •  aluminum-magnesium hydroxide-simethicone  •  senna-docusate sodium **AND** polyethylene glycol **AND** bisacodyl **AND** bisacodyl  •  calcium carbonate  •  dextrose  •  dextrose  •  Diclofenac Sodium  •  glucagon (human recombinant)  •  HYDROcodone-acetaminophen  •  HYDROcodone-acetaminophen  •  hydrOXYzine  •  levalbuterol  •  Lidocaine  •  melatonin  •  Morphine **AND** naloxone  •  nicotine  •  ondansetron  •  ondansetron ODT **OR**  [DISCONTINUED] ondansetron  •  Pharmacy Consult - Pharmacy to dose  •  prochlorperazine  •  sodium chloride  •  sodium chloride  •  sodium chloride  •  sodium chloride    acetaminophen, 1,000 mg, Oral, TID  ampicillin-sulbactam, 3 g, Intravenous, Q6H  budesonide-formoterol, 2 puff, Inhalation, BID - RT  Lidocaine, 1 patch, Transdermal, Q24H  magnesium oxide, 400 mg, Oral, Daily  multivitamin with minerals, 1 tablet, Oral, Daily  senna-docusate sodium, 2 tablet, Oral, BID  sodium chloride, 10 mL, Intravenous, Q12H  thiamine, 100 mg, Oral, Daily  tiotropium bromide monohydrate, 2 puff, Inhalation, Daily - RT  vitamin B-12, 1,000 mcg, Oral, Daily        MRI Brain With & Without Contrast    Result Date: 5/22/2024  Impression:  1. No acute intracranial abnormality. No evidence of intracranial metastatic disease. 2. No pathologic contrast enhancement. 3. Multiple enhancing lesions within the skull compatible with bony metastatic disease.    Electronically Signed By-Eliecer Jara MD On:5/22/2024 10:57 PM      CT Abdomen Pelvis With Contrast    Result Date: 5/22/2024   1. The study is ABNORMAL.  2. Diffuse osseous metastatic disease is seen. Impending pathologic fractures at L3 and involving the left femoral head are possible. Epidural extension of tumor involving the spine cannot be excluded. For instance, there may be epidural extension of tumor involving the spine, especially at T5, on the right at T11-12, on the left at L1, and probably at L3. Degenerative changes also involve the imaged spine, probably greatest at L4-5 with moderate spinal canal narrowing.  3. There is a moderate-to-large stool burden. No mechanical bowel obstruction or pneumoperitoneum. No acute appendicitis. The oral contrast preparation is limited.  4. Bilateral nonobstructing renal calyceal stones are seen, which measure 5 mm or less in size. No obstructive uropathy is seen due to ureterolithiasis. No acute pyelonephritis. No definite suspicious  renal mass.  5. There is nonspecific distention of the urinary bladder (566 mL estimated total volume). Please correlate clinically. Consider decompression of the bladder with catheter placement if clinically indicated. No urinary bladder wall thickening or urinary bladder calculi.  6. The patient has undergone recent open reduction and internal fixation (ORIF) of a comminuted intertrochanteric likely pathologic right femoral fracture with expected postoperative changes in the overlying soft tissues.  7. No definite hepatic metastases are seen. Hepatomegaly is suspected.  8. No adrenal mass is appreciated.  9. There are several artifacts on the study. The best possible images were obtained.  10. Please see above comments for further detail.    Please note that portions of this note were completed with a voice recognition program.        Electronically Signed By-Panda Howard MD On:5/22/2024 9:33 PM      CT Chest With Contrast Diagnostic    Result Date: 5/21/2024  Impression:  1. Left upper lobe noncalcified pulmonary nodule measuring 2.1 cm in size suspicious for primary malignancy. There is mild bilateral hilar adenopathy. Additionally there are multiple lytic bone lesions throughout the spine ribs and scapula consistent with metastatic disease.    Electronically Signed By-Eliecer Jara MD On:5/21/2024 10:11 PM      XR Chest 1 View    Result Date: 5/20/2024  Impression:  1. Questionable 14 mm nodule projecting in the left perihilar region. Follow-up CT scan of the chest would be recommended when clinically feasible.   Electronically Signed By-Eliecer Jara MD On:5/20/2024 10:23 PM      XR Femur 2 View Right    Result Date: 5/20/2024  (COMBINED) Acute comminuted displaced intertrochanteric right femoral fracture is noted, as discussed. No dislocation.     Please note that portions of this note were completed with a voice recognition program.      Electronically Signed BySyl Howard MD On:5/20/2024 10:21 PM       XR Pelvis 1 or 2 View    Result Date: 5/20/2024  (COMBINED) Acute comminuted displaced intertrochanteric right femoral fracture is noted, as discussed. No dislocation.     Please note that portions of this note were completed with a voice recognition program.      Electronically Signed By-Panda Howard MD On:5/20/2024 10:21 PM       Assessment / Plan     Summary: 44M with intellectual disability, 1-2PPD smoking, depression, who presented after a fall (possibly 1 month ago but may have been more recently), found to have right femoral fracture and VLAD.  Ortho assisting     Assessment/Plan (clinically significant if listed here)  Mechanical fall with acute comminuted displaced intertrochanteric right femoral fracture  S/p 5/21 right hip subtrochanteric nailing of closed displaced right femur fracture by Dr. Nelson  VLAD creatinine 1.7 leukocytosis suspect reactive in setting of acute fracture  Suspected new metastatic lung cancer with mets to the bone  Mediastinal lymphadenopathy  14 mm nodule left perihilar region in setting of smoking history  Haemophilus influenzae pneumonia   1 to 2 pack/day smoker, chronic  Intellectual disability  Depression  Normocytic anemia    Unasyn 5/24 for haemophilus influenza, treat for 5-day course, discussed with pulmonology.  I counseled mildly elevated.  Needs increased activity as able respiratory hygiene  scheduled Tylenol and lidocaine patch, as needed and p.o. pain medication  Multiple myeloma labs negative, biopsy results still pending, will need follow-up with oncology after rehab   Monitor for aspiration  Appreciate oncology assistance  Inhalers, respiratory hygiene, aspiration precautions  Replace potassium magnesium monitor electrolytes and refeeding  Continue postoperative monitoring for fracture appreciate orthopedic surgery assistance  No overt bleeding, hb slight downtrend, Continue B12 multivitamin, recheck in am   Nrt prn / smoking cessation   Palliative care consult,  guarded prognosis given patient's advanced disease baseline debility, poor social support/intellectual disability  PT/OT  Check a.m. CBC, BMP, magnesium, phosphorus and as above  Continue hospitalization at current level of care    Dispo: Inpatient rehab referral sent   D/w clinical      DVT prophylaxis:  Mechanical DVT prophylaxis orders are present.      Level Of Support Discussed With: Patient  Code Status (Patient has no pulse and is not breathing): CPR (Attempt to Resuscitate)  Medical Interventions (Patient has pulse or is breathing): Full Support

## 2024-05-26 NOTE — PROGRESS NOTES
Pulmonary / Critical Care Progress Note      Patient Name: Oswaldo Bowen  : 1980  MRN: [id number]  Primary Care Physician:  Provider, No Known  Date of admission: 2024    Subjective   Subjective   Follow-up for left upper lobe lung nodule with mediastinal adenopathy, chronic heavy smoking, unintentional weight loss.    Bronchoscopy pathology pending  Minimal dyspnea at baseline  No coughing  No fever or chills.  No nausea or vomiting.  On room air      Objective   Objective     Vitals:   Temp:  [97.5 °F (36.4 °C)-98 °F (36.7 °C)] 97.5 °F (36.4 °C)  Heart Rate:  [106-113] 108  Resp:  [14-18] 18  BP: (112-132)/(77-92) 132/92  Physical Exam   Vital Signs Reviewed   General:  WDWN, Alert, in no distress  Chest:  good aeration, clear to auscultation bilaterally, tympanic to percussion bilaterally, no work of breathing noted  CV: RRR, no MGR, pulses 2+, equal.  Abd:  Soft, NT, ND, + BS, no HSM  EXT:  no clubbing, no cyanosis, no edema  Neuro:  A&Ox3, CN grossly intact, no focal deficits.  Skin: No rashes or lesions noted      Result Review    Result Review:  I have personally reviewed the results from the time of this admission to 2024 11:32 EDT and agree with these findings:  [x]  Laboratory  [x]  Microbiology  [x]  Radiology  [x]  EKG/Telemetry   [x]  Cardiology/Vascular   []  Pathology  []  Old records  []  Other:  Most notable findings include:         Lab 24  0544 24  0918 24  1030 24  0656 24  1535 24  0504 24  0528 24  2134   WBC 11.08* 11.02* 11.76* 9.53  --  12.84* 14.66* 19.54*   HEMOGLOBIN 8.2* 8.6* 8.8* 9.5*  --  9.2* 10.0* 11.9*   HEMATOCRIT 26.9* 27.3* 27.8* 29.9*  --  29.3* 31.3* 37.0*   PLATELETS 394 369 383 357  --  359 351 410   SODIUM 137 136 136 135*  --  133* 136 137   POTASSIUM 3.9 3.7 3.8 3.1*  --  3.4* 3.5 3.9   CHLORIDE 100 97* 98 98  --  99 100 95*   CO2 27.6 27.8 27.7 25.4  --  25.2 24.0 24.4   BUN 12 10 9 9  --  16 22* 21*    CREATININE 0.39* 0.50* 0.49* 0.49*  --  0.55* 0.91 1.72*   GLUCOSE 113* 188* 131* 112*  --  125* 125* 183*   CALCIUM 8.6 8.5* 8.6 8.3*  --  8.4* 9.2 10.5   PHOSPHORUS 3.8  --  2.7 3.4  --  2.7 3.5 4.0   TOTAL PROTEIN  --   --   --   --   --   --  6.8 8.1   ALBUMIN  --   --   --   --  2.2*  --  2.9* 3.3*   GLOBULIN  --   --   --   --   --   --  3.9 4.8            CT Chest With Contrast Diagnostic    Result Date: 5/21/2024  CT CHEST W CONTRAST DIAGNOSTIC-  Date of Exam: 5/21/2024 9:01 PM  Indication: 14mm perihilar nodule, 1-2 ppd extensive smoking hx; S72.141A-Displaced intertrochanteric fracture of right femur, initial encounter for closed fracture; R91.1-Solitary pulmonary nodule.  Comparison: Chest radiograph 5/20/2024  Technique: Axial CT images were obtained of the chest after the uneventful intravenous administration of 75 cc Isovue-370 . Reconstructed coronal and sagittal images were also obtained. Automated exposure control and iterative construction methods were used.   Findings: No coronary artery calcification. There is no mediastinal adenopathy. There is a right hilar node measuring 2.5 cm and a left hilar node measuring 2.1 cm. No axillary adenopathy. There are no pleural effusions. Within the perihilar region of the left upper lobe there is a noncalcified macro lobular 2.1 x 1.9 cm noncalcified pulmonary nodule suspicious for malignancy. Within the posterior right lower lobe there is a subpleural 12 x 5 mm nodule which is nonspecific. No other pulmonary nodules. No other focal airspace consolidation.  Bilateral adrenal glands are within normal limits.  There are multiple lytic bone lesions throughout the spine and ribs. Findings would be consistent with metastatic disease or myeloma. There are additional lytic lesions within both scapula.      Impression: Impression:  1. Left upper lobe noncalcified pulmonary nodule measuring 2.1 cm in size suspicious for primary malignancy. There is mild bilateral  hilar adenopathy. Additionally there are multiple lytic bone lesions throughout the spine ribs and scapula consistent with metastatic disease.    Electronically Signed By-Eliecer Jara MD On:5/21/2024 10:11 PM         Assessment & Plan   Assessment / Plan     Active Hospital Problems:  Active Hospital Problems    Diagnosis     **Closed intertrochanteric fracture of right femur     Pulmonary nodule          Impression:   Acute comminuted displaced intertrochanteric right femoral fracture  Status post IM nailing  Left upper lobe lung nodule with mediastinal lymphadenopathy  Chronic heavy smoking  Unintentional weight loss    Plan:   Status post bronchoscopy with EBUS.  Pathology is pending.  Imaging consistent with metastatic cancer  Complete 5 days of Unasyn for haemophilus pneumonia growing on bronchoscopy BAL  Continue Symbicort and Spiriva with albuterol as needed  On room air  Encourage activity and incentive spirometer use  Pain control per primary service.  Postop surgical care per Ortho service.    DVT prophylaxis:  Mechanical DVT prophylaxis orders are present.    CODE STATUS:   Level Of Support Discussed With: Patient  Code Status (Patient has no pulse and is not breathing): CPR (Attempt to Resuscitate)  Medical Interventions (Patient has pulse or is breathing): Full Support      Labs, imaging, microbiology, notes and medications personally reviewed  Discussed with primary    Electronically signed by Manjinder Dash MD, 5/26/2024, 11:32 EDT.

## 2024-05-26 NOTE — PLAN OF CARE
Goal Outcome Evaluation:              Outcome Evaluation: VSS. Pt had c/o pain (see eMAR). Pt rested well throughout shift. No significant changes. Continue plan of care.

## 2024-05-26 NOTE — PLAN OF CARE
Goal Outcome Evaluation:PT with no issues or concerns this shift dsg intact to rt hip prn norco given times 1 with effective results cont current POC

## 2024-05-27 LAB
ANION GAP SERPL CALCULATED.3IONS-SCNC: 8.7 MMOL/L (ref 5–15)
BASOPHILS # BLD AUTO: 0.05 10*3/MM3 (ref 0–0.2)
BASOPHILS NFR BLD AUTO: 0.5 % (ref 0–1.5)
BUN SERPL-MCNC: 11 MG/DL (ref 6–20)
BUN/CREAT SERPL: 28.9 (ref 7–25)
CALCIUM SPEC-SCNC: 8.8 MG/DL (ref 8.6–10.5)
CHLORIDE SERPL-SCNC: 98 MMOL/L (ref 98–107)
CO2 SERPL-SCNC: 28.3 MMOL/L (ref 22–29)
CREAT SERPL-MCNC: 0.38 MG/DL (ref 0.76–1.27)
DEPRECATED RDW RBC AUTO: 43.5 FL (ref 37–54)
EGFRCR SERPLBLD CKD-EPI 2021: 140.1 ML/MIN/1.73
EOSINOPHIL # BLD AUTO: 0.22 10*3/MM3 (ref 0–0.4)
EOSINOPHIL NFR BLD AUTO: 2.3 % (ref 0.3–6.2)
ERYTHROCYTE [DISTWIDTH] IN BLOOD BY AUTOMATED COUNT: 13.2 % (ref 12.3–15.4)
GLUCOSE SERPL-MCNC: 100 MG/DL (ref 65–99)
HCT VFR BLD AUTO: 27.4 % (ref 37.5–51)
HGB BLD-MCNC: 8.4 G/DL (ref 13–17.7)
IMM GRANULOCYTES # BLD AUTO: 0.08 10*3/MM3 (ref 0–0.05)
IMM GRANULOCYTES NFR BLD AUTO: 0.8 % (ref 0–0.5)
LYMPHOCYTES # BLD AUTO: 1.21 10*3/MM3 (ref 0.7–3.1)
LYMPHOCYTES NFR BLD AUTO: 12.4 % (ref 19.6–45.3)
MAGNESIUM SERPL-MCNC: 2 MG/DL (ref 1.6–2.6)
MCH RBC QN AUTO: 27.5 PG (ref 26.6–33)
MCHC RBC AUTO-ENTMCNC: 30.7 G/DL (ref 31.5–35.7)
MCV RBC AUTO: 89.5 FL (ref 79–97)
MONOCYTES # BLD AUTO: 0.84 10*3/MM3 (ref 0.1–0.9)
MONOCYTES NFR BLD AUTO: 8.6 % (ref 5–12)
NEUTROPHILS NFR BLD AUTO: 7.32 10*3/MM3 (ref 1.7–7)
NEUTROPHILS NFR BLD AUTO: 75.4 % (ref 42.7–76)
NRBC BLD AUTO-RTO: 0 /100 WBC (ref 0–0.2)
PHOSPHATE SERPL-MCNC: 4 MG/DL (ref 2.5–4.5)
PLATELET # BLD AUTO: 418 10*3/MM3 (ref 140–450)
PMV BLD AUTO: 9.9 FL (ref 6–12)
POTASSIUM SERPL-SCNC: 3.9 MMOL/L (ref 3.5–5.2)
RBC # BLD AUTO: 3.06 10*6/MM3 (ref 4.14–5.8)
SODIUM SERPL-SCNC: 135 MMOL/L (ref 136–145)
WBC NRBC COR # BLD AUTO: 9.72 10*3/MM3 (ref 3.4–10.8)

## 2024-05-27 PROCEDURE — 97110 THERAPEUTIC EXERCISES: CPT

## 2024-05-27 PROCEDURE — 80048 BASIC METABOLIC PNL TOTAL CA: CPT | Performed by: INTERNAL MEDICINE

## 2024-05-27 PROCEDURE — 85025 COMPLETE CBC W/AUTO DIFF WBC: CPT | Performed by: INTERNAL MEDICINE

## 2024-05-27 PROCEDURE — 83735 ASSAY OF MAGNESIUM: CPT | Performed by: INTERNAL MEDICINE

## 2024-05-27 PROCEDURE — 94664 DEMO&/EVAL PT USE INHALER: CPT

## 2024-05-27 PROCEDURE — 84100 ASSAY OF PHOSPHORUS: CPT | Performed by: INTERNAL MEDICINE

## 2024-05-27 PROCEDURE — 94799 UNLISTED PULMONARY SVC/PX: CPT

## 2024-05-27 PROCEDURE — 25010000002 AMPICILLIN-SULBACTAM PER 1.5 G: Performed by: INTERNAL MEDICINE

## 2024-05-27 PROCEDURE — 97530 THERAPEUTIC ACTIVITIES: CPT

## 2024-05-27 PROCEDURE — 99232 SBSQ HOSP IP/OBS MODERATE 35: CPT | Performed by: INTERNAL MEDICINE

## 2024-05-27 RX ORDER — AMOXICILLIN AND CLAVULANATE POTASSIUM 400; 57 MG/5ML; MG/5ML
800 POWDER, FOR SUSPENSION ORAL EVERY 12 HOURS SCHEDULED
Status: COMPLETED | OUTPATIENT
Start: 2024-05-27 | End: 2024-05-28

## 2024-05-27 RX ADMIN — Medication 1 TABLET: at 10:20

## 2024-05-27 RX ADMIN — BUDESONIDE AND FORMOTEROL FUMARATE DIHYDRATE 2 PUFF: 160; 4.5 AEROSOL RESPIRATORY (INHALATION) at 08:48

## 2024-05-27 RX ADMIN — LIDOCAINE 1 PATCH: 560 PATCH PERCUTANEOUS; TOPICAL; TRANSDERMAL at 10:18

## 2024-05-27 RX ADMIN — Medication 400 MG: at 10:20

## 2024-05-27 RX ADMIN — BUDESONIDE AND FORMOTEROL FUMARATE DIHYDRATE 2 PUFF: 160; 4.5 AEROSOL RESPIRATORY (INHALATION) at 20:09

## 2024-05-27 RX ADMIN — ALUMINUM HYDROXIDE, MAGNESIUM HYDROXIDE, AND DIMETHICONE 15 ML: 400; 400; 40 SUSPENSION ORAL at 22:14

## 2024-05-27 RX ADMIN — CYANOCOBALAMIN TAB 500 MCG 1000 MCG: 500 TAB at 10:20

## 2024-05-27 RX ADMIN — SENNOSIDES AND DOCUSATE SODIUM 2 TABLET: 50; 8.6 TABLET ORAL at 10:19

## 2024-05-27 RX ADMIN — ACETAMINOPHEN 1000 MG: 500 TABLET ORAL at 22:13

## 2024-05-27 RX ADMIN — AMOXICILLIN AND CLAVULANATE POTASSIUM 800 MG: 400; 57 POWDER, FOR SUSPENSION ORAL at 10:17

## 2024-05-27 RX ADMIN — HYDROCODONE BITARTRATE AND ACETAMINOPHEN 1 TABLET: 5; 325 TABLET ORAL at 23:34

## 2024-05-27 RX ADMIN — Medication 10 ML: at 22:15

## 2024-05-27 RX ADMIN — TIOTROPIUM BROMIDE INHALATION SPRAY 2 PUFF: 3.12 SPRAY, METERED RESPIRATORY (INHALATION) at 08:48

## 2024-05-27 RX ADMIN — Medication 5 MG: at 22:14

## 2024-05-27 RX ADMIN — HYDROCODONE BITARTRATE AND ACETAMINOPHEN 1 TABLET: 5; 325 TABLET ORAL at 13:24

## 2024-05-27 RX ADMIN — SENNOSIDES AND DOCUSATE SODIUM 2 TABLET: 50; 8.6 TABLET ORAL at 22:14

## 2024-05-27 RX ADMIN — Medication 100 MG: at 10:20

## 2024-05-27 RX ADMIN — Medication 10 ML: at 10:20

## 2024-05-27 RX ADMIN — AMPICILLIN SODIUM, SULBACTAM SODIUM 3 G: 2; 1 INJECTION, POWDER, FOR SOLUTION INTRAMUSCULAR; INTRAVENOUS at 04:20

## 2024-05-27 RX ADMIN — HYDROCODONE BITARTRATE AND ACETAMINOPHEN 1 TABLET: 5; 325 TABLET ORAL at 04:55

## 2024-05-27 RX ADMIN — AMOXICILLIN AND CLAVULANATE POTASSIUM 800 MG: 400; 57 POWDER, FOR SUSPENSION ORAL at 23:30

## 2024-05-27 NOTE — PLAN OF CARE
Goal Outcome Evaluation:  Plan of Care Reviewed With: patient           Outcome Evaluation: Patient VSS. AAOX4. Prn norco given once for c/o 6/10 hip pain. Patient verbalized feeling weak. Abx administered as ordered. No acute events to report overnight. Continue plan of care.

## 2024-05-27 NOTE — PROGRESS NOTES
[facility]   Hospitalist Progress Note    Date of admission: 5/20/2024  Patient Name: Oswaldo Bowen  1980  Date: 5/27/2024      Subjective     Chief Complaint   Patient presents with   • Fall   • Leg Pain       Interval Followup: Still very weak and intermittent levels of pain.  Requiring 2 person assist with physical therapy to get up to the chair    Objective     Vitals:   Temp:  [98 °F (36.7 °C)-98.2 °F (36.8 °C)] 98.2 °F (36.8 °C)  Heart Rate:  [104-115] 104  Resp:  [16-18] 16  BP: (115-129)/(77-86) 115/77    Physical Exam  Thin frail ill-appearing , answering basic questions okay, flat affect, baseline intellectual disability   Rhonchorous room air  Elevated rate regular rhythm   Abdomen soft  Generalized weakness/debility    Result Review:  Vital signs, labs and recent relevant imaging reviewed.      CBC          5/25/2024    09:18 5/26/2024    05:44 5/27/2024    05:48   CBC   WBC 11.02  11.08  9.72    RBC 3.07  2.96  3.06    Hemoglobin 8.6  8.2  8.4    Hematocrit 27.3  26.9  27.4    MCV 88.9  90.9  89.5    MCH 28.0  27.7  27.5    MCHC 31.5  30.5  30.7    RDW 13.1  13.2  13.2    Platelets 369  394  418      CMP          5/25/2024    09:18 5/26/2024    05:44 5/27/2024    05:48   CMP   Glucose 188  113  100    BUN 10  12  11    Creatinine 0.50  0.39  0.38    EGFR 129.0  139.0  140.1    Sodium 136  137  135    Potassium 3.7  3.9  3.9    Chloride 97  100  98    Calcium 8.5  8.6  8.8    BUN/Creatinine Ratio 20.0  30.8  28.9    Anion Gap 11.2  9.4  8.7        •  acetaminophen  •  aluminum-magnesium hydroxide-simethicone  •  senna-docusate sodium **AND** polyethylene glycol **AND** bisacodyl **AND** bisacodyl  •  calcium carbonate  •  dextrose  •  dextrose  •  Diclofenac Sodium  •  glucagon (human recombinant)  •  HYDROcodone-acetaminophen  •  HYDROcodone-acetaminophen  •  hydrOXYzine  •  levalbuterol  •  Lidocaine  •  melatonin  •  [DISCONTINUED] Morphine **AND** naloxone  •  nicotine  •  ondansetron  •   ondansetron ODT **OR** [DISCONTINUED] ondansetron  •  prochlorperazine  •  sodium chloride  •  sodium chloride  •  sodium chloride  •  sodium chloride    acetaminophen, 1,000 mg, Oral, TID  amoxicillin-clavulanate, 800 mg, Oral, Q12H  budesonide-formoterol, 2 puff, Inhalation, BID - RT  Lidocaine, 1 patch, Transdermal, Q24H  magnesium oxide, 400 mg, Oral, Daily  multivitamin with minerals, 1 tablet, Oral, Daily  senna-docusate sodium, 2 tablet, Oral, BID  sodium chloride, 10 mL, Intravenous, Q12H  thiamine, 100 mg, Oral, Daily  tiotropium bromide monohydrate, 2 puff, Inhalation, Daily - RT  vitamin B-12, 1,000 mcg, Oral, Daily        MRI Brain With & Without Contrast    Result Date: 5/22/2024  Impression:  1. No acute intracranial abnormality. No evidence of intracranial metastatic disease. 2. No pathologic contrast enhancement. 3. Multiple enhancing lesions within the skull compatible with bony metastatic disease.    Electronically Signed By-Eliecer Jara MD On:5/22/2024 10:57 PM      CT Abdomen Pelvis With Contrast    Result Date: 5/22/2024   1. The study is ABNORMAL.  2. Diffuse osseous metastatic disease is seen. Impending pathologic fractures at L3 and involving the left femoral head are possible. Epidural extension of tumor involving the spine cannot be excluded. For instance, there may be epidural extension of tumor involving the spine, especially at T5, on the right at T11-12, on the left at L1, and probably at L3. Degenerative changes also involve the imaged spine, probably greatest at L4-5 with moderate spinal canal narrowing.  3. There is a moderate-to-large stool burden. No mechanical bowel obstruction or pneumoperitoneum. No acute appendicitis. The oral contrast preparation is limited.  4. Bilateral nonobstructing renal calyceal stones are seen, which measure 5 mm or less in size. No obstructive uropathy is seen due to ureterolithiasis. No acute pyelonephritis. No definite suspicious renal mass.  5.  There is nonspecific distention of the urinary bladder (566 mL estimated total volume). Please correlate clinically. Consider decompression of the bladder with catheter placement if clinically indicated. No urinary bladder wall thickening or urinary bladder calculi.  6. The patient has undergone recent open reduction and internal fixation (ORIF) of a comminuted intertrochanteric likely pathologic right femoral fracture with expected postoperative changes in the overlying soft tissues.  7. No definite hepatic metastases are seen. Hepatomegaly is suspected.  8. No adrenal mass is appreciated.  9. There are several artifacts on the study. The best possible images were obtained.  10. Please see above comments for further detail.    Please note that portions of this note were completed with a voice recognition program.        Electronically Signed By-Panda Howard MD On:5/22/2024 9:33 PM      CT Chest With Contrast Diagnostic    Result Date: 5/21/2024  Impression:  1. Left upper lobe noncalcified pulmonary nodule measuring 2.1 cm in size suspicious for primary malignancy. There is mild bilateral hilar adenopathy. Additionally there are multiple lytic bone lesions throughout the spine ribs and scapula consistent with metastatic disease.    Electronically Signed By-Eliecer Jara MD On:5/21/2024 10:11 PM      XR Chest 1 View    Result Date: 5/20/2024  Impression:  1. Questionable 14 mm nodule projecting in the left perihilar region. Follow-up CT scan of the chest would be recommended when clinically feasible.   Electronically Signed By-Eliecer Jara MD On:5/20/2024 10:23 PM      XR Femur 2 View Right    Result Date: 5/20/2024  (COMBINED) Acute comminuted displaced intertrochanteric right femoral fracture is noted, as discussed. No dislocation.     Please note that portions of this note were completed with a voice recognition program.      Electronically Signed By-Panda Howard MD On:5/20/2024 10:21 PM      XR Pelvis 1  or 2 View    Result Date: 5/20/2024  (COMBINED) Acute comminuted displaced intertrochanteric right femoral fracture is noted, as discussed. No dislocation.     Please note that portions of this note were completed with a voice recognition program.      Electronically Signed By-Panda Howard MD On:5/20/2024 10:21 PM       Assessment / Plan     Summary: 44M with intellectual disability, 1-2PPD smoking, depression, who presented after a fall (possibly 1 month ago but may have been more recently), found to have right femoral fracture and VLAD.  Ortho assisting with initial surgical treatment.  Initial lung mass concerning on chest x-ray additional CT imaging with suspected new metastatic colon cancer with mets to the bone, pulmonology consulted patient had a biopsy results pending.  Bronchoscopy also with findings of Haemophilus influenzae treatment with antibiotics.  Palliative assisting.  Working on rehab placement guarded long-term prognosis    Assessment/Plan (clinically significant if listed here)  Mechanical fall with acute comminuted displaced intertrochanteric right femoral fracture  S/p 5/21 right hip subtrochanteric nailing of closed displaced right femur fracture by Dr. Nelson  VLDA creatinine 1.7 leukocytosis suspect reactive in setting of acute fracture  Suspected new metastatic lung cancer with mets to the bone  Mediastinal lymphadenopathy  Haemophilus influenzae pneumonia   1 to 2 pack/day smoker, chronic  Intellectual disability  Depression  Normocytic anemia  Poor social support    Unasyn 5/24 for haemophilus influenza, transition to Augmentin to complete course.  White count normalized  Continue respiratory hygiene  Scheduled Tylenol and lidocaine patch, as needed and p.o. pain medication for breakthrough pain  Multiple myeloma labs negative, biopsy results still pending, will need follow-up with oncology/Dr. Johnson after rehab   Monitor for aspiration  Appreciate oncology assistance  Inhalers,  respiratory hygiene, aspiration precautions  Replace potassium magnesium monitor electrolytes and refeeding  Continue postoperative monitoring for fracture appreciate orthopedic surgery assistance  No overt bleeding, hb stabilized postoperatively near mid 8, cont mvi   Nrt prn / smoking cessation   Palliative care consult, guarded prognosis given patient's advanced disease baseline debility, poor social support/intellectual disability  PT/OT  Continue hospitalization at current level of care    Dispo: Pending rehab placement   D/w clinical      DVT prophylaxis:  Mechanical DVT prophylaxis orders are present.      Level Of Support Discussed With: Patient  Code Status (Patient has no pulse and is not breathing): CPR (Attempt to Resuscitate)  Medical Interventions (Patient has pulse or is breathing): Full Support

## 2024-05-27 NOTE — THERAPY TREATMENT NOTE
Acute Care - Physical Therapy Treatment Note   [facility]     Patient Name: Oswaldo Bowen  : 1980  MRN: [id number]  Today's Date: 2024      Visit Dx:     ICD-10-CM ICD-9-CM   1. Closed displaced intertrochanteric fracture of right femur, initial encounter  S72.141A 820.21   2. Pulmonary nodule  R91.1 793.11   3. Difficulty walking  R26.2 719.7   4. Dysphagia, oropharyngeal  R13.12 787.22     Patient Active Problem List   Diagnosis    Closed intertrochanteric fracture of right femur    Pulmonary nodule     History reviewed. No pertinent past medical history.  Past Surgical History:   Procedure Laterality Date    BRONCHOSCOPY N/A 2024    Procedure: BRONCHOSCOPY WITH ENDOBRONCHIAL ULTRASOUND, FINE NEEDLE ASPIRATE, BIOPSIES, BRUSHINGS, BRONCHOALVEOLAR LAVAGE;  Surgeon: Kendell Stern MD;  Location: [facility] ENDOSCOPY;  Service: Pulmonary;  Laterality: N/A;  LUNG NODULE, MUCOUS PLUGGING    HIP INTERTROCHANTERIC NAILING Right 2024    Procedure: HIP INTERTROCHANTERIC NAILING;  Surgeon: Molina Clayton MD;  Location: [facility] MAIN OR;  Service: Orthopedics;  Laterality: Right;     PT Assessment (Last 12 Hours)       PT Evaluation and Treatment       Row Name 24 1009          Physical Therapy Time and Intention    Subjective Information complains of;weakness;fatigue;pain  -DK     Document Type therapy note (daily note)  -DK     Mode of Treatment individual therapy;physical therapy  -DK     Patient Effort good  -DK     Symptoms Noted During/After Treatment fatigue;increased pain  -DK       Row Name 24 1009          Pain    Pretreatment Pain Rating 0/10 - no pain  -DK     Posttreatment Pain Rating 3/10  -DK     Pain Location - Side/Orientation Right  -DK     Pain Location generalized  -DK     Pain Location - hip  -DK     Pain Intervention(s) Repositioned;Ambulation/increased activity;Distraction;Therapeutic presence  -DK       Row Name 24 1009          Cognition    Affect/Mental Status  (Cognition) confused;anxious;flat/blunted affect  -DK     Behavioral Issues (Cognition) overwhelmed easily;difficulty managing stress  -DK     Orientation Status (Cognition) oriented to;person  -DK     Follows Commands (Cognition) physical/tactile prompts required;repetition of directions required;verbal cues/prompting required  -DK     Cognitive Function attention deficit  -DK     Attention Deficit (Cognition) minimal deficit;concentration;focused/sustained attention  -DK     Personal Safety Interventions fall prevention program maintained;muscle strengthening facilitated;nonskid shoes/slippers when out of bed  -       Row Name 05/27/24 1009          Mobility    Extremity Weight-bearing Status right lower extremity  -DK     Right Lower Extremity (Weight-bearing Status) weight-bearing as tolerated (WBAT)  -       Row Name 05/27/24 1009          Bed Mobility    Bed Mobility supine-sit  -DK     Supine-Sit [place] (Bed Mobility) minimum assist (75% patient effort);moderate assist (50% patient effort);1 person assist  -     Bed Mobility, Safety Issues decreased use of legs for bridging/pushing  -DK     Assistive Device (Bed Mobility) bed rails;draw sheet  -       Row Name 05/27/24 1009          Transfers    Transfers sit-stand transfer;stand-sit transfer  -       Row Name 05/27/24 1009          Bed-Chair Transfer    Bed-Chair [place] (Transfers) minimum assist (75% patient effort);moderate assist (50% patient effort);1 person assist  -     Assistive Device (Bed-Chair Transfers) walker, front-wheeled  -       Row Name 05/27/24 1009          Sit-Stand Transfer    Sit-Stand [place] (Transfers) minimum assist (75% patient effort);moderate assist (50% patient effort);1 person assist  -     Assistive Device (Sit-Stand Transfers) walker, front-wheeled  -       Row Name 05/27/24 1009          Stand-Sit Transfer    Stand-Sit [place] (Transfers) minimum assist (75% patient effort);moderate  assist (50% patient effort);1 person assist  -DK     Assistive Device (Stand-Sit Transfers) walker, front-wheeled  -DK       Row Name 05/27/24 1009          Stand Pivot/Stand Step Transfer    Stand Pivot/Stand Step [place] (Transfers) minimum assist (75% patient effort);moderate assist (50% patient effort);1 person assist  -DK     Assistive Device (Stand Pivot Stand Step Transfer) walker, front-wheeled  -DK       Row Name 05/27/24 1009          Gait/Stairs (Locomotion)    Gait/Stairs Locomotion gait/ambulation independence;gait/ambulation assistive device;distance ambulated;gait pattern  -DK     [place] Level (Gait) minimum assist (75% patient effort);moderate assist (50% patient effort);1 person assist  -DK     Assistive Device (Gait) walker, front-wheeled  -DK     Distance in Feet (Gait) 4  -DK     Pattern (Gait) step-to  -DK     Deviations/Abnormal Patterns (Gait) shannon decreased;festinating/shuffling;gait speed decreased;stride length decreased  -DK     Bilateral Gait Deviations forward flexed posture  -DK     Comment, (Gait/Stairs) Pt stood from a slightly elevated bed level and ambulated bed to chair with assist x 1 (with PCA in the room), rolling walker, no O2.  He was given cues to  the right foot versus inch it forward with his toes.  Pt was left in the recliner on alert post treatment.  -DK       Row Name 05/27/24 1009          Safety Issues, Functional Mobility    Safety Issues Affecting Function (Mobility) judgment;impulsivity;safety precaution awareness  -DK     Impairments Affecting Function (Mobility) balance;cognition;endurance/activity tolerance;pain;range of motion (ROM);strength  -DK     Cognitive Impairments, Mobility Safety/Performance attention;awareness, need for assistance;impulsivity;judgment;safety precaution awareness  -DK       Row Name 05/27/24 1009          Balance    Balance Assessment sitting static balance;sitting dynamic balance;standing static balance;standing  dynamic balance  -DK     Static Sitting Balance standby assist;contact guard;1-person assist  -DK     Dynamic Sitting Balance standby assist;contact guard;1-person assist  -DK     Position, Sitting Balance unsupported;sitting in chair;sitting edge of bed  -DK     Static Standing Balance minimal assist;moderate assist;1-person assist  -DK     Dynamic Standing Balance minimal assist;moderate assist;1-person assist  -DK     Position/Device Used, Standing Balance walker, front-wheeled  -DK     Balance Interventions standing;dynamic;tandem gait  -       Row Name 05/27/24 1009          Motor Skills    Motor Skills --  therapeutic exercises  -     Coordination WFL  -     Therapeutic Exercise hip;knee;ankle  -       Row Name 05/27/24 1009          Hip (Therapeutic Exercise)    Hip (Therapeutic Exercise) AAROM (active assistive range of motion)  -     Hip AAROM (Therapeutic Exercise) bilateral;flexion;extension;aBduction;aDduction;supine;10 repetitions;2 sets  -       Row Name 05/27/24 1009          Knee (Therapeutic Exercise)    Knee (Therapeutic Exercise) AAROM (active assistive range of motion)  -     Knee AAROM (Therapeutic Exercise) bilateral;flexion;extension;supine;10 repetitions;2 sets  -       Row Name 05/27/24 1009          Ankle (Therapeutic Exercise)    Ankle (Therapeutic Exercise) AAROM (active assistive range of motion)  -     Ankle AAROM (Therapeutic Exercise) bilateral;dorsiflexion;plantarflexion;supine;10 repetitions;2 sets  -       Row Name             Wound 05/21/24 0251 Bilateral coccyx    Wound - Properties Group Placement Date: 05/21/24  -SR Placement Time: 0251  -SR Present on Original Admission: Y  -SR Side: Bilateral  -SR Location: coccyx  -SR    Retired Wound - Properties Group Placement Date: 05/21/24  -SR Placement Time: 0251  -SR Present on Original Admission: Y  -SR Side: Bilateral  -SR Location: coccyx  -SR    Retired Wound - Properties Group Date first assessed: 05/21/24   -SR Time first assessed: 0251  -SR Present on Original Admission: Y  -SR Side: Bilateral  -SR Location: coccyx  -SR      Row Name             Wound 05/21/24 Right lateral thigh Incision    Wound - Properties Group Placement Date: 05/21/24  -SF Present on Original Admission: N  -SF Side: Right  -SF Orientation: lateral  -SF Location: thigh  -SF Primary Wound Type: Incision  -SF Additional Comments: incision sites x 3 to lateral right thigh  -SF    Retired Wound - Properties Group Placement Date: 05/21/24  -SF Present on Original Admission: N  -SF Side: Right  -SF Orientation: lateral  -SF Location: thigh  -SF Primary Wound Type: Incision  -SF Additional Comments: incision sites x 3 to lateral right thigh  -SF    Retired Wound - Properties Group Date first assessed: 05/21/24  -SF Present on Original Admission: N  -SF Side: Right  -SF Location: thigh  -SF Primary Wound Type: Incision  -SF Additional Comments: incision sites x 3 to lateral right thigh  -SF      Row Name 05/27/24 1009          Plan of Care Review    Plan of Care Reviewed With patient  -DK     Progress improving  -DK       Row Name 05/27/24 1009          Positioning and Restraints    Pre-Treatment Position in bed  -DK     Post Treatment Position chair  -DK     In Chair reclined;call light within reach;encouraged to call for assist;exit alarm on;with other staff;legs elevated;waffle cushion;heels elevated  -DK       Row Name 05/27/24 1009          Therapy Assessment/Plan (PT)    Rehab Potential (PT) fair, will monitor progress closely  -DK     Criteria for Skilled Interventions Met (PT) skilled treatment is necessary  -DK     Therapy Frequency (PT) daily  -DK     Problem List (PT) problems related to;balance;cognition;mobility;range of motion (ROM);strength;pain;hearing;communication  -DK     Activity Limitations Related to Problem List (PT) unable to ambulate safely;unable to transfer safely  -DK       Row Name 05/27/24 1009          Progress Summary (PT)     Progress Toward Functional Goals (PT) progress toward functional goals is fair  -DK               User Key  (r) = Recorded By, (t) = Taken By, (c) = Cosigned By      Initials Name Provider Type    SF Saritha Ramirez, RN Registered Nurse    Sheila Maharaj PTA Physical Therapist Assistant    SR Saritha Long, RN Registered Nurse                    Physical Therapy Education       Title: PT OT SLP Therapies (In Progress)       Topic: Physical Therapy (In Progress)       Point: Mobility training (Done)       Learning Progress Summary             Patient Eager, E, VU by  at 5/23/2024 2223    Acceptance, E,TB, VU by  at 5/22/2024 1339                         Point: Home exercise program (Not Started)       Learner Progress:  Not documented in this visit.              Point: Body mechanics (Done)       Learning Progress Summary             Patient Acceptance, E,TB, VU by  at 5/22/2024 1339                         Point: Precautions (Done)       Learning Progress Summary             Patient Acceptance, E,TB, VU by  at 5/22/2024 1339                                         User Key       Initials Effective Dates Name Provider Type Discipline     06/11/21 -  Eh Martínez, PT Physical Therapist PT     05/31/23 -  Angela Casillas, RN Registered Nurse Nurse                  PT Recommendation and Plan  Planned Therapy Interventions (PT): balance training, bed mobility training, gait training, strengthening, transfer training  Therapy Frequency (PT): daily  Progress Summary (PT)  Progress Toward Functional Goals (PT): progress toward functional goals is fair  Plan of Care Reviewed With: patient  Progress: improving   Outcome Measures       Row Name 05/27/24 1009 05/26/24 1024 05/25/24 1800       How much help from another person do you currently need...    Turning from your back to your side while in flat bed without using bedrails? 3  -DK 2  -DK 2  -CS    Moving from lying on back to sitting on the  side of a flat bed without bedrails? 3  -DK 2  -DK 2  -CS    Moving to and from a bed to a chair (including a wheelchair)? 2  -DK 2  -DK 2  -CS    Standing up from a chair using your arms (e.g., wheelchair, bedside chair)? 2  -DK 2  -DK 2  -CS    Climbing 3-5 steps with a railing? 1  -DK 1  -DK 1  -CS    To walk in hospital room? 2  -DK 2  -DK 2  -CS    AM-PAC 6 Clicks Score (PT) 13  -DK 11  -DK 11  -CS    Highest Level of Mobility Goal 4 --> Transfer to chair/commode  -DK 4 --> Transfer to chair/commode  -DK 4 --> Transfer to chair/commode  -CS       Functional Assessment    Outcome Measure Options AM-PAC 6 Clicks Basic Mobility (PT)  -DK AM-PAC 6 Clicks Basic Mobility (PT)  -DK AM-PAC 6 Clicks Basic Mobility (PT)  -CS      Row Name 05/24/24 1600             How much help from another person do you currently need...    Turning from your back to your side while in flat bed without using bedrails? 2  -CS      Moving from lying on back to sitting on the side of a flat bed without bedrails? 2  -CS      Moving to and from a bed to a chair (including a wheelchair)? 2  -CS      Standing up from a chair using your arms (e.g., wheelchair, bedside chair)? 2  -CS      Climbing 3-5 steps with a railing? 1  -CS      To walk in hospital room? 2  -CS      AM-PAC 6 Clicks Score (PT) 11  -CS      Highest Level of Mobility Goal 4 --> Transfer to chair/commode  -CS         Functional Assessment    Outcome Measure Options AM-PAC 6 Clicks Basic Mobility (PT)  -CS                User Key  (r) = Recorded By, (t) = Taken By, (c) = Cosigned By      Initials Name Provider Type    Sheila Maharaj PTA Physical Therapist Assistant    Phu Hdez PTA Physical Therapist Assistant                     Time Calculation:    PT Charges       Row Name 05/27/24 1014             Time Calculation    PT Received On 05/27/24  -DK      PT Goal Re-Cert Due Date 05/31/24  -DK         Timed Charges    98951 - PT Therapeutic Exercise Minutes 14  -DK       59722 - Gait Training Minutes  3  -DK      15637 - PT Therapeutic Activity Minutes 8  -DK         Total Minutes    Timed Charges Total Minutes 25  -DK       Total Minutes 25  -DK                User Key  (r) = Recorded By, (t) = Taken By, (c) = Cosigned By      Initials Name Provider Type    Sheila Maharaj PTA Physical Therapist Assistant                  Therapy Charges for Today       Code Description Service Date Service Provider Modifiers Qty    40636453312 HC PT THER PROC EA 15 MIN 5/27/2024 Sheila Nicole PTA GP 1    22869255455 HC PT THERAPEUTIC ACT EA 15 MIN 5/27/2024 Sheila Nicole PTA GP 1            PT G-Codes  Outcome Measure Options: AM-PAC 6 Clicks Basic Mobility (PT)  AM-PAC 6 Clicks Score (PT): 13  AM-PAC 6 Clicks Score (OT): 11    Sheila Nicole PTA  5/27/2024

## 2024-05-28 LAB
CYTO UR: NORMAL
LAB AP CASE REPORT: NORMAL
LAB AP CLINICAL INFORMATION: NORMAL
LAB AP DIAGNOSIS COMMENT: NORMAL
PATH REPORT.FINAL DX SPEC: NORMAL
PATH REPORT.GROSS SPEC: NORMAL

## 2024-05-28 PROCEDURE — 99232 SBSQ HOSP IP/OBS MODERATE 35: CPT | Performed by: INTERNAL MEDICINE

## 2024-05-28 PROCEDURE — 94799 UNLISTED PULMONARY SVC/PX: CPT

## 2024-05-28 PROCEDURE — 97110 THERAPEUTIC EXERCISES: CPT

## 2024-05-28 PROCEDURE — 97530 THERAPEUTIC ACTIVITIES: CPT

## 2024-05-28 PROCEDURE — 94664 DEMO&/EVAL PT USE INHALER: CPT

## 2024-05-28 RX ADMIN — BUDESONIDE AND FORMOTEROL FUMARATE DIHYDRATE 2 PUFF: 160; 4.5 AEROSOL RESPIRATORY (INHALATION) at 20:41

## 2024-05-28 RX ADMIN — Medication 1 TABLET: at 08:25

## 2024-05-28 RX ADMIN — HYDROCODONE BITARTRATE AND ACETAMINOPHEN 1 TABLET: 10; 325 TABLET ORAL at 21:22

## 2024-05-28 RX ADMIN — SENNOSIDES AND DOCUSATE SODIUM 2 TABLET: 50; 8.6 TABLET ORAL at 21:10

## 2024-05-28 RX ADMIN — CYANOCOBALAMIN TAB 500 MCG 1000 MCG: 500 TAB at 08:25

## 2024-05-28 RX ADMIN — Medication 5 MG: at 21:10

## 2024-05-28 RX ADMIN — Medication 100 MG: at 08:25

## 2024-05-28 RX ADMIN — Medication 10 ML: at 21:10

## 2024-05-28 RX ADMIN — AMOXICILLIN AND CLAVULANATE POTASSIUM 800 MG: 400; 57 POWDER, FOR SUSPENSION ORAL at 09:47

## 2024-05-28 RX ADMIN — Medication 400 MG: at 08:25

## 2024-05-28 RX ADMIN — BUDESONIDE AND FORMOTEROL FUMARATE DIHYDRATE 2 PUFF: 160; 4.5 AEROSOL RESPIRATORY (INHALATION) at 09:27

## 2024-05-28 RX ADMIN — TIOTROPIUM BROMIDE INHALATION SPRAY 2 PUFF: 3.12 SPRAY, METERED RESPIRATORY (INHALATION) at 09:27

## 2024-05-28 RX ADMIN — LIDOCAINE 1 PATCH: 560 PATCH PERCUTANEOUS; TOPICAL; TRANSDERMAL at 11:11

## 2024-05-28 RX ADMIN — Medication 10 ML: at 08:25

## 2024-05-28 NOTE — THERAPY TREATMENT NOTE
Acute Care - Physical Therapy Treatment Note   [facility]     Patient Name: Oswaldo Bowen  : 1980  MRN: [id number]  Today's Date: 2024      Visit Dx:     ICD-10-CM ICD-9-CM   1. Closed displaced intertrochanteric fracture of right femur, initial encounter  S72.141A 820.21   2. Pulmonary nodule  R91.1 793.11   3. Difficulty walking  R26.2 719.7   4. Dysphagia, oropharyngeal  R13.12 787.22     Patient Active Problem List   Diagnosis    Closed intertrochanteric fracture of right femur    Pulmonary nodule     History reviewed. No pertinent past medical history.  Past Surgical History:   Procedure Laterality Date    BRONCHOSCOPY N/A 2024    Procedure: BRONCHOSCOPY WITH ENDOBRONCHIAL ULTRASOUND, FINE NEEDLE ASPIRATE, BIOPSIES, BRUSHINGS, BRONCHOALVEOLAR LAVAGE;  Surgeon: Kendell Stern MD;  Location: [facility] ENDOSCOPY;  Service: Pulmonary;  Laterality: N/A;  LUNG NODULE, MUCOUS PLUGGING    HIP INTERTROCHANTERIC NAILING Right 2024    Procedure: HIP INTERTROCHANTERIC NAILING;  Surgeon: Molina Clayton MD;  Location: [facility] MAIN OR;  Service: Orthopedics;  Laterality: Right;     PT Assessment (Last 12 Hours)       PT Evaluation and Treatment       Row Name 24 1019          Physical Therapy Time and Intention    Subjective Information complains of;weakness;fatigue;pain  -DK     Document Type therapy note (daily note)  -DK     Mode of Treatment individual therapy;physical therapy  -DK     Patient Effort good  -DK     Symptoms Noted During/After Treatment fatigue;increased pain  -DK     Comment Pt reports feeling tired, wanting to go to sleep.  He was agreeable to exercises, but declined transfers at this time.  Pt was encouraged to get up with nursing staff to the chair for lunch.  -DK       Row Name 24 1019          Pain    Pretreatment Pain Rating 0/10 - no pain  -DK     Posttreatment Pain Rating 2/10  -DK     Pain Location - Side/Orientation Right  -DK     Pain Location generalized  -DK      Pain Location - hip  -DK     Pain Intervention(s) Distraction;Therapeutic presence  -       Row Name 05/28/24 1019          Cognition    Affect/Mental Status (Cognition) confused;flat/blunted affect  -DK     Behavioral Issues (Cognition) overwhelmed easily;difficulty managing stress  -DK     Orientation Status (Cognition) oriented to;person  -DK     Follows Commands (Cognition) physical/tactile prompts required;repetition of directions required;verbal cues/prompting required  -DK     Cognitive Function attention deficit  -DK     Attention Deficit (Cognition) minimal deficit;concentration;focused/sustained attention  -     Personal Safety Interventions gait belt;nonskid shoes/slippers when out of bed;supervised activity  -       Row Name 05/28/24 1019          Motor Skills    Motor Skills --  therapeutic exercises  -DK     Coordination WFL  -     Therapeutic Exercise hip;knee;ankle  -     Additional Documentation --  Pt declined transfers this session due to c/o fatigue.  -       Row Name 05/28/24 1019          Hip (Therapeutic Exercise)    Hip (Therapeutic Exercise) AAROM (active assistive range of motion)  -     Hip AAROM (Therapeutic Exercise) bilateral;flexion;extension;aBduction;aDduction;supine;10 repetitions;2 sets  -       Row Name 05/28/24 1019          Knee (Therapeutic Exercise)    Knee (Therapeutic Exercise) AAROM (active assistive range of motion)  -     Knee AAROM (Therapeutic Exercise) bilateral;flexion;extension;supine;10 repetitions;2 sets  -       Row Name 05/28/24 1019          Ankle (Therapeutic Exercise)    Ankle (Therapeutic Exercise) AAROM (active assistive range of motion)  -     Ankle AAROM (Therapeutic Exercise) bilateral;dorsiflexion;plantarflexion;supine;10 repetitions;2 sets  -       Row Name             Wound 05/21/24 0251 Bilateral coccyx    Wound - Properties Group Placement Date: 05/21/24  -SR Placement Time: 0251 -SR Present on Original Admission: Y  -SR  Side: Bilateral  -SR Location: coccyx  -SR    Retired Wound - Properties Group Placement Date: 05/21/24  -SR Placement Time: 0251 -SR Present on Original Admission: Y  -SR Side: Bilateral  -SR Location: coccyx  -SR    Retired Wound - Properties Group Date first assessed: 05/21/24  -SR Time first assessed: 0251  -SR Present on Original Admission: Y  -SR Side: Bilateral  -SR Location: coccyx  -SR      Row Name             Wound 05/21/24 Right lateral thigh Incision    Wound - Properties Group Placement Date: 05/21/24  -SF Present on Original Admission: N  -SF Side: Right  -SF Orientation: lateral  -SF Location: thigh  -SF Primary Wound Type: Incision  -SF Additional Comments: incision sites x 3 to lateral right thigh  -SF    Retired Wound - Properties Group Placement Date: 05/21/24  -SF Present on Original Admission: N  -SF Side: Right  -SF Orientation: lateral  -SF Location: thigh  -SF Primary Wound Type: Incision  -SF Additional Comments: incision sites x 3 to lateral right thigh  -SF    Retired Wound - Properties Group Date first assessed: 05/21/24  -SF Present on Original Admission: N  -SF Side: Right  -SF Location: thigh  -SF Primary Wound Type: Incision  -SF Additional Comments: incision sites x 3 to lateral right thigh  -SF      Row Name 05/28/24 1019          Plan of Care Review    Plan of Care Reviewed With patient  -DK     Progress no change  -DK       Row Name 05/28/24 1019          Positioning and Restraints    Pre-Treatment Position in bed  -DK     Post Treatment Position bed  -DK     In Bed supine;call light within reach;encouraged to call for assist;exit alarm on;side rails up x3;legs elevated;heels elevated  -DK       Row Name 05/28/24 1019          Therapy Assessment/Plan (PT)    Rehab Potential (PT) fair, will monitor progress closely  -DK     Criteria for Skilled Interventions Met (PT) skilled treatment is necessary  -DK     Therapy Frequency (PT) daily  -DK     Problem List (PT) problems related  to;balance;cognition;mobility;range of motion (ROM);strength;pain;hearing;communication  -DK     Activity Limitations Related to Problem List (PT) unable to ambulate safely;unable to transfer safely  -DK       Row Name 05/28/24 1019          Progress Summary (PT)    Progress Toward Functional Goals (PT) progress toward functional goals is fair  -DK               User Key  (r) = Recorded By, (t) = Taken By, (c) = Cosigned By      Initials Name Provider Type    SF Saritha Ramirez, RN Registered Nurse    Sheila Maharaj PTA Physical Therapist Assistant    Saritha Becker RN Registered Nurse                    Physical Therapy Education       Title: PT OT SLP Therapies (In Progress)       Topic: Physical Therapy (In Progress)       Point: Mobility training (Done)       Learning Progress Summary             Patient Eager, E, VU by  at 5/23/2024 2223    Acceptance, E,TB, VU by  at 5/22/2024 1339                         Point: Home exercise program (Not Started)       Learner Progress:  Not documented in this visit.              Point: Body mechanics (Done)       Learning Progress Summary             Patient Acceptance, E,TB, VU by  at 5/22/2024 1339                         Point: Precautions (Done)       Learning Progress Summary             Patient Acceptance, E,TB, VU by  at 5/22/2024 1339                                         User Key       Initials Effective Dates Name Provider Type Discipline     06/11/21 -  Eh Martínez, JERROD Physical Therapist PT     05/31/23 -  Angela Casillas, RN Registered Nurse Nurse                  PT Recommendation and Plan  Planned Therapy Interventions (PT): balance training, bed mobility training, gait training, strengthening, transfer training  Therapy Frequency (PT): daily  Progress Summary (PT)  Progress Toward Functional Goals (PT): progress toward functional goals is fair  Plan of Care Reviewed With: patient  Progress: no change   Outcome Measures        Row Name 05/28/24 1019 05/27/24 1009 05/26/24 1024       How much help from another person do you currently need...    Turning from your back to your side while in flat bed without using bedrails? 3  -DK 3  -DK 2  -DK    Moving from lying on back to sitting on the side of a flat bed without bedrails? 3  -DK 3  -DK 2  -DK    Moving to and from a bed to a chair (including a wheelchair)? 2  -DK 2  -DK 2  -DK    Standing up from a chair using your arms (e.g., wheelchair, bedside chair)? 2  -DK 2  -DK 2  -DK    Climbing 3-5 steps with a railing? 1  -DK 1  -DK 1  -DK    To walk in hospital room? 2  -DK 2  -DK 2  -DK    AM-PAC 6 Clicks Score (PT) 13  -DK 13  -DK 11  -DK    Highest Level of Mobility Goal 4 --> Transfer to chair/commode  -DK 4 --> Transfer to chair/commode  -DK 4 --> Transfer to chair/commode  -DK       Functional Assessment    Outcome Measure Options AM-PAC 6 Clicks Basic Mobility (PT)  -DK AM-PAC 6 Clicks Basic Mobility (PT)  -DK AM-PAC 6 Clicks Basic Mobility (PT)  -DK      Row Name 05/25/24 1800             How much help from another person do you currently need...    Turning from your back to your side while in flat bed without using bedrails? 2  -CS      Moving from lying on back to sitting on the side of a flat bed without bedrails? 2  -CS      Moving to and from a bed to a chair (including a wheelchair)? 2  -CS      Standing up from a chair using your arms (e.g., wheelchair, bedside chair)? 2  -CS      Climbing 3-5 steps with a railing? 1  -CS      To walk in hospital room? 2  -CS      AM-PAC 6 Clicks Score (PT) 11  -CS      Highest Level of Mobility Goal 4 --> Transfer to chair/commode  -CS         Functional Assessment    Outcome Measure Options AM-PAC 6 Clicks Basic Mobility (PT)  -CS                User Key  (r) = Recorded By, (t) = Taken By, (c) = Cosigned By      Initials Name Provider Type    Sheila Maharaj PTA Physical Therapist Assistant    Phu Hdez PTA Physical Therapist  Assistant                     Time Calculation:    PT Charges       Row Name 05/28/24 1022             Time Calculation    PT Received On 05/28/24  -DK      PT Goal Re-Cert Due Date 05/31/24  -DK         Timed Charges    79662 - PT Therapeutic Exercise Minutes 14  -DK      19039 - PT Therapeutic Activity Minutes 4  -DK         Total Minutes    Timed Charges Total Minutes 18  -DK       Total Minutes 18  -DK                User Key  (r) = Recorded By, (t) = Taken By, (c) = Cosigned By      Initials Name Provider Type    Sheila Maharaj PTA Physical Therapist Assistant                  Therapy Charges for Today       Code Description Service Date Service Provider Modifiers Qty    23099108361 HC PT THER PROC EA 15 MIN 5/27/2024 Sheila Nicole, PTA GP 1    43699520644 HC PT THERAPEUTIC ACT EA 15 MIN 5/27/2024 Sheila Nicole, RADHA GP 1    29292782594 HC PT THER PROC EA 15 MIN 5/28/2024 Sheila Nicole, RADHA GP 1            PT G-Codes  Outcome Measure Options: AM-PAC 6 Clicks Basic Mobility (PT)  AM-PAC 6 Clicks Score (PT): 13  AM-PAC 6 Clicks Score (OT): 11    Sheila Nicole PTA  5/28/2024

## 2024-05-28 NOTE — PROGRESS NOTES
Pulmonary / Critical Care Progress Note      Patient Name: Oswaldo Bowen  : 1980  MRN: [id number]  Primary Care Physician:  Provider, No Known  Date of admission: 2024    Subjective   Subjective   Follow-up for left upper lobe lung nodule with mediastinal adenopathy, chronic heavy smoking, unintentional weight loss.    Bronchoscopy pathology pending  Minimal dyspnea at baseline  No coughing  No fever or chills.  No nausea or vomiting.  On room air  Has been bedridden for most part.      Objective   Objective     Vitals:   Temp:  [98.1 °F (36.7 °C)-98.6 °F (37 °C)] 98.6 °F (37 °C)  Heart Rate:  [104-124] 106  Resp:  [16-18] 16  BP: (111-128)/(71-83) 117/83  Physical Exam   Vital Signs Reviewed   General:  WDWN, Alert, in no distress  Chest:  good aeration, clear to auscultation bilaterally, tympanic to percussion bilaterally, no work of breathing noted  CV: RRR, no MGR, pulses 2+, equal.  Abd:  Soft, NT, ND, + BS, no HSM  EXT:  no clubbing, no cyanosis, no edema  Neuro:  A&Ox3, CN grossly intact, no focal deficits.  Skin: No rashes or lesions noted      Result Review    Result Review:  I have personally reviewed the results from the time of this admission to 2024 07:26 EDT and agree with these findings:  [x]  Laboratory  [x]  Microbiology  [x]  Radiology  [x]  EKG/Telemetry   [x]  Cardiology/Vascular   []  Pathology  []  Old records  []  Other:  Most notable findings include:         Lab 24  0548 24  0544 24  0918 24  1030 24  0656 24  1535 24  0504   WBC 9.72 11.08* 11.02* 11.76* 9.53  --  12.84*   HEMOGLOBIN 8.4* 8.2* 8.6* 8.8* 9.5*  --  9.2*   HEMATOCRIT 27.4* 26.9* 27.3* 27.8* 29.9*  --  29.3*   PLATELETS 418 394 369 383 357  --  359   SODIUM 135* 137 136 136 135*  --  133*   POTASSIUM 3.9 3.9 3.7 3.8 3.1*  --  3.4*   CHLORIDE 98 100 97* 98 98  --  99   CO2 28.3 27.6 27.8 27.7 25.4  --  25.2   BUN 11 12 10 9 9  --  16   CREATININE 0.38* 0.39* 0.50* 0.49*  0.49*  --  0.55*   GLUCOSE 100* 113* 188* 131* 112*  --  125*   CALCIUM 8.8 8.6 8.5* 8.6 8.3*  --  8.4*   PHOSPHORUS 4.0 3.8  --  2.7 3.4  --  2.7   ALBUMIN  --   --   --   --   --  2.2*  --             CT Chest With Contrast Diagnostic    Result Date: 5/21/2024  CT CHEST W CONTRAST DIAGNOSTIC-  Date of Exam: 5/21/2024 9:01 PM  Indication: 14mm perihilar nodule, 1-2 ppd extensive smoking hx; S72.141A-Displaced intertrochanteric fracture of right femur, initial encounter for closed fracture; R91.1-Solitary pulmonary nodule.  Comparison: Chest radiograph 5/20/2024  Technique: Axial CT images were obtained of the chest after the uneventful intravenous administration of 75 cc Isovue-370 . Reconstructed coronal and sagittal images were also obtained. Automated exposure control and iterative construction methods were used.   Findings: No coronary artery calcification. There is no mediastinal adenopathy. There is a right hilar node measuring 2.5 cm and a left hilar node measuring 2.1 cm. No axillary adenopathy. There are no pleural effusions. Within the perihilar region of the left upper lobe there is a noncalcified macro lobular 2.1 x 1.9 cm noncalcified pulmonary nodule suspicious for malignancy. Within the posterior right lower lobe there is a subpleural 12 x 5 mm nodule which is nonspecific. No other pulmonary nodules. No other focal airspace consolidation.  Bilateral adrenal glands are within normal limits.  There are multiple lytic bone lesions throughout the spine and ribs. Findings would be consistent with metastatic disease or myeloma. There are additional lytic lesions within both scapula.      Impression: Impression:  1. Left upper lobe noncalcified pulmonary nodule measuring 2.1 cm in size suspicious for primary malignancy. There is mild bilateral hilar adenopathy. Additionally there are multiple lytic bone lesions throughout the spine ribs and scapula consistent with metastatic disease.    Electronically  Signed By-Eliecer Jara MD On:5/21/2024 10:11 PM         Assessment & Plan   Assessment / Plan     Active Hospital Problems:  Active Hospital Problems    Diagnosis     **Closed intertrochanteric fracture of right femur     Pulmonary nodule          Impression:   Acute comminuted displaced intertrochanteric right femoral fracture  Status post IM nailing  Left upper lobe lung nodule with mediastinal lymphadenopathy  Chronic heavy smoking  Unintentional weight loss    Plan:   Status post bronchoscopy with EBUS.  Pending pathology.  Imaging consistent with metastatic cancer  Complete 5 days of Unasyn for haemophilus pneumonia growing on bronchoscopy BAL  Continue Symbicort and Spiriva, albuterol as needed.    On room air  Encourage activity and incentive spirometer use  Pain control per primary service.  Postop surgical care per Ortho service.    DVT prophylaxis:  Mechanical DVT prophylaxis orders are present.    CODE STATUS:   Level Of Support Discussed With: Patient  Code Status (Patient has no pulse and is not breathing): CPR (Attempt to Resuscitate)  Medical Interventions (Patient has pulse or is breathing): Full Support    I have reviewed labs, imaging, pertinent clinical data and provider notes.   I have discussed with bedside nurse and primary service.     Electronically signed by Kendell Stern MD, 5/28/2024, 07:26 EDT.

## 2024-05-28 NOTE — THERAPY TREATMENT NOTE
Patient Name: Oswaldo Bowen  : 1980    MRN: [id number]                              Today's Date: 2024       Admit Date: 2024    Visit Dx:     ICD-10-CM ICD-9-CM   1. Closed displaced intertrochanteric fracture of right femur, initial encounter  S72.141A 820.21   2. Pulmonary nodule  R91.1 793.11   3. Difficulty walking  R26.2 719.7   4. Dysphagia, oropharyngeal  R13.12 787.22     Patient Active Problem List   Diagnosis    Closed intertrochanteric fracture of right femur    Pulmonary nodule     History reviewed. No pertinent past medical history.  Past Surgical History:   Procedure Laterality Date    BRONCHOSCOPY N/A 2024    Procedure: BRONCHOSCOPY WITH ENDOBRONCHIAL ULTRASOUND, FINE NEEDLE ASPIRATE, BIOPSIES, BRUSHINGS, BRONCHOALVEOLAR LAVAGE;  Surgeon: Kendell Stern MD;  Location: [facility] ENDOSCOPY;  Service: Pulmonary;  Laterality: N/A;  LUNG NODULE, MUCOUS PLUGGING    HIP INTERTROCHANTERIC NAILING Right 2024    Procedure: HIP INTERTROCHANTERIC NAILING;  Surgeon: Molina Clayton MD;  Location: [facility] MAIN OR;  Service: Orthopedics;  Laterality: Right;      General Information       Row Name 24 1117          OT Time and Intention    Document Type therapy note (daily note)  -PG     Mode of Treatment individual therapy;occupational therapy  -PG               User Key  (r) = Recorded By, (t) = Taken By, (c) = Cosigned By      Initials Name Provider Type    PG Felix Villasenor OT Occupational Therapist                     Mobility/ADL's       Row Name 24 1117          Bed Mobility    Bed Mobility supine-sit  -PG     Supine-Sit Trinity (Bed Mobility) standby assist  -PG       Row Name 24 1117          Transfers    Transfers bed-chair transfer  -PG       Row Name 24 1117          Bed-Chair Transfer    Bed-Chair Trinity (Transfers) contact guard;verbal cues  -PG     Assistive Device (Bed-Chair Transfers) walker, front-wheeled  -PG               User Key  (r) =  Recorded By, (t) = Taken By, (c) = Cosigned By      Initials Name Provider Type    PG Felix Villasenor, OT Occupational Therapist                   Obj/Interventions    No documentation.                  Goals/Plan    No documentation.                  Clinical Impression       Row Name 05/28/24 1118          Plan of Care Review    Progress no change  -PG               User Key  (r) = Recorded By, (t) = Taken By, (c) = Cosigned By      Initials Name Provider Type    PG Felix Villasenor OT Occupational Therapist                   Outcome Measures       Row Name 05/28/24 1118          How much help from another is currently needed...    Putting on and taking off regular lower body clothing? 1  -PG     Bathing (including washing, rinsing, and drying) 2  -PG     Toileting (which includes using toilet bed pan or urinal) 1  -PG     Putting on and taking off regular upper body clothing 3  -PG     Taking care of personal grooming (such as brushing teeth) 3  -PG     Eating meals 4  -PG     AM-PAC 6 Clicks Score (OT) 14  -PG       Row Name 05/28/24 1019 05/28/24 0825       How much help from another person do you currently need...    Turning from your back to your side while in flat bed without using bedrails? 3  -DK 3  -KG    Moving from lying on back to sitting on the side of a flat bed without bedrails? 3  -DK 3  -KG    Moving to and from a bed to a chair (including a wheelchair)? 2  -DK 2  -KG    Standing up from a chair using your arms (e.g., wheelchair, bedside chair)? 2  -DK 2  -KG    Climbing 3-5 steps with a railing? 1  -DK 1  -KG    To walk in hospital room? 2  -DK 2  -KG    AM-PAC 6 Clicks Score (PT) 13  -DK 13  -KG    Highest Level of Mobility Goal 4 --> Transfer to chair/commode  -DK 4 --> Transfer to chair/commode  -KG      Row Name 05/27/24 0361          How much help from another person do you currently need...    Turning from your back to your side while in flat bed without using bedrails? 3  -TB     Moving from  lying on back to sitting on the side of a flat bed without bedrails? 3  -TB     Moving to and from a bed to a chair (including a wheelchair)? 2  -TB     Standing up from a chair using your arms (e.g., wheelchair, bedside chair)? 2  -TB     Climbing 3-5 steps with a railing? 1  -TB     To walk in hospital room? 2  -TB     AM-PAC 6 Clicks Score (PT) 13  -TB     Highest Level of Mobility Goal 4 --> Transfer to chair/commode  -TB       Row Name 05/28/24 1118 05/28/24 1019       Functional Assessment    Outcome Measure Options AM-PAC 6 Clicks Daily Activity (OT);Optimal Instrument  -PG AM-PAC 6 Clicks Basic Mobility (PT)  -DK      Row Name 05/28/24 1118          Optimal Instrument    Optimal Instrument Optimal - 3  -PG     Bending/Stooping 3  -PG     Standing 2  -PG     Reaching 1  -PG               User Key  (r) = Recorded By, (t) = Taken By, (c) = Cosigned By      Initials Name Provider Type    Sheila Maharaj PTA Physical Therapist Assistant    Felix Taylor OT Occupational Therapist    Danae Jimenez RN Registered Nurse    TB Angie Vallejo, RN Registered Nurse                    Occupational Therapy Education       Title: PT OT SLP Therapies (In Progress)       Topic: Occupational Therapy (Done)       Point: ADL training (Done)       Description:   Instruct learner(s) on proper safety adaptation and remediation techniques during self care or transfers.   Instruct in proper use of assistive devices.                  Learning Progress Summary             Patient Eager, E, VU by  at 5/23/2024 2223    Acceptance, E,D, NR by PG at 5/22/2024 0917                         Point: Home exercise program (Done)       Description:   Instruct learner(s) on appropriate technique for monitoring, assisting and/or progressing therapeutic exercises/activities.                  Learning Progress Summary             Patient Eager, E, VU by  at 5/23/2024 2223    Acceptance, E,D, NR by PG at 5/22/2024 0917                          Point: Precautions (Done)       Description:   Instruct learner(s) on prescribed precautions during self-care and functional transfers.                  Learning Progress Summary             Patient Eager, E, VU by  at 5/23/2024 2223    Acceptance, E,D, NR by  at 5/22/2024 0917                         Point: Body mechanics (Done)       Description:   Instruct learner(s) on proper positioning and spine alignment during self-care, functional mobility activities and/or exercises.                  Learning Progress Summary             Patient Eager, E, VU by  at 5/23/2024 2223    Acceptance, E,D, NR by  at 5/22/2024 0917                                         User Key       Initials Effective Dates Name Provider Type Discipline     06/16/21 -  Felix Villasenor OT Occupational Therapist OT     05/31/23 -  Angela Casillas RN Registered Nurse Nurse                  OT Recommendation and Plan  Planned Therapy Interventions (OT): activity tolerance training, BADL retraining, strengthening exercise, transfer/mobility retraining, occupation/activity based interventions, patient/caregiver education/training  Therapy Frequency (OT): 5 times/wk  Plan of Care Review  Plan of Care Reviewed With: patient  Progress: no change  Outcome Evaluation: Patient presents with limitations affecting strength, activity tolerance, and balance impacting patient's ability to return home safely and independently.  The skills of a therapist will be required to safely and effectively implement the following treatment plan to restore maximal level of function     Time Calculation:   Evaluation Complexity (OT)  Review Occupational Profile/Medical/Therapy History Complexity: brief/low complexity  Assessment, Occupational Performance/Identification of Deficit Complexity: 3-5 performance deficits  Clinical Decision Making Complexity (OT): problem focused assessment/low complexity  Overall Complexity of Evaluation (OT): low  complexity     Time Calculation- OT       Row Name 05/28/24 1119             Time Calculation- OT    OT Received On 05/28/24  -PG      OT Goal Re-Cert Due Date 05/31/24  -PG         Timed Charges    64861 - OT Therapeutic Activity Minutes 15  -PG         Total Minutes    Timed Charges Total Minutes 15  -PG       Total Minutes 15  -PG                User Key  (r) = Recorded By, (t) = Taken By, (c) = Cosigned By      Initials Name Provider Type    PG Felix Villasenor OT Occupational Therapist                  Therapy Charges for Today       Code Description Service Date Service Provider Modifiers Qty    [id number]  OT THERAPEUTIC ACT EA 15 MIN 5/28/2024 Felix Villasenor OT GO 1                 Felix Villasenor OT  5/28/2024

## 2024-05-28 NOTE — PROGRESS NOTES
[facility]   Hospitalist Progress Note    Date of admission: 5/20/2024  Patient Name: Oswaldo Bowen  1980  Date: 5/28/2024      Subjective     Chief Complaint   Patient presents with    Fall    Leg Pain       Interval Followup: No acute events overnight, patient resting comfortably in bed    Objective     Vitals:   Temp:  [98.1 °F (36.7 °C)-98.7 °F (37.1 °C)] 98.7 °F (37.1 °C)  Heart Rate:  [101-124] 109  Resp:  [16-18] 18  BP: (111-128)/(69-83) 112/69    Physical Exam  GEN: No acute distress  HEENT: Moist mucous membranes  LUNGS: Equal chest rise bilaterally  CARDIAC: Regular rate and rhythm  NEURO: Moving all 4 extremities spontaneously  SKIN: No obvious breakdown    Result Review:  Vital signs, labs and recent relevant imaging reviewed.      CBC          5/25/2024    09:18 5/26/2024    05:44 5/27/2024    05:48   CBC   WBC 11.02  11.08  9.72    RBC 3.07  2.96  3.06    Hemoglobin 8.6  8.2  8.4    Hematocrit 27.3  26.9  27.4    MCV 88.9  90.9  89.5    MCH 28.0  27.7  27.5    MCHC 31.5  30.5  30.7    RDW 13.1  13.2  13.2    Platelets 369  394  418      CMP          5/25/2024    09:18 5/26/2024    05:44 5/27/2024    05:48   CMP   Glucose 188  113  100    BUN 10  12  11    Creatinine 0.50  0.39  0.38    EGFR 129.0  139.0  140.1    Sodium 136  137  135    Potassium 3.7  3.9  3.9    Chloride 97  100  98    Calcium 8.5  8.6  8.8    BUN/Creatinine Ratio 20.0  30.8  28.9    Anion Gap 11.2  9.4  8.7          acetaminophen    aluminum-magnesium hydroxide-simethicone    senna-docusate sodium **AND** polyethylene glycol **AND** bisacodyl **AND** bisacodyl    calcium carbonate    dextrose    dextrose    Diclofenac Sodium    glucagon (human recombinant)    HYDROcodone-acetaminophen    HYDROcodone-acetaminophen    hydrOXYzine    levalbuterol    Lidocaine    melatonin    [DISCONTINUED] Morphine **AND** naloxone    nicotine    ondansetron    ondansetron ODT **OR** [DISCONTINUED] ondansetron    prochlorperazine    sodium  chloride    sodium chloride    sodium chloride    sodium chloride    budesonide-formoterol, 2 puff, Inhalation, BID - RT  Lidocaine, 1 patch, Transdermal, Q24H  magnesium oxide, 400 mg, Oral, Daily  multivitamin with minerals, 1 tablet, Oral, Daily  senna-docusate sodium, 2 tablet, Oral, BID  sodium chloride, 10 mL, Intravenous, Q12H  thiamine, 100 mg, Oral, Daily  tiotropium bromide monohydrate, 2 puff, Inhalation, Daily - RT  vitamin B-12, 1,000 mcg, Oral, Daily        MRI Brain With & Without Contrast    Result Date: 5/22/2024  Impression:  1. No acute intracranial abnormality. No evidence of intracranial metastatic disease. 2. No pathologic contrast enhancement. 3. Multiple enhancing lesions within the skull compatible with bony metastatic disease.    Electronically Signed By-Eliecer Jara MD On:5/22/2024 10:57 PM      CT Abdomen Pelvis With Contrast    Result Date: 5/22/2024   1. The study is ABNORMAL.  2. Diffuse osseous metastatic disease is seen. Impending pathologic fractures at L3 and involving the left femoral head are possible. Epidural extension of tumor involving the spine cannot be excluded. For instance, there may be epidural extension of tumor involving the spine, especially at T5, on the right at T11-12, on the left at L1, and probably at L3. Degenerative changes also involve the imaged spine, probably greatest at L4-5 with moderate spinal canal narrowing.  3. There is a moderate-to-large stool burden. No mechanical bowel obstruction or pneumoperitoneum. No acute appendicitis. The oral contrast preparation is limited.  4. Bilateral nonobstructing renal calyceal stones are seen, which measure 5 mm or less in size. No obstructive uropathy is seen due to ureterolithiasis. No acute pyelonephritis. No definite suspicious renal mass.  5. There is nonspecific distention of the urinary bladder (566 mL estimated total volume). Please correlate clinically. Consider decompression of the bladder with catheter  placement if clinically indicated. No urinary bladder wall thickening or urinary bladder calculi.  6. The patient has undergone recent open reduction and internal fixation (ORIF) of a comminuted intertrochanteric likely pathologic right femoral fracture with expected postoperative changes in the overlying soft tissues.  7. No definite hepatic metastases are seen. Hepatomegaly is suspected.  8. No adrenal mass is appreciated.  9. There are several artifacts on the study. The best possible images were obtained.  10. Please see above comments for further detail.    Please note that portions of this note were completed with a voice recognition program.        Electronically Signed By-Panda Howard MD On:5/22/2024 9:33 PM      CT Chest With Contrast Diagnostic    Result Date: 5/21/2024  Impression:  1. Left upper lobe noncalcified pulmonary nodule measuring 2.1 cm in size suspicious for primary malignancy. There is mild bilateral hilar adenopathy. Additionally there are multiple lytic bone lesions throughout the spine ribs and scapula consistent with metastatic disease.    Electronically Signed By-Eliecer Jara MD On:5/21/2024 10:11 PM      XR Chest 1 View    Result Date: 5/20/2024  Impression:  1. Questionable 14 mm nodule projecting in the left perihilar region. Follow-up CT scan of the chest would be recommended when clinically feasible.   Electronically Signed By-Eliecer Jara MD On:5/20/2024 10:23 PM      XR Femur 2 View Right    Result Date: 5/20/2024  (COMBINED) Acute comminuted displaced intertrochanteric right femoral fracture is noted, as discussed. No dislocation.     Please note that portions of this note were completed with a voice recognition program.      Electronically Signed By-Panda Howard MD On:5/20/2024 10:21 PM      XR Pelvis 1 or 2 View    Result Date: 5/20/2024  (COMBINED) Acute comminuted displaced intertrochanteric right femoral fracture is noted, as discussed. No dislocation.     Please  note that portions of this note were completed with a voice recognition program.      Electronically Signed By-Panda Howard MD On:5/20/2024 10:21 PM       Assessment / Plan     Summary: 44M with intellectual disability, 1-2PPD smoking, depression, who presented after a fall (possibly 1 month ago but may have been more recently), found to have right femoral fracture and VLAD.  Ortho assisting with initial surgical treatment.  Initial lung mass concerning on chest x-ray additional CT imaging with suspected new metastatic colon cancer with mets to the bone, pulmonology consulted patient had a biopsy results pending.  Bronchoscopy also with findings of Haemophilus influenzae treatment with antibiotics.  Palliative assisting.  Working on rehab placement guarded long-term prognosis    Assessment/Plan (clinically significant if listed here)  Mechanical fall with acute comminuted displaced intertrochanteric right femoral fracture  S/p 5/21 right hip subtrochanteric nailing of closed displaced right femur fracture by Dr. Nelson  VLAD creatinine 1.7 leukocytosis suspect reactive in setting of acute fracture  Suspected new metastatic lung cancer with mets to the bone  Mediastinal lymphadenopathy  Haemophilus influenzae pneumonia   1 to 2 pack/day smoker, chronic  Intellectual disability  Depression  Normocytic anemia  Poor social support    Unasyn 5/24 for haemophilus influenza, transition to Augmentin to complete course.  White count normalized  Continue respiratory hygiene  Scheduled Tylenol and lidocaine patch, as needed and p.o. pain medication for breakthrough pain  Multiple myeloma labs negative, biopsy results still pending, will need follow-up with oncology/Dr. Johnson after rehab   Monitor for aspiration  Appreciate oncology assistance  Inhalers, respiratory hygiene, aspiration precautions  Replace potassium magnesium monitor electrolytes as needed  Continue postoperative monitoring for fracture appreciate orthopedic  surgery assistance  No overt bleeding, hb stabilized postoperatively near mid 8, cont mvi, continue to monitor closely  Nrt prn / smoking cessation   Palliative care consult, guarded prognosis given patient's advanced disease baseline debility, poor social support/intellectual disability  PT/OT  Continue hospitalization at current level of care  CBC, CMP reviewed  Repeat CBC, CMP, mag and Phos in a.m.    Dispo: Pending rehab placement   D/w clinical      DVT prophylaxis:  Mechanical DVT prophylaxis orders are present.      Level Of Support Discussed With: Patient  Code Status (Patient has no pulse and is not breathing): CPR (Attempt to Resuscitate)  Medical Interventions (Patient has pulse or is breathing): Full Support      Electronically signed by Molina Du MD, 5/28/2024, 13:28 EDT.

## 2024-05-28 NOTE — PLAN OF CARE
Goal Outcome Evaluation:  Plan of Care Reviewed With: patient        Progress: no change  Outcome Evaluation: PT vss. Alert and oriented x4. Up to the chair for a few hours today. No c/o pain. Continue plan of care

## 2024-05-28 NOTE — PLAN OF CARE
Goal Outcome Evaluation:      No acute events overnight, VSS, patient given Norco prior to sleep for pain 5/10. Patient tolerated it well. Patient voiced no concerns.

## 2024-05-29 ENCOUNTER — APPOINTMENT (OUTPATIENT)
Dept: CT IMAGING | Facility: HOSPITAL | Age: 44
DRG: 477 | End: 2024-05-29
Payer: COMMERCIAL

## 2024-05-29 LAB
ALBUMIN SERPL-MCNC: 2.8 G/DL (ref 3.5–5.2)
ALBUMIN/GLOB SERPL: 0.8 G/DL
ALP SERPL-CCNC: 157 U/L (ref 39–117)
ALT SERPL W P-5'-P-CCNC: 51 U/L (ref 1–41)
ANION GAP SERPL CALCULATED.3IONS-SCNC: 9.3 MMOL/L (ref 5–15)
AST SERPL-CCNC: 25 U/L (ref 1–40)
BACTERIA SPEC AEROBE CULT: NORMAL
BACTERIA SPEC AEROBE CULT: NORMAL
BASOPHILS # BLD AUTO: 0.03 10*3/MM3 (ref 0–0.2)
BASOPHILS NFR BLD AUTO: 0.3 % (ref 0–1.5)
BILIRUB SERPL-MCNC: 0.2 MG/DL (ref 0–1.2)
BUN SERPL-MCNC: 13 MG/DL (ref 6–20)
BUN/CREAT SERPL: 28.3 (ref 7–25)
CALCIUM SPEC-SCNC: 9 MG/DL (ref 8.6–10.5)
CHLORIDE SERPL-SCNC: 97 MMOL/L (ref 98–107)
CO2 SERPL-SCNC: 27.7 MMOL/L (ref 22–29)
CREAT SERPL-MCNC: 0.46 MG/DL (ref 0.76–1.27)
CYTO UR: NORMAL
DEPRECATED RDW RBC AUTO: 44.4 FL (ref 37–54)
EGFRCR SERPLBLD CKD-EPI 2021: 132.3 ML/MIN/1.73
EOSINOPHIL # BLD AUTO: 0.14 10*3/MM3 (ref 0–0.4)
EOSINOPHIL NFR BLD AUTO: 1.4 % (ref 0.3–6.2)
ERYTHROCYTE [DISTWIDTH] IN BLOOD BY AUTOMATED COUNT: 13.5 % (ref 12.3–15.4)
GLOBULIN UR ELPH-MCNC: 3.3 GM/DL
GLUCOSE SERPL-MCNC: 99 MG/DL (ref 65–99)
HCT VFR BLD AUTO: 28 % (ref 37.5–51)
HGB BLD-MCNC: 8.6 G/DL (ref 13–17.7)
IMM GRANULOCYTES # BLD AUTO: 0.13 10*3/MM3 (ref 0–0.05)
IMM GRANULOCYTES NFR BLD AUTO: 1.3 % (ref 0–0.5)
LAB AP CASE REPORT: NORMAL
LAB AP CLINICAL INFORMATION: NORMAL
LAB AP DIAGNOSIS COMMENT: NORMAL
LYMPHOCYTES # BLD AUTO: 1.54 10*3/MM3 (ref 0.7–3.1)
LYMPHOCYTES NFR BLD AUTO: 15.6 % (ref 19.6–45.3)
MAGNESIUM SERPL-MCNC: 2 MG/DL (ref 1.6–2.6)
MCH RBC QN AUTO: 27.8 PG (ref 26.6–33)
MCHC RBC AUTO-ENTMCNC: 30.7 G/DL (ref 31.5–35.7)
MCV RBC AUTO: 90.6 FL (ref 79–97)
MONOCYTES # BLD AUTO: 1.17 10*3/MM3 (ref 0.1–0.9)
MONOCYTES NFR BLD AUTO: 11.9 % (ref 5–12)
NEUTROPHILS NFR BLD AUTO: 6.85 10*3/MM3 (ref 1.7–7)
NEUTROPHILS NFR BLD AUTO: 69.5 % (ref 42.7–76)
NRBC BLD AUTO-RTO: 0 /100 WBC (ref 0–0.2)
PATH REPORT.FINAL DX SPEC: NORMAL
PATH REPORT.GROSS SPEC: NORMAL
PHOSPHATE SERPL-MCNC: 3.7 MG/DL (ref 2.5–4.5)
PLATELET # BLD AUTO: 458 10*3/MM3 (ref 140–450)
PMV BLD AUTO: 9.5 FL (ref 6–12)
POTASSIUM SERPL-SCNC: 4.2 MMOL/L (ref 3.5–5.2)
PROT SERPL-MCNC: 6.1 G/DL (ref 6–8.5)
RBC # BLD AUTO: 3.09 10*6/MM3 (ref 4.14–5.8)
SODIUM SERPL-SCNC: 134 MMOL/L (ref 136–145)
WBC NRBC COR # BLD AUTO: 9.86 10*3/MM3 (ref 3.4–10.8)

## 2024-05-29 PROCEDURE — 97530 THERAPEUTIC ACTIVITIES: CPT

## 2024-05-29 PROCEDURE — 99232 SBSQ HOSP IP/OBS MODERATE 35: CPT | Performed by: INTERNAL MEDICINE

## 2024-05-29 PROCEDURE — 94799 UNLISTED PULMONARY SVC/PX: CPT

## 2024-05-29 PROCEDURE — 84100 ASSAY OF PHOSPHORUS: CPT | Performed by: INTERNAL MEDICINE

## 2024-05-29 PROCEDURE — 80053 COMPREHEN METABOLIC PANEL: CPT | Performed by: INTERNAL MEDICINE

## 2024-05-29 PROCEDURE — 94664 DEMO&/EVAL PT USE INHALER: CPT

## 2024-05-29 PROCEDURE — 83735 ASSAY OF MAGNESIUM: CPT | Performed by: INTERNAL MEDICINE

## 2024-05-29 PROCEDURE — 0PB13ZX EXCISION OF 1 TO 2 RIBS, PERCUTANEOUS APPROACH, DIAGNOSTIC: ICD-10-PCS | Performed by: RADIOLOGY

## 2024-05-29 PROCEDURE — 85025 COMPLETE CBC W/AUTO DIFF WBC: CPT | Performed by: INTERNAL MEDICINE

## 2024-05-29 PROCEDURE — 97110 THERAPEUTIC EXERCISES: CPT

## 2024-05-29 PROCEDURE — 99233 SBSQ HOSP IP/OBS HIGH 50: CPT | Performed by: INTERNAL MEDICINE

## 2024-05-29 RX ADMIN — Medication 5 MG: at 22:23

## 2024-05-29 RX ADMIN — Medication 100 MG: at 08:21

## 2024-05-29 RX ADMIN — BUDESONIDE AND FORMOTEROL FUMARATE DIHYDRATE 2 PUFF: 160; 4.5 AEROSOL RESPIRATORY (INHALATION) at 20:29

## 2024-05-29 RX ADMIN — CYANOCOBALAMIN TAB 500 MCG 1000 MCG: 500 TAB at 08:21

## 2024-05-29 RX ADMIN — Medication 10 ML: at 20:24

## 2024-05-29 RX ADMIN — LIDOCAINE 1 PATCH: 560 PATCH PERCUTANEOUS; TOPICAL; TRANSDERMAL at 08:21

## 2024-05-29 RX ADMIN — SENNOSIDES AND DOCUSATE SODIUM 2 TABLET: 50; 8.6 TABLET ORAL at 20:24

## 2024-05-29 RX ADMIN — HYDROCODONE BITARTRATE AND ACETAMINOPHEN 1 TABLET: 5; 325 TABLET ORAL at 08:25

## 2024-05-29 RX ADMIN — Medication 400 MG: at 08:21

## 2024-05-29 RX ADMIN — HYDROCODONE BITARTRATE AND ACETAMINOPHEN 1 TABLET: 10; 325 TABLET ORAL at 22:15

## 2024-05-29 RX ADMIN — BUDESONIDE AND FORMOTEROL FUMARATE DIHYDRATE 2 PUFF: 160; 4.5 AEROSOL RESPIRATORY (INHALATION) at 09:01

## 2024-05-29 RX ADMIN — Medication 1 TABLET: at 08:21

## 2024-05-29 RX ADMIN — Medication 10 ML: at 08:21

## 2024-05-29 RX ADMIN — SENNOSIDES AND DOCUSATE SODIUM 2 TABLET: 50; 8.6 TABLET ORAL at 08:25

## 2024-05-29 RX ADMIN — TIOTROPIUM BROMIDE INHALATION SPRAY 2 PUFF: 3.12 SPRAY, METERED RESPIRATORY (INHALATION) at 09:01

## 2024-05-29 NOTE — PROGRESS NOTES
[facility]   Hospitalist Progress Note    Date of admission: 5/20/2024  Patient Name: Oswaldo Bowen  1980  Date: 5/29/2024      Subjective     Chief Complaint   Patient presents with   • Fall   • Leg Pain       Interval Followup: No acute events overnight, patient resting comfortably in bed, pathology returned negative for bronchoscopy, normal lymphoid tissue, being set up for bone biopsy today    Objective     Vitals:   Temp:  [97.5 °F (36.4 °C)-98.9 °F (37.2 °C)] 97.5 °F (36.4 °C)  Heart Rate:  [105-120] 105  Resp:  [16-18] 16  BP: (105-121)/(69-80) 105/80    Physical Exam  GEN: No acute distress  HEENT: Moist mucous membranes  LUNGS: Equal chest rise bilaterally  CARDIAC: Regular rate and rhythm  NEURO: Moving all 4 extremities spontaneously  SKIN: No obvious breakdown    Result Review:  Vital signs, labs and recent relevant imaging reviewed.      CBC          5/26/2024    05:44 5/27/2024    05:48 5/29/2024    06:22   CBC   WBC 11.08  9.72  9.86    RBC 2.96  3.06  3.09    Hemoglobin 8.2  8.4  8.6    Hematocrit 26.9  27.4  28.0    MCV 90.9  89.5  90.6    MCH 27.7  27.5  27.8    MCHC 30.5  30.7  30.7    RDW 13.2  13.2  13.5    Platelets 394  418  458      CMP          5/26/2024    05:44 5/27/2024    05:48 5/29/2024    06:22   CMP   Glucose 113  100  99    BUN 12  11  13    Creatinine 0.39  0.38  0.46    EGFR 139.0  140.1  132.3    Sodium 137  135  134    Potassium 3.9  3.9  4.2    Chloride 100  98  97    Calcium 8.6  8.8  9.0    Total Protein   6.1    Albumin   2.8    Globulin   3.3    Total Bilirubin   0.2    Alkaline Phosphatase   157    AST (SGOT)   25    ALT (SGPT)   51    Albumin/Globulin Ratio   0.8    BUN/Creatinine Ratio 30.8  28.9  28.3    Anion Gap 9.4  8.7  9.3        •  acetaminophen  •  aluminum-magnesium hydroxide-simethicone  •  senna-docusate sodium **AND** polyethylene glycol **AND** bisacodyl **AND** bisacodyl  •  calcium carbonate  •  dextrose  •  dextrose  •  Diclofenac Sodium  •   glucagon (human recombinant)  •  HYDROcodone-acetaminophen  •  HYDROcodone-acetaminophen  •  hydrOXYzine  •  levalbuterol  •  Lidocaine  •  melatonin  •  [DISCONTINUED] Morphine **AND** naloxone  •  nicotine  •  ondansetron  •  ondansetron ODT **OR** [DISCONTINUED] ondansetron  •  prochlorperazine  •  sodium chloride  •  sodium chloride  •  sodium chloride  •  sodium chloride    budesonide-formoterol, 2 puff, Inhalation, BID - RT  Lidocaine, 1 patch, Transdermal, Q24H  magnesium oxide, 400 mg, Oral, Daily  multivitamin with minerals, 1 tablet, Oral, Daily  senna-docusate sodium, 2 tablet, Oral, BID  sodium chloride, 10 mL, Intravenous, Q12H  thiamine, 100 mg, Oral, Daily  tiotropium bromide monohydrate, 2 puff, Inhalation, Daily - RT  vitamin B-12, 1,000 mcg, Oral, Daily        MRI Brain With & Without Contrast    Result Date: 5/22/2024  Impression:  1. No acute intracranial abnormality. No evidence of intracranial metastatic disease. 2. No pathologic contrast enhancement. 3. Multiple enhancing lesions within the skull compatible with bony metastatic disease.    Electronically Signed By-Eliecer Jara MD On:5/22/2024 10:57 PM      CT Abdomen Pelvis With Contrast    Result Date: 5/22/2024   1. The study is ABNORMAL.  2. Diffuse osseous metastatic disease is seen. Impending pathologic fractures at L3 and involving the left femoral head are possible. Epidural extension of tumor involving the spine cannot be excluded. For instance, there may be epidural extension of tumor involving the spine, especially at T5, on the right at T11-12, on the left at L1, and probably at L3. Degenerative changes also involve the imaged spine, probably greatest at L4-5 with moderate spinal canal narrowing.  3. There is a moderate-to-large stool burden. No mechanical bowel obstruction or pneumoperitoneum. No acute appendicitis. The oral contrast preparation is limited.  4. Bilateral nonobstructing renal calyceal stones are seen, which  measure 5 mm or less in size. No obstructive uropathy is seen due to ureterolithiasis. No acute pyelonephritis. No definite suspicious renal mass.  5. There is nonspecific distention of the urinary bladder (566 mL estimated total volume). Please correlate clinically. Consider decompression of the bladder with catheter placement if clinically indicated. No urinary bladder wall thickening or urinary bladder calculi.  6. The patient has undergone recent open reduction and internal fixation (ORIF) of a comminuted intertrochanteric likely pathologic right femoral fracture with expected postoperative changes in the overlying soft tissues.  7. No definite hepatic metastases are seen. Hepatomegaly is suspected.  8. No adrenal mass is appreciated.  9. There are several artifacts on the study. The best possible images were obtained.  10. Please see above comments for further detail.    Please note that portions of this note were completed with a voice recognition program.        Electronically Signed By-Panda Howard MD On:5/22/2024 9:33 PM      CT Chest With Contrast Diagnostic    Result Date: 5/21/2024  Impression:  1. Left upper lobe noncalcified pulmonary nodule measuring 2.1 cm in size suspicious for primary malignancy. There is mild bilateral hilar adenopathy. Additionally there are multiple lytic bone lesions throughout the spine ribs and scapula consistent with metastatic disease.    Electronically Signed By-Eliecer Jara MD On:5/21/2024 10:11 PM       Assessment / Plan     Summary: 44M with intellectual disability, 1-2PPD smoking, depression, who presented after a fall (possibly 1 month ago but may have been more recently), found to have right femoral fracture and VLAD.  Ortho assisting with initial surgical treatment.  Initial lung mass concerning on chest x-ray additional CT imaging with suspected new metastatic colon cancer with mets to the bone, pulmonology consulted patient had a biopsy results pending.   Bronchoscopy also with findings of Haemophilus influenzae treatment with antibiotics.  Palliative assisting.  Working on rehab placement guarded long-term prognosis    Assessment/Plan (clinically significant if listed here)  Mechanical fall with acute comminuted displaced intertrochanteric right femoral fracture  S/p 5/21 right hip subtrochanteric nailing of closed displaced right femur fracture by Dr. Nelson  VLAD creatinine 1.7 leukocytosis suspect reactive in setting of acute fracture  Suspected new metastatic lung cancer with mets to the bone  Mediastinal lymphadenopathy  Haemophilus influenzae pneumonia   1 to 2 pack/day smoker, chronic  Intellectual disability  Depression  Normocytic anemia    Completed antibiotics  Continue respiratory hygiene  Scheduled Tylenol and lidocaine patch, as needed and p.o. pain medication for breakthrough pain  Multiple myeloma labs negative, bronchoscopy/EBUS negative for malignancy, bone biopsy scheduled for today  Monitor for aspiration  Appreciate oncology assistance, will likely require outpatient PET scan  Inhalers, respiratory hygiene, aspiration precautions  Replace potassium magnesium monitor electrolytes as needed  Continue postoperative monitoring for fracture appreciate orthopedic surgery assistance  No overt bleeding, hb stabilized postoperatively near mid 8, cont mvi, continue to monitor closely  Nrt prn / smoking cessation   Continue PT/OT  Continue hospitalization at current level of care  CBC, CMP reviewed 5/29/2024   Repeat CBC, CMP, mag and Phos in a.m. 5/29/2024     Dispo: Pending rehab placement     DVT prophylaxis:  Mechanical DVT prophylaxis orders are present.      Level Of Support Discussed With: Patient  Code Status (Patient has no pulse and is not breathing): CPR (Attempt to Resuscitate)  Medical Interventions (Patient has pulse or is breathing): Full Support      Electronically signed by Molina Du MD, 5/29/2024, 13:52 EDT.

## 2024-05-29 NOTE — PLAN OF CARE
Goal Outcome Evaluation:  Plan of Care Reviewed With: patient        Progress: no change  Outcome Evaluation: Pt vss. Pt alert and oriented x4. Pt medicated for pain once with norco. Continue plan of care.

## 2024-05-29 NOTE — SIGNIFICANT NOTE
05/29/24 1000   Coping/Psychosocial   Observed Emotional State calm;cooperative   Verbalized Emotional State hopefulness   Trust Relationship/Rapport empathic listening provided   Involvement in Care interacting with patient   Additional Documentation Spiritual Care (Group)   Spiritual Care   Use of Spiritual Resources non-P[employer]ec[employer]al use of spiritual care   Spiritual Care Source  initiative   Spiritual Care Follow-Up follow-up, none required as presently assessed   Response to Spiritual Care receptive of support   Spiritual Care Interventions supportive conversation provided   Spiritual Care Visit Type initial   Receptivity to Spiritual Care visit welcomed

## 2024-05-29 NOTE — PROGRESS NOTES
Pulmonary / Critical Care Progress Note      Patient Name: Oswaldo Bowen  : 1980  MRN: [id number]  Primary Care Physician:  Provider, No Known  Date of admission: 2024    Subjective   Subjective   Follow-up for left upper lobe lung nodule with mediastinal adenopathy, chronic heavy smoking, unintentional weight loss.    Bronchoscopy pathology came back negative for malignancy.  Patient remains on room air.  Discussed the case with radiology.  Minimal dyspnea at baseline  No coughing  No fever or chills.  No nausea or vomiting.  Has been bedridden for most part.      Objective   Objective     Vitals:   Temp:  [98.1 °F (36.7 °C)-98.9 °F (37.2 °C)] 98.9 °F (37.2 °C)  Heart Rate:  [101-120] 111  Resp:  [18] 18  BP: (107-121)/(69-78) 112/73  Physical Exam   Vital Signs Reviewed   General:  WDWN, Alert, in no distress  Chest:  good aeration, clear to auscultation bilaterally, tympanic to percussion bilaterally, no work of breathing noted  CV: RRR, no MGR, pulses 2+, equal.  Abd:  Soft, NT, ND, + BS, no HSM  EXT:  no clubbing, no cyanosis, no edema  Neuro:  A&Ox3, CN grossly intact, no focal deficits.  Skin: No rashes or lesions noted      Result Review    Result Review:  I have personally reviewed the results from the time of this admission to 2024 07:36 EDT and agree with these findings:  [x]  Laboratory  [x]  Microbiology  [x]  Radiology  [x]  EKG/Telemetry   [x]  Cardiology/Vascular   []  Pathology  []  Old records  []  Other:  Most notable findings include:         Lab 24  0622 24  0548 24  0544 24  0918 24  1030 24  0656 24  1535   WBC 9.86 9.72 11.08* 11.02* 11.76* 9.53  --    HEMOGLOBIN 8.6* 8.4* 8.2* 8.6* 8.8* 9.5*  --    HEMATOCRIT 28.0* 27.4* 26.9* 27.3* 27.8* 29.9*  --    PLATELETS 458* 418 394 369 383 357  --    SODIUM  --  135* 137 136 136 135*  --    POTASSIUM  --  3.9 3.9 3.7 3.8 3.1*  --    CHLORIDE  --  98 100 97* 98 98  --    CO2  --  28.3 27.6  27.8 27.7 25.4  --    BUN  --  11 12 10 9 9  --    CREATININE  --  0.38* 0.39* 0.50* 0.49* 0.49*  --    GLUCOSE  --  100* 113* 188* 131* 112*  --    CALCIUM  --  8.8 8.6 8.5* 8.6 8.3*  --    PHOSPHORUS  --  4.0 3.8  --  2.7 3.4  --    ALBUMIN  --   --   --   --   --   --  2.2*            CT Chest With Contrast Diagnostic    Result Date: 5/21/2024  CT CHEST W CONTRAST DIAGNOSTIC-  Date of Exam: 5/21/2024 9:01 PM  Indication: 14mm perihilar nodule, 1-2 ppd extensive smoking hx; S72.141A-Displaced intertrochanteric fracture of right femur, initial encounter for closed fracture; R91.1-Solitary pulmonary nodule.  Comparison: Chest radiograph 5/20/2024  Technique: Axial CT images were obtained of the chest after the uneventful intravenous administration of 75 cc Isovue-[id number] . Reconstructed coronal and sagittal images were also obtained. Automated exposure control and iterative construction methods were used.   Findings: No coronary artery calcification. There is no mediastinal adenopathy. There is a right hilar node measuring 2.5 cm and a left hilar node measuring 2.1 cm. No axillary adenopathy. There are no pleural effusions. Within the perihilar region of the left upper lobe there is a noncalcified macro lobular 2.1 x 1.9 cm noncalcified pulmonary nodule suspicious for malignancy. Within the posterior right lower lobe there is a subpleural 12 x 5 mm nodule which is nonspecific. No other pulmonary nodules. No other focal airspace consolidation.  Bilateral adrenal glands are within normal limits.  There are multiple lytic bone lesions throughout the spine and ribs. Findings would be consistent with metastatic disease or myeloma. There are additional lytic lesions within both scapula.      Impression: Impression:  1. Left upper lobe noncalcified pulmonary nodule measuring 2.1 cm in size suspicious for primary malignancy. There is mild bilateral hilar adenopathy. Additionally there are multiple lytic bone lesions throughout  the spine ribs and scapula consistent with metastatic disease.    Electronically Signed By-Eliecer Jara MD On:5/21/2024 10:11 PM         Assessment & Plan   Assessment / Plan     Active Hospital Problems:  Active Hospital Problems    Diagnosis    • **Closed intertrochanteric fracture of right femur    • Pulmonary nodule          Impression:   Acute comminuted displaced intertrochanteric right femoral fracture  Status post IM nailing  Left upper lobe lung nodule with mediastinal lymphadenopathy  Chronic heavy smoking  Unintentional weight loss    Plan:   Status post bronchoscopy with EBUS.  Pathology from mediastinal lymph nodes were all negative for malignancy.  2.1 cm lung nodule could be possible target.  However patient has bony metastasis.  I discussed the case with radiology service.  Patient can have bone biopsy tomorrow.  We will try to schedule for bone biopsy with radiology service tomorrow.  Imaging still very concerning and consistent with metastatic cancer.  Check coags in the morning.  Completed 5 days of Unasyn for haemophilus pneumonia growing on bronchoscopy BAL  Continue Symbicort and Spiriva, albuterol as needed.    On room air  Encourage activity and incentive spirometer use  Pain control per primary service.  Postop surgical care per Ortho service.    DVT prophylaxis:  Mechanical DVT prophylaxis orders are present.    CODE STATUS:   Level Of Support Discussed With: Patient  Code Status (Patient has no pulse and is not breathing): CPR (Attempt to Resuscitate)  Medical Interventions (Patient has pulse or is breathing): Full Support    I have reviewed labs, imaging, pertinent clinical data and provider notes.   I have discussed with bedside nurse and primary service.     Electronically signed by Kendell Stern MD, 5/29/2024, 07:36 EDT.

## 2024-05-29 NOTE — THERAPY TREATMENT NOTE
Acute Care - Physical Therapy Treatment Note   [place]     Patient Name: Oswaldo Bowen  : 1980  MRN: [id number]  Today's Date: 2024      Visit Dx:     ICD-10-CM ICD-9-CM   1. Closed displaced intertrochanteric fracture of right femur, initial encounter  S72.141A 820.21   2. Pulmonary nodule  R91.1 793.11   3. Difficulty walking  R26.2 719.7   4. Dysphagia, oropharyngeal  R13.12 787.22     Patient Active Problem List   Diagnosis    Closed intertrochanteric fracture of right femur    Pulmonary nodule     History reviewed. No pertinent past medical history.  Past Surgical History:   Procedure Laterality Date    BRONCHOSCOPY N/A 2024    Procedure: BRONCHOSCOPY WITH ENDOBRONCHIAL ULTRASOUND, FINE NEEDLE ASPIRATE, BIOPSIES, BRUSHINGS, BRONCHOALVEOLAR LAVAGE;  Surgeon: Kendell Stern MD;  Location: [facility] ENDOSCOPY;  Service: Pulmonary;  Laterality: N/A;  LUNG NODULE, MUCOUS PLUGGING    HIP INTERTROCHANTERIC NAILING Right 2024    Procedure: HIP INTERTROCHANTERIC NAILING;  Surgeon: Molina Clayton MD;  Location: [facility] MAIN OR;  Service: Orthopedics;  Laterality: Right;     PT Assessment (Last 12 Hours)       PT Evaluation and Treatment       Row Name 24 1044          Physical Therapy Time and Intention    Subjective Information complains of;weakness;fatigue;pain  -DK     Document Type therapy note (daily note)  -DK     Mode of Treatment individual therapy;physical therapy  -DK     Patient Effort good  -DK     Symptoms Noted During/After Treatment fatigue;increased pain  -DK       Row Name 24 1044          Pain    Pretreatment Pain Rating 0/10 - no pain  -DK     Posttreatment Pain Rating 3/10  -DK     Pain Location - Side/Orientation Right  -DK     Pain Location generalized  -DK     Pain Location - hip;knee  -DK     Pain Intervention(s) Repositioned;Ambulation/increased activity;Distraction;Therapeutic presence  -       Row Name 24 1044          Cognition    Affect/Mental Status  (Cognition) confused;anxious;flat/blunted affect  -DK     Behavioral Issues (Cognition) overwhelmed easily;difficulty managing stress  -DK     Orientation Status (Cognition) oriented to;person  -DK     Follows Commands (Cognition) physical/tactile prompts required;repetition of directions required;verbal cues/prompting required  -DK     Cognitive Function attention deficit  -DK     Attention Deficit (Cognition) minimal deficit;concentration;focused/sustained attention  -DK     Personal Safety Interventions gait belt;nonskid shoes/slippers when out of bed;supervised activity  -       Row Name 05/29/24 1044          Mobility    Extremity Weight-bearing Status right lower extremity  -DK     Right Lower Extremity (Weight-bearing Status) weight-bearing as tolerated (WBAT)  -       Row Name 05/29/24 1044          Bed Mobility    Bed Mobility supine-sit  -DK     Supine-Sit [place] (Bed Mobility) minimum assist (75% patient effort);1 person assist;contact guard  -     Bed Mobility, Safety Issues decreased use of legs for bridging/pushing  -DK     Assistive Device (Bed Mobility) bed rails;draw sheet  -       Row Name 05/29/24 1044          Transfers    Transfers sit-stand transfer;stand-sit transfer  -       Row Name 05/29/24 1044          Bed-Chair Transfer    Bed-Chair [place] (Transfers) minimum assist (75% patient effort);1 person assist;contact guard  -     Assistive Device (Bed-Chair Transfers) walker, front-wheeled  -       Row Name 05/29/24 1044          Sit-Stand Transfer    Sit-Stand [place] (Transfers) minimum assist (75% patient effort);1 person assist;contact guard  -     Assistive Device (Sit-Stand Transfers) walker, front-wheeled  -       Row Name 05/29/24 1044          Stand-Sit Transfer    Stand-Sit [place] (Transfers) minimum assist (75% patient effort);1 person assist;contact guard  -     Assistive Device (Stand-Sit Transfers) walker, front-wheeled  -       Row  Name 05/29/24 1044          Stand Pivot/Stand Step Transfer    Stand Pivot/Stand Step [place] (Transfers) minimum assist (75% patient effort);1 person assist;contact guard  -DK     Assistive Device (Stand Pivot Stand Step Transfer) walker, front-wheeled  -DK       Row Name 05/29/24 1044          Gait/Stairs (Locomotion)    Gait/Stairs Locomotion gait/ambulation independence;gait/ambulation assistive device;distance ambulated;gait pattern  -DK     [place] Level (Gait) minimum assist (75% patient effort);1 person assist;contact guard  -DK     Assistive Device (Gait) walker, front-wheeled  -DK     Distance in Feet (Gait) 5  -DK     Pattern (Gait) step-to  -DK     Deviations/Abnormal Patterns (Gait) shannon decreased;festinating/shuffling;gait speed decreased;stride length decreased  -DK     Bilateral Gait Deviations forward flexed posture  -DK     Comment, (Gait/Stairs) Pt stood from a slightly elevated bed level and ambulated with a rolling walker on room air, with assist x 1 person.  He was left in the recliner on alert post treatment.  -DK       Row Name 05/29/24 1044          Safety Issues, Functional Mobility    Safety Issues Affecting Function (Mobility) judgment;safety precaution awareness  -DK     Impairments Affecting Function (Mobility) balance;cognition;endurance/activity tolerance;pain;range of motion (ROM);strength  -DK     Cognitive Impairments, Mobility Safety/Performance judgment;safety precaution awareness  -DK       Row Name 05/29/24 1044          Balance    Balance Assessment sitting static balance;sitting dynamic balance;standing static balance;standing dynamic balance  -DK     Static Sitting Balance standby assist  -DK     Dynamic Sitting Balance standby assist  -DK     Position, Sitting Balance unsupported;sitting edge of bed;sitting in chair  -DK     Static Standing Balance contact guard;1-person assist  -DK     Dynamic Standing Balance contact guard;minimal assist;1-person assist  -DK      Position/Device Used, Standing Balance walker, front-wheeled  -     Balance Interventions standing;dynamic;supported;tandem gait  -       Row Name 05/29/24 1044          Motor Skills    Motor Skills --  therapeutic exercises  -     Coordination WFL  -     Therapeutic Exercise hip;knee;ankle  -       Row Name 05/29/24 1044          Hip (Therapeutic Exercise)    Hip (Therapeutic Exercise) AAROM (active assistive range of motion)  -     Hip AAROM (Therapeutic Exercise) bilateral;flexion;extension;aBduction;aDduction;supine;10 repetitions;2 sets  -       Row Name 05/29/24 1044          Knee (Therapeutic Exercise)    Knee (Therapeutic Exercise) AAROM (active assistive range of motion)  -     Knee AAROM (Therapeutic Exercise) bilateral;flexion;extension;supine;10 repetitions;2 sets  -       Row Name 05/29/24 1044          Ankle (Therapeutic Exercise)    Ankle (Therapeutic Exercise) AAROM (active assistive range of motion)  -     Ankle AAROM (Therapeutic Exercise) dorsiflexion;bilateral;plantarflexion;supine;10 repetitions;2 sets  -       Row Name             Wound 05/21/24 0251 Bilateral coccyx    Wound - Properties Group Placement Date: 05/21/24  -SR Placement Time: 0251  -SR Present on Original Admission: Y  -SR Side: Bilateral  -SR Location: coccyx  -SR    Retired Wound - Properties Group Placement Date: 05/21/24  -SR Placement Time: 0251  -SR Present on Original Admission: Y  -SR Side: Bilateral  -SR Location: coccyx  -SR    Retired Wound - Properties Group Date first assessed: 05/21/24  -SR Time first assessed: 0251  -SR Present on Original Admission: Y  -SR Side: Bilateral  -SR Location: coccyx  -SR      Row Name             Wound 05/21/24 Right lateral thigh Incision    Wound - Properties Group Placement Date: 05/21/24  -SF Present on Original Admission: N  -SF Side: Right  -SF Orientation: lateral  -SF Location: thigh  -SF Primary Wound Type: Incision  -SF Additional Comments: incision  sites x 3 to lateral right thigh  -SF    Retired Wound - Properties Group Placement Date: 05/21/24  -SF Present on Original Admission: N  -SF Side: Right  -SF Orientation: lateral  -SF Location: thigh  -SF Primary Wound Type: Incision  -SF Additional Comments: incision sites x 3 to lateral right thigh  -SF    Retired Wound - Properties Group Date first assessed: 05/21/24  -SF Present on Original Admission: N  -SF Side: Right  -SF Location: thigh  -SF Primary Wound Type: Incision  -SF Additional Comments: incision sites x 3 to lateral right thigh  -SF      Row Name 05/29/24 1044          Plan of Care Review    Plan of Care Reviewed With patient  -DK     Progress improving  -DK       Row Name 05/29/24 1044          Positioning and Restraints    Pre-Treatment Position in bed  -DK     Post Treatment Position chair  -DK     In Chair reclined;call light within reach;encouraged to call for assist;exit alarm on;legs elevated;heels elevated;waffle cushion  -DK       Row Name 05/29/24 1044          Therapy Assessment/Plan (PT)    Rehab Potential (PT) fair, will monitor progress closely  -DK     Criteria for Skilled Interventions Met (PT) skilled treatment is necessary  -DK     Therapy Frequency (PT) daily  -DK     Problem List (PT) problems related to;balance;cognition;mobility;range of motion (ROM);strength;pain;hearing;communication  -DK     Activity Limitations Related to Problem List (PT) unable to ambulate safely;unable to transfer safely  -DK       Row Name 05/29/24 1044          Progress Summary (PT)    Progress Toward Functional Goals (PT) progress toward functional goals is fair  -DK               User Key  (r) = Recorded By, (t) = Taken By, (c) = Cosigned By      Initials Name Provider Type    Saritha Boudreaux, RN Registered Nurse    Sheila Maharaj PTA Physical Therapist Assistant    Saritha Becker RN Registered Nurse                    Physical Therapy Education       Title: PT OT SLP Therapies (In  Progress)       Topic: Physical Therapy (In Progress)       Point: Mobility training (Done)       Learning Progress Summary             Patient Eager, E, VU by  at 5/23/2024 2223    Acceptance, E,TB, VU by  at 5/22/2024 1339                         Point: Home exercise program (Not Started)       Learner Progress:  Not documented in this visit.              Point: Body mechanics (Done)       Learning Progress Summary             Patient Acceptance, E,TB, VU by  at 5/22/2024 1339                         Point: Precautions (Done)       Learning Progress Summary             Patient Acceptance, E,TB, VU by  at 5/22/2024 1339                                         User Key       Initials Effective Dates Name Provider Type Discipline     06/11/21 -  Eh Martínez, JERROD Physical Therapist PT     05/31/23 -  Angela Casillas, RN Registered Nurse Nurse                  PT Recommendation and Plan  Planned Therapy Interventions (PT): balance training, bed mobility training, gait training, strengthening, transfer training  Therapy Frequency (PT): daily  Progress Summary (PT)  Progress Toward Functional Goals (PT): progress toward functional goals is fair  Plan of Care Reviewed With: patient  Progress: improving   Outcome Measures       Row Name 05/29/24 1044 05/28/24 1019 05/27/24 1009       How much help from another person do you currently need...    Turning from your back to your side while in flat bed without using bedrails? 3  -DK 3  -DK 3  -DK    Moving from lying on back to sitting on the side of a flat bed without bedrails? 3  -DK 3  -DK 3  -DK    Moving to and from a bed to a chair (including a wheelchair)? 3  -DK 2  -DK 2  -DK    Standing up from a chair using your arms (e.g., wheelchair, bedside chair)? 3  -DK 2  -DK 2  -DK    Climbing 3-5 steps with a railing? 2  -DK 1  -DK 1  -DK    To walk in hospital room? 3  -DK 2  -DK 2  -DK    AM-PAC 6 Clicks Score (PT) 17  -DK 13  -DK 13  -DK    Highest  Level of Mobility Goal 5 --> Static standing  -DK 4 --> Transfer to chair/commode  -DK 4 --> Transfer to chair/commode  -DK       Functional Assessment    Outcome Measure Options AM-PAC 6 Clicks Basic Mobility (PT)  -DK AM-PAC 6 Clicks Basic Mobility (PT)  -DK AM-PAC 6 Clicks Basic Mobility (PT)  -DK              User Key  (r) = Recorded By, (t) = Taken By, (c) = Cosigned By      Initials Name Provider Type    Sheila Maharaj PTA Physical Therapist Assistant                     Time Calculation:    PT Charges       Row Name 05/29/24 1048             Time Calculation    PT Received On 05/29/24  -ROLANDO      PT Goal Re-Cert Due Date 05/31/24  -DK         Timed Charges    76023 - PT Therapeutic Exercise Minutes 14  -DK      92494 - Gait Training Minutes  3  -DK      83671 - PT Therapeutic Activity Minutes 6  -DK         Total Minutes    Timed Charges Total Minutes 23  -DK       Total Minutes 23  -DK                User Key  (r) = Recorded By, (t) = Taken By, (c) = Cosigned By      Initials Name Provider Type    Sheila Maharaj PTA Physical Therapist Assistant                  Therapy Charges for Today       Code Description Service Date Service Provider Modifiers Qty    40425819466 HC PT THER PROC EA 15 MIN 5/28/2024 Sheila Nicole, PTA GP 1    41404396898 HC PT THER PROC EA 15 MIN 5/29/2024 Sheila Nicole, PTA GP 1    49607501144 HC PT THERAPEUTIC ACT EA 15 MIN 5/29/2024 Sheila Nicole, RADHA GP 1            PT G-Codes  Outcome Measure Options: AM-PAC 6 Clicks Basic Mobility (PT)  AM-PAC 6 Clicks Score (PT): 17  AM-PAC 6 Clicks Score (OT): 14    Sheila Nicole PTA  5/29/2024

## 2024-05-29 NOTE — NURSING NOTE
Palliative care consult of goals of care/support- see previous notes. Patient pursuing biopsy and rehab, at this time palliative care will sign off to allow case management to proceed in placement options. If further needs arise please place new palliative care consult order.    Kimmie AGUIRRE, RN, CHPN  Palliative Care

## 2024-05-29 NOTE — THERAPY TREATMENT NOTE
Patient Name: Oswaldo Bowen  : 1980    MRN: [id number]                              Today's Date: 2024       Admit Date: 2024    Visit Dx:     ICD-10-CM ICD-9-CM   1. Closed displaced intertrochanteric fracture of right femur, initial encounter  S72.141A 820.21   2. Pulmonary nodule  R91.1 793.11   3. Difficulty walking  R26.2 719.7   4. Dysphagia, oropharyngeal  R13.12 787.22     Patient Active Problem List   Diagnosis    Closed intertrochanteric fracture of right femur    Pulmonary nodule     History reviewed. No pertinent past medical history.  Past Surgical History:   Procedure Laterality Date    BRONCHOSCOPY N/A 2024    Procedure: BRONCHOSCOPY WITH ENDOBRONCHIAL ULTRASOUND, FINE NEEDLE ASPIRATE, BIOPSIES, BRUSHINGS, BRONCHOALVEOLAR LAVAGE;  Surgeon: Kendell Stern MD;  Location: [facility] ENDOSCOPY;  Service: Pulmonary;  Laterality: N/A;  LUNG NODULE, MUCOUS PLUGGING    HIP INTERTROCHANTERIC NAILING Right 2024    Procedure: HIP INTERTROCHANTERIC NAILING;  Surgeon: Molina Clayton MD;  Location: [facility] MAIN OR;  Service: Orthopedics;  Laterality: Right;      General Information       Row Name 24 1117          OT Time and Intention    Document Type therapy note (daily note)  -PG     Mode of Treatment individual therapy;occupational therapy  -PG               User Key  (r) = Recorded By, (t) = Taken By, (c) = Cosigned By      Initials Name Provider Type    PG Felix Villasenor OT Occupational Therapist                     Mobility/ADL's    No documentation.                  Obj/Interventions       Row Name 24 1117          Shoulder (Therapeutic Exercise)    Shoulder (Therapeutic Exercise) strengthening exercise  -PG     Shoulder Strengthening (Therapeutic Exercise) 15 repititions;resistance band;green  -PG       Row Name 24 1117          Elbow/Forearm (Therapeutic Exercise)    Elbow/Forearm (Therapeutic Exercise) strengthening exercise  -PG     Elbow/Forearm Strengthening  (Therapeutic Exercise) 15 repititions;green;resistance band  -PG       Row Name 05/29/24 1117          Motor Skills    Therapeutic Exercise shoulder;elbow/forearm  -PG               User Key  (r) = Recorded By, (t) = Taken By, (c) = Cosigned By      Initials Name Provider Type    Felix Taylor OT Occupational Therapist                   Goals/Plan    No documentation.                  Clinical Impression       Row Name 05/29/24 1118          Plan of Care Review    Progress no change  -PG               User Key  (r) = Recorded By, (t) = Taken By, (c) = Cosigned By      Initials Name Provider Type    PG Felix Villasenor OT Occupational Therapist                   Outcome Measures       Row Name 05/29/24 1119          How much help from another is currently needed...    Putting on and taking off regular lower body clothing? 1  -PG     Bathing (including washing, rinsing, and drying) 2  -PG     Toileting (which includes using toilet bed pan or urinal) 1  -PG     Putting on and taking off regular upper body clothing 3  -PG     Taking care of personal grooming (such as brushing teeth) 3  -PG     Eating meals 4  -PG     AM-PAC 6 Clicks Score (OT) 14  -PG       Row Name 05/29/24 1044          How much help from another person do you currently need...    Turning from your back to your side while in flat bed without using bedrails? 3  -DK     Moving from lying on back to sitting on the side of a flat bed without bedrails? 3  -DK     Moving to and from a bed to a chair (including a wheelchair)? 3  -DK     Standing up from a chair using your arms (e.g., wheelchair, bedside chair)? 3  -DK     Climbing 3-5 steps with a railing? 2  -DK     To walk in hospital room? 3  -DK     AM-PAC 6 Clicks Score (PT) 17  -DK     Highest Level of Mobility Goal 5 --> Static standing  -DK       Row Name 05/29/24 1119 05/29/24 1044       Functional Assessment    Outcome Measure Options AM-PAC 6 Clicks Daily Activity (OT);Optimal Instrument  -PG  AM-PAC 6 Clicks Basic Mobility (PT)  -DK      Row Name 05/29/24 1119          Optimal Instrument    Optimal Instrument Optimal - 3  -PG     Bending/Stooping 3  -PG     Standing 2  -PG     Reaching 1  -PG               User Key  (r) = Recorded By, (t) = Taken By, (c) = Cosigned By      Initials Name Provider Type    Sheila Maharaj, PTA Physical Therapist Assistant    Felix Taylor OT Occupational Therapist                    Occupational Therapy Education       Title: PT OT SLP Therapies (In Progress)       Topic: Occupational Therapy (Done)       Point: ADL training (Done)       Description:   Instruct learner(s) on proper safety adaptation and remediation techniques during self care or transfers.   Instruct in proper use of assistive devices.                  Learning Progress Summary             Patient Eager, E, VU by  at 5/23/2024 2223    Acceptance, E,D, NR by PG at 5/22/2024 0917                         Point: Home exercise program (Done)       Description:   Instruct learner(s) on appropriate technique for monitoring, assisting and/or progressing therapeutic exercises/activities.                  Learning Progress Summary             Patient Eager, E, VU by  at 5/23/2024 2223    Acceptance, E,D, NR by PG at 5/22/2024 0917                         Point: Precautions (Done)       Description:   Instruct learner(s) on prescribed precautions during self-care and functional transfers.                  Learning Progress Summary             Patient Eager, E, VU by  at 5/23/2024 2223    Acceptance, E,D, NR by PG at 5/22/2024 0917                         Point: Body mechanics (Done)       Description:   Instruct learner(s) on proper positioning and spine alignment during self-care, functional mobility activities and/or exercises.                  Learning Progress Summary             Patient Eager, E, VU by  at 5/23/2024 2223    Acceptance, E,D, NR by PG at 5/22/2024 0917                                          User Key       Initials Effective Dates Name Provider Type Discipline    PG 06/16/21 -  Felix Villasenor OT Occupational Therapist OT     05/31/23 -  Angela Casillas RN Registered Nurse Nurse                  OT Recommendation and Plan  Planned Therapy Interventions (OT): activity tolerance training, BADL retraining, strengthening exercise, transfer/mobility retraining, occupation/activity based interventions, patient/caregiver education/training  Therapy Frequency (OT): 5 times/wk  Plan of Care Review  Plan of Care Reviewed With: patient  Progress: no change  Outcome Evaluation: Patient presents with limitations affecting strength, activity tolerance, and balance impacting patient's ability to return home safely and independently.  The skills of a therapist will be required to safely and effectively implement the following treatment plan to restore maximal level of function     Time Calculation:   Evaluation Complexity (OT)  Review Occupational Profile/Medical/Therapy History Complexity: brief/low complexity  Assessment, Occupational Performance/Identification of Deficit Complexity: 3-5 performance deficits  Clinical Decision Making Complexity (OT): problem focused assessment/low complexity  Overall Complexity of Evaluation (OT): low complexity     Time Calculation- OT       Row Name 05/29/24 1119 05/29/24 1048          Time Calculation- OT    OT Received On 05/29/24  -PG --     OT Goal Re-Cert Due Date 05/31/24  -PG --        Timed Charges    08527 - OT Therapeutic Exercise Minutes 10  -PG --     98253 - Gait Training Minutes  -- 3  -DK        Total Minutes    Timed Charges Total Minutes 10  -PG 3  -DK      Total Minutes 10  -PG 3  -DK               User Key  (r) = Recorded By, (t) = Taken By, (c) = Cosigned By      Initials Name Provider Type    Sheila Maharaj PTA Physical Therapist Assistant    PG Felix Villasenor OT Occupational Therapist                  Therapy Charges for Today       Code  Description Service Date Service Provider Modifiers Qty    32100834911  OT THERAPEUTIC ACT EA 15 MIN 5/28/2024 Felix Villasenor OT GO 1    [id number]  OT THER PROC EA 15 MIN 5/29/2024 Felix Villasenor OT GO 1                 Felix Villasenor OT  5/29/2024

## 2024-05-29 NOTE — PLAN OF CARE
Goal Outcome Evaluation:  Plan of Care Reviewed With: patient           Outcome Evaluation: Pt has been AAO. Medicated with [facility] for pain with relief noted.

## 2024-05-30 ENCOUNTER — APPOINTMENT (OUTPATIENT)
Dept: CT IMAGING | Facility: HOSPITAL | Age: 44
DRG: 477 | End: 2024-05-30
Payer: COMMERCIAL

## 2024-05-30 LAB
ALBUMIN SERPL-MCNC: 2.9 G/DL (ref 3.5–5.2)
ALBUMIN/GLOB SERPL: 0.9 G/DL
ALP SERPL-CCNC: 171 U/L (ref 39–117)
ALT SERPL W P-5'-P-CCNC: 88 U/L (ref 1–41)
ANION GAP SERPL CALCULATED.3IONS-SCNC: 11.9 MMOL/L (ref 5–15)
APTT PPP: 34.7 SECONDS (ref 24.2–34.2)
AST SERPL-CCNC: 50 U/L (ref 1–40)
BASOPHILS # BLD AUTO: 0.05 10*3/MM3 (ref 0–0.2)
BASOPHILS NFR BLD AUTO: 0.5 % (ref 0–1.5)
BILIRUB SERPL-MCNC: 0.2 MG/DL (ref 0–1.2)
BUN SERPL-MCNC: 15 MG/DL (ref 6–20)
BUN/CREAT SERPL: 31.3 (ref 7–25)
CALCIUM SPEC-SCNC: 9.4 MG/DL (ref 8.6–10.5)
CHLORIDE SERPL-SCNC: 97 MMOL/L (ref 98–107)
CO2 SERPL-SCNC: 27.1 MMOL/L (ref 22–29)
CREAT SERPL-MCNC: 0.48 MG/DL (ref 0.76–1.27)
DEPRECATED RDW RBC AUTO: 43.8 FL (ref 37–54)
EGFRCR SERPLBLD CKD-EPI 2021: 130.6 ML/MIN/1.73
EOSINOPHIL # BLD AUTO: 0.13 10*3/MM3 (ref 0–0.4)
EOSINOPHIL NFR BLD AUTO: 1.3 % (ref 0.3–6.2)
ERYTHROCYTE [DISTWIDTH] IN BLOOD BY AUTOMATED COUNT: 13.2 % (ref 12.3–15.4)
GLOBULIN UR ELPH-MCNC: 3.4 GM/DL
GLUCOSE SERPL-MCNC: 101 MG/DL (ref 65–99)
HCT VFR BLD AUTO: 28.8 % (ref 37.5–51)
HGB BLD-MCNC: 8.8 G/DL (ref 13–17.7)
IMM GRANULOCYTES # BLD AUTO: 0.14 10*3/MM3 (ref 0–0.05)
IMM GRANULOCYTES NFR BLD AUTO: 1.3 % (ref 0–0.5)
INR PPP: 1.03 (ref 0.86–1.15)
LYMPHOCYTES # BLD AUTO: 1.73 10*3/MM3 (ref 0.7–3.1)
LYMPHOCYTES NFR BLD AUTO: 16.7 % (ref 19.6–45.3)
MAGNESIUM SERPL-MCNC: 2 MG/DL (ref 1.6–2.6)
MCH RBC QN AUTO: 27.7 PG (ref 26.6–33)
MCHC RBC AUTO-ENTMCNC: 30.6 G/DL (ref 31.5–35.7)
MCV RBC AUTO: 90.6 FL (ref 79–97)
MONOCYTES # BLD AUTO: 1.06 10*3/MM3 (ref 0.1–0.9)
MONOCYTES NFR BLD AUTO: 10.2 % (ref 5–12)
NEUTROPHILS NFR BLD AUTO: 7.27 10*3/MM3 (ref 1.7–7)
NEUTROPHILS NFR BLD AUTO: 70 % (ref 42.7–76)
NRBC BLD AUTO-RTO: 0 /100 WBC (ref 0–0.2)
PHOSPHATE SERPL-MCNC: 4.4 MG/DL (ref 2.5–4.5)
PLATELET # BLD AUTO: 465 10*3/MM3 (ref 140–450)
PMV BLD AUTO: 9.5 FL (ref 6–12)
POTASSIUM SERPL-SCNC: 4.3 MMOL/L (ref 3.5–5.2)
PROT SERPL-MCNC: 6.3 G/DL (ref 6–8.5)
PROTHROMBIN TIME: 13.8 SECONDS (ref 11.8–14.9)
RBC # BLD AUTO: 3.18 10*6/MM3 (ref 4.14–5.8)
SODIUM SERPL-SCNC: 136 MMOL/L (ref 136–145)
WBC NRBC COR # BLD AUTO: 10.38 10*3/MM3 (ref 3.4–10.8)

## 2024-05-30 PROCEDURE — 97110 THERAPEUTIC EXERCISES: CPT

## 2024-05-30 PROCEDURE — 80053 COMPREHEN METABOLIC PANEL: CPT | Performed by: INTERNAL MEDICINE

## 2024-05-30 PROCEDURE — 94799 UNLISTED PULMONARY SVC/PX: CPT

## 2024-05-30 PROCEDURE — 83735 ASSAY OF MAGNESIUM: CPT | Performed by: INTERNAL MEDICINE

## 2024-05-30 PROCEDURE — 94664 DEMO&/EVAL PT USE INHALER: CPT

## 2024-05-30 PROCEDURE — 85025 COMPLETE CBC W/AUTO DIFF WBC: CPT | Performed by: INTERNAL MEDICINE

## 2024-05-30 PROCEDURE — 25010000002 MIDAZOLAM PER 1MG: Performed by: RADIOLOGY

## 2024-05-30 PROCEDURE — 85610 PROTHROMBIN TIME: CPT | Performed by: INTERNAL MEDICINE

## 2024-05-30 PROCEDURE — 88305 TISSUE EXAM BY PATHOLOGIST: CPT | Performed by: INTERNAL MEDICINE

## 2024-05-30 PROCEDURE — 99232 SBSQ HOSP IP/OBS MODERATE 35: CPT | Performed by: INTERNAL MEDICINE

## 2024-05-30 PROCEDURE — 25010000002 FENTANYL CITRATE (PF) 50 MCG/ML SOLUTION: Performed by: RADIOLOGY

## 2024-05-30 PROCEDURE — 88341 IMHCHEM/IMCYTCHM EA ADD ANTB: CPT | Performed by: INTERNAL MEDICINE

## 2024-05-30 PROCEDURE — 94760 N-INVAS EAR/PLS OXIMETRY 1: CPT

## 2024-05-30 PROCEDURE — 88312 SPECIAL STAINS GROUP 1: CPT | Performed by: INTERNAL MEDICINE

## 2024-05-30 PROCEDURE — 84100 ASSAY OF PHOSPHORUS: CPT | Performed by: INTERNAL MEDICINE

## 2024-05-30 PROCEDURE — 85730 THROMBOPLASTIN TIME PARTIAL: CPT | Performed by: INTERNAL MEDICINE

## 2024-05-30 PROCEDURE — 88173 CYTOPATH EVAL FNA REPORT: CPT | Performed by: INTERNAL MEDICINE

## 2024-05-30 PROCEDURE — 77012 CT SCAN FOR NEEDLE BIOPSY: CPT

## 2024-05-30 PROCEDURE — 88342 IMHCHEM/IMCYTCHM 1ST ANTB: CPT | Performed by: INTERNAL MEDICINE

## 2024-05-30 PROCEDURE — 99233 SBSQ HOSP IP/OBS HIGH 50: CPT | Performed by: INTERNAL MEDICINE

## 2024-05-30 RX ORDER — MIDAZOLAM HYDROCHLORIDE 2 MG/2ML
INJECTION, SOLUTION INTRAMUSCULAR; INTRAVENOUS AS NEEDED
Status: COMPLETED | OUTPATIENT
Start: 2024-05-30 | End: 2024-05-30

## 2024-05-30 RX ORDER — LIDOCAINE HYDROCHLORIDE 20 MG/ML
INJECTION, SOLUTION INFILTRATION; PERINEURAL
Status: DISPENSED
Start: 2024-05-30 | End: 2024-05-30

## 2024-05-30 RX ORDER — LIDOCAINE HYDROCHLORIDE 20 MG/ML
INJECTION, SOLUTION EPIDURAL; INFILTRATION; INTRACAUDAL; PERINEURAL
Status: DISPENSED
Start: 2024-05-30 | End: 2024-05-30

## 2024-05-30 RX ORDER — FENTANYL CITRATE 50 UG/ML
INJECTION, SOLUTION INTRAMUSCULAR; INTRAVENOUS AS NEEDED
Status: COMPLETED | OUTPATIENT
Start: 2024-05-30 | End: 2024-05-30

## 2024-05-30 RX ADMIN — CYANOCOBALAMIN TAB 500 MCG 1000 MCG: 500 TAB at 11:16

## 2024-05-30 RX ADMIN — ACETAMINOPHEN 650 MG: 325 TABLET ORAL at 05:47

## 2024-05-30 RX ADMIN — BUDESONIDE AND FORMOTEROL FUMARATE DIHYDRATE 2 PUFF: 160; 4.5 AEROSOL RESPIRATORY (INHALATION) at 07:46

## 2024-05-30 RX ADMIN — MIDAZOLAM HYDROCHLORIDE 0.5 MG: 1 INJECTION, SOLUTION INTRAMUSCULAR; INTRAVENOUS at 09:52

## 2024-05-30 RX ADMIN — BUDESONIDE AND FORMOTEROL FUMARATE DIHYDRATE 2 PUFF: 160; 4.5 AEROSOL RESPIRATORY (INHALATION) at 19:23

## 2024-05-30 RX ADMIN — Medication 1 TABLET: at 11:15

## 2024-05-30 RX ADMIN — FENTANYL CITRATE 25 MCG: 50 INJECTION, SOLUTION INTRAMUSCULAR; INTRAVENOUS at 09:40

## 2024-05-30 RX ADMIN — Medication 10 ML: at 21:29

## 2024-05-30 RX ADMIN — ALUMINUM HYDROXIDE, MAGNESIUM HYDROXIDE, AND DIMETHICONE 15 ML: 400; 400; 40 SUSPENSION ORAL at 23:55

## 2024-05-30 RX ADMIN — MIDAZOLAM HYDROCHLORIDE 0.5 MG: 1 INJECTION, SOLUTION INTRAMUSCULAR; INTRAVENOUS at 09:37

## 2024-05-30 RX ADMIN — FENTANYL CITRATE 25 MCG: 50 INJECTION, SOLUTION INTRAMUSCULAR; INTRAVENOUS at 09:55

## 2024-05-30 RX ADMIN — Medication 100 MG: at 11:16

## 2024-05-30 RX ADMIN — Medication 10 ML: at 11:16

## 2024-05-30 RX ADMIN — LIDOCAINE 1 PATCH: 560 PATCH PERCUTANEOUS; TOPICAL; TRANSDERMAL at 11:15

## 2024-05-30 RX ADMIN — HYDROCODONE BITARTRATE AND ACETAMINOPHEN 1 TABLET: 5; 325 TABLET ORAL at 21:29

## 2024-05-30 RX ADMIN — Medication 5 MG: at 21:29

## 2024-05-30 RX ADMIN — SENNOSIDES AND DOCUSATE SODIUM 2 TABLET: 50; 8.6 TABLET ORAL at 21:29

## 2024-05-30 RX ADMIN — TIOTROPIUM BROMIDE INHALATION SPRAY 2 PUFF: 3.12 SPRAY, METERED RESPIRATORY (INHALATION) at 07:46

## 2024-05-30 RX ADMIN — Medication 400 MG: at 11:16

## 2024-05-30 NOTE — PROGRESS NOTES
Pulmonary / Critical Care Progress Note      Patient Name: Oswaldo Bowen  : 1980  MRN: [id number]  Primary Care Physician:  Provider, No Known  Date of admission: 2024    Subjective   Subjective   Follow-up for left upper lobe lung nodule with mediastinal adenopathy, chronic heavy smoking, unintentional weight loss.    Bronchoscopy pathology came back negative for malignancy.  He is agreeable for bone biopsy.  Patient remains on room air.  Discussed the case with radiology.  Minimal dyspnea at baseline  No coughing  No fever or chills.  No nausea or vomiting.  Has been bedridden for most part.      Objective   Objective     Vitals:   Temp:  [97.3 °F (36.3 °C)-98.2 °F (36.8 °C)] 98.2 °F (36.8 °C)  Heart Rate:  [] 108  Resp:  [12-20] 16  BP: (105-126)/(68-86) 118/76  Physical Exam   Vital Signs Reviewed   General:  WDWN, Alert, in no distress  Chest:  good aeration, clear to auscultation bilaterally, tympanic to percussion bilaterally, no work of breathing noted  CV: RRR, no MGR, pulses 2+, equal.  Abd:  Soft, NT, ND, + BS, no HSM  EXT:  no clubbing, no cyanosis, no edema  Neuro:  A&Ox3, CN grossly intact, no focal deficits.  Skin: No rashes or lesions noted      Result Review    Result Review:  I have personally reviewed the results from the time of this admission to 2024 11:02 EDT and agree with these findings:  [x]  Laboratory  [x]  Microbiology  [x]  Radiology  [x]  EKG/Telemetry   [x]  Cardiology/Vascular   []  Pathology  []  Old records  []  Other:  Most notable findings include:         Lab 24  0546 24  0622 24  0548 24  0544 24  0918 24  1030   WBC 10.38 9.86 9.72 11.08* 11.02* 11.76*   HEMOGLOBIN 8.8* 8.6* 8.4* 8.2* 8.6* 8.8*   HEMATOCRIT 28.8* 28.0* 27.4* 26.9* 27.3* 27.8*   PLATELETS 465* 458* 418 394 369 383   SODIUM 136 134* 135* 137 136 136   POTASSIUM 4.3 4.2 3.9 3.9 3.7 3.8   CHLORIDE 97* 97* 98 100 97* 98   CO2 27.1 27.7 28.3 27.6 27.8 27.7    BUN 15 13 11 12 10 9   CREATININE 0.48* 0.46* 0.38* 0.39* 0.50* 0.49*   GLUCOSE 101* 99 100* 113* 188* 131*   CALCIUM 9.4 9.0 8.8 8.6 8.5* 8.6   PHOSPHORUS 4.4 3.7 4.0 3.8  --  2.7   TOTAL PROTEIN 6.3 6.1  --   --   --   --    ALBUMIN 2.9* 2.8*  --   --   --   --    GLOBULIN 3.4 3.3  --   --   --   --             CT Chest With Contrast Diagnostic    Result Date: 5/21/2024  CT CHEST W CONTRAST DIAGNOSTIC-  Date of Exam: 5/21/2024 9:01 PM  Indication: 14mm perihilar nodule, 1-2 ppd extensive smoking hx; S72.141A-Displaced intertrochanteric fracture of right femur, initial encounter for closed fracture; R91.1-Solitary pulmonary nodule.  Comparison: Chest radiograph 5/20/2024  Technique: Axial CT images were obtained of the chest after the uneventful intravenous administration of 75 cc Isovue-370 . Reconstructed coronal and sagittal images were also obtained. Automated exposure control and iterative construction methods were used.   Findings: No coronary artery calcification. There is no mediastinal adenopathy. There is a right hilar node measuring 2.5 cm and a left hilar node measuring 2.1 cm. No axillary adenopathy. There are no pleural effusions. Within the perihilar region of the left upper lobe there is a noncalcified macro lobular 2.1 x 1.9 cm noncalcified pulmonary nodule suspicious for malignancy. Within the posterior right lower lobe there is a subpleural 12 x 5 mm nodule which is nonspecific. No other pulmonary nodules. No other focal airspace consolidation.  Bilateral adrenal glands are within normal limits.  There are multiple lytic bone lesions throughout the spine and ribs. Findings would be consistent with metastatic disease or myeloma. There are additional lytic lesions within both scapula.      Impression: Impression:  1. Left upper lobe noncalcified pulmonary nodule measuring 2.1 cm in size suspicious for primary malignancy. There is mild bilateral hilar adenopathy. Additionally there are multiple  lytic bone lesions throughout the spine ribs and scapula consistent with metastatic disease.    Electronically Signed By-Eliecer Jara MD On:5/21/2024 10:11 PM         Assessment & Plan   Assessment / Plan     Active Hospital Problems:  Active Hospital Problems    Diagnosis    • **Closed intertrochanteric fracture of right femur    • Pulmonary nodule          Impression:   Acute comminuted displaced intertrochanteric right femoral fracture  Status post IM nailing  Left upper lobe lung nodule with mediastinal lymphadenopathy  Chronic heavy smoking  Unintentional weight loss    Plan:   Status post bronchoscopy with EBUS.  Pathology from mediastinal lymph nodes were all negative for malignancy.  2.1 cm lung nodule could be possible target.  However patient has bony metastasis.  I discussed the case with radiology service.  Patient to have 1 biopsy today with interventional radiology today.  Imaging still very concerning and consistent with metastatic cancer.  Coags normal.  Completed 5 days of Unasyn for haemophilus pneumonia growing on bronchoscopy BAL  Continue Symbicort and Spiriva, albuterol as needed.    Encourage activity and incentive spirometer use  Pain control per primary service.  Postop surgical care per Ortho service.    DVT prophylaxis:  Mechanical DVT prophylaxis orders are present.    CODE STATUS:   Level Of Support Discussed With: Patient  Code Status (Patient has no pulse and is not breathing): CPR (Attempt to Resuscitate)  Medical Interventions (Patient has pulse or is breathing): Full Support    I have reviewed labs, imaging, pertinent clinical data and provider notes.   I have discussed with bedside nurse and primary service.     Electronically signed by Kendell Stern MD, 5/30/2024, 11:02 EDT.

## 2024-05-30 NOTE — PLAN OF CARE
Goal Outcome Evaluation:  Plan of Care Reviewed With: patient           Outcome Evaluation: Pt is aao.  Medicated for pain x2. Has been npo for procedure today,.

## 2024-05-30 NOTE — THERAPY TREATMENT NOTE
Acute Care - Physical Therapy Treatment Note   [facility]     Patient Name: Oswaldo Bowen  : 1980  MRN: [id number]  Today's Date: 2024      Visit Dx:     ICD-10-CM ICD-9-CM   1. Closed displaced intertrochanteric fracture of right femur, initial encounter  S72.141A 820.21   2. Pulmonary nodule  R91.1 793.11   3. Difficulty walking  R26.2 719.7   4. Dysphagia, oropharyngeal  R13.12 787.22     Patient Active Problem List   Diagnosis    Closed intertrochanteric fracture of right femur    Pulmonary nodule     History reviewed. No pertinent past medical history.  Past Surgical History:   Procedure Laterality Date    BRONCHOSCOPY N/A 2024    Procedure: BRONCHOSCOPY WITH ENDOBRONCHIAL ULTRASOUND, FINE NEEDLE ASPIRATE, BIOPSIES, BRUSHINGS, BRONCHOALVEOLAR LAVAGE;  Surgeon: Kendell Stern MD;  Location: [facility] ENDOSCOPY;  Service: Pulmonary;  Laterality: N/A;  LUNG NODULE, MUCOUS PLUGGING    HIP INTERTROCHANTERIC NAILING Right 2024    Procedure: HIP INTERTROCHANTERIC NAILING;  Surgeon: Molina Clayton MD;  Location: [facility] MAIN OR;  Service: Orthopedics;  Laterality: Right;     PT Assessment (Last 12 Hours)       PT Evaluation and Treatment       Row Name 24 1125          Physical Therapy Time and Intention    Subjective Information complains of;weakness;fatigue;pain  -DK     Document Type therapy note (daily note)  -DK     Mode of Treatment individual therapy;physical therapy  -DK     Patient Effort good  -DK     Symptoms Noted During/After Treatment fatigue;increased pain  -DK     Comment Pt was just returning from his bone biopsy, and was observed transferring with SBA of PCA, and ambulated 4' stretcher to chair with SBA and rolling walker.  -DK       Row Name 24 1125          Pain    Pretreatment Pain Rating 2/10  -DK     Posttreatment Pain Rating 3/10  -DK     Pain Location - Side/Orientation Right  -DK     Pain Location generalized  -DK     Pain Location - hip  -DK     Pain  Intervention(s) Repositioned;Ambulation/increased activity;Distraction;Therapeutic presence  -       Row Name 05/30/24 1125          Cognition    Affect/Mental Status (Cognition) confused  -     Behavioral Issues (Cognition) overwhelmed easily;difficulty managing stress  -     Orientation Status (Cognition) oriented to;person;situation;place  -DK     Follows Commands (Cognition) WFL  -     Cognitive Function WFL  -DK     Personal Safety Interventions gait belt;nonskid shoes/slippers when out of bed;supervised activity  -       Row Name 05/30/24 1125          Motor Skills    Motor Skills --  therapeutic exercises  -DK     Coordination WFL  -DK     Therapeutic Exercise hip;knee;ankle  -     Additional Documentation --  No transfers this session as pt had just ambulated stretcher to chair with PCA after bone biopsy.  -       Row Name 05/30/24 1125          Hip (Therapeutic Exercise)    Hip (Therapeutic Exercise) AAROM (active assistive range of motion)  -     Hip AAROM (Therapeutic Exercise) bilateral;flexion;extension;aBduction;aDduction;supine;10 repetitions;2 sets  -       Row Name 05/30/24 1125          Knee (Therapeutic Exercise)    Knee (Therapeutic Exercise) AAROM (active assistive range of motion)  -     Knee AAROM (Therapeutic Exercise) bilateral;flexion;extension;supine;10 repetitions;2 sets  -       Row Name 05/30/24 1125          Ankle (Therapeutic Exercise)    Ankle (Therapeutic Exercise) AAROM (active assistive range of motion)  -     Ankle AAROM (Therapeutic Exercise) bilateral;dorsiflexion;plantarflexion;supine;10 repetitions;2 sets  -       Row Name             Wound 05/21/24 0251 Bilateral coccyx    Wound - Properties Group Placement Date: 05/21/24  -SR Placement Time: 0251 -SR Present on Original Admission: Y  -SR Side: Bilateral  -SR Location: coccyx  -SR    Retired Wound - Properties Group Placement Date: 05/21/24  -SR Placement Time: 0251 -SR Present on Original  Admission: Y  -SR Side: Bilateral  -SR Location: coccyx  -SR    Retired Wound - Properties Group Date first assessed: 05/21/24  -SR Time first assessed: 0251  -SR Present on Original Admission: Y  -SR Side: Bilateral  -SR Location: coccyx  -SR      Row Name             Wound 05/21/24 Right lateral thigh Incision    Wound - Properties Group Placement Date: 05/21/24  -SF Present on Original Admission: N  -SF Side: Right  -SF Orientation: lateral  -SF Location: thigh  -SF Primary Wound Type: Incision  -SF Additional Comments: incision sites x 3 to lateral right thigh  -SF    Retired Wound - Properties Group Placement Date: 05/21/24  -SF Present on Original Admission: N  -SF Side: Right  -SF Orientation: lateral  -SF Location: thigh  -SF Primary Wound Type: Incision  -SF Additional Comments: incision sites x 3 to lateral right thigh  -SF    Retired Wound - Properties Group Date first assessed: 05/21/24  -SF Present on Original Admission: N  -SF Side: Right  -SF Location: thigh  -SF Primary Wound Type: Incision  -SF Additional Comments: incision sites x 3 to lateral right thigh  -SF      Row Name 05/30/24 1125          Plan of Care Review    Plan of Care Reviewed With patient  -DK     Progress improving  -DK       Row Name 05/30/24 1125          Positioning and Restraints    Pre-Treatment Position sitting in chair/recliner  -DK     Post Treatment Position chair  -DK     In Chair reclined;call light within reach;encouraged to call for assist;exit alarm on;legs elevated;heels elevated  -DK       Row Name 05/30/24 1125          Therapy Assessment/Plan (PT)    Rehab Potential (PT) good, to achieve stated therapy goals  -DK     Criteria for Skilled Interventions Met (PT) skilled treatment is necessary  -DK     Therapy Frequency (PT) daily  -DK     Problem List (PT) problems related to;balance;cognition;mobility;strength;pain;hearing;communication  -DK     Activity Limitations Related to Problem List (PT) unable to ambulate  safely;unable to transfer safely  -DK       Row Name 05/30/24 1125          Progress Summary (PT)    Progress Toward Functional Goals (PT) progress toward functional goals is good  -DK               User Key  (r) = Recorded By, (t) = Taken By, (c) = Cosigned By      Initials Name Provider Type    SF Saritha Ramirez, RN Registered Nurse    Sheila Maharaj, PTA Physical Therapist Assistant    Saritha Becker, RN Registered Nurse                    Physical Therapy Education       Title: PT OT SLP Therapies (In Progress)       Topic: Physical Therapy (In Progress)       Point: Mobility training (Done)       Learning Progress Summary             Patient Eager, E, VU by  at 5/23/2024 2223    Acceptance, E,TB, VU by  at 5/22/2024 1339                         Point: Home exercise program (Not Started)       Learner Progress:  Not documented in this visit.              Point: Body mechanics (Done)       Learning Progress Summary             Patient Acceptance, E,TB, VU by  at 5/22/2024 1339                         Point: Precautions (Done)       Learning Progress Summary             Patient Acceptance, E,TB, VU by  at 5/22/2024 1339                                         User Key       Initials Effective Dates Name Provider Type Discipline     06/11/21 -  Eh Martínez, PT Physical Therapist PT     05/31/23 -  Angela Casillas, RN Registered Nurse Nurse                  PT Recommendation and Plan  Planned Therapy Interventions (PT): balance training, bed mobility training, gait training, strengthening, transfer training  Therapy Frequency (PT): daily  Progress Summary (PT)  Progress Toward Functional Goals (PT): progress toward functional goals is good  Plan of Care Reviewed With: patient  Progress: improving   Outcome Measures       Row Name 05/30/24 1125 05/29/24 1044 05/28/24 1019       How much help from another person do you currently need...    Turning from your back to your side while in  flat bed without using bedrails? 3  -DK 3  -DK 3  -DK    Moving from lying on back to sitting on the side of a flat bed without bedrails? 3  -DK 3  -DK 3  -DK    Moving to and from a bed to a chair (including a wheelchair)? 3  -DK 3  -DK 2  -DK    Standing up from a chair using your arms (e.g., wheelchair, bedside chair)? 3  -DK 3  -DK 2  -DK    Climbing 3-5 steps with a railing? 2  -DK 2  -DK 1  -DK    To walk in hospital room? 3  -DK 3  -DK 2  -DK    AM-PAC 6 Clicks Score (PT) 17  -DK 17  -DK 13  -DK    Highest Level of Mobility Goal 5 --> Static standing  -DK 5 --> Static standing  -DK 4 --> Transfer to chair/commode  -DK       Functional Assessment    Outcome Measure Options AM-PAC 6 Clicks Basic Mobility (PT)  -DK AM-PAC 6 Clicks Basic Mobility (PT)  -DK AM-PAC 6 Clicks Basic Mobility (PT)  -DK              User Key  (r) = Recorded By, (t) = Taken By, (c) = Cosigned By      Initials Name Provider Type    Sheila Maharaj PTA Physical Therapist Assistant                     Time Calculation:    PT Charges       Row Name 05/30/24 1129             Time Calculation    PT Received On 05/30/24  -DK      PT Goal Re-Cert Due Date 05/31/24  -DK         Timed Charges    93486 - PT Therapeutic Exercise Minutes 14  -DK      68390 - PT Therapeutic Activity Minutes 6  -DK         Total Minutes    Timed Charges Total Minutes 20  -DK       Total Minutes 20  -DK                User Key  (r) = Recorded By, (t) = Taken By, (c) = Cosigned By      Initials Name Provider Type    Sheila Maharaj PTA Physical Therapist Assistant                  Therapy Charges for Today       Code Description Service Date Service Provider Modifiers Qty    23063422819 HC PT THER PROC EA 15 MIN 5/29/2024 Sheila Nicole, PTA GP 1    21916060894 HC PT THERAPEUTIC ACT EA 15 MIN 5/29/2024 Sheila Nicole, PTA GP 1    99715994568 HC PT THER PROC EA 15 MIN 5/30/2024 Sheila Nicole, PTA GP 1            PT G-Codes  Outcome Measure Options: AM-PAC 6 Clicks  Basic Mobility (PT)  AM-PAC 6 Clicks Score (PT): 17  AM-PAC 6 Clicks Score (OT): 14    Sheila Nicole, PTA  5/30/2024

## 2024-05-30 NOTE — PLAN OF CARE
Goal Outcome Evaluation:  Plan of Care Reviewed With: patient        Progress: no change  Outcome Evaluation: Pt vss. Pt had no c/o pain. Up in the chair a little today. Bone biopsy done today results pending. Continue plan of care.

## 2024-05-30 NOTE — PROGRESS NOTES
[facility]   Hospitalist Progress Note    Date of admission: 5/20/2024  Patient Name: Oswaldo Bowen  1980  Date: 5/30/2024      Subjective     Chief Complaint   Patient presents with   • Fall   • Leg Pain       Interval Followup: No acute events overnight, patient resting comfortably in bed, patient underwent bone biopsy this a.m., pathology is pending.  Patient requesting that his diet be upgraded from thickened liquids, have requested repeat speech therapy evaluation    Objective     Vitals:   Temp:  [97.3 °F (36.3 °C)-98.2 °F (36.8 °C)] 98.1 °F (36.7 °C)  Heart Rate:  [] 102  Resp:  [12-20] 16  BP: (109-126)/(68-86) 111/74    Physical Exam  GEN: No acute distress  HEENT: Moist mucous membranes  LUNGS: Equal chest rise bilaterally  CARDIAC: Regular rate and rhythm  NEURO: Moving all 4 extremities spontaneously  SKIN: No obvious breakdown    Result Review:  Vital signs, labs and recent relevant imaging reviewed.      CBC          5/27/2024    05:48 5/29/2024    06:22 5/30/2024    05:46   CBC   WBC 9.72  9.86  10.38    RBC 3.06  3.09  3.18    Hemoglobin 8.4  8.6  8.8    Hematocrit 27.4  28.0  28.8    MCV 89.5  90.6  90.6    MCH 27.5  27.8  27.7    MCHC 30.7  30.7  30.6    RDW 13.2  13.5  13.2    Platelets 418  458  465      CMP          5/27/2024    05:48 5/29/2024    06:22 5/30/2024    05:46   CMP   Glucose 100  99  101    BUN 11  13  15    Creatinine 0.38  0.46  0.48    EGFR 140.1  132.3  130.6    Sodium 135  134  136    Potassium 3.9  4.2  4.3    Chloride 98  97  97    Calcium 8.8  9.0  9.4    Total Protein  6.1  6.3    Albumin  2.8  2.9    Globulin  3.3  3.4    Total Bilirubin  0.2  0.2    Alkaline Phosphatase  157  171    AST (SGOT)  25  50    ALT (SGPT)  51  88    Albumin/Globulin Ratio  0.8  0.9    BUN/Creatinine Ratio 28.9  28.3  31.3    Anion Gap 8.7  9.3  11.9        •  acetaminophen  •  aluminum-magnesium hydroxide-simethicone  •  senna-docusate sodium **AND** polyethylene glycol **AND**  bisacodyl **AND** bisacodyl  •  calcium carbonate  •  dextrose  •  dextrose  •  Diclofenac Sodium  •  glucagon (human recombinant)  •  HYDROcodone-acetaminophen  •  HYDROcodone-acetaminophen  •  hydrOXYzine  •  levalbuterol  •  lidocaine  •  Lidocaine  •  lidocaine PF 2%  •  melatonin  •  [DISCONTINUED] Morphine **AND** naloxone  •  nicotine  •  ondansetron  •  ondansetron ODT **OR** [DISCONTINUED] ondansetron  •  prochlorperazine  •  sodium chloride  •  sodium chloride  •  sodium chloride  •  sodium chloride    budesonide-formoterol, 2 puff, Inhalation, BID - RT  lidocaine, , ,   Lidocaine, 1 patch, Transdermal, Q24H  lidocaine PF 2%, , ,   magnesium oxide, 400 mg, Oral, Daily  multivitamin with minerals, 1 tablet, Oral, Daily  senna-docusate sodium, 2 tablet, Oral, BID  sodium chloride, 10 mL, Intravenous, Q12H  thiamine, 100 mg, Oral, Daily  tiotropium bromide monohydrate, 2 puff, Inhalation, Daily - RT  vitamin B-12, 1,000 mcg, Oral, Daily        CT Needle Biopsy Bone Deep    Result Date: 5/30/2024  Impression: 1.  Successful CT-guided 18-gauge fine-needle biopsy of right seventh rib lesion without evidence of complication.           Electronically Signed By-EVAN SORIANO MD On:5/30/2024 11:22 AM      MRI Brain With & Without Contrast    Result Date: 5/22/2024  Impression:  1. No acute intracranial abnormality. No evidence of intracranial metastatic disease. 2. No pathologic contrast enhancement. 3. Multiple enhancing lesions within the skull compatible with bony metastatic disease.    Electronically Signed By-Eliecer Jara MD On:5/22/2024 10:57 PM      CT Abdomen Pelvis With Contrast    Result Date: 5/22/2024   1. The study is ABNORMAL.  2. Diffuse osseous metastatic disease is seen. Impending pathologic fractures at L3 and involving the left femoral head are possible. Epidural extension of tumor involving the spine cannot be excluded. For instance, there may be epidural extension of tumor involving the  spine, especially at T5, on the right at T11-12, on the left at L1, and probably at L3. Degenerative changes also involve the imaged spine, probably greatest at L4-5 with moderate spinal canal narrowing.  3. There is a moderate-to-large stool burden. No mechanical bowel obstruction or pneumoperitoneum. No acute appendicitis. The oral contrast preparation is limited.  4. Bilateral nonobstructing renal calyceal stones are seen, which measure 5 mm or less in size. No obstructive uropathy is seen due to ureterolithiasis. No acute pyelonephritis. No definite suspicious renal mass.  5. There is nonspecific distention of the urinary bladder (566 mL estimated total volume). Please correlate clinically. Consider decompression of the bladder with catheter placement if clinically indicated. No urinary bladder wall thickening or urinary bladder calculi.  6. The patient has undergone recent open reduction and internal fixation (ORIF) of a comminuted intertrochanteric likely pathologic right femoral fracture with expected postoperative changes in the overlying soft tissues.  7. No definite hepatic metastases are seen. Hepatomegaly is suspected.  8. No adrenal mass is appreciated.  9. There are several artifacts on the study. The best possible images were obtained.  10. Please see above comments for further detail.    Please note that portions of this note were completed with a voice recognition program.        Electronically Signed By-Panda Howard MD On:5/22/2024 9:33 PM       Assessment / Plan     Summary: 44M with intellectual disability, 1-2PPD smoking, depression, who presented after a fall (possibly 1 month ago but may have been more recently), found to have right femoral fracture and VLAD.  Ortho assisting with initial surgical treatment.  Initial lung mass concerning on chest x-ray additional CT imaging with suspected new metastatic colon cancer with mets to the bone, pulmonology consulted patient had a biopsy results  pending.  Bronchoscopy also with findings of Haemophilus influenzae treatment with antibiotics.  Palliative assisting.  Working on rehab placement guarded long-term prognosis    Assessment/Plan (clinically significant if listed here)  Mechanical fall with acute comminuted displaced intertrochanteric right femoral fracture  S/p 5/21 right hip subtrochanteric nailing of closed displaced right femur fracture by Dr. Nelson  VLAD creatinine 1.7 leukocytosis suspect reactive in setting of acute fracture  Suspected new metastatic lung cancer with mets to the bone  Mediastinal lymphadenopathy  Haemophilus influenzae pneumonia   1 to 2 pack/day smoker, chronic  Intellectual disability  Depression  Normocytic anemia    Completed antibiotics  Continue respiratory hygiene  Scheduled Tylenol and lidocaine patch, as needed and p.o. pain medication for breakthrough pain  Multiple myeloma labs negative, bronchoscopy/EBUS negative for malignancy, patient underwent bone biopsy on 5/30/2024, pathology is pending at this time  Monitor for aspiration  Appreciate oncology assistance, will likely require outpatient PET scan  Inhalers, respiratory hygiene, aspiration precautions  Replace potassium magnesium monitor electrolytes as needed  Continue postoperative monitoring for fracture appreciate orthopedic surgery assistance, patient is ambulating some with minimal assistance  No overt bleeding, hb stabilized postoperatively near mid 8, cont mvi, continue to monitor closely  Nrt prn / smoking cessation   Continue PT/OT  Continue hospitalization at current level of care  CBC, CMP reviewed 5/30/2024   Repeat CBC, CMP, mag and Phos in a.m. 5/30/2024     Dispo: Pending rehab placement     DVT prophylaxis:  Mechanical DVT prophylaxis orders are present.      Level Of Support Discussed With: Patient  Code Status (Patient has no pulse and is not breathing): CPR (Attempt to Resuscitate)  Medical Interventions (Patient has pulse or is breathing): Full  Support      Electronically signed by Molina Du MD, 5/30/2024, 12:38 EDT.

## 2024-05-31 LAB
ALBUMIN SERPL-MCNC: 2.9 G/DL (ref 3.5–5.2)
ALBUMIN/GLOB SERPL: 0.8 G/DL
ALP SERPL-CCNC: 181 U/L (ref 39–117)
ALT SERPL W P-5'-P-CCNC: 89 U/L (ref 1–41)
ANION GAP SERPL CALCULATED.3IONS-SCNC: 10.6 MMOL/L (ref 5–15)
AST SERPL-CCNC: 39 U/L (ref 1–40)
BASOPHILS # BLD AUTO: 0.04 10*3/MM3 (ref 0–0.2)
BASOPHILS NFR BLD AUTO: 0.4 % (ref 0–1.5)
BILIRUB SERPL-MCNC: 0.2 MG/DL (ref 0–1.2)
BUN SERPL-MCNC: 13 MG/DL (ref 6–20)
BUN/CREAT SERPL: 26 (ref 7–25)
CALCIUM SPEC-SCNC: 9.4 MG/DL (ref 8.6–10.5)
CHLORIDE SERPL-SCNC: 96 MMOL/L (ref 98–107)
CO2 SERPL-SCNC: 28.4 MMOL/L (ref 22–29)
CREAT SERPL-MCNC: 0.5 MG/DL (ref 0.76–1.27)
DEPRECATED RDW RBC AUTO: 43.4 FL (ref 37–54)
EGFRCR SERPLBLD CKD-EPI 2021: 129 ML/MIN/1.73
EOSINOPHIL # BLD AUTO: 0.1 10*3/MM3 (ref 0–0.4)
EOSINOPHIL NFR BLD AUTO: 0.9 % (ref 0.3–6.2)
ERYTHROCYTE [DISTWIDTH] IN BLOOD BY AUTOMATED COUNT: 13.2 % (ref 12.3–15.4)
GLOBULIN UR ELPH-MCNC: 3.5 GM/DL
GLUCOSE SERPL-MCNC: 110 MG/DL (ref 65–99)
HCT VFR BLD AUTO: 28.5 % (ref 37.5–51)
HGB BLD-MCNC: 8.7 G/DL (ref 13–17.7)
IMM GRANULOCYTES # BLD AUTO: 0.12 10*3/MM3 (ref 0–0.05)
IMM GRANULOCYTES NFR BLD AUTO: 1.1 % (ref 0–0.5)
LYMPHOCYTES # BLD AUTO: 1.68 10*3/MM3 (ref 0.7–3.1)
LYMPHOCYTES NFR BLD AUTO: 15.1 % (ref 19.6–45.3)
MAGNESIUM SERPL-MCNC: 2.1 MG/DL (ref 1.6–2.6)
MCH RBC QN AUTO: 27.4 PG (ref 26.6–33)
MCHC RBC AUTO-ENTMCNC: 30.5 G/DL (ref 31.5–35.7)
MCV RBC AUTO: 89.6 FL (ref 79–97)
MONOCYTES # BLD AUTO: 1.08 10*3/MM3 (ref 0.1–0.9)
MONOCYTES NFR BLD AUTO: 9.7 % (ref 5–12)
NEUTROPHILS NFR BLD AUTO: 72.8 % (ref 42.7–76)
NEUTROPHILS NFR BLD AUTO: 8.12 10*3/MM3 (ref 1.7–7)
NRBC BLD AUTO-RTO: 0 /100 WBC (ref 0–0.2)
PHOSPHATE SERPL-MCNC: 4.2 MG/DL (ref 2.5–4.5)
PLATELET # BLD AUTO: 504 10*3/MM3 (ref 140–450)
PMV BLD AUTO: 9.7 FL (ref 6–12)
POTASSIUM SERPL-SCNC: 3.9 MMOL/L (ref 3.5–5.2)
PROT SERPL-MCNC: 6.4 G/DL (ref 6–8.5)
RBC # BLD AUTO: 3.18 10*6/MM3 (ref 4.14–5.8)
SODIUM SERPL-SCNC: 135 MMOL/L (ref 136–145)
WBC NRBC COR # BLD AUTO: 11.14 10*3/MM3 (ref 3.4–10.8)

## 2024-05-31 PROCEDURE — 85025 COMPLETE CBC W/AUTO DIFF WBC: CPT | Performed by: INTERNAL MEDICINE

## 2024-05-31 PROCEDURE — 99232 SBSQ HOSP IP/OBS MODERATE 35: CPT | Performed by: INTERNAL MEDICINE

## 2024-05-31 PROCEDURE — 94799 UNLISTED PULMONARY SVC/PX: CPT

## 2024-05-31 PROCEDURE — 92526 ORAL FUNCTION THERAPY: CPT

## 2024-05-31 PROCEDURE — 94664 DEMO&/EVAL PT USE INHALER: CPT

## 2024-05-31 PROCEDURE — 97164 PT RE-EVAL EST PLAN CARE: CPT

## 2024-05-31 PROCEDURE — 80053 COMPREHEN METABOLIC PANEL: CPT | Performed by: INTERNAL MEDICINE

## 2024-05-31 PROCEDURE — 83735 ASSAY OF MAGNESIUM: CPT | Performed by: INTERNAL MEDICINE

## 2024-05-31 PROCEDURE — 84100 ASSAY OF PHOSPHORUS: CPT | Performed by: INTERNAL MEDICINE

## 2024-05-31 RX ADMIN — Medication 10 ML: at 09:12

## 2024-05-31 RX ADMIN — Medication 400 MG: at 09:11

## 2024-05-31 RX ADMIN — LIDOCAINE 1 PATCH: 560 PATCH PERCUTANEOUS; TOPICAL; TRANSDERMAL at 09:30

## 2024-05-31 RX ADMIN — Medication 10 ML: at 21:01

## 2024-05-31 RX ADMIN — HYDROCODONE BITARTRATE AND ACETAMINOPHEN 1 TABLET: 5; 325 TABLET ORAL at 17:48

## 2024-05-31 RX ADMIN — Medication 100 MG: at 09:11

## 2024-05-31 RX ADMIN — Medication 5 MG: at 22:41

## 2024-05-31 RX ADMIN — SENNOSIDES AND DOCUSATE SODIUM 2 TABLET: 50; 8.6 TABLET ORAL at 21:01

## 2024-05-31 RX ADMIN — CYANOCOBALAMIN TAB 500 MCG 1000 MCG: 500 TAB at 09:11

## 2024-05-31 RX ADMIN — TIOTROPIUM BROMIDE INHALATION SPRAY 2 PUFF: 3.12 SPRAY, METERED RESPIRATORY (INHALATION) at 09:48

## 2024-05-31 RX ADMIN — Medication 1 TABLET: at 09:12

## 2024-05-31 RX ADMIN — BUDESONIDE AND FORMOTEROL FUMARATE DIHYDRATE 2 PUFF: 160; 4.5 AEROSOL RESPIRATORY (INHALATION) at 09:48

## 2024-05-31 NOTE — THERAPY TREATMENT NOTE
Acute Care - Speech Language Pathology   Swallow Treatment Note  [place]     Patient Name: Oswaldo Bowen  : 1980  MRN: [id number]  Today's Date: 2024               Admit Date: 2024    Visit Dx:     ICD-10-CM ICD-9-CM   1. Closed displaced intertrochanteric fracture of right femur, initial encounter  S72.141A 820.21   2. Pulmonary nodule  R91.1 793.11   3. Difficulty walking  R26.2 719.7   4. Dysphagia, oropharyngeal  R13.12 787.22     Patient Active Problem List   Diagnosis    Closed intertrochanteric fracture of right femur    Pulmonary nodule     History reviewed. No pertinent past medical history.  Past Surgical History:   Procedure Laterality Date    BRONCHOSCOPY N/A 2024    Procedure: BRONCHOSCOPY WITH ENDOBRONCHIAL ULTRASOUND, FINE NEEDLE ASPIRATE, BIOPSIES, BRUSHINGS, BRONCHOALVEOLAR LAVAGE;  Surgeon: Kendell Stern MD;  Location: [facility] ENDOSCOPY;  Service: Pulmonary;  Laterality: N/A;  LUNG NODULE, MUCOUS PLUGGING    HIP INTERTROCHANTERIC NAILING Right 2024    Procedure: HIP INTERTROCHANTERIC NAILING;  Surgeon: Molina Clayton MD;  Location: [facility] MAIN OR;  Service: Orthopedics;  Laterality: Right;     SPEECH PATHOLOGY DYSPHAGIA TREATMENT    Subjective/Behavioral Observations: Patient seen for dysphagia therapy    Current Diet: Soft to chew with nectar thick liquids    Current Strategies: Slow rate, small bites/drinks        Treatment received: Patient seen at bedside.  Tolerating therapeutic trials of soft solids and nectar thick liquids without clinical sign or symptom of aspiration.  Therapeutic trials of thin liquids completed tolerated cup and single sips from straw without clinical sign or symptom of aspiration.  Patient challenged with large sequential straw trials of thin liquids with increased laryngeal congestion and wet voicing noted.  Encouraged patient to monitor bolus size and take single sips of liquids from straw.        Results of treatment: As  stated        Progress toward goals: Progressing        Barriers to Achieving goals: Medical status        Plan of care:/changes in plan: Upgrade diet to soft to chew and single sips of thin liquids  90 degrees upright for all intake  Slow rate, small bites/drinks  Intermittent supervision to encourage small single sips of liquids  Caution with mixed consistencies  Oral meds whole in applesauce      EDUCATION  The patient has been educated in the following areas:   Dysphagia (Swallowing Impairment).       Martha Abebe, SLP  5/31/2024

## 2024-05-31 NOTE — PLAN OF CARE
Goal Outcome Evaluation:  Plan of Care Reviewed With: (P) patient        Progress: (P) improving  Outcome Evaluation: (P) Pt presents with deficits in pain, endurance, balance, and strength. Pt succesfully amb w minimal c/o pain. PT recommended to improve above deficits and progress pt to PLOF      Anticipated Discharge Disposition (PT): (P) inpatient rehabilitation facility

## 2024-05-31 NOTE — PLAN OF CARE
Goal Outcome Evaluation:           Progress: no change  Outcome Evaluation: Vss. No change during shift. Rested well. Will continue plan of care.

## 2024-05-31 NOTE — PROGRESS NOTES
Pulmonary / Critical Care Progress Note      Patient Name: Oswaldo Bowen  : 1980  MRN: [id number]  Primary Care Physician:  Provider, No Known  Date of admission: 2024    Subjective   Subjective   Follow-up for left upper lobe lung nodule with mediastinal adenopathy, chronic heavy smoking, unintentional weight loss.    Bronchoscopy pathology came back negative for malignancy.    Underwent bone biopsy yesterday.  Remains in bed.  Minimal dyspnea at baseline  No coughing  No fever or chills.  No nausea or vomiting.      Objective   Objective     Vitals:   Temp:  [97.3 °F (36.3 °C)-98.3 °F (36.8 °C)] 97.6 °F (36.4 °C)  Heart Rate:  [102-125] 115  Resp:  [12-18] 16  BP: (106-126)/(69-86) 106/76  Physical Exam   Vital Signs Reviewed   General:  WDWN, Alert, in no distress  Chest:  good aeration, clear to auscultation bilaterally, tympanic to percussion bilaterally, no work of breathing noted  CV: RRR, no MGR, pulses 2+, equal.  Abd:  Soft, NT, ND, + BS, no HSM  EXT:  no clubbing, no cyanosis, no edema  Neuro:  A&Ox3, CN grossly intact, no focal deficits.  Skin: No rashes or lesions noted      Result Review    Result Review:  I have personally reviewed the results from the time of this admission to 2024 07:18 EDT and agree with these findings:  [x]  Laboratory  [x]  Microbiology  [x]  Radiology  [x]  EKG/Telemetry   [x]  Cardiology/Vascular   []  Pathology  []  Old records  []  Other:  Most notable findings include:         Lab 24  0520 24  0546 24  0622 24  0548 24  0544 24  0918 24  1030   WBC 11.14* 10.38 9.86 9.72 11.08* 11.02* 11.76*   HEMOGLOBIN 8.7* 8.8* 8.6* 8.4* 8.2* 8.6* 8.8*   HEMATOCRIT 28.5* 28.8* 28.0* 27.4* 26.9* 27.3* 27.8*   PLATELETS 504* 465* 458* 418 394 369 383   SODIUM 135* 136 134* 135* 137 136 136   POTASSIUM 3.9 4.3 4.2 3.9 3.9 3.7 3.8   CHLORIDE 96* 97* 97* 98 100 97* 98   CO2 28.4 27.1 27.7 28.3 27.6 27.8 27.7   BUN 13 15 13 11 12 10 9    CREATININE 0.50* 0.48* 0.46* 0.38* 0.39* 0.50* 0.49*   GLUCOSE 110* 101* 99 100* 113* 188* 131*   CALCIUM 9.4 9.4 9.0 8.8 8.6 8.5* 8.6   PHOSPHORUS 4.2 4.4 3.7 4.0 3.8  --  2.7   TOTAL PROTEIN 6.4 6.3 6.1  --   --   --   --    ALBUMIN 2.9* 2.9* 2.8*  --   --   --   --    GLOBULIN 3.5 3.4 3.3  --   --   --   --             CT Chest With Contrast Diagnostic    Result Date: 5/21/2024  CT CHEST W CONTRAST DIAGNOSTIC-  Date of Exam: 5/21/2024 9:01 PM  Indication: 14mm perihilar nodule, 1-2 ppd extensive smoking hx; S72.141A-Displaced intertrochanteric fracture of right femur, initial encounter for closed fracture; R91.1-Solitary pulmonary nodule.  Comparison: Chest radiograph 5/20/2024  Technique: Axial CT images were obtained of the chest after the uneventful intravenous administration of 75 cc Isovue-370 . Reconstructed coronal and sagittal images were also obtained. Automated exposure control and iterative construction methods were used.   Findings: No coronary artery calcification. There is no mediastinal adenopathy. There is a right hilar node measuring 2.5 cm and a left hilar node measuring 2.1 cm. No axillary adenopathy. There are no pleural effusions. Within the perihilar region of the left upper lobe there is a noncalcified macro lobular 2.1 x 1.9 cm noncalcified pulmonary nodule suspicious for malignancy. Within the posterior right lower lobe there is a subpleural 12 x 5 mm nodule which is nonspecific. No other pulmonary nodules. No other focal airspace consolidation.  Bilateral adrenal glands are within normal limits.  There are multiple lytic bone lesions throughout the spine and ribs. Findings would be consistent with metastatic disease or myeloma. There are additional lytic lesions within both scapula.      Impression: Impression:  1. Left upper lobe noncalcified pulmonary nodule measuring 2.1 cm in size suspicious for primary malignancy. There is mild bilateral hilar adenopathy. Additionally there are  multiple lytic bone lesions throughout the spine ribs and scapula consistent with metastatic disease.    Electronically Signed By-Eliecer Jara MD On:5/21/2024 10:11 PM         Assessment & Plan   Assessment / Plan     Active Hospital Problems:  Active Hospital Problems    Diagnosis     **Closed intertrochanteric fracture of right femur     Pulmonary nodule          Impression:   Acute comminuted displaced intertrochanteric right femoral fracture  Status post IM nailing  Left upper lobe lung nodule with mediastinal lymphadenopathy  Chronic heavy smoking  Unintentional weight loss    Plan:   Status post bronchoscopy with EBUS.  Pathology from mediastinal lymph nodes were all negative for malignancy.  2.1 cm lung nodule could be possible target.  However patient has bony metastasis.  Imaging still very concerning and consistent with metastatic cancer.  Status post bone biopsy yesterday  Coags normal.  Completed 5 days of Unasyn for haemophilus pneumonia growing on bronchoscopy BAL  Continue Symbicort and Spiriva, albuterol as needed.    Encourage activity and incentive spirometer use  Pain control per primary service.  Postop surgical care per Ortho service.    DVT prophylaxis:  Mechanical DVT prophylaxis orders are present.    CODE STATUS:   Level Of Support Discussed With: Patient  Code Status (Patient has no pulse and is not breathing): CPR (Attempt to Resuscitate)  Medical Interventions (Patient has pulse or is breathing): Full Support    I have reviewed labs, imaging, pertinent clinical data and provider notes.   I have discussed with bedside nurse and primary service.     Electronically signed by Kendell Stern MD, 5/31/2024, 07:18 EDT.

## 2024-05-31 NOTE — THERAPY RE-EVALUATION
Acute Care - Physical Therapy Re-Assessment   [place]     Patient Name: Oswaldo Bowen  : 1980  MRN: [id number]  Today's Date: 2024        Visit Dx:     ICD-10-CM ICD-9-CM   1. Closed displaced intertrochanteric fracture of right femur, initial encounter  S72.141A 820.21   2. Pulmonary nodule  R91.1 793.11   3. Difficulty walking  R26.2 719.7   4. Dysphagia, oropharyngeal  R13.12 787.22     Patient Active Problem List   Diagnosis    Closed intertrochanteric fracture of right femur    Pulmonary nodule     History reviewed. No pertinent past medical history.  Past Surgical History:   Procedure Laterality Date    BRONCHOSCOPY N/A 2024    Procedure: BRONCHOSCOPY WITH ENDOBRONCHIAL ULTRASOUND, FINE NEEDLE ASPIRATE, BIOPSIES, BRUSHINGS, BRONCHOALVEOLAR LAVAGE;  Surgeon: Kendell Stern MD;  Location: [facility] ENDOSCOPY;  Service: Pulmonary;  Laterality: N/A;  LUNG NODULE, MUCOUS PLUGGING    HIP INTERTROCHANTERIC NAILING Right 2024    Procedure: HIP INTERTROCHANTERIC NAILING;  Surgeon: Molina Clayton MD;  Location: [facility] MAIN OR;  Service: Orthopedics;  Laterality: Right;     PT Assessment (Last 12 Hours)       PT Evaluation and Treatment       Row Name 24 1400          Physical Therapy Time and Intention    Subjective Information complains of;fatigue;pain (P)   -TR     Document Type re-evaluation (P)   -TR     Mode of Treatment individual therapy;physical therapy (P)   -TR     Patient Effort good (P)   -TR     Symptoms Noted During/After Treatment increased pain (P)   -TR       Row Name 24 1400          General Information    Patient Profile Reviewed yes (P)   -TR     Patient Observations alert;cooperative;agree to therapy (P)   -TR     Prior Level of Function independent: (P)   -TR       Row Name 24 1400          Living Environment    Current Living Arrangements home (P)   -TR     People in Home sibling(s) (P)   -TR     Primary Care Provided by self (P)   -TR       Row Name  05/31/24 1400          Pain    Pretreatment Pain Rating 2/10 (P)   -TR     Posttreatment Pain Rating 4/10 (P)   -TR       [facility] Name 05/31/24 1400          Range of Motion (ROM)    Range of Motion bilateral lower extremities;ROM is WFL (P)   R LE: knee flex 0-90  -[facility] Name 05/31/24 1400          Strength (Manual Muscle Testing)    Strength (Manual Muscle Testing) bilateral lower extremities;strength is WFL (P)   R LE: hip flx 4-/5, ank dorsi 4/5  -TR       Row Name 05/31/24 1400          Mobility    Extremity Weight-bearing Status right lower extremity (P)   -TR     Right Lower Extremity (Weight-bearing Status) weight-bearing as tolerated (WBAT) (P)   -TR       Row Name 05/31/24 1400          Bed Mobility    Bed Mobility supine-sit;sit-supine (P)   -TR     Supine-Sit [place] (Bed Mobility) minimum assist (75% patient effort);1 person assist;contact guard (P)   -TR     Sit-Supine [place] (Bed Mobility) contact guard (P)   -TR     Bed Mobility, Safety Issues decreased use of legs for bridging/pushing (P)   -TR     Assistive Device (Bed Mobility) bed rails;draw sheet (P)   -TR       Row Name 05/31/24 1400          Transfers    Transfers sit-stand transfer;stand-sit transfer (P)   -TR     Maintains Weight-bearing Status (Transfers) able to maintain (P)   -TR       Row Name 05/31/24 1400          Sit-Stand Transfer    Sit-Stand [place] (Transfers) minimum assist (75% patient effort);1 person assist;contact guard;verbal cues (P)   -TR     Assistive Device (Sit-Stand Transfers) walker, front-wheeled (P)   -TR       Row Name 05/31/24 1400          Stand-Sit Transfer    Stand-Sit [place] (Transfers) minimum assist (75% patient effort);1 person assist;contact guard (P)   -TR     Assistive Device (Stand-Sit Transfers) walker, front-wheeled (P)   -TR       Row Name 05/31/24 1400          Gait/Stairs (Locomotion)    Gait/Stairs Locomotion gait/ambulation independence;gait/ambulation assistive  device;distance ambulated (P)   -TR     [place] Level (Gait) verbal cues;contact guard (P)   -TR     Assistive Device (Gait) walker, front-wheeled (P)   -TR     Patient was able to Ambulate yes (P)   -TR     Distance in Feet (Gait) 10 (P)   -TR     Pattern (Gait) step-through (P)   -TR       Row Name 05/31/24 1400          Balance    Balance Assessment standing dynamic balance (P)   -TR     Dynamic Standing Balance contact guard;verbal cues (P)   -TR     Position/Device Used, Standing Balance walker, front-wheeled (P)   -TR       Row Name 05/31/24 1400          Motor Skills    Motor Skills coordination;functional endurance (P)   -TR       Row Name             Wound 05/21/24 0251 Bilateral coccyx    Wound - Properties Group Placement Date: 05/21/24  -SR Placement Time: 0251  -SR Present on Original Admission: Y  -SR Side: Bilateral  -SR Location: coccyx  -SR    Retired Wound - Properties Group Placement Date: 05/21/24  -SR Placement Time: 0251  -SR Present on Original Admission: Y  -SR Side: Bilateral  -SR Location: coccyx  -SR    Retired Wound - Properties Group Date first assessed: 05/21/24  -SR Time first assessed: 0251  -SR Present on Original Admission: Y  -SR Side: Bilateral  -SR Location: coccyx  -SR      Row Name             Wound 05/21/24 Right lateral thigh Incision    Wound - Properties Group Placement Date: 05/21/24  -SF Present on Original Admission: N  -SF Side: Right  -SF Orientation: lateral  -SF Location: thigh  -SF Primary Wound Type: Incision  -SF Additional Comments: incision sites x 3 to lateral right thigh  -SF    Retired Wound - Properties Group Placement Date: 05/21/24  -SF Present on Original Admission: N  -SF Side: Right  -SF Orientation: lateral  -SF Location: thigh  -SF Primary Wound Type: Incision  -SF Additional Comments: incision sites x 3 to lateral right thigh  -SF    Retired Wound - Properties Group Date first assessed: 05/21/24  -SF Present on Original Admission: N  -SF Side:  Right  -SF Location: thigh  -SF Primary Wound Type: Incision  -SF Additional Comments: incision sites x 3 to lateral right thigh  -SF      Row Name 05/31/24 1400          Plan of Care Review    Plan of Care Reviewed With patient (P)   -TR     Progress improving (P)   -TR     Outcome Evaluation Pt presents with deficits in pain, endurance, balance, and strength. Pt succesfully amb w minimal c/o pain. PT recommended to improve above deficits and progress pt to PLOF (P)   -TR       Row Name 05/31/24 1400          Positioning and Restraints    Pre-Treatment Position in bed (P)   -TR     Post Treatment Position bed (P)   -TR     In Bed call light within reach;encouraged to call for assist;exit alarm on (P)   -TR       Row Name 05/31/24 1400          Therapy Assessment/Plan (PT)    Rehab Potential (PT) good, to achieve stated therapy goals (P)   -TR     Criteria for Skilled Interventions Met (PT) skilled treatment is necessary (P)   -TR     Therapy Frequency (PT) daily (P)   -TR     Predicted Duration of Therapy Intervention (PT) 10 days (P)   -TR     Problem List (PT) problems related to;balance;coordination;mobility;range of motion (ROM);strength;pain (P)   -TR     Activity Limitations Related to Problem List (PT) unable to ambulate safely;unable to transfer safely (P)   -TR       Row Name 05/31/24 1400          Progress Summary (PT)    Progress Toward Functional Goals (PT) progress toward functional goals is good (P)   -TR       Row Name 05/31/24 1400          Therapy Plan Review/Discharge Plan (PT)    Therapy Plan Review (PT) evaluation/treatment results reviewed (P)   -TR       Row Name 05/31/24 1400          Physical Therapy Goals    Gait Training Goal Selection (PT) gait training, PT goal 1 (P)   -TR     ROM Goal Selection (PT) ROM, PT goal 1 (P)   -TR     Balance Goal Selection (PT) balance, PT goal 1 (P)   -TR       Row Name 05/31/24 1400          Bed Mobility Goal 1 (PT)    Activity/Assistive Device (Bed  Mobility Goal 1, PT) sit to supine/supine to sit (P)   -TR     [place] Level/Cues Needed (Bed Mobility Goal 1, PT) modified independence (P)   -TR     Time Frame (Bed Mobility Goal 1, PT) 10 days (P)   -TR     Progress/Outcomes (Bed Mobility Goal 1, PT) good progress toward goal (P)   -TR       Row Name 05/31/24 1400          Transfer Goal 1 (PT)    Activity/Assistive Device (Transfer Goal 1, PT) sit-to-stand/stand-to-sit;bed-to-chair/chair-to-bed;walker, rolling (P)   -TR     [place] Level/Cues Needed (Transfer Goal 1, PT) modified independence (P)   -TR     Time Frame (Transfer Goal 1, PT) 10 days (P)   -TR     Progress/Outcome (Transfer Goal 1, PT) good progress toward goal (P)   -TR       Row Name 05/31/24 1400          Gait Training Goal 1 (PT)    Activity/Assistive Device (Gait Training Goal 1, PT) gait (walking locomotion);assistive device use;walker, rolling (P)   -TR     [place] Level (Gait Training Goal 1, PT) modified independence (P)   -TR     Distance (Gait Training Goal 1, PT) 200 (P)   -TR     Time Frame (Gait Training Goal 1, PT) long term goal (LTG);10 days (P)   -TR     Progress/Outcome (Gait Training Goal 1, PT) good progress toward goal (P)   -TR       Row Name 05/31/24 1400          ROM Goal 1 (PT)    ROM Goal 1 (PT) Improve R knee flexion to 0-120 (P)   -TR     Time Frame (ROM Goal 1, PT) long-term goal (LTG);10 days (P)   -TR               User Key  (r) = Recorded By, (t) = Taken By, (c) = Cosigned By      Initials Name Provider Type    Saritha Boudreaux, RN Registered Nurse    Saritha Becker, RN Registered Nurse    Aaron Cooney, PT Student PT Student                    Physical Therapy Education       Title: PT OT SLP Therapies (In Progress)       Topic: Physical Therapy (In Progress)       Point: Mobility training (Done)       Learning Progress Summary             Patient Acceptance, E,TB, VU by TR at 5/31/2024 1449    TREV Falcon, VU by  at 5/23/2024  2223    Acceptance, E,TB, VU by AV at 5/22/2024 1339                         Point: Home exercise program (Not Started)       Learner Progress:  Not documented in this visit.              Point: Body mechanics (Done)       Learning Progress Summary             Patient Acceptance, E,TB, VU by AV at 5/22/2024 1339                         Point: Precautions (Done)       Learning Progress Summary             Patient Acceptance, E,TB, VU by TR at 5/31/2024 1449    Acceptance, E,TB, VU by AV at 5/22/2024 1339                                         User Key       Initials Effective Dates Name Provider Type Discipline     06/11/21 -  Eh Martínez, PT Physical Therapist PT     05/31/23 -  Angela Casillas, RN Registered Nurse Nurse    TR 05/21/24 -  Aaron Fowler, JERROD Student PT Student PT                  PT Recommendation and Plan  Anticipated Discharge Disposition (PT): (P) inpatient rehabilitation facility  Planned Therapy Interventions (PT): (P) balance training, bed mobility training, gait training, ROM (range of motion), stair training, strengthening  Therapy Frequency (PT): (P) daily  Progress Summary (PT)  Progress Toward Functional Goals (PT): (P) progress toward functional goals is good  Plan of Care Reviewed With: (P) patient  Progress: (P) improving  Outcome Evaluation: (P) Pt presents with deficits in pain, endurance, balance, and strength. Pt succesfully amb w minimal c/o pain. PT recommended to improve above deficits and progress pt to PLOF   Outcome Measures       Row Name 05/31/24 1400 05/30/24 1125 05/29/24 1044       How much help from another person do you currently need...    Turning from your back to your side while in flat bed without using bedrails? 3 (P)   -TR 3  -DK 3  -DK    Moving from lying on back to sitting on the side of a flat bed without bedrails? 3 (P)   -TR 3  -DK 3  -DK    Moving to and from a bed to a chair (including a wheelchair)? 3 (P)   -TR 3  -DK 3  -DK     Standing up from a chair using your arms (e.g., wheelchair, bedside chair)? 3 (P)   -TR 3  -DK 3  -DK    Climbing 3-5 steps with a railing? 2 (P)   -TR 2  -DK 2  -DK    To walk in hospital room? 3 (P)   -TR 3  -DK 3  -DK    AM-PAC 6 Clicks Score (PT) 17 (P)   -TR 17  -DK 17  -DK    Highest Level of Mobility Goal 5 --> Static standing (P)   -TR 5 --> Static standing  -DK 5 --> Static standing  -DK       Functional Assessment    Outcome Measure Options AM-PAC 6 Clicks Basic Mobility (PT) (P)   -TR AM-PAC 6 Clicks Basic Mobility (PT)  -DK AM-PAC 6 Clicks Basic Mobility (PT)  -DK              User Key  (r) = Recorded By, (t) = Taken By, (c) = Cosigned By      Initials Name Provider Type    Sheial Maharaj, PTA Physical Therapist Assistant    Aaron Cooney, PT Student PT Student                     Time Calculation:    PT Charges       Row Name 05/31/24 1432             Time Calculation    PT Received On 05/31/24 (P)   -TR      PT Goal Re-Cert Due Date 06/09/24 (P)   -TR         Untimed Charges    PT Eval/Re-eval Minutes 35 (P)   -TR         Total Minutes    Untimed Charges Total Minutes 35 (P)   -TR       Total Minutes 35 (P)   -TR                User Key  (r) = Recorded By, (t) = Taken By, (c) = Cosigned By      Initials Name Provider Type    Aaron Cooney, PT Student PT Student                      PT G-Codes  Outcome Measure Options: (P) AM-PAC 6 Clicks Basic Mobility (PT)  AM-PAC 6 Clicks Score (PT): (P) 17  AM-PAC 6 Clicks Score (OT): 14    Aaron Fowler, JERROD Student  5/31/2024

## 2024-06-01 LAB
ALBUMIN SERPL-MCNC: 2.9 G/DL (ref 3.5–5.2)
ALBUMIN/GLOB SERPL: 0.8 G/DL
ALP SERPL-CCNC: 179 U/L (ref 39–117)
ALT SERPL W P-5'-P-CCNC: 77 U/L (ref 1–41)
ANION GAP SERPL CALCULATED.3IONS-SCNC: 10.1 MMOL/L (ref 5–15)
AST SERPL-CCNC: 30 U/L (ref 1–40)
BASOPHILS # BLD AUTO: 0.05 10*3/MM3 (ref 0–0.2)
BASOPHILS NFR BLD AUTO: 0.4 % (ref 0–1.5)
BILIRUB SERPL-MCNC: 0.2 MG/DL (ref 0–1.2)
BUN SERPL-MCNC: 13 MG/DL (ref 6–20)
BUN/CREAT SERPL: 27.1 (ref 7–25)
CALCIUM SPEC-SCNC: 9.3 MG/DL (ref 8.6–10.5)
CHLORIDE SERPL-SCNC: 95 MMOL/L (ref 98–107)
CO2 SERPL-SCNC: 28.9 MMOL/L (ref 22–29)
CREAT SERPL-MCNC: 0.48 MG/DL (ref 0.76–1.27)
DEPRECATED RDW RBC AUTO: 43.3 FL (ref 37–54)
EGFRCR SERPLBLD CKD-EPI 2021: 130.6 ML/MIN/1.73
EOSINOPHIL # BLD AUTO: 0.12 10*3/MM3 (ref 0–0.4)
EOSINOPHIL NFR BLD AUTO: 1 % (ref 0.3–6.2)
ERYTHROCYTE [DISTWIDTH] IN BLOOD BY AUTOMATED COUNT: 13.3 % (ref 12.3–15.4)
GLOBULIN UR ELPH-MCNC: 3.8 GM/DL
GLUCOSE BLDC GLUCOMTR-MCNC: 163 MG/DL (ref 70–99)
GLUCOSE SERPL-MCNC: 101 MG/DL (ref 65–99)
HCT VFR BLD AUTO: 28.1 % (ref 37.5–51)
HGB BLD-MCNC: 8.7 G/DL (ref 13–17.7)
IMM GRANULOCYTES # BLD AUTO: 0.12 10*3/MM3 (ref 0–0.05)
IMM GRANULOCYTES NFR BLD AUTO: 1 % (ref 0–0.5)
LYMPHOCYTES # BLD AUTO: 1.73 10*3/MM3 (ref 0.7–3.1)
LYMPHOCYTES NFR BLD AUTO: 15 % (ref 19.6–45.3)
MAGNESIUM SERPL-MCNC: 1.9 MG/DL (ref 1.6–2.6)
MCH RBC QN AUTO: 27.6 PG (ref 26.6–33)
MCHC RBC AUTO-ENTMCNC: 31 G/DL (ref 31.5–35.7)
MCV RBC AUTO: 89.2 FL (ref 79–97)
MONOCYTES # BLD AUTO: 1.16 10*3/MM3 (ref 0.1–0.9)
MONOCYTES NFR BLD AUTO: 10.1 % (ref 5–12)
NEUTROPHILS NFR BLD AUTO: 72.5 % (ref 42.7–76)
NEUTROPHILS NFR BLD AUTO: 8.32 10*3/MM3 (ref 1.7–7)
NRBC BLD AUTO-RTO: 0 /100 WBC (ref 0–0.2)
PHOSPHATE SERPL-MCNC: 4.1 MG/DL (ref 2.5–4.5)
PLATELET # BLD AUTO: 482 10*3/MM3 (ref 140–450)
PMV BLD AUTO: 9.2 FL (ref 6–12)
POTASSIUM SERPL-SCNC: 4.2 MMOL/L (ref 3.5–5.2)
PROT SERPL-MCNC: 6.7 G/DL (ref 6–8.5)
RBC # BLD AUTO: 3.15 10*6/MM3 (ref 4.14–5.8)
SODIUM SERPL-SCNC: 134 MMOL/L (ref 136–145)
WBC NRBC COR # BLD AUTO: 11.5 10*3/MM3 (ref 3.4–10.8)

## 2024-06-01 PROCEDURE — 80053 COMPREHEN METABOLIC PANEL: CPT | Performed by: INTERNAL MEDICINE

## 2024-06-01 PROCEDURE — 84100 ASSAY OF PHOSPHORUS: CPT | Performed by: INTERNAL MEDICINE

## 2024-06-01 PROCEDURE — 85025 COMPLETE CBC W/AUTO DIFF WBC: CPT | Performed by: INTERNAL MEDICINE

## 2024-06-01 PROCEDURE — 94799 UNLISTED PULMONARY SVC/PX: CPT

## 2024-06-01 PROCEDURE — 99232 SBSQ HOSP IP/OBS MODERATE 35: CPT | Performed by: INTERNAL MEDICINE

## 2024-06-01 PROCEDURE — 97530 THERAPEUTIC ACTIVITIES: CPT

## 2024-06-01 PROCEDURE — 82948 REAGENT STRIP/BLOOD GLUCOSE: CPT

## 2024-06-01 PROCEDURE — 63710000001 ONDANSETRON ODT 4 MG TABLET DISPERSIBLE: Performed by: INTERNAL MEDICINE

## 2024-06-01 PROCEDURE — 97116 GAIT TRAINING THERAPY: CPT

## 2024-06-01 PROCEDURE — 94664 DEMO&/EVAL PT USE INHALER: CPT

## 2024-06-01 PROCEDURE — 99232 SBSQ HOSP IP/OBS MODERATE 35: CPT | Performed by: STUDENT IN AN ORGANIZED HEALTH CARE EDUCATION/TRAINING PROGRAM

## 2024-06-01 PROCEDURE — 83735 ASSAY OF MAGNESIUM: CPT | Performed by: INTERNAL MEDICINE

## 2024-06-01 PROCEDURE — 25010000002 ONDANSETRON PER 1 MG: Performed by: INTERNAL MEDICINE

## 2024-06-01 RX ADMIN — HYDROCODONE BITARTRATE AND ACETAMINOPHEN 1 TABLET: 10; 325 TABLET ORAL at 20:51

## 2024-06-01 RX ADMIN — Medication 10 ML: at 08:09

## 2024-06-01 RX ADMIN — CALCIUM CARBONATE 1 TABLET: 500 TABLET, CHEWABLE ORAL at 20:51

## 2024-06-01 RX ADMIN — ALUMINUM HYDROXIDE, MAGNESIUM HYDROXIDE, AND DIMETHICONE 15 ML: 400; 400; 40 SUSPENSION ORAL at 21:05

## 2024-06-01 RX ADMIN — TIOTROPIUM BROMIDE INHALATION SPRAY 2 PUFF: 3.12 SPRAY, METERED RESPIRATORY (INHALATION) at 10:35

## 2024-06-01 RX ADMIN — Medication 400 MG: at 08:09

## 2024-06-01 RX ADMIN — LIDOCAINE 1 PATCH: 560 PATCH PERCUTANEOUS; TOPICAL; TRANSDERMAL at 08:09

## 2024-06-01 RX ADMIN — HYDROXYZINE HYDROCHLORIDE 25 MG: 25 TABLET, FILM COATED ORAL at 21:05

## 2024-06-01 RX ADMIN — CYANOCOBALAMIN TAB 500 MCG 1000 MCG: 500 TAB at 08:09

## 2024-06-01 RX ADMIN — Medication 5 MG: at 21:05

## 2024-06-01 RX ADMIN — HYDROCODONE BITARTRATE AND ACETAMINOPHEN 1 TABLET: 10; 325 TABLET ORAL at 10:29

## 2024-06-01 RX ADMIN — BUDESONIDE AND FORMOTEROL FUMARATE DIHYDRATE 2 PUFF: 160; 4.5 AEROSOL RESPIRATORY (INHALATION) at 10:35

## 2024-06-01 RX ADMIN — ONDANSETRON 4 MG: 4 TABLET, ORALLY DISINTEGRATING ORAL at 16:09

## 2024-06-01 RX ADMIN — BUDESONIDE AND FORMOTEROL FUMARATE DIHYDRATE 2 PUFF: 160; 4.5 AEROSOL RESPIRATORY (INHALATION) at 19:16

## 2024-06-01 RX ADMIN — Medication 1 TABLET: at 08:09

## 2024-06-01 RX ADMIN — Medication 100 MG: at 08:09

## 2024-06-01 RX ADMIN — HYDROCODONE BITARTRATE AND ACETAMINOPHEN 1 TABLET: 10; 325 TABLET ORAL at 00:04

## 2024-06-01 NOTE — CONSULTS
"Nutrition Services    Patient Name: Oswaldo Bowen  YOB: 1980  MRN: [id number]  Admission date: 5/20/2024      CLINICAL NUTRITION ASSESSMENT      Reason for Assessment  MST Score 2+     H&P:  History reviewed. No pertinent past medical history.     Current Problems:   Active Hospital Problems    Diagnosis     **Closed intertrochanteric fracture of right femur     Pulmonary nodule         Nutrition/Diet History         Narrative   Pt was resting at my visit, did not wake up to verbal stimuli. No family was in the room. Observed 100% of breakfast consumed. Per MST assessment, pt has lost 2-13#'s recently. Per visual assessment, pt has moderate temporal muscle wasting. Will order nutrition supplements and monitor acceptance/need for adjustments. Speech therapy recommends a soft to chew diet with thin liquids. BM 5/30, pt is on a scheduled bowel regimen.      Anthropometrics        Current Height, Weight Height: 182.9 cm (72.01\")  Weight: 65 kg (143 lb 4.8 oz)   Current BMI Body mass index is 19.43 kg/m².   BMI Classification Normal range   % IBW 84%    Adjusted Body Weight (ABW)    Weight Hx  Wt Readings from Last 30 Encounters:   05/21/24 0219 65 kg (143 lb 4.8 oz)   05/20/24 2134 66 kg (145 lb 8.1 oz)          Wt Change Observation None documented prior to this admission.      Estimated/Assessed Needs  Estimated Needs based on: Current Body Weight       Energy Requirements 30-35 kcal/kg    EST Needs (kcal/day) 3994-0517 kcal/day       Protein Requirements 1.2-1.5 g.kg   EST Daily Needs (g/day) 78-98 g/day        Fluid Requirements 1 ml/kcal    Estimated Needs (mL/day) 0128-6885 ml/day      Labs/Medications         Pertinent Labs Reviewed.   Results from last 7 days   Lab Units 06/01/24  0545 05/31/24  0520 05/30/24  0546   SODIUM mmol/L 134* 135* 136   POTASSIUM mmol/L 4.2 3.9 4.3   CHLORIDE mmol/L 95* 96* 97*   CO2 mmol/L 28.9 28.4 27.1   BUN mg/dL 13 13 15   CREATININE mg/dL 0.48* 0.50* 0.48*   CALCIUM " "mg/dL 9.3 9.4 9.4   BILIRUBIN mg/dL 0.2 0.2 0.2   ALK PHOS U/L 179* 181* 171*   ALT (SGPT) U/L 77* 89* 88*   AST (SGOT) U/L 30 39 50*   GLUCOSE mg/dL 101* 110* 101*     Results from last 7 days   Lab Units 06/01/24  0545 05/31/24  0520 05/30/24  0546   MAGNESIUM mg/dL 1.9 2.1 2.0   PHOSPHORUS mg/dL 4.1 4.2 4.4   HEMOGLOBIN g/dL 8.7* 8.7* 8.8*   HEMATOCRIT % 28.1* 28.5* 28.8*     No results found for: \"COVID19\"  No results found for: \"HGBA1C\"      Pertinent Medications Reviewed.     Malnutrition Severity Assessment              Nutrition Diagnosis         Nutrition Dx Problem 1 Unintentional weight loss related to  decreased intake  as evidenced by  reported recent weight loss of 2-13#'s recently      Nutrition Intervention           Current Nutrition Orders & Evaluation of Intake       Current PO Diet Diet: Regular/House; Texture: Soft to Chew (NDD 3); Soft to Chew: Whole Meat; Fluid Consistency: Thin (IDDSI 0)   Supplement No active supplement orders           Nutrition Intervention/Prescription        Order boost plus BID (Each supplement contains 360 kcal, 14g protein) - monitor acceptance/need for adjustments         Medical Nutrition Therapy/Nutrition Education          Learner     Readiness N/A Unable to discuss with pt today   N/A     Method     Response N/A  N/A     Monitor/Evaluation        Monitor PO intake, Supplement intake, Skin status, Swallow function     Nutrition Discharge Plan         Recommend to continue oral nutrition supplements on discharge.      Electronically signed by:  Kika Roy RD  06/01/24 09:44 EDT   "

## 2024-06-01 NOTE — PROGRESS NOTES
[facility]   Hospitalist Progress Note    Date of admission: 5/20/2024  Patient Name: Oswaldo Bowen  1980  Date: 6/1/2024      Subjective     Chief Complaint   Patient presents with    Fall    Leg Pain       Interval Followup: No acute events overnight, patient resting comfortably in bed, bone biopsy is still pending at this time, suspect here early next week, patient to continue working with physical therapy, pain mostly controlled    Objective     Vitals:   Temp:  [97.3 °F (36.3 °C)-98.6 °F (37 °C)] 98.6 °F (37 °C)  Heart Rate:  [109-111] 111  Resp:  [16-20] 16  BP: (104-116)/(60-80) 116/77    Physical Exam  GEN: No acute distress  HEENT: Moist mucous membranes  LUNGS: Equal chest rise bilaterally  CARDIAC: Regular rate and rhythm  NEURO: Moving all 4 extremities spontaneously  SKIN: No obvious breakdown    Result Review:  Vital signs, labs and recent relevant imaging reviewed.      CBC          5/30/2024    05:46 5/31/2024    05:20 6/1/2024    05:45   CBC   WBC 10.38  11.14  11.50    RBC 3.18  3.18  3.15    Hemoglobin 8.8  8.7  8.7    Hematocrit 28.8  28.5  28.1    MCV 90.6  89.6  89.2    MCH 27.7  27.4  27.6    MCHC 30.6  30.5  31.0    RDW 13.2  13.2  13.3    Platelets 465  504  482      CMP          5/30/2024    05:46 5/31/2024    05:20 6/1/2024    05:45   CMP   Glucose 101  110  101    BUN 15  13  13    Creatinine 0.48  0.50  0.48    EGFR 130.6  129.0  130.6    Sodium 136  135  134    Potassium 4.3  3.9  4.2    Chloride 97  96  95    Calcium 9.4  9.4  9.3    Total Protein 6.3  6.4  6.7    Albumin 2.9  2.9  2.9    Globulin 3.4  3.5  3.8    Total Bilirubin 0.2  0.2  0.2    Alkaline Phosphatase 171  181  179    AST (SGOT) 50  39  30    ALT (SGPT) 88  89  77    Albumin/Globulin Ratio 0.9  0.8  0.8    BUN/Creatinine Ratio 31.3  26.0  27.1    Anion Gap 11.9  10.6  10.1          acetaminophen    aluminum-magnesium hydroxide-simethicone    senna-docusate sodium **AND** polyethylene glycol **AND** bisacodyl  **AND** bisacodyl    calcium carbonate    dextrose    dextrose    Diclofenac Sodium    glucagon (human recombinant)    HYDROcodone-acetaminophen    HYDROcodone-acetaminophen    hydrOXYzine    levalbuterol    Lidocaine    melatonin    [DISCONTINUED] Morphine **AND** naloxone    nicotine    ondansetron    ondansetron ODT **OR** [DISCONTINUED] ondansetron    prochlorperazine    sodium chloride    sodium chloride    sodium chloride    sodium chloride    budesonide-formoterol, 2 puff, Inhalation, BID - RT  Lidocaine, 1 patch, Transdermal, Q24H  magnesium oxide, 400 mg, Oral, Daily  multivitamin with minerals, 1 tablet, Oral, Daily  senna-docusate sodium, 2 tablet, Oral, BID  sodium chloride, 10 mL, Intravenous, Q12H  thiamine, 100 mg, Oral, Daily  tiotropium bromide monohydrate, 2 puff, Inhalation, Daily - RT  vitamin B-12, 1,000 mcg, Oral, Daily        CT Needle Biopsy Bone Deep    Result Date: 5/30/2024  Impression: 1.  Successful CT-guided 18-gauge fine-needle biopsy of right seventh rib lesion without evidence of complication.           Electronically Signed By-EVAN SORIANO MD On:5/30/2024 11:22 AM       Assessment / Plan     Summary: 44M with intellectual disability, 1-2PPD smoking, depression, who presented after a fall (possibly 1 month ago but may have been more recently), found to have right femoral fracture and VLAD.  Ortho assisting with initial surgical treatment.  Initial lung mass concerning on chest x-ray additional CT imaging with suspected new metastatic colon cancer with mets to the bone, pulmonology consulted patient had a biopsy results pending.  Bronchoscopy also with findings of Haemophilus influenzae treatment with antibiotics.  Palliative assisting.  Working on rehab placement guarded long-term prognosis    Assessment/Plan (clinically significant if listed here)  Mechanical fall with acute comminuted displaced intertrochanteric right femoral fracture  S/p 5/21 right hip subtrochanteric nailing  of closed displaced right femur fracture by Dr. Nelson  VLAD creatinine 1.7 leukocytosis suspect reactive in setting of acute fracture  Suspected new metastatic lung cancer with mets to the bone  Mediastinal lymphadenopathy  Haemophilus influenzae pneumonia   1 to 2 pack/day smoker, chronic  Intellectual disability  Depression  Normocytic anemia    Completed antibiotics  Continue respiratory hygiene  Scheduled Tylenol and lidocaine patch, as needed and p.o. pain medication for breakthrough pain  Multiple myeloma labs negative, bronchoscopy/EBUS negative for malignancy, patient underwent bone biopsy on 5/30/2024, pathology is pending at this time  Monitor for aspiration  Appreciate oncology assistance, will likely require outpatient PET scan  Inhalers, respiratory hygiene, aspiration precaution  Continue postoperative monitoring for fracture appreciate orthopedic surgery assistance, patient is ambulating some with minimal assistance  No overt bleeding, hb stabilized postoperatively near mid 8, cont mvi, continue to monitor closely  Nrt prn / smoking cessation   Continue PT/OT  Continue hospitalization at current level of care  CBC, CMP reviewed 6/1/2024   Lab holiday tomorrow  Out of bed to chair 3 times daily, encourage ambulation with physical therapy    Dispo: Disposition is pending biopsy results, and treatment plans regarding patient's cancer versus rehab    DVT prophylaxis:  Mechanical DVT prophylaxis orders are present.      Level Of Support Discussed With: Patient  Code Status (Patient has no pulse and is not breathing): CPR (Attempt to Resuscitate)  Medical Interventions (Patient has pulse or is breathing): Full Support      Electronically signed by Molina Du MD, 6/1/2024, 12:24 EDT.

## 2024-06-01 NOTE — PLAN OF CARE
Goal Outcome Evaluation:            Vitals stable during shift. Pt complained of pain and nausea during shift. See MAR. Pt ambulated in room with PT. Is currently up in chair with call light within reach. Tolerating chair well. Continue care plan.

## 2024-06-01 NOTE — PLAN OF CARE
Goal Outcome Evaluation:  Plan of Care Reviewed With: patient        Progress: improving  Outcome Evaluation: Patient resting in bed, patient requesting pain meds, patient alert and oriented, call light within reach.

## 2024-06-01 NOTE — THERAPY TREATMENT NOTE
Acute Care - Physical Therapy Progress Note   [facility]     Patient Name: Oswaldo Bowen  : 1980  MRN: [id number]  Today's Date: 2024      Visit Dx:     ICD-10-CM ICD-9-CM   1. Closed displaced intertrochanteric fracture of right femur, initial encounter  S72.141A 820.21   2. Pulmonary nodule  R91.1 793.11   3. Difficulty walking  R26.2 719.7   4. Dysphagia, oropharyngeal  R13.12 787.22     Patient Active Problem List   Diagnosis    Closed intertrochanteric fracture of right femur    Pulmonary nodule     History reviewed. No pertinent past medical history.  Past Surgical History:   Procedure Laterality Date    BRONCHOSCOPY N/A 2024    Procedure: BRONCHOSCOPY WITH ENDOBRONCHIAL ULTRASOUND, FINE NEEDLE ASPIRATE, BIOPSIES, BRUSHINGS, BRONCHOALVEOLAR LAVAGE;  Surgeon: Kendell Stern MD;  Location: [facility] ENDOSCOPY;  Service: Pulmonary;  Laterality: N/A;  LUNG NODULE, MUCOUS PLUGGING    HIP INTERTROCHANTERIC NAILING Right 2024    Procedure: HIP INTERTROCHANTERIC NAILING;  Surgeon: Molina Clayton MD;  Location: [facility] MAIN OR;  Service: Orthopedics;  Laterality: Right;     PT Assessment (Last 12 Hours)       PT Evaluation and Treatment       Row Name 24 1600          Physical Therapy Time and Intention    Subjective Information complains of;fatigue  -CS     Document Type therapy note (daily note)  -CS     Mode of Treatment individual therapy;physical therapy  -CS     Patient Effort good  -CS     Symptoms Noted During/After Treatment fatigue  -CS       Row Name 24 1600          Pain Scale: FACES Pre/Post-Treatment    Pain: FACES Scale, Pretreatment 0-->no hurt  -CS     Posttreatment Pain Rating 4-->hurts little more  -CS     Pain Location - hip  R  -CS       Row Name 24 1600          Bed Mobility    Bed Mobility scooting/bridging  -CS     Scooting/Bridging Fords (Bed Mobility) verbal cues;moderate assist (50% patient effort);1 person assist  -CS     Supine-Sit Fords  (Bed Mobility) verbal cues;minimum assist (75% patient effort);1 person assist  -CS     Bed Mobility, Safety Issues decreased use of legs for bridging/pushing  -CS     Assistive Device (Bed Mobility) bed rails;draw sheet  -CS       Row Name 06/01/24 1600          Sit-Stand Transfer    Sit-Stand Bryant (Transfers) verbal cues;minimum assist (75% patient effort);1 person assist  -CS     Assistive Device (Sit-Stand Transfers) walker, front-wheeled  -CS       Row Name 06/01/24 1600          Stand-Sit Transfer    Stand-Sit Bryant (Transfers) verbal cues;minimum assist (75% patient effort);1 person assist  -CS     Assistive Device (Stand-Sit Transfers) walker, front-wheeled  -CS       Row Name 06/01/24 1600          Gait/Stairs (Locomotion)    Bryant Level (Gait) verbal cues;contact guard;1 person assist  -CS     Assistive Device (Gait) walker, front-wheeled  -CS     Distance in Feet (Gait) 40  -CS     Pattern (Gait) 4-point;step-to;step-through  -CS     Deviations/Abnormal Patterns (Gait) antalgic;shannon decreased;festinating/shuffling;gait speed decreased;stride length decreased  -CS     Bilateral Gait Deviations forward flexed posture  -CS     Right Sided Gait Deviations weight shift ability decreased  -CS       Row Name             Wound 05/21/24 0251 Bilateral coccyx    Wound - Properties Group Placement Date: 05/21/24  -SR Placement Time: 0251  -SR Present on Original Admission: Y  -SR Side: Bilateral  -SR Location: coccyx  -SR    Retired Wound - Properties Group Placement Date: 05/21/24  -SR Placement Time: 0251  -SR Present on Original Admission: Y  -SR Side: Bilateral  -SR Location: coccyx  -SR    Retired Wound - Properties Group Date first assessed: 05/21/24  -SR Time first assessed: 0251  -SR Present on Original Admission: Y  -SR Side: Bilateral  -SR Location: coccyx  -SR      Row Name             Wound 05/21/24 Right lateral thigh Incision    Wound - Properties Group Placement Date: 05/21/24   -SF Present on Original Admission: N  -SF Side: Right  -SF Orientation: lateral  -SF Location: thigh  -SF Primary Wound Type: Incision  -SF Additional Comments: incision sites x 3 to lateral right thigh  -SF    Retired Wound - Properties Group Placement Date: 05/21/24  -SF Present on Original Admission: N  -SF Side: Right  -SF Orientation: lateral  -SF Location: thigh  -SF Primary Wound Type: Incision  -SF Additional Comments: incision sites x 3 to lateral right thigh  -SF    Retired Wound - Properties Group Date first assessed: 05/21/24  -SF Present on Original Admission: N  -SF Side: Right  -SF Location: thigh  -SF Primary Wound Type: Incision  -SF Additional Comments: incision sites x 3 to lateral right thigh  -SF      Row Name 06/01/24 1600          Positioning and Restraints    Pre-Treatment Position in bed  -CS     Post Treatment Position chair  -CS     In Chair reclined;call light within reach;encouraged to call for assist;exit alarm on;with nsg  -CS       Row Name 06/01/24 1600          Progress Summary (PT)    Progress Toward Functional Goals (PT) progress toward functional goals is good  -CS               User Key  (r) = Recorded By, (t) = Taken By, (c) = Cosigned By      Initials Name Provider Type    SF Saritha Ramirez, RN Registered Nurse    CS Phu Oquendo PTA Physical Therapist Assistant    SR Saritha Long, RN Registered Nurse                    Physical Therapy Education       Title: PT OT SLP Therapies (In Progress)       Topic: Physical Therapy (In Progress)       Point: Mobility training (Done)       Learning Progress Summary             Patient Acceptance, E, VU by PS at 6/1/2024 0627    Acceptance, E,TB, VU by TR at 5/31/2024 1449    Eager, E, VU by AH at 5/23/2024 2223    Acceptance, E,TB, VU by AV at 5/22/2024 1339                         Point: Home exercise program (Not Started)       Learner Progress:  Not documented in this visit.              Point: Body mechanics (Done)        Learning Progress Summary             Patient Acceptance, E, VU by PS at 6/1/2024 0627    Acceptance, E,TB, VU by AV at 5/22/2024 1339                         Point: Precautions (Done)       Learning Progress Summary             Patient Acceptance, E,TB, VU by TR at 5/31/2024 1449    Acceptance, E,TB, VU by AV at 5/22/2024 1339                                         User Key       Initials Effective Dates Name Provider Type Discipline    PS 06/16/21 -  Darcie Fiore, RN Registered Nurse Nurse    AV 06/11/21 -  Eh Martínez, PT Physical Therapist PT     05/31/23 -  Angela Casillas, RN Registered Nurse Nurse    TR 05/21/24 -  Aaron Fowler, PT Student PT Student PT                  PT Recommendation and Plan     Progress Summary (PT)  Progress Toward Functional Goals (PT): progress toward functional goals is good   Outcome Measures       Row Name 06/01/24 1600 05/31/24 1400 05/30/24 1125       How much help from another person do you currently need...    Turning from your back to your side while in flat bed without using bedrails? 3  -CS 3  -JAYRO (r) TR (t) JAYRO (c) 3  -DK    Moving from lying on back to sitting on the side of a flat bed without bedrails? 3  -CS 3  -JAYRO (r) TR (t) JAYRO (c) 3  -DK    Moving to and from a bed to a chair (including a wheelchair)? 3  -CS 3  -JAYRO (r) TR (t) JAYRO (c) 3  -DK    Standing up from a chair using your arms (e.g., wheelchair, bedside chair)? 3  -CS 3  -JAYRO (r) TR (t) JAYRO (c) 3  -DK    Climbing 3-5 steps with a railing? 2  -CS 2  -JAYRO (r) TR (t) JAYRO (c) 2  -DK    To walk in hospital room? 3  -CS 3  -JAYRO (r) TR (t) JAYRO (c) 3  -DK    AM-PAC 6 Clicks Score (PT) 17  -CS 17  -JAYRO (r) TR (t) 17  -DK    Highest Level of Mobility Goal 5 --> Static standing  -CS 5 --> Static standing  -JAYRO (r) TR (t) 5 --> Static standing  -DK       Functional Assessment    Outcome Measure Options AM-PAC 6 Clicks Basic Mobility (PT)  -CS AM-PAC 6 Clicks Basic Mobility (PT)  -JAYRO (simi) SHAWNA (t) JAYRO (c)  AM-PAC 6 Clicks Basic Mobility (PT)  -DK              User Key  (r) = Recorded By, (t) = Taken By, (c) = Cosigned By      Initials Name Provider Type    Sheila Maharaj, RADHA Physical Therapist Assistant    Darius Srivastava, PT Physical Therapist    CS Phu Oquendo PTA Physical Therapist Assistant    Aaron Cooney, PT Student PT Student                     Time Calculation:    PT Charges       Row Name 06/01/24 1657             Time Calculation    Start Time 1541  -CS      PT Received On 06/01/24  -CS         Timed Charges    86639 - Gait Training Minutes  12  -CS      25362 - PT Therapeutic Activity Minutes 12  -CS         Total Minutes    Timed Charges Total Minutes 24  -CS       Total Minutes 24  -CS                User Key  (r) = Recorded By, (t) = Taken By, (c) = Cosigned By      Initials Name Provider Type    CS Phu Oquendo PTA Physical Therapist Assistant                  Therapy Charges for Today       Code Description Service Date Service Provider Modifiers Qty    13045022074 HC GAIT TRAINING EA 15 MIN 6/1/2024 Phu Oquendo PTA GP 1    39446567021 HC PT THERAPEUTIC ACT EA 15 MIN 6/1/2024 Phu Oquendo PTA GP 1            PT G-Codes  Outcome Measure Options: AM-PAC 6 Clicks Basic Mobility (PT)  AM-PAC 6 Clicks Score (PT): 17  AM-PAC 6 Clicks Score (OT): 14    Phu Oquendo PTA  6/1/2024

## 2024-06-01 NOTE — PROGRESS NOTES
Pulmonary / Critical Care Progress Note      Patient Name: Oswaldo Bowen  : 1980  MRN: [id number]  Primary Care Physician:  Provider, No Known  Date of admission: 2024    Subjective   Subjective   Follow-up for left upper lobe lung nodule with mediastinal adenopathy, chronic heavy smoking, unintentional weight loss.    Bronchoscopy pathology came back negative for malignancy.    Underwent bone biopsy   No coughing  No fever or chills.  No nausea or vomiting  On room air      Objective   Objective     Vitals:   Temp:  [97.3 °F (36.3 °C)-98.6 °F (37 °C)] 97.3 °F (36.3 °C)  Heart Rate:  [109-115] 115  Resp:  [16-20] 16  BP: (104-126)/(60-86) 126/86    Physical Exam   Vital Signs Reviewed   General:  WDWN, Alert, in no distress  Chest:  good aeration, clear to auscultation bilaterally, no work of breathing noted on room air  CV: RRR, no MGR, pulses 2+, equal.  Abd:  Soft, NT, ND, + BS, no HSM  EXT:  no clubbing, no cyanosis, no edema  Neuro:  A&Ox3, CN grossly intact, no focal deficits.  Skin: No rashes or lesions noted    Result Review    Result Review:  I have personally reviewed the results from the time of this admission to 2024 17:21 EDT and agree with these findings:  [x]  Laboratory  [x]  Microbiology  [x]  Radiology  [x]  EKG/Telemetry   [x]  Cardiology/Vascular   []  Pathology  []  Old records  []  Other:  Most notable findings include:         Lab 24  0545 24  0520 24  0546 24  0622 24  0548 24  0544   WBC 11.50* 11.14* 10.38 9.86 9.72 11.08*   HEMOGLOBIN 8.7* 8.7* 8.8* 8.6* 8.4* 8.2*   HEMATOCRIT 28.1* 28.5* 28.8* 28.0* 27.4* 26.9*   PLATELETS 482* 504* 465* 458* 418 394   SODIUM 134* 135* 136 134* 135* 137   POTASSIUM 4.2 3.9 4.3 4.2 3.9 3.9   CHLORIDE 95* 96* 97* 97* 98 100   CO2 28.9 28.4 27.1 27.7 28.3 27.6   BUN 13 13 15 13 11 12   CREATININE 0.48* 0.50* 0.48* 0.46* 0.38* 0.39*   GLUCOSE 101* 110* 101* 99 100* 113*   CALCIUM 9.3 9.4 9.4 9.0 8.8 8.6    PHOSPHORUS 4.1 4.2 4.4 3.7 4.0 3.8   TOTAL PROTEIN 6.7 6.4 6.3 6.1  --   --    ALBUMIN 2.9* 2.9* 2.9* 2.8*  --   --    GLOBULIN 3.8 3.5 3.4 3.3  --   --             CT Chest With Contrast Diagnostic    Result Date: 5/21/2024  CT CHEST W CONTRAST DIAGNOSTIC-  Date of Exam: 5/21/2024 9:01 PM  Indication: 14mm perihilar nodule, 1-2 ppd extensive smoking hx; S72.141A-Displaced intertrochanteric fracture of right femur, initial encounter for closed fracture; R91.1-Solitary pulmonary nodule.  Comparison: Chest radiograph 5/20/2024  Technique: Axial CT images were obtained of the chest after the uneventful intravenous administration of 75 cc Isovue-370 . Reconstructed coronal and sagittal images were also obtained. Automated exposure control and iterative construction methods were used.   Findings: No coronary artery calcification. There is no mediastinal adenopathy. There is a right hilar node measuring 2.5 cm and a left hilar node measuring 2.1 cm. No axillary adenopathy. There are no pleural effusions. Within the perihilar region of the left upper lobe there is a noncalcified macro lobular 2.1 x 1.9 cm noncalcified pulmonary nodule suspicious for malignancy. Within the posterior right lower lobe there is a subpleural 12 x 5 mm nodule which is nonspecific. No other pulmonary nodules. No other focal airspace consolidation.  Bilateral adrenal glands are within normal limits.  There are multiple lytic bone lesions throughout the spine and ribs. Findings would be consistent with metastatic disease or myeloma. There are additional lytic lesions within both scapula.      Impression: Impression:  1. Left upper lobe noncalcified pulmonary nodule measuring 2.1 cm in size suspicious for primary malignancy. There is mild bilateral hilar adenopathy. Additionally there are multiple lytic bone lesions throughout the spine ribs and scapula consistent with metastatic disease.    Electronically Signed By-Eliecer Jara MD On:5/21/2024  10:11 PM         Assessment & Plan   Assessment / Plan     Active Hospital Problems:  Active Hospital Problems    Diagnosis     **Closed intertrochanteric fracture of right femur     Pulmonary nodule        Impression:   Acute comminuted displaced intertrochanteric right femoral fracture  Status post IM nailing  Left upper lobe lung nodule with mediastinal lymphadenopathy  Chronic heavy smoking  Unintentional weight loss    Plan:   Status post bronchoscopy with EBUS.  Pathology from mediastinal lymph nodes were all negative for malignancy.  2.1 cm lung nodule could be possible target.  However patient has bony metastasis.  Imaging still very concerning and consistent with metastatic cancer.  Status post bone biopsy 5/31  Completed 5 days of Unasyn for haemophilus pneumonia growing on bronchoscopy BAL  Continue Symbicort and Spiriva, albuterol as needed.    Encourage activity and incentive spirometer use  Pain control per primary service.  Postop surgical care per Ortho service.  Pulmonary will sign off for now.  Please call with questions.    DVT prophylaxis:  Mechanical DVT prophylaxis orders are present.    CODE STATUS:   Level Of Support Discussed With: Patient  Code Status (Patient has no pulse and is not breathing): CPR (Attempt to Resuscitate)  Medical Interventions (Patient has pulse or is breathing): Full Support    I have reviewed labs, imaging, pertinent clinical data and provider notes.   I have discussed with bedside nurse and primary service.     Electronically signed by John Elizalde MD, 6/1/2024, 17:21 EDT.

## 2024-06-02 PROCEDURE — 99232 SBSQ HOSP IP/OBS MODERATE 35: CPT | Performed by: INTERNAL MEDICINE

## 2024-06-02 PROCEDURE — 94799 UNLISTED PULMONARY SVC/PX: CPT

## 2024-06-02 PROCEDURE — 94664 DEMO&/EVAL PT USE INHALER: CPT

## 2024-06-02 PROCEDURE — 97110 THERAPEUTIC EXERCISES: CPT

## 2024-06-02 PROCEDURE — 97530 THERAPEUTIC ACTIVITIES: CPT

## 2024-06-02 RX ADMIN — Medication 400 MG: at 09:26

## 2024-06-02 RX ADMIN — SENNOSIDES AND DOCUSATE SODIUM 2 TABLET: 50; 8.6 TABLET ORAL at 20:50

## 2024-06-02 RX ADMIN — LIDOCAINE 1 PATCH: 560 PATCH PERCUTANEOUS; TOPICAL; TRANSDERMAL at 09:26

## 2024-06-02 RX ADMIN — SENNOSIDES AND DOCUSATE SODIUM 2 TABLET: 50; 8.6 TABLET ORAL at 04:45

## 2024-06-02 RX ADMIN — HYDROCODONE BITARTRATE AND ACETAMINOPHEN 1 TABLET: 10; 325 TABLET ORAL at 11:21

## 2024-06-02 RX ADMIN — TIOTROPIUM BROMIDE INHALATION SPRAY 2 PUFF: 3.12 SPRAY, METERED RESPIRATORY (INHALATION) at 08:57

## 2024-06-02 RX ADMIN — BUDESONIDE AND FORMOTEROL FUMARATE DIHYDRATE 2 PUFF: 160; 4.5 AEROSOL RESPIRATORY (INHALATION) at 18:27

## 2024-06-02 RX ADMIN — Medication 1 TABLET: at 09:26

## 2024-06-02 RX ADMIN — HYDROCODONE BITARTRATE AND ACETAMINOPHEN 1 TABLET: 10; 325 TABLET ORAL at 04:38

## 2024-06-02 RX ADMIN — CYANOCOBALAMIN TAB 500 MCG 1000 MCG: 500 TAB at 09:26

## 2024-06-02 RX ADMIN — Medication 100 MG: at 09:26

## 2024-06-02 RX ADMIN — BUDESONIDE AND FORMOTEROL FUMARATE DIHYDRATE 2 PUFF: 160; 4.5 AEROSOL RESPIRATORY (INHALATION) at 08:57

## 2024-06-02 NOTE — PLAN OF CARE
Goal Outcome Evaluation:         Vitals stable during shift. Pt up in chair for most of shift. Tolerated well. Complaints of hip pain x1. See MAR. Pt more alert today verses yesterday. Pt able to express his needs better today. No complaints at this time. Call light within reach. Continue care plan.

## 2024-06-02 NOTE — THERAPY TREATMENT NOTE
Acute Care - Physical Therapy Treatment Note   [facility]     Patient Name: Oswaldo Bowen  : 1980  MRN: [id number]  Today's Date: 2024      Visit Dx:     ICD-10-CM ICD-9-CM   1. Closed displaced intertrochanteric fracture of right femur, initial encounter  S72.141A 820.21   2. Pulmonary nodule  R91.1 793.11   3. Difficulty walking  R26.2 719.7   4. Dysphagia, oropharyngeal  R13.12 787.22     Patient Active Problem List   Diagnosis    Closed intertrochanteric fracture of right femur    Pulmonary nodule     History reviewed. No pertinent past medical history.  Past Surgical History:   Procedure Laterality Date    BRONCHOSCOPY N/A 2024    Procedure: BRONCHOSCOPY WITH ENDOBRONCHIAL ULTRASOUND, FINE NEEDLE ASPIRATE, BIOPSIES, BRUSHINGS, BRONCHOALVEOLAR LAVAGE;  Surgeon: Kendell Stern MD;  Location: [facility] ENDOSCOPY;  Service: Pulmonary;  Laterality: N/A;  LUNG NODULE, MUCOUS PLUGGING    HIP INTERTROCHANTERIC NAILING Right 2024    Procedure: HIP INTERTROCHANTERIC NAILING;  Surgeon: Molina Clayton MD;  Location: [facility] MAIN OR;  Service: Orthopedics;  Laterality: Right;     PT Assessment (Last 12 Hours)       PT Evaluation and Treatment       Row Name 24 1116          Physical Therapy Time and Intention    Subjective Information complains of;weakness;fatigue;pain  -DK     Document Type therapy note (daily note)  -DK     Mode of Treatment individual therapy;physical therapy  -DK     Patient Effort good  -DK     Symptoms Noted During/After Treatment fatigue;increased pain  -DK     Comment Pt was able to transfer with less assist and ambulated with less assistance. He did c/o feeling tired and more sore today.  Speech garbled.  -DK       Row Name 24 1116          Pain    Pretreatment Pain Rating 3/10  -DK     Posttreatment Pain Rating 4/10  -DK     Pain Location - Side/Orientation Right  -DK     Pain Location generalized  -DK     Pain Location - hip;back  -DK     Pain Intervention(s)  Repositioned;Ambulation/increased activity;Distraction;Therapeutic presence  -       Row Name 06/02/24 1116          Cognition    Affect/Mental Status (Cognition) flat/blunted affect;low arousal/lethargic;confused  -DK     Behavioral Issues (Cognition) overwhelmed easily  -     Orientation Status (Cognition) oriented to;person;situation  -DK     Follows Commands (Cognition) physical/tactile prompts required;repetition of directions required;verbal cues/prompting required  -DK     Cognitive Function attention deficit  -DK     Attention Deficit (Cognition) minimal deficit;concentration;focused/sustained attention  -DK     Personal Safety Interventions gait belt;nonskid shoes/slippers when out of bed;supervised activity  -       Row Name 06/02/24 1116          Mobility    Extremity Weight-bearing Status right lower extremity  -     Right Lower Extremity (Weight-bearing Status) weight-bearing as tolerated (WBAT)  -       Row Name 06/02/24 1116          Bed Mobility    Bed Mobility supine-sit  -     Supine-Sit [place] (Bed Mobility) standby assist;contact guard;1 person assist  -     Bed Mobility, Safety Issues decreased use of arms for pushing/pulling;decreased use of legs for bridging/pushing  -DK     Assistive Device (Bed Mobility) bed rails  -       Row Name 06/02/24 1116          Transfers    Transfers sit-stand transfer;stand-sit transfer  -       Row Name 06/02/24 1116          Sit-Stand Transfer    Sit-Stand [place] (Transfers) contact guard;1 person assist  -     Assistive Device (Sit-Stand Transfers) walker, front-wheeled  -       Row Name 06/02/24 1116          Stand-Sit Transfer    Stand-Sit [place] (Transfers) contact guard;1 person assist  -     Assistive Device (Stand-Sit Transfers) walker, front-wheeled  -       Row Name 06/02/24 1116          Gait/Stairs (Locomotion)    Gait/Stairs Locomotion gait/ambulation independence;gait/ambulation assistive device;distance  ambulated;gait pattern  -DK     [place] Level (Gait) contact guard;1 person assist  -DK     Assistive Device (Gait) walker, front-wheeled  -DK     Distance in Feet (Gait) 45  -DK     Pattern (Gait) step-to  -DK     Deviations/Abnormal Patterns (Gait) shannno decreased;festinating/shuffling;gait speed decreased;stride length decreased  -DK     Bilateral Gait Deviations forward flexed posture  -DK     Comment, (Gait/Stairs) Pt ambulated on room air with a rolling walker.  He was mildly unsteady with gait.  Pt was left in the recliner on alert post treatment.  -       Row Name 06/02/24 1116          Safety Issues, Functional Mobility    Safety Issues Affecting Function (Mobility) awareness of need for assistance;impulsivity;judgment;safety precaution awareness  -DK     Impairments Affecting Function (Mobility) balance;cognition;endurance/activity tolerance;pain;range of motion (ROM);strength  -DK     Cognitive Impairments, Mobility Safety/Performance attention;impulsivity;judgment;safety precaution awareness  -DK       Row Name 06/02/24 1116          Balance    Balance Assessment sitting static balance;sitting dynamic balance;standing static balance;standing dynamic balance  -DK     Static Sitting Balance standby assist  -DK     Dynamic Sitting Balance standby assist  -DK     Position, Sitting Balance unsupported;sitting edge of bed;sitting in chair  -DK     Static Standing Balance contact guard;1-person assist  -DK     Dynamic Standing Balance contact guard;1-person assist  -DK     Position/Device Used, Standing Balance walker, front-wheeled  -DK     Balance Interventions standing;dynamic;tandem gait  -DK       Row Name 06/02/24 1116          Motor Skills    Motor Skills --  therapeutic exercises  -DK     Coordination WFL  -DK     Therapeutic Exercise hip;knee;ankle  -DK       Row Name 06/02/24 1116          Hip (Therapeutic Exercise)    Hip (Therapeutic Exercise) AAROM (active assistive range of motion)  -      Hip AAROM (Therapeutic Exercise) bilateral;flexion;extension;aBduction;aDduction;supine;10 repetitions;2 sets  -DK       Row Name 06/02/24 1116          Knee (Therapeutic Exercise)    Knee (Therapeutic Exercise) AAROM (active assistive range of motion)  -DK     Knee AAROM (Therapeutic Exercise) bilateral;flexion;extension;supine;10 repetitions;2 sets  -DK       Row Name 06/02/24 1116          Ankle (Therapeutic Exercise)    Ankle (Therapeutic Exercise) AAROM (active assistive range of motion)  -DK     Ankle AAROM (Therapeutic Exercise) bilateral;dorsiflexion;plantarflexion;supine;10 repetitions;2 sets  -DK       Row Name             Wound 05/21/24 0251 Bilateral coccyx    Wound - Properties Group Placement Date: 05/21/24  -SR Placement Time: 0251  -SR Present on Original Admission: Y  -SR Side: Bilateral  -SR Location: coccyx  -SR    Retired Wound - Properties Group Placement Date: 05/21/24  -SR Placement Time: 0251  -SR Present on Original Admission: Y  -SR Side: Bilateral  -SR Location: coccyx  -SR    Retired Wound - Properties Group Date first assessed: 05/21/24  -SR Time first assessed: 0251  -SR Present on Original Admission: Y  -SR Side: Bilateral  -SR Location: coccyx  -SR      Row Name             Wound 05/21/24 Right lateral thigh Incision    Wound - Properties Group Placement Date: 05/21/24  -SF Present on Original Admission: N  -SF Side: Right  -SF Orientation: lateral  -SF Location: thigh  -SF Primary Wound Type: Incision  -SF Additional Comments: incision sites x 3 to lateral right thigh  -SF    Retired Wound - Properties Group Placement Date: 05/21/24  -SF Present on Original Admission: N  -SF Side: Right  -SF Orientation: lateral  -SF Location: thigh  -SF Primary Wound Type: Incision  -SF Additional Comments: incision sites x 3 to lateral right thigh  -SF    Retired Wound - Properties Group Date first assessed: 05/21/24  -SF Present on Original Admission: N  -SF Side: Right  -SF Location: thigh   -SF Primary Wound Type: Incision  -SF Additional Comments: incision sites x 3 to lateral right thigh  -SF      Row Name 06/02/24 1116          Plan of Care Review    Plan of Care Reviewed With patient  -DK     Progress improving  -DK       Row Name 06/02/24 1116          Positioning and Restraints    Pre-Treatment Position in bed  -DK     Post Treatment Position chair  -DK     In Chair reclined;call light within reach;encouraged to call for assist;exit alarm on;legs elevated  -DK       Row Name 06/02/24 1116          Therapy Assessment/Plan (PT)    Rehab Potential (PT) good, to achieve stated therapy goals  -DK     Criteria for Skilled Interventions Met (PT) skilled treatment is necessary  -DK     Therapy Frequency (PT) daily  -DK     Problem List (PT) problems related to;balance;cognition;mobility;range of motion (ROM);strength;pain;communication  -DK     Activity Limitations Related to Problem List (PT) unable to ambulate safely;unable to transfer safely  -DK       Row Name 06/02/24 1116          Progress Summary (PT)    Progress Toward Functional Goals (PT) progress toward functional goals is good  -               User Key  (r) = Recorded By, (t) = Taken By, (c) = Cosigned By      Initials Name Provider Type    SF Saritha Ramirez, RN Registered Nurse    Sheila Maharaj PTA Physical Therapist Assistant    SR Saritha Long, RN Registered Nurse                    Physical Therapy Education       Title: PT OT SLP Therapies (Done)       Topic: Physical Therapy (Done)       Point: Mobility training (Done)       Learning Progress Summary             Patient Acceptance, E, Bed IU by DS at 6/2/2024 0830    Acceptance, E, VU by PS at 6/1/2024 0627    Acceptance, E,TB, VU by TR at 5/31/2024 1449    Eager, E, VU by AH at 5/23/2024 2223    Acceptance, E,TB, VU by AV at 5/22/2024 1339                         Point: Home exercise program (Done)       Learning Progress Summary             Patient Acceptance, E, Bed IU  by DS at 6/2/2024 0830                         Point: Body mechanics (Done)       Learning Progress Summary             Patient Acceptance, E, Bed IU by DS at 6/2/2024 0830    Acceptance, E, VU by PS at 6/1/2024 0627    Acceptance, E,TB, VU by AV at 5/22/2024 1339                         Point: Precautions (Done)       Learning Progress Summary             Patient Acceptance, E, Bed IU by DS at 6/2/2024 0830    Acceptance, E,TB, VU by TR at 5/31/2024 1449    Acceptance, E,TB, VU by AV at 5/22/2024 1339                                         User Key       Initials Effective Dates Name Provider Type Discipline    PS 06/16/21 -  Darcie Fiore, RN Registered Nurse Nurse    AV 06/11/21 -  Eh Martínez, PT Physical Therapist PT    DS 06/26/23 -  Immanuel Beck, GRIS Registered Nurse Nurse     05/31/23 -  Angela Casillas, RN Registered Nurse Nurse    TR 05/21/24 -  Aaron Fowler, JERROD Student PT Student PT                  PT Recommendation and Plan  Planned Therapy Interventions (PT): balance training, bed mobility training, gait training, strengthening, transfer training  Therapy Frequency (PT): daily  Progress Summary (PT)  Progress Toward Functional Goals (PT): progress toward functional goals is good  Plan of Care Reviewed With: patient  Progress: improving   Outcome Measures       Row Name 06/02/24 1116 06/01/24 1600 05/31/24 1400       How much help from another person do you currently need...    Turning from your back to your side while in flat bed without using bedrails? 4  -DK 3  -CS 3  -JAYRO (r) TR (t) JAYRO (c)    Moving from lying on back to sitting on the side of a flat bed without bedrails? 3  -DK 3  -CS 3  -JAYRO (r) TR (t) JAYRO (c)    Moving to and from a bed to a chair (including a wheelchair)? 3  -DK 3  -CS 3  -JAYRO (r) TR (t) JAYRO (c)    Standing up from a chair using your arms (e.g., wheelchair, bedside chair)? 3  -DK 3  -CS 3  -JAYRO (r) TR (t) JAYRO (c)    Climbing 3-5 steps with a railing? 2  -DK 2   -CS 2  -JAYRO (r) TR (t) JAYRO (c)    To walk in hospital room? 3  -DK 3  -CS 3  -JAYRO (r) TR (t) JAYRO (c)    AM-PAC 6 Clicks Score (PT) 18  -DK 17  -CS 17  -JAYRO (r) TR (t)    Highest Level of Mobility Goal 6 --> Walk 10 steps or more  -DK 5 --> Static standing  -CS 5 --> Static standing  -JAYRO (r) TR (t)       Functional Assessment    Outcome Measure Options AM-PAC 6 Clicks Basic Mobility (PT)  -DK AM-PAC 6 Clicks Basic Mobility (PT)  -CS AM-PAC 6 Clicks Basic Mobility (PT)  -JAYRO (r) TR (t) JAYRO (c)      Row Name 05/30/24 1125             How much help from another person do you currently need...    Turning from your back to your side while in flat bed without using bedrails? 3  -DK      Moving from lying on back to sitting on the side of a flat bed without bedrails? 3  -DK      Moving to and from a bed to a chair (including a wheelchair)? 3  -DK      Standing up from a chair using your arms (e.g., wheelchair, bedside chair)? 3  -DK      Climbing 3-5 steps with a railing? 2  -DK      To walk in hospital room? 3  -DK      AM-PAC 6 Clicks Score (PT) 17  -DK      Highest Level of Mobility Goal 5 --> Static standing  -DK         Functional Assessment    Outcome Measure Options AM-PAC 6 Clicks Basic Mobility (PT)  -DK                User Key  (r) = Recorded By, (t) = Taken By, (c) = Cosigned By      Initials Name Provider Type    Sheila Maharaj, PTA Physical Therapist Assistant    Darius Srivastava, PT Physical Therapist    Phu Hdez PTA Physical Therapist Assistant    Aaron Cooney, PT Student PT Student                     Time Calculation:    PT Charges       Row Name 06/02/24 1122             Time Calculation    PT Received On 06/02/24  -DK      PT Goal Re-Cert Due Date 06/09/24  -DK         Timed Charges    41188 - PT Therapeutic Exercise Minutes 14  -DK      79067 - Gait Training Minutes  6  -DK      96425 - PT Therapeutic Activity Minutes 8  -DK         Total Minutes    Timed Charges Total Minutes  28  -DK       Total Minutes 28  -DK                User Key  (r) = Recorded By, (t) = Taken By, (c) = Cosigned By      Initials Name Provider Type    Sheila Maharaj PTA Physical Therapist Assistant                  Therapy Charges for Today       Code Description Service Date Service Provider Modifiers Qty    [id number]  PT THER PROC EA 15 MIN 6/2/2024 Sheila Nicole PTA GP 1    61732811299 HC PT THERAPEUTIC ACT EA 15 MIN 6/2/2024 Sheila Nicole PTA GP 1            PT G-Codes  Outcome Measure Options: AM-PAC 6 Clicks Basic Mobility (PT)  AM-PAC 6 Clicks Score (PT): 18  AM-PAC 6 Clicks Score (OT): 14    Sheila Nicole PTA  6/2/2024

## 2024-06-02 NOTE — PLAN OF CARE
Goal Outcome Evaluation:  Plan of Care Reviewed With: patient        Progress: no change  Outcome Evaluation: Patient resting in bed, patient c/o pain to back and some soreness to abdomen, patient alert and oriented, call light within reach. Dressings to right leg, C/D/I.

## 2024-06-02 NOTE — PROGRESS NOTES
[facility]   Hospitalist Progress Note    Date of admission: 5/20/2024  Patient Name: Oswaldo Bowen  1980  Date: 6/2/2024      Subjective     Chief Complaint   Patient presents with    Fall    Leg Pain       Interval Followup: No acute events overnight, patient resting comfortably in bed, lab holiday today, awaiting pathology results, suspect they will be here tomorrow or Tuesday    Objective     Vitals:   Temp:  [97.3 °F (36.3 °C)-97.9 °F (36.6 °C)] 97.5 °F (36.4 °C)  Heart Rate:  [102-120] 102  Resp:  [16-20] 18  BP: (103-126)/(68-86) 103/68    Physical Exam  GEN: No acute distress  HEENT: Moist mucous membranes  LUNGS: Equal chest rise bilaterally  CARDIAC: Regular rate and rhythm  NEURO: Moving all 4 extremities spontaneously  SKIN: No obvious breakdown    Result Review:  Vital signs, labs and recent relevant imaging reviewed.      CBC          5/30/2024    05:46 5/31/2024    05:20 6/1/2024    05:45   CBC   WBC 10.38  11.14  11.50    RBC 3.18  3.18  3.15    Hemoglobin 8.8  8.7  8.7    Hematocrit 28.8  28.5  28.1    MCV 90.6  89.6  89.2    MCH 27.7  27.4  27.6    MCHC 30.6  30.5  31.0    RDW 13.2  13.2  13.3    Platelets 465  504  482      CMP          5/30/2024    05:46 5/31/2024    05:20 6/1/2024    05:45   CMP   Glucose 101  110  101    BUN 15  13  13    Creatinine 0.48  0.50  0.48    EGFR 130.6  129.0  130.6    Sodium 136  135  134    Potassium 4.3  3.9  4.2    Chloride 97  96  95    Calcium 9.4  9.4  9.3    Total Protein 6.3  6.4  6.7    Albumin 2.9  2.9  2.9    Globulin 3.4  3.5  3.8    Total Bilirubin 0.2  0.2  0.2    Alkaline Phosphatase 171  181  179    AST (SGOT) 50  39  30    ALT (SGPT) 88  89  77    Albumin/Globulin Ratio 0.9  0.8  0.8    BUN/Creatinine Ratio 31.3  26.0  27.1    Anion Gap 11.9  10.6  10.1          acetaminophen    aluminum-magnesium hydroxide-simethicone    senna-docusate sodium **AND** polyethylene glycol **AND** bisacodyl **AND** bisacodyl    calcium carbonate    dextrose     dextrose    Diclofenac Sodium    glucagon (human recombinant)    HYDROcodone-acetaminophen    HYDROcodone-acetaminophen    hydrOXYzine    levalbuterol    Lidocaine    melatonin    [DISCONTINUED] Morphine **AND** naloxone    nicotine    ondansetron    ondansetron ODT **OR** [DISCONTINUED] ondansetron    prochlorperazine    sodium chloride    sodium chloride    sodium chloride    sodium chloride    budesonide-formoterol, 2 puff, Inhalation, BID - RT  Lidocaine, 1 patch, Transdermal, Q24H  magnesium oxide, 400 mg, Oral, Daily  multivitamin with minerals, 1 tablet, Oral, Daily  senna-docusate sodium, 2 tablet, Oral, BID  sodium chloride, 10 mL, Intravenous, Q12H  thiamine, 100 mg, Oral, Daily  tiotropium bromide monohydrate, 2 puff, Inhalation, Daily - RT  vitamin B-12, 1,000 mcg, Oral, Daily        CT Needle Biopsy Bone Deep    Result Date: 5/30/2024  Impression: 1.  Successful CT-guided 18-gauge fine-needle biopsy of right seventh rib lesion without evidence of complication.           Electronically Signed By-EVAN SORIANO MD On:5/30/2024 11:22 AM       Assessment / Plan     Summary: 44M with intellectual disability, 1-2PPD smoking, depression, who presented after a fall (possibly 1 month ago but may have been more recently), found to have right femoral fracture and VLAD.  Ortho assisting with initial surgical treatment.  Initial lung mass concerning on chest x-ray additional CT imaging with suspected new metastatic colon cancer with mets to the bone, pulmonology consulted patient had a biopsy results pending.  Bronchoscopy also with findings of Haemophilus influenzae treatment with antibiotics.  Palliative assisting.  Working on rehab placement guarded long-term prognosis    Assessment/Plan (clinically significant if listed here)  Mechanical fall with acute comminuted displaced intertrochanteric right femoral fracture  S/p 5/21 right hip subtrochanteric nailing of closed displaced right femur fracture by   Nelson  VLAD creatinine 1.7 leukocytosis suspect reactive in setting of acute fracture  Suspected new metastatic lung cancer with mets to the bone  Mediastinal lymphadenopathy  Haemophilus influenzae pneumonia   1 to 2 pack/day smoker, chronic  Intellectual disability  Depression  Normocytic anemia    Completed antibiotics  Continue respiratory hygiene  Scheduled Tylenol and lidocaine patch, as needed and p.o. pain medication for breakthrough pain  Multiple myeloma labs negative, bronchoscopy/EBUS negative for malignancy, patient underwent bone biopsy on 5/30/2024, pathology is pending at this time  Monitor for aspiration  Appreciate oncology assistance, will likely require outpatient PET scan  Inhalers, respiratory hygiene, aspiration precaution  Continue postoperative monitoring for fracture appreciate orthopedic surgery assistance, patient is ambulating some with minimal assistance  No overt bleeding, hb stabilized postoperatively near mid 8, cont mvi, continue to monitor closely  Nrt prn / smoking cessation   Continue PT/OT  Continue hospitalization at current level of care  Lab holiday today  Repeat labs in a.m.  Out of bed to chair 3 times daily, encourage ambulation with physical therapy    Dispo: Disposition is pending biopsy results, and treatment plans regarding patient's cancer versus rehab    DVT prophylaxis:  Mechanical DVT prophylaxis orders are present.      Level Of Support Discussed With: Patient  Code Status (Patient has no pulse and is not breathing): CPR (Attempt to Resuscitate)  Medical Interventions (Patient has pulse or is breathing): Full Support      Electronically signed by Molina Du MD, 6/2/2024, 11:18 EDT.

## 2024-06-03 LAB
ALBUMIN SERPL-MCNC: 2.9 G/DL (ref 3.5–5.2)
ALBUMIN/GLOB SERPL: 0.8 G/DL
ALP SERPL-CCNC: 200 U/L (ref 39–117)
ALT SERPL W P-5'-P-CCNC: 74 U/L (ref 1–41)
ANION GAP SERPL CALCULATED.3IONS-SCNC: 8.5 MMOL/L (ref 5–15)
AST SERPL-CCNC: 33 U/L (ref 1–40)
BASOPHILS # BLD AUTO: 0.03 10*3/MM3 (ref 0–0.2)
BASOPHILS NFR BLD AUTO: 0.2 % (ref 0–1.5)
BILIRUB SERPL-MCNC: 0.2 MG/DL (ref 0–1.2)
BUN SERPL-MCNC: 12 MG/DL (ref 6–20)
BUN/CREAT SERPL: 25.5 (ref 7–25)
CALCIUM SPEC-SCNC: 9.3 MG/DL (ref 8.6–10.5)
CHLORIDE SERPL-SCNC: 97 MMOL/L (ref 98–107)
CO2 SERPL-SCNC: 29.5 MMOL/L (ref 22–29)
CREAT SERPL-MCNC: 0.47 MG/DL (ref 0.76–1.27)
DEPRECATED RDW RBC AUTO: 43.6 FL (ref 37–54)
EGFRCR SERPLBLD CKD-EPI 2021: 131.4 ML/MIN/1.73
EOSINOPHIL # BLD AUTO: 0.09 10*3/MM3 (ref 0–0.4)
EOSINOPHIL NFR BLD AUTO: 0.7 % (ref 0.3–6.2)
ERYTHROCYTE [DISTWIDTH] IN BLOOD BY AUTOMATED COUNT: 13.5 % (ref 12.3–15.4)
GLOBULIN UR ELPH-MCNC: 3.7 GM/DL
GLUCOSE SERPL-MCNC: 102 MG/DL (ref 65–99)
HCT VFR BLD AUTO: 28.2 % (ref 37.5–51)
HGB BLD-MCNC: 8.7 G/DL (ref 13–17.7)
IMM GRANULOCYTES # BLD AUTO: 0.14 10*3/MM3 (ref 0–0.05)
IMM GRANULOCYTES NFR BLD AUTO: 1 % (ref 0–0.5)
LYMPHOCYTES # BLD AUTO: 1.63 10*3/MM3 (ref 0.7–3.1)
LYMPHOCYTES NFR BLD AUTO: 11.9 % (ref 19.6–45.3)
MAGNESIUM SERPL-MCNC: 2 MG/DL (ref 1.6–2.6)
MCH RBC QN AUTO: 27.4 PG (ref 26.6–33)
MCHC RBC AUTO-ENTMCNC: 30.9 G/DL (ref 31.5–35.7)
MCV RBC AUTO: 89 FL (ref 79–97)
MONOCYTES # BLD AUTO: 1.18 10*3/MM3 (ref 0.1–0.9)
MONOCYTES NFR BLD AUTO: 8.6 % (ref 5–12)
NEUTROPHILS NFR BLD AUTO: 10.65 10*3/MM3 (ref 1.7–7)
NEUTROPHILS NFR BLD AUTO: 77.6 % (ref 42.7–76)
NRBC BLD AUTO-RTO: 0 /100 WBC (ref 0–0.2)
PHOSPHATE SERPL-MCNC: 4.2 MG/DL (ref 2.5–4.5)
PLATELET # BLD AUTO: 520 10*3/MM3 (ref 140–450)
PMV BLD AUTO: 9.4 FL (ref 6–12)
POTASSIUM SERPL-SCNC: 4.1 MMOL/L (ref 3.5–5.2)
PROT SERPL-MCNC: 6.6 G/DL (ref 6–8.5)
RBC # BLD AUTO: 3.17 10*6/MM3 (ref 4.14–5.8)
SODIUM SERPL-SCNC: 135 MMOL/L (ref 136–145)
WBC NRBC COR # BLD AUTO: 13.72 10*3/MM3 (ref 3.4–10.8)

## 2024-06-03 PROCEDURE — 25010000002 ONDANSETRON PER 1 MG: Performed by: INTERNAL MEDICINE

## 2024-06-03 PROCEDURE — 94664 DEMO&/EVAL PT USE INHALER: CPT

## 2024-06-03 PROCEDURE — 84100 ASSAY OF PHOSPHORUS: CPT | Performed by: INTERNAL MEDICINE

## 2024-06-03 PROCEDURE — 92526 ORAL FUNCTION THERAPY: CPT

## 2024-06-03 PROCEDURE — 80053 COMPREHEN METABOLIC PANEL: CPT | Performed by: INTERNAL MEDICINE

## 2024-06-03 PROCEDURE — 85025 COMPLETE CBC W/AUTO DIFF WBC: CPT | Performed by: INTERNAL MEDICINE

## 2024-06-03 PROCEDURE — 97116 GAIT TRAINING THERAPY: CPT

## 2024-06-03 PROCEDURE — 83735 ASSAY OF MAGNESIUM: CPT | Performed by: INTERNAL MEDICINE

## 2024-06-03 PROCEDURE — 94799 UNLISTED PULMONARY SVC/PX: CPT

## 2024-06-03 PROCEDURE — 99232 SBSQ HOSP IP/OBS MODERATE 35: CPT | Performed by: INTERNAL MEDICINE

## 2024-06-03 PROCEDURE — 97110 THERAPEUTIC EXERCISES: CPT

## 2024-06-03 RX ORDER — HYDROCODONE BITARTRATE AND ACETAMINOPHEN 5; 325 MG/1; MG/1
1 TABLET ORAL EVERY 6 HOURS PRN
Status: DISCONTINUED | OUTPATIENT
Start: 2024-06-03 | End: 2024-06-09

## 2024-06-03 RX ORDER — HYDROCODONE BITARTRATE AND ACETAMINOPHEN 10; 325 MG/1; MG/1
1 TABLET ORAL EVERY 6 HOURS PRN
Status: DISCONTINUED | OUTPATIENT
Start: 2024-06-03 | End: 2024-06-09

## 2024-06-03 RX ADMIN — BUDESONIDE AND FORMOTEROL FUMARATE DIHYDRATE 2 PUFF: 160; 4.5 AEROSOL RESPIRATORY (INHALATION) at 09:54

## 2024-06-03 RX ADMIN — Medication 10 ML: at 09:38

## 2024-06-03 RX ADMIN — Medication 100 MG: at 09:37

## 2024-06-03 RX ADMIN — LIDOCAINE 1 PATCH: 560 PATCH PERCUTANEOUS; TOPICAL; TRANSDERMAL at 09:37

## 2024-06-03 RX ADMIN — SENNOSIDES AND DOCUSATE SODIUM 2 TABLET: 50; 8.6 TABLET ORAL at 09:37

## 2024-06-03 RX ADMIN — Medication 10 ML: at 21:10

## 2024-06-03 RX ADMIN — HYDROCODONE BITARTRATE AND ACETAMINOPHEN 1 TABLET: 5; 325 TABLET ORAL at 12:03

## 2024-06-03 RX ADMIN — HYDROCODONE BITARTRATE AND ACETAMINOPHEN 1 TABLET: 10; 325 TABLET ORAL at 19:15

## 2024-06-03 RX ADMIN — Medication 1 TABLET: at 09:37

## 2024-06-03 RX ADMIN — ONDANSETRON 4 MG: 2 INJECTION INTRAMUSCULAR; INTRAVENOUS at 09:47

## 2024-06-03 RX ADMIN — ONDANSETRON 4 MG: 2 INJECTION INTRAMUSCULAR; INTRAVENOUS at 19:15

## 2024-06-03 RX ADMIN — BUDESONIDE AND FORMOTEROL FUMARATE DIHYDRATE 2 PUFF: 160; 4.5 AEROSOL RESPIRATORY (INHALATION) at 19:01

## 2024-06-03 RX ADMIN — Medication 400 MG: at 09:37

## 2024-06-03 RX ADMIN — CYANOCOBALAMIN TAB 500 MCG 1000 MCG: 500 TAB at 09:37

## 2024-06-03 RX ADMIN — SENNOSIDES AND DOCUSATE SODIUM 2 TABLET: 50; 8.6 TABLET ORAL at 21:09

## 2024-06-03 RX ADMIN — HYDROCODONE BITARTRATE AND ACETAMINOPHEN 1 TABLET: 10; 325 TABLET ORAL at 03:36

## 2024-06-03 RX ADMIN — TIOTROPIUM BROMIDE INHALATION SPRAY 2 PUFF: 3.12 SPRAY, METERED RESPIRATORY (INHALATION) at 09:54

## 2024-06-03 NOTE — PLAN OF CARE
Goal Outcome Evaluation:  Plan of Care Reviewed With: patient      Pt tachycardic through shift. Pt ambulated in [place] x1/walker this shift. C/o pain to right leg, PRN norco 5 administered once this shift. Continuing with plan of care.

## 2024-06-03 NOTE — THERAPY TREATMENT NOTE
Acute Care - Physical Therapy Treatment Note   [facility]     Patient Name: Oswaldo Bowen  : 1980  MRN: [id number]  Today's Date: 6/3/2024      Visit Dx:     ICD-10-CM ICD-9-CM   1. Closed displaced intertrochanteric fracture of right femur, initial encounter  S72.141A 820.21   2. Pulmonary nodule  R91.1 793.11   3. Difficulty walking  R26.2 719.7   4. Dysphagia, oropharyngeal  R13.12 787.22     Patient Active Problem List   Diagnosis    Closed intertrochanteric fracture of right femur    Pulmonary nodule     History reviewed. No pertinent past medical history.  Past Surgical History:   Procedure Laterality Date    BRONCHOSCOPY N/A 2024    Procedure: BRONCHOSCOPY WITH ENDOBRONCHIAL ULTRASOUND, FINE NEEDLE ASPIRATE, BIOPSIES, BRUSHINGS, BRONCHOALVEOLAR LAVAGE;  Surgeon: Kendell Stern MD;  Location: [facility] ENDOSCOPY;  Service: Pulmonary;  Laterality: N/A;  LUNG NODULE, MUCOUS PLUGGING    HIP INTERTROCHANTERIC NAILING Right 2024    Procedure: HIP INTERTROCHANTERIC NAILING;  Surgeon: Molina Clayton MD;  Location: [facility] MAIN OR;  Service: Orthopedics;  Laterality: Right;     PT Assessment (Last 12 Hours)       PT Evaluation and Treatment       Row Name 24 1122          Physical Therapy Time and Intention    Subjective Information complains of;weakness;fatigue;pain  -DK     Document Type therapy note (daily note)  -DK     Mode of Treatment individual therapy;physical therapy  -DK     Patient Effort good  -DK     Symptoms Noted During/After Treatment fatigue;increased pain  -DK       Row Name 24 1122          Pain    Pretreatment Pain Rating 3/10  -DK     Posttreatment Pain Rating 4/10  -DK     Pain Location - Side/Orientation Right  -DK     Pain Location generalized  -DK     Pain Location - hip;knee;back  -DK     Pain Intervention(s) Repositioned;Ambulation/increased activity;Distraction;Therapeutic presence  -DK       Row Name 24 1122          Cognition    Affect/Mental Status  (Cognition) flat/blunted affect;low arousal/lethargic;confused  -DK     Behavioral Issues (Cognition) overwhelmed easily  -DK     Orientation Status (Cognition) oriented to;person;situation  -DK     Follows Commands (Cognition) physical/tactile prompts required;repetition of directions required;verbal cues/prompting required  -DK     Cognitive Function attention deficit  -DK     Attention Deficit (Cognition) minimal deficit;concentration;focused/sustained attention  -DK     Personal Safety Interventions gait belt;nonskid shoes/slippers when out of bed;supervised activity  -       Row Name 06/03/24 1122          Mobility    Extremity Weight-bearing Status right lower extremity  -DK     Right Lower Extremity (Weight-bearing Status) weight-bearing as tolerated (WBAT)  -       Row Name 06/03/24 1122          Bed Mobility    Bed Mobility supine-sit  -DK     Scooting/Bridging Linn (Bed Mobility) standby assist  -     Supine-Sit [place] (Bed Mobility) standby assist;contact guard;1 person assist  -DK     Bed Mobility, Safety Issues decreased use of arms for pushing/pulling;decreased use of legs for bridging/pushing  -DK     Assistive Device (Bed Mobility) bed rails  -       Row Name 06/03/24 1122          Transfers    Transfers sit-stand transfer;stand-sit transfer  -       Row Name 06/03/24 1122          Sit-Stand Transfer    Sit-Stand [place] (Transfers) contact guard;1 person assist  -     Assistive Device (Sit-Stand Transfers) walker, front-wheeled  -       Row Name 06/03/24 1122          Stand-Sit Transfer    Stand-Sit [place] (Transfers) contact guard;1 person assist;standby assist  -     Assistive Device (Stand-Sit Transfers) walker, front-wheeled  -       Row Name 06/03/24 1122          Gait/Stairs (Locomotion)    Gait/Stairs Locomotion gait/ambulation independence;gait/ambulation assistive device;distance ambulated;gait pattern  -     [place] Level (Gait) contact  guard;1 person assist  -DK     Assistive Device (Gait) walker, front-wheeled  -DK     Distance in Feet (Gait) 60  -DK     Pattern (Gait) step-to  -DK     Deviations/Abnormal Patterns (Gait) shannon decreased;festinating/shuffling;gait speed decreased;stride length decreased  -DK     Bilateral Gait Deviations forward flexed posture  -DK     Comment, (Gait/Stairs) Pt ambulated on room air with a rolling walker.  He returned to bed on alert post treatment.  -       Row Name 06/03/24 1122          Safety Issues, Functional Mobility    Impairments Affecting Function (Mobility) balance;cognition;endurance/activity tolerance;pain;range of motion (ROM);strength  -     Cognitive Impairments, Mobility Safety/Performance judgment;safety precaution awareness  -       Row Name 06/03/24 1122          Balance    Balance Assessment sitting static balance;sitting dynamic balance;standing static balance;standing dynamic balance  -     Static Sitting Balance standby assist  -DK     Dynamic Sitting Balance standby assist  -DK     Position, Sitting Balance unsupported;sitting edge of bed  -DK     Static Standing Balance standby assist;contact guard;1-person assist  -DK     Dynamic Standing Balance contact guard;1-person assist  -DK     Position/Device Used, Standing Balance walker, front-wheeled  -DK     Balance Interventions standing;dynamic;tandem gait  -       Row Name 06/03/24 1122          Motor Skills    Motor Skills --  therapeutic exercises  -     Coordination WFL  -     Therapeutic Exercise hip;knee;ankle  -       Row Name 06/03/24 1122          Hip (Therapeutic Exercise)    Hip (Therapeutic Exercise) AAROM (active assistive range of motion)  -     Hip AAROM (Therapeutic Exercise) bilateral;flexion;extension;aBduction;aDduction;supine;10 repetitions;2 sets  -       Row Name 06/03/24 1122          Knee (Therapeutic Exercise)    Knee (Therapeutic Exercise) AAROM (active assistive range of motion)  -     Knee  AAROM (Therapeutic Exercise) bilateral;flexion;extension;supine;10 repetitions;2 sets  -DK       Row Name 06/03/24 1122          Ankle (Therapeutic Exercise)    Ankle (Therapeutic Exercise) AAROM (active assistive range of motion)  -ROLANDO     Ankle AAROM (Therapeutic Exercise) bilateral;dorsiflexion;plantarflexion;supine;10 repetitions;2 sets  -DK       Row Name             Wound 05/21/24 0251 Bilateral coccyx    Wound - Properties Group Placement Date: 05/21/24  -SR Placement Time: 0251  -SR Present on Original Admission: Y  -SR Side: Bilateral  -SR Location: coccyx  -SR    Retired Wound - Properties Group Placement Date: 05/21/24  -SR Placement Time: 0251  -SR Present on Original Admission: Y  -SR Side: Bilateral  -SR Location: coccyx  -SR    Retired Wound - Properties Group Date first assessed: 05/21/24  -SR Time first assessed: 0251  -SR Present on Original Admission: Y  -SR Side: Bilateral  -SR Location: coccyx  -SR      Row Name             Wound 05/21/24 Right lateral thigh Incision    Wound - Properties Group Placement Date: 05/21/24  -SF Present on Original Admission: N  -SF Side: Right  -SF Orientation: lateral  -SF Location: thigh  -SF Primary Wound Type: Incision  -SF Additional Comments: incision sites x 3 to lateral right thigh  -SF    Retired Wound - Properties Group Placement Date: 05/21/24  -SF Present on Original Admission: N  -SF Side: Right  -SF Orientation: lateral  -SF Location: thigh  -SF Primary Wound Type: Incision  -SF Additional Comments: incision sites x 3 to lateral right thigh  -SF    Retired Wound - Properties Group Date first assessed: 05/21/24  -SF Present on Original Admission: N  -SF Side: Right  -SF Location: thigh  -SF Primary Wound Type: Incision  -SF Additional Comments: incision sites x 3 to lateral right thigh  -SF      Row Name 06/03/24 1122          Plan of Care Review    Plan of Care Reviewed With patient  -ROLANDO     Progress improving  -ROLANDO       Row Name 06/03/24 1122           Positioning and Restraints    Pre-Treatment Position in bed  -DK     Post Treatment Position bed  -DK     In Bed supine;call light within reach;encouraged to call for assist;exit alarm on;side rails up x2;legs elevated;heels elevated  -DK       Row Name 06/03/24 1122          Therapy Assessment/Plan (PT)    Rehab Potential (PT) good, to achieve stated therapy goals  -DK     Criteria for Skilled Interventions Met (PT) skilled treatment is necessary  -DK     Therapy Frequency (PT) daily  -DK     Problem List (PT) problems related to;balance;cognition;mobility;range of motion (ROM);strength;pain;communication  -DK     Activity Limitations Related to Problem List (PT) unable to ambulate safely;unable to transfer safely  -DK       Row Name 06/03/24 1122          Progress Summary (PT)    Progress Toward Functional Goals (PT) progress toward functional goals is good  -DK               User Key  (r) = Recorded By, (t) = Taken By, (c) = Cosigned By      Initials Name Provider Type    Saritha Boudreaux, RN Registered Nurse    Sheila Maharaj PTA Physical Therapist Assistant    SR Saritha Long RN Registered Nurse                    Physical Therapy Education       Title: PT OT SLP Therapies (Done)       Topic: Physical Therapy (Done)       Point: Mobility training (Done)       Learning Progress Summary             Patient Acceptance, E, Bed IU by DS at 6/2/2024 0830    Acceptance, E, VU by PS at 6/1/2024 0627    Acceptance, E,TB, VU by TR at 5/31/2024 1449    Eager, E, VU by AH at 5/23/2024 2223    Acceptance, E,TB, VU by AV at 5/22/2024 1339                         Point: Home exercise program (Done)       Learning Progress Summary             Patient Acceptance, E, Bed IU by DS at 6/2/2024 0830                         Point: Body mechanics (Done)       Learning Progress Summary             Patient Acceptance, E, Bed IU by DS at 6/2/2024 0830    Acceptance, E, VU by PS at 6/1/2024 0627    Acceptance, E,TB, VU by AV  at 5/22/2024 1339                         Point: Precautions (Done)       Learning Progress Summary             Patient Acceptance, E, Bed IU by DS at 6/2/2024 0830    Acceptance, E,TB, VU by TR at 5/31/2024 1449    Acceptance, E,TB, VU by AV at 5/22/2024 1339                                         User Key       Initials Effective Dates Name Provider Type Discipline    PS 06/16/21 -  Darcie Fiore, RN Registered Nurse Nurse    AV 06/11/21 -  Eh Martínez, PT Physical Therapist PT    DS 06/26/23 -  Immanuel Beck, RN Registered Nurse Nurse    MICHAELA 05/31/23 -  Angela Casillas, RN Registered Nurse Nurse    SHAWNA 05/21/24 -  Aaron Fowler, JERROD Student PT Student PT                  PT Recommendation and Plan  Planned Therapy Interventions (PT): balance training, bed mobility training, gait training, strengthening, transfer training  Therapy Frequency (PT): daily  Progress Summary (PT)  Progress Toward Functional Goals (PT): progress toward functional goals is good  Plan of Care Reviewed With: patient  Progress: improving   Outcome Measures       Row Name 06/03/24 1122 06/02/24 1116 06/01/24 1600       How much help from another person do you currently need...    Turning from your back to your side while in flat bed without using bedrails? 3  -DK 4  -DK 3  -CS    Moving from lying on back to sitting on the side of a flat bed without bedrails? 3  -DK 3  -DK 3  -CS    Moving to and from a bed to a chair (including a wheelchair)? 3  -DK 3  -DK 3  -CS    Standing up from a chair using your arms (e.g., wheelchair, bedside chair)? 3  -DK 3  -DK 3  -CS    Climbing 3-5 steps with a railing? 1  -DK 2  -DK 2  -CS    To walk in hospital room? 3  -DK 3  -DK 3  -CS    AM-PAC 6 Clicks Score (PT) 16  -DK 18  -DK 17  -CS    Highest Level of Mobility Goal 5 --> Static standing  -DK 6 --> Walk 10 steps or more  -DK 5 --> Static standing  -CS       Functional Assessment    Outcome Measure Options AM-PAC 6 Clicks Basic  Mobility (PT)  -DK AM-PAC 6 Clicks Basic Mobility (PT)  -DK AM-PAC 6 Clicks Basic Mobility (PT)  -      Row Name 05/31/24 1400             How much help from another person do you currently need...    Turning from your back to your side while in flat bed without using bedrails? 3  -JAYRO (r) TR (t) JAYRO (c)      Moving from lying on back to sitting on the side of a flat bed without bedrails? 3  -JAYRO (r) TR (t) JAYRO (c)      Moving to and from a bed to a chair (including a wheelchair)? 3  -JAYRO (r) TR (t) JAYRO (c)      Standing up from a chair using your arms (e.g., wheelchair, bedside chair)? 3  -JAYRO (r) TR (t) JAYRO (c)      Climbing 3-5 steps with a railing? 2  -JAYRO (r) TR (t) JAYRO (c)      To walk in hospital room? 3  -JAYRO (r) TR (t) JAYRO (c)      AM-PAC 6 Clicks Score (PT) 17  -JAYRO (r) TR (t)      Highest Level of Mobility Goal 5 --> Static standing  -JAYRO (r) TR (t)         Functional Assessment    Outcome Measure Options AM-PAC 6 Clicks Basic Mobility (PT)  -JAYRO (r) TR (t) JAYRO (c)                User Key  (r) = Recorded By, (t) = Taken By, (c) = Cosigned By      Initials Name Provider Type    Shiela Maharaj, PTA Physical Therapist Assistant    Darius Srivastava, PT Physical Therapist    Phu Hdez PTA Physical Therapist Assistant    Aaron Cooney, JERROD Student PT Student                     Time Calculation:    PT Charges       Row Name 06/03/24 1125             Time Calculation    PT Received On 06/03/24  -DK      PT Goal Re-Cert Due Date 06/09/24  -DK         Timed Charges    77542 - PT Therapeutic Exercise Minutes 14  -DK      41808 - Gait Training Minutes  8  -DK      36082 - PT Therapeutic Activity Minutes 8  -DK         Total Minutes    Timed Charges Total Minutes 30  -DK       Total Minutes 30  -DK                User Key  (r) = Recorded By, (t) = Taken By, (c) = Cosigned By      Initials Name Provider Type    Sheila Maharaj, PTA Physical Therapist Assistant                  Therapy Charges for  Today       Code Description Service Date Service Provider Modifiers Qty    35624255829 HC PT THER PROC EA 15 MIN 6/2/2024 Sheila Nicole, PTA GP 1    92964245579 HC PT THERAPEUTIC ACT EA 15 MIN 6/2/2024 Sheila Nicole, PTA GP 1    69780122021 HC PT THER PROC EA 15 MIN 6/3/2024 Sheila Nicole, PTA GP 1    02517541459 HC GAIT TRAINING EA 15 MIN 6/3/2024 Sheila Nicole, PTA GP 1            PT G-Codes  Outcome Measure Options: AM-PAC 6 Clicks Basic Mobility (PT)  AM-PAC 6 Clicks Score (PT): 16  AM-PAC 6 Clicks Score (OT): 14    Sheila Nicole PTA  6/3/2024

## 2024-06-03 NOTE — PLAN OF CARE
Goal Outcome Evaluation:  Plan of Care Reviewed With: patient           Outcome Evaluation: Pt resting well.HR Sinus tachy on flex,otherwise VSS. Prn norco given once for c/o 8/10 aching back pain. skin care completed this shift. Staple intact dressing applied. Call light with reach. Continue plan of care.

## 2024-06-03 NOTE — PROGRESS NOTES
[facility]   Hospitalist Progress Note    Date of admission: 5/20/2024  Patient Name: Oswaldo Bowen  1980  Date: 6/3/2024      Subjective     Chief Complaint   Patient presents with   • Fall   • Leg Pain       Interval Followup: No acute events overnight, patient continues to rest comfortably in bed, working with physical therapy.  Awaiting pathology results from bone biopsy as patient's bronchoscopy was inconclusive    Objective     Vitals:   Temp:  [97.7 °F (36.5 °C)-99.3 °F (37.4 °C)] 97.8 °F (36.6 °C)  Heart Rate:  [110-129] 122  Resp:  [18-20] 18  BP: (104-125)/(62-72) 107/71    Physical Exam  GEN: No acute distress  HEENT: Moist mucous membranes  LUNGS: Equal chest rise bilaterally  CARDIAC: Regular rate and rhythm  NEURO: Moving all 4 extremities spontaneously  SKIN: No obvious breakdown    Result Review:  Vital signs, labs and recent relevant imaging reviewed.      CBC          5/31/2024    05:20 6/1/2024    05:45 6/3/2024    05:37   CBC   WBC 11.14  11.50  13.72    RBC 3.18  3.15  3.17    Hemoglobin 8.7  8.7  8.7    Hematocrit 28.5  28.1  28.2    MCV 89.6  89.2  89.0    MCH 27.4  27.6  27.4    MCHC 30.5  31.0  30.9    RDW 13.2  13.3  13.5    Platelets 504  482  520      CMP          5/31/2024    05:20 6/1/2024    05:45 6/3/2024    05:37   CMP   Glucose 110  101  102    BUN 13  13  12    Creatinine 0.50  0.48  0.47    EGFR 129.0  130.6  131.4    Sodium 135  134  135    Potassium 3.9  4.2  4.1    Chloride 96  95  97    Calcium 9.4  9.3  9.3    Total Protein 6.4  6.7  6.6    Albumin 2.9  2.9  2.9    Globulin 3.5  3.8  3.7    Total Bilirubin 0.2  0.2  0.2    Alkaline Phosphatase 181  179  200    AST (SGOT) 39  30  33    ALT (SGPT) 89  77  74    Albumin/Globulin Ratio 0.8  0.8  0.8    BUN/Creatinine Ratio 26.0  27.1  25.5    Anion Gap 10.6  10.1  8.5        •  acetaminophen  •  aluminum-magnesium hydroxide-simethicone  •  senna-docusate sodium **AND** polyethylene glycol **AND** bisacodyl **AND**  bisacodyl  •  calcium carbonate  •  dextrose  •  dextrose  •  Diclofenac Sodium  •  glucagon (human recombinant)  •  HYDROcodone-acetaminophen  •  HYDROcodone-acetaminophen  •  hydrOXYzine  •  levalbuterol  •  Lidocaine  •  melatonin  •  [DISCONTINUED] Morphine **AND** naloxone  •  nicotine  •  ondansetron  •  ondansetron ODT **OR** [DISCONTINUED] ondansetron  •  prochlorperazine  •  sodium chloride  •  sodium chloride  •  sodium chloride  •  sodium chloride    budesonide-formoterol, 2 puff, Inhalation, BID - RT  Lidocaine, 1 patch, Transdermal, Q24H  magnesium oxide, 400 mg, Oral, Daily  multivitamin with minerals, 1 tablet, Oral, Daily  senna-docusate sodium, 2 tablet, Oral, BID  sodium chloride, 10 mL, Intravenous, Q12H  thiamine, 100 mg, Oral, Daily  tiotropium bromide monohydrate, 2 puff, Inhalation, Daily - RT  vitamin B-12, 1,000 mcg, Oral, Daily        CT Needle Biopsy Bone Deep    Result Date: 5/30/2024  Impression: 1.  Successful CT-guided 18-gauge fine-needle biopsy of right seventh rib lesion without evidence of complication.           Electronically Signed By-EVAN SORIANO MD On:5/30/2024 11:22 AM       Assessment / Plan     Summary: 44M with intellectual disability, 1-2PPD smoking, depression, who presented after a fall (possibly 1 month ago but may have been more recently), found to have right femoral fracture and VLAD.  Ortho assisting with initial surgical treatment.  Initial lung mass concerning on chest x-ray additional CT imaging with suspected new metastatic colon cancer with mets to the bone, pulmonology consulted patient had a biopsy results pending.  Bronchoscopy also with findings of Haemophilus influenzae treatment with antibiotics.  Palliative assisting.  Awaiting pathology results from bone biopsy, this will dictate whether or not patient would go to rehab versus start chemotherapy if this is an aggressive cancer.  Patient is working better with physical therapy and is ambulating with  minimal assistance    Assessment/Plan (clinically significant if listed here)  Mechanical fall with acute comminuted displaced intertrochanteric right femoral fracture  S/p 5/21 right hip subtrochanteric nailing of closed displaced right femur fracture by Dr. Nelson  VLAD creatinine 1.7 leukocytosis suspect reactive in setting of acute fracture  Suspected new metastatic lung cancer with mets to the bone  Mediastinal lymphadenopathy  Haemophilus influenzae pneumonia   1 to 2 pack/day smoker, chronic  Intellectual disability  Depression  Normocytic anemia    Completed antibiotics  Continue respiratory hygiene  Scheduled Tylenol and lidocaine patch, as needed and p.o. pain medication for breakthrough pain  Multiple myeloma labs negative, bronchoscopy/EBUS negative for malignancy, patient underwent bone biopsy on 5/30/2024, pathology is still pending at this time  Monitor for aspiration  Appreciate oncology assistance, will likely require outpatient PET scan  Inhalers, respiratory hygiene, aspiration precaution  Continue postoperative monitoring for fracture appreciate orthopedic surgery assistance, patient is ambulating some with minimal assistance  No overt bleeding, hb stabilized postoperatively near mid 8, cont mvi, continue to monitor closely  Nrt prn / smoking cessation   Continue PT/OT  Continue hospitalization at current level of care  Repeat labs in a.m., CBC, CMP, mag and Phos  Out of bed to chair 3 times daily, encourage ambulation with physical therapy    Dispo: Disposition is pending biopsy results, and treatment plans regarding patient's cancer versus rehab    DVT prophylaxis:  Mechanical DVT prophylaxis orders are present.      Level Of Support Discussed With: Patient  Code Status (Patient has no pulse and is not breathing): CPR (Attempt to Resuscitate)  Medical Interventions (Patient has pulse or is breathing): Full Support      Electronically signed by Molina Du MD, 6/3/2024, 11:32 EDT.

## 2024-06-03 NOTE — THERAPY TREATMENT NOTE
Acute Care - Speech Language Pathology   Swallow Treatment Note  [place]     Patient Name: Oswaldo Bowen  : 1980  MRN: [id number]  Today's Date: 6/3/2024               Admit Date: 2024    Visit Dx:     ICD-10-CM ICD-9-CM   1. Closed displaced intertrochanteric fracture of right femur, initial encounter  S72.141A 820.21   2. Pulmonary nodule  R91.1 793.11   3. Difficulty walking  R26.2 719.7   4. Dysphagia, oropharyngeal  R13.12 787.22     Patient Active Problem List   Diagnosis    Closed intertrochanteric fracture of right femur    Pulmonary nodule     History reviewed. No pertinent past medical history.  Past Surgical History:   Procedure Laterality Date    BRONCHOSCOPY N/A 2024    Procedure: BRONCHOSCOPY WITH ENDOBRONCHIAL ULTRASOUND, FINE NEEDLE ASPIRATE, BIOPSIES, BRUSHINGS, BRONCHOALVEOLAR LAVAGE;  Surgeon: Kendell Stern MD;  Location: [facility] ENDOSCOPY;  Service: Pulmonary;  Laterality: N/A;  LUNG NODULE, MUCOUS PLUGGING    HIP INTERTROCHANTERIC NAILING Right 2024    Procedure: HIP INTERTROCHANTERIC NAILING;  Surgeon: Molina Clayton MD;  Location: [facility] MAIN OR;  Service: Orthopedics;  Laterality: Right;     SPEECH PATHOLOGY DYSPHAGIA TREATMENT    Subjective/Behavioral Observations: Patient seen for dysphagia therapy    Current Diet: Soft to chew with Thin liquids.     Current Strategies: Slow rate, small bites/drinks    Treatment received: Patient seen at bedside.  Tolerating therapeutic trials of soft solids and  thin liquids without clinical sign or symptom of aspiration.  Good intake.  No clinical signs or symptoms of aspiration noted.  Vocal quality remained clear to auscultation.  Denies difficulty with meds.  Results of treatment: As stated        Progress toward goals: Progressing        Barriers to Achieving goals: Medical status        Plan of care:/changes in plan: Upgrade diet to soft to chew and single sips of thin liquids  90 degrees upright for all intake  Slow rate,  small bites/drinks  Intermittent supervision to encourage small single sips of liquids  Caution with mixed consistencies  Oral meds whole in applesauce      No further dysphagia therapy is indicated at this time.  Available for reconsult should medical status warrant.    EDUCATION  The patient has been educated in the following areas:   Dysphagia (Swallowing Impairment).       Martha Abebe, SLP  6/3/2024

## 2024-06-04 ENCOUNTER — APPOINTMENT (OUTPATIENT)
Dept: CT IMAGING | Facility: HOSPITAL | Age: 44
DRG: 477 | End: 2024-06-04
Payer: COMMERCIAL

## 2024-06-04 LAB
ALBUMIN SERPL-MCNC: 2.9 G/DL (ref 3.5–5.2)
ALBUMIN/GLOB SERPL: 0.8 G/DL
ALP SERPL-CCNC: 199 U/L (ref 39–117)
ALT SERPL W P-5'-P-CCNC: 58 U/L (ref 1–41)
ANION GAP SERPL CALCULATED.3IONS-SCNC: 15.5 MMOL/L (ref 5–15)
AST SERPL-CCNC: 26 U/L (ref 1–40)
BASOPHILS # BLD AUTO: 0.07 10*3/MM3 (ref 0–0.2)
BASOPHILS NFR BLD AUTO: 0.4 % (ref 0–1.5)
BILIRUB SERPL-MCNC: 0.2 MG/DL (ref 0–1.2)
BUN SERPL-MCNC: 13 MG/DL (ref 6–20)
BUN/CREAT SERPL: 24.5 (ref 7–25)
CALCIUM SPEC-SCNC: 9.3 MG/DL (ref 8.6–10.5)
CHLORIDE SERPL-SCNC: 94 MMOL/L (ref 98–107)
CO2 SERPL-SCNC: 25.5 MMOL/L (ref 22–29)
CREAT SERPL-MCNC: 0.53 MG/DL (ref 0.76–1.27)
D-LACTATE SERPL-SCNC: 1.8 MMOL/L (ref 0.5–2)
DEPRECATED RDW RBC AUTO: 43.5 FL (ref 37–54)
EGFRCR SERPLBLD CKD-EPI 2021: 126.7 ML/MIN/1.73
EOSINOPHIL # BLD AUTO: 0.03 10*3/MM3 (ref 0–0.4)
EOSINOPHIL NFR BLD AUTO: 0.2 % (ref 0.3–6.2)
ERYTHROCYTE [DISTWIDTH] IN BLOOD BY AUTOMATED COUNT: 13.4 % (ref 12.3–15.4)
GLOBULIN UR ELPH-MCNC: 3.5 GM/DL
GLUCOSE SERPL-MCNC: 189 MG/DL (ref 65–99)
HCT VFR BLD AUTO: 27.4 % (ref 37.5–51)
HGB BLD-MCNC: 8.5 G/DL (ref 13–17.7)
IMM GRANULOCYTES # BLD AUTO: 0.14 10*3/MM3 (ref 0–0.05)
IMM GRANULOCYTES NFR BLD AUTO: 0.9 % (ref 0–0.5)
LYMPHOCYTES # BLD AUTO: 1.39 10*3/MM3 (ref 0.7–3.1)
LYMPHOCYTES NFR BLD AUTO: 8.8 % (ref 19.6–45.3)
MAGNESIUM SERPL-MCNC: 1.9 MG/DL (ref 1.6–2.6)
MCH RBC QN AUTO: 27.4 PG (ref 26.6–33)
MCHC RBC AUTO-ENTMCNC: 31 G/DL (ref 31.5–35.7)
MCV RBC AUTO: 88.4 FL (ref 79–97)
MONOCYTES # BLD AUTO: 1.08 10*3/MM3 (ref 0.1–0.9)
MONOCYTES NFR BLD AUTO: 6.8 % (ref 5–12)
NEUTROPHILS NFR BLD AUTO: 13.16 10*3/MM3 (ref 1.7–7)
NEUTROPHILS NFR BLD AUTO: 82.9 % (ref 42.7–76)
NRBC BLD AUTO-RTO: 0 /100 WBC (ref 0–0.2)
PHOSPHATE SERPL-MCNC: 3.9 MG/DL (ref 2.5–4.5)
PLATELET # BLD AUTO: 504 10*3/MM3 (ref 140–450)
PMV BLD AUTO: 9.6 FL (ref 6–12)
POTASSIUM SERPL-SCNC: 3.9 MMOL/L (ref 3.5–5.2)
PROCALCITONIN SERPL-MCNC: 0.27 NG/ML (ref 0–0.25)
PROT SERPL-MCNC: 6.4 G/DL (ref 6–8.5)
RBC # BLD AUTO: 3.1 10*6/MM3 (ref 4.14–5.8)
SODIUM SERPL-SCNC: 135 MMOL/L (ref 136–145)
WBC NRBC COR # BLD AUTO: 15.87 10*3/MM3 (ref 3.4–10.8)

## 2024-06-04 PROCEDURE — 83735 ASSAY OF MAGNESIUM: CPT | Performed by: INTERNAL MEDICINE

## 2024-06-04 PROCEDURE — 84145 PROCALCITONIN (PCT): CPT | Performed by: INTERNAL MEDICINE

## 2024-06-04 PROCEDURE — 94799 UNLISTED PULMONARY SVC/PX: CPT

## 2024-06-04 PROCEDURE — 25010000002 ENOXAPARIN PER 10 MG: Performed by: INTERNAL MEDICINE

## 2024-06-04 PROCEDURE — 25510000001 IOPAMIDOL PER 1 ML: Performed by: INTERNAL MEDICINE

## 2024-06-04 PROCEDURE — 85025 COMPLETE CBC W/AUTO DIFF WBC: CPT | Performed by: INTERNAL MEDICINE

## 2024-06-04 PROCEDURE — 97110 THERAPEUTIC EXERCISES: CPT

## 2024-06-04 PROCEDURE — 97530 THERAPEUTIC ACTIVITIES: CPT

## 2024-06-04 PROCEDURE — 99233 SBSQ HOSP IP/OBS HIGH 50: CPT | Performed by: INTERNAL MEDICINE

## 2024-06-04 PROCEDURE — 71260 CT THORAX DX C+: CPT

## 2024-06-04 PROCEDURE — 83605 ASSAY OF LACTIC ACID: CPT | Performed by: INTERNAL MEDICINE

## 2024-06-04 PROCEDURE — 80053 COMPREHEN METABOLIC PANEL: CPT | Performed by: INTERNAL MEDICINE

## 2024-06-04 PROCEDURE — 94664 DEMO&/EVAL PT USE INHALER: CPT

## 2024-06-04 PROCEDURE — 84100 ASSAY OF PHOSPHORUS: CPT | Performed by: INTERNAL MEDICINE

## 2024-06-04 RX ORDER — ENOXAPARIN SODIUM 100 MG/ML
40 INJECTION SUBCUTANEOUS EVERY 24 HOURS
Status: DISCONTINUED | OUTPATIENT
Start: 2024-06-04 | End: 2024-06-11

## 2024-06-04 RX ADMIN — BUDESONIDE AND FORMOTEROL FUMARATE DIHYDRATE 2 PUFF: 160; 4.5 AEROSOL RESPIRATORY (INHALATION) at 10:10

## 2024-06-04 RX ADMIN — Medication 5 MG: at 23:00

## 2024-06-04 RX ADMIN — LIDOCAINE 1 PATCH: 560 PATCH PERCUTANEOUS; TOPICAL; TRANSDERMAL at 08:38

## 2024-06-04 RX ADMIN — Medication 400 MG: at 08:37

## 2024-06-04 RX ADMIN — Medication 10 ML: at 08:38

## 2024-06-04 RX ADMIN — SENNOSIDES AND DOCUSATE SODIUM 2 TABLET: 50; 8.6 TABLET ORAL at 21:42

## 2024-06-04 RX ADMIN — IOPAMIDOL 100 ML: 755 INJECTION, SOLUTION INTRAVENOUS at 17:00

## 2024-06-04 RX ADMIN — BUDESONIDE AND FORMOTEROL FUMARATE DIHYDRATE 2 PUFF: 160; 4.5 AEROSOL RESPIRATORY (INHALATION) at 18:28

## 2024-06-04 RX ADMIN — HYDROCODONE BITARTRATE AND ACETAMINOPHEN 1 TABLET: 10; 325 TABLET ORAL at 10:58

## 2024-06-04 RX ADMIN — TIOTROPIUM BROMIDE INHALATION SPRAY 2 PUFF: 3.12 SPRAY, METERED RESPIRATORY (INHALATION) at 10:10

## 2024-06-04 RX ADMIN — HYDROCODONE BITARTRATE AND ACETAMINOPHEN 1 TABLET: 10; 325 TABLET ORAL at 21:42

## 2024-06-04 RX ADMIN — ACETAMINOPHEN 650 MG: 325 TABLET ORAL at 08:37

## 2024-06-04 RX ADMIN — SENNOSIDES AND DOCUSATE SODIUM 2 TABLET: 50; 8.6 TABLET ORAL at 08:37

## 2024-06-04 RX ADMIN — CYANOCOBALAMIN TAB 500 MCG 1000 MCG: 500 TAB at 08:37

## 2024-06-04 RX ADMIN — ENOXAPARIN SODIUM 40 MG: 100 INJECTION SUBCUTANEOUS at 19:11

## 2024-06-04 RX ADMIN — Medication 100 MG: at 08:37

## 2024-06-04 RX ADMIN — Medication 1 TABLET: at 08:37

## 2024-06-04 RX ADMIN — HYDROCODONE BITARTRATE AND ACETAMINOPHEN 1 TABLET: 5; 325 TABLET ORAL at 04:09

## 2024-06-04 RX ADMIN — Medication 10 ML: at 21:43

## 2024-06-04 NOTE — THERAPY TREATMENT NOTE
Acute Care - Physical Therapy Treatment Note   [place]     Patient Name: Oswaldo Bowen  : 1980  MRN: [id number]  Today's Date: 2024      Visit Dx:     ICD-10-CM ICD-9-CM   1. Closed displaced intertrochanteric fracture of right femur, initial encounter  S72.141A 820.21   2. Pulmonary nodule  R91.1 793.11   3. Difficulty walking  R26.2 719.7   4. Dysphagia, oropharyngeal  R13.12 787.22     Patient Active Problem List   Diagnosis    Closed intertrochanteric fracture of right femur    Pulmonary nodule     History reviewed. No pertinent past medical history.  Past Surgical History:   Procedure Laterality Date    BRONCHOSCOPY N/A 2024    Procedure: BRONCHOSCOPY WITH ENDOBRONCHIAL ULTRASOUND, FINE NEEDLE ASPIRATE, BIOPSIES, BRUSHINGS, BRONCHOALVEOLAR LAVAGE;  Surgeon: Kendell Stern MD;  Location: [facility] ENDOSCOPY;  Service: Pulmonary;  Laterality: N/A;  LUNG NODULE, MUCOUS PLUGGING    HIP INTERTROCHANTERIC NAILING Right 2024    Procedure: HIP INTERTROCHANTERIC NAILING;  Surgeon: Molina Clayton MD;  Location: [facility] MAIN OR;  Service: Orthopedics;  Laterality: Right;     PT Assessment (Last 12 Hours)       PT Evaluation and Treatment       Row Name 24 1058          Physical Therapy Time and Intention    Subjective Information complains of;weakness;fatigue;pain  -DK     Document Type therapy note (daily note)  -DK     Mode of Treatment individual therapy;physical therapy  -DK     Patient Effort good  -DK     Symptoms Noted During/After Treatment fatigue;increased pain  -DK     Comment Pt quieter today, dripping sweat in bed.  RN was advised post treatment.  -DK       Row Name 24 1058          Pain    Pretreatment Pain Rating 3/10  -DK     Posttreatment Pain Rating 4/10  -DK     Pain Location - Side/Orientation Right  -DK     Pain Location generalized  -DK     Pain Location - back;hip  -DK     Pain Intervention(s) Repositioned;Ambulation/increased activity;Distraction;Nursing  Notified;Therapeutic presence  -       Row Name 06/04/24 1058          Cognition    Affect/Mental Status (Cognition) flat/blunted affect;low arousal/lethargic;confused  -DK     Behavioral Issues (Cognition) overwhelmed easily  -DK     Orientation Status (Cognition) oriented to;person;situation  -DK     Follows Commands (Cognition) physical/tactile prompts required;repetition of directions required;verbal cues/prompting required  -DK     Cognitive Function attention deficit  -DK     Attention Deficit (Cognition) minimal deficit;concentration;focused/sustained attention  -DK     Personal Safety Interventions gait belt;nonskid shoes/slippers when out of bed;supervised activity  -       Row Name 06/04/24 1058          Mobility    Extremity Weight-bearing Status right lower extremity  -DK     Right Lower Extremity (Weight-bearing Status) weight-bearing as tolerated (WBAT)  -       Row Name 06/04/24 1058          Bed Mobility    Bed Mobility supine-sit  -DK     Scooting/Bridging [place] (Bed Mobility) standby assist  -     Supine-Sit [place] (Bed Mobility) standby assist;contact guard;1 person assist  -     Sit-Supine [place] (Bed Mobility) standby assist;contact guard;1 person assist  -DK     Bed Mobility, Safety Issues decreased use of arms for pushing/pulling;decreased use of legs for bridging/pushing  -DK     Assistive Device (Bed Mobility) bed rails  -       Row Name 06/04/24 1058          Transfers    Transfers sit-stand transfer;stand-sit transfer  -       Row Name 06/04/24 1058          Sit-Stand Transfer    Sit-Stand [place] (Transfers) contact guard;1 person assist  -     Assistive Device (Sit-Stand Transfers) walker, front-wheeled  -       Row Name 06/04/24 1058          Stand-Sit Transfer    Stand-Sit [place] (Transfers) contact guard;1 person assist;standby assist  -     Assistive Device (Stand-Sit Transfers) walker, front-wheeled  -       Row Name 06/04/24 1058           Gait/Stairs (Locomotion)    Gait/Stairs Locomotion gait/ambulation independence;gait/ambulation assistive device;distance ambulated;gait pattern  -DK     [place] Level (Gait) contact guard;1 person assist  -DK     Assistive Device (Gait) walker, front-wheeled  -DK     Distance in Feet (Gait) 40  -DK     Pattern (Gait) step-to  -DK     Deviations/Abnormal Patterns (Gait) shannon decreased;festinating/shuffling;gait speed decreased;stride length decreased  -DK     Bilateral Gait Deviations forward flexed posture  -DK     Comment, (Gait/Stairs) Pt ambulated on room air with a rolling walker.  He returned to bed on alert post treatment.  -       Row Name 06/04/24 1058          Safety Issues, Functional Mobility    Safety Issues Affecting Function (Mobility) ability to follow commands;awareness of need for assistance;impulsivity;judgment;safety precaution awareness  -DK     Impairments Affecting Function (Mobility) balance;cognition;endurance/activity tolerance;pain;range of motion (ROM);strength  -DK     Cognitive Impairments, Mobility Safety/Performance attention;awareness, need for assistance;impulsivity;judgment;safety precaution awareness  -       Row Name 06/04/24 1058          Balance    Balance Assessment sitting static balance;sitting dynamic balance;standing static balance;standing dynamic balance  -DK     Static Sitting Balance standby assist  -DK     Dynamic Sitting Balance standby assist  -DK     Position, Sitting Balance unsupported;sitting edge of bed  -DK     Static Standing Balance contact guard;1-person assist  -DK     Dynamic Standing Balance contact guard;1-person assist  -DK     Position/Device Used, Standing Balance walker, front-wheeled  -DK     Balance Interventions standing;dynamic;tandem gait  -       Row Name 06/04/24 1058          Motor Skills    Motor Skills --  therapeutic exercises  -DK     Coordination WFL  -DK     Therapeutic Exercise hip;knee;ankle  -       Row Name  06/04/24 1058          Hip (Therapeutic Exercise)    Hip (Therapeutic Exercise) AAROM (active assistive range of motion)  -     Hip AAROM (Therapeutic Exercise) bilateral;flexion;extension;aBduction;aDduction;supine;10 repetitions;2 sets  -DK       Row Name 06/04/24 1058          Knee (Therapeutic Exercise)    Knee (Therapeutic Exercise) AAROM (active assistive range of motion)  -DK     Knee AAROM (Therapeutic Exercise) bilateral;flexion;extension;supine;10 repetitions;2 sets  -DK       Row Name 06/04/24 1058          Ankle (Therapeutic Exercise)    Ankle (Therapeutic Exercise) AAROM (active assistive range of motion)  -DK     Ankle AAROM (Therapeutic Exercise) bilateral;dorsiflexion;plantarflexion;supine;10 repetitions;2 sets  -DK       Row Name             Wound 05/21/24 0251 Bilateral coccyx    Wound - Properties Group Placement Date: 05/21/24  -SR Placement Time: 0251  -SR Present on Original Admission: Y  -SR Side: Bilateral  -SR Location: coccyx  -SR    Retired Wound - Properties Group Placement Date: 05/21/24  -SR Placement Time: 0251  -SR Present on Original Admission: Y  -SR Side: Bilateral  -SR Location: coccyx  -SR    Retired Wound - Properties Group Date first assessed: 05/21/24  -SR Time first assessed: 0251  -SR Present on Original Admission: Y  -SR Side: Bilateral  -SR Location: coccyx  -SR      Row Name             Wound 05/21/24 Right lateral thigh Incision    Wound - Properties Group Placement Date: 05/21/24  -SF Present on Original Admission: N  -SF Side: Right  -SF Orientation: lateral  -SF Location: thigh  -SF Primary Wound Type: Incision  -SF Additional Comments: incision sites x 3 to lateral right thigh  -SF    Retired Wound - Properties Group Placement Date: 05/21/24  -SF Present on Original Admission: N  -SF Side: Right  -SF Orientation: lateral  -SF Location: thigh  -SF Primary Wound Type: Incision  -SF Additional Comments: incision sites x 3 to lateral right thigh  -SF    Retired Wound -  Properties Group Date first assessed: 05/21/24  -SF Present on Original Admission: N  -SF Side: Right  -SF Location: thigh  -SF Primary Wound Type: Incision  -SF Additional Comments: incision sites x 3 to lateral right thigh  -SF      Row Name 06/04/24 1058          Plan of Care Review    Plan of Care Reviewed With patient  -DK     Progress improving  -DK       Row Name 06/04/24 1058          Positioning and Restraints    Pre-Treatment Position in bed  -DK     Post Treatment Position bed  -DK     In Bed supine;call light within reach;encouraged to call for assist;exit alarm on;side rails up x2;legs elevated;heels elevated  -DK       Row Name 06/04/24 1058          Therapy Assessment/Plan (PT)    Rehab Potential (PT) good, to achieve stated therapy goals  -DK     Criteria for Skilled Interventions Met (PT) skilled treatment is necessary  -DK     Therapy Frequency (PT) daily  -DK     Problem List (PT) problems related to;balance;cognition;mobility;range of motion (ROM);strength;pain;communication  -DK     Activity Limitations Related to Problem List (PT) unable to ambulate safely;unable to transfer safely  -DK       Row Name 06/04/24 1058          Progress Summary (PT)    Progress Toward Functional Goals (PT) progress toward functional goals is good  -DK               User Key  (r) = Recorded By, (t) = Taken By, (c) = Cosigned By      Initials Name Provider Type    SF Saritha Ramirez, RN Registered Nurse    Sheila Maharaj PTA Physical Therapist Assistant    SR Saritha Long, RN Registered Nurse                    Physical Therapy Education       Title: PT OT SLP Therapies (Done)       Topic: Physical Therapy (Done)       Point: Mobility training (Done)       Learning Progress Summary             Patient Acceptance, E, Bed IU by DS at 6/2/2024 0830    Acceptance, E, VU by PS at 6/1/2024 0627    Acceptance, E,TB, VU by TR at 5/31/2024 1449    Eager, E, VU by AH at 5/23/2024 2223    Acceptance, E,TB, VU by AV at  5/22/2024 1339                         Point: Home exercise program (Done)       Learning Progress Summary             Patient Acceptance, E, Bed IU by DS at 6/2/2024 0830                         Point: Body mechanics (Done)       Learning Progress Summary             Patient Acceptance, E, Bed IU by DS at 6/2/2024 0830    Acceptance, E, VU by PS at 6/1/2024 0627    Acceptance, E,TB, VU by AV at 5/22/2024 1339                         Point: Precautions (Done)       Learning Progress Summary             Patient Acceptance, E, Bed IU by DS at 6/2/2024 0830    Acceptance, E,TB, VU by TR at 5/31/2024 1449    Acceptance, E,TB, VU by AV at 5/22/2024 1339                                         User Key       Initials Effective Dates Name Provider Type Discipline    PS 06/16/21 -  Darcie Fiore, RN Registered Nurse Nurse    AV 06/11/21 -  Eh Martínez, PT Physical Therapist PT    DS 06/26/23 -  Immanuel Beck, RN Registered Nurse Nurse     05/31/23 -  Angela Casillas, RN Registered Nurse Nurse    TR 05/21/24 -  Aaron Fowler, JERROD Student PT Student PT                  PT Recommendation and Plan  Planned Therapy Interventions (PT): balance training, bed mobility training, gait training, strengthening, transfer training  Therapy Frequency (PT): daily  Progress Summary (PT)  Progress Toward Functional Goals (PT): progress toward functional goals is good  Plan of Care Reviewed With: patient  Progress: improving   Outcome Measures       Row Name 06/04/24 1058 06/03/24 1122 06/02/24 1116       How much help from another person do you currently need...    Turning from your back to your side while in flat bed without using bedrails? 3  -DK 3  -DK 4  -DK    Moving from lying on back to sitting on the side of a flat bed without bedrails? 3  -DK 3  -DK 3  -DK    Moving to and from a bed to a chair (including a wheelchair)? 3  -DK 3  -DK 3  -DK    Standing up from a chair using your arms (e.g., wheelchair,  bedside chair)? 3  -DK 3  -DK 3  -DK    Climbing 3-5 steps with a railing? 2  -DK 1  -DK 2  -DK    To walk in hospital room? 3  -DK 3  -DK 3  -DK    AM-PAC 6 Clicks Score (PT) 17  -DK 16  -DK 18  -DK    Highest Level of Mobility Goal 5 --> Static standing  -DK 5 --> Static standing  -DK 6 --> Walk 10 steps or more  -DK       Functional Assessment    Outcome Measure Options AM-PAC 6 Clicks Basic Mobility (PT)  -DK AM-PAC 6 Clicks Basic Mobility (PT)  -DK AM-PAC 6 Clicks Basic Mobility (PT)  -DK      Row Name 06/01/24 1600             How much help from another person do you currently need...    Turning from your back to your side while in flat bed without using bedrails? 3  -CS      Moving from lying on back to sitting on the side of a flat bed without bedrails? 3  -CS      Moving to and from a bed to a chair (including a wheelchair)? 3  -CS      Standing up from a chair using your arms (e.g., wheelchair, bedside chair)? 3  -CS      Climbing 3-5 steps with a railing? 2  -CS      To walk in hospital room? 3  -CS      AM-PAC 6 Clicks Score (PT) 17  -CS      Highest Level of Mobility Goal 5 --> Static standing  -CS         Functional Assessment    Outcome Measure Options AM-PAC 6 Clicks Basic Mobility (PT)  -CS                User Key  (r) = Recorded By, (t) = Taken By, (c) = Cosigned By      Initials Name Provider Type    DK Sheila Nicole PTA Physical Therapist Assistant    Phu Hdez PTA Physical Therapist Assistant                     Time Calculation:    PT Charges       Row Name 06/04/24 1103             Time Calculation    PT Received On 06/04/24  -DK      PT Goal Re-Cert Due Date 06/09/24  -DK         Timed Charges    86396 - PT Therapeutic Exercise Minutes 14  -DK      78936 - Gait Training Minutes  6  -DK      50949 - PT Therapeutic Activity Minutes 8  -DK         Total Minutes    Timed Charges Total Minutes 28  -DK       Total Minutes 28  -DK                User Key  (r) = Recorded By, (t) = Taken  By, (c) = Cosigned By      Initials Name Provider Type    Sheila Maharaj PTA Physical Therapist Assistant                  Therapy Charges for Today       Code Description Service Date Service Provider Modifiers Qty    39767910270 HC PT THER PROC EA 15 MIN 6/3/2024 Sheila Nicole, RADHA GP 1    42587000315 HC GAIT TRAINING EA 15 MIN 6/3/2024 Sheila Nicole, RADHA GP 1    16520886137 HC PT THER PROC EA 15 MIN 6/4/2024 Sheila Nicole, RADHA GP 1    60850210019 HC PT THERAPEUTIC ACT EA 15 MIN 6/4/2024 Sheila Nicole, RADHA GP 1            PT G-Codes  Outcome Measure Options: AM-PAC 6 Clicks Basic Mobility (PT)  AM-PAC 6 Clicks Score (PT): 17  AM-PAC 6 Clicks Score (OT): 14    Sheila Nicole PTA  6/4/2024

## 2024-06-04 NOTE — PROGRESS NOTES
[facility]   Hospitalist Progress Note    Date of admission: 5/20/2024  Patient Name: Oswaldo Bowen  1980  Date: 6/4/2024      Subjective     Chief Complaint   Patient presents with    Fall    Leg Pain       Interval Followup: Pain okay had some trouble with working with PT due to pain at times.  Had some cold sweat type symptoms earlier denies any cough or increased trouble breathing      Objective     Vitals:   Temp:  [97.3 °F (36.3 °C)-98.4 °F (36.9 °C)] 97.7 °F (36.5 °C)  Heart Rate:  [109-132] 109  Resp:  [16-20] 16  BP: (105-127)/(64-88) 127/73    Physical Exam  Thin frail in bed appears slightly sweaty on his forehead  Elevated rate regular rhythm  Mild rhonchi no acute wheezing active chest rise on room air  Abdomen soft  Baseline intellectual disability/limited discussion of medical condition, answers basic questions reasonably sometimes requires prompting/redirection      Result Review:  Vital signs, labs and recent relevant imaging reviewed.      CBC          6/1/2024    05:45 6/3/2024    05:37 6/4/2024    06:35   CBC   WBC 11.50  13.72  15.87    RBC 3.15  3.17  3.10    Hemoglobin 8.7  8.7  8.5    Hematocrit 28.1  28.2  27.4    MCV 89.2  89.0  88.4    MCH 27.6  27.4  27.4    MCHC 31.0  30.9  31.0    RDW 13.3  13.5  13.4    Platelets 482  520  504      CMP          6/1/2024    05:45 6/3/2024    05:37 6/4/2024    06:35   CMP   Glucose 101  102  189    BUN 13  12  13    Creatinine 0.48  0.47  0.53    EGFR 130.6  131.4  126.7    Sodium 134  135  135    Potassium 4.2  4.1  3.9    Chloride 95  97  94    Calcium 9.3  9.3  9.3    Total Protein 6.7  6.6  6.4    Albumin 2.9  2.9  2.9    Globulin 3.8  3.7  3.5    Total Bilirubin 0.2  0.2  0.2    Alkaline Phosphatase 179  200  199    AST (SGOT) 30  33  26    ALT (SGPT) 77  74  58    Albumin/Globulin Ratio 0.8  0.8  0.8    BUN/Creatinine Ratio 27.1  25.5  24.5    Anion Gap 10.1  8.5  15.5          acetaminophen    aluminum-magnesium hydroxide-simethicone     senna-docusate sodium **AND** polyethylene glycol **AND** bisacodyl **AND** bisacodyl    calcium carbonate    dextrose    dextrose    Diclofenac Sodium    glucagon (human recombinant)    HYDROcodone-acetaminophen    HYDROcodone-acetaminophen    hydrOXYzine    levalbuterol    Lidocaine    melatonin    [DISCONTINUED] Morphine **AND** naloxone    nicotine    ondansetron    ondansetron ODT **OR** [DISCONTINUED] ondansetron    prochlorperazine    sodium chloride    sodium chloride    sodium chloride    sodium chloride    budesonide-formoterol, 2 puff, Inhalation, BID - RT  Lidocaine, 1 patch, Transdermal, Q24H  magnesium oxide, 400 mg, Oral, Daily  multivitamin with minerals, 1 tablet, Oral, Daily  senna-docusate sodium, 2 tablet, Oral, BID  sodium chloride, 10 mL, Intravenous, Q12H  thiamine, 100 mg, Oral, Daily  tiotropium bromide monohydrate, 2 puff, Inhalation, Daily - RT  vitamin B-12, 1,000 mcg, Oral, Daily        CT Chest With Contrast Diagnostic    Result Date: 6/4/2024  1. No evidence of pulmonary embolus 2. No evidence of pneumonia 3. 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 malignancy, with bilateral hilar and AP window adenopathy and extensive osteolytic metastatic disease as described Electronically Signed: Sree Hawthorne  6/4/2024 5:17 PM EDT  Workstation ID: [id number]    CT Needle Biopsy Bone Deep    Result Date: 5/30/2024  Impression: 1.  Successful CT-guided 18-gauge fine-needle biopsy of right seventh rib lesion without evidence of complication.           Electronically Signed By-EVAN SORIANO MD On:5/30/2024 11:22 AM       Assessment / Plan     Summary: 44 y.o. with intellectual disability, 1-2PPD smoking, depression, who presented after a fall (possibly 1 month ago but may have been more recently), found to have right femoral fracture and VLAD.  Ortho assisting with initial surgical treatment.  Initial lung mass concerning on chest x-ray additional CT imaging with suspected new  metastatic colon cancer with mets to the bone, pulmonology consulted patient had a biopsy results pending.  Bronchoscopy also with findings of Haemophilus influenzae treatment with antibiotics.  Palliative assisting.  Awaiting pathology results from bone biopsy, this will dictate whether or not patient would go to rehab versus start chemotherapy if this is an aggressive cancer.  Patient is working better with physical therapy and is ambulating with minimal assistance       Assessment/Plan (clinically significant if listed here)  Mechanical fall with acute comminuted displaced intertrochanteric right femoral fracture  S/p 5/21 right hip subtrochanteric nailing of closed displaced right femur fracture by Dr. Nelson  VLAD creatinine 1.7 leukocytosis suspect reactive in setting of acute fracture  Suspected new metastatic lung cancer with mets to the bone  Mediastinal lymphadenopathy  Haemophilus influenzae pneumonia   1 to 2 pack/day smoker, chronic  Intellectual disability  Depression  Normocytic anemia    Leukocytosis uptrending, Pro-Alvaro decreased from when he first came in, tachycardia seems a little worse recently, will check a CT PE to further reassess and evaluate for pulmonary embolism.  Anion gap elevated lactic checked within normal limits  Lung biopsy was negative for malignancy, bone marrow biopsy from 5/30 of the right seventh rib still pending -will need to follow-up after rehab with oncology for discussion of further management and intervention  Continue Symbicort, Spiriva, respiratory hygiene as needed inhalers with Xopenex  Continue bowel regimen magnesium  Continue lidocaine patch as needed medications for pain as needed [place]  Prn nrt, pt declines need currently  Cont mvi/b12, thiamine   PT/OT  Check a.m. CBC, BMP, magnesium, phosphorus  Continue hospitalization at current level of care    Dispo: Rehab once medically stable.   Discussed plan of care/dispo with clinical .     DVT  prophylaxis:  Medical and mechanical DVT prophylaxis orders are present.    Level Of Support Discussed With: Patient  Code Status (Patient has no pulse and is not breathing): CPR (Attempt to Resuscitate)  Medical Interventions (Patient has pulse or is breathing): Full Support    Greater than 50 minutes on this encounter including review of labs, imaging, documentation, orders, vitals, monitoring and adjusting treatment and orders as indicated, discussion with the patient, sw, rn, and documenting.

## 2024-06-04 NOTE — PLAN OF CARE
Goal Outcome Evaluation:              Outcome Evaluation: VSS. PT A&Ox4. Pt c/o pain (see eMAR). Pt had CT scan, tolerated well. Pt rested well throughout shift. Continue plan of care.

## 2024-06-04 NOTE — PLAN OF CARE
Goal Outcome Evaluation:  Plan of Care Reviewed With: patient           Outcome Evaluation: Patient resting well, AAOx4.VSS. Prn norco given 2x this shift. skin care completed. dressing clean, dry and intact. Continue Plan of care.

## 2024-06-05 LAB
ANION GAP SERPL CALCULATED.3IONS-SCNC: 10.5 MMOL/L (ref 5–15)
BASOPHILS # BLD AUTO: 0.04 10*3/MM3 (ref 0–0.2)
BASOPHILS NFR BLD AUTO: 0.3 % (ref 0–1.5)
BUN SERPL-MCNC: 14 MG/DL (ref 6–20)
BUN/CREAT SERPL: 29.2 (ref 7–25)
CALCIUM SPEC-SCNC: 9.3 MG/DL (ref 8.6–10.5)
CHLORIDE SERPL-SCNC: 93 MMOL/L (ref 98–107)
CO2 SERPL-SCNC: 28.5 MMOL/L (ref 22–29)
CREAT SERPL-MCNC: 0.48 MG/DL (ref 0.76–1.27)
DEPRECATED RDW RBC AUTO: 44.1 FL (ref 37–54)
EGFRCR SERPLBLD CKD-EPI 2021: 130.6 ML/MIN/1.73
EOSINOPHIL # BLD AUTO: 0.14 10*3/MM3 (ref 0–0.4)
EOSINOPHIL NFR BLD AUTO: 1 % (ref 0.3–6.2)
ERYTHROCYTE [DISTWIDTH] IN BLOOD BY AUTOMATED COUNT: 13.6 % (ref 12.3–15.4)
GLUCOSE SERPL-MCNC: 104 MG/DL (ref 65–99)
HCT VFR BLD AUTO: 28.1 % (ref 37.5–51)
HGB BLD-MCNC: 8.5 G/DL (ref 13–17.7)
IMM GRANULOCYTES # BLD AUTO: 0.1 10*3/MM3 (ref 0–0.05)
IMM GRANULOCYTES NFR BLD AUTO: 0.7 % (ref 0–0.5)
LYMPHOCYTES # BLD AUTO: 1.51 10*3/MM3 (ref 0.7–3.1)
LYMPHOCYTES NFR BLD AUTO: 11 % (ref 19.6–45.3)
MAGNESIUM SERPL-MCNC: 1.9 MG/DL (ref 1.6–2.6)
MCH RBC QN AUTO: 27 PG (ref 26.6–33)
MCHC RBC AUTO-ENTMCNC: 30.2 G/DL (ref 31.5–35.7)
MCV RBC AUTO: 89.2 FL (ref 79–97)
MONOCYTES # BLD AUTO: 1.43 10*3/MM3 (ref 0.1–0.9)
MONOCYTES NFR BLD AUTO: 10.4 % (ref 5–12)
NEUTROPHILS NFR BLD AUTO: 10.47 10*3/MM3 (ref 1.7–7)
NEUTROPHILS NFR BLD AUTO: 76.6 % (ref 42.7–76)
NRBC BLD AUTO-RTO: 0 /100 WBC (ref 0–0.2)
PHOSPHATE SERPL-MCNC: 3.7 MG/DL (ref 2.5–4.5)
PLATELET # BLD AUTO: 475 10*3/MM3 (ref 140–450)
PMV BLD AUTO: 9.6 FL (ref 6–12)
POTASSIUM SERPL-SCNC: 4.1 MMOL/L (ref 3.5–5.2)
PROCALCITONIN SERPL-MCNC: 0.36 NG/ML (ref 0–0.25)
RBC # BLD AUTO: 3.15 10*6/MM3 (ref 4.14–5.8)
SODIUM SERPL-SCNC: 132 MMOL/L (ref 136–145)
WBC NRBC COR # BLD AUTO: 13.69 10*3/MM3 (ref 3.4–10.8)

## 2024-06-05 PROCEDURE — 85025 COMPLETE CBC W/AUTO DIFF WBC: CPT | Performed by: INTERNAL MEDICINE

## 2024-06-05 PROCEDURE — 80048 BASIC METABOLIC PNL TOTAL CA: CPT | Performed by: INTERNAL MEDICINE

## 2024-06-05 PROCEDURE — 25010000002 ENOXAPARIN PER 10 MG: Performed by: INTERNAL MEDICINE

## 2024-06-05 PROCEDURE — 97110 THERAPEUTIC EXERCISES: CPT

## 2024-06-05 PROCEDURE — 99232 SBSQ HOSP IP/OBS MODERATE 35: CPT | Performed by: INTERNAL MEDICINE

## 2024-06-05 PROCEDURE — 94664 DEMO&/EVAL PT USE INHALER: CPT

## 2024-06-05 PROCEDURE — 84145 PROCALCITONIN (PCT): CPT | Performed by: INTERNAL MEDICINE

## 2024-06-05 PROCEDURE — 94799 UNLISTED PULMONARY SVC/PX: CPT

## 2024-06-05 PROCEDURE — 83735 ASSAY OF MAGNESIUM: CPT | Performed by: INTERNAL MEDICINE

## 2024-06-05 PROCEDURE — 97116 GAIT TRAINING THERAPY: CPT

## 2024-06-05 PROCEDURE — 84100 ASSAY OF PHOSPHORUS: CPT | Performed by: INTERNAL MEDICINE

## 2024-06-05 RX ADMIN — Medication 1 TABLET: at 08:27

## 2024-06-05 RX ADMIN — HYDROXYZINE HYDROCHLORIDE 25 MG: 25 TABLET, FILM COATED ORAL at 20:47

## 2024-06-05 RX ADMIN — METOPROLOL TARTRATE 12.5 MG: 25 TABLET, FILM COATED ORAL at 20:47

## 2024-06-05 RX ADMIN — LIDOCAINE 1 PATCH: 560 PATCH PERCUTANEOUS; TOPICAL; TRANSDERMAL at 08:26

## 2024-06-05 RX ADMIN — BUDESONIDE AND FORMOTEROL FUMARATE DIHYDRATE 2 PUFF: 160; 4.5 AEROSOL RESPIRATORY (INHALATION) at 09:58

## 2024-06-05 RX ADMIN — TIOTROPIUM BROMIDE INHALATION SPRAY 2 PUFF: 3.12 SPRAY, METERED RESPIRATORY (INHALATION) at 09:58

## 2024-06-05 RX ADMIN — BUDESONIDE AND FORMOTEROL FUMARATE DIHYDRATE 2 PUFF: 160; 4.5 AEROSOL RESPIRATORY (INHALATION) at 18:42

## 2024-06-05 RX ADMIN — SENNOSIDES AND DOCUSATE SODIUM 2 TABLET: 50; 8.6 TABLET ORAL at 08:27

## 2024-06-05 RX ADMIN — HYDROCODONE BITARTRATE AND ACETAMINOPHEN 1 TABLET: 10; 325 TABLET ORAL at 20:47

## 2024-06-05 RX ADMIN — Medication 10 ML: at 08:27

## 2024-06-05 RX ADMIN — Medication 400 MG: at 08:27

## 2024-06-05 RX ADMIN — Medication 10 ML: at 20:47

## 2024-06-05 RX ADMIN — CYANOCOBALAMIN TAB 500 MCG 1000 MCG: 500 TAB at 08:27

## 2024-06-05 RX ADMIN — SENNOSIDES AND DOCUSATE SODIUM 2 TABLET: 50; 8.6 TABLET ORAL at 20:47

## 2024-06-05 RX ADMIN — ENOXAPARIN SODIUM 40 MG: 100 INJECTION SUBCUTANEOUS at 18:01

## 2024-06-05 RX ADMIN — Medication 5 MG: at 21:41

## 2024-06-05 RX ADMIN — Medication 100 MG: at 08:27

## 2024-06-05 RX ADMIN — METOPROLOL TARTRATE 12.5 MG: 25 TABLET, FILM COATED ORAL at 12:24

## 2024-06-05 NOTE — THERAPY TREATMENT NOTE
Acute Care - Physical Therapy Treatment Note   [facility]     Patient Name: Oswaldo Bowen  : 1980  MRN: [id number]  Today's Date: 2024      Visit Dx:     ICD-10-CM ICD-9-CM   1. Closed displaced intertrochanteric fracture of right femur, initial encounter  S72.141A 820.21   2. Pulmonary nodule  R91.1 793.11   3. Difficulty walking  R26.2 719.7   4. Dysphagia, oropharyngeal  R13.12 787.22     Patient Active Problem List   Diagnosis    Closed intertrochanteric fracture of right femur    Pulmonary nodule     History reviewed. No pertinent past medical history.  Past Surgical History:   Procedure Laterality Date    BRONCHOSCOPY N/A 2024    Procedure: BRONCHOSCOPY WITH ENDOBRONCHIAL ULTRASOUND, FINE NEEDLE ASPIRATE, BIOPSIES, BRUSHINGS, BRONCHOALVEOLAR LAVAGE;  Surgeon: Kendell Stern MD;  Location: [facility] ENDOSCOPY;  Service: Pulmonary;  Laterality: N/A;  LUNG NODULE, MUCOUS PLUGGING    HIP INTERTROCHANTERIC NAILING Right 2024    Procedure: HIP INTERTROCHANTERIC NAILING;  Surgeon: Molina Clayton MD;  Location: [facility] MAIN OR;  Service: Orthopedics;  Laterality: Right;     PT Assessment (Last 12 Hours)       PT Evaluation and Treatment       Row Name 24 1018          Physical Therapy Time and Intention    Subjective Information complains of;fatigue;pain  -DK     Document Type therapy note (daily note)  -DK     Mode of Treatment individual therapy;physical therapy  -DK     Patient Effort good  -DK     Symptoms Noted During/After Treatment fatigue;increased pain  -DK     Comment Pt reports feeling some better today, not sweating profusely.  -DK       Row Name 24 1018          Pain    Pretreatment Pain Rating 3/10  -DK     Posttreatment Pain Rating 4/10  -DK     Pain Location - Side/Orientation Right  -DK     Pain Location generalized  -DK     Pain Location - back;hip  -DK     Pain Intervention(s) Repositioned;Ambulation/increased activity;Distraction;Therapeutic presence  -DK       Row  Name 06/05/24 1018          Cognition    Affect/Mental Status (Cognition) flat/blunted affect;low arousal/lethargic;confused  -DK     Behavioral Issues (Cognition) overwhelmed easily  -DK     Orientation Status (Cognition) oriented to;person;situation  -DK     Follows Commands (Cognition) physical/tactile prompts required;repetition of directions required;verbal cues/prompting required  -DK     Cognitive Function attention deficit  -DK     Attention Deficit (Cognition) minimal deficit;concentration;focused/sustained attention  -DK     Personal Safety Interventions gait belt;nonskid shoes/slippers when out of bed;supervised activity  -DK       Row Name 06/05/24 1018          Mobility    Extremity Weight-bearing Status right lower extremity  -DK     Right Lower Extremity (Weight-bearing Status) weight-bearing as tolerated (WBAT)  -       Row Name 06/05/24 1018          Bed Mobility    Bed Mobility supine-sit  -DK     Scooting/Bridging Ramsey (Bed Mobility) standby assist  -DK     Supine-Sit Ramsey (Bed Mobility) standby assist;contact guard;1 person assist  -DK     Sit-Supine [place] (Bed Mobility) standby assist;contact guard;1 person assist  -DK     Bed Mobility, Safety Issues decreased use of arms for pushing/pulling;decreased use of legs for bridging/pushing  -DK     Assistive Device (Bed Mobility) bed rails  -       Row Name 06/05/24 1018          Transfers    Transfers sit-stand transfer;stand-sit transfer  -       Row Name 06/05/24 1018          Sit-Stand Transfer    Sit-Stand Ramsey (Transfers) contact guard;1 person assist  -DK     Assistive Device (Sit-Stand Transfers) walker, front-wheeled  -       Row Name 06/05/24 1018          Stand-Sit Transfer    Stand-Sit Ramsey (Transfers) contact guard;1 person assist;standby assist  -DK     Assistive Device (Stand-Sit Transfers) walker, front-wheeled  -       Row Name 06/05/24 1018          Gait/Stairs (Locomotion)     Gait/Stairs Locomotion gait/ambulation independence;gait/ambulation assistive device;distance ambulated;gait pattern  -DK     [place] Level (Gait) contact guard;1 person assist  -DK     Assistive Device (Gait) walker, front-wheeled  -DK     Distance in Feet (Gait) 60  -DK     Pattern (Gait) step-to  -DK     Deviations/Abnormal Patterns (Gait) shannon decreased;festinating/shuffling;gait speed decreased;stride length decreased  -DK     Bilateral Gait Deviations forward flexed posture  -DK     Comment, (Gait/Stairs) Pt ambulated on room air with a rolling walker.  He was left in the recliner on alert post treatment.  -       Row Name 06/05/24 1018          Safety Issues, Functional Mobility    Safety Issues Affecting Function (Mobility) ability to follow commands;awareness of need for assistance;impulsivity;judgment;safety precaution awareness  -DK     Impairments Affecting Function (Mobility) balance;cognition;endurance/activity tolerance;pain;range of motion (ROM);strength  -DK     Cognitive Impairments, Mobility Safety/Performance attention;awareness, need for assistance;impulsivity;judgment;safety precaution awareness  -DK       Row Name 06/05/24 1018          Balance    Balance Assessment sitting static balance;sitting dynamic balance;standing static balance;standing dynamic balance  -DK     Static Sitting Balance standby assist  -DK     Dynamic Sitting Balance standby assist  -DK     Position, Sitting Balance unsupported;sitting in chair;sitting edge of bed  -DK     Static Standing Balance standby assist;contact guard;1-person assist  -DK     Dynamic Standing Balance contact guard;1-person assist  -DK     Position/Device Used, Standing Balance walker, front-wheeled  -DK     Balance Interventions standing;dynamic;tandem gait  -DK       Row Name 06/05/24 1018          Motor Skills    Motor Skills --  therapeutic exercises  -DK     Coordination WFL  -DK     Therapeutic Exercise hip;knee;ankle  -       Row  Name 06/05/24 1018          Hip (Therapeutic Exercise)    Hip (Therapeutic Exercise) AAROM (active assistive range of motion)  -DK     Hip AAROM (Therapeutic Exercise) bilateral;flexion;extension;aBduction;aDduction;supine;10 repetitions;2 sets  -DK       Row Name 06/05/24 1018          Knee (Therapeutic Exercise)    Knee (Therapeutic Exercise) AAROM (active assistive range of motion)  -DK     Knee AAROM (Therapeutic Exercise) bilateral;flexion;extension;supine;10 repetitions;2 sets  -DK       Row Name 06/05/24 1018          Ankle (Therapeutic Exercise)    Ankle (Therapeutic Exercise) AAROM (active assistive range of motion)  -DK     Ankle AAROM (Therapeutic Exercise) dorsiflexion;bilateral;plantarflexion;supine;10 repetitions;2 sets  -DK       Row Name             Wound 05/21/24 0251 Bilateral coccyx    Wound - Properties Group Placement Date: 05/21/24  -SR Placement Time: 0251  -SR Present on Original Admission: Y  -SR Side: Bilateral  -SR Location: coccyx  -SR    Retired Wound - Properties Group Placement Date: 05/21/24  -SR Placement Time: 0251  -SR Present on Original Admission: Y  -SR Side: Bilateral  -SR Location: coccyx  -SR    Retired Wound - Properties Group Date first assessed: 05/21/24  -SR Time first assessed: 0251  -SR Present on Original Admission: Y  -SR Side: Bilateral  -SR Location: coccyx  -SR      Row Name             Wound 05/21/24 Right lateral thigh Incision    Wound - Properties Group Placement Date: 05/21/24  -SF Present on Original Admission: N  -SF Side: Right  -SF Orientation: lateral  -SF Location: thigh  -SF Primary Wound Type: Incision  -SF Additional Comments: incision sites x 3 to lateral right thigh  -SF    Retired Wound - Properties Group Placement Date: 05/21/24  -SF Present on Original Admission: N  -SF Side: Right  -SF Orientation: lateral  -SF Location: thigh  -SF Primary Wound Type: Incision  -SF Additional Comments: incision sites x 3 to lateral right thigh  -SF    Retired  Wound - Properties Group Date first assessed: 05/21/24  -SF Present on Original Admission: N  -SF Side: Right  -SF Location: thigh  -SF Primary Wound Type: Incision  -SF Additional Comments: incision sites x 3 to lateral right thigh  -SF      Row Name 06/05/24 1018          Plan of Care Review    Plan of Care Reviewed With patient  -DK     Progress improving  -DK       Row Name 06/05/24 1018          Positioning and Restraints    Pre-Treatment Position in bed  -DK     Post Treatment Position chair  -DK     In Chair reclined;call light within reach;encouraged to call for assist;exit alarm on;legs elevated;waffle cushion;heels elevated  -DK       Row Name 06/05/24 1018          Therapy Assessment/Plan (PT)    Rehab Potential (PT) good, to achieve stated therapy goals  -DK     Criteria for Skilled Interventions Met (PT) skilled treatment is necessary  -DK     Therapy Frequency (PT) daily  -DK     Problem List (PT) problems related to;balance;cognition;mobility;range of motion (ROM);strength;pain;communication  -DK     Activity Limitations Related to Problem List (PT) unable to ambulate safely;unable to transfer safely  -DK       Row Name 06/05/24 1018          Progress Summary (PT)    Progress Toward Functional Goals (PT) progress toward functional goals is good  -DK               User Key  (r) = Recorded By, (t) = Taken By, (c) = Cosigned By      Initials Name Provider Type    SF Saritha Ramirez, RN Registered Nurse    Sheila Maharaj PTA Physical Therapist Assistant    SR Saritha Long, RN Registered Nurse                    Physical Therapy Education       Title: PT OT SLP Therapies (Done)       Topic: Physical Therapy (Done)       Point: Mobility training (Done)       Learning Progress Summary             Patient Acceptance, E, Bed IU by DS at 6/2/2024 0830    Acceptance, E, VU by PS at 6/1/2024 0627    Acceptance, E,TB, VU by TR at 5/31/2024 1449    Eager, E, VU by AH at 5/23/2024 2223    Acceptance, E,TB,  VU by AV at 5/22/2024 1339                         Point: Home exercise program (Done)       Learning Progress Summary             Patient Acceptance, E, Bed IU by DS at 6/2/2024 0830                         Point: Body mechanics (Done)       Learning Progress Summary             Patient Acceptance, E, Bed IU by DS at 6/2/2024 0830    Acceptance, E, VU by PS at 6/1/2024 0627    Acceptance, E,TB, VU by AV at 5/22/2024 1339                         Point: Precautions (Done)       Learning Progress Summary             Patient Acceptance, E, Bed IU by DS at 6/2/2024 0830    Acceptance, E,TB, VU by TR at 5/31/2024 1449    Acceptance, E,TB, VU by AV at 5/22/2024 1339                                         User Key       Initials Effective Dates Name Provider Type Discipline    PS 06/16/21 -  Darcie Fiore, RN Registered Nurse Nurse    AV 06/11/21 -  Eh Martínez, PT Physical Therapist PT    DS 06/26/23 -  Immanuel Beck, RN Registered Nurse Nurse     05/31/23 -  Angela Casillas, RN Registered Nurse Nurse    TR 05/21/24 -  Aaron Fowler, JERROD Student PT Student PT                  PT Recommendation and Plan  Planned Therapy Interventions (PT): balance training, bed mobility training, gait training, strengthening, transfer training  Therapy Frequency (PT): daily  Progress Summary (PT)  Progress Toward Functional Goals (PT): progress toward functional goals is good  Plan of Care Reviewed With: patient  Progress: improving   Outcome Measures       Row Name 06/05/24 1018 06/04/24 1058 06/03/24 1122       How much help from another person do you currently need...    Turning from your back to your side while in flat bed without using bedrails? 4  -DK 3  -DK 3  -DK    Moving from lying on back to sitting on the side of a flat bed without bedrails? 3  -DK 3  -DK 3  -DK    Moving to and from a bed to a chair (including a wheelchair)? 3  -DK 3  -DK 3  -DK    Standing up from a chair using your arms (e.g.,  wheelchair, bedside chair)? 3  -DK 3  -DK 3  -DK    Climbing 3-5 steps with a railing? 2  -DK 2  -DK 1  -DK    To walk in hospital room? 3  -DK 3  -DK 3  -DK    AM-PAC 6 Clicks Score (PT) 18  -DK 17  -DK 16  -DK    Highest Level of Mobility Goal 6 --> Walk 10 steps or more  -DK 5 --> Static standing  -DK 5 --> Static standing  -DK       Functional Assessment    Outcome Measure Options AM-PAC 6 Clicks Basic Mobility (PT)  -DK AM-PAC 6 Clicks Basic Mobility (PT)  -DK AM-PAC 6 Clicks Basic Mobility (PT)  -DK      Row Name 06/02/24 1116             How much help from another person do you currently need...    Turning from your back to your side while in flat bed without using bedrails? 4  -DK      Moving from lying on back to sitting on the side of a flat bed without bedrails? 3  -DK      Moving to and from a bed to a chair (including a wheelchair)? 3  -DK      Standing up from a chair using your arms (e.g., wheelchair, bedside chair)? 3  -DK      Climbing 3-5 steps with a railing? 2  -DK      To walk in hospital room? 3  -DK      AM-PAC 6 Clicks Score (PT) 18  -DK      Highest Level of Mobility Goal 6 --> Walk 10 steps or more  -DK         Functional Assessment    Outcome Measure Options AM-PAC 6 Clicks Basic Mobility (PT)  -DK                User Key  (r) = Recorded By, (t) = Taken By, (c) = Cosigned By      Initials Name Provider Type    Sheila Maharaj PTA Physical Therapist Assistant                     Time Calculation:    PT Charges       Row Name 06/05/24 1021             Time Calculation    PT Received On 06/05/24  -DK      PT Goal Re-Cert Due Date 06/09/24  -DK         Timed Charges    05858 - PT Therapeutic Exercise Minutes 14  -DK      02175 - Gait Training Minutes  8  -DK      87540 - PT Therapeutic Activity Minutes 8  -DK         Total Minutes    Timed Charges Total Minutes 30  -DK       Total Minutes 30  -DK                User Key  (r) = Recorded By, (t) = Taken By, (c) = Cosigned By      Initials  Name Provider Type    Sheila Maharaj PTA Physical Therapist Assistant                  Therapy Charges for Today       Code Description Service Date Service Provider Modifiers Qty    10112427455 HC PT THER PROC EA 15 MIN 6/4/2024 Sheila Nicole, PTA GP 1    43755375540 HC PT THERAPEUTIC ACT EA 15 MIN 6/4/2024 Sheila Nicole, RADHA GP 1    70595929077 HC PT THER PROC EA 15 MIN 6/5/2024 Sheila Nicole, RADHA GP 1    21653370351 HC GAIT TRAINING EA 15 MIN 6/5/2024 Sheila Nicole, RADHA GP 1            PT G-Codes  Outcome Measure Options: AM-PAC 6 Clicks Basic Mobility (PT)  AM-PAC 6 Clicks Score (PT): 18  AM-PAC 6 Clicks Score (OT): 14    Sheila Nicole PTA  6/5/2024

## 2024-06-05 NOTE — PROGRESS NOTES
[facility]   Hospitalist Progress Note    Date of admission: 5/20/2024  Patient Name: Oswaldo Bowen  1980  Date: 6/5/2024      Subjective     Chief Complaint   Patient presents with   • Fall   • Leg Pain       Interval Followup: Pain reasonable.  No acute symptoms.  Denies fevers or chills or shortness of air    Objective     Vitals:   Temp:  [97.3 °F (36.3 °C)-98.2 °F (36.8 °C)] 97.3 °F (36.3 °C)  Heart Rate:  [111-135] 120  Resp:  [16-20] 18  BP: (108-119)/(63-74) 108/63    Physical Exam  Thin frail in bed no acute distress  Elevated rate regular rhythm  Mild rhonchi no wheezing on room air no respiratory distress  Abdomen soft  Baseline intellectual disability/limited discussion of medical condition, answers basic questions reasonably sometimes requires prompting/redirection      Result Review:  Vital signs, labs and recent relevant imaging reviewed.      CBC          6/3/2024    05:37 6/4/2024    06:35 6/5/2024    06:03   CBC   WBC 13.72  15.87  13.69    RBC 3.17  3.10  3.15    Hemoglobin 8.7  8.5  8.5    Hematocrit 28.2  27.4  28.1    MCV 89.0  88.4  89.2    MCH 27.4  27.4  27.0    MCHC 30.9  31.0  30.2    RDW 13.5  13.4  13.6    Platelets 520  504  475      CMP          6/3/2024    05:37 6/4/2024    06:35 6/5/2024    06:03   CMP   Glucose 102  189  104    BUN 12  13  14    Creatinine 0.47  0.53  0.48    EGFR 131.4  126.7  130.6    Sodium 135  135  132    Potassium 4.1  3.9  4.1    Chloride 97  94  93    Calcium 9.3  9.3  9.3    Total Protein 6.6  6.4     Albumin 2.9  2.9     Globulin 3.7  3.5     Total Bilirubin 0.2  0.2     Alkaline Phosphatase 200  199     AST (SGOT) 33  26     ALT (SGPT) 74  58     Albumin/Globulin Ratio 0.8  0.8     BUN/Creatinine Ratio 25.5  24.5  29.2    Anion Gap 8.5  15.5  10.5        •  acetaminophen  •  aluminum-magnesium hydroxide-simethicone  •  senna-docusate sodium **AND** polyethylene glycol **AND** bisacodyl **AND** bisacodyl  •  calcium carbonate  •  dextrose  •   dextrose  •  Diclofenac Sodium  •  glucagon (human recombinant)  •  HYDROcodone-acetaminophen  •  HYDROcodone-acetaminophen  •  hydrOXYzine  •  levalbuterol  •  Lidocaine  •  melatonin  •  [DISCONTINUED] Morphine **AND** naloxone  •  nicotine  •  ondansetron  •  ondansetron ODT **OR** [DISCONTINUED] ondansetron  •  prochlorperazine  •  sodium chloride  •  sodium chloride    budesonide-formoterol, 2 puff, Inhalation, BID - RT  enoxaparin, 40 mg, Subcutaneous, Q24H  Lidocaine, 1 patch, Transdermal, Q24H  magnesium oxide, 400 mg, Oral, Daily  metoprolol tartrate, 12.5 mg, Oral, Q12H  multivitamin with minerals, 1 tablet, Oral, Daily  senna-docusate sodium, 2 tablet, Oral, BID  sodium chloride, 10 mL, Intravenous, Q12H  thiamine, 100 mg, Oral, Daily  tiotropium bromide monohydrate, 2 puff, Inhalation, Daily - RT  vitamin B-12, 1,000 mcg, Oral, Daily        CT Chest With Contrast Diagnostic    Result Date: 6/4/2024  1. No evidence of pulmonary embolus 2. No evidence of pneumonia 3. 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 malignancy, with bilateral hilar and AP window adenopathy and extensive osteolytic metastatic disease as described Electronically Signed: Sree Hawthorne  6/4/2024 5:17 PM EDT  Workstation ID: [id number]    CT Needle Biopsy Bone Deep    Result Date: 5/30/2024  Impression: 1.  Successful CT-guided 18-gauge fine-needle biopsy of right seventh rib lesion without evidence of complication.           Electronically Signed By-EVAN SORIANO MD On:5/30/2024 11:22 AM       Assessment / Plan     Summary: 44 y.o. with intellectual disability, 1-2PPD smoking, depression, who presented after a fall (possibly 1 month ago but may have been more recently), found to have right femoral fracture and VLAD.  Ortho assisting with initial surgical treatment.  Initial lung mass concerning on chest x-ray additional CT imaging with suspected new metastatic colon cancer with mets to the bone, pulmonology  consulted patient had a biopsy results pending.  Bronchoscopy also with findings of Haemophilus influenzae treatment with antibiotics.  Palliative assisting.  Awaiting pathology results from bone biopsy, this will dictate whether or not patient would go to rehab versus start chemotherapy if this is an aggressive cancer.  Patient is working better with physical therapy and is ambulating with minimal assistance       Assessment/Plan (clinically significant if listed here)  Mechanical fall with acute comminuted displaced intertrochanteric right femoral fracture  S/p 5/21 right hip subtrochanteric nailing of closed displaced right femur fracture by Dr. Nelson  VLAD creatinine 1.7 leukocytosis suspect reactive in setting of acute fracture  Suspected new metastatic lung cancer with mets to the bone  Mediastinal lymphadenopathy  Haemophilus influenzae pneumonia   1 to 2 pack/day smoker, chronic  Intellectual disability  Depression  Normocytic anemia    Afebrile white count improving Pro-Alvaro unremarkable  Add low-dose metoprolol for chronic sinus tachycardia monitor closely  CT PE is negative for pulmonary embolism no acute changes,   Lung biopsy was negative for malignancy, bone marrow biopsy from 5/30 of the right seventh rib still pending -will need to follow-up after rehab with oncology for discussion of further management and intervention  Continue Symbicort, Spiriva, respiratory hygiene as needed inhalers with Xopenex  Continue bowel regimen magnesium  Continue lidocaine patch as needed medications for pain as needed [place]  Prn nrt, pt declines need currently  Cont mvi/b12, thiamine   PT/OT  Continue hospitalization at current level of care    Dispo: Discussed with social work working on rehab placement.  Will need follow-up with oncology for further discussion and treatment after discharge and based on results from biopsy  Discussed plan of care/dispo with clinical .     DVT prophylaxis:  Medical and  mechanical DVT prophylaxis orders are present.    Level Of Support Discussed With: Patient  Code Status (Patient has no pulse and is not breathing): CPR (Attempt to Resuscitate)  Medical Interventions (Patient has pulse or is breathing): Full Support

## 2024-06-05 NOTE — PLAN OF CARE
Goal Outcome Evaluation:              Outcome Evaluation: VSS. Pt had no complaints of pain this shift. No acute changes. New dressing applied to right leg. Continue plan of care.

## 2024-06-06 PROCEDURE — 94799 UNLISTED PULMONARY SVC/PX: CPT

## 2024-06-06 PROCEDURE — 97116 GAIT TRAINING THERAPY: CPT

## 2024-06-06 PROCEDURE — 94664 DEMO&/EVAL PT USE INHALER: CPT

## 2024-06-06 PROCEDURE — 97535 SELF CARE MNGMENT TRAINING: CPT

## 2024-06-06 PROCEDURE — 97110 THERAPEUTIC EXERCISES: CPT

## 2024-06-06 PROCEDURE — 25010000002 ENOXAPARIN PER 10 MG: Performed by: INTERNAL MEDICINE

## 2024-06-06 PROCEDURE — 99232 SBSQ HOSP IP/OBS MODERATE 35: CPT | Performed by: INTERNAL MEDICINE

## 2024-06-06 RX ADMIN — SENNOSIDES AND DOCUSATE SODIUM 2 TABLET: 50; 8.6 TABLET ORAL at 20:48

## 2024-06-06 RX ADMIN — HYDROCODONE BITARTRATE AND ACETAMINOPHEN 1 TABLET: 5; 325 TABLET ORAL at 20:48

## 2024-06-06 RX ADMIN — HYDROCODONE BITARTRATE AND ACETAMINOPHEN 1 TABLET: 10; 325 TABLET ORAL at 04:47

## 2024-06-06 RX ADMIN — METOPROLOL TARTRATE 12.5 MG: 25 TABLET, FILM COATED ORAL at 09:34

## 2024-06-06 RX ADMIN — Medication 10 ML: at 09:35

## 2024-06-06 RX ADMIN — BUDESONIDE AND FORMOTEROL FUMARATE DIHYDRATE 2 PUFF: 160; 4.5 AEROSOL RESPIRATORY (INHALATION) at 09:43

## 2024-06-06 RX ADMIN — SENNOSIDES AND DOCUSATE SODIUM 2 TABLET: 50; 8.6 TABLET ORAL at 09:34

## 2024-06-06 RX ADMIN — Medication 1 TABLET: at 09:34

## 2024-06-06 RX ADMIN — POLYETHYLENE GLYCOL 3350 17 G: 17 POWDER, FOR SOLUTION ORAL at 17:32

## 2024-06-06 RX ADMIN — BUDESONIDE AND FORMOTEROL FUMARATE DIHYDRATE 2 PUFF: 160; 4.5 AEROSOL RESPIRATORY (INHALATION) at 20:38

## 2024-06-06 RX ADMIN — HYDROXYZINE HYDROCHLORIDE 25 MG: 25 TABLET, FILM COATED ORAL at 20:48

## 2024-06-06 RX ADMIN — Medication 400 MG: at 09:34

## 2024-06-06 RX ADMIN — Medication 5 MG: at 20:48

## 2024-06-06 RX ADMIN — ENOXAPARIN SODIUM 40 MG: 100 INJECTION SUBCUTANEOUS at 17:31

## 2024-06-06 RX ADMIN — LIDOCAINE 1 PATCH: 560 PATCH PERCUTANEOUS; TOPICAL; TRANSDERMAL at 09:34

## 2024-06-06 RX ADMIN — Medication 100 MG: at 09:34

## 2024-06-06 RX ADMIN — CYANOCOBALAMIN TAB 500 MCG 1000 MCG: 500 TAB at 09:34

## 2024-06-06 RX ADMIN — TIOTROPIUM BROMIDE INHALATION SPRAY 2 PUFF: 3.12 SPRAY, METERED RESPIRATORY (INHALATION) at 09:43

## 2024-06-06 RX ADMIN — Medication 10 ML: at 20:48

## 2024-06-06 RX ADMIN — METOPROLOL TARTRATE 12.5 MG: 25 TABLET, FILM COATED ORAL at 20:48

## 2024-06-06 NOTE — PLAN OF CARE
Goal Outcome Evaluation:  Plan of Care Reviewed With: patient         Pt is alert and orient.  VS Wnl for pt.  Pt denies any pain/discomfort.  Incision sites to right hip area cleaned.  No s/s of infection.  Well approx.

## 2024-06-06 NOTE — PROGRESS NOTES
[facility]   Hospitalist Progress Note    Date of admission: 5/20/2024  Patient Name: Oswaldo Bowen  1980  Date: 6/6/2024      Subjective     Chief Complaint   Patient presents with    Fall    Leg Pain       Interval Followup: Pain reasonable still about to work with pt. Denies fevers or chills or shortness of air    Objective     Vitals:   Temp:  [97.3 °F (36.3 °C)-98.6 °F (37 °C)] 98.1 °F (36.7 °C)  Heart Rate:  [113-134] 118  Resp:  [18-20] 20  BP: (105-116)/(63-76) 108/72  Flow (L/min):  [0] 0    Physical Exam  Thin frail in bed no acute distress  Elevated rate regular rhythm  Mild rhonchi no wheezing on room air no respiratory distress  Abdomen soft  Baseline intellectual disability/limited discussion of medical condition, answers basic questions reasonably sometimes requires prompting/redirection    Result Review:  Vital signs, labs and recent relevant imaging reviewed.      CBC          6/3/2024    05:37 6/4/2024    06:35 6/5/2024    06:03   CBC   WBC 13.72  15.87  13.69    RBC 3.17  3.10  3.15    Hemoglobin 8.7  8.5  8.5    Hematocrit 28.2  27.4  28.1    MCV 89.0  88.4  89.2    MCH 27.4  27.4  27.0    MCHC 30.9  31.0  30.2    RDW 13.5  13.4  13.6    Platelets 520  504  475      CMP          6/3/2024    05:37 6/4/2024    06:35 6/5/2024    06:03   CMP   Glucose 102  189  104    BUN 12  13  14    Creatinine 0.47  0.53  0.48    EGFR 131.4  126.7  130.6    Sodium 135  135  132    Potassium 4.1  3.9  4.1    Chloride 97  94  93    Calcium 9.3  9.3  9.3    Total Protein 6.6  6.4     Albumin 2.9  2.9     Globulin 3.7  3.5     Total Bilirubin 0.2  0.2     Alkaline Phosphatase 200  199     AST (SGOT) 33  26     ALT (SGPT) 74  58     Albumin/Globulin Ratio 0.8  0.8     BUN/Creatinine Ratio 25.5  24.5  29.2    Anion Gap 8.5  15.5  10.5          acetaminophen    aluminum-magnesium hydroxide-simethicone    senna-docusate sodium **AND** polyethylene glycol **AND** bisacodyl **AND** bisacodyl    calcium carbonate     dextrose    dextrose    Diclofenac Sodium    glucagon (human recombinant)    HYDROcodone-acetaminophen    HYDROcodone-acetaminophen    hydrOXYzine    levalbuterol    Lidocaine    melatonin    [DISCONTINUED] Morphine **AND** naloxone    nicotine    ondansetron    ondansetron ODT **OR** [DISCONTINUED] ondansetron    prochlorperazine    sodium chloride    sodium chloride    budesonide-formoterol, 2 puff, Inhalation, BID - RT  enoxaparin, 40 mg, Subcutaneous, Q24H  Lidocaine, 1 patch, Transdermal, Q24H  magnesium oxide, 400 mg, Oral, Daily  metoprolol tartrate, 12.5 mg, Oral, Q12H  multivitamin with minerals, 1 tablet, Oral, Daily  senna-docusate sodium, 2 tablet, Oral, BID  sodium chloride, 10 mL, Intravenous, Q12H  thiamine, 100 mg, Oral, Daily  tiotropium bromide monohydrate, 2 puff, Inhalation, Daily - RT  vitamin B-12, 1,000 mcg, Oral, Daily        CT Chest With Contrast Diagnostic    Result Date: 6/4/2024  1. No evidence of pulmonary embolus 2. No evidence of pneumonia 3. 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 malignancy, with bilateral hilar and AP window adenopathy and extensive osteolytic metastatic disease as described Electronically Signed: Sree Hawthorne  6/4/2024 5:17 PM EDT  Workstation ID: [id number]    CT Needle Biopsy Bone Deep    Result Date: 5/30/2024  Impression: 1.  Successful CT-guided 18-gauge fine-needle biopsy of right seventh rib lesion without evidence of complication.           Electronically Signed By-EVAN SORIANO MD On:5/30/2024 11:22 AM       Assessment / Plan     Summary: 44 y.o. with intellectual disability, 1-2PPD smoking, depression, who presented after a fall (possibly 1 month ago but may have been more recently), found to have right femoral fracture and VLAD.  Ortho assisting with initial surgical treatment.  Initial lung mass concerning on chest x-ray additional CT imaging with suspected new metastatic colon cancer with mets to the bone, pulmonology consulted  patient had a biopsy results pending.  Bronchoscopy also with findings of Haemophilus influenzae treatment with antibiotics.  Palliative assisting.  Awaiting pathology results from bone biopsy, this will dictate whether or not patient would go to rehab versus start chemotherapy if this is an aggressive cancer.  Patient is working better with physical therapy and is ambulating with minimal assistance       Assessment/Plan (clinically significant if listed here)  Mechanical fall with acute comminuted displaced intertrochanteric right femoral fracture  S/p 5/21 right hip subtrochanteric nailing of closed displaced right femur fracture by Dr. Nelson  VLAD creatinine 1.7 leukocytosis suspect reactive in setting of acute fracture  Suspected new metastatic lung cancer with mets to the bone  Mediastinal lymphadenopathy  Haemophilus influenzae pneumonia   1 to 2 pack/day smoker, chronic  Intellectual disability  Depression  Normocytic anemia    Cont metoprolol for chronic sinus tachycardia, monitor bp   CT PE is negative for pulmonary embolism no acute changes  Lung biopsy was negative for malignancy, bone marrow biopsy from 5/30 of the right seventh rib still pending (was sent to [facility] for excpert eval apparently)   will need to follow-up after rehab with oncology for discussion of further management and intervention  Continue Symbicort, Spiriva, respiratory hygiene as needed inhalers with Xopenex  Continue bowel regimen magnesium  Continue lidocaine patch as needed medications for pain as needed [place]  Prn nrt, pt declines need currently  Cont mvi/b12, thiamine   PT/OT  Continue hospitalization at current level of care    Dispo: Discussed with social work working on rehab placement.  Will need follow-up with oncology for further discussion and treatment after discharge and based on results from biopsy  Discussed plan of care/dispo with clinical .     DVT prophylaxis:  Medical and mechanical DVT prophylaxis orders  are present.    Level Of Support Discussed With: Patient  Code Status (Patient has no pulse and is not breathing): CPR (Attempt to Resuscitate)  Medical Interventions (Patient has pulse or is breathing): Full Support

## 2024-06-06 NOTE — THERAPY TREATMENT NOTE
Acute Care - Physical Therapy Treatment Note   [place]     Patient Name: Oswaldo Bowen  : 1980  MRN: [id number]  Today's Date: 2024      Visit Dx:     ICD-10-CM ICD-9-CM   1. Closed displaced intertrochanteric fracture of right femur, initial encounter  S72.141A 820.21   2. Pulmonary nodule  R91.1 793.11   3. Difficulty walking  R26.2 719.7   4. Dysphagia, oropharyngeal  R13.12 787.22     Patient Active Problem List   Diagnosis    Closed intertrochanteric fracture of right femur    Pulmonary nodule     History reviewed. No pertinent past medical history.  Past Surgical History:   Procedure Laterality Date    BRONCHOSCOPY N/A 2024    Procedure: BRONCHOSCOPY WITH ENDOBRONCHIAL ULTRASOUND, FINE NEEDLE ASPIRATE, BIOPSIES, BRUSHINGS, BRONCHOALVEOLAR LAVAGE;  Surgeon: Kendell Stern MD;  Location: [facility] ENDOSCOPY;  Service: Pulmonary;  Laterality: N/A;  LUNG NODULE, MUCOUS PLUGGING    HIP INTERTROCHANTERIC NAILING Right 2024    Procedure: HIP INTERTROCHANTERIC NAILING;  Surgeon: Molina Clayton MD;  Location: [facility] MAIN OR;  Service: Orthopedics;  Laterality: Right;     PT Assessment (Last 12 Hours)       PT Evaluation and Treatment       Row Name 24 1210          Physical Therapy Time and Intention    Subjective Information complains of;weakness;fatigue;pain  -DK     Document Type therapy note (daily note)  -DK     Mode of Treatment individual therapy;physical therapy  -DK     Patient Effort good  -DK     Symptoms Noted During/After Treatment fatigue  -DK     Comment Pt reports wanting to walk without the walker, but he is still too weak / unsteady currently.  -DK       Row Name 24 1210          Pain    Pretreatment Pain Rating 3/10  -DK     Posttreatment Pain Rating 4/10  -DK     Pain Location - Side/Orientation Right  -DK     Pain Location generalized  -DK     Pain Location - hip;back  -DK     Pain Intervention(s) Repositioned;Ambulation/increased activity;Distraction;Therapeutic  presence  -       Row Name 06/06/24 1210          Cognition    Affect/Mental Status (Cognition) flat/blunted affect;low arousal/lethargic;confused  -DK     Behavioral Issues (Cognition) overwhelmed easily  -DK     Orientation Status (Cognition) oriented to;person;situation  -DK     Follows Commands (Cognition) physical/tactile prompts required;repetition of directions required;verbal cues/prompting required  -DK     Cognitive Function attention deficit  -DK     Attention Deficit (Cognition) minimal deficit;concentration;focused/sustained attention  -DK     Personal Safety Interventions gait belt;nonskid shoes/slippers when out of bed;supervised activity  -       Row Name 06/06/24 1210          Mobility    Extremity Weight-bearing Status right lower extremity  -DK     Right Lower Extremity (Weight-bearing Status) weight-bearing as tolerated (WBAT)  -       Row Name 06/06/24 1210          Bed Mobility    Bed Mobility supine-sit  -DK     Scooting/Bridging [place] (Bed Mobility) standby assist  -     Supine-Sit [place] (Bed Mobility) standby assist;contact guard;1 person assist  -DK     Sit-Supine [place] (Bed Mobility) standby assist;contact guard;1 person assist  -DK     Bed Mobility, Safety Issues decreased use of arms for pushing/pulling;decreased use of legs for bridging/pushing  -DK     Assistive Device (Bed Mobility) bed rails  -       Row Name 06/06/24 1210          Transfers    Transfers sit-stand transfer;stand-sit transfer  -       Row Name 06/06/24 1210          Sit-Stand Transfer    Sit-Stand [place] (Transfers) contact guard;1 person assist  -DK     Assistive Device (Sit-Stand Transfers) walker, front-wheeled  -       Row Name 06/06/24 1210          Stand-Sit Transfer    Stand-Sit [place] (Transfers) contact guard;1 person assist;standby assist  -     Assistive Device (Stand-Sit Transfers) walker, front-wheeled  -       Row Name 06/06/24 1210          Gait/Stairs  (Locomotion)    Gait/Stairs Locomotion gait/ambulation independence;gait/ambulation assistive device;distance ambulated;gait pattern  -DK     [place] Level (Gait) contact guard;1 person assist  -DK     Assistive Device (Gait) walker, front-wheeled  -DK     Distance in Feet (Gait) 50  -DK     Pattern (Gait) step-to  -DK     Deviations/Abnormal Patterns (Gait) shannon decreased;festinating/shuffling;gait speed decreased;stride length decreased  -DK     Bilateral Gait Deviations forward flexed posture  -DK     Comment, (Gait/Stairs) Pt ambulated on room air with a rolling walker.  He was left in the recliner on alert post treatment.  -       Row Name 06/06/24 1210          Safety Issues, Functional Mobility    Safety Issues Affecting Function (Mobility) awareness of need for assistance;impulsivity;ability to follow commands;judgment;safety precaution awareness  -DK     Impairments Affecting Function (Mobility) balance;cognition;endurance/activity tolerance;pain;range of motion (ROM);strength  -DK     Cognitive Impairments, Mobility Safety/Performance attention;impulsivity;judgment;safety precaution awareness  -DK       Row Name 06/06/24 1210          Balance    Balance Assessment sitting static balance;sitting dynamic balance;standing static balance;standing dynamic balance  -DK     Static Sitting Balance standby assist  -DK     Dynamic Sitting Balance standby assist  -DK     Position, Sitting Balance unsupported;sitting in chair  -DK     Static Standing Balance standby assist;contact guard;1-person assist  -DK     Dynamic Standing Balance contact guard;1-person assist  -DK     Position/Device Used, Standing Balance walker, front-wheeled  -DK     Balance Interventions standing;dynamic;tandem gait  -DK       Row Name 06/06/24 1210          Motor Skills    Motor Skills --  therapeutic exercises  -DK     Coordination WFL  -DK     Therapeutic Exercise hip;knee;ankle  -       Row Name 06/06/24 1210          Hip  (Therapeutic Exercise)    Hip (Therapeutic Exercise) AAROM (active assistive range of motion)  -     Hip AAROM (Therapeutic Exercise) bilateral;flexion;extension;aBduction;aDduction;supine;10 repetitions;2 sets  -DK       Row Name 06/06/24 1210          Knee (Therapeutic Exercise)    Knee (Therapeutic Exercise) AAROM (active assistive range of motion)  -     Knee AAROM (Therapeutic Exercise) bilateral;flexion;extension;supine;10 repetitions;2 sets  -DK       Row Name 06/06/24 1210          Ankle (Therapeutic Exercise)    Ankle (Therapeutic Exercise) AAROM (active assistive range of motion)  -     Ankle AAROM (Therapeutic Exercise) bilateral;dorsiflexion;plantarflexion;supine;10 repetitions;2 sets  -DK       Row Name             Wound 05/21/24 0251 Bilateral coccyx    Wound - Properties Group Placement Date: 05/21/24  -SR Placement Time: 0251  -SR Present on Original Admission: Y  -SR Side: Bilateral  -SR Location: coccyx  -SR    Retired Wound - Properties Group Placement Date: 05/21/24  -SR Placement Time: 0251  -SR Present on Original Admission: Y  -SR Side: Bilateral  -SR Location: coccyx  -SR    Retired Wound - Properties Group Date first assessed: 05/21/24  -SR Time first assessed: 0251  -SR Present on Original Admission: Y  -SR Side: Bilateral  -SR Location: coccyx  -SR      Row Name             Wound 05/21/24 Right lateral thigh Incision    Wound - Properties Group Placement Date: 05/21/24  -SF Present on Original Admission: N  -SF Side: Right  -SF Orientation: lateral  -SF Location: thigh  -SF Primary Wound Type: Incision  -SF Additional Comments: incision sites x 3 to lateral right thigh  -SF    Retired Wound - Properties Group Placement Date: 05/21/24  -SF Present on Original Admission: N  -SF Side: Right  -SF Orientation: lateral  -SF Location: thigh  -SF Primary Wound Type: Incision  -SF Additional Comments: incision sites x 3 to lateral right thigh  -SF    Retired Wound - Properties Group Date  first assessed: 05/21/24  -SF Present on Original Admission: N  -SF Side: Right  -SF Location: thigh  -SF Primary Wound Type: Incision  -SF Additional Comments: incision sites x 3 to lateral right thigh  -SF      Row Name 06/06/24 1210          Plan of Care Review    Plan of Care Reviewed With patient  -DK     Progress improving  -DK       Row Name 06/06/24 1210          Positioning and Restraints    Pre-Treatment Position sitting in chair/recliner  -DK     Post Treatment Position chair  -DK     In Chair reclined;call light within reach;encouraged to call for assist;exit alarm on;legs elevated;heels elevated  -DK       Row Name 06/06/24 1210          Therapy Assessment/Plan (PT)    Rehab Potential (PT) good, to achieve stated therapy goals  -DK     Criteria for Skilled Interventions Met (PT) skilled treatment is necessary  -DK     Therapy Frequency (PT) daily  -DK     Problem List (PT) problems related to;balance;cognition;mobility;range of motion (ROM);strength;pain;communication  -DK     Activity Limitations Related to Problem List (PT) unable to ambulate safely;unable to transfer safely  -DK       Row Name 06/06/24 1210          Progress Summary (PT)    Progress Toward Functional Goals (PT) progress toward functional goals is good  -DK               User Key  (r) = Recorded By, (t) = Taken By, (c) = Cosigned By      Initials Name Provider Type    SF Saritha Ramirez, RN Registered Nurse    Sheila Maharaj PTA Physical Therapist Assistant    SR Saritha Long, RN Registered Nurse                    Physical Therapy Education       Title: PT OT SLP Therapies (Done)       Topic: Physical Therapy (Done)       Point: Mobility training (Done)       Learning Progress Summary             Patient Acceptance, E, Bed IU by DS at 6/2/2024 0830    Acceptance, E, VU by PS at 6/1/2024 0627    Acceptance, E,TB, VU by TR at 5/31/2024 1449    Eager, E, VU by AH at 5/23/2024 3233    Acceptance, E,TB, VU by AV at 5/22/2024 3587                          Point: Home exercise program (Done)       Learning Progress Summary             Patient Acceptance, E, Bed IU by DS at 6/2/2024 0830                         Point: Body mechanics (Done)       Learning Progress Summary             Patient Acceptance, E, Bed IU by DS at 6/2/2024 0830    Acceptance, E, VU by PS at 6/1/2024 0627    Acceptance, E,TB, VU by AV at 5/22/2024 1339                         Point: Precautions (Done)       Learning Progress Summary             Patient Acceptance, E, Bed IU by DS at 6/2/2024 0830    Acceptance, E,TB, VU by TR at 5/31/2024 1449    Acceptance, E,TB, VU by AV at 5/22/2024 1339                                         User Key       Initials Effective Dates Name Provider Type Discipline    PS 06/16/21 -  Darcie Fiore, RN Registered Nurse Nurse    AV 06/11/21 -  Eh Martínez, PT Physical Therapist PT    DS 06/26/23 -  Immanuel Beck, RN Registered Nurse Nurse     05/31/23 -  Angela Casillas RN Registered Nurse Nurse    TR 05/21/24 -  Aaron Fowler, JERROD Student PT Student PT                  PT Recommendation and Plan  Planned Therapy Interventions (PT): balance training, bed mobility training, gait training, strengthening, transfer training  Therapy Frequency (PT): daily  Progress Summary (PT)  Progress Toward Functional Goals (PT): progress toward functional goals is good  Plan of Care Reviewed With: patient  Progress: improving   Outcome Measures       Row Name 06/06/24 1210 06/05/24 1018 06/04/24 1058       How much help from another person do you currently need...    Turning from your back to your side while in flat bed without using bedrails? 4  -DK 4  -DK 3  -DK    Moving from lying on back to sitting on the side of a flat bed without bedrails? 3  -DK 3  -DK 3  -DK    Moving to and from a bed to a chair (including a wheelchair)? 3  -DK 3  -DK 3  -DK    Standing up from a chair using your arms (e.g., wheelchair, bedside chair)? 3   -DK 3  -DK 3  -DK    Climbing 3-5 steps with a railing? 2  -DK 2  -DK 2  -DK    To walk in hospital room? 3  -DK 3  -DK 3  -DK    AM-PAC 6 Clicks Score (PT) 18  -DK 18  -DK 17  -DK    Highest Level of Mobility Goal 6 --> Walk 10 steps or more  -DK 6 --> Walk 10 steps or more  -DK 5 --> Static standing  -DK       Functional Assessment    Outcome Measure Options AM-PAC 6 Clicks Basic Mobility (PT)  -DK AM-PAC 6 Clicks Basic Mobility (PT)  -DK AM-PAC 6 Clicks Basic Mobility (PT)  -DK              User Key  (r) = Recorded By, (t) = Taken By, (c) = Cosigned By      Initials Name Provider Type    Sheila Maharaj PTA Physical Therapist Assistant                     Time Calculation:    PT Charges       Row Name 06/06/24 1216             Time Calculation    PT Received On 06/06/24  -DK      PT Goal Re-Cert Due Date 06/09/24  -DK         Timed Charges    09437 - PT Therapeutic Exercise Minutes 14  -DK      46346 - Gait Training Minutes  6  -DK      14778 - PT Therapeutic Activity Minutes 6  -DK         Total Minutes    Timed Charges Total Minutes 26  -DK       Total Minutes 26  -DK                User Key  (r) = Recorded By, (t) = Taken By, (c) = Cosigned By      Initials Name Provider Type    Sheila Maharaj PTA Physical Therapist Assistant                  Therapy Charges for Today       Code Description Service Date Service Provider Modifiers Qty    62068479702 HC PT THER PROC EA 15 MIN 6/5/2024 Sheila Nicole, PTA GP 1    76247887294 HC GAIT TRAINING EA 15 MIN 6/5/2024 Sheila Nicole, RADHA GP 1    24773006974 HC PT THER PROC EA 15 MIN 6/6/2024 Sheila Nicole, PTA GP 1    37829851092 HC GAIT TRAINING EA 15 MIN 6/6/2024 Sheila Nicole, RADHA GP 1            PT G-Codes  Outcome Measure Options: AM-PAC 6 Clicks Basic Mobility (PT)  AM-PAC 6 Clicks Score (PT): 18  AM-PAC 6 Clicks Score (OT): 14    Sheila Nicole PTA  6/6/2024

## 2024-06-06 NOTE — THERAPY RE-EVALUATION
Patient Name: Oswaldo Bowen  : 1980    MRN: [id number]                              Today's Date: 2024       Admit Date: 2024    Visit Dx:     ICD-10-CM ICD-9-CM   1. Closed displaced intertrochanteric fracture of right femur, initial encounter  S72.141A 820.21   2. Pulmonary nodule  R91.1 793.11   3. Difficulty walking  R26.2 719.7   4. Dysphagia, oropharyngeal  R13.12 787.22     Patient Active Problem List   Diagnosis    Closed intertrochanteric fracture of right femur    Pulmonary nodule     History reviewed. No pertinent past medical history.  Past Surgical History:   Procedure Laterality Date    BRONCHOSCOPY N/A 2024    Procedure: BRONCHOSCOPY WITH ENDOBRONCHIAL ULTRASOUND, FINE NEEDLE ASPIRATE, BIOPSIES, BRUSHINGS, BRONCHOALVEOLAR LAVAGE;  Surgeon: Kendell Stern MD;  Location: [facility] ENDOSCOPY;  Service: Pulmonary;  Laterality: N/A;  LUNG NODULE, MUCOUS PLUGGING    HIP INTERTROCHANTERIC NAILING Right 2024    Procedure: HIP INTERTROCHANTERIC NAILING;  Surgeon: Molina Clayton MD;  Location: [facility] MAIN OR;  Service: Orthopedics;  Laterality: Right;      General Information       Row Name 24 1254          OT Time and Intention    Document Type re-evaluation  -PG     Mode of Treatment individual therapy;occupational therapy  -PG               User Key  (r) = Recorded By, (t) = Taken By, (c) = Cosigned By      Initials Name Provider Type    PG Felix Villasenor OT Occupational Therapist                     Mobility/ADL's       Row Name 24 1254          Transfers    Transfers bed-chair transfer  -PG       Row Name 24 1254          Bed-Chair Transfer    Bed-Chair [place] (Transfers) contact guard;minimum assist (75% patient effort);verbal cues  -PG     Assistive Device (Bed-Chair Transfers) walker, front-wheeled  -PG       Row Name 24 1254          Bathing Assessment/Intervention    [place] Level (Bathing) bathing skills;moderate assist (50% patient effort)   -PG       Row Name 06/06/24 1254          Upper Body Dressing Assessment/Training    [place] Level (Upper Body Dressing) upper body dressing skills;set up  -PG       Row Name 06/06/24 1254          Lower Body Dressing Assessment/Training    [place] Level (Lower Body Dressing) lower body dressing skills;maximum assist (25% patient effort)  -PG       Row Name 06/06/24 1254          Grooming Assessment/Training    [place] Level (Grooming) grooming skills;oral care regimen;contact guard assist;verbal cues  -PG       Row Name 06/06/24 1254          Toileting Assessment/Training    [place] Level (Toileting) toileting skills;moderate assist (50% patient effort)  -PG               User Key  (r) = Recorded By, (t) = Taken By, (c) = Cosigned By      Initials Name Provider Type    Felix Taylor OT Occupational Therapist                   Obj/Interventions    No documentation.                  Goals/Plan       Row Name 06/06/24 1256          Transfer Goal 1 (OT)    Progress/Outcome (Transfer Goal 1, OT) continuing progress toward goal  -PG       Row Name 06/06/24 1256          Bathing Goal 1 (OT)    Progress/Outcomes (Bathing Goal 1, OT) continuing progress toward goal  -PG       Row Name 06/06/24 1256          Dressing Goal 1 (OT)    Progress/Outcome (Dressing Goal 1, OT) continuing progress toward goal  -PG       Row Name 06/06/24 1256          Toileting Goal 1 (OT)    Progress/Outcome (Toileting Goal 1, OT) continuing progress toward goal  -PG       Row Name 06/06/24 1256          Grooming Goal 1 (OT)    Progress/Outcome (Grooming Goal 1, OT) continuing progress toward goal  -PG       Row Name 06/06/24 1256          Problem Specific Goal 1 (OT)    Progress/Outcome (Problem Specific Goal 1, OT) continuing progress toward goal  -PG               User Key  (r) = Recorded By, (t) = Taken By, (c) = Cosigned By      Initials Name Provider Type    Felix Taylor, OT Occupational Therapist                    Clinical Impression       Row Name 06/06/24 1255          Plan of Care Review    Plan of Care Reviewed With patient  -PG     Progress improving  -PG     Outcome Evaluation Patient making good progress in occupational therapy.  Patient now able to walk to the sink and complete grooming task in standing with contact-guard assist and rolling walker for safety.  Patient is performing functional transfers with contact-guard/minimal assist and lower body self-care with maximal assistance.  Recommend continued skilled OT services to maximize ADL performance and return patient to his prior level of function  -PG       Row Name 06/06/24 1255          Therapy Plan Review/Discharge Plan (OT)    Anticipated Discharge Disposition (OT) inpatient rehabilitation facility;sub acute care setting  -PG               User Key  (r) = Recorded By, (t) = Taken By, (c) = Cosigned By      Initials Name Provider Type    PG Felix Villasenor, OT Occupational Therapist                   Outcome Measures       Row Name 06/06/24 1257          How much help from another is currently needed...    Putting on and taking off regular lower body clothing? 2  -PG     Bathing (including washing, rinsing, and drying) 3  -PG     Toileting (which includes using toilet bed pan or urinal) 2  -PG     Putting on and taking off regular upper body clothing 3  -PG     Taking care of personal grooming (such as brushing teeth) 3  -PG     Eating meals 4  -PG     AM-PAC 6 Clicks Score (OT) 17  -PG       Row Name 06/06/24 1210 06/06/24 0742       How much help from another person do you currently need...    Turning from your back to your side while in flat bed without using bedrails? 4  -DK 4  -AS    Moving from lying on back to sitting on the side of a flat bed without bedrails? 3  -DK 3  -AS    Moving to and from a bed to a chair (including a wheelchair)? 3  -DK 3  -AS    Standing up from a chair using your arms (e.g., wheelchair, bedside chair)? 3  -DK 3  -AS     Climbing 3-5 steps with a railing? 2  -DK 2  -AS    To walk in hospital room? 3  -DK 3  -AS    AM-PAC 6 Clicks Score (PT) 18  -DK 18  -AS    Highest Level of Mobility Goal 6 --> Walk 10 steps or more  -DK 6 --> Walk 10 steps or more  -AS      Row Name 06/06/24 1257 06/06/24 1210       Functional Assessment    Outcome Measure Options AM-PAC 6 Clicks Daily Activity (OT);Optimal Instrument  -PG AM-PAC 6 Clicks Basic Mobility (PT)  -DK      Row Name 06/06/24 1257          Optimal Instrument    Optimal Instrument Optimal - 3  -PG     Bending/Stooping 3  -PG     Standing 2  -PG     Reaching 1  -PG               User Key  (r) = Recorded By, (t) = Taken By, (c) = Cosigned By      Initials Name Provider Type    Sheila Maharaj, PTA Physical Therapist Assistant    Felix Taylor OT Occupational Therapist    AS Sulma Zimmerman, RN Registered Nurse                    Occupational Therapy Education       Title: PT OT SLP Therapies (Done)       Topic: Occupational Therapy (Done)       Point: ADL training (Done)       Description:   Instruct learner(s) on proper safety adaptation and remediation techniques during self care or transfers.   Instruct in proper use of assistive devices.                  Learning Progress Summary             Patient Acceptance, E, Bed IU by DS at 6/2/2024 0830    TREV Falcon, VU by  at 5/23/2024 2223    Acceptance, E,D, NR by PG at 5/22/2024 0917                         Point: Home exercise program (Done)       Description:   Instruct learner(s) on appropriate technique for monitoring, assisting and/or progressing therapeutic exercises/activities.                  Learning Progress Summary             Patient Acceptance, E, Bed IU by DS at 6/2/2024 0830    Eagraghav, TREV, VU by  at 5/23/2024 2223    Acceptance, E,D, NR by PG at 5/22/2024 0917                         Point: Precautions (Done)       Description:   Instruct learner(s) on prescribed precautions during self-care and functional transfers.                   Learning Progress Summary             Patient Acceptance, E, Bed IU by  at 6/2/2024 0830    Eager, E, VU by  at 5/23/2024 2223    Acceptance, E,D, NR by  at 5/22/2024 0917                         Point: Body mechanics (Done)       Description:   Instruct learner(s) on proper positioning and spine alignment during self-care, functional mobility activities and/or exercises.                  Learning Progress Summary             Patient Acceptance, E, Bed IU by  at 6/2/2024 0830    Eager, E, VU by  at 5/23/2024 2223    Acceptance, E,D, NR by  at 5/22/2024 0917                                         User Key       Initials Effective Dates Name Provider Type Discipline     06/16/21 -  Felix Villasenor OT Occupational Therapist OT     06/26/23 -  Immanuel Beck, RN Registered Nurse Nurse     05/31/23 -  Angela Casillas RN Registered Nurse Nurse                  OT Recommendation and Plan  Planned Therapy Interventions (OT): activity tolerance training, BADL retraining, strengthening exercise, transfer/mobility retraining, occupation/activity based interventions, patient/caregiver education/training  Therapy Frequency (OT): 5 times/wk  Plan of Care Review  Plan of Care Reviewed With: patient  Progress: improving  Outcome Evaluation: Patient making good progress in occupational therapy.  Patient now able to walk to the sink and complete grooming task in standing with contact-guard assist and rolling walker for safety.  Patient is performing functional transfers with contact-guard/minimal assist and lower body self-care with maximal assistance.  Recommend continued skilled OT services to maximize ADL performance and return patient to his prior level of function     Time Calculation:   Evaluation Complexity (OT)  Review Occupational Profile/Medical/Therapy History Complexity: brief/low complexity  Assessment, Occupational Performance/Identification of Deficit Complexity: 3-5 performance  deficits  Clinical Decision Making Complexity (OT): problem focused assessment/low complexity  Overall Complexity of Evaluation (OT): low complexity     Time Calculation- OT       Row Name 06/06/24 1258 06/06/24 1216          Time Calculation- OT    OT Received On 06/06/24  -PG --     OT Goal Re-Cert Due Date 06/15/24  -PG --        Timed Charges    31346 - Gait Training Minutes  -- 6  -DK     43976 - OT Therapeutic Activity Minutes 5  -PG --     33595 - OT Self Care/Mgmt Minutes 10  -PG --        Total Minutes    Timed Charges Total Minutes 15  -PG 6  -DK      Total Minutes 15  -PG 6  -DK               User Key  (r) = Recorded By, (t) = Taken By, (c) = Cosigned By      Initials Name Provider Type    Sheila Maharaj, PTA Physical Therapist Assistant    PG Felix Villasenor OT Occupational Therapist                  Therapy Charges for Today       Code Description Service Date Service Provider Modifiers Qty    88163274206 HC OT SELF CARE/MGMT/TRAIN EA 15 MIN 6/6/2024 Felix Villasenor OT GO 1                 Felix Villasenor OT  6/6/2024

## 2024-06-06 NOTE — PLAN OF CARE
Goal Outcome Evaluation:  Plan of Care Reviewed With: patient        Progress: improving  Outcome Evaluation: Patient resting at this time, no c/o pain or discomfort at this time, pain med given early this shift. patient sister called and wanted update, and wanted to know results of biopsy, patient did have some anxiety, atarax given. Patient alert and oriented, bed alert on, call light within reach.

## 2024-06-07 ENCOUNTER — APPOINTMENT (OUTPATIENT)
Dept: GENERAL RADIOLOGY | Facility: HOSPITAL | Age: 44
DRG: 477 | End: 2024-06-07
Payer: COMMERCIAL

## 2024-06-07 LAB
CYTO UR: NORMAL
LAB AP CASE REPORT: NORMAL
LAB AP CLINICAL INFORMATION: NORMAL
LAB AP DIAGNOSIS COMMENT: NORMAL
LAB AP SPECIAL STAINS: NORMAL
PATH REPORT.FINAL DX SPEC: NORMAL
PATH REPORT.GROSS SPEC: NORMAL

## 2024-06-07 PROCEDURE — 99232 SBSQ HOSP IP/OBS MODERATE 35: CPT | Performed by: INTERNAL MEDICINE

## 2024-06-07 PROCEDURE — 97530 THERAPEUTIC ACTIVITIES: CPT

## 2024-06-07 PROCEDURE — 99233 SBSQ HOSP IP/OBS HIGH 50: CPT | Performed by: INTERNAL MEDICINE

## 2024-06-07 PROCEDURE — 94799 UNLISTED PULMONARY SVC/PX: CPT

## 2024-06-07 PROCEDURE — 97110 THERAPEUTIC EXERCISES: CPT

## 2024-06-07 PROCEDURE — 97116 GAIT TRAINING THERAPY: CPT

## 2024-06-07 PROCEDURE — 25010000002 ENOXAPARIN PER 10 MG: Performed by: INTERNAL MEDICINE

## 2024-06-07 PROCEDURE — 94664 DEMO&/EVAL PT USE INHALER: CPT

## 2024-06-07 PROCEDURE — 73552 X-RAY EXAM OF FEMUR 2/>: CPT

## 2024-06-07 RX ADMIN — HYDROCODONE BITARTRATE AND ACETAMINOPHEN 1 TABLET: 10; 325 TABLET ORAL at 04:34

## 2024-06-07 RX ADMIN — SENNOSIDES AND DOCUSATE SODIUM 2 TABLET: 50; 8.6 TABLET ORAL at 09:01

## 2024-06-07 RX ADMIN — BISACODYL 5 MG: 5 TABLET, COATED ORAL at 09:01

## 2024-06-07 RX ADMIN — LIDOCAINE 1 PATCH: 4 PATCH TOPICAL at 21:23

## 2024-06-07 RX ADMIN — CYANOCOBALAMIN TAB 500 MCG 1000 MCG: 500 TAB at 09:01

## 2024-06-07 RX ADMIN — LIDOCAINE 1 PATCH: 560 PATCH PERCUTANEOUS; TOPICAL; TRANSDERMAL at 21:20

## 2024-06-07 RX ADMIN — Medication 5 MG: at 22:30

## 2024-06-07 RX ADMIN — HYDROXYZINE HYDROCHLORIDE 25 MG: 25 TABLET, FILM COATED ORAL at 18:45

## 2024-06-07 RX ADMIN — METOPROLOL TARTRATE 12.5 MG: 25 TABLET, FILM COATED ORAL at 09:01

## 2024-06-07 RX ADMIN — METOPROLOL TARTRATE 12.5 MG: 25 TABLET, FILM COATED ORAL at 20:24

## 2024-06-07 RX ADMIN — LIDOCAINE 1 PATCH: 4 PATCH TOPICAL at 10:01

## 2024-06-07 RX ADMIN — ACETAMINOPHEN 650 MG: 325 TABLET ORAL at 01:53

## 2024-06-07 RX ADMIN — BUDESONIDE AND FORMOTEROL FUMARATE DIHYDRATE 2 PUFF: 160; 4.5 AEROSOL RESPIRATORY (INHALATION) at 10:03

## 2024-06-07 RX ADMIN — LIDOCAINE 1 PATCH: 560 PATCH PERCUTANEOUS; TOPICAL; TRANSDERMAL at 09:03

## 2024-06-07 RX ADMIN — Medication 10 ML: at 20:24

## 2024-06-07 RX ADMIN — Medication 10 ML: at 09:01

## 2024-06-07 RX ADMIN — SENNOSIDES AND DOCUSATE SODIUM 2 TABLET: 50; 8.6 TABLET ORAL at 20:24

## 2024-06-07 RX ADMIN — TIOTROPIUM BROMIDE INHALATION SPRAY 2 PUFF: 3.12 SPRAY, METERED RESPIRATORY (INHALATION) at 10:03

## 2024-06-07 RX ADMIN — BUDESONIDE AND FORMOTEROL FUMARATE DIHYDRATE 2 PUFF: 160; 4.5 AEROSOL RESPIRATORY (INHALATION) at 20:28

## 2024-06-07 RX ADMIN — Medication 400 MG: at 09:01

## 2024-06-07 RX ADMIN — ENOXAPARIN SODIUM 40 MG: 100 INJECTION SUBCUTANEOUS at 17:41

## 2024-06-07 RX ADMIN — Medication 1 TABLET: at 09:01

## 2024-06-07 NOTE — PROGRESS NOTES
"Nutrition Services    Patient Name: Oswaldo Bowen  YOB: 1980  MRN: [id number]  Admission date: 5/20/2024      CLINICAL NUTRITION ASSESSMENT      Reason for Assessment  Follow up      H&P:  History reviewed. No pertinent past medical history.     Current Problems:   Active Hospital Problems    Diagnosis     **Closed intertrochanteric fracture of right femur     Pulmonary nodule         Nutrition/Diet History         Narrative   Pt alert at time of visit. Currently, pt reports eating 100 of his meals w/ no issues. Pt currently receiving ONS TID and continues to drink. Pt complains of constipation, but per pt nurse pt on a bowel regimen of Miralax and Dulcolax. RD to monitor.      Anthropometrics        Current Height, Weight Height: 182.9 cm (72.01\")  Weight: 65 kg (143 lb 4.8 oz)   Current BMI Body mass index is 19.43 kg/m².   BMI Classification Normal range   % IBW 84%    Adjusted Body Weight (ABW)    Weight Hx  Wt Readings from Last 30 Encounters:   05/21/24 0219 65 kg (143 lb 4.8 oz)   05/20/24 2134 66 kg (145 lb 8.1 oz)          Wt Change Observation None documented prior to this admission.      Estimated/Assessed Needs  Estimated Needs based on: Current Body Weight       Energy Requirements 30-35 kcal/kg    EST Needs (kcal/day) 6581-1754 kcal/day       Protein Requirements 1.2-1.5 g.kg   EST Daily Needs (g/day) 78-98 g/day        Fluid Requirements 1 ml/kcal    Estimated Needs (mL/day) 6918-4184 ml/day      Labs/Medications         Pertinent Labs Reviewed.   Results from last 7 days   Lab Units 06/05/24  0603 06/04/24  0635 06/03/24  0537 06/01/24  0545   SODIUM mmol/L 132* 135* 135* 134*   POTASSIUM mmol/L 4.1 3.9 4.1 4.2   CHLORIDE mmol/L 93* 94* 97* 95*   CO2 mmol/L 28.5 25.5 29.5* 28.9   BUN mg/dL 14 13 12 13   CREATININE mg/dL 0.48* 0.53* 0.47* 0.48*   CALCIUM mg/dL 9.3 9.3 9.3 9.3   BILIRUBIN mg/dL  --  0.2 0.2 0.2   ALK PHOS U/L  --  199* 200* 179*   ALT (SGPT) U/L  --  58* 74* 77*   AST " "(SGOT) U/L  --  26 33 30   GLUCOSE mg/dL 104* 189* 102* 101*     Results from last 7 days   Lab Units 06/05/24  0603 06/04/24  0635 06/03/24  0537   MAGNESIUM mg/dL 1.9 1.9 2.0   PHOSPHORUS mg/dL 3.7 3.9 4.2   HEMOGLOBIN g/dL 8.5* 8.5* 8.7*   HEMATOCRIT % 28.1* 27.4* 28.2*     No results found for: \"COVID19\"  No results found for: \"HGBA1C\"      Pertinent Medications Reviewed.     Malnutrition Severity Assessment              Nutrition Diagnosis         Nutrition Dx Problem 1 Unintentional weight loss related to  decreased intake  as evidenced by  reported recent weight loss of 2-13#'s recently      Nutrition Intervention           Current Nutrition Orders & Evaluation of Intake       Current PO Diet Diet: Regular/House; Texture: Soft to Chew (NDD 3); Soft to Chew: Whole Meat; Fluid Consistency: Thin (IDDSI 0)   Supplement Orders Placed This Encounter      Dietary Nutrition Supplements Boost Plus (Ensure Plus)           Nutrition Intervention/Prescription        Recommend to continue current diet order   Recommend to continue  Boost plus BID (Each supplement contains 360 kcal, 14g protein)         Medical Nutrition Therapy/Nutrition Education          Learner     Readiness N/A Unable to discuss with pt today   N/A     Method     Response N/A  N/A     Monitor/Evaluation        Monitor PO intake, Supplement intake, Skin status, Swallow function     Nutrition Discharge Plan         Recommend to continue oral nutrition supplements on discharge.      Electronically signed by:  Heavenly Agarwal RD  06/07/24 10:24 EDT   "

## 2024-06-07 NOTE — PROGRESS NOTES
" [facility]   Hospitalist Progress Note    Date of admission: 5/20/2024  Patient Name: Oswaldo Bowen  1980  Date: 6/7/2024      Subjective     Chief Complaint   Patient presents with    Fall    Leg Pain       Interval Followup: no aeon.  Pt states \"I released a monster earlier\", when clarifying notes he had a large bm today.  Feels better.  D/w pt biopsy results and need for further testing.    Objective     Vitals:   Temp:  [97.9 °F (36.6 °C)-98.8 °F (37.1 °C)] 97.9 °F (36.6 °C)  Heart Rate:  [103-122] 117  Resp:  [16-18] 16  BP: (108-120)/(70-81) 115/72  Flow (L/min):  [0] 0    Physical Exam  Thin frail in bed no acute distress watching tv  Elevated rate regular rhythm  Ctab no wrr ra  Abdomen soft  Baseline intellectual disability/limited discussion of medical condition, answers basic questions reasonably sometimes requires prompting/redirection    Result Review:  Vital signs, labs and recent relevant imaging reviewed.      CBC          6/3/2024    05:37 6/4/2024    06:35 6/5/2024    06:03   CBC   WBC 13.72  15.87  13.69    RBC 3.17  3.10  3.15    Hemoglobin 8.7  8.5  8.5    Hematocrit 28.2  27.4  28.1    MCV 89.0  88.4  89.2    MCH 27.4  27.4  27.0    MCHC 30.9  31.0  30.2    RDW 13.5  13.4  13.6    Platelets 520  504  475      CMP          6/3/2024    05:37 6/4/2024    06:35 6/5/2024    06:03   CMP   Glucose 102  189  104    BUN 12  13  14    Creatinine 0.47  0.53  0.48    EGFR 131.4  126.7  130.6    Sodium 135  135  132    Potassium 4.1  3.9  4.1    Chloride 97  94  93    Calcium 9.3  9.3  9.3    Total Protein 6.6  6.4     Albumin 2.9  2.9     Globulin 3.7  3.5     Total Bilirubin 0.2  0.2     Alkaline Phosphatase 200  199     AST (SGOT) 33  26     ALT (SGPT) 74  58     Albumin/Globulin Ratio 0.8  0.8     BUN/Creatinine Ratio 25.5  24.5  29.2    Anion Gap 8.5  15.5  10.5          acetaminophen    aluminum-magnesium hydroxide-simethicone    senna-docusate sodium **AND** polyethylene glycol **AND** " bisacodyl **AND** bisacodyl    calcium carbonate    dextrose    dextrose    Diclofenac Sodium    glucagon (human recombinant)    HYDROcodone-acetaminophen    HYDROcodone-acetaminophen    hydrOXYzine    levalbuterol    Lidocaine    melatonin    [DISCONTINUED] Morphine **AND** naloxone    nicotine    ondansetron    ondansetron ODT **OR** [DISCONTINUED] ondansetron    prochlorperazine    sodium chloride    sodium chloride    budesonide-formoterol, 2 puff, Inhalation, BID - RT  enoxaparin, 40 mg, Subcutaneous, Q24H  Lidocaine, 1 patch, Transdermal, Q24H  magnesium oxide, 400 mg, Oral, Daily  metoprolol tartrate, 12.5 mg, Oral, Q12H  multivitamin with minerals, 1 tablet, Oral, Daily  senna-docusate sodium, 2 tablet, Oral, BID  sodium chloride, 10 mL, Intravenous, Q12H  tiotropium bromide monohydrate, 2 puff, Inhalation, Daily - RT  vitamin B-12, 1,000 mcg, Oral, Daily        CT Chest With Contrast Diagnostic    Result Date: 6/4/2024  1. No evidence of pulmonary embolus 2. No evidence of pneumonia 3. 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 malignancy, with bilateral hilar and AP window adenopathy and extensive osteolytic metastatic disease as described Electronically Signed: Sree Hawthorne  6/4/2024 5:17 PM EDT  Workstation ID: [id number]     Assessment / Plan     Summary: 44 y.o. with intellectual disability, 1-2PPD smoking, depression, who presented after a fall (possibly 1 month ago but may have been more recently), found to have right femoral fracture and VLAD.  Ortho assisting with initial surgical treatment.  Initial lung mass concerning on chest x-ray additional CT imaging with suspected new metastatic colon cancer with mets to the bone, pulmonology consulted patient had a biopsy results pending.  Bronchoscopy also with findings of Haemophilus influenzae treatment with antibiotics.  Palliative assisting.  Awaiting pathology results from bone biopsy, this will dictate whether or not patient would go  to rehab versus start chemotherapy if this is an aggressive cancer.  Patient is working better with physical therapy and is ambulating with minimal assistance       Assessment/Plan (clinically significant if listed here)  Mechanical fall with acute comminuted displaced intertrochanteric right femoral fracture  S/p 5/21 right hip subtrochanteric nailing of closed displaced right femur fracture by Dr. Nelson  VLAD creatinine 1.7 leukocytosis suspect reactive in setting of acute fracture  Suspected new metastatic lung cancer with mets to the bone  Mediastinal lymphadenopathy  Haemophilus influenzae pneumonia   1 to 2 pack/day smoker, chronic  Intellectual disability  Depression  Normocytic anemia    D/w oncology and pathologist, rib biopsy - apparently were not able to get a definitive diagnosis from pathology that resulted from [facility] as they ran out of tissue while they are doing stains and has finalized as atypical cells present  Repeat biopsy studies Monday with additional biopsies for great tissue sample given degree of testing required   Cont metoprolol for chronic sinus tachycardia, monitor bp   CT PE is negative for pulmonary embolism no acute changes  Continue Symbicort, Spiriva, respiratory hygiene as needed inhalers with Xopenex  Continue bowel regimen magnesium  Continue lidocaine patch as needed medications for pain as needed [place]  Prn nrt, pt declines need currently  Cont mvi/b12, thiamine   PT/OT  Continue hospitalization at current level of care    Dispo: Discussed with social work working on rehab placement.  Insurance declined, p2p requested, will attempt but also need additional intervention for biopsy prior to discharge regardless  Discussed plan of care/dispo with clinical .     DVT prophylaxis:  Medical and mechanical DVT prophylaxis orders are present.    Level Of Support Discussed With: Patient  Code Status (Patient has no pulse and is not breathing): CPR (Attempt to  Resuscitate)  Medical Interventions (Patient has pulse or is breathing): Full Support    Greater than 50 minutes on this encounter including review of labs, imaging, documentation, orders, vitals, monitoring and adjusting treatment and orders as indicated, discussion with the patient, pathology, oncology, rn, sw, and documenting.

## 2024-06-07 NOTE — THERAPY TREATMENT NOTE
Acute Care - Physical Therapy Treatment Note   [facility]     Patient Name: Oswaldo Bowen  : 1980  MRN: [id number]  Today's Date: 2024      Visit Dx:     ICD-10-CM ICD-9-CM   1. Closed displaced intertrochanteric fracture of right femur, initial encounter  S72.141A 820.21   2. Pulmonary nodule  R91.1 793.11   3. Difficulty walking  R26.2 719.7   4. Dysphagia, oropharyngeal  R13.12 787.22     Patient Active Problem List   Diagnosis    Closed intertrochanteric fracture of right femur    Pulmonary nodule     History reviewed. No pertinent past medical history.  Past Surgical History:   Procedure Laterality Date    BRONCHOSCOPY N/A 2024    Procedure: BRONCHOSCOPY WITH ENDOBRONCHIAL ULTRASOUND, FINE NEEDLE ASPIRATE, BIOPSIES, BRUSHINGS, BRONCHOALVEOLAR LAVAGE;  Surgeon: Kendell Stern MD;  Location: [facility] ENDOSCOPY;  Service: Pulmonary;  Laterality: N/A;  LUNG NODULE, MUCOUS PLUGGING    HIP INTERTROCHANTERIC NAILING Right 2024    Procedure: HIP INTERTROCHANTERIC NAILING;  Surgeon: Molina Clayton MD;  Location: [facility] MAIN OR;  Service: Orthopedics;  Laterality: Right;     PT Assessment (Last 12 Hours)       PT Evaluation and Treatment       Row Name 24 1417          Physical Therapy Time and Intention    Subjective Information no complaints  -VK     Document Type therapy note (daily note)  -VK     Mode of Treatment individual therapy;physical therapy  -VK     Patient Effort good  -VK       Row Name 24 1417          General Information    Patient Profile Reviewed yes  -VK     Existing Precautions/Restrictions fall  -VK       Row Name 24 1417          Cognition    Affect/Mental Status (Cognition) flat/blunted affect;WFL  -VK     Orientation Status (Cognition) oriented to;person;situation  -VK     Follows Commands (Cognition) physical/tactile prompts required;repetition of directions required;verbal cues/prompting required  -VK     Personal Safety Interventions nonskid shoes/slippers  when out of bed;gait belt;fall prevention program maintained  -VK       Row Name 06/07/24 1417          Mobility    Extremity Weight-bearing Status right lower extremity  -VK     Right Lower Extremity (Weight-bearing Status) weight-bearing as tolerated (WBAT)  -VK       Row Name 06/07/24 1417          Bed Mobility    Supine-Sit Grayson (Bed Mobility) contact guard;verbal cues  -VK     Sit-Supine Grayson (Bed Mobility) minimum assist (75% patient effort);verbal cues  -VK     Bed Mobility, Safety Issues decreased use of arms for pushing/pulling;decreased use of legs for bridging/pushing  -VK     Assistive Device (Bed Mobility) bed rails;head of bed elevated  -VK       Row Name 06/07/24 1417          Sit-Stand Transfer    Sit-Stand Grayson (Transfers) contact guard;1 person assist  -VK     Assistive Device (Sit-Stand Transfers) walker, front-wheeled  -VK       Row Name 06/07/24 1417          Stand-Sit Transfer    Stand-Sit Grayson (Transfers) contact guard;1 person assist;standby assist  -VK     Assistive Device (Stand-Sit Transfers) walker, front-wheeled  -VK       Row Name 06/07/24 1417          Gait/Stairs (Locomotion)    Grayson Level (Gait) contact guard;1 person assist;verbal cues  -VK     Assistive Device (Gait) walker, front-wheeled  -VK     Patient was able to Ambulate yes  -VK     Distance in Feet (Gait) --  75ftx2  -VK     Pattern (Gait) step-to  -VK     Deviations/Abnormal Patterns (Gait) shannon decreased;festinating/shuffling;gait speed decreased;stride length decreased  -VK     Bilateral Gait Deviations forward flexed posture;heel strike decreased  -VK     Right Sided Gait Deviations weight shift ability decreased  -VK     Gait Assessment/Intervention Pt is setting goals for walking further tomorrow, [facility]  -VK       Row Name 06/07/24 1417          Safety Issues, Functional Mobility    Impairments Affecting Function (Mobility)  balance;cognition;endurance/activity tolerance;pain;range of motion (ROM);strength  -VK       Row Name 06/07/24 1417          Balance    Static Standing Balance standby assist;verbal cues  -VK     Dynamic Standing Balance contact guard;verbal cues  -VK     Position/Device Used, Standing Balance walker, front-wheeled  -VK       Row Name             Wound 05/21/24 0251 Bilateral coccyx    Wound - Properties Group Placement Date: 05/21/24  -SR Placement Time: 0251 -SR Present on Original Admission: Y  -SR Side: Bilateral  -SR Location: coccyx  -SR    Retired Wound - Properties Group Placement Date: 05/21/24  -SR Placement Time: 0251  -SR Present on Original Admission: Y  -SR Side: Bilateral  -SR Location: coccyx  -SR    Retired Wound - Properties Group Date first assessed: 05/21/24  -SR Time first assessed: 0251  -SR Present on Original Admission: Y  -SR Side: Bilateral  -SR Location: coccyx  -SR      Row Name             Wound 05/21/24 Right lateral thigh Incision    Wound - Properties Group Placement Date: 05/21/24  -SF Present on Original Admission: N  -SF Side: Right  -SF Orientation: lateral  -SF Location: thigh  -SF Primary Wound Type: Incision  -SF Additional Comments: incision sites x 3 to lateral right thigh  -SF    Retired Wound - Properties Group Placement Date: 05/21/24  -SF Present on Original Admission: N  -SF Side: Right  -SF Orientation: lateral  -SF Location: thigh  -SF Primary Wound Type: Incision  -SF Additional Comments: incision sites x 3 to lateral right thigh  -SF    Retired Wound - Properties Group Date first assessed: 05/21/24  -SF Present on Original Admission: N  -SF Side: Right  -SF Location: thigh  -SF Primary Wound Type: Incision  -SF Additional Comments: incision sites x 3 to lateral right thigh  -SF      Row Name 06/07/24 1417          Positioning and Restraints    Pre-Treatment Position in bed  -VK     Post Treatment Position bed  -VK     In Bed fowlers;call light within  reach;encouraged to call for assist;exit alarm on  -VK       Row Name 06/07/24 1417          Progress Summary (PT)    Progress Toward Functional Goals (PT) progress toward functional goals is good  -VK               User Key  (r) = Recorded By, (t) = Taken By, (c) = Cosigned By      Initials Name Provider Type    SF Saritha Ramirez, RN Registered Nurse    Saritha Becker RN Registered Nurse    Shira Blevins PTA Physical Therapist Assistant                    Physical Therapy Education       Title: PT OT SLP Therapies (Done)       Topic: Physical Therapy (Done)       Point: Mobility training (Done)       Learning Progress Summary             Patient Acceptance, E, Bed IU by DS at 6/2/2024 0830    Acceptance, E, VU by PS at 6/1/2024 0627    Acceptance, E,TB, VU by TR at 5/31/2024 1449    Eager, E, VU by AH at 5/23/2024 2223    Acceptance, E,TB, VU by AV at 5/22/2024 1339                         Point: Home exercise program (Done)       Learning Progress Summary             Patient Acceptance, E, Bed IU by DS at 6/2/2024 0830                         Point: Body mechanics (Done)       Learning Progress Summary             Patient Acceptance, E, Bed IU by DS at 6/2/2024 0830    Acceptance, E, VU by PS at 6/1/2024 0627    Acceptance, E,TB, VU by AV at 5/22/2024 1339                         Point: Precautions (Done)       Learning Progress Summary             Patient Acceptance, E, Bed IU by DS at 6/2/2024 0830    Acceptance, E,TB, VU by TR at 5/31/2024 1449    Acceptance, E,TB, VU by AV at 5/22/2024 1339                                         User Key       Initials Effective Dates Name Provider Type Discipline    PS 06/16/21 -  Darcie Fiore, RN Registered Nurse Nurse    AV 06/11/21 -  Eh Martínez, PT Physical Therapist PT    DS 06/26/23 -  Immanuel Beck, GRIS Registered Nurse Nurse    MICHAELA 05/31/23 -  Angela Casillas, RN Registered Nurse Nurse    TR 05/21/24 -  Aaron Fowler, JERROD Student PT  Student PT                  PT Recommendation and Plan     Progress Summary (PT)  Progress Toward Functional Goals (PT): progress toward functional goals is good   Outcome Measures       Row Name 06/07/24 1400 06/06/24 1210 06/05/24 1018       How much help from another person do you currently need...    Turning from your back to your side while in flat bed without using bedrails? 4  -VK 4  -DK 4  -DK    Moving from lying on back to sitting on the side of a flat bed without bedrails? 3  -VK 3  -DK 3  -DK    Moving to and from a bed to a chair (including a wheelchair)? 3  -VK 3  -DK 3  -DK    Standing up from a chair using your arms (e.g., wheelchair, bedside chair)? 3  -VK 3  -DK 3  -DK    Climbing 3-5 steps with a railing? 2  -VK 2  -DK 2  -DK    To walk in hospital room? 3  -VK 3  -DK 3  -DK    AM-PAC 6 Clicks Score (PT) 18  -VK 18  -DK 18  -DK    Highest Level of Mobility Goal 6 --> Walk 10 steps or more  -VK 6 --> Walk 10 steps or more  -DK 6 --> Walk 10 steps or more  -DK       Functional Assessment    Outcome Measure Options -- AM-PAC 6 Clicks Basic Mobility (PT)  -DK AM-PAC 6 Clicks Basic Mobility (PT)  -DK              User Key  (r) = Recorded By, (t) = Taken By, (c) = Cosigned By      Initials Name Provider Type    Sheila Maharaj PTA Physical Therapist Assistant    Shira Blevins PTA Physical Therapist Assistant                     Time Calculation:    PT Charges       Row Name 06/07/24 1455             Time Calculation    PT Received On 06/07/24  -VK         Timed Charges    67257 - Gait Training Minutes  13  -VK      39678 - PT Therapeutic Activity Minutes 10  -VK         Total Minutes    Timed Charges Total Minutes 23  -VK       Total Minutes 23  -VK                User Key  (r) = Recorded By, (t) = Taken By, (c) = Cosigned By      Initials Name Provider Type    Shira Blevins PTA Physical Therapist Assistant                  Therapy Charges for Today       Code Description Service Date  Service Provider Modifiers Qty    67324729902 HC GAIT TRAINING EA 15 MIN 6/7/2024 Shira Carrera, PTA GP 1    60867421798 HC PT THERAPEUTIC ACT EA 15 MIN 6/7/2024 Shira Carrera, RADHA GP 1            PT G-Codes  Outcome Measure Options: AM-PAC 6 Clicks Daily Activity (OT), Optimal Instrument  AM-PAC 6 Clicks Score (PT): 18  AM-PAC 6 Clicks Score (OT): 18    Shira Carrera PTA  6/7/2024

## 2024-06-07 NOTE — THERAPY TREATMENT NOTE
Patient Name: Oswaldo Bowen  : 1980    MRN: [id number]                              Today's Date: 2024       Admit Date: 2024    Visit Dx:     ICD-10-CM ICD-9-CM   1. Closed displaced intertrochanteric fracture of right femur, initial encounter  S72.141A 820.21   2. Pulmonary nodule  R91.1 793.11   3. Difficulty walking  R26.2 719.7   4. Dysphagia, oropharyngeal  R13.12 787.22     Patient Active Problem List   Diagnosis    Closed intertrochanteric fracture of right femur    Pulmonary nodule     History reviewed. No pertinent past medical history.  Past Surgical History:   Procedure Laterality Date    BRONCHOSCOPY N/A 2024    Procedure: BRONCHOSCOPY WITH ENDOBRONCHIAL ULTRASOUND, FINE NEEDLE ASPIRATE, BIOPSIES, BRUSHINGS, BRONCHOALVEOLAR LAVAGE;  Surgeon: Kendell Stern MD;  Location: [facility] ENDOSCOPY;  Service: Pulmonary;  Laterality: N/A;  LUNG NODULE, MUCOUS PLUGGING    HIP INTERTROCHANTERIC NAILING Right 2024    Procedure: HIP INTERTROCHANTERIC NAILING;  Surgeon: Molina Clayton MD;  Location: [facility] MAIN OR;  Service: Orthopedics;  Laterality: Right;      General Information       Row Name 24 1100          OT Time and Intention    Document Type therapy note (daily note)  -PG     Mode of Treatment individual therapy;occupational therapy  -PG               User Key  (r) = Recorded By, (t) = Taken By, (c) = Cosigned By      Initials Name Provider Type    PG Felix Villasenor OT Occupational Therapist                     Mobility/ADL's    No documentation.                  Obj/Interventions       Row Name 24 1100          Shoulder (Therapeutic Exercise)    Shoulder Strengthening (Therapeutic Exercise) green;resistance band;20 repititions  -PG       Row Name 24 1100          Elbow/Forearm (Therapeutic Exercise)    Elbow/Forearm Strengthening (Therapeutic Exercise) green;resistance band;20 repititions  -PG       Row Name 24 1100          Motor Skills    Therapeutic  Exercise shoulder;elbow/forearm  -PG               User Key  (r) = Recorded By, (t) = Taken By, (c) = Cosigned By      Initials Name Provider Type    PG Felix Villasenor OT Occupational Therapist                   Goals/Plan    No documentation.                  Clinical Impression       Row Name 06/07/24 1100          Plan of Care Review    Plan of Care Reviewed With patient  -PG     Progress no change  -PG               User Key  (r) = Recorded By, (t) = Taken By, (c) = Cosigned By      Initials Name Provider Type    PG Felix Villasenor OT Occupational Therapist                   Outcome Measures       Row Name 06/07/24 1101          How much help from another is currently needed...    Putting on and taking off regular lower body clothing? 2  -PG     Bathing (including washing, rinsing, and drying) 3  -PG     Toileting (which includes using toilet bed pan or urinal) 2  -PG     Putting on and taking off regular upper body clothing 3  -PG     Taking care of personal grooming (such as brushing teeth) 4  -PG     Eating meals 4  -PG     AM-PAC 6 Clicks Score (OT) 18  -PG       Row Name 06/07/24 1101          Functional Assessment    Outcome Measure Options AM-PAC 6 Clicks Daily Activity (OT);Optimal Instrument  -PG       Row Name 06/07/24 1101          Optimal Instrument    Optimal Instrument Optimal - 3  -PG     Bending/Stooping 3  -PG     Standing 2  -PG     Reaching 1  -PG               User Key  (r) = Recorded By, (t) = Taken By, (c) = Cosigned By      Initials Name Provider Type    Felix Taylor OT Occupational Therapist                    Occupational Therapy Education       Title: PT OT SLP Therapies (Done)       Topic: Occupational Therapy (Done)       Point: ADL training (Done)       Description:   Instruct learner(s) on proper safety adaptation and remediation techniques during self care or transfers.   Instruct in proper use of assistive devices.                  Learning Progress Summary             Patient  Acceptance, E, Bed IU by DS at 6/2/2024 0830    Eager, E, VU by  at 5/23/2024 2223    Acceptance, E,D, NR by  at 5/22/2024 0917                         Point: Home exercise program (Done)       Description:   Instruct learner(s) on appropriate technique for monitoring, assisting and/or progressing therapeutic exercises/activities.                  Learning Progress Summary             Patient Acceptance, E, Bed IU by DS at 6/2/2024 0830    Eager, E, VU by  at 5/23/2024 2223    Acceptance, E,D, NR by PG at 5/22/2024 0917                         Point: Precautions (Done)       Description:   Instruct learner(s) on prescribed precautions during self-care and functional transfers.                  Learning Progress Summary             Patient Acceptance, E, Bed IU by DS at 6/2/2024 0830    Eager, E, VU by  at 5/23/2024 2223    Acceptance, E,D, NR by PG at 5/22/2024 0917                         Point: Body mechanics (Done)       Description:   Instruct learner(s) on proper positioning and spine alignment during self-care, functional mobility activities and/or exercises.                  Learning Progress Summary             Patient Acceptance, E, Bed IU by DS at 6/2/2024 0830    Eager, E, VU by  at 5/23/2024 2223    Acceptance, E,D, NR by  at 5/22/2024 0917                                         User Key       Initials Effective Dates Name Provider Type Discipline     06/16/21 -  Felix Villasenor OT Occupational Therapist OT     06/26/23 -  Immanuel Beck, GRIS Registered Nurse Nurse     05/31/23 -  Angela Casillas, RN Registered Nurse Nurse                  OT Recommendation and Plan  Planned Therapy Interventions (OT): activity tolerance training, BADL retraining, strengthening exercise, transfer/mobility retraining, occupation/activity based interventions, patient/caregiver education/training  Therapy Frequency (OT): 5 times/wk  Plan of Care Review  Plan of Care Reviewed With: patient  Progress: no  change  Outcome Evaluation: Patient making good progress in occupational therapy.  Patient now able to walk to the sink and complete grooming task in standing with contact-guard assist and rolling walker for safety.  Patient is performing functional transfers with contact-guard/minimal assist and lower body self-care with maximal assistance.  Recommend continued skilled OT services to maximize ADL performance and return patient to his prior level of function     Time Calculation:   Evaluation Complexity (OT)  Review Occupational Profile/Medical/Therapy History Complexity: brief/low complexity  Assessment, Occupational Performance/Identification of Deficit Complexity: 3-5 performance deficits  Clinical Decision Making Complexity (OT): problem focused assessment/low complexity  Overall Complexity of Evaluation (OT): low complexity     Time Calculation- OT       Row Name 06/07/24 1102             Time Calculation- OT    OT Received On 06/07/24  -PG      OT Goal Re-Cert Due Date 06/15/24  -PG         Timed Charges    86629 - OT Therapeutic Exercise Minutes 10  -PG         Total Minutes    Timed Charges Total Minutes 10  -PG       Total Minutes 10  -PG                User Key  (r) = Recorded By, (t) = Taken By, (c) = Cosigned By      Initials Name Provider Type    PG Felix Villasenor OT Occupational Therapist                  Therapy Charges for Today       Code Description Service Date Service Provider Modifiers Qty    03657995922  OT SELF CARE/MGMT/TRAIN EA 15 MIN 6/6/2024 Felix Villasenor OT GO 1    [id number]  OT THER PROC EA 15 MIN 6/7/2024 Felix Villasenor OT GO 1                 Felix Villasenor OT  6/7/2024

## 2024-06-07 NOTE — PROGRESS NOTES
[facility]   Hematology/Oncology  Progress Note    Patient Name: Oswaldo Bowen  : 1980  MRN: [id number]  Primary Care Physician:  Provider, No Known  Date of admission: 2024    Subjective   Subjective     Chief Complaint: fall    HPI:  Patient Reports being tired. No fevers or chills. No acute events overnight. No bleeding. Does not like missing meals. Understands that he needs to have another biopsy as the other biopsies have been inconclusive.     Review of Systems   All systems were reviewed and negative except for: as stated above    Objective   Objective     Vitals:   Temp:  [97.9 °F (36.6 °C)-98.8 °F (37.1 °C)] 97.9 °F (36.6 °C)  Heart Rate:  [103-122] 117  Resp:  [16-18] 16  BP: (108-120)/(70-81) 115/72  Flow (L/min):  [0] 0  Physical Exam    Constitutional: appears older than stated age, slightly cachectic, on RA              Eyes: PERRLA, sclerae anicteric, no conjunctival injection              HENT: NCAT, mucous membranes moist              Respiratory: nonlabored respirations               Cardiovascular: no edema in the extremities              Gastrointestinal: nontender, nondistended              Musculoskeletal: Normal strength and tone, no joint swelling              Psychiatric: Appropriate affect, cooperative              Neurologic: awake and alert, speech dysarthric              Skin: Normal tone, no rashes    Result Review    Result Review:  I have personally reviewed the results from the time of this admission to 2024 16:38 EDT and agree with these findings:  [x]  Laboratory  []  Microbiology  []  Radiology  []  EKG/Telemetry   []  Cardiology/Vascular   [x]  Pathology  []  Old records  []  Other:  Most notable findings include: Hgb stable. WBC elevated. Plts elevated. Pathology from bone biopsy positive for atypical cells.     Assessment & Plan   Assessment / Plan     Brief Patient Summary:  Oswaldo Bowen is a 44 y.o. male with  intellectual disability, 1-2PPD smoking,  depression, who presented after a fall (possibly 1 month ago but may have been more recently), found to have right femoral fracture and VLAD. S/p fixation in OR on 5/21/24. CT chest showed 2.1 cm RUL nodule, bilateral hilar adenopathy and multiple lytic lesions throughout the spine, ribs and scapula consistent with metastatic disease.     Active Hospital Problems:  Active Hospital Problems    Diagnosis     **Closed intertrochanteric fracture of right femur     Pulmonary nodule        Plan:   Metastatic lung cancer, likely  CT chest obtained due to left sided pulmonary nodule on chest x-ray showed 2.1 cm RUL nodule, bilateral hilar adenopathy and multiple lytic lesions throughout the spine, ribs and scapula consistent with metastatic disease.  CT abdomen/pelvis with contrast with multiple bony mets. No visceral mets seen. MRI brain without any intracranial pathology.  Multiple myeloma labs with elevated kappa and lambda FLC but normal ratio, so this is normal. M-spike not observed and no monoclonal protein on MARTINEZ. Therefore patient does not have any signs of multiple myeloma.   Pulm performed biopsy via bronchoscopy 5/23/24, results negative. Bone biopsy performed on 5/30/24 was sent to [facility] and returned as atypical cells and not enough tissue for diagnosis. This was discussed with patient. He most certainly has metastatic cancer but primary site needs to be known for prognosis and treatment recommendations. For this reason, recommend repeat bone biopsy with multiple cores so that plenty of tissue is present for testing. Plan for NGS testing from biopsy.    Recommend follow up with me in clinic after rehab.      Anemia  Worse after surgery likely related to blood loss. With elevated ferritin, low iron sat and low TIBC, labs consistent with anemia of chronic disease. Recommend to trend daily. Hgb has remained stable.     Right femoral fracture  After fall. S/p right subtrochanteric nailing 5/21/24. Ortho  following, appreciate assistance. Plan for bone modifying agent as outpatient due to metastatic cancer to bone. Recommend pain medication provided on discharge.    Electronically signed by Sukhwinder Johnson MD, 05/24/24, 4:10 PM EDT.

## 2024-06-07 NOTE — PLAN OF CARE
Goal Outcome Evaluation:  Plan of Care Reviewed With: patient        Progress: improving  Outcome Evaluation: Patient resting, pt alert and oriented, pain med given this shift, dressing to right leg, C/D/I, call light within reach.

## 2024-06-07 NOTE — PLAN OF CARE
Goal Outcome Evaluation:  Plan of Care Reviewed With: patient               Pt is alert and orient x4.  VS Wnl for pt.  Pt had large bowel movement this shift.  Pt denies any pain/discomfort.

## 2024-06-08 PROCEDURE — 94760 N-INVAS EAR/PLS OXIMETRY 1: CPT

## 2024-06-08 PROCEDURE — 97116 GAIT TRAINING THERAPY: CPT

## 2024-06-08 PROCEDURE — 99232 SBSQ HOSP IP/OBS MODERATE 35: CPT | Performed by: INTERNAL MEDICINE

## 2024-06-08 PROCEDURE — 94799 UNLISTED PULMONARY SVC/PX: CPT

## 2024-06-08 PROCEDURE — 25010000002 ENOXAPARIN PER 10 MG: Performed by: INTERNAL MEDICINE

## 2024-06-08 PROCEDURE — 94664 DEMO&/EVAL PT USE INHALER: CPT

## 2024-06-08 PROCEDURE — 97530 THERAPEUTIC ACTIVITIES: CPT

## 2024-06-08 RX ADMIN — HYDROXYZINE HYDROCHLORIDE 25 MG: 25 TABLET, FILM COATED ORAL at 19:18

## 2024-06-08 RX ADMIN — Medication 10 ML: at 09:00

## 2024-06-08 RX ADMIN — TIOTROPIUM BROMIDE INHALATION SPRAY 2 PUFF: 3.12 SPRAY, METERED RESPIRATORY (INHALATION) at 09:50

## 2024-06-08 RX ADMIN — ALUMINUM HYDROXIDE, MAGNESIUM HYDROXIDE, AND DIMETHICONE 15 ML: 400; 400; 40 SUSPENSION ORAL at 07:31

## 2024-06-08 RX ADMIN — SENNOSIDES AND DOCUSATE SODIUM 2 TABLET: 50; 8.6 TABLET ORAL at 09:01

## 2024-06-08 RX ADMIN — HYDROCODONE BITARTRATE AND ACETAMINOPHEN 1 TABLET: 5; 325 TABLET ORAL at 07:25

## 2024-06-08 RX ADMIN — SENNOSIDES AND DOCUSATE SODIUM 2 TABLET: 50; 8.6 TABLET ORAL at 21:19

## 2024-06-08 RX ADMIN — ENOXAPARIN SODIUM 40 MG: 100 INJECTION SUBCUTANEOUS at 17:30

## 2024-06-08 RX ADMIN — CYANOCOBALAMIN TAB 500 MCG 1000 MCG: 500 TAB at 09:01

## 2024-06-08 RX ADMIN — Medication 1 TABLET: at 09:01

## 2024-06-08 RX ADMIN — BUDESONIDE AND FORMOTEROL FUMARATE DIHYDRATE 2 PUFF: 160; 4.5 AEROSOL RESPIRATORY (INHALATION) at 09:50

## 2024-06-08 RX ADMIN — METOPROLOL TARTRATE 12.5 MG: 25 TABLET, FILM COATED ORAL at 09:01

## 2024-06-08 RX ADMIN — HYDROCODONE BITARTRATE AND ACETAMINOPHEN 1 TABLET: 10; 325 TABLET ORAL at 23:39

## 2024-06-08 RX ADMIN — LIDOCAINE 1 PATCH: 560 PATCH PERCUTANEOUS; TOPICAL; TRANSDERMAL at 09:01

## 2024-06-08 RX ADMIN — METOPROLOL TARTRATE 12.5 MG: 25 TABLET, FILM COATED ORAL at 21:19

## 2024-06-08 RX ADMIN — Medication 400 MG: at 09:01

## 2024-06-08 RX ADMIN — BUDESONIDE AND FORMOTEROL FUMARATE DIHYDRATE 2 PUFF: 160; 4.5 AEROSOL RESPIRATORY (INHALATION) at 19:04

## 2024-06-08 RX ADMIN — Medication 10 ML: at 21:19

## 2024-06-08 RX ADMIN — HYDROCODONE BITARTRATE AND ACETAMINOPHEN 1 TABLET: 5; 325 TABLET ORAL at 17:34

## 2024-06-08 NOTE — PLAN OF CARE
Goal Outcome Evaluation:           Progress: no change  Outcome Evaluation: Treated for pain in AM. Treated for upset stomach in AM. no other complaints

## 2024-06-08 NOTE — THERAPY TREATMENT NOTE
Acute Care - Physical Therapy Treatment Note   [place]     Patient Name: Oswaldo Bowen  : 1980  MRN: [id number]  Today's Date: 2024      Visit Dx:     ICD-10-CM ICD-9-CM   1. Closed displaced intertrochanteric fracture of right femur, initial encounter  S72.141A 820.21   2. Pulmonary nodule  R91.1 793.11   3. Difficulty walking  R26.2 719.7   4. Dysphagia, oropharyngeal  R13.12 787.22     Patient Active Problem List   Diagnosis    Closed intertrochanteric fracture of right femur    Pulmonary nodule     History reviewed. No pertinent past medical history.  Past Surgical History:   Procedure Laterality Date    BRONCHOSCOPY N/A 2024    Procedure: BRONCHOSCOPY WITH ENDOBRONCHIAL ULTRASOUND, FINE NEEDLE ASPIRATE, BIOPSIES, BRUSHINGS, BRONCHOALVEOLAR LAVAGE;  Surgeon: Kendell Stern MD;  Location: [facility] ENDOSCOPY;  Service: Pulmonary;  Laterality: N/A;  LUNG NODULE, MUCOUS PLUGGING    HIP INTERTROCHANTERIC NAILING Right 2024    Procedure: HIP INTERTROCHANTERIC NAILING;  Surgeon: Molina Clayton MD;  Location: [facility] MAIN OR;  Service: Orthopedics;  Laterality: Right;     PT Assessment (Last 12 Hours)       PT Evaluation and Treatment       Row Name 24 1122          Physical Therapy Time and Intention    Document Type therapy note (daily note)  -WM     Mode of Treatment individual therapy;physical therapy  -WM     Patient Effort good  -WM     Symptoms Noted During/After Treatment fatigue  -WM       Row Name 24 1122          Bed Mobility    Supine-Sit [place] (Bed Mobility) minimum assist (75% patient effort);verbal cues  -WM     Sit-Supine [place] (Bed Mobility) minimum assist (75% patient effort);verbal cues  -WM     Bed Mobility, Safety Issues decreased use of legs for bridging/pushing  -WM     Assistive Device (Bed Mobility) bed rails;draw sheet;head of bed elevated  -WM       Row Name 24 1122          Sit-Stand Transfer    Sit-Stand [place] (Transfers) minimum  assist (75% patient effort);verbal cues  -WM     Assistive Device (Sit-Stand Transfers) walker, front-wheeled  -WM       Row Name 06/08/24 1122          Stand-Sit Transfer    Stand-Sit [place] (Transfers) minimum assist (75% patient effort);verbal cues  -WM     Assistive Device (Stand-Sit Transfers) walker, front-wheeled  -WM       Row Name 06/08/24 1122          Gait/Stairs (Locomotion)    [place] Level (Gait) contact guard;standby assist  -WM     Assistive Device (Gait) walker, front-wheeled  -WM     Distance in Feet (Gait) 200  -WM     Pattern (Gait) 4-point;step-through  -WM     Deviations/Abnormal Patterns (Gait) gait speed decreased;stride length decreased  -WM       Row Name 06/08/24 1122          Safety Issues, Functional Mobility    Impairments Affecting Function (Mobility) balance;endurance/activity tolerance;strength  -WM       Row Name             Wound 05/21/24 0251 Bilateral coccyx    Wound - Properties Group Placement Date: 05/21/24  -SR Placement Time: 0251  -SR Present on Original Admission: Y  -SR Side: Bilateral  -SR Location: coccyx  -SR    Retired Wound - Properties Group Placement Date: 05/21/24  -SR Placement Time: 0251  -SR Present on Original Admission: Y  -SR Side: Bilateral  -SR Location: coccyx  -SR    Retired Wound - Properties Group Date first assessed: 05/21/24  -SR Time first assessed: 0251  -SR Present on Original Admission: Y  -SR Side: Bilateral  -SR Location: coccyx  -SR      Row Name             Wound 05/21/24 Right lateral thigh Incision    Wound - Properties Group Placement Date: 05/21/24  -SF Present on Original Admission: N  -SF Side: Right  -SF Orientation: lateral  -SF Location: thigh  -SF Primary Wound Type: Incision  -SF Additional Comments: incision sites x 3 to lateral right thigh  -SF    Retired Wound - Properties Group Placement Date: 05/21/24  -SF Present on Original Admission: N  -SF Side: Right  -SF Orientation: lateral  -SF Location: thigh  -SF Primary  Wound Type: Incision  -SF Additional Comments: incision sites x 3 to lateral right thigh  -SF    Retired Wound - Properties Group Date first assessed: 05/21/24  -SF Present on Original Admission: N  -SF Side: Right  -SF Location: thigh  -SF Primary Wound Type: Incision  -SF Additional Comments: incision sites x 3 to lateral right thigh  -SF      Row Name 06/08/24 1122          Positioning and Restraints    Pre-Treatment Position in bed  -WM     Post Treatment Position bed  -WM     In Bed fowlers;call light within reach;encouraged to call for assist;exit alarm on  -WM       Row Name 06/08/24 1122          Progress Summary (PT)    Progress Toward Functional Goals (PT) progress toward functional goals is good  -WM               User Key  (r) = Recorded By, (t) = Taken By, (c) = Cosigned By      Initials Name Provider Type    SF Saritha Ramirez, RN Registered Nurse    Vicente Jones PTA Physical Therapist Assistant    SR Saritha Long RN Registered Nurse                    Physical Therapy Education       Title: PT OT SLP Therapies (Done)       Topic: Physical Therapy (Done)       Point: Mobility training (Done)       Learning Progress Summary             Patient Acceptance, E, Bed IU by DS at 6/2/2024 0830    Acceptance, E, VU by PS at 6/1/2024 0627    Acceptance, E,TB, VU by TR at 5/31/2024 1449    Eager, E, VU by AH at 5/23/2024 2223    Acceptance, E,TB, VU by AV at 5/22/2024 1339                         Point: Home exercise program (Done)       Learning Progress Summary             Patient Acceptance, E, Bed IU by DS at 6/2/2024 0830                         Point: Body mechanics (Done)       Learning Progress Summary             Patient Acceptance, E, Bed IU by DS at 6/2/2024 0830    Acceptance, E, VU by PS at 6/1/2024 0627    Acceptance, E,TB, VU by AV at 5/22/2024 1339                         Point: Precautions (Done)       Learning Progress Summary             Patient Acceptance, E, Bed IU by DS at  6/2/2024 0830    Acceptance, E,TB, VU by TR at 5/31/2024 1449    Acceptance, E,TB, VU by AV at 5/22/2024 1339                                         User Key       Initials Effective Dates Name Provider Type Discipline    PS 06/16/21 -  Darcie Fiore, RN Registered Nurse Nurse    AV 06/11/21 -  Eh Martínez, PT Physical Therapist PT    DS 06/26/23 -  Immanuel Beck, RN Registered Nurse Nurse     05/31/23 -  Angela Casillas, RN Registered Nurse Nurse    TR 05/21/24 -  Aaron Fowler, JERROD Student PT Student PT                  PT Recommendation and Plan     Progress Summary (PT)  Progress Toward Functional Goals (PT): progress toward functional goals is good   Outcome Measures       Row Name 06/08/24 1125 06/07/24 1400 06/06/24 1210       How much help from another person do you currently need...    Turning from your back to your side while in flat bed without using bedrails? 4  -WM 4  -VK 4  -DK    Moving from lying on back to sitting on the side of a flat bed without bedrails? 3  -WM 3  -VK 3  -DK    Moving to and from a bed to a chair (including a wheelchair)? 3  -WM 3  -VK 3  -DK    Standing up from a chair using your arms (e.g., wheelchair, bedside chair)? 3  -WM 3  -VK 3  -DK    Climbing 3-5 steps with a railing? 2  -WM 2  -VK 2  -DK    To walk in hospital room? 3  -WM 3  -VK 3  -DK    AM-PAC 6 Clicks Score (PT) 18  -WM 18  -VK 18  -DK    Highest Level of Mobility Goal 6 --> Walk 10 steps or more  -WM 6 --> Walk 10 steps or more  -VK 6 --> Walk 10 steps or more  -DK       Functional Assessment    Outcome Measure Options -- -- AM-PAC 6 Clicks Basic Mobility (PT)  -DK              User Key  (r) = Recorded By, (t) = Taken By, (c) = Cosigned By      Initials Name Provider Type     Vicente Taylor, PTA Physical Therapist Assistant    Sheila Maharaj, PTA Physical Therapist Assistant    Shira Blevins PTA Physical Therapist Assistant                     Time Calculation:    PT Charges        Row Name 06/08/24 1121             Time Calculation    PT Received On 06/08/24  -WM         Timed Charges    67868 - Gait Training Minutes  10  -WM      53724 - PT Therapeutic Activity Minutes 14  -WM         Total Minutes    Timed Charges Total Minutes 24  -WM       Total Minutes 24  -WM                User Key  (r) = Recorded By, (t) = Taken By, (c) = Cosigned By      Initials Name Provider Type    Vicente Jones PTA Physical Therapist Assistant                      PT G-Codes  Outcome Measure Options: AM-PAC 6 Clicks Daily Activity (OT), Optimal Instrument  AM-PAC 6 Clicks Score (PT): 18  AM-PAC 6 Clicks Score (OT): 18    Vicente Taylor PTA  6/8/2024

## 2024-06-08 NOTE — PLAN OF CARE
Goal Outcome Evaluation:  Plan of Care Reviewed With: patient        Progress: no change  Outcome Evaluation: Pt difficulty sleeping tonight even after sleeping pill given. No complaints of pain. alert and oriented.

## 2024-06-08 NOTE — PROGRESS NOTES
[facility]   Hospitalist Progress Note    Date of admission: 5/20/2024  Patient Name: Oswaldo Bowen  1980  Date: 6/8/2024      Subjective     Chief Complaint   Patient presents with   • Fall   • Leg Pain       Interval Followup: had some trouble sleeping last night. No acute complaints or issues otherwise today    Objective     Vitals:   Temp:  [97.8 °F (36.6 °C)-98.8 °F (37.1 °C)] 98.8 °F (37.1 °C)  Heart Rate:  [104-122] 114  Resp:  [16] 16  BP: (108-126)/(64-75) 116/73    Physical Exam  Thin frail in bed no acute distress watching tv   Elevated rate regular rhythm, no le pitting edema  Ctab no wrr ra  Answers basic questions ok, poor baseline insight    Result Review:  Vital signs, labs and recent relevant imaging reviewed.      CBC          6/3/2024    05:37 6/4/2024    06:35 6/5/2024    06:03   CBC   WBC 13.72  15.87  13.69    RBC 3.17  3.10  3.15    Hemoglobin 8.7  8.5  8.5    Hematocrit 28.2  27.4  28.1    MCV 89.0  88.4  89.2    MCH 27.4  27.4  27.0    MCHC 30.9  31.0  30.2    RDW 13.5  13.4  13.6    Platelets 520  504  475      CMP          6/3/2024    05:37 6/4/2024    06:35 6/5/2024    06:03   CMP   Glucose 102  189  104    BUN 12  13  14    Creatinine 0.47  0.53  0.48    EGFR 131.4  126.7  130.6    Sodium 135  135  132    Potassium 4.1  3.9  4.1    Chloride 97  94  93    Calcium 9.3  9.3  9.3    Total Protein 6.6  6.4     Albumin 2.9  2.9     Globulin 3.7  3.5     Total Bilirubin 0.2  0.2     Alkaline Phosphatase 200  199     AST (SGOT) 33  26     ALT (SGPT) 74  58     Albumin/Globulin Ratio 0.8  0.8     BUN/Creatinine Ratio 25.5  24.5  29.2    Anion Gap 8.5  15.5  10.5        •  acetaminophen  •  aluminum-magnesium hydroxide-simethicone  •  senna-docusate sodium **AND** polyethylene glycol **AND** bisacodyl **AND** bisacodyl  •  calcium carbonate  •  dextrose  •  dextrose  •  Diclofenac Sodium  •  glucagon (human recombinant)  •  HYDROcodone-acetaminophen  •  HYDROcodone-acetaminophen  •   hydrOXYzine  •  levalbuterol  •  Lidocaine  •  melatonin  •  [DISCONTINUED] Morphine **AND** naloxone  •  nicotine  •  ondansetron  •  ondansetron ODT **OR** [DISCONTINUED] ondansetron  •  prochlorperazine  •  sodium chloride  •  sodium chloride    budesonide-formoterol, 2 puff, Inhalation, BID - RT  enoxaparin, 40 mg, Subcutaneous, Q24H  Lidocaine, 1 patch, Transdermal, Q24H  magnesium oxide, 400 mg, Oral, Daily  metoprolol tartrate, 12.5 mg, Oral, Q12H  multivitamin with minerals, 1 tablet, Oral, Daily  senna-docusate sodium, 2 tablet, Oral, BID  sodium chloride, 10 mL, Intravenous, Q12H  tiotropium bromide monohydrate, 2 puff, Inhalation, Daily - RT  vitamin B-12, 1,000 mcg, Oral, Daily        XR Femur 2 View Right    Result Date: 6/7/2024  No hardware complications following ORIF of the proximal femur fracture. No hip dislocation. Fracture line is still visible.  Electronically Signed By-Dr. Galen Dailey MD On:6/7/2024 9:59 PM      CT Chest With Contrast Diagnostic    Result Date: 6/4/2024  1. No evidence of pulmonary embolus 2. No evidence of pneumonia 3. 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 malignancy, with bilateral hilar and AP window adenopathy and extensive osteolytic metastatic disease as described Electronically Signed: Sree Hawthorne  6/4/2024 5:17 PM EDT  Workstation ID: [id number]     Assessment / Plan     Summary: 44 y.o. with intellectual disability, 1-2PPD smoking, depression, who presented after a fall (possibly 1 month ago but may have been more recently), found to have right femoral fracture and VLAD.  Ortho assisting with initial surgical treatment.  Initial lung mass concerning on chest x-ray additional CT imaging with suspected new metastatic colon cancer with mets to the bone, pulmonology consulted patient had a biopsy results pending.  Bronchoscopy also with findings of Haemophilus influenzae treatment with antibiotics.  Palliative assisting.  Awaiting pathology  results from bone biopsy, this will dictate whether or not patient would go to rehab versus start chemotherapy if this is an aggressive cancer.  Patient is working better with physical therapy and is ambulating with minimal assistance       Assessment/Plan (clinically significant if listed here)  Mechanical fall with acute comminuted displaced intertrochanteric right femoral fracture  S/p 5/21 right hip subtrochanteric nailing of closed displaced right femur fracture by Dr. Nelson  VLAD creatinine 1.7 leukocytosis suspect reactive in setting of acute fracture  Suspected new metastatic lung cancer with mets to the bone  Mediastinal lymphadenopathy  Haemophilus influenzae pneumonia   1 to 2 pack/day smoker, chronic  Intellectual disability  Depression  Normocytic anemia    D/w oncology and pathologist, rib biopsy - apparently were not able to get a definitive diagnosis from pathology that resulted from [facility] as they ran out of tissue while they are doing stains and has finalized as atypical cells present  Repeat biopsy studies Monday with additional biopsies for great tissue sample given degree of testing required   Cont metoprolol for chronic sinus tachycardia, monitor bp - reasonable  CT PE is negative for pulmonary embolism no acute changes  Continue Symbicort, Spiriva, respiratory hygiene as needed inhalers with Xopenex  Continue bowel regimen, monitor with pain medications  Continue lidocaine patch as needed medications for pain as needed [place]  Prn nrt, pt declines need currently  Cont mvi/b12, thiamine   PT/OT  Continue hospitalization at current level of care    Dispo: Discussed with social work working on rehab placement.  Insurance declined, p2p requested, will attempt but also need additional intervention for biopsy prior to discharge regardless  Discussed plan of care/dispo with clinical .     DVT prophylaxis:  Medical and mechanical DVT prophylaxis orders are present.    Level  Of Support Discussed With: Patient  Code Status (Patient has no pulse and is not breathing): CPR (Attempt to Resuscitate)  Medical Interventions (Patient has pulse or is breathing): Full Support

## 2024-06-09 PROCEDURE — 25010000002 ENOXAPARIN PER 10 MG: Performed by: INTERNAL MEDICINE

## 2024-06-09 PROCEDURE — 94799 UNLISTED PULMONARY SVC/PX: CPT

## 2024-06-09 PROCEDURE — 99232 SBSQ HOSP IP/OBS MODERATE 35: CPT | Performed by: INTERNAL MEDICINE

## 2024-06-09 RX ORDER — HYDROCODONE BITARTRATE AND ACETAMINOPHEN 10; 325 MG/1; MG/1
1 TABLET ORAL EVERY 6 HOURS PRN
Status: DISCONTINUED | OUTPATIENT
Start: 2024-06-09 | End: 2024-06-23 | Stop reason: HOSPADM

## 2024-06-09 RX ORDER — HYDROCODONE BITARTRATE AND ACETAMINOPHEN 5; 325 MG/1; MG/1
1 TABLET ORAL EVERY 6 HOURS PRN
Status: DISCONTINUED | OUTPATIENT
Start: 2024-06-09 | End: 2024-06-23 | Stop reason: HOSPADM

## 2024-06-09 RX ADMIN — Medication 10 ML: at 08:38

## 2024-06-09 RX ADMIN — ENOXAPARIN SODIUM 40 MG: 100 INJECTION SUBCUTANEOUS at 17:23

## 2024-06-09 RX ADMIN — Medication 1 TABLET: at 08:38

## 2024-06-09 RX ADMIN — HYDROCODONE BITARTRATE AND ACETAMINOPHEN 1 TABLET: 10; 325 TABLET ORAL at 06:31

## 2024-06-09 RX ADMIN — BISACODYL 10 MG: 10 SUPPOSITORY RECTAL at 19:56

## 2024-06-09 RX ADMIN — SENNOSIDES AND DOCUSATE SODIUM 2 TABLET: 50; 8.6 TABLET ORAL at 08:38

## 2024-06-09 RX ADMIN — TIOTROPIUM BROMIDE INHALATION SPRAY 2 PUFF: 3.12 SPRAY, METERED RESPIRATORY (INHALATION) at 08:55

## 2024-06-09 RX ADMIN — HYDROCODONE BITARTRATE AND ACETAMINOPHEN 1 TABLET: 10; 325 TABLET ORAL at 18:34

## 2024-06-09 RX ADMIN — METOPROLOL TARTRATE 12.5 MG: 25 TABLET, FILM COATED ORAL at 20:00

## 2024-06-09 RX ADMIN — ALUMINUM HYDROXIDE, MAGNESIUM HYDROXIDE, AND DIMETHICONE 15 ML: 400; 400; 40 SUSPENSION ORAL at 19:56

## 2024-06-09 RX ADMIN — METOPROLOL TARTRATE 12.5 MG: 25 TABLET, FILM COATED ORAL at 08:38

## 2024-06-09 RX ADMIN — Medication 400 MG: at 08:38

## 2024-06-09 RX ADMIN — BUDESONIDE AND FORMOTEROL FUMARATE DIHYDRATE 2 PUFF: 160; 4.5 AEROSOL RESPIRATORY (INHALATION) at 20:56

## 2024-06-09 RX ADMIN — BUDESONIDE AND FORMOTEROL FUMARATE DIHYDRATE 2 PUFF: 160; 4.5 AEROSOL RESPIRATORY (INHALATION) at 08:55

## 2024-06-09 RX ADMIN — CYANOCOBALAMIN TAB 500 MCG 1000 MCG: 500 TAB at 08:38

## 2024-06-09 RX ADMIN — HYDROXYZINE HYDROCHLORIDE 25 MG: 25 TABLET, FILM COATED ORAL at 19:56

## 2024-06-09 RX ADMIN — Medication 5 MG: at 19:56

## 2024-06-09 RX ADMIN — AVOBENZONE, HOMOSALATE, OCTISALATE, OCTOCRYLENE, AND OXYBENZONE 1 PACKET: 29.4; 147; 49; 25.4; 58.8 LOTION TOPICAL at 11:20

## 2024-06-09 RX ADMIN — Medication 10 ML: at 19:57

## 2024-06-09 RX ADMIN — LIDOCAINE 1 PATCH: 560 PATCH PERCUTANEOUS; TOPICAL; TRANSDERMAL at 08:38

## 2024-06-09 NOTE — PLAN OF CARE
Goal Outcome Evaluation:              Outcome Evaluation: PT is AAOx4, VSS, flat affect, (R) hip staples removed this shift with steri strips placed, appetite adequate, compliant with meds, potential DC to rehab when ready, bed in low/locked position, call light within reach, continue with current POC at this time.

## 2024-06-09 NOTE — PROGRESS NOTES
[facility]   Hospitalist Progress Note    Date of admission: 5/20/2024  Patient Name: Oswaldo Bowen  1980  Date: 6/9/2024      Subjective     Chief Complaint   Patient presents with   • Fall   • Leg Pain       Interval Followup: Had some pain in his legs doing better with pain medicine.  No acute complaints otherwise today    Objective     Vitals:   Temp:  [97.3 °F (36.3 °C)-98.7 °F (37.1 °C)] 97.3 °F (36.3 °C)  Heart Rate:  [110-126] 111  Resp:  [14-18] 17  BP: (112-125)/(72-78) 112/76    Physical Exam  Sitting in chair at bedside, thin frail resulted in stated age  Elevated rate regular rhythm, no le pitting edema  Ctab no wrr ra  Answers basic questions ok, poor baseline insight    Result Review:  Vital signs, labs and recent relevant imaging reviewed.      CBC          6/3/2024    05:37 6/4/2024    06:35 6/5/2024    06:03   CBC   WBC 13.72  15.87  13.69    RBC 3.17  3.10  3.15    Hemoglobin 8.7  8.5  8.5    Hematocrit 28.2  27.4  28.1    MCV 89.0  88.4  89.2    MCH 27.4  27.4  27.0    MCHC 30.9  31.0  30.2    RDW 13.5  13.4  13.6    Platelets 520  504  475      CMP          6/3/2024    05:37 6/4/2024    06:35 6/5/2024    06:03   CMP   Glucose 102  189  104    BUN 12  13  14    Creatinine 0.47  0.53  0.48    EGFR 131.4  126.7  130.6    Sodium 135  135  132    Potassium 4.1  3.9  4.1    Chloride 97  94  93    Calcium 9.3  9.3  9.3    Total Protein 6.6  6.4     Albumin 2.9  2.9     Globulin 3.7  3.5     Total Bilirubin 0.2  0.2     Alkaline Phosphatase 200  199     AST (SGOT) 33  26     ALT (SGPT) 74  58     Albumin/Globulin Ratio 0.8  0.8     BUN/Creatinine Ratio 25.5  24.5  29.2    Anion Gap 8.5  15.5  10.5        •  acetaminophen  •  aluminum-magnesium hydroxide-simethicone  •  senna-docusate sodium **AND** polyethylene glycol **AND** bisacodyl **AND** bisacodyl  •  calcium carbonate  •  dextrose  •  dextrose  •  Diclofenac Sodium  •  glucagon (human recombinant)  •  HYDROcodone-acetaminophen  •   HYDROcodone-acetaminophen  •  hydrOXYzine  •  levalbuterol  •  Lidocaine  •  melatonin  •  [DISCONTINUED] Morphine **AND** naloxone  •  nicotine  •  ondansetron  •  ondansetron ODT **OR** [DISCONTINUED] ondansetron  •  prochlorperazine  •  sodium chloride  •  sodium chloride    budesonide-formoterol, 2 puff, Inhalation, BID - RT  enoxaparin, 40 mg, Subcutaneous, Q24H  Lidocaine, 1 patch, Transdermal, Q24H  magnesium oxide, 400 mg, Oral, Daily  metoprolol tartrate, 12.5 mg, Oral, Q12H  multivitamin with minerals, 1 tablet, Oral, Daily  Psyllium, 1 packet, Oral, Daily  senna-docusate sodium, 2 tablet, Oral, BID  sodium chloride, 10 mL, Intravenous, Q12H  tiotropium bromide monohydrate, 2 puff, Inhalation, Daily - RT  vitamin B-12, 1,000 mcg, Oral, Daily        XR Femur 2 View Right    Result Date: 6/7/2024  No hardware complications following ORIF of the proximal femur fracture. No hip dislocation. Fracture line is still visible.  Electronically Signed By-Dr. Galen Dailey MD On:6/7/2024 9:59 PM      CT Chest With Contrast Diagnostic    Result Date: 6/4/2024  1. No evidence of pulmonary embolus 2. No evidence of pneumonia 3. 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 malignancy, with bilateral hilar and AP window adenopathy and extensive osteolytic metastatic disease as described Electronically Signed: Sree Hawthorne  6/4/2024 5:17 PM EDT  Workstation ID: [id number]     Assessment / Plan     Summary: 44 y.o. with intellectual disability, 1-2PPD smoking, depression, who presented after a fall (possibly 1 month ago but may have been more recently), found to have right femoral fracture and VLAD.  Ortho assisting with initial surgical treatment.  Initial lung mass concerning on chest x-ray additional CT imaging with suspected new metastatic colon cancer with mets to the bone, pulmonology consulted patient had a biopsy results pending.  Bronchoscopy also with findings of Haemophilus influenzae treatment  with antibiotics.  Palliative assisting.  Awaiting pathology results from bone biopsy, this will dictate whether or not patient would go to rehab versus start chemotherapy if this is an aggressive cancer.  Patient is working better with physical therapy and is ambulating with minimal assistance       Assessment/Plan (clinically significant if listed here)  Mechanical fall with acute comminuted displaced intertrochanteric right femoral fracture  S/p 5/21 right hip subtrochanteric nailing of closed displaced right femur fracture by Dr. Nelson  VLAD creatinine 1.7 leukocytosis suspect reactive in setting of acute fracture  Suspected new metastatic lung cancer with mets to the bone  Mediastinal lymphadenopathy  Haemophilus influenzae pneumonia   1 to 2 pack/day smoker, chronic  Intellectual disability  Depression  Normocytic anemia    D/w oncology and pathologist, rib biopsy - apparently were not able to get a definitive diagnosis from pathology that resulted from [facility] as they ran out of tissue while they are doing stains and has finalized as atypical cells present  Oncology planning for repeat biopsy studies Monday with additional biopsies for great tissue sample given degree of testing required   Cont metoprolol for chronic sinus tachycardia, monitor bp - reasonable  CT PE is negative for pulmonary embolism no acute changes  Continue Symbicort, Spiriva, respiratory hygiene as needed inhalers with Xopenex  Continue bowel regimen, monitor with pain medications  Continue lidocaine patch as needed medications for pain as needed [place]  Prn nrt, pt declines need currently  Cont mvi/b12, thiamine   PT/OT  Continue hospitalization at current level of care    Dispo: Discussed with social work working on rehab placement.  Insurance declined p2p; need repeat biopsy evaluation prior     requested, will attempt but also need additional intervention for biopsy prior to discharge regardless  Discussed plan of  care/dispo with clinical .     DVT prophylaxis:  Medical and mechanical DVT prophylaxis orders are present.    Level Of Support Discussed With: Patient  Code Status (Patient has no pulse and is not breathing): CPR (Attempt to Resuscitate)  Medical Interventions (Patient has pulse or is breathing): Full Support

## 2024-06-09 NOTE — PLAN OF CARE
Problem: Pain Acute  Goal: Acceptable Pain Control and Functional Ability  Intervention: Optimize Psychosocial Wellbeing  Recent Flowsheet Documentation  Taken 6/8/2024 2025 by Aimee Santos RN  Supportive Measures: active listening utilized  Diversional Activities: television   Goal Outcome Evaluation:      Pt reported that he was having pain in his back and legs. He requested pain medication frequently.  He requested that his lidocaine patch be left on for as long as possible.  Agreed to leave on till approx 1 hr past time to be removed. Pt was offered pillows to help reposition and heat or ice. Pt was repositioned and awoken to receive pain medication. PT slept all hs.  No further complaints. Still awaiting to go to rehab

## 2024-06-10 PROCEDURE — 99233 SBSQ HOSP IP/OBS HIGH 50: CPT | Performed by: INTERNAL MEDICINE

## 2024-06-10 PROCEDURE — 94664 DEMO&/EVAL PT USE INHALER: CPT

## 2024-06-10 PROCEDURE — 94799 UNLISTED PULMONARY SVC/PX: CPT

## 2024-06-10 PROCEDURE — 97164 PT RE-EVAL EST PLAN CARE: CPT

## 2024-06-10 RX ORDER — METOPROLOL SUCCINATE 25 MG/1
25 TABLET, EXTENDED RELEASE ORAL EVERY 12 HOURS SCHEDULED
Status: DISCONTINUED | OUTPATIENT
Start: 2024-06-10 | End: 2024-06-19

## 2024-06-10 RX ADMIN — METOPROLOL SUCCINATE 25 MG: 25 TABLET, EXTENDED RELEASE ORAL at 09:21

## 2024-06-10 RX ADMIN — METOPROLOL SUCCINATE 25 MG: 25 TABLET, EXTENDED RELEASE ORAL at 20:48

## 2024-06-10 RX ADMIN — TIOTROPIUM BROMIDE INHALATION SPRAY 2 PUFF: 3.12 SPRAY, METERED RESPIRATORY (INHALATION) at 09:34

## 2024-06-10 RX ADMIN — Medication 1 TABLET: at 09:21

## 2024-06-10 RX ADMIN — BUDESONIDE AND FORMOTEROL FUMARATE DIHYDRATE 2 PUFF: 160; 4.5 AEROSOL RESPIRATORY (INHALATION) at 09:33

## 2024-06-10 RX ADMIN — HYDROCODONE BITARTRATE AND ACETAMINOPHEN 1 TABLET: 10; 325 TABLET ORAL at 18:15

## 2024-06-10 RX ADMIN — LIDOCAINE 1 PATCH: 560 PATCH PERCUTANEOUS; TOPICAL; TRANSDERMAL at 09:20

## 2024-06-10 RX ADMIN — Medication 10 ML: at 20:52

## 2024-06-10 RX ADMIN — HYDROCODONE BITARTRATE AND ACETAMINOPHEN 1 TABLET: 10; 325 TABLET ORAL at 04:38

## 2024-06-10 RX ADMIN — AVOBENZONE, HOMOSALATE, OCTISALATE, OCTOCRYLENE, AND OXYBENZONE 1 PACKET: 29.4; 147; 49; 25.4; 58.8 LOTION TOPICAL at 09:20

## 2024-06-10 RX ADMIN — HYDROXYZINE HYDROCHLORIDE 25 MG: 25 TABLET, FILM COATED ORAL at 20:48

## 2024-06-10 RX ADMIN — BUDESONIDE AND FORMOTEROL FUMARATE DIHYDRATE 2 PUFF: 160; 4.5 AEROSOL RESPIRATORY (INHALATION) at 20:45

## 2024-06-10 RX ADMIN — Medication 400 MG: at 09:21

## 2024-06-10 RX ADMIN — SENNOSIDES AND DOCUSATE SODIUM 2 TABLET: 50; 8.6 TABLET ORAL at 20:48

## 2024-06-10 RX ADMIN — Medication 5 MG: at 20:48

## 2024-06-10 RX ADMIN — CYANOCOBALAMIN TAB 500 MCG 1000 MCG: 500 TAB at 09:21

## 2024-06-10 RX ADMIN — Medication 10 ML: at 09:22

## 2024-06-10 RX ADMIN — SENNOSIDES AND DOCUSATE SODIUM 2 TABLET: 50; 8.6 TABLET ORAL at 09:21

## 2024-06-10 NOTE — PROGRESS NOTES
[facility]   Hospitalist Progress Note    Date of admission: 5/20/2024  Patient Name: Oswaldo Bowen  1980  Date: 6/10/2024      Subjective     Chief Complaint   Patient presents with    Fall    Leg Pain       Interval Followup: Has some intermittent leg pain.  Better with as needed medications.  No chest pain no fevers.    Objective     Vitals:   Temp:  [97.5 °F (36.4 °C)-98.4 °F (36.9 °C)] 98.1 °F (36.7 °C)  Heart Rate:  [112-128] 123  Resp:  [16] 16  BP: (109-132)/(68-83) 117/72    Physical Exam  Resting in bed watching TV thin frail no acute distress  Elevated rate regular rhythm, no le pitting edema  Ctab no wrr ra  Answers basic questions ok, poor baseline insight    Result Review:  Vital signs, labs and recent relevant imaging reviewed.      CBC          6/3/2024    05:37 6/4/2024    06:35 6/5/2024    06:03   CBC   WBC 13.72  15.87  13.69    RBC 3.17  3.10  3.15    Hemoglobin 8.7  8.5  8.5    Hematocrit 28.2  27.4  28.1    MCV 89.0  88.4  89.2    MCH 27.4  27.4  27.0    MCHC 30.9  31.0  30.2    RDW 13.5  13.4  13.6    Platelets 520  504  475      CMP          6/3/2024    05:37 6/4/2024    06:35 6/5/2024    06:03   CMP   Glucose 102  189  104    BUN 12  13  14    Creatinine 0.47  0.53  0.48    EGFR 131.4  126.7  130.6    Sodium 135  135  132    Potassium 4.1  3.9  4.1    Chloride 97  94  93    Calcium 9.3  9.3  9.3    Total Protein 6.6  6.4     Albumin 2.9  2.9     Globulin 3.7  3.5     Total Bilirubin 0.2  0.2     Alkaline Phosphatase 200  199     AST (SGOT) 33  26     ALT (SGPT) 74  58     Albumin/Globulin Ratio 0.8  0.8     BUN/Creatinine Ratio 25.5  24.5  29.2    Anion Gap 8.5  15.5  10.5          acetaminophen    aluminum-magnesium hydroxide-simethicone    senna-docusate sodium **AND** polyethylene glycol **AND** bisacodyl **AND** bisacodyl    calcium carbonate    dextrose    dextrose    Diclofenac Sodium    glucagon (human recombinant)    HYDROcodone-acetaminophen    HYDROcodone-acetaminophen     hydrOXYzine    levalbuterol    Lidocaine    melatonin    [DISCONTINUED] Morphine **AND** naloxone    nicotine    ondansetron    ondansetron ODT **OR** [DISCONTINUED] ondansetron    prochlorperazine    sodium chloride    sodium chloride    budesonide-formoterol, 2 puff, Inhalation, BID - RT  enoxaparin, 40 mg, Subcutaneous, Q24H  Lidocaine, 1 patch, Transdermal, Q24H  magnesium oxide, 400 mg, Oral, Daily  metoprolol succinate XL, 25 mg, Oral, Q12H  multivitamin with minerals, 1 tablet, Oral, Daily  Psyllium, 1 packet, Oral, Daily  senna-docusate sodium, 2 tablet, Oral, BID  sodium chloride, 10 mL, Intravenous, Q12H  tiotropium bromide monohydrate, 2 puff, Inhalation, Daily - RT  vitamin B-12, 1,000 mcg, Oral, Daily        XR Femur 2 View Right    Result Date: 6/7/2024  No hardware complications following ORIF of the proximal femur fracture. No hip dislocation. Fracture line is still visible.  Electronically Signed By-Dr. Galen Dailey MD On:6/7/2024 9:59 PM      CT Chest With Contrast Diagnostic    Result Date: 6/4/2024  1. No evidence of pulmonary embolus 2. No evidence of pneumonia 3. 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 malignancy, with bilateral hilar and AP window adenopathy and extensive osteolytic metastatic disease as described Electronically Signed: Sree Hawthorne  6/4/2024 5:17 PM EDT  Workstation ID: [id number]     Assessment / Plan     Summary: 44 y.o. with intellectual disability, 1-2PPD smoking, depression, who presented after a fall (possibly 1 month ago but may have been more recently), found to have right femoral fracture and VLAD.  Ortho assisting with initial surgical treatment.  Initial lung mass concerning on chest x-ray additional CT imaging with suspected new metastatic colon cancer with mets to the bone, pulmonology consulted patient had a biopsy results pending.  Bronchoscopy also with findings of Haemophilus influenzae treatment with antibiotics.  Palliative assisting.   Awaiting pathology results from bone biopsy, this will dictate whether or not patient would go to rehab versus start chemotherapy if this is an aggressive cancer.  Patient is working better with physical therapy and is ambulating with minimal assistance       Assessment/Plan (clinically significant if listed here)  Mechanical fall with acute comminuted displaced intertrochanteric right femoral fracture  S/p 5/21 right hip subtrochanteric nailing of closed displaced right femur fracture by Dr. Nelson  VLAD creatinine 1.7 leukocytosis suspect reactive in setting of acute fracture  Suspected new metastatic lung cancer with mets to the bone  Mediastinal lymphadenopathy  Haemophilus influenzae pneumonia   1 to 2 pack/day smoker, chronic  Intellectual disability  Depression  Normocytic anemia    D/w oncology, repeat right rib biopsy is pending as long off sample from original 1 for definitive diagnosis, atypical cells reported from initial stains sent out to [facility].   Repeat biopsy pending for tomorrow  Patient has some concerns about pain on his back/being able to lay flat for procedure, will add as needed IV Dilaudid in addition to his current pain medication to help him tolerate.  Hold Lovenox tomorrow  Change metoprolol to succinate for blood pressure with chronic side effects tachycardia.    CT PE is negative for pulmonary embolism no acute changes  Continue Symbicort, Spiriva, respiratory hygiene as needed inhalers with Xopenex  Continue bowel regimen, monitor with pain medications  Continue lidocaine patch as needed medications for pain as needed [place]  Prn nrt, pt declines need currently  Cont mvi/b12, thiamine   PT/OT  Continue hospitalization at current level of care    Dispo: Discussed with social work working on rehab placement.  Insurance declined p2p; calling to schedule appeal,   Discussed plan of care/dispo with clinical .     DVT prophylaxis:  Medical and mechanical DVT  prophylaxis orders are present.    Level Of Support Discussed With: Patient  Code Status (Patient has no pulse and is not breathing): CPR (Attempt to Resuscitate)  Medical Interventions (Patient has pulse or is breathing): Full Support    Greater than 50 minutes on this encounter including review of labs, imaging, documentation, orders, vitals, monitoring and adjusting treatment and orders as indicated, discussion with the patient oncology sw, insurance and discussion w/ family, and documenting.

## 2024-06-10 NOTE — PLAN OF CARE
Goal Outcome Evaluation:  Plan of Care Reviewed With: (P) patient        Progress: (P) improving  Outcome Evaluation: (P) Pt presented to session with good tolerance to ambulation, but complained of right hip pain. Pt presented with deficits in strength, endurance, and pain. PT recommended to address above deficits      Anticipated Discharge Disposition (PT): (P) inpatient rehabilitation facility

## 2024-06-10 NOTE — PLAN OF CARE
Goal Outcome Evaluation:           Progress: improving  Outcome Evaluation: Patient treated for pain twice during shift. Patient to be NPO after midnight for biopsy

## 2024-06-10 NOTE — THERAPY RE-EVALUATION
Acute Care - Physical Therapy Re-Evaluation   [place]     Patient Name: Oswaldo Bowen  : 1980  MRN: [id number]  Today's Date: 6/10/2024      Admit date: 2024     Referring Physician: Michael Benitez MD     Surgery Date:2024 - 2024   Procedure(s) (LRB):  BRONCHOSCOPY WITH ENDOBRONCHIAL ULTRASOUND, FINE NEEDLE ASPIRATE, BIOPSIES, BRUSHINGS, BRONCHOALVEOLAR LAVAGE (N/A)       Visit Dx:     ICD-10-CM ICD-9-CM   1. Closed displaced intertrochanteric fracture of right femur, initial encounter  S72.141A 820.21   2. Pulmonary nodule  R91.1 793.11   3. Difficulty walking  R26.2 719.7   4. Dysphagia, oropharyngeal  R13.12 787.22   5. Difficulty in walking  R26.2 719.7     Patient Active Problem List   Diagnosis    Closed intertrochanteric fracture of right femur    Pulmonary nodule     History reviewed. No pertinent past medical history.  Past Surgical History:   Procedure Laterality Date    BRONCHOSCOPY N/A 2024    Procedure: BRONCHOSCOPY WITH ENDOBRONCHIAL ULTRASOUND, FINE NEEDLE ASPIRATE, BIOPSIES, BRUSHINGS, BRONCHOALVEOLAR LAVAGE;  Surgeon: Kendell Stern MD;  Location: [facility] ENDOSCOPY;  Service: Pulmonary;  Laterality: N/A;  LUNG NODULE, MUCOUS PLUGGING    HIP INTERTROCHANTERIC NAILING Right 2024    Procedure: HIP INTERTROCHANTERIC NAILING;  Surgeon: Molina Clayton MD;  Location: [facility] MAIN OR;  Service: Orthopedics;  Laterality: Right;     PT Assessment (Last 12 Hours)       PT Evaluation and Treatment       Row Name 06/10/24 1400          Physical Therapy Time and Intention    Subjective Information complains of;pain;fatigue;weakness (P)   -TR     Document Type re-evaluation (P)   -TR     Mode of Treatment individual therapy;physical therapy (P)   -TR     Patient Effort good (P)   -TR     Symptoms Noted During/After Treatment none (P)   -TR       Row Name 06/10/24 1400          Living Environment    Current Living Arrangements home (P)   -TR       Row Name 06/10/24 1400           Pain    Posttreatment Pain Rating 8/10 (P)   -TR       [facility] Name 06/10/24 1400          Range of Motion (ROM)    Range of Motion bilateral lower extremities;ROM is WFL (P)   -[facility] Name 06/10/24 1400          Strength (Manual Muscle Testing)    Strength (Manual Muscle Testing) bilateral lower extremities;strength is WFL (P)   R knee extension: 4-/5 with pain  -[facility] Name 06/10/24 1400          Mobility    Extremity Weight-bearing Status right lower extremity (P)   -TR     Right Lower Extremity (Weight-bearing Status) weight-bearing as tolerated (WBAT) (P)   -TR       [facility] Name 06/10/24 1400          Bed Mobility    Bed Mobility supine-sit (P)   -TR     [place] (Bed Mobility) standby assist (P)   -TR     Supine-Sit [place] (Bed Mobility) contact guard (P)   -TR     Sit-Supine [place] (Bed Mobility) contact guard (P)   -TR     Assistive Device (Bed Mobility) bed rails (P)   -TR       Row Name 06/10/24 1400          Transfers    Transfers sit-stand transfer;stand-sit transfer (P)   -TR     Maintains Weight-bearing Status (Transfers) able to maintain (P)   -TR       [facility] Name 06/10/24 1400          Sit-Stand Transfer    Sit-Stand [place] (Transfers) contact guard (P)   -TR     Assistive Device (Sit-Stand Transfers) walker, front-wheeled (P)   -TR       [facility] Name 06/10/24 1400          Stand-Sit Transfer    Stand-Sit [place] (Transfers) contact guard (P)   -TR     Assistive Device (Stand-Sit Transfers) walker, front-wheeled (P)   -TR       [facility] Name 06/10/24 1400          Gait/Stairs (Locomotion)    Gait/Stairs Locomotion gait/ambulation independence;gait/ambulation assistive device;distance ambulated (P)   -TR     [place] Level (Gait) contact guard (P)   -TR     Assistive Device (Gait) walker, front-wheeled (P)   -TR     Patient was able to Ambulate yes (P)   -TR     Distance in Feet (Gait) 50 (P)   pt requested early pause to ambulating  -[facility] Name  06/10/24 1400          Safety Issues, Functional Mobility    Impairments Affecting Function (Mobility) balance;endurance/activity tolerance;strength (P)   -TR       Row Name 06/10/24 1400          Balance    Balance Assessment standing dynamic balance (P)   -TR     Dynamic Standing Balance contact guard (P)   -TR       Row Name 06/10/24 1400          Motor Skills    Motor Skills coordination;functional endurance (P)   -TR       Row Name             Wound 05/21/24 0251 Bilateral coccyx    Wound - Properties Group Placement Date: 05/21/24  -SR Placement Time: 0251  -SR Present on Original Admission: Y  -SR Side: Bilateral  -SR Location: coccyx  -SR    Retired Wound - Properties Group Placement Date: 05/21/24  -SR Placement Time: 0251  -SR Present on Original Admission: Y  -SR Side: Bilateral  -SR Location: coccyx  -SR    Retired Wound - Properties Group Date first assessed: 05/21/24  -SR Time first assessed: 0251  -SR Present on Original Admission: Y  -SR Side: Bilateral  -SR Location: coccyx  -SR      Row Name             Wound 05/21/24 Right lateral thigh Incision    Wound - Properties Group Placement Date: 05/21/24  -SF Present on Original Admission: N  -SF Side: Right  -SF Orientation: lateral  -SF Location: thigh  -SF Primary Wound Type: Incision  -SF Additional Comments: incision sites x 3 to lateral right thigh  -SF    Retired Wound - Properties Group Placement Date: 05/21/24  -SF Present on Original Admission: N  -SF Side: Right  -SF Orientation: lateral  -SF Location: thigh  -SF Primary Wound Type: Incision  -SF Additional Comments: incision sites x 3 to lateral right thigh  -SF    Retired Wound - Properties Group Date first assessed: 05/21/24  -SF Present on Original Admission: N  -SF Side: Right  -SF Location: thigh  -SF Primary Wound Type: Incision  -SF Additional Comments: incision sites x 3 to lateral right thigh  -SF      Row Name 06/10/24 1400          Plan of Care Review    Plan of Care Reviewed With  patient (P)   -TR     Progress improving (P)   -TR     Outcome Evaluation Pt presented to session with good tolerance to ambulation, but complained of right hip pain. Pt presented with deficits in strength, endurance, and pain. PT recommended to address above deficits (P)   -TR       Row Name 06/10/24 1400          Positioning and Restraints    Pre-Treatment Position in bed (P)   -TR     Post Treatment Position bed (P)   -TR     In Bed call light within reach;encouraged to call for assist;exit alarm on (P)   -TR       Row Name 06/10/24 1400          Therapy Assessment/Plan (PT)    Rehab Potential (PT) good, to achieve stated therapy goals (P)   -TR     Criteria for Skilled Interventions Met (PT) skilled treatment is necessary (P)   -TR     Therapy Frequency (PT) daily (P)   -TR     Predicted Duration of Therapy Intervention (PT) 10 days (P)   -TR     Problem List (PT) problems related to;balance;cognition;mobility;range of motion (ROM);strength;pain;communication (P)   -TR       Row Name 06/10/24 1400          Progress Summary (PT)    Progress Toward Functional Goals (PT) progress toward functional goals is good (P)   -TR       Row Name 06/10/24 1400          Therapy Plan Review/Discharge Plan (PT)    Therapy Plan Review (PT) evaluation/treatment results reviewed (P)   -TR       Row Name 06/10/24 1400          Physical Therapy Goals    Bed Mobility Goal Selection (PT) bed mobility, PT goal 1 (P)   -TR     Transfer Goal Selection (PT) transfer, PT goal 1 (P)   -TR     Gait Training Goal Selection (PT) gait training, PT goal 1 (P)   -TR     ROM Goal Selection (PT) ROM, PT goal 1 (P)   -TR     Balance Goal Selection (PT) balance, PT goal 1 (P)   -TR       Row Name 06/10/24 1400          Bed Mobility Goal 1 (PT)    Activity/Assistive Device (Bed Mobility Goal 1, PT) sit to supine/supine to sit (P)   -TR     [place] Level/Cues Needed (Bed Mobility Goal 1, PT) modified independence (P)   -TR     Time Frame (Bed  Mobility Goal 1, PT) 10 days (P)   -TR     Progress/Outcomes (Bed Mobility Goal 1, PT) continuing progress toward goal (P)   -TR       Row Name 06/10/24 1400          Transfer Goal 1 (PT)    Activity/Assistive Device (Transfer Goal 1, PT) sit-to-stand/stand-to-sit;bed-to-chair/chair-to-bed;walker, rolling (P)   -TR     [place] Level/Cues Needed (Transfer Goal 1, PT) modified independence (P)   -TR     Time Frame (Transfer Goal 1, PT) 10 days (P)   -TR     Progress/Outcome (Transfer Goal 1, PT) good progress toward goal (P)   -TR       Row Name 06/10/24 1400          Gait Training Goal 1 (PT)    Activity/Assistive Device (Gait Training Goal 1, PT) gait (walking locomotion);assistive device use;walker, rolling (P)   -TR     [place] Level (Gait Training Goal 1, PT) modified independence (P)   -TR     Distance (Gait Training Goal 1, PT) 200 (P)   -TR     Time Frame (Gait Training Goal 1, PT) long term goal (LTG);10 days (P)   -TR     Progress/Outcome (Gait Training Goal 1, PT) good progress toward goal (P)   -TR       Row Name 06/10/24 1400          ROM Goal 1 (PT)    ROM Goal 1 (PT) Improve R knee flexion to 0-120 (P)   -TR     Time Frame (ROM Goal 1, PT) long-term goal (LTG);10 days (P)   -TR     Progress/Outcome (ROM Goal 1, PT) goal met (P)   -TR               User Key  (r) = Recorded By, (t) = Taken By, (c) = Cosigned By      Initials Name Provider Type    Saritha Boudreaux, RN Registered Nurse    Saritha Becker, RN Registered Nurse    Aaron Cooney, PT Student PT Student                    Physical Therapy Education       Title: PT OT SLP Therapies (Done)       Topic: Physical Therapy (Done)       Point: Mobility training (Done)       Learning Progress Summary             Patient Acceptance, E,TB, VU by TR at 6/10/2024 1507    Acceptance, E, Bed IU by DS at 6/2/2024 0830    Acceptance, E, VU by PS at 6/1/2024 0627    Acceptance, E,TB, VU by TR at 5/31/2024 1449    Eager, E, VU by AH  at 5/23/2024 2223    Acceptance, E,TB, VU by AV at 5/22/2024 1339                         Point: Home exercise program (Done)       Learning Progress Summary             Patient Acceptance, E, Bed IU by DS at 6/2/2024 0830                         Point: Body mechanics (Done)       Learning Progress Summary             Patient Acceptance, E, Bed IU by DS at 6/2/2024 0830    Acceptance, E, VU by PS at 6/1/2024 0627    Acceptance, E,TB, VU by AV at 5/22/2024 1339                         Point: Precautions (Done)       Learning Progress Summary             Patient Acceptance, E,TB, VU by TR at 6/10/2024 1507    Acceptance, E, Bed IU by DS at 6/2/2024 0830    Acceptance, E,TB, VU by TR at 5/31/2024 1449    Acceptance, E,TB, VU by AV at 5/22/2024 1339                                         User Key       Initials Effective Dates Name Provider Type Discipline    PS 06/16/21 -  Darcie Fiore, RN Registered Nurse Nurse    AV 06/11/21 -  Eh Martínez, PT Physical Therapist PT    DS 06/26/23 -  Immanuel Beck, RN Registered Nurse Nurse     05/31/23 -  Angela Casillas, RN Registered Nurse Nurse    TR 05/21/24 -  Aaron Fowler PT Student PT Student PT                  PT Recommendation and Plan  Anticipated Discharge Disposition (PT): (P) inpatient rehabilitation facility  Planned Therapy Interventions (PT): (P) balance training, strengthening, gait training, bed mobility training, transfer training  Therapy Frequency (PT): (P) daily  Progress Summary (PT)  Progress Toward Functional Goals (PT): (P) progress toward functional goals is good  Plan of Care Reviewed With: (P) patient  Progress: (P) improving  Outcome Evaluation: (P) Pt presented to session with good tolerance to ambulation, but complained of right hip pain. Pt presented with deficits in strength, endurance, and pain. PT recommended to address above deficits   Outcome Measures       Row Name 06/10/24 1500 06/08/24 1125          How much help  from another person do you currently need...    Turning from your back to your side while in flat bed without using bedrails? 4 (P)   -TR 4  -WM     Moving from lying on back to sitting on the side of a flat bed without bedrails? -- 3  -WM     Moving to and from a bed to a chair (including a wheelchair)? -- 3  -WM     Standing up from a chair using your arms (e.g., wheelchair, bedside chair)? 3 (P)   -TR 3  -WM     Climbing 3-5 steps with a railing? 3 (P)   -TR 2  -WM     To walk in hospital room? 3 (P)   -TR 3  -WM     AM-PAC 6 Clicks Score (PT) -- 18  -WM     Highest Level of Mobility Goal -- 6 --> Walk 10 steps or more  -WM        Functional Assessment    Outcome Measure Options AM-PAC 6 Clicks Basic Mobility (PT) (P)   -TR --               User Key  (r) = Recorded By, (t) = Taken By, (c) = Cosigned By      Initials Name Provider Type    Vicente Jones PTA Physical Therapist Assistant    Aaron Cooney, PT Student PT Student                     Time Calculation:    PT Charges       Row Name 06/10/24 1453             Time Calculation    PT Received On 06/10/24 (P)   -TR      PT Goal Re-Cert Due Date 06/19/24 (P)   -TR         Untimed Charges    PT Eval/Re-eval Minutes 25 (P)   -TR         Total Minutes    Untimed Charges Total Minutes 25 (P)   -TR       Total Minutes 25 (P)   -TR                User Key  (r) = Recorded By, (t) = Taken By, (c) = Cosigned By      Initials Name Provider Type    Aaron Cooney, PT Student PT Student                  Therapy Charges for Today       Code Description Service Date Service Provider Modifiers Qty    01038032182 HC PT RE-EVAL ESTABLISHED PLAN 2 6/10/2024 Aaron Fowler, PT Student GP 1            PT G-Codes  Outcome Measure Options: (P) AM-PAC 6 Clicks Basic Mobility (PT)  AM-PAC 6 Clicks Score (PT): 18  AM-PAC 6 Clicks Score (OT): 18    Aaron Fowler PT Student  6/10/2024

## 2024-06-10 NOTE — PLAN OF CARE
Goal Outcome Evaluation:  Plan of Care Reviewed With: patient        Progress: no change  Outcome Evaluation: tachycardia unchanged, other VSS. medicated with maalox for c/o gas pain. medicated x 1 with norco for R hip pain. pt ambulated to toilet with walker and minimal assist. no significant changes this shift.

## 2024-06-11 ENCOUNTER — APPOINTMENT (OUTPATIENT)
Dept: CT IMAGING | Facility: HOSPITAL | Age: 44
DRG: 477 | End: 2024-06-11
Payer: COMMERCIAL

## 2024-06-11 PROCEDURE — 25010000002 FENTANYL CITRATE (PF) 50 MCG/ML SOLUTION: Performed by: RADIOLOGY

## 2024-06-11 PROCEDURE — 94761 N-INVAS EAR/PLS OXIMETRY MLT: CPT

## 2024-06-11 PROCEDURE — 94799 UNLISTED PULMONARY SVC/PX: CPT

## 2024-06-11 PROCEDURE — 0P913ZX DRAINAGE OF 1 TO 2 RIBS, PERCUTANEOUS APPROACH, DIAGNOSTIC: ICD-10-PCS | Performed by: RADIOLOGY

## 2024-06-11 PROCEDURE — 88341 IMHCHEM/IMCYTCHM EA ADD ANTB: CPT | Performed by: INTERNAL MEDICINE

## 2024-06-11 PROCEDURE — 88173 CYTOPATH EVAL FNA REPORT: CPT | Performed by: INTERNAL MEDICINE

## 2024-06-11 PROCEDURE — 99232 SBSQ HOSP IP/OBS MODERATE 35: CPT | Performed by: INTERNAL MEDICINE

## 2024-06-11 PROCEDURE — 88342 IMHCHEM/IMCYTCHM 1ST ANTB: CPT | Performed by: INTERNAL MEDICINE

## 2024-06-11 PROCEDURE — 88305 TISSUE EXAM BY PATHOLOGIST: CPT | Performed by: INTERNAL MEDICINE

## 2024-06-11 PROCEDURE — 97116 GAIT TRAINING THERAPY: CPT

## 2024-06-11 RX ORDER — FENTANYL CITRATE 50 UG/ML
INJECTION, SOLUTION INTRAMUSCULAR; INTRAVENOUS AS NEEDED
Status: COMPLETED | OUTPATIENT
Start: 2024-06-11 | End: 2024-06-11

## 2024-06-11 RX ORDER — ENOXAPARIN SODIUM 100 MG/ML
40 INJECTION SUBCUTANEOUS EVERY 24 HOURS
Status: DISCONTINUED | OUTPATIENT
Start: 2024-06-12 | End: 2024-06-23 | Stop reason: HOSPADM

## 2024-06-11 RX ORDER — LIDOCAINE HYDROCHLORIDE 20 MG/ML
INJECTION, SOLUTION INFILTRATION; PERINEURAL
Status: COMPLETED
Start: 2024-06-11 | End: 2024-06-11

## 2024-06-11 RX ORDER — POLYETHYLENE GLYCOL 3350 17 G/17G
17 POWDER, FOR SOLUTION ORAL DAILY
Status: DISCONTINUED | OUTPATIENT
Start: 2024-06-11 | End: 2024-06-23 | Stop reason: HOSPADM

## 2024-06-11 RX ADMIN — Medication 5 MG: at 19:46

## 2024-06-11 RX ADMIN — HYDROCODONE BITARTRATE AND ACETAMINOPHEN 1 TABLET: 5; 325 TABLET ORAL at 08:10

## 2024-06-11 RX ADMIN — SENNOSIDES AND DOCUSATE SODIUM 2 TABLET: 50; 8.6 TABLET ORAL at 19:48

## 2024-06-11 RX ADMIN — Medication 10 ML: at 08:10

## 2024-06-11 RX ADMIN — METOPROLOL SUCCINATE 25 MG: 25 TABLET, EXTENDED RELEASE ORAL at 19:47

## 2024-06-11 RX ADMIN — TIOTROPIUM BROMIDE INHALATION SPRAY 2 PUFF: 3.12 SPRAY, METERED RESPIRATORY (INHALATION) at 08:30

## 2024-06-11 RX ADMIN — BUDESONIDE AND FORMOTEROL FUMARATE DIHYDRATE 2 PUFF: 160; 4.5 AEROSOL RESPIRATORY (INHALATION) at 08:30

## 2024-06-11 RX ADMIN — HYDROCODONE BITARTRATE AND ACETAMINOPHEN 1 TABLET: 5; 325 TABLET ORAL at 19:43

## 2024-06-11 RX ADMIN — LIDOCAINE 1 PATCH: 560 PATCH PERCUTANEOUS; TOPICAL; TRANSDERMAL at 20:50

## 2024-06-11 RX ADMIN — HYDROXYZINE HYDROCHLORIDE 25 MG: 25 TABLET, FILM COATED ORAL at 19:46

## 2024-06-11 RX ADMIN — BUDESONIDE AND FORMOTEROL FUMARATE DIHYDRATE 2 PUFF: 160; 4.5 AEROSOL RESPIRATORY (INHALATION) at 21:11

## 2024-06-11 RX ADMIN — LIDOCAINE HYDROCHLORIDE: 20 INJECTION, SOLUTION INFILTRATION; PERINEURAL at 13:06

## 2024-06-11 RX ADMIN — Medication 10 ML: at 19:47

## 2024-06-11 RX ADMIN — FENTANYL CITRATE 25 MCG: 50 INJECTION, SOLUTION INTRAMUSCULAR; INTRAVENOUS at 11:55

## 2024-06-11 NOTE — THERAPY TREATMENT NOTE
Acute Care - Physical Therapy Treatment Note   [place]     Patient Name: Oswaldo Bowen  : 1980  MRN: [id number]  Today's Date: 2024      Visit Dx:     ICD-10-CM ICD-9-CM   1. Closed displaced intertrochanteric fracture of right femur, initial encounter  S72.141A 820.21   2. Pulmonary nodule  R91.1 793.11   3. Difficulty walking  R26.2 719.7   4. Dysphagia, oropharyngeal  R13.12 787.22   5. Difficulty in walking  R26.2 719.7     Patient Active Problem List   Diagnosis    Closed intertrochanteric fracture of right femur    Pulmonary nodule     History reviewed. No pertinent past medical history.  Past Surgical History:   Procedure Laterality Date    BRONCHOSCOPY N/A 2024    Procedure: BRONCHOSCOPY WITH ENDOBRONCHIAL ULTRASOUND, FINE NEEDLE ASPIRATE, BIOPSIES, BRUSHINGS, BRONCHOALVEOLAR LAVAGE;  Surgeon: Kendell Stern MD;  Location: [facility] ENDOSCOPY;  Service: Pulmonary;  Laterality: N/A;  LUNG NODULE, MUCOUS PLUGGING    HIP INTERTROCHANTERIC NAILING Right 2024    Procedure: HIP INTERTROCHANTERIC NAILING;  Surgeon: Molina Clayton MD;  Location: [facility] MAIN OR;  Service: Orthopedics;  Laterality: Right;     PT Assessment (Last 12 Hours)       PT Evaluation and Treatment       Row Name 24 1400          Physical Therapy Time and Intention    Subjective Information no complaints (P)   -TR     Document Type therapy note (daily note) (P)   -TR     Mode of Treatment individual therapy;physical therapy (P)   -TR     Patient Effort good (P)   -TR     Symptoms Noted During/After Treatment none (P)   -TR       Row Name 24 1400          Mobility    Extremity Weight-bearing Status right lower extremity (P)   -TR     Right Lower Extremity (Weight-bearing Status) weight-bearing as tolerated (WBAT) (P)   -TR       Row Name 24 1400          Bed Mobility    Bed Mobility supine-sit (P)   -TR     Scooting/Bridging [place] (Bed Mobility) standby assist (P)   -TR     Supine-Sit [place]  (Bed Mobility) standby assist (P)   -TR     Sit-Supine [place] (Bed Mobility) minimum assist (75% patient effort) (P)   -TR       Row Name 06/11/24 1400          Transfers    Transfers sit-stand transfer;stand-sit transfer (P)   -TR     Maintains Weight-bearing Status (Transfers) able to maintain (P)   -TR       Row Name 06/11/24 1400          Sit-Stand Transfer    Sit-Stand [place] (Transfers) contact guard (P)   -TR       Row Name 06/11/24 1400          Stand-Sit Transfer    Stand-Sit [place] (Transfers) contact guard (P)   -TR     Assistive Device (Stand-Sit Transfers) walker, front-wheeled (P)   -TR       Row Name 06/11/24 1400          Gait/Stairs (Locomotion)    Gait/Stairs Locomotion gait/ambulation independence;gait/ambulation assistive device;distance ambulated (P)   -TR     [place] Level (Gait) contact guard (P)   -TR     Assistive Device (Gait) walker, front-wheeled (P)   -TR     Patient was able to Ambulate yes (P)   -TR     Distance in Feet (Gait) 100 (P)   -TR     Pattern (Gait) -- (P)   practiced heel strike and weight acceptance  -TR       Row Name 06/11/24 1400          Balance    Balance Assessment standing dynamic balance (P)   -TR     Static Sitting Balance contact guard (P)   -TR       Row Name 06/11/24 1400          Motor Skills    Motor Skills coordination;functional endurance (P)   -TR       Row Name             Wound 05/21/24 0251 Bilateral coccyx    Wound - Properties Group Placement Date: 05/21/24  -SR Placement Time: 0251  -SR Present on Original Admission: Y  -SR Side: Bilateral  -SR Location: coccyx  -SR    Retired Wound - Properties Group Placement Date: 05/21/24  -SR Placement Time: 0251  -SR Present on Original Admission: Y  -SR Side: Bilateral  -SR Location: coccyx  -SR    Retired Wound - Properties Group Date first assessed: 05/21/24  -SR Time first assessed: 0251  -SR Present on Original Admission: Y  -SR Side: Bilateral  -SR Location: coccyx  -SR      Row Name              Wound 05/21/24 Right lateral thigh Incision    Wound - Properties Group Placement Date: 05/21/24  -SF Present on Original Admission: N  -SF Side: Right  -SF Orientation: lateral  -SF Location: thigh  -SF Primary Wound Type: Incision  -SF Additional Comments: incision sites x 3 to lateral right thigh  -SF    Retired Wound - Properties Group Placement Date: 05/21/24  -SF Present on Original Admission: N  -SF Side: Right  -SF Orientation: lateral  -SF Location: thigh  -SF Primary Wound Type: Incision  -SF Additional Comments: incision sites x 3 to lateral right thigh  -SF    Retired Wound - Properties Group Date first assessed: 05/21/24  -SF Present on Original Admission: N  -SF Side: Right  -SF Location: thigh  -SF Primary Wound Type: Incision  -SF Additional Comments: incision sites x 3 to lateral right thigh  -SF      Row Name 06/11/24 1400          Positioning and Restraints    Pre-Treatment Position in bed (P)   -TR     Post Treatment Position bed (P)   -TR     In Bed call light within reach;encouraged to call for assist;exit alarm on (P)   -TR       Row Name 06/11/24 1400          Progress Summary (PT)    Daily Progress Summary (PT) Pt presented to session with decreased complaints of pain. Session consisted of increased weight acceptance through his heel and improving stride length. Continue with POC (P)   -TR               User Key  (r) = Recorded By, (t) = Taken By, (c) = Cosigned By      Initials Name Provider Type    SF Saritha Ramirez, RN Registered Nurse    Saritha Becker RN Registered Nurse    Aaron Cooney, PT Student PT Student                    Physical Therapy Education       Title: PT OT SLP Therapies (Done)       Topic: Physical Therapy (Done)       Point: Mobility training (Done)       Learning Progress Summary             Patient Acceptance, E,TB, VU by TR at 6/10/2024 1507    Acceptance, E, Bed IU by DS at 6/2/2024 0830    Acceptance, E, VU by PS at 6/1/2024  0627    Acceptance, E,TB, VU by TR at 5/31/2024 1449    Eager, E, VU by AH at 5/23/2024 2223    Acceptance, E,TB, VU by AV at 5/22/2024 1339                         Point: Home exercise program (Done)       Learning Progress Summary             Patient Acceptance, E, Bed IU by DS at 6/2/2024 0830                         Point: Body mechanics (Done)       Learning Progress Summary             Patient Acceptance, E, Bed IU by DS at 6/2/2024 0830    Acceptance, E, VU by PS at 6/1/2024 0627    Acceptance, E,TB, VU by AV at 5/22/2024 1339                         Point: Precautions (Done)       Learning Progress Summary             Patient Acceptance, E,TB, VU by TR at 6/10/2024 1507    Acceptance, E, Bed IU by DS at 6/2/2024 0830    Acceptance, E,TB, VU by TR at 5/31/2024 1449    Acceptance, E,TB, VU by AV at 5/22/2024 1339                                         User Key       Initials Effective Dates Name Provider Type Discipline    PS 06/16/21 -  Darcie Fiore, RN Registered Nurse Nurse    AV 06/11/21 -  Eh Martínez, PT Physical Therapist PT    DS 06/26/23 -  Immanuel Beck, RN Registered Nurse Nurse     05/31/23 -  Angela Casillas, RN Registered Nurse Nurse    TR 05/21/24 -  Aaron Fowler, JERROD Student PT Student PT                  PT Recommendation and Plan  Anticipated Discharge Disposition (PT): inpatient rehabilitation facility  Planned Therapy Interventions (PT): balance training, strengthening, gait training, bed mobility training, transfer training  Therapy Frequency (PT): daily  Progress Summary (PT)  Progress Toward Functional Goals (PT): progress toward functional goals is good  Daily Progress Summary (PT): (P) Pt presented to session with decreased complaints of pain. Session consisted of increased weight acceptance through his heel and improving stride length. Continue with POC  Plan of Care Reviewed With: patient  Progress: improving  Outcome Evaluation: Pt presented to session  with good tolerance to ambulation, but complained of right hip pain. Pt presented with deficits in strength, endurance, and pain. PT recommended to address above deficits   Outcome Measures       Row Name 06/11/24 1400 06/10/24 1500          How much help from another person do you currently need...    Turning from your back to your side while in flat bed without using bedrails? 4 (P)   -TR 4  -JAYRO (r) TR (t) JAYRO (c)     Moving from lying on back to sitting on the side of a flat bed without bedrails? 4 (P)   -TR --     Moving to and from a bed to a chair (including a wheelchair)? 4 (P)   -TR --     Standing up from a chair using your arms (e.g., wheelchair, bedside chair)? 4 (P)   -TR 3  -JAYRO (r) TR (t) JAYRO (c)     Climbing 3-5 steps with a railing? 3 (P)   -TR 3  -JAYRO (r) TR (t) JAYRO (c)     To walk in hospital room? 4 (P)   -TR 3  -JAYRO (r) TR (t) JAYRO (c)     AM-PAC 6 Clicks Score (PT) 23 (P)   -TR --     Highest Level of Mobility Goal 7 --> Walk 25 feet or more (P)   -TR --        Functional Assessment    Outcome Measure Options AM-PAC 6 Clicks Basic Mobility (PT) (P)   -TR AM-PAC 6 Clicks Basic Mobility (PT)  -JAYRO (r) TR (t) JAYRO (c)               User Key  (r) = Recorded By, (t) = Taken By, (c) = Cosigned By      Initials Name Provider Type    JAYRO Darius Preston, PT Physical Therapist    Aaron Cooney, JERROD Student PT Student                     Time Calculation:    PT Charges       Row Name 06/11/24 1412             Time Calculation    PT Received On 06/11/24 (P)   -TR         Timed Charges    31496 - Gait Training Minutes  10 (P)   -TR      05041 - PT Therapeutic Activity Minutes 5 (P)   -TR         Total Minutes    Timed Charges Total Minutes 15 (P)   -TR       Total Minutes 15 (P)   -TR                User Key  (r) = Recorded By, (t) = Taken By, (c) = Cosigned By      Initials Name Provider Type    TR Aaron Fowler, PT Student PT Student                  Therapy Charges for Today       Code  Description Service Date Service Provider Modifiers Qty    55662289431 HC PT RE-EVAL ESTABLISHED PLAN 2 6/10/2024 Aaron Fowler, PT Student GP 1    [id number]  GAIT TRAINING EA 15 MIN 6/11/2024 Aaron Fowler, PT Student GP 1            PT G-Codes  Outcome Measure Options: (P) AM-PAC 6 Clicks Basic Mobility (PT)  AM-PAC 6 Clicks Score (PT): (P) 23  AM-PAC 6 Clicks Score (OT): 18    Aaron Fowler, PT Student  6/11/2024

## 2024-06-11 NOTE — PLAN OF CARE
Goal Outcome Evaluation:  Plan of Care Reviewed With: patient           Outcome Evaluation: Pt is alert and orient x4.  VS pt norm.  Pt had bone biopsy to left rib this shift.  Pain managed with prn pain management with good effect.

## 2024-06-11 NOTE — PROGRESS NOTES
[facility]   Hospitalist Progress Note    Date of admission: 5/20/2024  Patient Name: Oswaldo Bowen  1980  Date: 6/11/2024      Subjective     Chief Complaint   Patient presents with    Fall    Leg Pain       Interval Followup: pain ok, wants to eat, is npo for biopsy.    Tried to call pt's sister no answer currently left a voicemail.      Objective     Vitals:   Temp:  [97.3 °F (36.3 °C)-98.2 °F (36.8 °C)] 98.2 °F (36.8 °C)  Heart Rate:  [105-129] 123  Resp:  [10-18] 16  BP: ()/(51-82) 130/82    Physical Exam  Resting in bed, thin frail no acute distress  Elevated rate regular rhythm, no le pitting edema  Ctab no wrr ra  Answers basic questions ok, poor baseline insight    Result Review:  Vital signs, labs and recent relevant imaging reviewed.      CBC          6/3/2024    05:37 6/4/2024    06:35 6/5/2024    06:03   CBC   WBC 13.72  15.87  13.69    RBC 3.17  3.10  3.15    Hemoglobin 8.7  8.5  8.5    Hematocrit 28.2  27.4  28.1    MCV 89.0  88.4  89.2    MCH 27.4  27.4  27.0    MCHC 30.9  31.0  30.2    RDW 13.5  13.4  13.6    Platelets 520  504  475      CMP          6/3/2024    05:37 6/4/2024    06:35 6/5/2024    06:03   CMP   Glucose 102  189  104    BUN 12  13  14    Creatinine 0.47  0.53  0.48    EGFR 131.4  126.7  130.6    Sodium 135  135  132    Potassium 4.1  3.9  4.1    Chloride 97  94  93    Calcium 9.3  9.3  9.3    Total Protein 6.6  6.4     Albumin 2.9  2.9     Globulin 3.7  3.5     Total Bilirubin 0.2  0.2     Alkaline Phosphatase 200  199     AST (SGOT) 33  26     ALT (SGPT) 74  58     Albumin/Globulin Ratio 0.8  0.8     BUN/Creatinine Ratio 25.5  24.5  29.2    Anion Gap 8.5  15.5  10.5          acetaminophen    aluminum-magnesium hydroxide-simethicone    senna-docusate sodium **AND** polyethylene glycol **AND** bisacodyl **AND** bisacodyl    calcium carbonate    dextrose    dextrose    Diclofenac Sodium    glucagon (human recombinant)    HYDROcodone-acetaminophen     HYDROcodone-acetaminophen    hydrOXYzine    levalbuterol    Lidocaine    melatonin    [DISCONTINUED] Morphine **AND** naloxone    nicotine    ondansetron    ondansetron ODT **OR** [DISCONTINUED] ondansetron    prochlorperazine    sodium chloride    sodium chloride    budesonide-formoterol, 2 puff, Inhalation, BID - RT  [Held by provider] enoxaparin, 40 mg, Subcutaneous, Q24H  Lidocaine, 1 patch, Transdermal, Q24H  magnesium oxide, 400 mg, Oral, Daily  metoprolol succinate XL, 25 mg, Oral, Q12H  multivitamin with minerals, 1 tablet, Oral, Daily  polyethylene glycol, 17 g, Oral, Daily  Psyllium, 1 packet, Oral, Daily  senna-docusate sodium, 2 tablet, Oral, BID  sodium chloride, 10 mL, Intravenous, Q12H  tiotropium bromide monohydrate, 2 puff, Inhalation, Daily - RT  vitamin B-12, 1,000 mcg, Oral, Daily        CT Fine Needle Aspir W CT Guidance 1st Lesion    Result Date: 6/11/2024  Impression: Technically successful fine-needle aspiration of a lytic lesion in the lateral left eighth rib without evidence of complication Electronically Signed: Saman Sandoval MD  6/11/2024 3:07 PM EDT  Workstation ID: [id number]    XR Femur 2 View Right    Result Date: 6/7/2024  No hardware complications following ORIF of the proximal femur fracture. No hip dislocation. Fracture line is still visible.  Electronically Signed By-Dr. Galen Dailey MD On:6/7/2024 9:59 PM      CT Chest With Contrast Diagnostic    Result Date: 6/4/2024  1. No evidence of pulmonary embolus 2. No evidence of pneumonia 3. 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 malignancy, with bilateral hilar and AP window adenopathy and extensive osteolytic metastatic disease as described Electronically Signed: Sree Hawthorne  6/4/2024 5:17 PM EDT  Workstation ID: [id number]     Assessment / Plan     Summary: 44 y.o. with intellectual disability, 1-2PPD smoking, depression, who presented after a fall (possibly 1 month ago but may have been more recently), found  to have right femoral fracture and VLAD.  Ortho assisting with initial surgical treatment.  Initial lung mass concerning on chest x-ray additional CT imaging with suspected new metastatic colon cancer with mets to the bone, pulmonology consulted patient had a biopsy results pending.  Bronchoscopy also with findings of Haemophilus influenzae treatment with antibiotics.  Palliative assisting.  Awaiting pathology results from bone biopsy, this will dictate whether or not patient would go to rehab versus start chemotherapy if this is an aggressive cancer.  Patient is working better with physical therapy and is ambulating with minimal assistance       Assessment/Plan (clinically significant if listed here)  Mechanical fall with acute comminuted displaced intertrochanteric right femoral fracture  S/p 5/21 right hip subtrochanteric nailing of closed displaced right femur fracture by Dr. Nelson  VLAD creatinine 1.7 leukocytosis suspect reactive in setting of acute fracture  Suspected new metastatic lung cancer with mets to the bone  Mediastinal lymphadenopathy  Haemophilus influenzae pneumonia   1 to 2 pack/day smoker, chronic  Intellectual disability  Depression  Normocytic anemia    D/w oncology, repeat right rib biopsy pending today, need additional sample for further testing.  Original testing was inadequate quantity atypical cells reported from initial stains sent out to [facility].   Patient has some concerns about pain on his back/being able to lay flat for procedure; added an as needed iv dilaudid dose in addition to current po meds.   Hold Lovenox pre biopsy  Cont metoprolol succinate for bp and tachycardia, monitor  CT PE is negative for pulmonary embolism no acute changes  Continue Symbicort, Spiriva, respiratory hygiene as needed inhalers with Xopenex  Continue bowel regimen, monitor with pain medications  Continue lidocaine patch as needed medications for pain as needed [place]  Prn nrt, pt declines need  currently  Cont mvi/b12, thiamine   PT/OT  Continue hospitalization at current level of care    Dispo: Discussed with social work working on rehab placement.  Insurance declined p2p; appeal attempted, waiting to hear back, apparently was already declined.   Discussed plan of care/dispo with clinical .     DVT prophylaxis:  Pharmacologic & mechanical VTE prophylaxis orders are present.    Level Of Support Discussed With: Patient  Code Status (Patient has no pulse and is not breathing): CPR (Attempt to Resuscitate)  Medical Interventions (Patient has pulse or is breathing): Full Support

## 2024-06-11 NOTE — H&P
[facility]   Interventional Radiology H&P    Patient Name: Oswaldo Bowen  : 1980  MRN: [id number]  Primary Care Physician:  Provider, No Known  Referring Physician: No ref. provider found  Date of admission: 2024    Subjective   Subjective     HPI:  Oswaldo Bowen is a 44 y.o. male Multiple bone lesions    Review of Systems:   Constitutional no fever,  no weight loss       Otolaryngeal no difficulty swallowing   Cardiovascular no chest pain   Pulmonary no cough, no sputum production   Gastrointestinal no constipation, no diarrhea                         Personal History       Past Medical/Surgical History: History reviewed. No pertinent past medical history.  Past Surgical History:   Procedure Laterality Date    BRONCHOSCOPY N/A 2024    Procedure: BRONCHOSCOPY WITH ENDOBRONCHIAL ULTRASOUND, FINE NEEDLE ASPIRATE, BIOPSIES, BRUSHINGS, BRONCHOALVEOLAR LAVAGE;  Surgeon: Kendell Stern MD;  Location: [facility] ENDOSCOPY;  Service: Pulmonary;  Laterality: N/A;  LUNG NODULE, MUCOUS PLUGGING    HIP INTERTROCHANTERIC NAILING Right 2024    Procedure: HIP INTERTROCHANTERIC NAILING;  Surgeon: Molina Clayton MD;  Location: [facility] MAIN OR;  Service: Orthopedics;  Laterality: Right;       Social History:  reports that he has been smoking cigarettes. He started smoking about 24 years ago. He has a 36.7 pack-year smoking history. He has quit using smokeless tobacco. He reports that he does not drink alcohol and does not use drugs.    Medications:  No medications prior to admission.     Current medications:  lidocaine, , ,   budesonide-formoterol, 2 puff, Inhalation, BID - RT  [Held by provider] enoxaparin, 40 mg, Subcutaneous, Q24H  HYDROmorphone, 0.5 mg, Intravenous, On Call  Lidocaine, 1 patch, Transdermal, Q24H  magnesium oxide, 400 mg, Oral, Daily  metoprolol succinate XL, 25 mg, Oral, Q12H  multivitamin with minerals, 1 tablet, Oral, Daily  polyethylene glycol, 17 g, Oral, Daily  Psyllium, 1 packet,  "Oral, Daily  senna-docusate sodium, 2 tablet, Oral, BID  sodium chloride, 10 mL, Intravenous, Q12H  tiotropium bromide monohydrate, 2 puff, Inhalation, Daily - RT  vitamin B-12, 1,000 mcg, Oral, Daily      Current IV drips:       Allergies:  No Known Allergies    Objective    Objective     Vitals:   Temp:  [97.6 °F (36.4 °C)-98.2 °F (36.8 °C)] 97.7 °F (36.5 °C)  Heart Rate:  [105-129] 105  Resp:  [12-16] 12  BP: (109-119)/(71-77) 109/77      Physical Exam:   Constitutional: Awake, alert, No acute distress    Respiratory: Clear to auscultation bilaterally, nonlabored respirations    Cardiovascular: RRR, no murmurs, rubs, or gallops, palpable pedal pulses bilaterally   Gastrointestinal: Positive bowel sounds, soft, nontender, nondistended        ASA SCALE ASSESSMENT:       MALLAMPATI CLASSIFICATION:          Result Review        Result Review:     No results found for: \"NA\"    No results found for: \"K\"    No results found for: \"CL\"    No results found for: \"PLASMABICARB\"    No results found for: \"BUN\"    No results found for: \"CREATININE\"    No results found for: \"CALCIUM\"        No components found for: \"GLUCOSE.*\"  Results from last 7 days   Lab Units 06/05/24  0603   WBC 10*3/mm3 13.69*   HEMOGLOBIN g/dL 8.5*   HEMATOCRIT % 28.1*   PLATELETS 10*3/mm3 475*                Assessment / Plan     Assesment:   Multiple bone lesions      Plan:   CT guided biopsy of bone lesion    The risks and benefits of the procedure were discussed with the patient.    Electronically signed by Saman Sandoval MD, 06/11/24, 11:34 AM EDT.   "

## 2024-06-11 NOTE — PLAN OF CARE
Goal Outcome Evaluation:  Plan of Care Reviewed With: patient        Progress: no change  Outcome Evaluation: vss, though HR remains elevated 1teens-120s. pt did not request any pain med thru the night. slept well. NPO since MN for planned bone biopsy this am.

## 2024-06-12 PROCEDURE — 94761 N-INVAS EAR/PLS OXIMETRY MLT: CPT

## 2024-06-12 PROCEDURE — 97116 GAIT TRAINING THERAPY: CPT

## 2024-06-12 PROCEDURE — 94799 UNLISTED PULMONARY SVC/PX: CPT

## 2024-06-12 PROCEDURE — 94664 DEMO&/EVAL PT USE INHALER: CPT

## 2024-06-12 PROCEDURE — 99232 SBSQ HOSP IP/OBS MODERATE 35: CPT | Performed by: INTERNAL MEDICINE

## 2024-06-12 PROCEDURE — 97535 SELF CARE MNGMENT TRAINING: CPT

## 2024-06-12 PROCEDURE — 25010000002 ENOXAPARIN PER 10 MG: Performed by: INTERNAL MEDICINE

## 2024-06-12 PROCEDURE — 94760 N-INVAS EAR/PLS OXIMETRY 1: CPT

## 2024-06-12 PROCEDURE — 97530 THERAPEUTIC ACTIVITIES: CPT

## 2024-06-12 RX ADMIN — HYDROCODONE BITARTRATE AND ACETAMINOPHEN 1 TABLET: 5; 325 TABLET ORAL at 05:47

## 2024-06-12 RX ADMIN — HYDROCODONE BITARTRATE AND ACETAMINOPHEN 1 TABLET: 5; 325 TABLET ORAL at 21:07

## 2024-06-12 RX ADMIN — Medication 10 ML: at 21:07

## 2024-06-12 RX ADMIN — LIDOCAINE 1 PATCH: 560 PATCH PERCUTANEOUS; TOPICAL; TRANSDERMAL at 08:49

## 2024-06-12 RX ADMIN — CYANOCOBALAMIN TAB 500 MCG 1000 MCG: 500 TAB at 08:43

## 2024-06-12 RX ADMIN — Medication 400 MG: at 08:43

## 2024-06-12 RX ADMIN — HYDROXYZINE HYDROCHLORIDE 25 MG: 25 TABLET, FILM COATED ORAL at 21:07

## 2024-06-12 RX ADMIN — METOPROLOL SUCCINATE 25 MG: 25 TABLET, EXTENDED RELEASE ORAL at 08:44

## 2024-06-12 RX ADMIN — BUDESONIDE AND FORMOTEROL FUMARATE DIHYDRATE 2 PUFF: 160; 4.5 AEROSOL RESPIRATORY (INHALATION) at 18:42

## 2024-06-12 RX ADMIN — Medication 1 TABLET: at 08:43

## 2024-06-12 RX ADMIN — BUDESONIDE AND FORMOTEROL FUMARATE DIHYDRATE 2 PUFF: 160; 4.5 AEROSOL RESPIRATORY (INHALATION) at 09:10

## 2024-06-12 RX ADMIN — HYDROCODONE BITARTRATE AND ACETAMINOPHEN 1 TABLET: 5; 325 TABLET ORAL at 11:54

## 2024-06-12 RX ADMIN — AVOBENZONE, HOMOSALATE, OCTISALATE, OCTOCRYLENE, AND OXYBENZONE 1 PACKET: 29.4; 147; 49; 25.4; 58.8 LOTION TOPICAL at 08:44

## 2024-06-12 RX ADMIN — Medication 10 ML: at 08:44

## 2024-06-12 RX ADMIN — TIOTROPIUM BROMIDE INHALATION SPRAY 2 PUFF: 3.12 SPRAY, METERED RESPIRATORY (INHALATION) at 09:10

## 2024-06-12 RX ADMIN — SENNOSIDES AND DOCUSATE SODIUM 2 TABLET: 50; 8.6 TABLET ORAL at 21:07

## 2024-06-12 RX ADMIN — SENNOSIDES AND DOCUSATE SODIUM 2 TABLET: 50; 8.6 TABLET ORAL at 08:43

## 2024-06-12 RX ADMIN — ENOXAPARIN SODIUM 40 MG: 100 INJECTION SUBCUTANEOUS at 05:47

## 2024-06-12 RX ADMIN — METOPROLOL SUCCINATE 25 MG: 25 TABLET, EXTENDED RELEASE ORAL at 21:07

## 2024-06-12 NOTE — THERAPY TREATMENT NOTE
Patient Name: Oswaldo Bowen  : 1980    MRN: [id number]                              Today's Date: 2024       Admit Date: 2024    Visit Dx:     ICD-10-CM ICD-9-CM   1. Closed displaced intertrochanteric fracture of right femur, initial encounter  S72.141A 820.21   2. Pulmonary nodule  R91.1 793.11   3. Difficulty walking  R26.2 719.7   4. Dysphagia, oropharyngeal  R13.12 787.22   5. Difficulty in walking  R26.2 719.7     Patient Active Problem List   Diagnosis    Closed intertrochanteric fracture of right femur    Pulmonary nodule     History reviewed. No pertinent past medical history.  Past Surgical History:   Procedure Laterality Date    BRONCHOSCOPY N/A 2024    Procedure: BRONCHOSCOPY WITH ENDOBRONCHIAL ULTRASOUND, FINE NEEDLE ASPIRATE, BIOPSIES, BRUSHINGS, BRONCHOALVEOLAR LAVAGE;  Surgeon: Kendell Stern MD;  Location: [facility] ENDOSCOPY;  Service: Pulmonary;  Laterality: N/A;  LUNG NODULE, MUCOUS PLUGGING    HIP INTERTROCHANTERIC NAILING Right 2024    Procedure: HIP INTERTROCHANTERIC NAILING;  Surgeon: Molina Clayton MD;  Location: [facility] MAIN OR;  Service: Orthopedics;  Laterality: Right;    US GUIDED FINE NEEDLE ASPIRATION  2024      General Information       Row Name 24 1110          OT Time and Intention    Document Type therapy note (daily note)  -PG     Mode of Treatment individual therapy;occupational therapy  -PG               User Key  (r) = Recorded By, (t) = Taken By, (c) = Cosigned By      Initials Name Provider Type    PG Felix Villasenor OT Occupational Therapist                     Mobility/ADL's       Row Name 24 1110          Grooming Assessment/Training    [place] Level (Grooming) grooming skills;oral care regimen;set up  -PG       Row Name 24 1110          Toileting Assessment/Training    [place] Level (Toileting) toileting skills;standby assist  -PG     Assistive Devices (Toileting) urinal  -PG     Position (Toileting) supported  standing  -PG               User Key  (r) = Recorded By, (t) = Taken By, (c) = Cosigned By      Initials Name Provider Type    PG Felix Villasenor OT Occupational Therapist                   Obj/Interventions    No documentation.                  Goals/Plan    No documentation.                  Clinical Impression       [facility] Name 06/12/24 1111          Plan of Care Review    Progress no change  -PG               User Key  (r) = Recorded By, (t) = Taken By, (c) = Cosigned By      Initials Name Provider Type    PG Felix Villasenor OT Occupational Therapist                   Outcome Measures       Row Name 06/12/24 1112          How much help from another is currently needed...    Putting on and taking off regular lower body clothing? 2  -PG     Bathing (including washing, rinsing, and drying) 3  -PG     Toileting (which includes using toilet bed pan or urinal) 3  -PG     Putting on and taking off regular upper body clothing 3  -PG     Taking care of personal grooming (such as brushing teeth) 3  -PG     Eating meals 4  -PG     AM-PAC 6 Clicks Score (OT) 18  -PG       Row Name 06/12/24 0849          How much help from another person do you currently need...    Turning from your back to your side while in flat bed without using bedrails? 4  -KG     Moving from lying on back to sitting on the side of a flat bed without bedrails? 4  -KG     Moving to and from a bed to a chair (including a wheelchair)? 4  -KG     Standing up from a chair using your arms (e.g., wheelchair, bedside chair)? 4  -KG     Climbing 3-5 steps with a railing? 3  -KG     To walk in hospital room? 3  -KG     AM-PAC 6 Clicks Score (PT) 22  -KG     Highest Level of Mobility Goal 7 --> Walk 25 feet or more  -KG       Row Name 06/12/24 1112          Functional Assessment    Outcome Measure Options AM-PAC 6 Clicks Daily Activity (OT);Optimal Instrument  -PG       [facility] Name 06/12/24 1112          Optimal Instrument    Optimal Instrument Optimal - 3  -PG      Bending/Stooping 3  -PG     Standing 2  -PG     Reaching 1  -PG               User Key  (r) = Recorded By, (t) = Taken By, (c) = Cosigned By      Initials Name Provider Type    PG Felix Villasenor OT Occupational Therapist    Danae Jimenez, RN Registered Nurse                    Occupational Therapy Education       Title: PT OT SLP Therapies (Done)       Topic: Occupational Therapy (Done)       Point: ADL training (Done)       Description:   Instruct learner(s) on proper safety adaptation and remediation techniques during self care or transfers.   Instruct in proper use of assistive devices.                  Learning Progress Summary             Patient Acceptance, E, Bed IU by DS at 6/2/2024 0830    Eager, E, VU by  at 5/23/2024 2223    Acceptance, E,D, NR by PG at 5/22/2024 0917                         Point: Home exercise program (Done)       Description:   Instruct learner(s) on appropriate technique for monitoring, assisting and/or progressing therapeutic exercises/activities.                  Learning Progress Summary             Patient Acceptance, E, Bed IU by DS at 6/2/2024 0830    Eager, E, VU by  at 5/23/2024 2223    Acceptance, E,D, NR by PG at 5/22/2024 0917                         Point: Precautions (Done)       Description:   Instruct learner(s) on prescribed precautions during self-care and functional transfers.                  Learning Progress Summary             Patient Acceptance, E, Bed IU by DS at 6/2/2024 0830    Eager, E, VU by  at 5/23/2024 2223    Acceptance, E,D, NR by PG at 5/22/2024 0917                         Point: Body mechanics (Done)       Description:   Instruct learner(s) on proper positioning and spine alignment during self-care, functional mobility activities and/or exercises.                  Learning Progress Summary             Patient Acceptance, E, Bed IU by DS at 6/2/2024 0830    Eager, E, VU by  at 5/23/2024 2223    Acceptance, E,D, NR by PG at 5/22/2024  0917                                         User Key       Initials Effective Dates Name Provider Type Discipline    PG 06/16/21 -  Felix Villasenor OT Occupational Therapist OT    DS 06/26/23 -  Immanuel Beck, RN Registered Nurse Nurse     05/31/23 -  Angela Casillas, RN Registered Nurse Nurse                  OT Recommendation and Plan  Planned Therapy Interventions (OT): activity tolerance training, BADL retraining, strengthening exercise, transfer/mobility retraining, occupation/activity based interventions, patient/caregiver education/training  Therapy Frequency (OT): 5 times/wk  Plan of Care Review  Plan of Care Reviewed With: patient  Progress: no change  Outcome Evaluation: Patient making good progress in occupational therapy.  Patient now able to walk to the sink and complete grooming task in standing with contact-guard assist and rolling walker for safety.  Patient is performing functional transfers with contact-guard/minimal assist and lower body self-care with maximal assistance.  Recommend continued skilled OT services to maximize ADL performance and return patient to his prior level of function     Time Calculation:   Evaluation Complexity (OT)  Review Occupational Profile/Medical/Therapy History Complexity: brief/low complexity  Assessment, Occupational Performance/Identification of Deficit Complexity: 3-5 performance deficits  Clinical Decision Making Complexity (OT): problem focused assessment/low complexity  Overall Complexity of Evaluation (OT): low complexity     Time Calculation- OT       Row Name 06/12/24 1113             Time Calculation- OT    OT Received On 06/12/24  -PG      OT Goal Re-Cert Due Date 06/15/24  -PG         Timed Charges    21772 - OT Therapeutic Activity Minutes 15  -PG      50208 - OT Self Care/Mgmt Minutes 10  -PG         Total Minutes    Timed Charges Total Minutes 25  -PG       Total Minutes 25  -PG                User Key  (r) = Recorded By, (t) = Taken By, (c) =  Cosigned By      Initials Name Provider Type    PG Felix Villasenor OT Occupational Therapist                  Therapy Charges for Today       Code Description Service Date Service Provider Modifiers Qty    47231097504 HC OT THERAPEUTIC ACT EA 15 MIN 6/12/2024 Felix Villasenor OT GO 1    39367073590 HC OT SELF CARE/MGMT/TRAIN EA 15 MIN 6/12/2024 Felix Villasenor OT GO 1                 Felix Villasenor OT  6/12/2024

## 2024-06-12 NOTE — PROGRESS NOTES
[facility]   Hospitalist Progress Note    Date of admission: 5/20/2024  Patient Name: Oswaldo Bowen  1980  Date: 6/12/2024      Subjective     Chief Complaint   Patient presents with    Fall    Leg Pain       Interval Followup: pain ok, had repeat right lung bx yesterday did well  No soa.  Is asking for a coke    Objective     Vitals:   Temp:  [97.3 °F (36.3 °C)-98.2 °F (36.8 °C)] 98.1 °F (36.7 °C)  Heart Rate:  [110-134] 120  Resp:  [16-18] 16  BP: (113-130)/(73-82) 120/78    Physical Exam  Resting in bed, thin frail no acute distress  Elevated rate regular rhythm, no le pitting edema  Ctab no wrr ra, no acute resp distress  Answers basic questions ok, poor baseline insight    Result Review:  Vital signs, labs and recent relevant imaging reviewed.      CBC          6/3/2024    05:37 6/4/2024    06:35 6/5/2024    06:03   CBC   WBC 13.72  15.87  13.69    RBC 3.17  3.10  3.15    Hemoglobin 8.7  8.5  8.5    Hematocrit 28.2  27.4  28.1    MCV 89.0  88.4  89.2    MCH 27.4  27.4  27.0    MCHC 30.9  31.0  30.2    RDW 13.5  13.4  13.6    Platelets 520  504  475      CMP          6/3/2024    05:37 6/4/2024    06:35 6/5/2024    06:03   CMP   Glucose 102  189  104    BUN 12  13  14    Creatinine 0.47  0.53  0.48    EGFR 131.4  126.7  130.6    Sodium 135  135  132    Potassium 4.1  3.9  4.1    Chloride 97  94  93    Calcium 9.3  9.3  9.3    Total Protein 6.6  6.4     Albumin 2.9  2.9     Globulin 3.7  3.5     Total Bilirubin 0.2  0.2     Alkaline Phosphatase 200  199     AST (SGOT) 33  26     ALT (SGPT) 74  58     Albumin/Globulin Ratio 0.8  0.8     BUN/Creatinine Ratio 25.5  24.5  29.2    Anion Gap 8.5  15.5  10.5          acetaminophen    aluminum-magnesium hydroxide-simethicone    senna-docusate sodium **AND** polyethylene glycol **AND** bisacodyl **AND** bisacodyl    calcium carbonate    dextrose    dextrose    Diclofenac Sodium    glucagon (human recombinant)    HYDROcodone-acetaminophen     HYDROcodone-acetaminophen    hydrOXYzine    levalbuterol    Lidocaine    melatonin    [DISCONTINUED] Morphine **AND** naloxone    nicotine    ondansetron    ondansetron ODT **OR** [DISCONTINUED] ondansetron    prochlorperazine    sodium chloride    sodium chloride    budesonide-formoterol, 2 puff, Inhalation, BID - RT  enoxaparin, 40 mg, Subcutaneous, Q24H  Lidocaine, 1 patch, Transdermal, Q24H  magnesium oxide, 400 mg, Oral, Daily  metoprolol succinate XL, 25 mg, Oral, Q12H  multivitamin with minerals, 1 tablet, Oral, Daily  polyethylene glycol, 17 g, Oral, Daily  Psyllium, 1 packet, Oral, Daily  senna-docusate sodium, 2 tablet, Oral, BID  sodium chloride, 10 mL, Intravenous, Q12H  tiotropium bromide monohydrate, 2 puff, Inhalation, Daily - RT  vitamin B-12, 1,000 mcg, Oral, Daily        CT Fine Needle Aspir W CT Guidance 1st Lesion    Result Date: 6/11/2024  Impression: Technically successful fine-needle aspiration of a lytic lesion in the lateral left eighth rib without evidence of complication Electronically Signed: Saman Sandoval MD  6/11/2024 3:07 PM EDT  Workstation ID: [id number]    XR Femur 2 View Right    Result Date: 6/7/2024  No hardware complications following ORIF of the proximal femur fracture. No hip dislocation. Fracture line is still visible.  Electronically Signed By-Dr. Galen Dailey MD On:6/7/2024 9:59 PM      CT Chest With Contrast Diagnostic    Result Date: 6/4/2024  1. No evidence of pulmonary embolus 2. No evidence of pneumonia 3. 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 malignancy, with bilateral hilar and AP window adenopathy and extensive osteolytic metastatic disease as described Electronically Signed: Sree Hawthorne  6/4/2024 5:17 PM EDT  Workstation ID: [id number]     Assessment / Plan     Summary: 44 y.o. with intellectual disability, 1-2PPD smoking, depression, who presented after a fall (possibly 1 month ago but may have been more recently), found to have right  femoral fracture and VLAD.  Ortho assisting with initial surgical treatment.  Initial lung mass concerning on chest x-ray additional CT imaging with suspected new metastatic colon cancer with mets to the bone, pulmonology consulted patient had a biopsy results pending.  Bronchoscopy also with findings of Haemophilus influenzae treatment with antibiotics.  Palliative assisting.  Awaiting pathology results from right rib biopsy (initial testing inadequate total sample, resulted with atypical cells), repeat biopsy obtained 6/11 and results pending.  Patient is working better with physical therapy and is ambulating with minimal assistance.  Initial placement declined, P2p attempted.       Assessment/Plan (clinically significant if listed here)  Mechanical fall with acute comminuted displaced intertrochanteric right femoral fracture  S/p 5/21 right hip subtrochanteric nailing of closed displaced right femur fracture by Dr. Nelson  VLAD creatinine 1.7 leukocytosis suspect reactive in setting of acute fracture  Suspected new metastatic lung cancer with mets to the bone  Mediastinal lymphadenopathy  Haemophilus influenzae pneumonia   1 to 2 pack/day smoker, chronic  Intellectual disability  Depression  Normocytic anemia    Repeat right rib biopsy pending from 6/11.  Will take some time for results given sendout to [facility].    Original testing was inadequate quantity atypical cells reported from initial stains sent out to [facility].   Cont prn pain medications, tolerating, no sedation noted  Cont metoprolol succinate for bp and tachycardia, monitor  CT PE is negative for pulmonary embolism no acute changes  Continue Symbicort, Spiriva, respiratory hygiene as needed inhalers with Xopenex  Continue bowel regimen, monitor with pain medications  Continue lidocaine patch as needed medications for pain as needed [place]  Prn nrt, pt declines need currently  Cont mvi/b12, thiamine   PT/OT  Continue hospitalization at  current level of care    Dispo: Discussed with social work working on rehab placement.  Insurance declined p2p; appeal attempted, waiting to hear back, apparently was already declined.   Discussed plan of care/dispo with clinical .       DVT prophylaxis:  Pharmacologic & mechanical VTE prophylaxis orders are present.    Level Of Support Discussed With: Patient  Code Status (Patient has no pulse and is not breathing): CPR (Attempt to Resuscitate)  Medical Interventions (Patient has pulse or is breathing): Full Support

## 2024-06-12 NOTE — PLAN OF CARE
Goal Outcome Evaluation:  Plan of Care Reviewed With: patient        Progress: no change  Outcome Evaluation: Pt vss. Pt HR remains in the 120s. C/o pain in his back and legs gave norco oncwe today. Up to the br with x1 assist. Continue plan of care

## 2024-06-12 NOTE — PLAN OF CARE
Goal Outcome Evaluation:  Plan of Care Reviewed With: patient        Progress: no change  Outcome Evaluation: vss, though HR remains 120s thru night. pt medicated with norco 5mg x 2 tonight. slept well. continue poc.

## 2024-06-12 NOTE — THERAPY TREATMENT NOTE
Acute Care - Physical Therapy Treatment Note   [place]     Patient Name: Oswaldo Bowen  : 1980  MRN: [id number]  Today's Date: 2024      Visit Dx:     ICD-10-CM ICD-9-CM   1. Closed displaced intertrochanteric fracture of right femur, initial encounter  S72.141A 820.21   2. Pulmonary nodule  R91.1 793.11   3. Difficulty walking  R26.2 719.7   4. Dysphagia, oropharyngeal  R13.12 787.22   5. Difficulty in walking  R26.2 719.7     Patient Active Problem List   Diagnosis    Closed intertrochanteric fracture of right femur    Pulmonary nodule     History reviewed. No pertinent past medical history.  Past Surgical History:   Procedure Laterality Date    BRONCHOSCOPY N/A 2024    Procedure: BRONCHOSCOPY WITH ENDOBRONCHIAL ULTRASOUND, FINE NEEDLE ASPIRATE, BIOPSIES, BRUSHINGS, BRONCHOALVEOLAR LAVAGE;  Surgeon: Kendell Stern MD;  Location: [facility] ENDOSCOPY;  Service: Pulmonary;  Laterality: N/A;  LUNG NODULE, MUCOUS PLUGGING    HIP INTERTROCHANTERIC NAILING Right 2024    Procedure: HIP INTERTROCHANTERIC NAILING;  Surgeon: Molina Clayton MD;  Location: [facility] MAIN OR;  Service: Orthopedics;  Laterality: Right;    US GUIDED FINE NEEDLE ASPIRATION  2024     PT Assessment (Last 12 Hours)       PT Evaluation and Treatment       Row Name 24 1200          Physical Therapy Time and Intention    Subjective Information no complaints (P)   -TR     Document Type therapy note (daily note) (P)   -TR     Mode of Treatment individual therapy;physical therapy (P)   -TR     Patient Effort good (P)   -TR     Symptoms Noted During/After Treatment none (P)   -TR       Row Name 24 1200          Mobility    Extremity Weight-bearing Status right lower extremity (P)   -TR     Right Lower Extremity (Weight-bearing Status) weight-bearing as tolerated (WBAT) (P)   -TR       Row Name 24 1200          Bed Mobility    Bed Mobility supine-sit;sit-supine;scooting/bridging (P)   -TR     Scooting/Bridging  Rogers (Bed Mobility) verbal cues;standby assist (P)   -TR     Supine-Sit Rogers (Bed Mobility) verbal cues;standby assist (P)   -TR     Sit-Supine Rogers (Bed Mobility) verbal cues;standby assist (P)   -TR     Assistive Device (Bed Mobility) bed rails (P)   -TR       Row Name 06/12/24 1200          Transfers    Transfers sit-stand transfer;stand-sit transfer (P)   -TR     Maintains Weight-bearing Status (Transfers) able to maintain (P)   -TR       Row Name 06/12/24 1200          Bed-Chair Transfer    Bed-Chair Rogers (Transfers) standby assist (P)   -TR     Assistive Device (Bed-Chair Transfers) walker, front-wheeled (P)   -TR       Row Name 06/12/24 1200          Sit-Stand Transfer    Sit-Stand Rogers (Transfers) standby assist (P)   -TR     Assistive Device (Sit-Stand Transfers) walker, front-wheeled (P)   -TR       Row Name 06/12/24 1200          Stand-Sit Transfer    Stand-Sit Rogers (Transfers) standby assist (P)   -TR     Assistive Device (Stand-Sit Transfers) walker, front-wheeled (P)   -TR       Row Name 06/12/24 1200          Gait/Stairs (Locomotion)    Gait/Stairs Locomotion gait/ambulation independence;gait/ambulation assistive device;distance ambulated (P)   -TR     Rogers Level (Gait) standby assist;verbal cues (P)   -TR     Assistive Device (Gait) walker, front-wheeled (P)   -TR     Patient was able to Ambulate yes (P)   -TR     Distance in Feet (Gait) 200 (P)   -TR     Pattern (Gait) step-through (P)   -TR     Deviations/Abnormal Patterns (Gait) gait speed decreased;stride length decreased (P)   -TR     Bilateral Gait Deviations heel strike decreased (P)   -TR     Right Sided Gait Deviations -- (P)   decreased toe off and roll over on R LE  -TR       Row Name 06/12/24 1200          Safety Issues, Functional Mobility    Impairments Affecting Function (Mobility) balance;endurance/activity tolerance;strength (P)   -TR       Row Name 06/12/24 1200           Balance    Balance Assessment standing dynamic balance (P)   -TR     Dynamic Standing Balance standby assist (P)   -TR       Row Name 06/12/24 1200          Motor Skills    Motor Skills coordination;functional endurance (P)   -TR       Row Name             Wound 05/21/24 0251 Bilateral coccyx    Wound - Properties Group Placement Date: 05/21/24  -SR Placement Time: 0251  -SR Present on Original Admission: Y  -SR Side: Bilateral  -SR Location: coccyx  -SR    Retired Wound - Properties Group Placement Date: 05/21/24  -SR Placement Time: 0251  -SR Present on Original Admission: Y  -SR Side: Bilateral  -SR Location: coccyx  -SR    Retired Wound - Properties Group Date first assessed: 05/21/24  -SR Time first assessed: 0251  -SR Present on Original Admission: Y  -SR Side: Bilateral  -SR Location: coccyx  -SR      Row Name             Wound 05/21/24 Right lateral thigh Incision    Wound - Properties Group Placement Date: 05/21/24  -SF Present on Original Admission: N  -SF Side: Right  -SF Orientation: lateral  -SF Location: thigh  -SF Primary Wound Type: Incision  -SF Additional Comments: incision sites x 3 to lateral right thigh  -SF    Retired Wound - Properties Group Placement Date: 05/21/24  -SF Present on Original Admission: N  -SF Side: Right  -SF Orientation: lateral  -SF Location: thigh  -SF Primary Wound Type: Incision  -SF Additional Comments: incision sites x 3 to lateral right thigh  -SF    Retired Wound - Properties Group Date first assessed: 05/21/24  -SF Present on Original Admission: N  -SF Side: Right  -SF Location: thigh  -SF Primary Wound Type: Incision  -SF Additional Comments: incision sites x 3 to lateral right thigh  -SF      Row Name 06/12/24 1200          Positioning and Restraints    Pre-Treatment Position in bed (P)   -TR     Post Treatment Position bed (P)   -TR     In Bed call light within reach;encouraged to call for assist;exit alarm on (P)   -TR       Row Name 06/12/24 1200           Progress Summary (PT)    Daily Progress Summary (PT) Pt presented with increased gait speed but decreased roll over and toe off during ambulation. Continue with POC to address deficits in coordination, balance, and strength. (P)   -TR               User Key  (r) = Recorded By, (t) = Taken By, (c) = Cosigned By      Initials Name Provider Type    SF Saritha Ramirez, RN Registered Nurse     Saritha Long RN Registered Nurse    Aaron Cooney, PT Student PT Student                    Physical Therapy Education       Title: PT OT SLP Therapies (Done)       Topic: Physical Therapy (Done)       Point: Mobility training (Done)       Learning Progress Summary             Patient Acceptance, E,TB, VU by TR at 6/10/2024 1507    Acceptance, E, Bed IU by DS at 6/2/2024 0830    Acceptance, E, VU by PS at 6/1/2024 0627    Acceptance, E,TB, VU by TR at 5/31/2024 1449    Eager, E, VU by AH at 5/23/2024 2223    Acceptance, E,TB, VU by AV at 5/22/2024 1339                         Point: Home exercise program (Done)       Learning Progress Summary             Patient Acceptance, E, Bed IU by DS at 6/2/2024 0830                         Point: Body mechanics (Done)       Learning Progress Summary             Patient Acceptance, E, Bed IU by DS at 6/2/2024 0830    Acceptance, E, VU by PS at 6/1/2024 0627    Acceptance, E,TB, VU by AV at 5/22/2024 1339                         Point: Precautions (Done)       Learning Progress Summary             Patient Acceptance, E,TB, VU by TR at 6/10/2024 1507    Acceptance, E, Bed IU by DS at 6/2/2024 0830    Acceptance, E,TB, VU by TR at 5/31/2024 1449    Acceptance, E,TB, VU by AV at 5/22/2024 1339                                         User Key       Initials Effective Dates Name Provider Type Discipline    PS 06/16/21 -  Darcie Fiore, RN Registered Nurse Nurse    AV 06/11/21 -  Eh Martínez, PT Physical Therapist PT    DS 06/26/23 -  Immanuel Beck RN Registered Nurse  Nurse     05/31/23 -  Angela Casillas, RN Registered Nurse Nurse    TR 05/21/24 -  Aaron Fowler, JERROD Student PT Student PT                  PT Recommendation and Plan  Anticipated Discharge Disposition (PT): inpatient rehabilitation facility  Planned Therapy Interventions (PT): balance training, strengthening, gait training, bed mobility training, transfer training  Therapy Frequency (PT): daily  Progress Summary (PT)  Progress Toward Functional Goals (PT): progress toward functional goals is good  Daily Progress Summary (PT): (P) Pt presented with increased gait speed but decreased roll over and toe off during ambulation. Continue with POC to address deficits in coordination, balance, and strength.  Plan of Care Reviewed With: patient  Progress: improving  Outcome Evaluation: Pt presented to session with good tolerance to ambulation, but complained of right hip pain. Pt presented with deficits in strength, endurance, and pain. PT recommended to address above deficits   Outcome Measures       Row Name 06/12/24 1200 06/11/24 1400 06/10/24 1500       How much help from another person do you currently need...    Turning from your back to your side while in flat bed without using bedrails? 4 (P)   -TR 4  -JAYRO (r) TR (t) JAYRO (c) 4  -JAYRO (r) TR (t) JAYRO (c)    Moving from lying on back to sitting on the side of a flat bed without bedrails? 4 (P)   -TR 4  -JAYRO (r) TR (t) JAYRO (c) --    Moving to and from a bed to a chair (including a wheelchair)? 4 (P)   -TR 4  -JAYRO (r) TR (t) JAYRO (c) --    Standing up from a chair using your arms (e.g., wheelchair, bedside chair)? 4 (P)   -TR 4  -JAYRO (r) TR (t) JAYRO (c) 3  -JAYRO (r) TR (t) JAYRO (c)    Climbing 3-5 steps with a railing? 3 (P)   -TR 3  -JAYRO (r) TR (t) JAYRO (c) 3  -JAYRO (r) TR (t) JAYRO (c)    To walk in hospital room? 4 (P)   -TR 4  -JAYRO (r) TR (t) JAYRO (c) 3  -JAYRO (r) TR (t) JAYRO (c)    AM-PAC 6 Clicks Score (PT) 23 (P)   -TR 23  -JAYRO (r) TR (t) --    Highest Level of Mobility Goal 7  --> Walk 25 feet or more (P)   -TR 7 --> Walk 25 feet or more  -JAYRO (r) TR (t) --       Functional Assessment    Outcome Measure Options AM-PAC 6 Clicks Daily Activity (OT);Optimal Instrument (P)   -TR AM-PAC 6 Clicks Basic Mobility (PT)  -JAYRO (r) TR (t) JAYRO (c) AM-PAC 6 Clicks Basic Mobility (PT)  -JAYRO (r) TR (t) JAYRO (c)              User Key  (r) = Recorded By, (t) = Taken By, (c) = Cosigned By      Initials Name Provider Type    Darius Srivastava, PT Physical Therapist    TR Aaron Fowler, PT Student PT Student                     Time Calculation:    PT Charges       Row Name 06/12/24 1202             Time Calculation    PT Received On 06/12/24 (P)   -TR         Timed Charges    54662 - Gait Training Minutes  10 (P)   -TR      03285 - PT Therapeutic Activity Minutes 5 (P)   -TR         Total Minutes    Timed Charges Total Minutes 15 (P)   -TR       Total Minutes 15 (P)   -TR                User Key  (r) = Recorded By, (t) = Taken By, (c) = Cosigned By      Initials Name Provider Type    Aaron Cooney, PT Student PT Student                  Therapy Charges for Today       Code Description Service Date Service Provider Modifiers Qty    08047668819 HC GAIT TRAINING EA 15 MIN 6/11/2024 Aaron Fowler, PT Student GP 1    [id number] HC GAIT TRAINING EA 15 MIN 6/12/2024 Aaron Fowler, PT Student GP 1            PT G-Codes  Outcome Measure Options: (P) AM-PAC 6 Clicks Daily Activity (OT), Optimal Instrument  AM-PAC 6 Clicks Score (PT): (P) 23  AM-PAC 6 Clicks Score (OT): 18    JERROD Thomas  6/12/2024

## 2024-06-13 PROCEDURE — 25010000002 ENOXAPARIN PER 10 MG: Performed by: INTERNAL MEDICINE

## 2024-06-13 PROCEDURE — 94799 UNLISTED PULMONARY SVC/PX: CPT

## 2024-06-13 PROCEDURE — 94760 N-INVAS EAR/PLS OXIMETRY 1: CPT

## 2024-06-13 PROCEDURE — 97116 GAIT TRAINING THERAPY: CPT

## 2024-06-13 PROCEDURE — 99232 SBSQ HOSP IP/OBS MODERATE 35: CPT | Performed by: INTERNAL MEDICINE

## 2024-06-13 PROCEDURE — 94664 DEMO&/EVAL PT USE INHALER: CPT

## 2024-06-13 RX ADMIN — LIDOCAINE 1 PATCH: 560 PATCH PERCUTANEOUS; TOPICAL; TRANSDERMAL at 09:02

## 2024-06-13 RX ADMIN — TIOTROPIUM BROMIDE INHALATION SPRAY 2 PUFF: 3.12 SPRAY, METERED RESPIRATORY (INHALATION) at 08:07

## 2024-06-13 RX ADMIN — METOPROLOL SUCCINATE 25 MG: 25 TABLET, EXTENDED RELEASE ORAL at 09:03

## 2024-06-13 RX ADMIN — Medication 1 TABLET: at 09:03

## 2024-06-13 RX ADMIN — ACETAMINOPHEN 650 MG: 325 TABLET ORAL at 07:49

## 2024-06-13 RX ADMIN — AVOBENZONE, HOMOSALATE, OCTISALATE, OCTOCRYLENE, AND OXYBENZONE 1 PACKET: 29.4; 147; 49; 25.4; 58.8 LOTION TOPICAL at 09:03

## 2024-06-13 RX ADMIN — ACETAMINOPHEN 650 MG: 325 TABLET ORAL at 22:26

## 2024-06-13 RX ADMIN — BUDESONIDE AND FORMOTEROL FUMARATE DIHYDRATE 2 PUFF: 160; 4.5 AEROSOL RESPIRATORY (INHALATION) at 08:07

## 2024-06-13 RX ADMIN — HYDROCODONE BITARTRATE AND ACETAMINOPHEN 1 TABLET: 5; 325 TABLET ORAL at 19:48

## 2024-06-13 RX ADMIN — Medication 400 MG: at 09:03

## 2024-06-13 RX ADMIN — Medication 5 MG: at 22:26

## 2024-06-13 RX ADMIN — HYDROCODONE BITARTRATE AND ACETAMINOPHEN 1 TABLET: 10; 325 TABLET ORAL at 03:57

## 2024-06-13 RX ADMIN — METOPROLOL SUCCINATE 25 MG: 25 TABLET, EXTENDED RELEASE ORAL at 20:05

## 2024-06-13 RX ADMIN — SENNOSIDES AND DOCUSATE SODIUM 2 TABLET: 50; 8.6 TABLET ORAL at 20:05

## 2024-06-13 RX ADMIN — HYDROCODONE BITARTRATE AND ACETAMINOPHEN 1 TABLET: 5; 325 TABLET ORAL at 12:58

## 2024-06-13 RX ADMIN — CYANOCOBALAMIN TAB 500 MCG 1000 MCG: 500 TAB at 09:03

## 2024-06-13 RX ADMIN — SENNOSIDES AND DOCUSATE SODIUM 2 TABLET: 50; 8.6 TABLET ORAL at 09:03

## 2024-06-13 RX ADMIN — ENOXAPARIN SODIUM 40 MG: 100 INJECTION SUBCUTANEOUS at 06:25

## 2024-06-13 RX ADMIN — Medication 10 ML: at 20:05

## 2024-06-13 RX ADMIN — BUDESONIDE AND FORMOTEROL FUMARATE DIHYDRATE 2 PUFF: 160; 4.5 AEROSOL RESPIRATORY (INHALATION) at 21:30

## 2024-06-13 RX ADMIN — Medication 10 ML: at 09:04

## 2024-06-13 RX ADMIN — POLYETHYLENE GLYCOL 3350 17 G: 17 POWDER, FOR SOLUTION ORAL at 09:03

## 2024-06-13 NOTE — PROGRESS NOTES
"Nutrition Services    Patient Name: Oswaldo Bowen  YOB: 1980  MRN: [id number]  Admission date: 5/20/2024      CLINICAL NUTRITION ASSESSMENT      Reason for Assessment  Follow up      H&P:  History reviewed. No pertinent past medical history.     Current Problems:   Active Hospital Problems    Diagnosis     **Closed intertrochanteric fracture of right femur     Pulmonary nodule         Nutrition/Diet History         Narrative   Pt alert at time of visit. Currently, pt reports eating 100% of his meals w/ no issues. Pt currently receiving ONS TID and continues to drink. No n/v/c/d complaints. RD to monitor.      Anthropometrics        Current Height, Weight Height: 182.9 cm (72.01\")  Weight: 73.1 kg (161 lb 2.5 oz)   Current BMI Body mass index is 21.85 kg/m².   BMI Classification Normal range   % IBW 84%    Adjusted Body Weight (ABW)    Weight Hx  Wt Readings from Last 30 Encounters:   06/08/24 0526 73.1 kg (161 lb 2.5 oz)   05/21/24 0219 65 kg (143 lb 4.8 oz)   05/20/24 2134 66 kg (145 lb 8.1 oz)          Wt Change Observation None documented prior to this admission.      Estimated/Assessed Needs  Estimated Needs based on: Current Body Weight       Energy Requirements 30-35 kcal/kg    EST Needs (kcal/day) 9435-0847 kcal/day       Protein Requirements 1.2-1.5 g.kg   EST Daily Needs (g/day) 78-98 g/day        Fluid Requirements 1 ml/kcal    Estimated Needs (mL/day) 6408-1270 ml/day      Labs/Medications         Pertinent Labs Reviewed.         Invalid input(s): \"LABALBU\", \"PROT\"          No results found for: \"COVID19\"  No results found for: \"HGBA1C\"      Pertinent Medications Reviewed.     Malnutrition Severity Assessment              Nutrition Diagnosis         Nutrition Dx Problem 1 Unintentional weight loss related to  decreased intake  as evidenced by  reported recent weight loss of 2-13#'s recently      Nutrition Intervention           Current Nutrition Orders & Evaluation of Intake       Current PO " Diet Diet: Regular/House; Texture: Soft to Chew (NDD 3); Soft to Chew: Whole Meat; Fluid Consistency: Thin (IDDSI 0)   Supplement Orders Placed This Encounter      Dietary Nutrition Supplements Boost Plus (Ensure Plus)           Nutrition Intervention/Prescription        Recommend to continue current diet order   Recommend to continue  Boost plus BID (Each supplement contains 360 kcal, 14g protein)         Medical Nutrition Therapy/Nutrition Education          Learner     Readiness N/A Unable to discuss with pt today   N/A     Method     Response N/A  N/A     Monitor/Evaluation        Monitor PO intake, Supplement intake, Skin status, Swallow function     Nutrition Discharge Plan         Recommend to continue oral nutrition supplements on discharge.      Electronically signed by:  Heavenly Agarwal RD  06/13/24 14:37 EDT

## 2024-06-13 NOTE — PLAN OF CARE
Goal Outcome Evaluation:              Outcome Evaluation: Pt up walking in room/[facility] today with walker. Pt complains of pain in hip/leg. Pt taking prn pain medications as needed for pain level. Pt up to bathroom with standby assist with walker. Continue to monitor.       Marquita Bernstein RN

## 2024-06-13 NOTE — PROGRESS NOTES
"Hospital Medicine Team    LOS 23 days      Patient Care Team:  Provider, No Known as PCP - General      Subjective       Chief Complaint:  fall and leg pain  Summary: 44 y.o. with intellectual disability, 1-2PPD smoking, depression, who presented after a fall (possibly 1 month ago but may have been more recently), found to have right femoral fracture and VLAD.  Ortho assisting with initial surgical treatment.  Initial lung mass concerning on chest x-ray additional CT imaging with suspected new metastatic colon cancer with mets to the bone, pulmonology consulted patient had a biopsy results pending.  Bronchoscopy also with findings of Haemophilus influenzae treatment with antibiotics.  Palliative assisting.  Awaiting pathology results from right rib biopsy (initial testing inadequate total sample, resulted with atypical cells), repeat biopsy obtained 6/11 and results pending.  Patient is working better with physical therapy and is ambulating with minimal assistance.  Initial placement declined, P2p attempted.        Subjective    Reports having intermittent pain otherwise no new issues.     Objective       Vital Signs  Temp:  [97.7 °F (36.5 °C)-100.2 °F (37.9 °C)] 98.2 °F (36.8 °C)  Heart Rate:  [115-120] 120  Resp:  [16-18] 18  BP: (107-127)/(71-80) 114/71  Oxygen Therapy  SpO2: 95 %  Pulse Oximetry Type: Intermittent  Device (Oxygen Therapy): room air  Flow (L/min): 0  Oxygen Concentration (%): 21  ETCO2 (mmHg):  (ptnot on ETCO2)  Flowsheet Rows      Flowsheet Row First Filed Value   Admission Height 182.9 cm (72\") Documented at 05/20/2024 2139   Admission Weight 66 kg (145 lb 8.1 oz) Documented at 05/20/2024 2134                Physical Exam:  Physical Exam  Vitals reviewed.   Constitutional:       Comments: Chroncially ill appearing and cachetic   Cardiovascular:      Rate and Rhythm: Normal rate.   Pulmonary:      Effort: No respiratory distress.   Abdominal:      Palpations: Abdomen is soft.   Musculoskeletal: "      Right lower leg: No edema.      Left lower leg: No edema.   Neurological:      Mental Status: He is alert.           Results Review:    I reviewed the patient's new clinical results.    [x]  Laboratory  []  Microbiology  []  Radiology  []  EKG/Telemetry   []  Cardiology/Vascular   []  Pathology  [x]  Old records  []  Other:      X-rays, labs reviewed personally by physician.    Medication Review:   I have reviewed the patient's current medication list    Scheduled Meds  budesonide-formoterol, 2 puff, Inhalation, BID - RT  enoxaparin, 40 mg, Subcutaneous, Q24H  Lidocaine, 1 patch, Transdermal, Q24H  magnesium oxide, 400 mg, Oral, Daily  metoprolol succinate XL, 25 mg, Oral, Q12H  multivitamin with minerals, 1 tablet, Oral, Daily  polyethylene glycol, 17 g, Oral, Daily  Psyllium, 1 packet, Oral, Daily  senna-docusate sodium, 2 tablet, Oral, BID  sodium chloride, 10 mL, Intravenous, Q12H  tiotropium bromide monohydrate, 2 puff, Inhalation, Daily - RT  vitamin B-12, 1,000 mcg, Oral, Daily        Meds Infusions       Meds PRN  •  acetaminophen  •  aluminum-magnesium hydroxide-simethicone  •  senna-docusate sodium **AND** polyethylene glycol **AND** bisacodyl **AND** bisacodyl  •  calcium carbonate  •  dextrose  •  dextrose  •  Diclofenac Sodium  •  glucagon (human recombinant)  •  HYDROcodone-acetaminophen  •  HYDROcodone-acetaminophen  •  hydrOXYzine  •  levalbuterol  •  Lidocaine  •  melatonin  •  [DISCONTINUED] Morphine **AND** naloxone  •  nicotine  •  ondansetron  •  ondansetron ODT **OR** [DISCONTINUED] ondansetron  •  prochlorperazine  •  sodium chloride  •  sodium chloride        Assessment / Plan       Active Hospital Problems:  Active Hospital Problems    Diagnosis  POA   • **Closed intertrochanteric fracture of right femur [S72.141A]  Yes   • Pulmonary nodule [R91.1]  Unknown      Resolved Hospital Problems   No resolved problems to display.     Assessment/Plan (clinically significant if listed  here)  Mechanical fall with acute comminuted displaced intertrochanteric right femoral fracture  S/p 5/21 right hip subtrochanteric nailing of closed displaced right femur fracture by Dr. Nelson  VLAD creatinine 1.7 leukocytosis suspect reactive in setting of acute fracture  Suspected new metastatic lung cancer with mets to the bone  Mediastinal lymphadenopathy  Haemophilus influenzae pneumonia   1 to 2 pack/day smoker, chronic  Intellectual disability  Depression  Normocytic anemia     Repeat right rib biopsy pending from 6/11.  Will take some time for results given sendout to [facility].    Original testing was inadequate quantity atypical cells reported from initial stains sent out to [facility].   Cont prn pain medications, tolerating, no sedation noted  Cont metoprolol succinate for bp and tachycardia, monitor  CT PE is negative for pulmonary embolism no acute changes  Continue Symbicort, Spiriva, respiratory hygiene as needed inhalers with Xopenex  Continue bowel regimen, monitor with pain medications  Continue lidocaine patch as needed medications for pain as needed [place]  Prn nrt, pt declines need currently  Cont mvi/b12, thiamine   PT/OT  Continue hospitalization at current level of care      DVT ppx-lovenox      Plan for disposition:Insurance declined p2p; appeal attempted, waiting to hear back, apparently was already declined. Still awating appeal for rehab/SNF    Electronically signed by Juni Ashraf DO, 06/13/24, 14:46 EDT.      Note disclaimer: At [facility], we believe that sharing information builds trust and better relationships. You are receiving this note because you recently visited [facility]. It is possible you will see health information before a provider has talked with you about it. This kind of information can be easy to misunderstand. To help you fully understand what it means for your health, we urge you to discuss this note with your provider.

## 2024-06-13 NOTE — THERAPY TREATMENT NOTE
Acute Care - Physical Therapy Treatment Note   [place]     Patient Name: Oswaldo Bowen  : 1980  MRN: [id number]  Today's Date: 2024      Visit Dx:     ICD-10-CM ICD-9-CM   1. Closed displaced intertrochanteric fracture of right femur, initial encounter  S72.141A 820.21   2. Pulmonary nodule  R91.1 793.11   3. Difficulty walking  R26.2 719.7   4. Dysphagia, oropharyngeal  R13.12 787.22   5. Difficulty in walking  R26.2 719.7     Patient Active Problem List   Diagnosis    Closed intertrochanteric fracture of right femur    Pulmonary nodule     History reviewed. No pertinent past medical history.  Past Surgical History:   Procedure Laterality Date    BRONCHOSCOPY N/A 2024    Procedure: BRONCHOSCOPY WITH ENDOBRONCHIAL ULTRASOUND, FINE NEEDLE ASPIRATE, BIOPSIES, BRUSHINGS, BRONCHOALVEOLAR LAVAGE;  Surgeon: Kendell Stern MD;  Location: [facility] ENDOSCOPY;  Service: Pulmonary;  Laterality: N/A;  LUNG NODULE, MUCOUS PLUGGING    HIP INTERTROCHANTERIC NAILING Right 2024    Procedure: HIP INTERTROCHANTERIC NAILING;  Surgeon: Molina Clayton MD;  Location: [facility] MAIN OR;  Service: Orthopedics;  Laterality: Right;    US GUIDED FINE NEEDLE ASPIRATION  2024     PT Assessment (Last 12 Hours)       PT Evaluation and Treatment       Row Name 24 1100          Physical Therapy Time and Intention    Subjective Information no complaints (P)   -TR     Document Type therapy note (daily note) (P)   -TR     Mode of Treatment individual therapy;physical therapy (P)   -TR     Patient Effort good (P)   -TR     Symptoms Noted During/After Treatment increased pain (P)   -TR       Row Name 24 1100          Mobility    Extremity Weight-bearing Status right lower extremity (P)   -TR     Right Lower Extremity (Weight-bearing Status) weight-bearing as tolerated (WBAT) (P)   -TR       Row Name 24 1100          Transfers    Transfers sit-stand transfer;stand-sit transfer (P)   -TR     Maintains  Weight-bearing Status (Transfers) able to maintain (P)   -TR       Row Name 06/13/24 1100          Sit-Stand Transfer    Sit-Stand [place] (Transfers) standby assist (P)   -TR     Assistive Device (Sit-Stand Transfers) walker, front-wheeled (P)   -TR       Row Name 06/13/24 1100          Stand-Sit Transfer    Stand-Sit [place] (Transfers) standby assist (P)   -TR     Assistive Device (Stand-Sit Transfers) walker, front-wheeled (P)   -TR       Row Name 06/13/24 1100          Gait/Stairs (Locomotion)    Gait/Stairs Locomotion gait/ambulation independence;gait/ambulation assistive device;distance ambulated (P)   -TR     [place] Level (Gait) standby assist;verbal cues (P)   -TR     Assistive Device (Gait) walker, front-wheeled (P)   -TR     Patient was able to Ambulate yes (P)   -TR     Distance in Feet (Gait) 200 (P)   -TR     Deviations/Abnormal Patterns (Gait) gait speed decreased;stride length decreased (P)   -TR     Bilateral Gait Deviations heel strike decreased (P)   -TR       Row Name 06/13/24 1100          Safety Issues, Functional Mobility    Impairments Affecting Function (Mobility) balance;endurance/activity tolerance;strength (P)   -TR       Row Name 06/13/24 1100          Balance    Balance Assessment standing dynamic balance (P)   -TR     Static Sitting Balance standby assist (P)   -TR       Row Name 06/13/24 1100          Motor Skills    Motor Skills coordination;functional endurance (P)   -TR       Row Name             Wound 05/21/24 0251 Bilateral coccyx    Wound - Properties Group Placement Date: 05/21/24  -SR Placement Time: 0251 -SR Present on Original Admission: Y  -SR Side: Bilateral  -SR Location: coccyx  -SR    Retired Wound - Properties Group Placement Date: 05/21/24  -SR Placement Time: 0251 -SR Present on Original Admission: Y  -SR Side: Bilateral  -SR Location: coccyx  -SR    Retired Wound - Properties Group Date first assessed: 05/21/24  -SR Time first assessed: 0251 -SR  Present on Original Admission: Y  -SR Side: Bilateral  -SR Location: coccyx  -SR      Row Name             Wound 05/21/24 Right lateral thigh Incision    Wound - Properties Group Placement Date: 05/21/24  -SF Present on Original Admission: N  -SF Side: Right  -SF Orientation: lateral  -SF Location: thigh  -SF Primary Wound Type: Incision  -SF Additional Comments: incision sites x 3 to lateral right thigh  -SF    Retired Wound - Properties Group Placement Date: 05/21/24  -SF Present on Original Admission: N  -SF Side: Right  -SF Orientation: lateral  -SF Location: thigh  -SF Primary Wound Type: Incision  -SF Additional Comments: incision sites x 3 to lateral right thigh  -SF    Retired Wound - Properties Group Date first assessed: 05/21/24  -SF Present on Original Admission: N  -SF Side: Right  -SF Location: thigh  -SF Primary Wound Type: Incision  -SF Additional Comments: incision sites x 3 to lateral right thigh  -SF      Row Name 06/13/24 1100          Positioning and Restraints    Pre-Treatment Position in bed (P)   -TR     Post Treatment Position bed (P)   -TR     In Bed call light within reach;encouraged to call for assist;exit alarm on;with nsg (P)   -TR       Row Name 06/13/24 1100          Progress Summary (PT)    Daily Progress Summary (PT) Pt progressed with demonstrating initial contact with heel strike. Pt improved roll over mechanics but still lacks proper push off. Continue with POC (P)   -TR               User Key  (r) = Recorded By, (t) = Taken By, (c) = Cosigned By      Initials Name Provider Type    SF Saritha Ramirez, RN Registered Nurse    Saritha Becker RN Registered Nurse    Aaron Cooney, JERROD Student PT Student                    Physical Therapy Education       Title: PT OT SLP Therapies (Done)       Topic: Physical Therapy (Done)       Point: Mobility training (Done)       Learning Progress Summary             Patient Acceptance, E,TB, VU by TR at 6/10/2024 2290     Acceptance, E, Bed IU by DS at 6/2/2024 0830    Acceptance, E, VU by PS at 6/1/2024 0627    Acceptance, E,TB, VU by TR at 5/31/2024 1449    Eager, E, VU by AH at 5/23/2024 2223    Acceptance, E,TB, VU by AV at 5/22/2024 1339                         Point: Home exercise program (Done)       Learning Progress Summary             Patient Acceptance, E, Bed IU by DS at 6/2/2024 0830                         Point: Body mechanics (Done)       Learning Progress Summary             Patient Acceptance, E, Bed IU by DS at 6/2/2024 0830    Acceptance, E, VU by PS at 6/1/2024 0627    Acceptance, E,TB, VU by AV at 5/22/2024 1339                         Point: Precautions (Done)       Learning Progress Summary             Patient Acceptance, E,TB, VU by TR at 6/10/2024 1507    Acceptance, E, Bed IU by DS at 6/2/2024 0830    Acceptance, E,TB, VU by TR at 5/31/2024 1449    Acceptance, E,TB, VU by AV at 5/22/2024 1339                                         User Key       Initials Effective Dates Name Provider Type Discipline    PS 06/16/21 -  Darcie Fiore, RN Registered Nurse Nurse    AV 06/11/21 -  Eh Martínez, PT Physical Therapist PT    DS 06/26/23 -  Immanuel Beck, RN Registered Nurse Nurse     05/31/23 -  Angela Casillas, RN Registered Nurse Nurse    TR 05/21/24 -  Aaron Fowler, JERROD Student PT Student PT                  PT Recommendation and Plan  Anticipated Discharge Disposition (PT): inpatient rehabilitation facility  Planned Therapy Interventions (PT): balance training, strengthening, gait training, bed mobility training, transfer training  Therapy Frequency (PT): daily  Progress Summary (PT)  Progress Toward Functional Goals (PT): progress toward functional goals is good  Daily Progress Summary (PT): (P) Pt progressed with demonstrating initial contact with heel strike. Pt improved roll over mechanics but still lacks proper push off. Continue with POC  Plan of Care Reviewed With:  patient  Progress: improving  Outcome Evaluation: Pt presented to session with good tolerance to ambulation, but complained of right hip pain. Pt presented with deficits in strength, endurance, and pain. PT recommended to address above deficits   Outcome Measures       Row Name 06/13/24 1100 06/12/24 1200 06/11/24 1400       How much help from another person do you currently need...    Turning from your back to your side while in flat bed without using bedrails? 4 (P)   -TR 4  -JAYRO (r) TR (t) JAYRO (c) 4  -JAYRO (r) TR (t) JAYRO (c)    Moving from lying on back to sitting on the side of a flat bed without bedrails? 4 (P)   -TR 4  -JAYRO (r) TR (t) JAYRO (c) 4  -JAYRO (r) TR (t) JAYRO (c)    Moving to and from a bed to a chair (including a wheelchair)? 4 (P)   -TR 4  -JAYRO (r) TR (t) JAYRO (c) 4  -JAYRO (r) TR (t) JAYRO (c)    Standing up from a chair using your arms (e.g., wheelchair, bedside chair)? 4 (P)   -TR 4  -JAYRO (r) TR (t) JAYRO (c) 4  -JAYRO (r) TR (t) JAYRO (c)    Climbing 3-5 steps with a railing? 4 (P)   -TR 3  -JAYRO (r) TR (t) JAYRO (c) 3  -JAYRO (r) TR (t) JAYRO (c)    To walk in hospital room? 4 (P)   -TR 4  -JAYRO (r) TR (t) JAYRO (c) 4  -JAYRO (r) TR (t) JAYRO (c)    AM-PAC 6 Clicks Score (PT) 24 (P)   -TR 23  -JAYRO (r) TR (t) 23  -JAYRO (r) TR (t)    Highest Level of Mobility Goal 8 --> Walked 250 feet or more (P)   -TR 7 --> Walk 25 feet or more  -JAYRO (r) TR (t) 7 --> Walk 25 feet or more  -JAYRO (r) TR (t)       Functional Assessment    Outcome Measure Options AM-PAC 6 Clicks Daily Activity (OT);Optimal Instrument (P)   -TR AM-PAC 6 Clicks Daily Activity (OT);Optimal Instrument  -JAYRO (r) TR (t) JAYRO (c) AM-PAC 6 Clicks Basic Mobility (PT)  -JAYRO (r) SHAWNA (t) JAYRO (c)      Row Name 06/10/24 1500             How much help from another person do you currently need...    Turning from your back to your side while in flat bed without using bedrails? 4  -JAYRO (r) SHAWNA (t) JAYRO (c)      Standing up from a chair using your arms (e.g., wheelchair, bedside chair)? 3  -JAYRO (r) SHAWNA (t) JAYRO (c)       Climbing 3-5 steps with a railing? 3  -JAYRO (r) TR (t) JAYRO (c)      To walk in hospital room? 3  -JAYRO (r) SHAWNA (t) JAYRO (c)         Functional Assessment    Outcome Measure Options AM-PAC 6 Clicks Basic Mobility (PT)  -JAYRO (r) TR (t) JAYRO (c)                User Key  (r) = Recorded By, (t) = Taken By, (c) = Cosigned By      Initials Name Provider Type    Darius Srivastava, PT Physical Therapist    Aaron Cooney, PT Student PT Student                     Time Calculation:    PT Charges       Row Name 06/13/24 1111             Time Calculation    PT Received On 06/13/24 (P)   -TR         Timed Charges    82448 - Gait Training Minutes  14 (P)   -TR         Total Minutes    Timed Charges Total Minutes 14 (P)   -TR       Total Minutes 14 (P)   -TR                User Key  (r) = Recorded By, (t) = Taken By, (c) = Cosigned By      Initials Name Provider Type    Aaron Cooney, PT Student PT Student                  Therapy Charges for Today       Code Description Service Date Service Provider Modifiers Qty    78539113767 HC GAIT TRAINING EA 15 MIN 6/12/2024 Aaron Fowler, PT Student GP 1    [id number] HC GAIT TRAINING EA 15 MIN 6/13/2024 Aaron Fowler, PT Student GP 1            PT G-Codes  Outcome Measure Options: (P) AM-PAC 6 Clicks Daily Activity (OT), Optimal Instrument  AM-PAC 6 Clicks Score (PT): (P) 24  AM-PAC 6 Clicks Score (OT): 18    Aaron Fowler PT Student  6/13/2024

## 2024-06-14 LAB
CYTO UR: NORMAL
LAB AP CASE REPORT: NORMAL
LAB AP CLINICAL INFORMATION: NORMAL
LAB AP SPECIAL STAINS: NORMAL
PATH REPORT.FINAL DX SPEC: NORMAL
PATH REPORT.GROSS SPEC: NORMAL

## 2024-06-14 PROCEDURE — 94799 UNLISTED PULMONARY SVC/PX: CPT

## 2024-06-14 PROCEDURE — 99232 SBSQ HOSP IP/OBS MODERATE 35: CPT | Performed by: INTERNAL MEDICINE

## 2024-06-14 PROCEDURE — 94664 DEMO&/EVAL PT USE INHALER: CPT

## 2024-06-14 PROCEDURE — 93010 ELECTROCARDIOGRAM REPORT: CPT | Performed by: INTERNAL MEDICINE

## 2024-06-14 PROCEDURE — 25010000002 ENOXAPARIN PER 10 MG: Performed by: INTERNAL MEDICINE

## 2024-06-14 PROCEDURE — 93005 ELECTROCARDIOGRAM TRACING: CPT | Performed by: PHYSICIAN ASSISTANT

## 2024-06-14 PROCEDURE — 94760 N-INVAS EAR/PLS OXIMETRY 1: CPT

## 2024-06-14 PROCEDURE — 97116 GAIT TRAINING THERAPY: CPT

## 2024-06-14 PROCEDURE — 97110 THERAPEUTIC EXERCISES: CPT

## 2024-06-14 RX ADMIN — BUDESONIDE AND FORMOTEROL FUMARATE DIHYDRATE 2 PUFF: 160; 4.5 AEROSOL RESPIRATORY (INHALATION) at 19:51

## 2024-06-14 RX ADMIN — BUDESONIDE AND FORMOTEROL FUMARATE DIHYDRATE 2 PUFF: 160; 4.5 AEROSOL RESPIRATORY (INHALATION) at 07:18

## 2024-06-14 RX ADMIN — LIDOCAINE 1 PATCH: 560 PATCH PERCUTANEOUS; TOPICAL; TRANSDERMAL at 09:25

## 2024-06-14 RX ADMIN — METOPROLOL SUCCINATE 25 MG: 25 TABLET, EXTENDED RELEASE ORAL at 22:40

## 2024-06-14 RX ADMIN — Medication 1 TABLET: at 09:24

## 2024-06-14 RX ADMIN — SENNOSIDES AND DOCUSATE SODIUM 2 TABLET: 50; 8.6 TABLET ORAL at 22:40

## 2024-06-14 RX ADMIN — SENNOSIDES AND DOCUSATE SODIUM 2 TABLET: 50; 8.6 TABLET ORAL at 09:25

## 2024-06-14 RX ADMIN — Medication 10 ML: at 09:26

## 2024-06-14 RX ADMIN — METOPROLOL SUCCINATE 25 MG: 25 TABLET, EXTENDED RELEASE ORAL at 09:24

## 2024-06-14 RX ADMIN — POLYETHYLENE GLYCOL 3350 17 G: 17 POWDER, FOR SOLUTION ORAL at 09:25

## 2024-06-14 RX ADMIN — Medication 10 ML: at 22:39

## 2024-06-14 RX ADMIN — AVOBENZONE, HOMOSALATE, OCTISALATE, OCTOCRYLENE, AND OXYBENZONE 1 PACKET: 29.4; 147; 49; 25.4; 58.8 LOTION TOPICAL at 09:25

## 2024-06-14 RX ADMIN — HYDROCODONE BITARTRATE AND ACETAMINOPHEN 1 TABLET: 10; 325 TABLET ORAL at 09:26

## 2024-06-14 RX ADMIN — METOPROLOL TARTRATE 5 MG: 1 INJECTION, SOLUTION INTRAVENOUS at 04:33

## 2024-06-14 RX ADMIN — Medication 400 MG: at 09:24

## 2024-06-14 RX ADMIN — TIOTROPIUM BROMIDE INHALATION SPRAY 2 PUFF: 3.12 SPRAY, METERED RESPIRATORY (INHALATION) at 07:18

## 2024-06-14 RX ADMIN — ENOXAPARIN SODIUM 40 MG: 100 INJECTION SUBCUTANEOUS at 06:28

## 2024-06-14 RX ADMIN — CYANOCOBALAMIN TAB 500 MCG 1000 MCG: 500 TAB at 09:24

## 2024-06-14 NOTE — PROGRESS NOTES
"Hospital Medicine Team    LOS 24 days      Patient Care Team:  Provider, No Known as PCP - General      Subjective       Chief Complaint:  fall and leg pain  Summary: 44 y.o. with intellectual disability, 1-2PPD smoking, depression, who presented after a fall (possibly 1 month ago but may have been more recently), found to have right femoral fracture and VLAD.  Ortho assisting with initial surgical treatment.  Initial lung mass concerning on chest x-ray additional CT imaging with suspected new metastatic colon cancer with mets to the bone, pulmonology consulted patient had a biopsy results pending.  Bronchoscopy also with findings of Haemophilus influenzae treatment with antibiotics.  Palliative assisting.  Awaiting pathology results from right rib biopsy (initial testing inadequate total sample, resulted with atypical cells), repeat biopsy obtained 6/11 and results pending.  Patient is working better with physical therapy and is ambulating with minimal assistance.  Initial placement declined, P2p attempted.        Subjective    No new issues today appears weak.     Objective       Vital Signs  Temp:  [97.7 °F (36.5 °C)-98.6 °F (37 °C)] 97.7 °F (36.5 °C)  Heart Rate:  [115-138] 115  Resp:  [16-20] 16  BP: (108-125)/(67-78) 119/78  Oxygen Therapy  SpO2: 96 %  Pulse Oximetry Type: Intermittent  Device (Oxygen Therapy): room air  Flow (L/min): 0  Oxygen Concentration (%): 21  ETCO2 (mmHg):  (ptnot on ETCO2)  Flowsheet Rows      Flowsheet Row First Filed Value   Admission Height 182.9 cm (72\") Documented at 05/20/2024 2139   Admission Weight 66 kg (145 lb 8.1 oz) Documented at 05/20/2024 2134                Physical Exam:  Physical Exam  Vitals reviewed.   Constitutional:       Comments: Chroncially ill appearing and cachetic   Cardiovascular:      Rate and Rhythm: Normal rate.   Pulmonary:      Effort: No respiratory distress.   Abdominal:      Palpations: Abdomen is soft.   Musculoskeletal:      Right lower leg: No " edema.      Left lower leg: No edema.   Neurological:      Mental Status: He is alert.           Results Review:    I reviewed the patient's new clinical results.    [x]  Laboratory  []  Microbiology  []  Radiology  []  EKG/Telemetry   []  Cardiology/Vascular   []  Pathology  [x]  Old records  []  Other:      X-rays, labs reviewed personally by physician.    Medication Review:   I have reviewed the patient's current medication list    Scheduled Meds  budesonide-formoterol, 2 puff, Inhalation, BID - RT  enoxaparin, 40 mg, Subcutaneous, Q24H  Lidocaine, 1 patch, Transdermal, Q24H  magnesium oxide, 400 mg, Oral, Daily  metoprolol succinate XL, 25 mg, Oral, Q12H  multivitamin with minerals, 1 tablet, Oral, Daily  polyethylene glycol, 17 g, Oral, Daily  Psyllium, 1 packet, Oral, Daily  senna-docusate sodium, 2 tablet, Oral, BID  sodium chloride, 10 mL, Intravenous, Q12H  tiotropium bromide monohydrate, 2 puff, Inhalation, Daily - RT  vitamin B-12, 1,000 mcg, Oral, Daily        Meds Infusions       Meds PRN  •  acetaminophen  •  aluminum-magnesium hydroxide-simethicone  •  senna-docusate sodium **AND** polyethylene glycol **AND** bisacodyl **AND** bisacodyl  •  calcium carbonate  •  dextrose  •  dextrose  •  Diclofenac Sodium  •  glucagon (human recombinant)  •  HYDROcodone-acetaminophen  •  HYDROcodone-acetaminophen  •  hydrOXYzine  •  levalbuterol  •  Lidocaine  •  melatonin  •  [DISCONTINUED] Morphine **AND** naloxone  •  nicotine  •  ondansetron  •  ondansetron ODT **OR** [DISCONTINUED] ondansetron  •  prochlorperazine  •  sodium chloride  •  sodium chloride        Assessment / Plan       Active Hospital Problems:  Active Hospital Problems    Diagnosis  POA   • **Closed intertrochanteric fracture of right femur [S72.141A]  Yes   • Pulmonary nodule [R91.1]  Unknown      Resolved Hospital Problems   No resolved problems to display.     Assessment/Plan (clinically significant if listed here)  Mechanical fall with acute  comminuted displaced intertrochanteric right femoral fracture  S/p 5/21 right hip subtrochanteric nailing of closed displaced right femur fracture by Dr. Nelson  VLAD creatinine 1.7 leukocytosis suspect reactive in setting of acute fracture  Suspected new metastatic lung cancer with mets to the bone  Mediastinal lymphadenopathy  Haemophilus influenzae pneumonia   1 to 2 pack/day smoker, chronic  Intellectual disability  Depression  Normocytic anemia     Repeat right rib biopsy remains pending from 6/11.  Will take some time for results given sendout to [facility].    Original testing was inadequate quantity atypical cells reported from initial stains sent out to [facility].   Cont prn pain medications  Cont metoprolol succinate for bp and tachycardia, monitor  CT PE is negative for pulmonary embolism no acute changes  Continue Symbicort, Spiriva, respiratory hygiene as needed inhalers with Xopenex  Continue bowel regimen, monitor with pain medications  Continue lidocaine patch as needed medications for pain as needed [place]  Prn nrt, pt declines need currently  Cont mvi/b12, thiamine   PT/OT  Continue hospitalization at current level of care      DVT ppx-lovenox      Plan for disposition:Insurance declined p2p; appeal attempted, waiting to hear back, apparently was already declined. Awaiting appeal for SNF now     Electronically signed by Juni Ashraf DO, 06/14/24, 13:41 EDT.      Note disclaimer: At [facility], we believe that sharing information builds trust and better relationships. You are receiving this note because you recently visited [facility]. It is possible you will see health information before a provider has talked with you about it. This kind of information can be easy to misunderstand. To help you fully understand what it means for your health, we urge you to discuss this note with your provider.

## 2024-06-14 NOTE — PLAN OF CARE
Goal Outcome Evaluation:  Plan of Care Reviewed With: patient        Progress: no change  Outcome Evaluation: Patient resting in bed, patient heart rate tachycardic 130s, received order for 5mg metoprolol iv one time, patient alert and oriented, call light within reach.

## 2024-06-14 NOTE — THERAPY TREATMENT NOTE
Acute Care - Physical Therapy Progress Note   [facility]     Patient Name: Oswaldo Bowen  : 1980  MRN: [id number]  Today's Date: 2024      Visit Dx:     ICD-10-CM ICD-9-CM   1. Closed displaced intertrochanteric fracture of right femur, initial encounter  S72.141A 820.21   2. Pulmonary nodule  R91.1 793.11   3. Difficulty walking  R26.2 719.7   4. Dysphagia, oropharyngeal  R13.12 787.22   5. Difficulty in walking  R26.2 719.7     Patient Active Problem List   Diagnosis    Closed intertrochanteric fracture of right femur    Pulmonary nodule     History reviewed. No pertinent past medical history.  Past Surgical History:   Procedure Laterality Date    BRONCHOSCOPY N/A 2024    Procedure: BRONCHOSCOPY WITH ENDOBRONCHIAL ULTRASOUND, FINE NEEDLE ASPIRATE, BIOPSIES, BRUSHINGS, BRONCHOALVEOLAR LAVAGE;  Surgeon: Kendell Stern MD;  Location: [facility] ENDOSCOPY;  Service: Pulmonary;  Laterality: N/A;  LUNG NODULE, MUCOUS PLUGGING    HIP INTERTROCHANTERIC NAILING Right 2024    Procedure: HIP INTERTROCHANTERIC NAILING;  Surgeon: Molina Clayton MD;  Location: [facility] MAIN OR;  Service: Orthopedics;  Laterality: Right;    US GUIDED FINE NEEDLE ASPIRATION  2024     PT Assessment (Last 12 Hours)       PT Evaluation and Treatment       Row Name 24 1500          Physical Therapy Time and Intention    Subjective Information no complaints  -CS     Document Type therapy note (daily note)  -CS     Mode of Treatment individual therapy;physical therapy  -CS     Patient Effort good  -CS     Symptoms Noted During/After Treatment fatigue  -CS       Row Name 24 1500          Pain    Pretreatment Pain Rating 4/10  -CS     Posttreatment Pain Rating 4/10  -CS     Pain Location - Side/Orientation Right  -CS     Pain Location - hip  -CS     Pain Intervention(s) Repositioned  -CS       Row Name 24 1500          Bed Mobility    Supine-Sit [place] (Bed Mobility) verbal cues;minimum assist (75% patient  effort);1 person assist  -CS     Assistive Device (Bed Mobility) bed rails  -CS       Row Name 06/14/24 1500          Sit-Stand Transfer    Sit-Stand [place] (Transfers) verbal cues;contact guard;1 person assist  -CS     Assistive Device (Sit-Stand Transfers) walker, front-wheeled  -CS       Row Name 06/14/24 1500          Stand-Sit Transfer    Stand-Sit [place] (Transfers) verbal cues;contact guard;1 person assist  -CS     Assistive Device (Stand-Sit Transfers) walker, front-wheeled  -CS       Row Name 06/14/24 1500          Gait/Stairs (Locomotion)    [place] Level (Gait) verbal cues;standby assist  -CS     Assistive Device (Gait) walker, front-wheeled  -CS     Distance in Feet (Gait) 120  -CS     Pattern (Gait) 4-point;step-through  -CS     Deviations/Abnormal Patterns (Gait) gait speed decreased;stride length decreased  -     Bilateral Gait Deviations heel strike decreased  -       Row Name 06/14/24 1500          Hip (Therapeutic Exercise)    Hip AAROM (Therapeutic Exercise) right;aBduction;aDduction;external rotation;internal rotation;sitting;supine  x 25 reps; marches  -       Row Name 06/14/24 1500          Knee (Therapeutic Exercise)    Knee AAROM (Therapeutic Exercise) right;sitting;supine  x 25 reps LAQ's, SLR's  -       Row Name 06/14/24 1500          Ankle (Therapeutic Exercise)    Ankle (Therapeutic Exercise) AROM (active range of motion)  -     Ankle AROM (Therapeutic Exercise) bilateral;dorsiflexion;plantarflexion;supine;20 repititions;5 repetitions  -       Row Name             Wound 05/21/24 0251 Bilateral coccyx    Wound - Properties Group Placement Date: 05/21/24  -SR Placement Time: 0251  -SR Present on Original Admission: Y  -SR Side: Bilateral  -SR Location: coccyx  -SR    Retired Wound - Properties Group Placement Date: 05/21/24  -SR Placement Time: 0251 -SR Present on Original Admission: Y  -SR Side: Bilateral  -SR Location: coccyx  -SR    Retired Wound -  Properties Group Date first assessed: 05/21/24  -SR Time first assessed: 0251  -SR Present on Original Admission: Y  -SR Side: Bilateral  -SR Location: coccyx  -SR      Row Name             Wound 05/21/24 Right lateral thigh Incision    Wound - Properties Group Placement Date: 05/21/24  -SF Present on Original Admission: N  -SF Side: Right  -SF Orientation: lateral  -SF Location: thigh  -SF Primary Wound Type: Incision  -SF Additional Comments: incision sites x 3 to lateral right thigh  -SF    Retired Wound - Properties Group Placement Date: 05/21/24  -SF Present on Original Admission: N  -SF Side: Right  -SF Orientation: lateral  -SF Location: thigh  -SF Primary Wound Type: Incision  -SF Additional Comments: incision sites x 3 to lateral right thigh  -SF    Retired Wound - Properties Group Date first assessed: 05/21/24  -SF Present on Original Admission: N  -SF Side: Right  -SF Location: thigh  -SF Primary Wound Type: Incision  -SF Additional Comments: incision sites x 3 to lateral right thigh  -SF      Row Name 06/14/24 1500          Positioning and Restraints    Pre-Treatment Position in bed  -CS     Post Treatment Position chair  -CS     In Chair reclined;call light within reach;encouraged to call for assist;exit alarm on;with nsg;legs elevated;heels elevated  -CS       Row Name 06/14/24 1500          Progress Summary (PT)    Progress Toward Functional Goals (PT) progress toward functional goals is good  -CS               User Key  (r) = Recorded By, (t) = Taken By, (c) = Cosigned By      Initials Name Provider Type    SF Saritha Ramirez, RN Registered Nurse    Phu Hdez PTA Physical Therapist Assistant    SR Saritha Long RN Registered Nurse                    Physical Therapy Education       Title: PT OT SLP Therapies (Done)       Topic: Physical Therapy (Done)       Point: Mobility training (Done)       Learning Progress Summary             Patient Acceptance, E,TB, VU by TR at 6/10/2024 5772     Acceptance, E, Bed IU by DS at 6/2/2024 0830    Acceptance, E, VU by PS at 6/1/2024 0627    Acceptance, E,TB, VU by TR at 5/31/2024 1449    Eager, E, VU by AH at 5/23/2024 2223    Acceptance, E,TB, VU by AV at 5/22/2024 1339                         Point: Home exercise program (Done)       Learning Progress Summary             Patient Acceptance, E, Bed IU by DS at 6/2/2024 0830                         Point: Body mechanics (Done)       Learning Progress Summary             Patient Acceptance, E, Bed IU by DS at 6/2/2024 0830    Acceptance, E, VU by PS at 6/1/2024 0627    Acceptance, E,TB, VU by AV at 5/22/2024 1339                         Point: Precautions (Done)       Learning Progress Summary             Patient Acceptance, E,TB, VU by TR at 6/10/2024 1507    Acceptance, E, Bed IU by DS at 6/2/2024 0830    Acceptance, E,TB, VU by TR at 5/31/2024 1449    Acceptance, E,TB, VU by AV at 5/22/2024 1339                                         User Key       Initials Effective Dates Name Provider Type Discipline    PS 06/16/21 -  Darcie Fiore, RN Registered Nurse Nurse    AV 06/11/21 -  Eh Martínez, PT Physical Therapist PT    DS 06/26/23 -  Immanuel Beck, RN Registered Nurse Nurse     05/31/23 -  Angela Casillas, RN Registered Nurse Nurse    TR 05/21/24 -  Aaron Fowler, JERROD Student PT Student PT                  PT Recommendation and Plan     Progress Summary (PT)  Progress Toward Functional Goals (PT): progress toward functional goals is good   Outcome Measures       Row Name 06/14/24 1500 06/13/24 1100 06/12/24 1200       How much help from another person do you currently need...    Turning from your back to your side while in flat bed without using bedrails? 4  -CS 4  -JAYRO (r) TR (t) JAYRO (c) 4  -JAYRO (r) TR (t) JAYRO (c)    Moving from lying on back to sitting on the side of a flat bed without bedrails? 3  -CS 4  -JAYRO (r) TR (t) JAYRO (c) 4  -JAYRO (r) TR (t) JAYRO (c)    Moving to and from a bed to a  chair (including a wheelchair)? 3  -CS 4  -JAYRO (r) TR (t) JAYRO (c) 4  -JAYRO (r) TR (t) JAYRO (c)    Standing up from a chair using your arms (e.g., wheelchair, bedside chair)? 4  -CS 4  -JAYRO (r) TR (t) JAYRO (c) 4  -JAYRO (r) TR (t) JAYRO (c)    Climbing 3-5 steps with a railing? 3  -CS 4  -JAYRO (r) TR (t) JAYRO (c) 3  -JAYRO (r) TR (t) JAYRO (c)    To walk in hospital room? 3  -CS 4  -JAYRO (r) TR (t) JAYRO (c) 4  -JAYRO (r) TR (t) JAYRO (c)    AM-PAC 6 Clicks Score (PT) 20  -CS 24  -JAYRO (r) TR (t) 23  -JAYRO (r) TR (t)    Highest Level of Mobility Goal 6 --> Walk 10 steps or more  -CS 8 --> Walked 250 feet or more  -JAYRO (r) TR (t) 7 --> Walk 25 feet or more  -JAYRO (r) TR (t)       Functional Assessment    Outcome Measure Options AM-PAC 6 Clicks Basic Mobility (PT)  -CS AM-PAC 6 Clicks Daily Activity (OT);Optimal Instrument  -JAYRO (r) TR (t) JAYRO (c) AM-PAC 6 Clicks Daily Activity (OT);Optimal Instrument  -JAYRO (r) TR (t) JAYRO (c)              User Key  (r) = Recorded By, (t) = Taken By, (c) = Cosigned By      Initials Name Provider Type    JAYRODarius Cannon, PT Physical Therapist    CS Phu Oquendo, PTA Physical Therapist Assistant    TR Aaron Fowler, JERROD Student PT Student                     Time Calculation:    PT Charges       Row Name 06/14/24 1548             Time Calculation    Start Time 0845  -CS      PT Received On 06/14/24  -CS         Timed Charges    60082 - PT Therapeutic Exercise Minutes 10  -CS      86848 - Gait Training Minutes  11  -CS      34354 - PT Therapeutic Activity Minutes 4  -CS         Total Minutes    Timed Charges Total Minutes 25  -CS       Total Minutes 25  -CS                User Key  (r) = Recorded By, (t) = Taken By, (c) = Cosigned By      Initials Name Provider Type    CS Phu Oquendo PTA Physical Therapist Assistant                  Therapy Charges for Today       Code Description Service Date Service Provider Modifiers Qty    95633108749 HC PT THER PROC EA 15 MIN 6/14/2024 Phu Oquendo PTA GP 1     [id number]  GAIT TRAINING EA 15 MIN 6/14/2024 Phu Oquendo, RADHA GP 1            PT G-Codes  Outcome Measure Options: AM-PAC 6 Clicks Basic Mobility (PT)  AM-PAC 6 Clicks Score (PT): 20  AM-PAC 6 Clicks Score (OT): 18    Phu Oquendo PTA  6/14/2024

## 2024-06-15 LAB
ANION GAP SERPL CALCULATED.3IONS-SCNC: 11.1 MMOL/L (ref 5–15)
BUN SERPL-MCNC: 14 MG/DL (ref 6–20)
BUN/CREAT SERPL: 31.1 (ref 7–25)
CALCIUM SPEC-SCNC: 8.7 MG/DL (ref 8.6–10.5)
CHLORIDE SERPL-SCNC: 91 MMOL/L (ref 98–107)
CO2 SERPL-SCNC: 26.9 MMOL/L (ref 22–29)
CREAT SERPL-MCNC: 0.45 MG/DL (ref 0.76–1.27)
DEPRECATED RDW RBC AUTO: 44.5 FL (ref 37–54)
EGFRCR SERPLBLD CKD-EPI 2021: 133.2 ML/MIN/1.73
ERYTHROCYTE [DISTWIDTH] IN BLOOD BY AUTOMATED COUNT: 14.1 % (ref 12.3–15.4)
GLUCOSE SERPL-MCNC: 117 MG/DL (ref 65–99)
HCT VFR BLD AUTO: 22.9 % (ref 37.5–51)
HGB BLD-MCNC: 7 G/DL (ref 13–17.7)
MCH RBC QN AUTO: 26.5 PG (ref 26.6–33)
MCHC RBC AUTO-ENTMCNC: 30.6 G/DL (ref 31.5–35.7)
MCV RBC AUTO: 86.7 FL (ref 79–97)
PLATELET # BLD AUTO: 406 10*3/MM3 (ref 140–450)
PMV BLD AUTO: 9.8 FL (ref 6–12)
POTASSIUM SERPL-SCNC: 4.1 MMOL/L (ref 3.5–5.2)
QT INTERVAL: 305 MS
QTC INTERVAL: 471 MS
RBC # BLD AUTO: 2.64 10*6/MM3 (ref 4.14–5.8)
SODIUM SERPL-SCNC: 129 MMOL/L (ref 136–145)
WBC NRBC COR # BLD AUTO: 13.16 10*3/MM3 (ref 3.4–10.8)

## 2024-06-15 PROCEDURE — 25010000002 ENOXAPARIN PER 10 MG: Performed by: INTERNAL MEDICINE

## 2024-06-15 PROCEDURE — 99232 SBSQ HOSP IP/OBS MODERATE 35: CPT | Performed by: INTERNAL MEDICINE

## 2024-06-15 PROCEDURE — 97110 THERAPEUTIC EXERCISES: CPT

## 2024-06-15 PROCEDURE — 80048 BASIC METABOLIC PNL TOTAL CA: CPT | Performed by: INTERNAL MEDICINE

## 2024-06-15 PROCEDURE — 94664 DEMO&/EVAL PT USE INHALER: CPT

## 2024-06-15 PROCEDURE — 94799 UNLISTED PULMONARY SVC/PX: CPT

## 2024-06-15 PROCEDURE — 85027 COMPLETE CBC AUTOMATED: CPT | Performed by: INTERNAL MEDICINE

## 2024-06-15 RX ADMIN — METOPROLOL SUCCINATE 25 MG: 25 TABLET, EXTENDED RELEASE ORAL at 21:10

## 2024-06-15 RX ADMIN — METOPROLOL SUCCINATE 25 MG: 25 TABLET, EXTENDED RELEASE ORAL at 08:39

## 2024-06-15 RX ADMIN — Medication 1 TABLET: at 08:38

## 2024-06-15 RX ADMIN — ENOXAPARIN SODIUM 40 MG: 100 INJECTION SUBCUTANEOUS at 06:56

## 2024-06-15 RX ADMIN — Medication 10 ML: at 21:10

## 2024-06-15 RX ADMIN — BUDESONIDE AND FORMOTEROL FUMARATE DIHYDRATE 2 PUFF: 160; 4.5 AEROSOL RESPIRATORY (INHALATION) at 09:45

## 2024-06-15 RX ADMIN — SENNOSIDES AND DOCUSATE SODIUM 2 TABLET: 50; 8.6 TABLET ORAL at 21:10

## 2024-06-15 RX ADMIN — SENNOSIDES AND DOCUSATE SODIUM 2 TABLET: 50; 8.6 TABLET ORAL at 08:38

## 2024-06-15 RX ADMIN — TIOTROPIUM BROMIDE INHALATION SPRAY 2 PUFF: 3.12 SPRAY, METERED RESPIRATORY (INHALATION) at 09:45

## 2024-06-15 RX ADMIN — HYDROCODONE BITARTRATE AND ACETAMINOPHEN 1 TABLET: 10; 325 TABLET ORAL at 17:23

## 2024-06-15 RX ADMIN — LIDOCAINE 1 PATCH: 560 PATCH PERCUTANEOUS; TOPICAL; TRANSDERMAL at 08:39

## 2024-06-15 RX ADMIN — Medication 5 MG: at 21:10

## 2024-06-15 RX ADMIN — Medication 400 MG: at 08:38

## 2024-06-15 RX ADMIN — Medication 10 ML: at 08:42

## 2024-06-15 RX ADMIN — HYDROCODONE BITARTRATE AND ACETAMINOPHEN 1 TABLET: 10; 325 TABLET ORAL at 07:30

## 2024-06-15 RX ADMIN — CYANOCOBALAMIN TAB 500 MCG 1000 MCG: 500 TAB at 08:38

## 2024-06-15 RX ADMIN — BUDESONIDE AND FORMOTEROL FUMARATE DIHYDRATE 2 PUFF: 160; 4.5 AEROSOL RESPIRATORY (INHALATION) at 20:30

## 2024-06-15 NOTE — PLAN OF CARE
Goal Outcome Evaluation:  Plan of Care Reviewed With: patient        Progress: no change  Outcome Evaluation: No acute events this shift. Pt continues to be NSR-sinus tach- averaging around 115 bpm. Pt worked with physical therapist today. C/o back pain relieved with Lidocaine patch. He has been pleasant and voiced no complaints. Will cont POC.

## 2024-06-15 NOTE — PLAN OF CARE
Goal Outcome Evaluation: Pt. Medicated this once this am for c/o pain. States he did not sleep well last night,  and asked if he had taken his melatonin. Informed pt. That he had. Informed  to alert nurse of any needs, VSS.

## 2024-06-15 NOTE — THERAPY TREATMENT NOTE
Acute Care - Physical Therapy Treatment Note   [facility]     Patient Name: Oswaldo Bowen  : 1980  MRN: [id number]  Today's Date: 6/15/2024      Visit Dx:     ICD-10-CM ICD-9-CM   1. Closed displaced intertrochanteric fracture of right femur, initial encounter  S72.141A 820.21   2. Pulmonary nodule  R91.1 793.11   3. Difficulty walking  R26.2 719.7   4. Dysphagia, oropharyngeal  R13.12 787.22   5. Difficulty in walking  R26.2 719.7     Patient Active Problem List   Diagnosis    Closed intertrochanteric fracture of right femur    Pulmonary nodule     History reviewed. No pertinent past medical history.  Past Surgical History:   Procedure Laterality Date    BRONCHOSCOPY N/A 2024    Procedure: BRONCHOSCOPY WITH ENDOBRONCHIAL ULTRASOUND, FINE NEEDLE ASPIRATE, BIOPSIES, BRUSHINGS, BRONCHOALVEOLAR LAVAGE;  Surgeon: Kendell Stern MD;  Location: [facility] ENDOSCOPY;  Service: Pulmonary;  Laterality: N/A;  LUNG NODULE, MUCOUS PLUGGING    HIP INTERTROCHANTERIC NAILING Right 2024    Procedure: HIP INTERTROCHANTERIC NAILING;  Surgeon: Molina Clayton MD;  Location: [facility] MAIN OR;  Service: Orthopedics;  Laterality: Right;    US GUIDED FINE NEEDLE ASPIRATION  2024     PT Assessment (Last 12 Hours)       PT Evaluation and Treatment       Row Name 06/15/24 1510          Physical Therapy Time and Intention    Subjective Information complains of;pain  -TS     Document Type therapy note (daily note)  -TS     Mode of Treatment individual therapy;physical therapy  -TS     Comment Pt declined transfers, but states willingness to participate in therapeutic exercises.  -TS       Row Name 06/15/24 1510          Pain    Pretreatment Pain Rating 2/10  -TS     Posttreatment Pain Rating 2/10  -TS     Pain Location - Side/Orientation Right  -TS     Pain Location - hip  -TS       Row Name 06/15/24 1510          Cognition    Affect/Mental Status (Cognition) flat/blunted affect;WFL  -TS       Row Name 06/15/24 151           Motor Skills    Therapeutic Exercise hip;knee;ankle  AROM/AAROM RLE x20 reps, supine position  -TS       Row Name             Wound 05/21/24 0251 Bilateral coccyx    Wound - Properties Group Placement Date: 05/21/24  -SR Placement Time: 0251  -SR Present on Original Admission: Y  -SR Side: Bilateral  -SR Location: coccyx  -SR    Retired Wound - Properties Group Placement Date: 05/21/24  -SR Placement Time: 0251  -SR Present on Original Admission: Y  -SR Side: Bilateral  -SR Location: coccyx  -SR    Retired Wound - Properties Group Date first assessed: 05/21/24  -SR Time first assessed: 0251  -SR Present on Original Admission: Y  -SR Side: Bilateral  -SR Location: coccyx  -SR      Row Name             Wound 05/21/24 Right lateral thigh Incision    Wound - Properties Group Placement Date: 05/21/24  -SF Present on Original Admission: N  -SF Side: Right  -SF Orientation: lateral  -SF Location: thigh  -SF Primary Wound Type: Incision  -SF Additional Comments: incision sites x 3 to lateral right thigh  -SF    Retired Wound - Properties Group Placement Date: 05/21/24  -SF Present on Original Admission: N  -SF Side: Right  -SF Orientation: lateral  -SF Location: thigh  -SF Primary Wound Type: Incision  -SF Additional Comments: incision sites x 3 to lateral right thigh  -SF    Retired Wound - Properties Group Date first assessed: 05/21/24  -SF Present on Original Admission: N  -SF Side: Right  -SF Location: thigh  -SF Primary Wound Type: Incision  -SF Additional Comments: incision sites x 3 to lateral right thigh  -SF      Row Name 06/15/24 1510          Positioning and Restraints    Pre-Treatment Position in bed  -TS     Post Treatment Position bed  -TS     In Bed supine;call light within reach;exit alarm on;heels elevated  -TS       Row Name 06/15/24 1510          Progress Summary (PT)    Progress Toward Functional Goals (PT) progress toward functional goals is good  -TS               User Key  (r) = Recorded By, (t) =  Taken By, (c) = Cosigned By      Initials Name Provider Type    TS Dwight Tompkins, PTA Physical Therapist Assistant    SF Saritha Ramirez, RN Registered Nurse    SR Saritha Long, RN Registered Nurse                Right Lower Extremity   Exercise  Reps  Sets    Hip ab/adduction  10 2   Heel slides  10 2   Ankle pumps  10 2   Quad sets  10 2   Glut sets 10 2     Physical Therapy Education       Title: PT OT SLP Therapies (Done)       Topic: Physical Therapy (Done)       Point: Mobility training (Done)       Learning Progress Summary             Patient Acceptance, E,TB, VU by TR at 6/10/2024 1507    Acceptance, E, Bed IU by DS at 6/2/2024 0830    Acceptance, E, VU by PS at 6/1/2024 0627    Acceptance, E,TB, VU by TR at 5/31/2024 1449    Eager, E, VU by AH at 5/23/2024 2223    Acceptance, E,TB, VU by AV at 5/22/2024 1339                         Point: Home exercise program (Done)       Learning Progress Summary             Patient Acceptance, E, Bed IU by DS at 6/2/2024 0830                         Point: Body mechanics (Done)       Learning Progress Summary             Patient Acceptance, E, Bed IU by DS at 6/2/2024 0830    Acceptance, E, VU by PS at 6/1/2024 0627    Acceptance, E,TB, VU by AV at 5/22/2024 1339                         Point: Precautions (Done)       Learning Progress Summary             Patient Acceptance, E,TB, VU by TR at 6/10/2024 1507    Acceptance, E, Bed IU by DS at 6/2/2024 0830    Acceptance, E,TB, VU by TR at 5/31/2024 1449    Acceptance, E,TB, VU by AV at 5/22/2024 1339                                         User Key       Initials Effective Dates Name Provider Type Discipline    PS 06/16/21 -  Darcie Fiore, RN Registered Nurse Nurse    AV 06/11/21 -  Eh Martínez PT Physical Therapist PT    DS 06/26/23 -  Immanuel Beck, GRIS Registered Nurse Nurse    AH 05/31/23 -  Angela Casillas, RN Registered Nurse Nurse    TR 05/21/24 -  Aaron Fowler PT Student PT Student  PT                  PT Recommendation and Plan     Progress Summary (PT)  Progress Toward Functional Goals (PT): progress toward functional goals is good   Outcome Measures       Row Name 06/15/24 1515 06/14/24 1500 06/13/24 1100       How much help from another person do you currently need...    Turning from your back to your side while in flat bed without using bedrails? 4  -TS 4  -CS 4  -JAYRO (r) TR (t) JAYRO (c)    Moving from lying on back to sitting on the side of a flat bed without bedrails? 3  -TS 3  -CS 4  -JAYRO (r) TR (t) JAYRO (c)    Moving to and from a bed to a chair (including a wheelchair)? 3  -TS 3  -CS 4  -JAYRO (r) TR (t) JAYRO (c)    Standing up from a chair using your arms (e.g., wheelchair, bedside chair)? 4  -TS 4  -CS 4  -JAYRO (r) TR (t) JAYRO (c)    Climbing 3-5 steps with a railing? 3  -TS 3  -CS 4  -JAYRO (r) TR (t) JAYRO (c)    To walk in hospital room? 3  -TS 3  -CS 4  -JAYRO (r) TR (t) JAYRO (c)    AM-PAC 6 Clicks Score (PT) 20  -TS 20  -CS 24  -JAYRO (r) TR (t)    Highest Level of Mobility Goal 6 --> Walk 10 steps or more  -TS 6 --> Walk 10 steps or more  -CS 8 --> Walked 250 feet or more  -JAYRO (r) TR (t)       Functional Assessment    Outcome Measure Options -- AM-PAC 6 Clicks Basic Mobility (PT)  -CS AM-PAC 6 Clicks Daily Activity (OT);Optimal Instrument  -JAYRO (r) TR (t) JAYRO (c)              User Key  (r) = Recorded By, (t) = Taken By, (c) = Cosigned By      Initials Name Provider Type    TS Dwight Tompkins, RADHA Physical Therapist Assistant    Darius Srivastava, PT Physical Therapist    Phu Hdez PTA Physical Therapist Assistant    Aaron Cooney, PT Student PT Student                     Time Calculation:    PT Charges       Row Name 06/15/24 1509             Time Calculation    PT Received On 06/15/24  -TS      PT Goal Re-Cert Due Date 06/19/24  -TS         Timed Charges    33959 - PT Therapeutic Exercise Minutes 12  -TS         Total Minutes    Timed Charges Total Minutes 12  -TS       Total  Minutes 12  -TS                User Key  (r) = Recorded By, (t) = Taken By, (c) = Cosigned By      Initials Name Provider Type    Dwight Blum PTA Physical Therapist Assistant                  Therapy Charges for Today       Code Description Service Date Service Provider Modifiers Qty    75943853637  PT THER PROC EA 15 MIN 6/15/2024 Dwight Tompkins PTA GP 1            PT G-Codes  Outcome Measure Options: AM-PAC 6 Clicks Basic Mobility (PT)  AM-PAC 6 Clicks Score (PT): 20  AM-PAC 6 Clicks Score (OT): 18    Dwight Tompkins PTA  6/15/2024

## 2024-06-15 NOTE — PROGRESS NOTES
"Hospital Medicine Team    LOS 25 days      Patient Care Team:  Provider, No Known as PCP - General      Subjective       Chief Complaint:  fall and leg pain  Summary: 44 y.o. with intellectual disability, 1-2PPD smoking, depression, who presented after a fall (possibly 1 month ago but may have been more recently), found to have right femoral fracture and VLAD.  Ortho assisting with initial surgical treatment.  Initial lung mass concerning on chest x-ray additional CT imaging with suspected new metastatic colon cancer with mets to the bone, pulmonology consulted patient had a biopsy results pending.  Bronchoscopy also with findings of Haemophilus influenzae treatment with antibiotics.  Palliative assisting.  Awaiting pathology results from right rib biopsy (initial testing inadequate total sample, resulted with atypical cells), repeat biopsy obtained 6/11 and results pending.  Patient is working better with physical therapy and is ambulating with minimal assistance.  Initial placement declined, P2p attempted.        Subjective    Still weak no other major issues    Objective       Vital Signs  Temp:  [97.7 °F (36.5 °C)-99 °F (37.2 °C)] 98.2 °F (36.8 °C)  Heart Rate:  [107-126] 112  Resp:  [16-20] 20  BP: (112-120)/(68-79) 120/71  Oxygen Therapy  SpO2: 96 %  Pulse Oximetry Type: Intermittent  Device (Oxygen Therapy): room air  Flow (L/min): 0  Oxygen Concentration (%): 21  ETCO2 (mmHg):  (ptnot on ETCO2)  Flowsheet Rows      Flowsheet Row First Filed Value   Admission Height 182.9 cm (72\") Documented at 05/20/2024 2139   Admission Weight 66 kg (145 lb 8.1 oz) Documented at 05/20/2024 2134                Physical Exam:  Physical Exam  Vitals reviewed.   Constitutional:       Comments: Chroncially ill appearing and cachetic   Cardiovascular:      Rate and Rhythm: Normal rate.   Pulmonary:      Effort: No respiratory distress.   Abdominal:      Palpations: Abdomen is soft.   Musculoskeletal:      Right lower leg: No " edema.      Left lower leg: No edema.   Neurological:      Mental Status: He is alert.           Results Review:    I reviewed the patient's new clinical results.    [x]  Laboratory  []  Microbiology  []  Radiology  []  EKG/Telemetry   []  Cardiology/Vascular   []  Pathology  [x]  Old records  []  Other:      X-rays, labs reviewed personally by physician.    Medication Review:   I have reviewed the patient's current medication list    Scheduled Meds  budesonide-formoterol, 2 puff, Inhalation, BID - RT  enoxaparin, 40 mg, Subcutaneous, Q24H  Lidocaine, 1 patch, Transdermal, Q24H  magnesium oxide, 400 mg, Oral, Daily  metoprolol succinate XL, 25 mg, Oral, Q12H  multivitamin with minerals, 1 tablet, Oral, Daily  polyethylene glycol, 17 g, Oral, Daily  Psyllium, 1 packet, Oral, Daily  senna-docusate sodium, 2 tablet, Oral, BID  sodium chloride, 10 mL, Intravenous, Q12H  tiotropium bromide monohydrate, 2 puff, Inhalation, Daily - RT  vitamin B-12, 1,000 mcg, Oral, Daily        Meds Infusions       Meds PRN    acetaminophen    aluminum-magnesium hydroxide-simethicone    senna-docusate sodium **AND** polyethylene glycol **AND** bisacodyl **AND** bisacodyl    calcium carbonate    dextrose    dextrose    Diclofenac Sodium    glucagon (human recombinant)    HYDROcodone-acetaminophen    HYDROcodone-acetaminophen    hydrOXYzine    levalbuterol    Lidocaine    melatonin    [DISCONTINUED] Morphine **AND** naloxone    nicotine    ondansetron    ondansetron ODT **OR** [DISCONTINUED] ondansetron    prochlorperazine    sodium chloride    sodium chloride        Assessment / Plan       Active Hospital Problems:  Active Hospital Problems    Diagnosis  POA    **Closed intertrochanteric fracture of right femur [S72.141A]  Yes    Pulmonary nodule [R91.1]  Unknown      Resolved Hospital Problems   No resolved problems to display.     Assessment/Plan (clinically significant if listed here)  Mechanical fall with acute comminuted displaced  intertrochanteric right femoral fracture  S/p 5/21 right hip subtrochanteric nailing of closed displaced right femur fracture by Dr. Nelson  VLAD creatinine 1.7 leukocytosis suspect reactive in setting of acute fracture  Suspected new metastatic lung cancer with mets to the bone  Mediastinal lymphadenopathy  Haemophilus influenzae pneumonia   1 to 2 pack/day smoker, chronic  Intellectual disability  Depression  Normocytic anemia     Repeat left rib biopsy 6/11. Looks like prelim with scattered chronic inflammatory cells. Possibly other [facility].    Original testing was inadequate quantity atypical cells reported from initial stains sent out to [facility].   Cont prn pain medications  Cont metoprolol succinate for bp and tachycardia, monitor  CT PE is negative for pulmonary embolism no acute changes  Continue Symbicort, Spiriva, respiratory hygiene as needed inhalers with Xopenex  Continue bowel regimen, monitor with pain medications  Continue lidocaine patch as needed medications for pain as needed [place]  Prn nrt, pt declines need currently  Cont mvi/b12, thiamine   PT/OT  Continue hospitalization at current level of care      DVT ppx-lovenox      Plan for disposition:Insurance declined p2p; appeal attempted, waiting to hear back, apparently was already declined. Still awaits appeal for SNF    Electronically signed by Juni Ashraf DO, 06/15/24, 16:37 EDT.      Note disclaimer: At [facility], we believe that sharing information builds trust and better relationships. You are receiving this note because you recently visited [facility]. It is possible you will see health information before a provider has talked with you about it. This kind of information can be easy to misunderstand. To help you fully understand what it means for your health, we urge you to discuss this note with your provider.

## 2024-06-16 LAB
HCT VFR BLD AUTO: 23 % (ref 37.5–51)
HGB BLD-MCNC: 7 G/DL (ref 13–17.7)

## 2024-06-16 PROCEDURE — 25010000002 ENOXAPARIN PER 10 MG: Performed by: INTERNAL MEDICINE

## 2024-06-16 PROCEDURE — 99232 SBSQ HOSP IP/OBS MODERATE 35: CPT | Performed by: INTERNAL MEDICINE

## 2024-06-16 PROCEDURE — 94799 UNLISTED PULMONARY SVC/PX: CPT

## 2024-06-16 PROCEDURE — 97530 THERAPEUTIC ACTIVITIES: CPT

## 2024-06-16 PROCEDURE — 97116 GAIT TRAINING THERAPY: CPT

## 2024-06-16 PROCEDURE — 85018 HEMOGLOBIN: CPT | Performed by: INTERNAL MEDICINE

## 2024-06-16 PROCEDURE — 97110 THERAPEUTIC EXERCISES: CPT

## 2024-06-16 PROCEDURE — 94664 DEMO&/EVAL PT USE INHALER: CPT

## 2024-06-16 PROCEDURE — 94760 N-INVAS EAR/PLS OXIMETRY 1: CPT

## 2024-06-16 PROCEDURE — 85014 HEMATOCRIT: CPT | Performed by: INTERNAL MEDICINE

## 2024-06-16 RX ADMIN — LIDOCAINE 1 PATCH: 560 PATCH PERCUTANEOUS; TOPICAL; TRANSDERMAL at 08:09

## 2024-06-16 RX ADMIN — BUDESONIDE AND FORMOTEROL FUMARATE DIHYDRATE 2 PUFF: 160; 4.5 AEROSOL RESPIRATORY (INHALATION) at 19:12

## 2024-06-16 RX ADMIN — HYDROCODONE BITARTRATE AND ACETAMINOPHEN 1 TABLET: 10; 325 TABLET ORAL at 04:15

## 2024-06-16 RX ADMIN — BUDESONIDE AND FORMOTEROL FUMARATE DIHYDRATE 2 PUFF: 160; 4.5 AEROSOL RESPIRATORY (INHALATION) at 09:04

## 2024-06-16 RX ADMIN — Medication 400 MG: at 08:09

## 2024-06-16 RX ADMIN — HYDROCODONE BITARTRATE AND ACETAMINOPHEN 1 TABLET: 10; 325 TABLET ORAL at 13:03

## 2024-06-16 RX ADMIN — Medication 10 ML: at 20:24

## 2024-06-16 RX ADMIN — SENNOSIDES AND DOCUSATE SODIUM 2 TABLET: 50; 8.6 TABLET ORAL at 20:23

## 2024-06-16 RX ADMIN — HYDROCODONE BITARTRATE AND ACETAMINOPHEN 1 TABLET: 10; 325 TABLET ORAL at 18:30

## 2024-06-16 RX ADMIN — Medication 10 ML: at 08:10

## 2024-06-16 RX ADMIN — TIOTROPIUM BROMIDE INHALATION SPRAY 2 PUFF: 3.12 SPRAY, METERED RESPIRATORY (INHALATION) at 09:04

## 2024-06-16 RX ADMIN — METOPROLOL SUCCINATE 25 MG: 25 TABLET, EXTENDED RELEASE ORAL at 20:23

## 2024-06-16 RX ADMIN — CYANOCOBALAMIN TAB 500 MCG 1000 MCG: 500 TAB at 08:09

## 2024-06-16 RX ADMIN — Medication 1 TABLET: at 08:09

## 2024-06-16 RX ADMIN — SENNOSIDES AND DOCUSATE SODIUM 2 TABLET: 50; 8.6 TABLET ORAL at 08:09

## 2024-06-16 RX ADMIN — ENOXAPARIN SODIUM 40 MG: 100 INJECTION SUBCUTANEOUS at 06:13

## 2024-06-16 RX ADMIN — METOPROLOL SUCCINATE 25 MG: 25 TABLET, EXTENDED RELEASE ORAL at 08:09

## 2024-06-16 NOTE — PROGRESS NOTES
"Hospital Medicine Team    LOS 26 days      Patient Care Team:  Provider, No Known as PCP - General      Subjective       Chief Complaint:  fall and leg pain  Summary: 44 y.o. with intellectual disability, 1-2PPD smoking, depression, who presented after a fall (possibly 1 month ago but may have been more recently), found to have right femoral fracture and VLAD.  Ortho assisting with initial surgical treatment.  Initial lung mass concerning on chest x-ray additional CT imaging with suspected new metastatic colon cancer with mets to the bone, pulmonology consulted patient had a biopsy results pending.  Bronchoscopy also with findings of Haemophilus influenzae treatment with antibiotics.  Palliative assisting.  Awaiting pathology results from right rib biopsy (initial testing inadequate total sample, resulted with atypical cells), repeat biopsy obtained 6/11 and results pending.  Patient is working better with physical therapy and is ambulating with minimal assistance.  Initial placement declined, P2p attempted but this was also declined. Awaiting appeal        Subjective    Weak but has no other issues.     Objective       Vital Signs  Temp:  [97.2 °F (36.2 °C)-98.2 °F (36.8 °C)] 97.3 °F (36.3 °C)  Heart Rate:  [] 113  Resp:  [16-20] 18  BP: (106-120)/(71-79) 112/76  Oxygen Therapy  SpO2: 97 %  Pulse Oximetry Type: Intermittent  Device (Oxygen Therapy): room air  Flow (L/min): 0  Oxygen Concentration (%): 21  ETCO2 (mmHg):  (ptnot on ETCO2)  Flowsheet Rows      Flowsheet Row First Filed Value   Admission Height 182.9 cm (72\") Documented at 05/20/2024 2139   Admission Weight 66 kg (145 lb 8.1 oz) Documented at 05/20/2024 2134                Physical Exam:  Physical Exam  Vitals reviewed.   Constitutional:       Comments: Chroncially ill appearing and cachetic   Cardiovascular:      Rate and Rhythm: Normal rate.   Pulmonary:      Effort: No respiratory distress.   Abdominal:      Palpations: Abdomen is soft. "   Musculoskeletal:      Right lower leg: No edema.      Left lower leg: No edema.   Neurological:      Mental Status: He is alert.           Results Review:    I reviewed the patient's new clinical results.    [x]  Laboratory  []  Microbiology  []  Radiology  []  EKG/Telemetry   []  Cardiology/Vascular   []  Pathology  [x]  Old records  []  Other:      X-rays, labs reviewed personally by physician.    Medication Review:   I have reviewed the patient's current medication list    Scheduled Meds  budesonide-formoterol, 2 puff, Inhalation, BID - RT  enoxaparin, 40 mg, Subcutaneous, Q24H  Lidocaine, 1 patch, Transdermal, Q24H  magnesium oxide, 400 mg, Oral, Daily  metoprolol succinate XL, 25 mg, Oral, Q12H  multivitamin with minerals, 1 tablet, Oral, Daily  polyethylene glycol, 17 g, Oral, Daily  Psyllium, 1 packet, Oral, Daily  senna-docusate sodium, 2 tablet, Oral, BID  sodium chloride, 10 mL, Intravenous, Q12H  tiotropium bromide monohydrate, 2 puff, Inhalation, Daily - RT  vitamin B-12, 1,000 mcg, Oral, Daily        Meds Infusions       Meds PRN    acetaminophen    aluminum-magnesium hydroxide-simethicone    senna-docusate sodium **AND** polyethylene glycol **AND** bisacodyl **AND** bisacodyl    calcium carbonate    dextrose    dextrose    Diclofenac Sodium    glucagon (human recombinant)    HYDROcodone-acetaminophen    HYDROcodone-acetaminophen    hydrOXYzine    levalbuterol    Lidocaine    melatonin    [DISCONTINUED] Morphine **AND** naloxone    nicotine    ondansetron    ondansetron ODT **OR** [DISCONTINUED] ondansetron    prochlorperazine    sodium chloride    sodium chloride        Assessment / Plan       Active Hospital Problems:  Active Hospital Problems    Diagnosis  POA    **Closed intertrochanteric fracture of right femur [S72.141A]  Yes    Pulmonary nodule [R91.1]  Unknown      Resolved Hospital Problems   No resolved problems to display.     Assessment/Plan (clinically significant if listed  here)  Mechanical fall with acute comminuted displaced intertrochanteric right femoral fracture  S/p 5/21 right hip subtrochanteric nailing of closed displaced right femur fracture by Dr. Nelson  VLAD creatinine 1.7 leukocytosis suspect reactive in setting of acute fracture  Suspected new metastatic lung cancer with mets to the bone  Mediastinal lymphadenopathy  Haemophilus influenzae pneumonia   1 to 2 pack/day smoker, chronic  Intellectual disability  Depression  Normocytic anemia     Repeat left rib biopsy 6/11. Looks like prelim with scattered chronic inflammatory cells. Possibly other results sendout to [facility] but if this is final report may need to d/w oncology as this also seems like inconclusive finding.    Original testing was inadequate quantity atypical cells reported from initial stains sent out to [facility].   Cont prn pain control  Cont metoprolol succinate for bp and tachycardia, monitor  CT PE is negative for pulmonary embolism no acute changes  Continue Symbicort, Spiriva, respiratory hygiene as needed inhalers with Xopenex  Continue bowel regimen,   Continue lidocaine patch as needed medications for pain as needed [place]  Prn nrt, pt declines need currently  Cont mvi/b12, thiamine   PT/OT  Hgb slighglt lower but at 7, if drops below 7 in am will need transfusion.       DVT ppx-lovenox      Plan for disposition:Insurance declined p2p; appeal attempted, waiting to hear back, apparently was already declined. Awaiting appeal to [facility]    Electronically signed by Juni Ashraf DO, 06/16/24, 15:29 EDT.      Note disclaimer: At [facility], we believe that sharing information builds trust and better relationships. You are receiving this note because you recently visited [facility]. It is possible you will see health information before a provider has talked with you about it. This kind of information can be easy to misunderstand. To help you fully understand what it means for your health,  we urge you to discuss this note with your provider.

## 2024-06-16 NOTE — THERAPY TREATMENT NOTE
Acute Care - Physical Therapy Treatment Note   [place]     Patient Name: Oswaldo Bowen  : 1980  MRN: [id number]  Today's Date: 2024      Visit Dx:     ICD-10-CM ICD-9-CM   1. Closed displaced intertrochanteric fracture of right femur, initial encounter  S72.141A 820.21   2. Pulmonary nodule  R91.1 793.11   3. Difficulty walking  R26.2 719.7   4. Dysphagia, oropharyngeal  R13.12 787.22   5. Difficulty in walking  R26.2 719.7     Patient Active Problem List   Diagnosis    Closed intertrochanteric fracture of right femur    Pulmonary nodule     History reviewed. No pertinent past medical history.  Past Surgical History:   Procedure Laterality Date    BRONCHOSCOPY N/A 2024    Procedure: BRONCHOSCOPY WITH ENDOBRONCHIAL ULTRASOUND, FINE NEEDLE ASPIRATE, BIOPSIES, BRUSHINGS, BRONCHOALVEOLAR LAVAGE;  Surgeon: Kendell Stern MD;  Location: [facility] ENDOSCOPY;  Service: Pulmonary;  Laterality: N/A;  LUNG NODULE, MUCOUS PLUGGING    HIP INTERTROCHANTERIC NAILING Right 2024    Procedure: HIP INTERTROCHANTERIC NAILING;  Surgeon: Molina Clayton MD;  Location: [facility] MAIN OR;  Service: Orthopedics;  Laterality: Right;    US GUIDED FINE NEEDLE ASPIRATION  2024     PT Assessment (Last 12 Hours)       PT Evaluation and Treatment       Row Name 24 1138          Physical Therapy Time and Intention    Subjective Information complains of;weakness;fatigue;pain  -DK     Document Type therapy note (daily note)  -DK     Mode of Treatment individual therapy;physical therapy  -DK     Patient Effort good  -DK     Symptoms Noted During/After Treatment fatigue;increased pain  -DK     Comment Pt was somewhat slow moving during transfers.  He was able to ambulate further this session.  -DK       Row Name 24 1138          Pain    Pretreatment Pain Rating 3/10  -DK     Posttreatment Pain Rating 4/10  -DK     Pain Location - Side/Orientation Right  -DK     Pain Location generalized  -DK     Pain Location -  hip;knee;back  -DK     Pain Intervention(s) Repositioned;Ambulation/increased activity;Distraction;Therapeutic presence  -       Row Name 06/16/24 1138          Cognition    Affect/Mental Status (Cognition) flat/blunted affect;low arousal/lethargic;confused  -DK     Behavioral Issues (Cognition) overwhelmed easily  -DK     Orientation Status (Cognition) oriented to;person;situation  -DK     Follows Commands (Cognition) physical/tactile prompts required;repetition of directions required;verbal cues/prompting required  -DK     Cognitive Function attention deficit  -DK     Attention Deficit (Cognition) minimal deficit;concentration;focused/sustained attention  -       Row Name 06/16/24 1138          Mobility    Extremity Weight-bearing Status right lower extremity  -DK     Right Lower Extremity (Weight-bearing Status) weight-bearing as tolerated (WBAT)  -[facility] Name 06/16/24 1138          Transfers    Transfers sit-stand transfer;stand-sit transfer  -DK       Row Name 06/16/24 1138          Sit-Stand Transfer    Sit-Stand Spink (Transfers) contact guard;1 person assist;standby assist  -DK     Assistive Device (Sit-Stand Transfers) walker, front-wheeled  -DK       Row Name 06/16/24 1138          Stand-Sit Transfer    Stand-Sit Spink (Transfers) contact guard;1 person assist;standby assist  -     Assistive Device (Stand-Sit Transfers) walker, front-wheeled  -DK       [facility] Name 06/16/24 1138          Gait/Stairs (Locomotion)    Gait/Stairs Locomotion gait/ambulation independence;gait/ambulation assistive device;distance ambulated;gait pattern  -DK     Spink Level (Gait) contact guard;1 person assist;standby assist  -DK     Assistive Device (Gait) walker, front-wheeled  -     Distance in Feet (Gait) 200  -DK     Pattern (Gait) step-to  -DK     Deviations/Abnormal Patterns (Gait) shannon decreased;festinating/shuffling;gait speed decreased;stride length decreased  -DK     Bilateral Gait  Deviations forward flexed posture  -     Comment, (Gait/Stairs) Pt ambulated on room air with a rolling walker.  He returned to bed on alert post treatment.  Pt does tend to take shorter strides.  -       Row Name 06/16/24 1138          Safety Issues, Functional Mobility    Safety Issues Affecting Function (Mobility) awareness of need for assistance;impulsivity;judgment;safety precaution awareness  -     Impairments Affecting Function (Mobility) balance;cognition;endurance/activity tolerance;pain;range of motion (ROM);strength  -     Cognitive Impairments, Mobility Safety/Performance attention;awareness, need for assistance;impulsivity;judgment;safety precaution awareness  -       Row Name 06/16/24 1138          Balance    Balance Assessment sitting static balance;sitting dynamic balance;standing static balance;standing dynamic balance  -     Static Sitting Balance standby assist  -     Dynamic Sitting Balance standby assist  -DK     Position, Sitting Balance unsupported;sitting edge of bed  -     Static Standing Balance standby assist;contact guard;1-person assist  -DK     Dynamic Standing Balance standby assist;contact guard;1-person assist  -DK     Position/Device Used, Standing Balance walker, front-wheeled  -     Balance Interventions standing;dynamic;tandem gait  -       Row Name 06/16/24 1138          Motor Skills    Motor Skills --  therapeutic exercises  -     Coordination WFL  -     Therapeutic Exercise hip;knee;ankle  -       Row Name 06/16/24 1138          Hip (Therapeutic Exercise)    Hip (Therapeutic Exercise) AAROM (active assistive range of motion)  -     Hip AAROM (Therapeutic Exercise) bilateral;flexion;extension;aBduction;aDduction;supine;10 repetitions;2 sets  -       Row Name 06/16/24 1138          Knee (Therapeutic Exercise)    Knee (Therapeutic Exercise) AAROM (active assistive range of motion)  -     Knee AAROM (Therapeutic Exercise)  bilateral;flexion;extension;supine;10 repetitions;2 sets  -       Row Name 06/16/24 1138          Ankle (Therapeutic Exercise)    Ankle (Therapeutic Exercise) AAROM (active assistive range of motion)  -ROLANDO     Ankle AAROM (Therapeutic Exercise) bilateral;dorsiflexion;plantarflexion;supine;10 repetitions;2 sets  -DK       Row Name             Wound 05/21/24 0251 Bilateral coccyx    Wound - Properties Group Placement Date: 05/21/24  -SR Placement Time: 0251  -SR Present on Original Admission: Y  -SR Side: Bilateral  -SR Location: coccyx  -SR    Retired Wound - Properties Group Placement Date: 05/21/24  -SR Placement Time: 0251  -SR Present on Original Admission: Y  -SR Side: Bilateral  -SR Location: coccyx  -SR    Retired Wound - Properties Group Date first assessed: 05/21/24  -SR Time first assessed: 0251  -SR Present on Original Admission: Y  -SR Side: Bilateral  -SR Location: coccyx  -SR      Row Name             Wound 05/21/24 Right lateral thigh Incision    Wound - Properties Group Placement Date: 05/21/24  -SF Present on Original Admission: N  -SF Side: Right  -SF Orientation: lateral  -SF Location: thigh  -SF Primary Wound Type: Incision  -SF Additional Comments: incision sites x 3 to lateral right thigh  -SF    Retired Wound - Properties Group Placement Date: 05/21/24  -SF Present on Original Admission: N  -SF Side: Right  -SF Orientation: lateral  -SF Location: thigh  -SF Primary Wound Type: Incision  -SF Additional Comments: incision sites x 3 to lateral right thigh  -SF    Retired Wound - Properties Group Date first assessed: 05/21/24  -SF Present on Original Admission: N  -SF Side: Right  -SF Location: thigh  -SF Primary Wound Type: Incision  -SF Additional Comments: incision sites x 3 to lateral right thigh  -SF      Row Name 06/16/24 1138          Plan of Care Review    Plan of Care Reviewed With patient  -ROLANDO     Progress improving  -       Row Name 06/16/24 1138          Positioning and Restraints     Pre-Treatment Position in bed  -DK     Post Treatment Position bed  -DK     In Bed supine;call light within reach;encouraged to call for assist;exit alarm on;side rails up x2;legs elevated;heels elevated  -DK       Row Name 06/16/24 1138          Therapy Assessment/Plan (PT)    Rehab Potential (PT) good, to achieve stated therapy goals  -DK     Criteria for Skilled Interventions Met (PT) skilled treatment is necessary  -DK     Therapy Frequency (PT) daily  -DK     Problem List (PT) problems related to;balance;cognition;mobility;range of motion (ROM);strength;pain;communication  -DK     Activity Limitations Related to Problem List (PT) unable to ambulate safely;unable to transfer safely  -DK       Row Name 06/16/24 1138          Progress Summary (PT)    Progress Toward Functional Goals (PT) progress toward functional goals is good  -DK               User Key  (r) = Recorded By, (t) = Taken By, (c) = Cosigned By      Initials Name Provider Type    Saritha Boudreaux, RN Registered Nurse    Sheila Maharaj PTA Physical Therapist Assistant    SR Saritha Long RN Registered Nurse                    Physical Therapy Education       Title: PT OT SLP Therapies (Done)       Topic: Physical Therapy (Done)       Point: Mobility training (Done)       Learning Progress Summary             Patient Acceptance, E,TB, VU by TR at 6/10/2024 1507    Acceptance, E, Bed IU by DS at 6/2/2024 0830    Acceptance, E, VU by PS at 6/1/2024 0627    Acceptance, E,TB, VU by TR at 5/31/2024 1449    Eager, E, VU by AH at 5/23/2024 2223    Acceptance, E,TB, VU by AV at 5/22/2024 1339                         Point: Home exercise program (Done)       Learning Progress Summary             Patient Acceptance, E, Bed IU by DS at 6/2/2024 0830                         Point: Body mechanics (Done)       Learning Progress Summary             Patient Acceptance, E, Bed IU by DS at 6/2/2024 0830    Acceptance, E, VU by PS at 6/1/2024 0627     Acceptance, E,TB, VU by AV at 5/22/2024 1339                         Point: Precautions (Done)       Learning Progress Summary             Patient Acceptance, E,TB, VU by TR at 6/10/2024 1507    Acceptance, E, Bed IU by DS at 6/2/2024 0830    Acceptance, E,TB, VU by TR at 5/31/2024 1449    Acceptance, E,TB, VU by AV at 5/22/2024 1339                                         User Key       Initials Effective Dates Name Provider Type Discipline    PS 06/16/21 -  Darcie Fiore, RN Registered Nurse Nurse    AV 06/11/21 -  Eh Martínez, PT Physical Therapist PT    DS 06/26/23 -  Immanuel Beck, RN Registered Nurse Nurse     05/31/23 -  Angela Casillas, RN Registered Nurse Nurse    TR 05/21/24 -  Aaron Fowler, EJRROD Student PT Student PT                  PT Recommendation and Plan  Planned Therapy Interventions (PT): balance training, bed mobility training, gait training, strengthening, transfer training  Therapy Frequency (PT): daily  Progress Summary (PT)  Progress Toward Functional Goals (PT): progress toward functional goals is good  Plan of Care Reviewed With: patient  Progress: improving   Outcome Measures       Row Name 06/16/24 1138 06/15/24 1515 06/14/24 1500       How much help from another person do you currently need...    Turning from your back to your side while in flat bed without using bedrails? 4  -DK 4  -TS 4  -CS    Moving from lying on back to sitting on the side of a flat bed without bedrails? 3  -DK 3  -TS 3  -CS    Moving to and from a bed to a chair (including a wheelchair)? 3  -DK 3  -TS 3  -CS    Standing up from a chair using your arms (e.g., wheelchair, bedside chair)? 4  -DK 4  -TS 4  -CS    Climbing 3-5 steps with a railing? 3  -DK 3  -TS 3  -CS    To walk in hospital room? 3  -DK 3  -TS 3  -CS    AM-PAC 6 Clicks Score (PT) 20  -DK 20  -TS 20  -CS    Highest Level of Mobility Goal 6 --> Walk 10 steps or more  -DK 6 --> Walk 10 steps or more  -TS 6 --> Walk 10 steps or more   -       Functional Assessment    Outcome Measure Options AM-PAC 6 Clicks Basic Mobility (PT)  -DK -- AM-PAC 6 Clicks Basic Mobility (PT)  -CS              User Key  (r) = Recorded By, (t) = Taken By, (c) = Cosigned By      Initials Name Provider Type    TS Dwight Tompkins, RADHA Physical Therapist Assistant    Sheila Maharaj, RADHA Physical Therapist Assistant    Phu Hdez, RADHA Physical Therapist Assistant                     Time Calculation:    PT Charges       Row Name 06/16/24 1144             Time Calculation    PT Received On 06/16/24  -DK      PT Goal Re-Cert Due Date 06/19/24  -DK         Timed Charges    45343 - PT Therapeutic Exercise Minutes 14  -DK      23667 - Gait Training Minutes  14  -DK      05865 - PT Therapeutic Activity Minutes 10  -DK         Total Minutes    Timed Charges Total Minutes 38  -DK       Total Minutes 38  -DK                User Key  (r) = Recorded By, (t) = Taken By, (c) = Cosigned By      Initials Name Provider Type    Sheila Maharaj, RADHA Physical Therapist Assistant                  Therapy Charges for Today       Code Description Service Date Service Provider Modifiers Qty    57022602888 HC PT THER PROC EA 15 MIN 6/16/2024 Sheila Nicole, PTA GP 1    09925323657 HC GAIT TRAINING EA 15 MIN 6/16/2024 Sheila Nicole, PTA GP 1    00084105749 HC PT THERAPEUTIC ACT EA 15 MIN 6/16/2024 Sheila Nicole, RADHA GP 1            PT G-Codes  Outcome Measure Options: AM-PAC 6 Clicks Basic Mobility (PT)  AM-PAC 6 Clicks Score (PT): 20  AM-PAC 6 Clicks Score (OT): 18    Sheila Nicole PTA  6/16/2024

## 2024-06-17 LAB
DEPRECATED RDW RBC AUTO: 44.3 FL (ref 37–54)
ERYTHROCYTE [DISTWIDTH] IN BLOOD BY AUTOMATED COUNT: 14 % (ref 12.3–15.4)
HCT VFR BLD AUTO: 25.7 % (ref 37.5–51)
HGB BLD-MCNC: 7.9 G/DL (ref 13–17.7)
MCH RBC QN AUTO: 26.6 PG (ref 26.6–33)
MCHC RBC AUTO-ENTMCNC: 30.7 G/DL (ref 31.5–35.7)
MCV RBC AUTO: 86.5 FL (ref 79–97)
PLATELET # BLD AUTO: 475 10*3/MM3 (ref 140–450)
PMV BLD AUTO: 9.6 FL (ref 6–12)
RBC # BLD AUTO: 2.97 10*6/MM3 (ref 4.14–5.8)
WBC NRBC COR # BLD AUTO: 9.57 10*3/MM3 (ref 3.4–10.8)

## 2024-06-17 PROCEDURE — 97116 GAIT TRAINING THERAPY: CPT

## 2024-06-17 PROCEDURE — 94664 DEMO&/EVAL PT USE INHALER: CPT

## 2024-06-17 PROCEDURE — 97110 THERAPEUTIC EXERCISES: CPT

## 2024-06-17 PROCEDURE — 25010000002 ENOXAPARIN PER 10 MG: Performed by: INTERNAL MEDICINE

## 2024-06-17 PROCEDURE — 99232 SBSQ HOSP IP/OBS MODERATE 35: CPT | Performed by: INTERNAL MEDICINE

## 2024-06-17 PROCEDURE — 85027 COMPLETE CBC AUTOMATED: CPT | Performed by: INTERNAL MEDICINE

## 2024-06-17 PROCEDURE — 94799 UNLISTED PULMONARY SVC/PX: CPT

## 2024-06-17 RX ADMIN — SENNOSIDES AND DOCUSATE SODIUM 2 TABLET: 50; 8.6 TABLET ORAL at 09:34

## 2024-06-17 RX ADMIN — ENOXAPARIN SODIUM 40 MG: 100 INJECTION SUBCUTANEOUS at 06:00

## 2024-06-17 RX ADMIN — BUDESONIDE AND FORMOTEROL FUMARATE DIHYDRATE 2 PUFF: 160; 4.5 AEROSOL RESPIRATORY (INHALATION) at 09:56

## 2024-06-17 RX ADMIN — SENNOSIDES AND DOCUSATE SODIUM 2 TABLET: 50; 8.6 TABLET ORAL at 21:26

## 2024-06-17 RX ADMIN — POLYETHYLENE GLYCOL 3350 17 G: 17 POWDER, FOR SOLUTION ORAL at 09:34

## 2024-06-17 RX ADMIN — Medication 10 ML: at 21:26

## 2024-06-17 RX ADMIN — AVOBENZONE, HOMOSALATE, OCTISALATE, OCTOCRYLENE, AND OXYBENZONE 1 PACKET: 29.4; 147; 49; 25.4; 58.8 LOTION TOPICAL at 09:34

## 2024-06-17 RX ADMIN — Medication 1 TABLET: at 09:35

## 2024-06-17 RX ADMIN — HYDROCODONE BITARTRATE AND ACETAMINOPHEN 1 TABLET: 5; 325 TABLET ORAL at 18:09

## 2024-06-17 RX ADMIN — BUDESONIDE AND FORMOTEROL FUMARATE DIHYDRATE 2 PUFF: 160; 4.5 AEROSOL RESPIRATORY (INHALATION) at 21:00

## 2024-06-17 RX ADMIN — Medication 400 MG: at 09:35

## 2024-06-17 RX ADMIN — LIDOCAINE 1 PATCH: 560 PATCH PERCUTANEOUS; TOPICAL; TRANSDERMAL at 09:34

## 2024-06-17 RX ADMIN — METOPROLOL SUCCINATE 25 MG: 25 TABLET, EXTENDED RELEASE ORAL at 09:35

## 2024-06-17 RX ADMIN — CYANOCOBALAMIN TAB 500 MCG 1000 MCG: 500 TAB at 09:35

## 2024-06-17 RX ADMIN — METOPROLOL SUCCINATE 25 MG: 25 TABLET, EXTENDED RELEASE ORAL at 21:26

## 2024-06-17 RX ADMIN — TIOTROPIUM BROMIDE INHALATION SPRAY 2 PUFF: 3.12 SPRAY, METERED RESPIRATORY (INHALATION) at 09:56

## 2024-06-17 RX ADMIN — Medication 10 ML: at 09:35

## 2024-06-17 RX ADMIN — Medication 5 MG: at 00:16

## 2024-06-17 RX ADMIN — HYDROCODONE BITARTRATE AND ACETAMINOPHEN 1 TABLET: 5; 325 TABLET ORAL at 02:32

## 2024-06-17 NOTE — PLAN OF CARE
Goal Outcome Evaluation:  Plan of Care Reviewed With: patient        Progress: no change  Outcome Evaluation: Pt slept well, VSS. AAOx4. No acute events to report overnight. Continue plan of care.

## 2024-06-17 NOTE — PLAN OF CARE
Problem: Adult Inpatient Plan of Care  Goal: Plan of Care Review  Outcome: Ongoing, Progressing  Flowsheets  Taken 6/17/2024 1607 by Danae Benitez RN  Progress: improving  Outcome Evaluation: pt AxOx4. pt rested well during shift. no new concerns at this time.  Taken 6/17/2024 1134 by Sheila Nicole PTA  Plan of Care Reviewed With: patient  Goal: Patient-Specific Goal (Individualized)  Outcome: Ongoing, Progressing  Goal: Absence of Hospital-Acquired Illness or Injury  Outcome: Ongoing, Progressing  Intervention: Identify and Manage Fall Risk  Recent Flowsheet Documentation  Taken 6/17/2024 1347 by Danae Benitez RN  Safety Promotion/Fall Prevention:   safety round/check completed   clutter free environment maintained   assistive device/personal items within reach   fall prevention program maintained  Taken 6/17/2024 1131 by Danae Benitez RN  Safety Promotion/Fall Prevention:   safety round/check completed   clutter free environment maintained   assistive device/personal items within reach   fall prevention program maintained  Taken 6/17/2024 0927 by Danae Benitez RN  Safety Promotion/Fall Prevention:   safety round/check completed   clutter free environment maintained   assistive device/personal items within reach   fall prevention program maintained  Taken 6/17/2024 0731 by Danae Benitez RN  Safety Promotion/Fall Prevention:   safety round/check completed   clutter free environment maintained   assistive device/personal items within reach   fall prevention program maintained  Intervention: Prevent and Manage VTE (Venous Thromboembolism) Risk  Recent Flowsheet Documentation  Taken 6/17/2024 0731 by Danae Benitez RN  Range of Motion: active ROM (range of motion) encouraged  Intervention: Prevent Infection  Recent Flowsheet Documentation  Taken 6/17/2024 1347 by Danae Benitez RN  Infection Prevention:   rest/sleep promoted   personal protective equipment utilized   hand  hygiene promoted   equipment surfaces disinfected  Taken 6/17/2024 1131 by Danae Benitez RN  Infection Prevention:   rest/sleep promoted   personal protective equipment utilized   hand hygiene promoted   equipment surfaces disinfected  Taken 6/17/2024 0927 by Danae Benitez RN  Infection Prevention:   rest/sleep promoted   personal protective equipment utilized   hand hygiene promoted   equipment surfaces disinfected  Taken 6/17/2024 0731 by Danae Benitez RN  Infection Prevention:   rest/sleep promoted   hand hygiene promoted   personal protective equipment utilized   equipment surfaces disinfected  Goal: Optimal Comfort and Wellbeing  Outcome: Ongoing, Progressing  Goal: Readiness for Transition of Care  Outcome: Ongoing, Progressing     Problem: Skin Injury Risk Increased  Goal: Skin Health and Integrity  Outcome: Ongoing, Progressing     Problem: Fall Injury Risk  Goal: Absence of Fall and Fall-Related Injury  Outcome: Ongoing, Progressing  Intervention: Promote Injury-Free Environment  Recent Flowsheet Documentation  Taken 6/17/2024 1347 by Danae Benitez RN  Safety Promotion/Fall Prevention:   safety round/check completed   clutter free environment maintained   assistive device/personal items within [facility]   fall prevention program maintained  Taken 6/17/2024 1131 by Danae Benitez RN  Safety Promotion/Fall Prevention:   safety round/check completed   clutter free environment maintained   assistive device/personal items within reach   fall prevention program maintained  Taken 6/17/2024 0927 by Danae Benitez RN  Safety Promotion/Fall Prevention:   safety round/check completed   clutter free environment maintained   assistive device/personal items within reach   fall prevention program maintained  Taken 6/17/2024 0731 by Danae Benitez RN  Safety Promotion/Fall Prevention:   safety round/check completed   clutter free environment maintained   assistive device/personal items  within reach   fall prevention program maintained     Problem: Palliative Care  Goal: Enhanced Quality of Life  Outcome: Ongoing, Progressing     Problem: Pain Acute  Goal: Acceptable Pain Control and Functional Ability  Outcome: Ongoing, Progressing   Goal Outcome Evaluation:           Progress: improving  Outcome Evaluation: pt AxOx4. pt rested well during shift. no new concerns at this time.

## 2024-06-17 NOTE — PROGRESS NOTES
"Hospital Medicine Team    LOS 27 days      Patient Care Team:  Provider, No Known as PCP - General      Subjective       Chief Complaint:  fall and leg pain  Summary: 44 y.o. with intellectual disability, 1-2PPD smoking, depression, who presented after a fall (possibly 1 month ago but may have been more recently), found to have right femoral fracture and VLAD.  Ortho assisting with initial surgical treatment.  Initial lung mass concerning on chest x-ray additional CT imaging with suspected new metastatic colon cancer with mets to the bone, pulmonology consulted patient had a biopsy results pending.  Bronchoscopy also with findings of Haemophilus influenzae treatment with antibiotics.  Palliative assisting.  Awaiting pathology results from right rib biopsy (initial testing inadequate total sample, resulted with atypical cells), repeat biopsy obtained 6/11 and results pending.  Patient is working better with physical therapy and is ambulating with minimal assistance.  Initial placement declined, P2p attempted but this was also declined. Awaiting appeal        Subjective    No new complaints today    Objective       Vital Signs  Temp:  [97.3 °F (36.3 °C)-98.1 °F (36.7 °C)] 97.9 °F (36.6 °C)  Heart Rate:  [] 102  Resp:  [16-18] 16  BP: (113-122)/(74-87) 117/81  Oxygen Therapy  SpO2: 99 %  Pulse Oximetry Type: Intermittent  Device (Oxygen Therapy): room air  Flow (L/min): 0  Oxygen Concentration (%): 21  ETCO2 (mmHg):  (ptnot on ETCO2)  Flowsheet Rows      Flowsheet Row First Filed Value   Admission Height 182.9 cm (72\") Documented at 05/20/2024 2139   Admission Weight 66 kg (145 lb 8.1 oz) Documented at 05/20/2024 2134                Physical Exam:  Physical Exam  Vitals reviewed.   Constitutional:       Comments: Chroncially ill appearing and cachetic   Cardiovascular:      Rate and Rhythm: Normal rate.   Pulmonary:      Effort: No respiratory distress.   Abdominal:      Palpations: Abdomen is soft. "   Musculoskeletal:      Right lower leg: No edema.      Left lower leg: No edema.   Neurological:      Mental Status: He is alert.           Results Review:    I reviewed the patient's new clinical results.    [x]  Laboratory  []  Microbiology  []  Radiology  []  EKG/Telemetry   []  Cardiology/Vascular   []  Pathology  [x]  Old records  []  Other:      X-rays, labs reviewed personally by physician.    Medication Review:   I have reviewed the patient's current medication list    Scheduled Meds  budesonide-formoterol, 2 puff, Inhalation, BID - RT  enoxaparin, 40 mg, Subcutaneous, Q24H  Lidocaine, 1 patch, Transdermal, Q24H  magnesium oxide, 400 mg, Oral, Daily  metoprolol succinate XL, 25 mg, Oral, Q12H  multivitamin with minerals, 1 tablet, Oral, Daily  polyethylene glycol, 17 g, Oral, Daily  Psyllium, 1 packet, Oral, Daily  senna-docusate sodium, 2 tablet, Oral, BID  sodium chloride, 10 mL, Intravenous, Q12H  tiotropium bromide monohydrate, 2 puff, Inhalation, Daily - RT  vitamin B-12, 1,000 mcg, Oral, Daily        Meds Infusions       Meds PRN  •  acetaminophen  •  aluminum-magnesium hydroxide-simethicone  •  senna-docusate sodium **AND** polyethylene glycol **AND** bisacodyl **AND** bisacodyl  •  calcium carbonate  •  dextrose  •  dextrose  •  Diclofenac Sodium  •  glucagon (human recombinant)  •  HYDROcodone-acetaminophen  •  HYDROcodone-acetaminophen  •  hydrOXYzine  •  levalbuterol  •  Lidocaine  •  melatonin  •  [DISCONTINUED] Morphine **AND** naloxone  •  nicotine  •  ondansetron  •  ondansetron ODT **OR** [DISCONTINUED] ondansetron  •  prochlorperazine  •  sodium chloride  •  sodium chloride        Assessment / Plan       Active Hospital Problems:  Active Hospital Problems    Diagnosis  POA   • **Closed intertrochanteric fracture of right femur [S72.141A]  Yes   • Pulmonary nodule [R91.1]  Unknown      Resolved Hospital Problems   No resolved problems to display.     Assessment/Plan (clinically significant  if listed here)  Mechanical fall with acute comminuted displaced intertrochanteric right femoral fracture  S/p 5/21 right hip subtrochanteric nailing of closed displaced right femur fracture by Dr. Nelson  VLAD creatinine 1.7 leukocytosis suspect reactive in setting of acute fracture  Suspected new metastatic lung cancer with mets to the bone  Mediastinal lymphadenopathy  Haemophilus influenzae pneumonia   1 to 2 pack/day smoker, chronic  Intellectual disability  Depression  Normocytic anemia     Repeat left rib biopsy 6/11: Per read scattered chronic inflammatory cells but no definitive gnosis.  Bronc with EBUS biopsy 5/23 results inconclusive.  Also bone biopsy 5/30 typical cells noted but overall profile is nonspecific  Original testing was inadequate quantity atypical cells reported from initial stains sent out to [facility].    discussed  with oncology about getting a tissue sample and they were in agreeement this may be necessary [place] with now 3 inconclusive biopsy, will reconsult pulmonology to see if robotic bronchoscopy with biopsy   Cont prn pain control  Cont metoprolol succinate for bp and tachycardia, monitor  CT PE is negative for pulmonary embolism no acute changes  Continue Symbicort, Spiriva, respiratory hygiene as needed inhalers with Xopenex  Continue bowel regimen,   Continue lidocaine patch as needed medications for pain as needed [place]  Prn nrt, pt declines need currently  Cont mvi/b12, thiamine   PT/OT  Hgb low but stable at 7.9 if drops below 7 in am will need transfusion.       DVT ppx-lovenox      Plan for disposition:Insurance declined p2p; appeal has been sent by facility    Electronically signed by Juni Ashraf DO, 06/17/24, 15:32 EDT.      Note disclaimer: At [facility], we believe that sharing information builds trust and better relationships. You are receiving this note because you recently visited [facility]. It is possible you will see health information before a  provider has talked with you about it. This kind of information can be easy to misunderstand. To help you fully understand what it means for your health, we urge you to discuss this note with your provider.

## 2024-06-17 NOTE — THERAPY TREATMENT NOTE
Acute Care - Physical Therapy Treatment Note   [place]     Patient Name: Oswaldo Bowen  : 1980  MRN: [id number]  Today's Date: 2024      Visit Dx:     ICD-10-CM ICD-9-CM   1. Closed displaced intertrochanteric fracture of right femur, initial encounter  S72.141A 820.21   2. Pulmonary nodule  R91.1 793.11   3. Difficulty walking  R26.2 719.7   4. Dysphagia, oropharyngeal  R13.12 787.22   5. Difficulty in walking  R26.2 719.7     Patient Active Problem List   Diagnosis    Closed intertrochanteric fracture of right femur    Pulmonary nodule     History reviewed. No pertinent past medical history.  Past Surgical History:   Procedure Laterality Date    BRONCHOSCOPY N/A 2024    Procedure: BRONCHOSCOPY WITH ENDOBRONCHIAL ULTRASOUND, FINE NEEDLE ASPIRATE, BIOPSIES, BRUSHINGS, BRONCHOALVEOLAR LAVAGE;  Surgeon: Kendell Stern MD;  Location: [facility] ENDOSCOPY;  Service: Pulmonary;  Laterality: N/A;  LUNG NODULE, MUCOUS PLUGGING    HIP INTERTROCHANTERIC NAILING Right 2024    Procedure: HIP INTERTROCHANTERIC NAILING;  Surgeon: Molina Clayton MD;  Location: [facility] MAIN OR;  Service: Orthopedics;  Laterality: Right;    US GUIDED FINE NEEDLE ASPIRATION  2024     PT Assessment (Last 12 Hours)       PT Evaluation and Treatment       Row Name 24 1134          Physical Therapy Time and Intention    Subjective Information complains of;weakness;fatigue;pain  -DK     Document Type therapy note (daily note)  -DK     Mode of Treatment individual therapy;physical therapy  -DK     Patient Effort good  -DK     Symptoms Noted During/After Treatment fatigue;increased pain  -DK     Comment PTA has been progressing pt to more arom exercises.  He was instructed to minimize the support from the rolling walker during gait, to try more just pushing the rolling walker.  -DK       Row Name 24 1134          Pain    Pretreatment Pain Rating 2/10  -DK     Posttreatment Pain Rating 3/10  -DK     Pain Location -  Dr. Mendoza at bedside to assess bleeding of circumcision; another string added per MD; monitored on bed per MD for 20 minutes; reassessed and stated bleeding is good; monitor on bed for another 30 minutes prior to going back to parents.    Side/Orientation Right  -DK     Pain Location generalized  -DK     Pain Location - hip  -DK     Pain Intervention(s) Repositioned;Ambulation/increased activity;Distraction;Therapeutic presence  -       Row Name 06/17/24 1134          Cognition    Affect/Mental Status (Cognition) flat/blunted affect;low arousal/lethargic;confused  -DK     Behavioral Issues (Cognition) overwhelmed easily  -DK     Orientation Status (Cognition) oriented to;person;situation  -DK     Follows Commands (Cognition) physical/tactile prompts required;repetition of directions required;verbal cues/prompting required  -DK     Cognitive Function attention deficit  -DK     Attention Deficit (Cognition) minimal deficit;concentration;focused/sustained attention  -DK     Personal Safety Interventions gait belt;nonskid shoes/slippers when out of bed;supervised activity  -       Row Name 06/17/24 1134          Mobility    Extremity Weight-bearing Status right lower extremity  -DK     Right Lower Extremity (Weight-bearing Status) weight-bearing as tolerated (WBAT)  -       Row Name 06/17/24 1134          Transfers    Transfers sit-stand transfer;stand-sit transfer  -       Row Name 06/17/24 1134          Sit-Stand Transfer    Sit-Stand Boyle (Transfers) standby assist  -     Assistive Device (Sit-Stand Transfers) walker, front-wheeled  -       Row Name 06/17/24 1134          Stand-Sit Transfer    Stand-Sit Boyle (Transfers) standby assist  -     Assistive Device (Stand-Sit Transfers) walker, front-wheeled  -DK       Row Name 06/17/24 1134          Gait/Stairs (Locomotion)    Gait/Stairs Locomotion gait/ambulation independence;gait/ambulation assistive device;distance ambulated;gait pattern  -DK     Boyle Level (Gait) contact guard;1 person assist;standby assist  -DK     Assistive Device (Gait) walker, front-wheeled  -     Distance in Feet (Gait) 200  -DK     Pattern (Gait) step-through  -DK     Deviations/Abnormal  Patterns (Gait) shannon decreased;festinating/shuffling;gait speed decreased;stride length decreased  -     Comment, (Gait/Stairs) Pt ambulated on room air with a rolling walker.  He was given cues to do less support from the rolling walker during gait.  Pt was left in the recliner on alert post treatment.  -       Row Name 06/17/24 1134          Safety Issues, Functional Mobility    Safety Issues Affecting Function (Mobility) awareness of need for assistance;impulsivity;judgment;safety precaution awareness  -     Impairments Affecting Function (Mobility) balance;cognition;endurance/activity tolerance;pain;range of motion (ROM);strength  -     Cognitive Impairments, Mobility Safety/Performance impulsivity;awareness, need for assistance;judgment;safety precaution awareness  -       Row Name 06/17/24 1134          Balance    Balance Assessment sitting static balance;sitting dynamic balance;standing static balance;standing dynamic balance  -     Static Sitting Balance standby assist  -     Dynamic Sitting Balance standby assist  -DK     Position, Sitting Balance unsupported;sitting edge of bed;sitting in chair  -     Static Standing Balance standby assist  -     Dynamic Standing Balance standby assist;contact guard;1-person assist  -DK     Position/Device Used, Standing Balance walker, front-wheeled  -     Balance Interventions standing;dynamic;tandem gait  -       Row Name 06/17/24 1134          Motor Skills    Motor Skills --  therapeutic exercises  -     Coordination WFL  -     Therapeutic Exercise hip;knee;ankle  -       Row Name 06/17/24 1134          Hip (Therapeutic Exercise)    Hip (Therapeutic Exercise) AAROM (active assistive range of motion)  -     Hip AAROM (Therapeutic Exercise) bilateral;flexion;extension;aBduction;aDduction;supine;10 repetitions;2 sets  -       Row Name 06/17/24 1134          Knee (Therapeutic Exercise)    Knee (Therapeutic Exercise) AAROM (active  assistive range of motion);AROM (active range of motion)  -     Knee AROM (Therapeutic Exercise) right;heel slides;SAQ (short arc quad);supine;15 repititions  -DK     Knee AAROM (Therapeutic Exercise) bilateral;flexion;extension;supine;10 repetitions;2 sets  -DK       Row Name 06/17/24 1134          Ankle (Therapeutic Exercise)    Ankle (Therapeutic Exercise) AAROM (active assistive range of motion)  -DK     Ankle AROM (Therapeutic Exercise) bilateral;dorsiflexion;plantarflexion;supine;20 repititions  -DK     Ankle AAROM (Therapeutic Exercise) bilateral;dorsiflexion;plantarflexion;supine;10 repetitions;2 sets  -DK       Row Name             Wound 05/21/24 0251 Bilateral coccyx    Wound - Properties Group Placement Date: 05/21/24  -SR Placement Time: 0251  -SR Present on Original Admission: Y  -SR Side: Bilateral  -SR Location: coccyx  -SR    Retired Wound - Properties Group Placement Date: 05/21/24  -SR Placement Time: 0251  -SR Present on Original Admission: Y  -SR Side: Bilateral  -SR Location: coccyx  -SR    Retired Wound - Properties Group Date first assessed: 05/21/24  -SR Time first assessed: 0251  -SR Present on Original Admission: Y  -SR Side: Bilateral  -SR Location: coccyx  -SR      Row Name             Wound 05/21/24 Right lateral thigh Incision    Wound - Properties Group Placement Date: 05/21/24  -SF Present on Original Admission: N  -SF Side: Right  -SF Orientation: lateral  -SF Location: thigh  -SF Primary Wound Type: Incision  -SF Additional Comments: incision sites x 3 to lateral right thigh  -SF    Retired Wound - Properties Group Placement Date: 05/21/24  -SF Present on Original Admission: N  -SF Side: Right  -SF Orientation: lateral  -SF Location: thigh  -SF Primary Wound Type: Incision  -SF Additional Comments: incision sites x 3 to lateral right thigh  -SF    Retired Wound - Properties Group Date first assessed: 05/21/24  -SF Present on Original Admission: N  -SF Side: Right  -SF Location:  thigh  -SF Primary Wound Type: Incision  -SF Additional Comments: incision sites x 3 to lateral right thigh  -SF      Row Name 06/17/24 1134          Plan of Care Review    Plan of Care Reviewed With patient  -DK     Progress improving  -DK       Row Name 06/17/24 1134          Positioning and Restraints    Pre-Treatment Position in bed  -DK     Post Treatment Position chair  -DK     In Chair reclined;call light within reach;encouraged to call for assist;exit alarm on;waffle cushion;legs elevated;heels elevated  -DK       Row Name 06/17/24 1134          Therapy Assessment/Plan (PT)    Rehab Potential (PT) good, to achieve stated therapy goals  -DK     Criteria for Skilled Interventions Met (PT) skilled treatment is necessary  -DK     Therapy Frequency (PT) daily  -DK     Problem List (PT) problems related to;balance;cognition;mobility;range of motion (ROM);strength;pain;communication  -DK     Activity Limitations Related to Problem List (PT) unable to ambulate safely;unable to transfer safely  -       Row Name 06/17/24 1134          Progress Summary (PT)    Progress Toward Functional Goals (PT) progress toward functional goals is good  -DK               User Key  (r) = Recorded By, (t) = Taken By, (c) = Cosigned By      Initials Name Provider Type    SF Saritha Ramirez, RN Registered Nurse    Sheila Maharaj PTA Physical Therapist Assistant    SR Saritha Long, RN Registered Nurse                    Physical Therapy Education       Title: PT OT SLP Therapies (Done)       Topic: Physical Therapy (Done)       Point: Mobility training (Done)       Learning Progress Summary             Patient Acceptance, E,TB, VU by TR at 6/10/2024 1507    Acceptance, E, Bed IU by DS at 6/2/2024 0830    Acceptance, E, VU by PS at 6/1/2024 0627    Acceptance, E,TB, VU by TR at 5/31/2024 1449    Eager, E, VU by AH at 5/23/2024 2223    Acceptance, E,TB, VU by AV at 5/22/2024 1339                         Point: Home exercise  program (Done)       Learning Progress Summary             Patient Acceptance, E, Bed IU by DS at 6/2/2024 0830                         Point: Body mechanics (Done)       Learning Progress Summary             Patient Acceptance, E, Bed IU by DS at 6/2/2024 0830    Acceptance, E, VU by PS at 6/1/2024 0627    Acceptance, E,TB, VU by AV at 5/22/2024 1339                         Point: Precautions (Done)       Learning Progress Summary             Patient Acceptance, E,TB, VU by TR at 6/10/2024 1507    Acceptance, E, Bed IU by DS at 6/2/2024 0830    Acceptance, E,TB, VU by TR at 5/31/2024 1449    Acceptance, E,TB, VU by AV at 5/22/2024 1339                                         User Key       Initials Effective Dates Name Provider Type Discipline    PS 06/16/21 -  Darcie Fiore, RN Registered Nurse Nurse    AV 06/11/21 -  Eh Martínez, PT Physical Therapist PT    DS 06/26/23 -  Immanuel Beck, RN Registered Nurse Nurse     05/31/23 -  Angela Casillas, RN Registered Nurse Nurse    TR 05/21/24 -  Aaron Fowler, JERROD Student PT Student PT                  PT Recommendation and Plan  Planned Therapy Interventions (PT): balance training, bed mobility training, gait training, strengthening, transfer training  Therapy Frequency (PT): daily  Progress Summary (PT)  Progress Toward Functional Goals (PT): progress toward functional goals is good  Plan of Care Reviewed With: patient  Progress: improving   Outcome Measures       Row Name 06/17/24 1134 06/16/24 1138 06/15/24 1515       How much help from another person do you currently need...    Turning from your back to your side while in flat bed without using bedrails? 4  -DK 4  -DK 4  -TS    Moving from lying on back to sitting on the side of a flat bed without bedrails? 4  -DK 3  -DK 3  -TS    Moving to and from a bed to a chair (including a wheelchair)? 3  -DK 3  -DK 3  -TS    Standing up from a chair using your arms (e.g., wheelchair, bedside chair)? 3   -DK 4  -DK 4  -TS    Climbing 3-5 steps with a railing? 3  -DK 3  -DK 3  -TS    To walk in hospital room? 3  -DK 3  -DK 3  -TS    AM-PAC 6 Clicks Score (PT) 20  -DK 20  -DK 20  -TS    Highest Level of Mobility Goal 6 --> Walk 10 steps or more  -DK 6 --> Walk 10 steps or more  -DK 6 --> Walk 10 steps or more  -TS       Functional Assessment    Outcome Measure Options AM-PAC 6 Clicks Basic Mobility (PT)  -DK AM-PAC 6 Clicks Basic Mobility (PT)  -DK --      Row Name 06/14/24 1500             How much help from another person do you currently need...    Turning from your back to your side while in flat bed without using bedrails? 4  -CS      Moving from lying on back to sitting on the side of a flat bed without bedrails? 3  -CS      Moving to and from a bed to a chair (including a wheelchair)? 3  -CS      Standing up from a chair using your arms (e.g., wheelchair, bedside chair)? 4  -CS      Climbing 3-5 steps with a railing? 3  -CS      To walk in hospital room? 3  -CS      AM-PAC 6 Clicks Score (PT) 20  -CS      Highest Level of Mobility Goal 6 --> Walk 10 steps or more  -CS         Functional Assessment    Outcome Measure Options AM-PAC 6 Clicks Basic Mobility (PT)  -CS                User Key  (r) = Recorded By, (t) = Taken By, (c) = Cosigned By      Initials Name Provider Type    TS Dwight Tompkins PTA Physical Therapist Assistant    Sheila Maharaj PTA Physical Therapist Assistant    Phu Hdez PTA Physical Therapist Assistant                     Time Calculation:    PT Charges       Row Name 06/17/24 1140             Time Calculation    PT Received On 06/17/24  -DK      PT Goal Re-Cert Due Date 06/19/24  -DK         Timed Charges    09295 - PT Therapeutic Exercise Minutes 16  -DK      08719 - Gait Training Minutes  10  -DK      95298 - PT Therapeutic Activity Minutes 8  -DK         Total Minutes    Timed Charges Total Minutes 34  -DK       Total Minutes 34  -DK                User Key  (r) =  Recorded By, (t) = Taken By, (c) = Cosigned By      Initials Name Provider Type    Sheila Maharaj PTA Physical Therapist Assistant                  Therapy Charges for Today       Code Description Service Date Service Provider Modifiers Qty    13535314964 HC PT THER PROC EA 15 MIN 6/16/2024 Sheila Nicole, PTA GP 1    36822661760 HC GAIT TRAINING EA 15 MIN 6/16/2024 Sheila Nicole, PTA GP 1    05692003707 HC PT THERAPEUTIC ACT EA 15 MIN 6/16/2024 Sheila Nicole, RADHA GP 1    38389480394 HC PT THER PROC EA 15 MIN 6/17/2024 Sheila Nicole, PTA GP 1    96076877510 HC GAIT TRAINING EA 15 MIN 6/17/2024 Sheila Nicole, RADHA GP 1            PT G-Codes  Outcome Measure Options: AM-PAC 6 Clicks Basic Mobility (PT)  AM-PAC 6 Clicks Score (PT): 20  AM-PAC 6 Clicks Score (OT): 18    Sheila Nicole PTA  6/17/2024

## 2024-06-18 ENCOUNTER — APPOINTMENT (OUTPATIENT)
Dept: CT IMAGING | Facility: HOSPITAL | Age: 44
DRG: 477 | End: 2024-06-18
Payer: COMMERCIAL

## 2024-06-18 LAB — FUNGUS WND CULT: ABNORMAL

## 2024-06-18 PROCEDURE — 94664 DEMO&/EVAL PT USE INHALER: CPT

## 2024-06-18 PROCEDURE — 94799 UNLISTED PULMONARY SVC/PX: CPT

## 2024-06-18 PROCEDURE — 99233 SBSQ HOSP IP/OBS HIGH 50: CPT | Performed by: STUDENT IN AN ORGANIZED HEALTH CARE EDUCATION/TRAINING PROGRAM

## 2024-06-18 PROCEDURE — 94760 N-INVAS EAR/PLS OXIMETRY 1: CPT

## 2024-06-18 PROCEDURE — 97110 THERAPEUTIC EXERCISES: CPT

## 2024-06-18 PROCEDURE — 71250 CT THORAX DX C-: CPT

## 2024-06-18 PROCEDURE — 97116 GAIT TRAINING THERAPY: CPT

## 2024-06-18 PROCEDURE — 99233 SBSQ HOSP IP/OBS HIGH 50: CPT | Performed by: INTERNAL MEDICINE

## 2024-06-18 PROCEDURE — 25010000002 ENOXAPARIN PER 10 MG: Performed by: INTERNAL MEDICINE

## 2024-06-18 RX ADMIN — Medication 400 MG: at 10:32

## 2024-06-18 RX ADMIN — METOPROLOL SUCCINATE 25 MG: 25 TABLET, EXTENDED RELEASE ORAL at 21:32

## 2024-06-18 RX ADMIN — CYANOCOBALAMIN TAB 500 MCG 1000 MCG: 500 TAB at 10:32

## 2024-06-18 RX ADMIN — HYDROCODONE BITARTRATE AND ACETAMINOPHEN 1 TABLET: 10; 325 TABLET ORAL at 21:32

## 2024-06-18 RX ADMIN — Medication 10 ML: at 10:33

## 2024-06-18 RX ADMIN — METOPROLOL SUCCINATE 25 MG: 25 TABLET, EXTENDED RELEASE ORAL at 10:32

## 2024-06-18 RX ADMIN — ENOXAPARIN SODIUM 40 MG: 100 INJECTION SUBCUTANEOUS at 06:08

## 2024-06-18 RX ADMIN — POLYETHYLENE GLYCOL 3350 17 G: 17 POWDER, FOR SOLUTION ORAL at 10:31

## 2024-06-18 RX ADMIN — Medication 1 TABLET: at 10:32

## 2024-06-18 RX ADMIN — TIOTROPIUM BROMIDE INHALATION SPRAY 2 PUFF: 3.12 SPRAY, METERED RESPIRATORY (INHALATION) at 09:54

## 2024-06-18 RX ADMIN — SENNOSIDES AND DOCUSATE SODIUM 2 TABLET: 50; 8.6 TABLET ORAL at 21:31

## 2024-06-18 RX ADMIN — BUDESONIDE AND FORMOTEROL FUMARATE DIHYDRATE 2 PUFF: 160; 4.5 AEROSOL RESPIRATORY (INHALATION) at 20:12

## 2024-06-18 RX ADMIN — AVOBENZONE, HOMOSALATE, OCTISALATE, OCTOCRYLENE, AND OXYBENZONE 1 PACKET: 29.4; 147; 49; 25.4; 58.8 LOTION TOPICAL at 10:31

## 2024-06-18 RX ADMIN — HYDROCODONE BITARTRATE AND ACETAMINOPHEN 1 TABLET: 5; 325 TABLET ORAL at 10:32

## 2024-06-18 RX ADMIN — BUDESONIDE AND FORMOTEROL FUMARATE DIHYDRATE 2 PUFF: 160; 4.5 AEROSOL RESPIRATORY (INHALATION) at 09:54

## 2024-06-18 RX ADMIN — SENNOSIDES AND DOCUSATE SODIUM 2 TABLET: 50; 8.6 TABLET ORAL at 10:32

## 2024-06-18 RX ADMIN — Medication 10 ML: at 21:31

## 2024-06-18 RX ADMIN — LIDOCAINE 1 PATCH: 560 PATCH PERCUTANEOUS; TOPICAL; TRANSDERMAL at 10:32

## 2024-06-18 NOTE — PROGRESS NOTES
Pulmonary / Critical Care Progress Note      Patient Name: Oswaldo Bowen  : 1980  MRN: [id number]  Primary Care Physician:  Provider, No Known  Date of admission: 2024    Subjective   Subjective   Follow-up for left upper lobe lung nodule with mediastinal adenopathy    2 bone biopsies and EBUS inconclusive  Discussed with patient about Ion navigational bronchoscopy for his lung nodule as this may give us a diagnosis.  Patient is agreeable.  No acute changes since symptoms    Objective   Objective     Vitals:   Temp:  [97.5 °F (36.4 °C)-98.1 °F (36.7 °C)] 97.5 °F (36.4 °C)  Heart Rate:  [102-117] 111  Resp:  [16-18] 18  BP: (117-132)/(78-83) 119/79  Physical Exam   Vital Signs Reviewed   General:  WDWN, Alert, in no distress, lying in bed  Chest:  good aeration, clear to auscultation bilaterally, tympanic to percussion bilaterally, no work of breathing noted on room air  CV: RRR, no MGR, pulses 2+, equal.  Abd:  Soft, NT, ND, + BS, no HSM  EXT:  no clubbing, no cyanosis, no edema  Neuro:  A&Ox3, CN grossly intact, no focal deficits.  Skin: No rashes or lesions noted      Result Review    Result Review:  I have personally reviewed the results from the time of this admission to 2024 11:15 EDT and agree with these findings:  [x]  Laboratory  [x]  Microbiology  [x]  Radiology  [x]  EKG/Telemetry   [x]  Cardiology/Vascular   []  Pathology  []  Old records  []  Other:  Most notable findings include:         Lab 24  0643 24  0816 06/15/24  0642   WBC 9.57  --  13.16*   HEMOGLOBIN 7.9* 7.0* 7.0*   HEMATOCRIT 25.7* 23.0* 22.9*   PLATELETS 475*  --  406   SODIUM  --   --  129*   POTASSIUM  --   --  4.1   CHLORIDE  --   --  91*   CO2  --   --  26.9   BUN  --   --  14   CREATININE  --   --  0.45*   GLUCOSE  --   --  117*   CALCIUM  --   --  8.7            CT Chest With Contrast Diagnostic    Result Date: 2024  CT CHEST W CONTRAST DIAGNOSTIC Date of Exam: 2024 4:43 PM EDT Indication:  leukocytosis worsening, subjective fevers, worsening tachycardia - question pe vs pna. Comparison: 5/21/2024 Technique: Axial CT images were obtained of the chest after the uneventful intravenous administration of iodinated contrast.  Reconstructed coronal and sagittal images were also obtained. Automated exposure control and iterative construction methods were  used. Findings: Pulmonary Arteries: Adequately opacified. Some motion distortion of peripheral pulmonary vessels on the left. Within that limitation, no emboli demonstrated Marta/mediastinum: Mildly enlarged bilateral hilar and AP window lymph nodes. No pericardial effusion. Indeterminate for coronary calcification due to motion Lungs/pleura: 2.1 cm lobular mass in the inferior left upper lobe again demonstrated. No acute infiltrate. No pleural effusion Upper Abdomen: Unremarkable Bones/soft tissues: Multiple lytic bone lesions including expansile lytic lesions of the right seventh rib and left eighth rib, multiple vertebral bodies including stable pathologic compression of T5, sternum, left scapula. The T5 compression fracture and infiltrating tumor results in significant central canal stenosis. There is some tumor encroachment on the T8 central canal and T8-T9 left foramina, and T11-T12 right foramina     Impression: 1. No evidence of pulmonary embolus 2. No evidence of pneumonia 3. 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 malignancy, with bilateral hilar and AP window adenopathy and extensive osteolytic metastatic disease as described Electronically Signed: Sree Hawthorne  6/4/2024 5:17 PM EDT  Workstation ID: [id number]       Assessment & Plan   Assessment / Plan     Active Hospital Problems:  Active Hospital Problems    Diagnosis    • **Closed intertrochanteric fracture of right femur    • Pulmonary nodule          Impression:   Acute comminuted displaced intertrochanteric right femoral fracture  Status post IM nailing  Left upper lobe  lung nodule with mediastinal lymphadenopathy  Chronic heavy smoking  Unintentional weight loss    Plan:   Status post bronchoscopy with EBUS 5/23.  Pathology from mediastinal lymph nodes were all negative for malignancy.    Bone biopsy x 2 showed atypical and inflammatory cells without definitive diagnosis  Imaging still very concerning and consistent with metastatic cancer.  Will plan for [facility] navigational bronchoscopy of the left lobe lesion.  Date TBD.  Ideally Friday if schedule allows.  Completed 5 days of Unasyn for haemophilus pneumonia growing on bronchoscopy BAL  Continue Symbicort and Spiriva, albuterol as needed.    Encourage activity and incentive spirometer use    DVT prophylaxis:  Pharmacologic & mechanical VTE prophylaxis orders are present.    CODE STATUS:   Level Of Support Discussed With: Patient  Code Status (Patient has no pulse and is not breathing): CPR (Attempt to Resuscitate)  Medical Interventions (Patient has pulse or is breathing): Full Support    I have reviewed labs, imaging, pertinent clinical data and provider notes.   I have discussed with bedside nurse and primary service.     Electronically signed by John Elizalde MD, 6/18/2024, 11:15 EDT.

## 2024-06-18 NOTE — THERAPY TREATMENT NOTE
Acute Care - Physical Therapy Treatment Note   [facility]     Patient Name: Oswaldo Bowen  : 1980  MRN: [id number]  Today's Date: 2024      Visit Dx:     ICD-10-CM ICD-9-CM   1. Closed displaced intertrochanteric fracture of right femur, initial encounter  S72.141A 820.21   2. Pulmonary nodule  R91.1 793.11   3. Difficulty walking  R26.2 719.7   4. Dysphagia, oropharyngeal  R13.12 787.22   5. Difficulty in walking  R26.2 719.7     Patient Active Problem List   Diagnosis    Closed intertrochanteric fracture of right femur    Pulmonary nodule     History reviewed. No pertinent past medical history.  Past Surgical History:   Procedure Laterality Date    BRONCHOSCOPY N/A 2024    Procedure: BRONCHOSCOPY WITH ENDOBRONCHIAL ULTRASOUND, FINE NEEDLE ASPIRATE, BIOPSIES, BRUSHINGS, BRONCHOALVEOLAR LAVAGE;  Surgeon: Kendell Stern MD;  Location: [facility] ENDOSCOPY;  Service: Pulmonary;  Laterality: N/A;  LUNG NODULE, MUCOUS PLUGGING    HIP INTERTROCHANTERIC NAILING Right 2024    Procedure: HIP INTERTROCHANTERIC NAILING;  Surgeon: Molina Clayton MD;  Location: [facility] MAIN OR;  Service: Orthopedics;  Laterality: Right;    US GUIDED FINE NEEDLE ASPIRATION  2024     PT Assessment (Last 12 Hours)       PT Evaluation and Treatment       Row Name 24 1152          Physical Therapy Time and Intention    Subjective Information complains of;weakness;fatigue;pain  -DK     Document Type therapy note (daily note)  -DK     Mode of Treatment individual therapy;physical therapy  -DK     Patient Effort good  -DK     Symptoms Noted During/After Treatment fatigue;increased pain  -DK     Comment Pt was gettiing into the shower with PCA assist on first attempt at treatment.  -DK       Row Name 24 1152          Pain    Pretreatment Pain Rating 2/10  -DK     Posttreatment Pain Rating 2/10  -DK     Pain Location - Side/Orientation Right  -DK     Pain Location generalized  -DK     Pain Location - back;hip;knee  -DK      Pain Intervention(s) Repositioned;Ambulation/increased activity;Distraction;Therapeutic presence  -       Row Name 06/18/24 1152          Cognition    Affect/Mental Status (Cognition) flat/blunted affect;confused  -DK     Behavioral Issues (Cognition) overwhelmed easily  -DK     Orientation Status (Cognition) oriented to;person;situation  -DK     Follows Commands (Cognition) physical/tactile prompts required;repetition of directions required;verbal cues/prompting required  -DK     Cognitive Function attention deficit  -DK     Attention Deficit (Cognition) minimal deficit;concentration;focused/sustained attention  -DK     Personal Safety Interventions gait belt;nonskid shoes/slippers when out of bed;supervised activity  -       Row Name 06/18/24 1152          Mobility    Extremity Weight-bearing Status right lower extremity  -DK     Right Lower Extremity (Weight-bearing Status) weight-bearing as tolerated (WBAT)  -       Row Name 06/18/24 1152          Transfers    Transfers sit-stand transfer;stand-sit transfer  -       Row Name 06/18/24 1152          Sit-Stand Transfer    Sit-Stand Alex (Transfers) standby assist  -DK     Assistive Device (Sit-Stand Transfers) walker, front-wheeled  -       Row Name 06/18/24 1152          Stand-Sit Transfer    Stand-Sit Alex (Transfers) standby assist  -     Assistive Device (Stand-Sit Transfers) walker, front-wheeled  -DK       Row Name 06/18/24 1152          Gait/Stairs (Locomotion)    Gait/Stairs Locomotion gait/ambulation independence;gait/ambulation assistive device;distance ambulated;gait pattern  -DK     Alex Level (Gait) contact guard;1 person assist;standby assist  -DK     Assistive Device (Gait) walker, front-wheeled  -     Distance in Feet (Gait) 250  -DK     Pattern (Gait) step-through  -DK     Deviations/Abnormal Patterns (Gait) shannon decreased;festinating/shuffling;gait speed decreased;stride length decreased  -DK      Comment, (Gait/Stairs) Pt ambulated on room air with a rolling walker.  He was left in the recliner on alert post treatment.  -       Row Name 06/18/24 1152          Safety Issues, Functional Mobility    Safety Issues Affecting Function (Mobility) awareness of need for assistance;impulsivity;safety precaution awareness  -     Impairments Affecting Function (Mobility) balance;cognition;endurance/activity tolerance;pain;range of motion (ROM);strength  -     Cognitive Impairments, Mobility Safety/Performance judgment;safety precaution awareness  -       Row Name 06/18/24 1152          Balance    Balance Assessment sitting static balance;sitting dynamic balance;standing static balance;standing dynamic balance  -     Static Sitting Balance standby assist  -     Dynamic Sitting Balance standby assist  -     Position, Sitting Balance unsupported;sitting edge of bed;sitting in chair  -     Static Standing Balance standby assist  -     Dynamic Standing Balance standby assist;contact guard;1-person assist  -     Position/Device Used, Standing Balance walker, front-wheeled  -     Balance Interventions standing;dynamic;tandem gait  -       Row Name 06/18/24 1152          Motor Skills    Motor Skills --  therapeutic exercises  -     Coordination WFL  -     Therapeutic Exercise knee;hip;ankle  -       Row Name 06/18/24 1152          Hip (Therapeutic Exercise)    Hip (Therapeutic Exercise) AAROM (active assistive range of motion)  -     Hip AAROM (Therapeutic Exercise) bilateral;flexion;extension;aBduction;aDduction;supine;10 repetitions;2 sets  -       Row Name 06/18/24 1152          Knee (Therapeutic Exercise)    Knee (Therapeutic Exercise) AROM (active range of motion);AAROM (active assistive range of motion)  -     Knee AROM (Therapeutic Exercise) right;heel slides;SAQ (short arc quad);supine;15 repititions  -     Knee AAROM (Therapeutic Exercise) bilateral;flexion;extension;supine;10  repetitions;2 sets  -       Row Name 06/18/24 1152          Ankle (Therapeutic Exercise)    Ankle (Therapeutic Exercise) AAROM (active assistive range of motion)  -ROLANDO     Ankle AAROM (Therapeutic Exercise) bilateral;dorsiflexion;plantarflexion;supine;10 repetitions;2 sets  -       Row Name             Wound 05/21/24 0251 Bilateral coccyx    Wound - Properties Group Placement Date: 05/21/24  -SR Placement Time: 0251 -SR Present on Original Admission: Y  -SR Side: Bilateral  -SR Location: coccyx  -SR    Retired Wound - Properties Group Placement Date: 05/21/24  -SR Placement Time: 0251  -SR Present on Original Admission: Y  -SR Side: Bilateral  -SR Location: coccyx  -SR    Retired Wound - Properties Group Date first assessed: 05/21/24  -SR Time first assessed: 0251  -SR Present on Original Admission: Y  -SR Side: Bilateral  -SR Location: coccyx  -SR      Row Name             Wound 05/21/24 Right lateral thigh Incision    Wound - Properties Group Placement Date: 05/21/24  -SF Present on Original Admission: N  -SF Side: Right  -SF Orientation: lateral  -SF Location: thigh  -SF Primary Wound Type: Incision  -SF Additional Comments: incision sites x 3 to lateral right thigh  -SF    Retired Wound - Properties Group Placement Date: 05/21/24  -SF Present on Original Admission: N  -SF Side: Right  -SF Orientation: lateral  -SF Location: thigh  -SF Primary Wound Type: Incision  -SF Additional Comments: incision sites x 3 to lateral right thigh  -SF    Retired Wound - Properties Group Date first assessed: 05/21/24  -SF Present on Original Admission: N  -SF Side: Right  -SF Location: thigh  -SF Primary Wound Type: Incision  -SF Additional Comments: incision sites x 3 to lateral right thigh  -SF      Row Name 06/18/24 1152          Plan of Care Review    Plan of Care Reviewed With patient;family  -ROLANDO     Progress improving  -       Row Name 06/18/24 1152          Positioning and Restraints    Pre-Treatment Position in bed   -DK     Post Treatment Position chair  -DK     In Chair reclined;call light within reach;encouraged to call for assist;exit alarm on;with family/caregiver;legs elevated;heels elevated;waffle cushion  -DK       Row Name 06/18/24 1152          Therapy Assessment/Plan (PT)    Rehab Potential (PT) good, to achieve stated therapy goals  -DK     Criteria for Skilled Interventions Met (PT) skilled treatment is necessary  -DK     Therapy Frequency (PT) daily  -DK     Problem List (PT) problems related to;balance;cognition;mobility;range of motion (ROM);strength;pain;communication  -DK     Activity Limitations Related to Problem List (PT) unable to ambulate safely;unable to transfer safely  -DK       Row Name 06/18/24 1152          Progress Summary (PT)    Progress Toward Functional Goals (PT) progress toward functional goals is good  -DK               User Key  (r) = Recorded By, (t) = Taken By, (c) = Cosigned By      Initials Name Provider Type    Saritha Boudreaux, RN Registered Nurse    Sheila Maharaj PTA Physical Therapist Assistant    SR Saritha Long RN Registered Nurse                    Physical Therapy Education       Title: PT OT SLP Therapies (Done)       Topic: Physical Therapy (Done)       Point: Mobility training (Done)       Learning Progress Summary             Patient Acceptance, E,TB, VU by TR at 6/10/2024 1507    Acceptance, E, Bed IU by DS at 6/2/2024 0830    Acceptance, E, VU by PS at 6/1/2024 0627    Acceptance, E,TB, VU by TR at 5/31/2024 1449    Eager, E, VU by AH at 5/23/2024 2223    Acceptance, E,TB, VU by AV at 5/22/2024 1339                         Point: Home exercise program (Done)       Learning Progress Summary             Patient Acceptance, E, Bed IU by DS at 6/2/2024 0830                         Point: Body mechanics (Done)       Learning Progress Summary             Patient Acceptance, E, Bed IU by DS at 6/2/2024 0830    Acceptance, E, VU by PS at 6/1/2024 0627    Acceptance,  E,TB, VU by AV at 5/22/2024 1339                         Point: Precautions (Done)       Learning Progress Summary             Patient Acceptance, E,TB, VU by TR at 6/10/2024 1507    Acceptance, E, Bed IU by DS at 6/2/2024 0830    Acceptance, E,TB, VU by TR at 5/31/2024 1449    Acceptance, E,TB, VU by AV at 5/22/2024 1339                                         User Key       Initials Effective Dates Name Provider Type Discipline    PS 06/16/21 -  Darcie Fiore, RN Registered Nurse Nurse    AV 06/11/21 -  Eh Martínez, PT Physical Therapist PT    DS 06/26/23 -  Immanuel Beck, GRIS Registered Nurse Nurse     05/31/23 -  Angela Casillas, RN Registered Nurse Nurse    TR 05/21/24 -  Aaron Fowler, JERROD Student PT Student PT                  PT Recommendation and Plan  Planned Therapy Interventions (PT): balance training, bed mobility training, gait training, strengthening, transfer training  Therapy Frequency (PT): daily  Progress Summary (PT)  Progress Toward Functional Goals (PT): progress toward functional goals is good  Plan of Care Reviewed With: patient, family  Progress: improving   Outcome Measures       Row Name 06/18/24 1152 06/17/24 1134 06/16/24 1138       How much help from another person do you currently need...    Turning from your back to your side while in flat bed without using bedrails? 4  -DK 4  -DK 4  -DK    Moving from lying on back to sitting on the side of a flat bed without bedrails? 4  -DK 4  -DK 3  -DK    Moving to and from a bed to a chair (including a wheelchair)? 3  -DK 3  -DK 3  -DK    Standing up from a chair using your arms (e.g., wheelchair, bedside chair)? 4  -DK 3  -DK 4  -DK    Climbing 3-5 steps with a railing? 3  -DK 3  -DK 3  -DK    To walk in hospital room? 3  -DK 3  -DK 3  -DK    AM-PAC 6 Clicks Score (PT) 21  -DK 20  -DK 20  -DK    Highest Level of Mobility Goal 6 --> Walk 10 steps or more  -DK 6 --> Walk 10 steps or more  -DK 6 --> Walk 10 steps or more   -DK       Functional Assessment    Outcome Measure Options AM-PAC 6 Clicks Basic Mobility (PT)  -DK AM-PAC 6 Clicks Basic Mobility (PT)  -DK AM-PAC 6 Clicks Basic Mobility (PT)  -      Row Name 06/15/24 1515             How much help from another person do you currently need...    Turning from your back to your side while in flat bed without using bedrails? 4  -TS      Moving from lying on back to sitting on the side of a flat bed without bedrails? 3  -TS      Moving to and from a bed to a chair (including a wheelchair)? 3  -TS      Standing up from a chair using your arms (e.g., wheelchair, bedside chair)? 4  -TS      Climbing 3-5 steps with a railing? 3  -TS      To walk in hospital room? 3  -TS      AM-PAC 6 Clicks Score (PT) 20  -TS      Highest Level of Mobility Goal 6 --> Walk 10 steps or more  -TS                User Key  (r) = Recorded By, (t) = Taken By, (c) = Cosigned By      Initials Name Provider Type    Dwight Blum PTA Physical Therapist Assistant    Sheila Maharaj PTA Physical Therapist Assistant                     Time Calculation:    PT Charges       Row Name 06/18/24 1155             Time Calculation    PT Received On 06/18/24  -DK      PT Goal Re-Cert Due Date 06/19/24  -DK         Timed Charges    37755 - PT Therapeutic Exercise Minutes 16  -DK      40521 - Gait Training Minutes  10  -DK      61502 - PT Therapeutic Activity Minutes 6  -DK         Total Minutes    Timed Charges Total Minutes 32  -DK       Total Minutes 32  -DK                User Key  (r) = Recorded By, (t) = Taken By, (c) = Cosigned By      Initials Name Provider Type    Sheila Maharaj PTA Physical Therapist Assistant                  Therapy Charges for Today       Code Description Service Date Service Provider Modifiers Qty    92557581781 HC PT THER PROC EA 15 MIN 6/17/2024 Sheila Nicole PTA GP 1    05338681107 HC GAIT TRAINING EA 15 MIN 6/17/2024 Sheila Nicole PTA GP 1    31927290416 HC PT THER PROC EA 15  MIN 6/18/2024 Sheila Nicole, RADHA GP 1    68919196709 HC GAIT TRAINING EA 15 MIN 6/18/2024 Sheila Nicole PTA GP 1            PT G-Codes  Outcome Measure Options: AM-PAC 6 Clicks Basic Mobility (PT)  AM-PAC 6 Clicks Score (PT): 21  AM-PAC 6 Clicks Score (OT): 18    Sheila Nicole PTA  6/18/2024

## 2024-06-18 NOTE — PLAN OF CARE
Goal Outcome Evaluation:  Plan of Care Reviewed With: patient        Progress: no change  Outcome Evaluation: Pt AAOx4. VSS. No acute events to report overnight. Continue plan of care.

## 2024-06-19 ENCOUNTER — ANESTHESIA EVENT (OUTPATIENT)
Dept: PERIOP | Facility: HOSPITAL | Age: 44
End: 2024-06-19
Payer: COMMERCIAL

## 2024-06-19 LAB
ANION GAP SERPL CALCULATED.3IONS-SCNC: 9.8 MMOL/L (ref 5–15)
BASOPHILS # BLD AUTO: 0.05 10*3/MM3 (ref 0–0.2)
BASOPHILS NFR BLD AUTO: 0.3 % (ref 0–1.5)
BUN SERPL-MCNC: 13 MG/DL (ref 6–20)
BUN/CREAT SERPL: 31.7 (ref 7–25)
CALCIUM SPEC-SCNC: 9.6 MG/DL (ref 8.6–10.5)
CHLORIDE SERPL-SCNC: 97 MMOL/L (ref 98–107)
CO2 SERPL-SCNC: 29.2 MMOL/L (ref 22–29)
CREAT SERPL-MCNC: 0.41 MG/DL (ref 0.76–1.27)
DEPRECATED RDW RBC AUTO: 46.2 FL (ref 37–54)
EGFRCR SERPLBLD CKD-EPI 2021: 137 ML/MIN/1.73
EOSINOPHIL # BLD AUTO: 0.11 10*3/MM3 (ref 0–0.4)
EOSINOPHIL NFR BLD AUTO: 0.7 % (ref 0.3–6.2)
ERYTHROCYTE [DISTWIDTH] IN BLOOD BY AUTOMATED COUNT: 14.6 % (ref 12.3–15.4)
GLUCOSE SERPL-MCNC: 103 MG/DL (ref 65–99)
HCT VFR BLD AUTO: 27 % (ref 37.5–51)
HGB BLD-MCNC: 8.1 G/DL (ref 13–17.7)
IMM GRANULOCYTES # BLD AUTO: 0.26 10*3/MM3 (ref 0–0.05)
IMM GRANULOCYTES NFR BLD AUTO: 1.6 % (ref 0–0.5)
LYMPHOCYTES # BLD AUTO: 1.59 10*3/MM3 (ref 0.7–3.1)
LYMPHOCYTES NFR BLD AUTO: 10 % (ref 19.6–45.3)
MAGNESIUM SERPL-MCNC: 2 MG/DL (ref 1.6–2.6)
MCH RBC QN AUTO: 26.4 PG (ref 26.6–33)
MCHC RBC AUTO-ENTMCNC: 30 G/DL (ref 31.5–35.7)
MCV RBC AUTO: 87.9 FL (ref 79–97)
MONOCYTES # BLD AUTO: 1.6 10*3/MM3 (ref 0.1–0.9)
MONOCYTES NFR BLD AUTO: 10.1 % (ref 5–12)
NEUTROPHILS NFR BLD AUTO: 12.31 10*3/MM3 (ref 1.7–7)
NEUTROPHILS NFR BLD AUTO: 77.3 % (ref 42.7–76)
NRBC BLD AUTO-RTO: 0 /100 WBC (ref 0–0.2)
PHOSPHATE SERPL-MCNC: 4.7 MG/DL (ref 2.5–4.5)
PLATELET # BLD AUTO: 481 10*3/MM3 (ref 140–450)
PMV BLD AUTO: 9.4 FL (ref 6–12)
POTASSIUM SERPL-SCNC: 4.2 MMOL/L (ref 3.5–5.2)
RBC # BLD AUTO: 3.07 10*6/MM3 (ref 4.14–5.8)
SODIUM SERPL-SCNC: 136 MMOL/L (ref 136–145)
WBC NRBC COR # BLD AUTO: 15.92 10*3/MM3 (ref 3.4–10.8)

## 2024-06-19 PROCEDURE — 80048 BASIC METABOLIC PNL TOTAL CA: CPT | Performed by: INTERNAL MEDICINE

## 2024-06-19 PROCEDURE — 97164 PT RE-EVAL EST PLAN CARE: CPT

## 2024-06-19 PROCEDURE — 25010000002 ENOXAPARIN PER 10 MG: Performed by: INTERNAL MEDICINE

## 2024-06-19 PROCEDURE — 97530 THERAPEUTIC ACTIVITIES: CPT

## 2024-06-19 PROCEDURE — 94664 DEMO&/EVAL PT USE INHALER: CPT

## 2024-06-19 PROCEDURE — 83735 ASSAY OF MAGNESIUM: CPT | Performed by: INTERNAL MEDICINE

## 2024-06-19 PROCEDURE — 84100 ASSAY OF PHOSPHORUS: CPT | Performed by: INTERNAL MEDICINE

## 2024-06-19 PROCEDURE — 99233 SBSQ HOSP IP/OBS HIGH 50: CPT | Performed by: STUDENT IN AN ORGANIZED HEALTH CARE EDUCATION/TRAINING PROGRAM

## 2024-06-19 PROCEDURE — 85025 COMPLETE CBC W/AUTO DIFF WBC: CPT | Performed by: INTERNAL MEDICINE

## 2024-06-19 PROCEDURE — 99232 SBSQ HOSP IP/OBS MODERATE 35: CPT | Performed by: INTERNAL MEDICINE

## 2024-06-19 PROCEDURE — 94799 UNLISTED PULMONARY SVC/PX: CPT

## 2024-06-19 PROCEDURE — 97168 OT RE-EVAL EST PLAN CARE: CPT

## 2024-06-19 RX ORDER — METOPROLOL SUCCINATE 25 MG/1
25 TABLET, EXTENDED RELEASE ORAL ONCE
Status: COMPLETED | OUTPATIENT
Start: 2024-06-19 | End: 2024-06-19

## 2024-06-19 RX ORDER — METOPROLOL SUCCINATE 50 MG/1
50 TABLET, EXTENDED RELEASE ORAL EVERY 12 HOURS SCHEDULED
Status: DISCONTINUED | OUTPATIENT
Start: 2024-06-19 | End: 2024-06-23 | Stop reason: HOSPADM

## 2024-06-19 RX ADMIN — CALCIUM CARBONATE 1 TABLET: 500 TABLET, CHEWABLE ORAL at 20:17

## 2024-06-19 RX ADMIN — METOPROLOL SUCCINATE 50 MG: 50 TABLET, EXTENDED RELEASE ORAL at 20:17

## 2024-06-19 RX ADMIN — ENOXAPARIN SODIUM 40 MG: 100 INJECTION SUBCUTANEOUS at 06:13

## 2024-06-19 RX ADMIN — Medication 10 ML: at 08:09

## 2024-06-19 RX ADMIN — BUDESONIDE AND FORMOTEROL FUMARATE DIHYDRATE 2 PUFF: 160; 4.5 AEROSOL RESPIRATORY (INHALATION) at 09:44

## 2024-06-19 RX ADMIN — TIOTROPIUM BROMIDE INHALATION SPRAY 2 PUFF: 3.12 SPRAY, METERED RESPIRATORY (INHALATION) at 09:44

## 2024-06-19 RX ADMIN — LIDOCAINE 1 PATCH: 560 PATCH PERCUTANEOUS; TOPICAL; TRANSDERMAL at 08:08

## 2024-06-19 RX ADMIN — METOPROLOL SUCCINATE 25 MG: 25 TABLET, EXTENDED RELEASE ORAL at 08:09

## 2024-06-19 RX ADMIN — Medication 400 MG: at 08:09

## 2024-06-19 RX ADMIN — Medication 10 ML: at 20:17

## 2024-06-19 RX ADMIN — HYDROCODONE BITARTRATE AND ACETAMINOPHEN 1 TABLET: 10; 325 TABLET ORAL at 07:30

## 2024-06-19 RX ADMIN — Medication 1 TABLET: at 08:09

## 2024-06-19 RX ADMIN — SENNOSIDES AND DOCUSATE SODIUM 2 TABLET: 50; 8.6 TABLET ORAL at 08:09

## 2024-06-19 RX ADMIN — CYANOCOBALAMIN TAB 500 MCG 1000 MCG: 500 TAB at 08:09

## 2024-06-19 RX ADMIN — METOPROLOL SUCCINATE 25 MG: 25 TABLET, EXTENDED RELEASE ORAL at 10:00

## 2024-06-19 RX ADMIN — BUDESONIDE AND FORMOTEROL FUMARATE DIHYDRATE 2 PUFF: 160; 4.5 AEROSOL RESPIRATORY (INHALATION) at 22:00

## 2024-06-19 NOTE — PROGRESS NOTES
Pulmonary / Critical Care Progress Note      Patient Name: Oswaldo Bowen  : 1980  MRN: [id number]  Primary Care Physician:  Provider, No Known  Date of admission: 2024    Subjective   Subjective   Follow-up for left upper lobe lung nodule with mediastinal adenopathy    No acute issues overnight  Lying in bed  Will proceed with Ion navigational bronchoscopy tomorrow  No new complaints    Objective   Objective     Vitals:   Temp:  [97.3 °F (36.3 °C)-98.3 °F (36.8 °C)] 97.8 °F (36.6 °C)  Heart Rate:  [106-124] 111  Resp:  [18-20] 20  BP: (104-125)/(73-81) 125/81  Physical Exam   Vital Signs Reviewed   General:  WDWN, Alert, in no distress, lying in bed  Chest:  good aeration, clear to auscultation bilaterally, tympanic to percussion bilaterally, no work of breathing noted on room air  CV: RRR, no MGR, pulses 2+, equal.  Abd:  Soft, NT, ND, + BS, no HSM  EXT:  no clubbing, no cyanosis, no edema  Neuro:  A&Ox3, CN grossly intact, no focal deficits.  Skin: No rashes or lesions noted      Result Review    Result Review:  I have personally reviewed the results from the time of this admission to 2024 15:03 EDT and agree with these findings:  [x]  Laboratory  [x]  Microbiology  [x]  Radiology  [x]  EKG/Telemetry   [x]  Cardiology/Vascular   []  Pathology  []  Old records  []  Other:  Most notable findings include:         Lab 24  0617 24  0643 24  0816 06/15/24  0642   WBC 15.92* 9.57  --  13.16*   HEMOGLOBIN 8.1* 7.9* 7.0* 7.0*   HEMATOCRIT 27.0* 25.7* 23.0* 22.9*   PLATELETS 481* 475*  --  406   SODIUM 136  --   --  129*   POTASSIUM 4.2  --   --  4.1   CHLORIDE 97*  --   --  91*   CO2 29.2*  --   --  26.9   BUN 13  --   --  14   CREATININE 0.41*  --   --  0.45*   GLUCOSE 103*  --   --  117*   CALCIUM 9.6  --   --  8.7   PHOSPHORUS 4.7*  --   --   --             CT Chest Without Contrast Diagnostic    Result Date: 2024  CT CHEST WO CONTRAST DIAGNOSTIC Date of Exam: 2024 6:21 PM  EDT Indication: Ion Navigational Bronchoscopy. Comparison: 6/4/2024 Technique: Axial CT images were obtained of the chest without contrast administration.  Reconstructed coronal and sagittal images were also obtained. Automated exposure control and iterative construction methods were used. Findings: No coronary artery calcification. Left hilum remains prominent compatible with underlying adenopathy. No definite subcarinal or axillary adenopathy. There are no pleural effusions. Within the left upper lobe there is a noncalcified 2.6 cm pulmonary nodule which is unchanged. No new or enlarging pulmonary nodule. No new focal airspace consolidation. Again right lower lobe is a 4 mm subpleural noncalcified nodule. Or enlarging nodule seen within the right lung. Bilateral adrenal glands are within normal limits. Again seen are multiple lytic bone lesions throughout the spine and ribs. There are additional lytic areas within both scapula. There is a stable compression fracture of the T5 vertebral body. No new compression fracture.     Impression: Impression: 1. No change in 2.6 cm nodule within the left upper lobe. There is prominence of the left hilum compatible with known hilar adenopathy. 2. No change in multiple lytic bone lesions consistent with metastatic disease. Electronically Signed: Reese Jara MD  6/18/2024 7:32 PM EDT  Workstation ID: [id number]    CT Chest With Contrast Diagnostic    Result Date: 6/4/2024  CT CHEST W CONTRAST DIAGNOSTIC Date of Exam: 6/4/2024 4:43 PM EDT Indication: leukocytosis worsening, subjective fevers, worsening tachycardia - question pe vs pna. Comparison: 5/21/2024 Technique: Axial CT images were obtained of the chest after the uneventful intravenous administration of iodinated contrast.  Reconstructed coronal and sagittal images were also obtained. Automated exposure control and iterative construction methods were  used. Findings: Pulmonary Arteries: Adequately opacified. Some motion  distortion of peripheral pulmonary vessels on the left. Within that limitation, no emboli demonstrated Marta/mediastinum: Mildly enlarged bilateral hilar and AP window lymph nodes. No pericardial effusion. Indeterminate for coronary calcification due to motion Lungs/pleura: 2.1 cm lobular mass in the inferior left upper lobe again demonstrated. No acute infiltrate. No pleural effusion Upper Abdomen: Unremarkable Bones/soft tissues: Multiple lytic bone lesions including expansile lytic lesions of the right seventh rib and left eighth rib, multiple vertebral bodies including stable pathologic compression of T5, sternum, left scapula. The T5 compression fracture and infiltrating tumor results in significant central canal stenosis. There is some tumor encroachment on the T8 central canal and T8-T9 left foramina, and T11-T12 right foramina     Impression: 1. No evidence of pulmonary embolus 2. No evidence of pneumonia 3. 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 malignancy, with bilateral hilar and AP window adenopathy and extensive osteolytic metastatic disease as described Electronically Signed: Sree Hawthorne  6/4/2024 5:17 PM EDT  Workstation ID: [id number]       Assessment & Plan   Assessment / Plan     Active Hospital Problems:  Active Hospital Problems    Diagnosis    • **Closed intertrochanteric fracture of right femur    • Pulmonary nodule          Impression:   Acute comminuted displaced intertrochanteric right femoral fracture  Status post IM nailing  Left upper lobe lung nodule with mediastinal lymphadenopathy  Chronic heavy smoking  Unintentional weight loss    Plan:   Status post bronchoscopy with EBUS 5/23.  Pathology from mediastinal lymph nodes were all negative for malignancy.    Bone biopsy x 2 showed atypical and inflammatory cells without definitive diagnosis  Imaging still very concerning and consistent with metastatic cancer.  Will plan for Ion navigational bronchoscopy of the left  lobe lesion tomorrow.  Risk and benefits of procedure explained to patient.  Risk include bleeding, pneumothorax, infection, even death can occur.  Patient expressed understanding and would like to proceed.  N.p.o. at midnight.  Hold anticoagulation tonight.  Completed 5 days of Unasyn for haemophilus pneumonia growing on bronchoscopy BAL  Continue Symbicort and Spiriva, albuterol as needed.    Encourage activity and incentive spirometer use    DVT prophylaxis:  Pharmacologic & mechanical VTE prophylaxis orders are present.    CODE STATUS:   Level Of Support Discussed With: Patient  Code Status (Patient has no pulse and is not breathing): CPR (Attempt to Resuscitate)  Medical Interventions (Patient has pulse or is breathing): Full Support    I have reviewed labs, imaging, pertinent clinical data and provider notes.   I have discussed with bedside nurse and primary service.     Electronically signed by John Elizalde MD, 6/19/2024, 15:03 EDT.

## 2024-06-19 NOTE — PLAN OF CARE
Goal Outcome Evaluation:    Patient has been resting through out the night Pt was given PRN pain medication for LBP,. Patient has sleep throughout the night, plan of care on going.

## 2024-06-19 NOTE — PLAN OF CARE
Goal Outcome Evaluation:  Plan of Care Reviewed With: patient        Progress: no change  Outcome Evaluation: vss, medicated for pain in right hip/ leg, see MAR. No new concerns at this time. Plan for biopsy friday, 6/21/24. Will continue plan of care.

## 2024-06-19 NOTE — PROGRESS NOTES
[facility]   Hospitalist Progress Note  Date: 2024  Patient Name: Oswaldo Bowen  : 1980  MRN: [id number]  Date of admission: 2024      Subjective   Subjective     Chief Complaint: Fall, leg pain    Summary: 44 y.o. with intellectual disability, 1-2PPD smoking, depression, who presented after a fall (possibly 1 month ago but may have been more recently), found to have right femoral fracture and VLAD.  Ortho assisting with initial surgical treatment.  Initial lung mass concerning on chest x-ray additional CT imaging with suspected new metastatic colon cancer with mets to the bone, pulmonology consulted patient had a biopsy results pending.  Bronchoscopy also with findings of Haemophilus influenzae treatment with antibiotics.  Palliative assisting.  Awaiting pathology results from right rib biopsy (initial testing inadequate total sample, resulted with atypical cells), repeat biopsy obtained  and results pending.  Patient is working better with physical therapy and is ambulating with minimal assistance.  Initial placement declined, P2p attempted but this was also declined. Awaiting appeal        Interval Followup: No acute events overnight.  He is still on the fence about proceeding with robotic bronchoscopy, however, he feels that he will likely proceed with the procedure.  Denies pain currently.  Slept well last night.    Objective   Objective     Vitals:   Temp:  [97.3 °F (36.3 °C)-98.3 °F (36.8 °C)] 98 °F (36.7 °C)  Heart Rate:  [106-124] 106  Resp:  [18-20] 18  BP: (104-116)/(73-77) 104/73  Physical Exam    Constitutional: Chronically ill-appearing, resting in bed, NAD   Respiratory: Clear to auscultation bilaterally, nonlabored respirations    Cardiovascular: RRR, no MRG   Gastrointestinal: Positive bowel sounds, soft, nontender, nondistended   Neurologic: Oriented x 3, strength symmetric in all extremities, Cranial Nerves grossly intact to confrontation, speech clear    Result Review     I have personally reviewed the results below:  [x]  Laboratory personally reviewed BMP, CBC, magnesium, phosphorus  []  Microbiology  []  Radiology  []  EKG/Telemetry   []  Cardiology/Vascular   []  Pathology  []  Old records  []  Other:  CBC          6/16/2024    08:16 6/17/2024    06:43 6/19/2024    06:17   CBC   WBC  9.57  15.92    RBC  2.97  3.07    Hemoglobin 7.0  7.9  8.1    Hematocrit 23.0  25.7  27.0    MCV  86.5  87.9    MCH  26.6  26.4    MCHC  30.7  30.0    RDW  14.0  14.6    Platelets  475  481      CMP          6/5/2024    06:03 6/15/2024    06:42 6/19/2024    06:17   CMP   Glucose 104  117  103    BUN 14  14  13    Creatinine 0.48  0.45  0.41    EGFR 130.6  133.2  137.0    Sodium 132  129  136    Potassium 4.1  4.1  4.2    Chloride 93  91  97    Calcium 9.3  8.7  9.6    BUN/Creatinine Ratio 29.2  31.1  31.7    Anion Gap 10.5  11.1  9.8        Assessment & Plan   Assessment / Plan   Mechanical fall with acute comminuted displaced intertrochanteric right femoral fracture  S/p 5/21 right hip subtrochanteric nailing of closed displaced right femur fracture by Dr. Nelson  VLAD creatinine 1.7 leukocytosis suspect reactive in setting of acute fracture  Suspected new metastatic lung cancer with mets to the bone  2.6 cm nodule within the left upper lobe   Mediastinal lymphadenopathy  Haemophilus influenzae pneumonia   1 to 2 pack/day smoker, chronic  Intellectual disability  Depression  Normocytic anemia    Continue to monitor in the hospital for workup and management of the above  Discussed with pulmonology-planned for Ion navigational bronchoscopy of the left lobe lesion on Friday if patient amenable.  If not, can be scheduled on an outpatient date  Completed a course of Unasyn for haemophilus pneumonia  Continue Symbicort, Spiriva, respiratory hygiene  Xopenex as needed  Bowel regimen scheduled  PT/OT following-recommend rehab.  Currently awaiting results of the appeal  Trend renal function and electrolytes  with a.m. BMP, magnesium   Trend Hgb and WBC with a.m. CBC     Discussed plan with RN, pulmonology    VTE Prophylaxis:  Pharmacologic & mechanical VTE prophylaxis orders are present.        CODE STATUS:   Level Of Support Discussed With: Patient  Code Status (Patient has no pulse and is not breathing): CPR (Attempt to Resuscitate)  Medical Interventions (Patient has pulse or is breathing): Full Support

## 2024-06-19 NOTE — ANESTHESIA PREPROCEDURE EVALUATION
Anesthesia Evaluation     Patient summary reviewed and Nursing notes reviewed   no history of anesthetic complications:   NPO Solid Status: > 8 hours  NPO Liquid Status: > 2 hours           Airway   Mallampati: III  TM distance: >3 FB  Neck ROM: full  Small opening and Possible difficult intubation  Comment: beard  Dental      Comment: Decay on teeth    Pulmonary - negative pulmonary ROS   (+) pneumonia improving ,decreased breath sounds  Cardiovascular - negative cardio ROS and normal exam  Exercise tolerance: poor (<4 METS)    Rhythm: regular  Rate: normal        Neuro/Psych- negative ROS  GI/Hepatic/Renal/Endo - negative ROS     Musculoskeletal (-) negative ROS    Abdominal    Substance History - negative use     OB/GYN negative ob/gyn ROS         Other - negative ROS       ROS/Med Hx Other: PAT Nursing Notes unavailable.                 Anesthesia Plan    ASA 3     general     (Patient understands anesthesia not responsible for dental damage.  Discussed possible post op resp issues, coughing, possible neb prn    Active Hospital Problems    Diagnosis    · **Closed intertrochanteric fracture of right femur    · Pulmonary nodule             Impression:   Acute comminuted displaced intertrochanteric right femoral fracture  Status post IM nailing  Left upper lobe lung nodule with mediastinal lymphadenopathy  Chronic heavy smoking  Unintentional weight loss     Plan:   Status post bronchoscopy with EBUS 5/23.  Pathology from mediastinal lymph nodes were all negative for malignancy.    Bone biopsy x 2 showed atypical and inflammatory cells without definitive diagnosis  Imaging still very concerning and consistent with metastatic cancer.  Will plan for Ion navigational bronchoscopy of the left lobe lesion tomorrow.  Risk and benefits of procedure explained to patient.  Risk include bleeding, pneumothorax, infection, even death can occur.  Patient expressed understanding and would like to proceed.  N.p.o. at midnight.   Hold anticoagulation tonight.  Completed 5 days of Unasyn for haemophilus pneumonia growing on bronchoscopy BAL  Continue Symbicort and Spiriva, albuterol as needed.    )  intravenous induction     Anesthetic plan, risks, benefits, and alternatives have been provided, discussed and informed consent has been obtained with: patient.  Pre-procedure education provided      CODE STATUS:    Level Of Support Discussed With: Patient  Code Status (Patient has no pulse and is not breathing): CPR (Attempt to Resuscitate)  Medical Interventions (Patient has pulse or is breathing): Full Support

## 2024-06-19 NOTE — THERAPY RE-EVALUATION
Patient Name: Oswaldo Bowen  : 1980    MRN: [id number]                              Today's Date: 2024       Admit Date: 2024    Visit Dx:     ICD-10-CM ICD-9-CM   1. Closed displaced intertrochanteric fracture of right femur, initial encounter  S72.141A 820.21   2. Pulmonary nodule  R91.1 793.11   3. Difficulty walking  R26.2 719.7   4. Dysphagia, oropharyngeal  R13.12 787.22   5. Difficulty in walking  R26.2 719.7   6. Decreased activities of daily living (ADL)  Z78.9 V49.89     Patient Active Problem List   Diagnosis    Closed intertrochanteric fracture of right femur    Pulmonary nodule     History reviewed. No pertinent past medical history.  Past Surgical History:   Procedure Laterality Date    BRONCHOSCOPY N/A 2024    Procedure: BRONCHOSCOPY WITH ENDOBRONCHIAL ULTRASOUND, FINE NEEDLE ASPIRATE, BIOPSIES, BRUSHINGS, BRONCHOALVEOLAR LAVAGE;  Surgeon: Kendell Stern MD;  Location: [facility] ENDOSCOPY;  Service: Pulmonary;  Laterality: N/A;  LUNG NODULE, MUCOUS PLUGGING    HIP INTERTROCHANTERIC NAILING Right 2024    Procedure: HIP INTERTROCHANTERIC NAILING;  Surgeon: Molina Clayton MD;  Location: [facility] MAIN OR;  Service: Orthopedics;  Laterality: Right;    US GUIDED FINE NEEDLE ASPIRATION  2024      General Information       Row Name 24 1512 24 1507       OT Time and Intention    Document Type therapy note (daily note)  -LF re-evaluation  -LF    Mode of Treatment individual therapy;occupational therapy  -LF individual therapy;occupational therapy  -LF      Row Name 24 1507          General Information    Patient Profile Reviewed yes  -LF     Existing Precautions/Restrictions fall;weight bearing  WBAT RLE  -LF       Row Name 24 1507          Safety Issues, Functional Mobility    Impairments Affecting Function (Mobility) balance;cognition;endurance/activity tolerance;pain;range of motion (ROM);strength  -LF               User Key  (r) = Recorded By, (t) =  Taken By, (c) = Cosigned By      Initials Name Provider Type    LF Ally Henderson OT Occupational Therapist                     Mobility/ADL's       Row Name 06/19/24 1512 06/19/24 1507       Bed Mobility    Bed Mobility supine-sit;sit-supine  -LF supine-sit;sit-supine  -LF    Supine-Sit [place] (Bed Mobility) minimum assist (75% patient effort);1 person assist  -LF minimum assist (75% patient effort);1 person assist  -LF    Sit-Supine [place] (Bed Mobility) minimum assist (75% patient effort);1 person assist  -LF minimum assist (75% patient effort);1 person assist  -LF    Bed Mobility, Safety Issues decreased use of legs for bridging/pushing  -LF decreased use of legs for bridging/pushing  -LF    Assistive Device (Bed Mobility) bed rails;head of bed elevated  -LF bed rails;head of bed elevated  -LF      Row Name 06/19/24 1512 06/19/24 1507       Transfers    Transfers sit-stand transfer;stand-sit transfer;toilet transfer  -LF sit-stand transfer;stand-sit transfer;toilet transfer  -LF      Row Name 06/19/24 1512 06/19/24 1507       Sit-Stand Transfer    Sit-Stand [place] (Transfers) contact guard;1 person assist  -LF contact guard;1 person assist  -LF    Assistive Device (Sit-Stand Transfers) walker, front-wheeled  -LF walker, front-wheeled  -LF      Row Name 06/19/24 1512 06/19/24 1507       Stand-Sit Transfer    Stand-Sit [place] (Transfers) contact guard;1 person assist  -LF contact guard;1 person assist  -LF    Assistive Device (Stand-Sit Transfers) walker, front-wheeled  -LF walker, front-wheeled  -LF      Row Name 06/19/24 1512 06/19/24 1507       Toilet Transfer    Type (Toilet Transfer) sit-stand;stand-sit  -LF sit-stand;stand-sit  -LF    [place] Level (Toilet Transfer) contact guard;1 person assist  -LF contact guard;1 person assist  -LF    Assistive Device (Toilet Transfer) raised toilet seat;grab bars/safety frame;walker, front-wheeled  -LF raised toilet seat;grab bars/safety  frame;walker, front-wheeled  -LF      Row Name 06/19/24 1512 06/19/24 1507       Functional Mobility    Functional Mobility- Ind. Level contact guard assist;1 person  -LF contact guard assist;1 person  -LF    Functional Mobility- Device walker, front-wheeled  -LF walker, front-wheeled  -LF    Functional Mobility- Comment Pt completed functional mobility to the bathroom and back with CGA using RW. Min cues for safety and proper technique using RW.  -LF Pt completed functional mobility to the bathroom and back with CGA using RW. Min cues for safety and proper technique using RW.  -      Row Name 06/19/24 1512 06/19/24 1507       Activities of Daily Living    BADL Assessment/Intervention toileting  - bathing;upper body dressing;lower body dressing;grooming;feeding;toileting  -      Row Name 06/19/24 1512 06/19/24 1507       Mobility    Extremity Weight-bearing Status right lower extremity  -LF right lower extremity  -LF    Right Lower Extremity (Weight-bearing Status) weight-bearing as tolerated (WBAT)  - weight-bearing as tolerated (WBAT)  -      Row Name 06/19/24 1507          Bathing Assessment/Intervention    [place] Level (Bathing) bathing skills;upper body;standby assist;lower body;maximum assist (25% patient effort)  -       Row Name 06/19/24 1507          Upper Body Dressing Assessment/Training    [place] Level (Upper Body Dressing) upper body dressing skills;set up  -       Row Name 06/19/24 1507          Lower Body Dressing Assessment/Training    [place] Level (Lower Body Dressing) lower body dressing skills;maximum assist (25% patient effort)  -       Row Name 06/19/24 1507          Grooming Assessment/Training    [place] Level (Grooming) grooming skills;set up  -       Row Name 06/19/24 1512 06/19/24 1507       Toileting Assessment/Training    [place] Level (Toileting) toileting skills;adjust/manage clothing;perform perineal hygiene;maximum assist (25% patient  effort)  - toileting skills;maximum assist (25% patient effort)  -LF    Assistive Devices (Toileting) raised toilet seat;grab bar/safety frame  -LF --    Position (Toileting) unsupported sitting;supported standing  -LF --      Row Name 06/19/24 1507          Self-Feeding Assessment/Training    [place] Level (Feeding) feeding skills;set up  -LF               User Key  (r) = Recorded By, (t) = Taken By, (c) = Cosigned By      Initials Name Provider Type     Ally Henderson OT Occupational Therapist                   Obj/Interventions       Row Name 06/19/24 1509          Sensory Assessment (Somatosensory)    Sensory Assessment (Somatosensory) UE sensation intact  -       Row Name 06/19/24 1509          Vision Assessment/Intervention    Visual Impairment/Limitations WFL  -LF       Row Name 06/19/24 1509          Range of Motion Comprehensive    General Range of Motion bilateral upper extremity ROM WFL  -[facility] Name 06/19/24 1509          Strength Comprehensive (MMT)    Comment, General Manual Muscle Testing (MMT) Assessment 4+/5 BUEs  -       Row Name 06/19/24 1509          Motor Skills    Motor Skills coordination;functional endurance  -LF     Coordination bilateral;upper extremity;WFL  -LF     Functional Endurance Fair-  -LF       Row Name 06/19/24 1513 06/19/24 1509       Balance    Balance Assessment sitting dynamic balance;standing dynamic balance  -LF sitting dynamic balance;standing dynamic balance  -LF    Dynamic Sitting Balance supervision  -LF supervision  -LF    Position, Sitting Balance unsupported;sitting edge of bed  -LF unsupported;sitting edge of bed  -LF    Dynamic Standing Balance contact guard;1-person assist  -LF contact guard;1-person assist  -LF    Position/Device Used, Standing Balance supported;walker, front-wheeled  -LF supported;walker, front-wheeled  -LF    Balance Interventions sitting;standing;sit to stand;supported;dynamic;weight shifting activity  -LF --               User Key  (r) = Recorded By, (t) = Taken By, (c) = Cosigned By      Initials Name Provider Type    LF Ally Henderson OT Occupational Therapist                   Goals/Plan       Row Name 06/19/24 1511          Bed Mobility Goal 1 (OT)    Activity/Assistive Device (Bed Mobility Goal 1, OT) bed mobility activities, all  -LF     [place] Level/Cues Needed (Bed Mobility Goal 1, OT) modified independence  -LF     Time Frame (Bed Mobility Goal 1, OT) long term goal (LTG);10 days  -LF     Progress/Outcomes (Bed Mobility Goal 1, OT) new goal  -LF       Row Name 06/19/24 1511          Transfer Goal 1 (OT)    Activity/Assistive Device (Transfer Goal 1, OT) transfers, all  -LF     [place] Level/Cues Needed (Transfer Goal 1, OT) modified independence  -LF     Time Frame (Transfer Goal 1, OT) long term goal (LTG);10 days  -LF     Progress/Outcome (Transfer Goal 1, OT) continuing progress toward goal  -LF       Row Name 06/19/24 1511          Bathing Goal 1 (OT)    Activity/Device (Bathing Goal 1, OT) bathing skills, all  -LF     [place] Level/Cues Needed (Bathing Goal 1, OT) modified independence  -LF     Time Frame (Bathing Goal 1, OT) long term goal (LTG);10 days  -LF     Progress/Outcomes (Bathing Goal 1, OT) continuing progress toward goal  -LF       Row Name 06/19/24 1511          Dressing Goal 1 (OT)    Activity/Device (Dressing Goal 1, OT) dressing skills, all  -LF     [place]/Cues Needed (Dressing Goal 1, OT) modified independence  -LF     Time Frame (Dressing Goal 1, OT) long term goal (LTG);10 days  -LF     Progress/Outcome (Dressing Goal 1, OT) continuing progress toward goal  -LF       Row Name 06/19/24 1511          Toileting Goal 1 (OT)    Activity/Device (Toileting Goal 1, OT) toileting skills, all  -LF     [place] Level/Cues Needed (Toileting Goal 1, OT) modified independence  -LF     Time Frame (Toileting Goal 1, OT) long term goal (LTG);10 days  -LF     Progress/Outcome (Toileting  Goal 1, OT) continuing progress toward goal  -LF       Row Name 06/19/24 1511          Grooming Goal 1 (OT)    Activity/Device (Grooming Goal 1, OT) grooming skills, all  -LF     [place] (Grooming Goal 1, OT) modified independence  -LF     Time Frame (Grooming Goal 1, OT) long term goal (LTG);10 days  -LF     Progress/Outcome (Grooming Goal 1, OT) continuing progress toward goal  -LF       Row Name 06/19/24 1511          Problem Specific Goal 1 (OT)    Problem Specific Goal 1 (OT) Patient will improve activity tolerance to fair plus to support independence and engagement with ADL activities  -LF     Time Frame (Problem Specific Goal 1, OT) long term goal (LTG);10 days  -LF     Progress/Outcome (Problem Specific Goal 1, OT) continuing progress toward goal  -LF       Row Name 06/19/24 1511          Therapy Assessment/Plan (OT)    Planned Therapy Interventions (OT) activity tolerance training;BADL retraining;functional balance retraining;occupation/activity based interventions;patient/caregiver education/training;strengthening exercise;transfer/mobility retraining  -               User Key  (r) = Recorded By, (t) = Taken By, (c) = Cosigned By      Initials Name Provider Type    LF Ally Henderson OT Occupational Therapist                   Clinical Impression       Row Name 06/19/24 1504          Pain Assessment    Additional Documentation Pain Scale: FACES Pre/Post-Treatment (Group)  -       Row Name 06/19/24 1501          Plan of Care Review    Plan of Care Reviewed With patient  -LF     Outcome Evaluation Patient continues to present with limitations in self-care, functional transfers, balance, and endurance. He would benefit from continued skilled occupational therapy services to maximize independence with ADLs/functional transfers.Bed mobility goal added, continue with all other goals and plan of care.  -       Row Name 06/19/24 1501          Therapy Assessment/Plan (OT)    Patient/Family Therapy Goal  Statement (OT) To maximize independence.  -     Rehab Potential (OT) good, to achieve stated therapy goals  -     Criteria for Skilled Therapeutic Interventions Met (OT) yes;meets criteria;skilled treatment is necessary  -     Therapy Frequency (OT) 5 times/wk  -       Row Name 06/19/24 1509          Therapy Plan Review/Discharge Plan (OT)    Equipment Needs Upon Discharge (OT) walker, rolling;commode chair;tub bench  -LF     Anticipated Discharge Disposition (OT) inpatient rehabilitation facility  -       Row Name 06/19/24 1509          Vital Signs    O2 Delivery Pre Treatment room air  -LF     O2 Delivery Intra Treatment room air  -LF     O2 Delivery Post Treatment room air  -       Row Name 06/19/24 1509          Positioning and Restraints    Pre-Treatment Position in bed  -LF     Post Treatment Position bed  -LF     In Bed fowlers;call light within reach;encouraged to call for assist;exit alarm on  -LF               User Key  (r) = Recorded By, (t) = Taken By, (c) = Cosigned By      Initials Name Provider Type     Ally Henderson, OT Occupational Therapist                   Outcome Measures       Row Name 06/19/24 1511          How much help from another is currently needed...    Putting on and taking off regular lower body clothing? 2  -LF     Bathing (including washing, rinsing, and drying) 2  -LF     Toileting (which includes using toilet bed pan or urinal) 2  -LF     Putting on and taking off regular upper body clothing 3  -LF     Taking care of personal grooming (such as brushing teeth) 3  -LF     Eating meals 4  -LF     AM-PAC 6 Clicks Score (OT) 16  -LF       Row Name 06/19/24 1147 06/19/24 0816       How much help from another person do you currently need...    Turning from your back to your side while in flat bed without using bedrails? 4  -JAYRO 4  -SS    Moving from lying on back to sitting on the side of a flat bed without bedrails? 3  -JAYRO 4  -SS    Moving to and from a bed to a chair  (including a wheelchair)? 3  -JAYRO 3  -SS    Standing up from a chair using your arms (e.g., wheelchair, bedside chair)? 4  -JAYRO 4  -SS    Climbing 3-5 steps with a railing? 3  -JAYRO 3  -SS    To walk in hospital room? 3  -JAYRO 3  -SS    AM-PAC 6 Clicks Score (PT) 20  -JAYRO 21  -SS    Highest Level of Mobility Goal 6 --> Walk 10 steps or more  -JAYRO 6 --> Walk 10 steps or more  -SS      Row Name 06/19/24 1511 06/19/24 1147       Functional Assessment    Outcome Measure Options AM-PAC 6 Clicks Daily Activity (OT);Optimal Instrument  -LF AM-PAC 6 Clicks Basic Mobility (PT)  -JAYRO      Row Name 06/19/24 1511          Optimal Instrument    Optimal Instrument Optimal - 3  -LF     Bending/Stooping 4  -LF     Standing 2  -LF     Reaching 1  -LF     From the list, choose the 3 activities you would most like to be able to do without any difficulty Bending/stooping;Standing;Reaching  -LF     Total Score Optimal - 3 7  -LF               User Key  (r) = Recorded By, (t) = Taken By, (c) = Cosigned By      Initials Name Provider Type    LF Ally Henderson, RAJIV Occupational Therapist    Darius Srivastava, PT Physical Therapist    Kat Martino, RN Registered Nurse                    Occupational Therapy Education       Title: PT OT SLP Therapies (Done)       Topic: Occupational Therapy (Done)       Point: ADL training (Done)       Description:   Instruct learner(s) on proper safety adaptation and remediation techniques during self care or transfers.   Instruct in proper use of assistive devices.                  Learning Progress Summary             Patient Acceptance, E, Bed IU by DS at 6/2/2024 0830    Eager, E, VU by  at 5/23/2024 2223    Acceptance, E,D, NR by PG at 5/22/2024 0917                         Point: Home exercise program (Done)       Description:   Instruct learner(s) on appropriate technique for monitoring, assisting and/or progressing therapeutic exercises/activities.                  Learning Progress Summary              Patient Acceptance, E, Bed IU by DS at 6/2/2024 0830    Eager, E, VU by  at 5/23/2024 2223    Acceptance, E,D, NR by  at 5/22/2024 0917                         Point: Precautions (Done)       Description:   Instruct learner(s) on prescribed precautions during self-care and functional transfers.                  Learning Progress Summary             Patient Acceptance, E, Bed IU by DS at 6/2/2024 0830    Eager, E, VU by  at 5/23/2024 2223    Acceptance, E,D, NR by PG at 5/22/2024 0917                         Point: Body mechanics (Done)       Description:   Instruct learner(s) on proper positioning and spine alignment during self-care, functional mobility activities and/or exercises.                  Learning Progress Summary             Patient Acceptance, E, Bed IU by DS at 6/2/2024 0830    Eager, E, VU by  at 5/23/2024 2223    Acceptance, E,D, NR by  at 5/22/2024 0917                                         User Key       Initials Effective Dates Name Provider Type Discipline     06/16/21 -  Felix Villasenor, RAJIV Occupational Therapist OT     06/26/23 -  Immanuel Beck, RN Registered Nurse Nurse     05/31/23 -  Angela Casillas, RN Registered Nurse Nurse                  OT Recommendation and Plan  Planned Therapy Interventions (OT): activity tolerance training, BADL retraining, functional balance retraining, occupation/activity based interventions, patient/caregiver education/training, strengthening exercise, transfer/mobility retraining  Therapy Frequency (OT): 5 times/wk  Plan of Care Review  Plan of Care Reviewed With: patient  Outcome Evaluation: Patient continues to present with limitations in self-care, functional transfers, balance, and endurance. He would benefit from continued skilled occupational therapy services to maximize independence with ADLs/functional transfers.Bed mobility goal added, continue with all other goals and plan of care.     Time Calculation:         Time  Calculation- OT       Row Name 06/19/24 1512             Time Calculation- OT    OT Received On 06/19/24  -LF      OT Goal Re-Cert Due Date 06/28/24  -LF         Timed Charges    94082 - OT Therapeutic Activity Minutes 10  -LF      47930 - OT Self Care/Mgmt Minutes 5  -LF         Untimed Charges    OT Eval/Re-eval Minutes 15  -LF         Total Minutes    Timed Charges Total Minutes 15  -LF      Untimed Charges Total Minutes 15  -LF       Total Minutes 30  -LF                User Key  (r) = Recorded By, (t) = Taken By, (c) = Cosigned By      Initials Name Provider Type    LF Ally Henderson OT Occupational Therapist                  Therapy Charges for Today       Code Description Service Date Service Provider Modifiers Qty    91971669193 HC OT THERAPEUTIC ACT EA 15 MIN 6/19/2024 Ally Henderson OT GO 1    [id number] HC OT RE-EVAL 2 6/19/2024 Ally Henderson OT GO 1                 Ally Henderson OT  6/19/2024

## 2024-06-19 NOTE — THERAPY RE-EVALUATION
Acute Care - Physical Therapy Re-Evaluation   [place]     Patient Name: Oswaldo Bowen  : 1980  MRN: [id number]  Today's Date: 2024    Admit date: 2024     Referring Physician: Sy Guzman MD     Surgery Date:2024   Procedure(s) (LRB):  BRONCHOSCOPY WITH ENDOBRONCHIAL ULTRASOUND, FINE NEEDLE ASPIRATE, BIOPSIES, BRUSHINGS, BRONCHOALVEOLAR LAVAGE (N/A)        Visit Dx:     ICD-10-CM ICD-9-CM   1. Closed displaced intertrochanteric fracture of right femur, initial encounter  S72.141A 820.21   2. Pulmonary nodule  R91.1 793.11   3. Difficulty walking  R26.2 719.7   4. Dysphagia, oropharyngeal  R13.12 787.22   5. Difficulty in walking  R26.2 719.7     Patient Active Problem List   Diagnosis    Closed intertrochanteric fracture of right femur    Pulmonary nodule     History reviewed. No pertinent past medical history.  Past Surgical History:   Procedure Laterality Date    BRONCHOSCOPY N/A 2024    Procedure: BRONCHOSCOPY WITH ENDOBRONCHIAL ULTRASOUND, FINE NEEDLE ASPIRATE, BIOPSIES, BRUSHINGS, BRONCHOALVEOLAR LAVAGE;  Surgeon: Kendell Stern MD;  Location: [facility] ENDOSCOPY;  Service: Pulmonary;  Laterality: N/A;  LUNG NODULE, MUCOUS PLUGGING    HIP INTERTROCHANTERIC NAILING Right 2024    Procedure: HIP INTERTROCHANTERIC NAILING;  Surgeon: Molina Clayton MD;  Location: [facility] MAIN OR;  Service: Orthopedics;  Laterality: Right;    US GUIDED FINE NEEDLE ASPIRATION  2024     PT Assessment (Last 12 Hours)       PT Evaluation and Treatment       Row Name 24 1118          Physical Therapy Time and Intention    Subjective Information complains of;pain  -JAYRO     Document Type re-evaluation  -JAYRO     Mode of Treatment individual therapy;physical therapy  -JAYRO     Patient Effort good  -JAYRO     Symptoms Noted During/After Treatment increased pain  -JAYRO       Row Name 24 1118          General Information    Patient Profile Reviewed yes  -JAYRO     Patient Observations  alert;cooperative;agree to therapy  -JAYRO     Existing Precautions/Restrictions fall  -JAYRO       Row Name 06/19/24 1118          Pain    Additional Documentation Pain Scale: FACES Pre/Post-Treatment (Group)  -JAYRO       [facility] Name 06/19/24 1118          Pain Scale: FACES Pre/Post-Treatment    Pain: FACES Scale, Pretreatment 2-->hurts little bit  -JAYRO     Posttreatment Pain Rating 4-->hurts little more  -JAYRO     Pain Location - Side/Orientation Right  -JAYRO     Pain Location - hip  -JAYRO       [facility] Name 06/19/24 1118          Cognition    Orientation Status (Cognition) oriented to;person;situation  -JAYRO     Follows Commands (Cognition) physical/tactile prompts required;repetition of directions required;verbal cues/prompting required  -JAYRO     Cognitive Function attention deficit  -JAYRO     Attention Deficit (Cognition) minimal deficit;concentration;focused/sustained attention  -JAYRO       [facility] Name 06/19/24 1118          Range of Motion (ROM)    Range of Motion bilateral lower extremities  R LE Hip, Knee, Ankle; L LE Hip, Knee, Ankle  -JAYRO       [facility] Name 06/19/24 1118          Strength (Manual Muscle Testing)    Strength (Manual Muscle Testing) bilateral lower extremities  R LE Hip, Knee, Ankle; L LE Hip, Knee, Ankle  -JAYRO       Row Name 06/19/24 1118          Mobility    Extremity Weight-bearing Status right lower extremity  -JAYRO     Right Lower Extremity (Weight-bearing Status) weight-bearing as tolerated (WBAT)  -JAYRO       [facility] Name 06/19/24 1118          Bed Mobility    Bed Mobility supine-sit;sit-supine  -JYARO     Supine-Sit [place] (Bed Mobility) minimum assist (75% patient effort);1 person assist  -JAYRO     Sit-Supine [place] (Bed Mobility) minimum assist (75% patient effort);1 person assist  -JAYRO     Bed Mobility, Safety Issues decreased use of legs for bridging/pushing  -JAYRO     Assistive Device (Bed Mobility) bed rails  -JAYRO       Row Name 06/19/24 1118          Transfers    Transfers sit-stand transfer;stand-sit transfer  -JAYRO      Maintains Weight-bearing Status (Transfers) able to maintain  -JAYRO       Row Name 06/19/24 1118          Sit-Stand Transfer    Sit-Stand [place] (Transfers) contact guard;1 person assist  -JAYRO     Assistive Device (Sit-Stand Transfers) walker, front-wheeled  -JAYRO     Comment, (Sit-Stand Transfer) Pt exhibited mild LOB posteriorly when coming to stand.  -JAYRO       Row Name 06/19/24 1118          Stand-Sit Transfer    Stand-Sit [place] (Transfers) contact guard;1 person assist  -JAYRO     Assistive Device (Stand-Sit Transfers) walker, front-wheeled  -JAYRO       Row Name 06/19/24 1118          Gait/Stairs (Locomotion)    Gait/Stairs Locomotion gait/ambulation assistive device  -JAYRO     [place] Level (Gait) contact guard;1 person assist  -JAYRO     Assistive Device (Gait) walker, front-wheeled  -JAYRO     Patient was able to Ambulate yes  -JAYRO     Distance in Feet (Gait) 100  -JAYRO     Pattern (Gait) step-to  -JAYRO     Deviations/Abnormal Patterns (Gait) shannon decreased;base of support, narrow;gait speed decreased;stride length decreased;weight shifting decreased  -JAYRO     Bilateral Gait Deviations forward flexed posture  -JAYRO     Left Sided Gait Deviations leans left;weight shift ability decreased  -JAYRO     Right Sided Gait Deviations heel strike decreased;decreased knee extension;weight shift ability decreased  -JAYRO     Gait Assessment/Intervention Pt exhibited favoring of R side due to pain and weakness.  -JAYRO       Row Name 06/19/24 1118          Safety Issues, Functional Mobility    Safety Issues Affecting Function (Mobility) positioning of assistive device;sequencing abilities  AD too far from JUS at times.  -JAYRO     Impairments Affecting Function (Mobility) balance;cognition;endurance/activity tolerance;pain;range of motion (ROM);strength  -JAYRO     Cognitive Impairments, Mobility Safety/Performance attention;insight into deficits/self-awareness  -JAYRO       Row Name 06/19/24 1118          Balance    Balance Assessment  standing dynamic balance  -JAYRO     Dynamic Standing Balance contact guard;1-person assist  -JAYRO     Position/Device Used, Standing Balance walker, front-wheeled  -JAYRO       Row Name             Wound 05/21/24 0251 Bilateral coccyx    Wound - Properties Group Placement Date: 05/21/24  -SR Placement Time: 0251  -SR Present on Original Admission: Y  -SR Side: Bilateral  -SR Location: coccyx  -SR    Retired Wound - Properties Group Placement Date: 05/21/24  -SR Placement Time: 0251  -SR Present on Original Admission: Y  -SR Side: Bilateral  -SR Location: coccyx  -SR    Retired Wound - Properties Group Date first assessed: 05/21/24  -SR Time first assessed: 0251  -SR Present on Original Admission: Y  -SR Side: Bilateral  -SR Location: coccyx  -SR      Row Name             Wound 05/21/24 Right lateral thigh Incision    Wound - Properties Group Placement Date: 05/21/24  -SF Present on Original Admission: N  -SF Side: Right  -SF Orientation: lateral  -SF Location: thigh  -SF Primary Wound Type: Incision  -SF Additional Comments: incision sites x 3 to lateral right thigh  -SF    Retired Wound - Properties Group Placement Date: 05/21/24  -SF Present on Original Admission: N  -SF Side: Right  -SF Orientation: lateral  -SF Location: thigh  -SF Primary Wound Type: Incision  -SF Additional Comments: incision sites x 3 to lateral right thigh  -SF    Retired Wound - Properties Group Date first assessed: 05/21/24  -SF Present on Original Admission: N  -SF Side: Right  -SF Location: thigh  -SF Primary Wound Type: Incision  -SF Additional Comments: incision sites x 3 to lateral right thigh  -SF      Row Name 06/19/24 1118          Positioning and Restraints    Pre-Treatment Position in bed  -JAYRO     Post Treatment Position bed  -JAYRO     In Bed supine;call light within reach;encouraged to call for assist;exit alarm on  -JAYRO       Row Name 06/19/24 1118          Therapy Assessment/Plan (PT)    Rehab Potential (PT) good, to achieve stated  therapy goals  -JAYRO     Criteria for Skilled Interventions Met (PT) yes;skilled treatment is necessary  -JAYRO     Therapy Frequency (PT) daily  -JAYRO     Predicted Duration of Therapy Intervention (PT) 10 days  -JAYRO     Problem List (PT) problems related to;balance;cognition;coordination;mobility;range of motion (ROM);strength;pain;postural control;communication  -JAYRO     Activity Limitations Related to Problem List (PT) unable to ambulate safely;unable to transfer safely  -JAYRO       Row Name 06/19/24 1118          Progress Summary (PT)    Progress Toward Functional Goals (PT) progress toward functional goals is good  -JAYRO     Daily Progress Summary (PT) Pt presents with complaints of mild pain during ROM assessment. Pt exhibits good functional mobility, and activity tolerance. Pt still has deficits in balance, strength, and ROM which is negatively effecting patients ability to reach prior level of function. Skilled therapy still necessary while in hospital and pt will benefit from rehab upon discharge to optimize recover process.  -JAYRO       Row Name 06/19/24 1118          Bed Mobility Goal 1 (PT)    Activity/Assistive Device (Bed Mobility Goal 1, PT) sit to supine/supine to sit  -JAYRO     [place] Level/Cues Needed (Bed Mobility Goal 1, PT) modified independence  -JAYRO     Time Frame (Bed Mobility Goal 1, PT) 10 days  -JAYRO     Progress/Outcomes (Bed Mobility Goal 1, PT) continuing progress toward goal  -JAYRO       Row Name 06/19/24 1118          Transfer Goal 1 (PT)    Activity/Assistive Device (Transfer Goal 1, PT) sit-to-stand/stand-to-sit;bed-to-chair/chair-to-bed;walker, rolling  -JAYRO     [place] Level/Cues Needed (Transfer Goal 1, PT) modified independence  -JAYRO     Time Frame (Transfer Goal 1, PT) 10 days  -JAYRO     Progress/Outcome (Transfer Goal 1, PT) good progress toward goal  -JAYRO       Row Name 06/19/24 1118          Gait Training Goal 1 (PT)    Activity/Assistive Device (Gait Training Goal 1, PT) gait (walking  locomotion);assistive device use;walker, rolling  -JAYRO     [place] Level (Gait Training Goal 1, PT) modified independence  -JAYRO     Distance (Gait Training Goal 1, PT) 200  -JAYRO     Time Frame (Gait Training Goal 1, PT) long term goal (LTG);10 days  -JAYRO     Progress/Outcome (Gait Training Goal 1, PT) good progress toward goal  -JAYRO       Row Name 06/19/24 1118          ROM Goal 1 (PT)    ROM Goal 1 (PT) Improve R knee flexion to 0-120  -JAYRO     Time Frame (ROM Goal 1, PT) long-term goal (LTG);10 days  -JAYRO     Progress/Outcome (ROM Goal 1, PT) goal met  -JAYRO               User Key  (r) = Recorded By, (t) = Taken By, (c) = Cosigned By      Initials Name Provider Type    SF Saritha Ramirez, RN Registered Nurse    Darius Srivastava, PT Physical Therapist    SR Saritha Long RN Registered Nurse                    Physical Therapy Education       Title: PT OT SLP Therapies (Done)       Topic: Physical Therapy (Done)       Point: Mobility training (Done)       Learning Progress Summary             Patient Acceptance, E,TB, VU by TR at 6/10/2024 1507    Acceptance, E, Bed IU by DS at 6/2/2024 0830    Acceptance, E, VU by PS at 6/1/2024 0627    Acceptance, E,TB, VU by TR at 5/31/2024 1449    Eager, E, VU by AH at 5/23/2024 2223    Acceptance, E,TB, VU by AV at 5/22/2024 1339                         Point: Home exercise program (Done)       Learning Progress Summary             Patient Acceptance, E, Bed IU by DS at 6/2/2024 0830                         Point: Body mechanics (Done)       Learning Progress Summary             Patient Acceptance, E, Bed IU by DS at 6/2/2024 0830    Acceptance, E, VU by PS at 6/1/2024 0627    Acceptance, E,TB, VU by AV at 5/22/2024 1339                         Point: Precautions (Done)       Learning Progress Summary             Patient Acceptance, E,TB, VU by TR at 6/10/2024 1507    Acceptance, E, Bed IU by DS at 6/2/2024 0830    Acceptance, E,TB, VU by TR at 5/31/2024 1449    Acceptance,  E,TB, VU by CARLOTA at 5/22/2024 9832                                         User Key       Initials Effective Dates Name Provider Type Discipline    PS 06/16/21 -  Darcie Fiore, RN Registered Nurse Nurse    CARLOTA 06/11/21 -  Eh Martínez, PT Physical Therapist PT    DS 06/26/23 -  Immanuel Beck, GRIS Registered Nurse Nurse     05/31/23 -  Angela Casillas, RN Registered Nurse Nurse    TR 05/21/24 -  Aaron Fowler, JERROD Student PT Student PT                  PT Recommendation and Plan  Planned Therapy Interventions (PT): balance training, bed mobility training, gait training, patient/family education, ROM (range of motion), strengthening, transfer training, motor coordination training  Therapy Frequency (PT): daily  Progress Summary (PT)  Progress Toward Functional Goals (PT): progress toward functional goals is good  Daily Progress Summary (PT): Pt presents with complaints of mild pain during ROM assessment. Pt exhibits good functional mobility, and activity tolerance. Pt still has deficits in balance, strength, and ROM which is negatively effecting patients ability to reach prior level of function. Skilled therapy still necessary while in hospital and pt will benefit from rehab upon discharge to optimize recover process.   Outcome Measures       Row Name 06/19/24 1147 06/18/24 1152 06/17/24 1134       How much help from another person do you currently need...    Turning from your back to your side while in flat bed without using bedrails? 4  -JAYRO 4  -DK 4  -DK    Moving from lying on back to sitting on the side of a flat bed without bedrails? 3  -JAYRO 4  -DK 4  -DK    Moving to and from a bed to a chair (including a wheelchair)? 3  -JAYRO 3  -DK 3  -DK    Standing up from a chair using your arms (e.g., wheelchair, bedside chair)? 4  -JAYRO 4  -DK 3  -DK    Climbing 3-5 steps with a railing? 3  -JAYRO 3  -DK 3  -DK    To walk in hospital room? 3  -JAYRO 3  -DK 3  -DK    AM-PAC 6 Clicks Score (PT) 20  -JAYRO 21  -DK 20   -DK    Highest Level of Mobility Goal 6 --> Walk 10 steps or more  -JAYRO 6 --> Walk 10 steps or more  -DK 6 --> Walk 10 steps or more  -DK       Functional Assessment    Outcome Measure Options AM-PAC 6 Clicks Basic Mobility (PT)  -JAYRO AM-PAC 6 Clicks Basic Mobility (PT)  -DK AM-PAC 6 Clicks Basic Mobility (PT)  -DK              User Key  (r) = Recorded By, (t) = Taken By, (c) = Cosigned By      Initials Name Provider Type    Sheila Maharaj, PTA Physical Therapist Assistant    Darius Srivastava, PT Physical Therapist                     Time Calculation:         PT G-Codes  Outcome Measure Options: AM-PAC 6 Clicks Basic Mobility (PT)  AM-PAC 6 Clicks Score (PT): 20  AM-PAC 6 Clicks Score (OT): 18    Darius Preston PT  6/19/2024

## 2024-06-20 ENCOUNTER — APPOINTMENT (OUTPATIENT)
Dept: GENERAL RADIOLOGY | Facility: HOSPITAL | Age: 44
DRG: 477 | End: 2024-06-20
Payer: COMMERCIAL

## 2024-06-20 ENCOUNTER — ANESTHESIA (OUTPATIENT)
Dept: PERIOP | Facility: HOSPITAL | Age: 44
End: 2024-06-20
Payer: COMMERCIAL

## 2024-06-20 LAB
ACB CMPLX DNA BAL NAA+NON-PRB-NCNCRNG: NOT DETECTED
ANION GAP SERPL CALCULATED.3IONS-SCNC: 10.1 MMOL/L (ref 5–15)
BASOPHILS # BLD AUTO: 0.03 10*3/MM3 (ref 0–0.2)
BASOPHILS NFR BLD AUTO: 0.2 % (ref 0–1.5)
BLACTX-M ISLT/SPM QL: NORMAL
BLAIMP ISLT/SPM QL: NORMAL
BLAKPC ISLT/SPM QL: NORMAL
BLAOXA-48-LIKE ISLT/SPM QL: NORMAL
BLAVIM ISLT/SPM QL: NORMAL
BUN SERPL-MCNC: 12 MG/DL (ref 6–20)
BUN/CREAT SERPL: 33.3 (ref 7–25)
C PNEUM DNA NPH QL NAA+NON-PROBE: NOT DETECTED
CALCIUM SPEC-SCNC: 9.5 MG/DL (ref 8.6–10.5)
CHLORIDE SERPL-SCNC: 94 MMOL/L (ref 98–107)
CO2 SERPL-SCNC: 27.9 MMOL/L (ref 22–29)
CREAT SERPL-MCNC: 0.36 MG/DL (ref 0.76–1.27)
DEPRECATED RDW RBC AUTO: 46.2 FL (ref 37–54)
E CLOAC COMP DNA BAL NAA+NON-PRB-NCNCRNG: NOT DETECTED
E COLI DNA BAL NAA+NON-PRB-NCNCRNG: NOT DETECTED
EGFRCR SERPLBLD CKD-EPI 2021: 142.4 ML/MIN/1.73
EOSINOPHIL # BLD AUTO: 0.07 10*3/MM3 (ref 0–0.4)
EOSINOPHIL NFR BLD AUTO: 0.5 % (ref 0.3–6.2)
ERYTHROCYTE [DISTWIDTH] IN BLOOD BY AUTOMATED COUNT: 14.8 % (ref 12.3–15.4)
FLUAV SUBTYP SPEC NAA+PROBE: NOT DETECTED
FLUBV RNA ISLT QL NAA+PROBE: NOT DETECTED
GLUCOSE SERPL-MCNC: 115 MG/DL (ref 65–99)
GP B STREP DNA BAL NAA+NON-PRB-NCNCRNG: NOT DETECTED
GRAM STN SPEC: NORMAL
GRAM STN SPEC: NORMAL
HADV DNA SPEC NAA+PROBE: NOT DETECTED
HAEM INFLU DNA BAL NAA+NON-PRB-NCNCRNG: NOT DETECTED
HCOV RNA LOWER RESP QL NAA+NON-PROBE: NOT DETECTED
HCT VFR BLD AUTO: 25.5 % (ref 37.5–51)
HGB BLD-MCNC: 7.7 G/DL (ref 13–17.7)
HMPV RNA NPH QL NAA+NON-PROBE: NOT DETECTED
HPIV RNA LOWER RESP QL NAA+NON-PROBE: NOT DETECTED
IMM GRANULOCYTES # BLD AUTO: 0.21 10*3/MM3 (ref 0–0.05)
IMM GRANULOCYTES NFR BLD AUTO: 1.4 % (ref 0–0.5)
K AEROGENES DNA BAL NAA+NON-PRB-NCNCRNG: NOT DETECTED
K OXYTOCA DNA BAL NAA+NON-PRB-NCNCRNG: NOT DETECTED
K PNEU GRP DNA BAL NAA+NON-PRB-NCNCRNG: NOT DETECTED
L PNEUMO DNA LOWER RESP QL NAA+NON-PROBE: NOT DETECTED
LYMPHOCYTES # BLD AUTO: 1.42 10*3/MM3 (ref 0.7–3.1)
LYMPHOCYTES NFR BLD AUTO: 9.5 % (ref 19.6–45.3)
M CATARRHALIS DNA BAL NAA+NON-PRB-NCNCRNG: NOT DETECTED
M PNEUMO IGG SER IA-ACNC: NOT DETECTED
MAGNESIUM SERPL-MCNC: 1.7 MG/DL (ref 1.6–2.6)
MCH RBC QN AUTO: 26.1 PG (ref 26.6–33)
MCHC RBC AUTO-ENTMCNC: 30.2 G/DL (ref 31.5–35.7)
MCV RBC AUTO: 86.4 FL (ref 79–97)
MECA+MECC ISLT/SPM QL: NORMAL
MONOCYTES # BLD AUTO: 1.53 10*3/MM3 (ref 0.1–0.9)
MONOCYTES NFR BLD AUTO: 10.2 % (ref 5–12)
NDM GENE: NORMAL
NEUTROPHILS NFR BLD AUTO: 11.72 10*3/MM3 (ref 1.7–7)
NEUTROPHILS NFR BLD AUTO: 78.2 % (ref 42.7–76)
NRBC BLD AUTO-RTO: 0 /100 WBC (ref 0–0.2)
P AERUGINOSA DNA BAL NAA+NON-PRB-NCNCRNG: NOT DETECTED
PHOSPHATE SERPL-MCNC: 4.4 MG/DL (ref 2.5–4.5)
PLATELET # BLD AUTO: 469 10*3/MM3 (ref 140–450)
PMV BLD AUTO: 9.4 FL (ref 6–12)
POTASSIUM SERPL-SCNC: 3.9 MMOL/L (ref 3.5–5.2)
PROTEUS SP DNA BAL NAA+NON-PRB-NCNCRNG: NOT DETECTED
RBC # BLD AUTO: 2.95 10*6/MM3 (ref 4.14–5.8)
RHINOVIRUS RNA SPEC NAA+PROBE: NOT DETECTED
RSV RNA NPH QL NAA+NON-PROBE: NOT DETECTED
S AUREUS DNA BAL NAA+NON-PRB-NCNCRNG: NOT DETECTED
S MARCESCENS DNA BAL NAA+NON-PRB-NCNCRNG: NOT DETECTED
S PNEUM DNA BAL NAA+NON-PRB-NCNCRNG: NOT DETECTED
S PYO DNA BAL NAA+NON-PRB-NCNCRNG: NOT DETECTED
SODIUM SERPL-SCNC: 132 MMOL/L (ref 136–145)
WBC NRBC COR # BLD AUTO: 14.98 10*3/MM3 (ref 3.4–10.8)

## 2024-06-20 PROCEDURE — C1726 CATH, BAL DIL, NON-VASCULAR: HCPCS | Performed by: STUDENT IN AN ORGANIZED HEALTH CARE EDUCATION/TRAINING PROGRAM

## 2024-06-20 PROCEDURE — 88104 CYTOPATH FL NONGYN SMEARS: CPT | Performed by: STUDENT IN AN ORGANIZED HEALTH CARE EDUCATION/TRAINING PROGRAM

## 2024-06-20 PROCEDURE — 87071 CULTURE AEROBIC QUANT OTHER: CPT | Performed by: STUDENT IN AN ORGANIZED HEALTH CARE EDUCATION/TRAINING PROGRAM

## 2024-06-20 PROCEDURE — 0B9G8ZX DRAINAGE OF LEFT UPPER LUNG LOBE, VIA NATURAL OR ARTIFICIAL OPENING ENDOSCOPIC, DIAGNOSTIC: ICD-10-PCS | Performed by: STUDENT IN AN ORGANIZED HEALTH CARE EDUCATION/TRAINING PROGRAM

## 2024-06-20 PROCEDURE — 88305 TISSUE EXAM BY PATHOLOGIST: CPT | Performed by: STUDENT IN AN ORGANIZED HEALTH CARE EDUCATION/TRAINING PROGRAM

## 2024-06-20 PROCEDURE — 76000 FLUOROSCOPY <1 HR PHYS/QHP: CPT

## 2024-06-20 PROCEDURE — 31624 DX BRONCHOSCOPE/LAVAGE: CPT | Performed by: STUDENT IN AN ORGANIZED HEALTH CARE EDUCATION/TRAINING PROGRAM

## 2024-06-20 PROCEDURE — 25810000003 LACTATED RINGERS PER 1000 ML: Performed by: NURSE ANESTHETIST, CERTIFIED REGISTERED

## 2024-06-20 PROCEDURE — 31627 NAVIGATIONAL BRONCHOSCOPY: CPT | Performed by: STUDENT IN AN ORGANIZED HEALTH CARE EDUCATION/TRAINING PROGRAM

## 2024-06-20 PROCEDURE — 88342 IMHCHEM/IMCYTCHM 1ST ANTB: CPT | Performed by: STUDENT IN AN ORGANIZED HEALTH CARE EDUCATION/TRAINING PROGRAM

## 2024-06-20 PROCEDURE — 0BD88ZX EXTRACTION OF LEFT UPPER LOBE BRONCHUS, VIA NATURAL OR ARTIFICIAL OPENING ENDOSCOPIC, DIAGNOSTIC: ICD-10-PCS | Performed by: STUDENT IN AN ORGANIZED HEALTH CARE EDUCATION/TRAINING PROGRAM

## 2024-06-20 PROCEDURE — 84100 ASSAY OF PHOSPHORUS: CPT | Performed by: INTERNAL MEDICINE

## 2024-06-20 PROCEDURE — 94799 UNLISTED PULMONARY SVC/PX: CPT

## 2024-06-20 PROCEDURE — 88173 CYTOPATH EVAL FNA REPORT: CPT | Performed by: STUDENT IN AN ORGANIZED HEALTH CARE EDUCATION/TRAINING PROGRAM

## 2024-06-20 PROCEDURE — 31629 BRONCHOSCOPY/NEEDLE BX EACH: CPT | Performed by: STUDENT IN AN ORGANIZED HEALTH CARE EDUCATION/TRAINING PROGRAM

## 2024-06-20 PROCEDURE — 88341 IMHCHEM/IMCYTCHM EA ADD ANTB: CPT | Performed by: STUDENT IN AN ORGANIZED HEALTH CARE EDUCATION/TRAINING PROGRAM

## 2024-06-20 PROCEDURE — 31628 BRONCHOSCOPY/LUNG BX EACH: CPT | Performed by: STUDENT IN AN ORGANIZED HEALTH CARE EDUCATION/TRAINING PROGRAM

## 2024-06-20 PROCEDURE — 87633 RESP VIRUS 12-25 TARGETS: CPT | Performed by: STUDENT IN AN ORGANIZED HEALTH CARE EDUCATION/TRAINING PROGRAM

## 2024-06-20 PROCEDURE — 25010000002 ONDANSETRON PER 1 MG: Performed by: NURSE ANESTHETIST, CERTIFIED REGISTERED

## 2024-06-20 PROCEDURE — 99232 SBSQ HOSP IP/OBS MODERATE 35: CPT | Performed by: INTERNAL MEDICINE

## 2024-06-20 PROCEDURE — 88108 CYTOPATH CONCENTRATE TECH: CPT | Performed by: STUDENT IN AN ORGANIZED HEALTH CARE EDUCATION/TRAINING PROGRAM

## 2024-06-20 PROCEDURE — 25010000002 FENTANYL CITRATE (PF) 50 MCG/ML SOLUTION: Performed by: NURSE ANESTHETIST, CERTIFIED REGISTERED

## 2024-06-20 PROCEDURE — 87205 SMEAR GRAM STAIN: CPT | Performed by: STUDENT IN AN ORGANIZED HEALTH CARE EDUCATION/TRAINING PROGRAM

## 2024-06-20 PROCEDURE — 87206 SMEAR FLUORESCENT/ACID STAI: CPT | Performed by: STUDENT IN AN ORGANIZED HEALTH CARE EDUCATION/TRAINING PROGRAM

## 2024-06-20 PROCEDURE — 31645 BRNCHSC W/THER ASPIR 1ST: CPT | Performed by: STUDENT IN AN ORGANIZED HEALTH CARE EDUCATION/TRAINING PROGRAM

## 2024-06-20 PROCEDURE — 31652 BRONCH EBUS SAMPLNG 1/2 NODE: CPT | Performed by: STUDENT IN AN ORGANIZED HEALTH CARE EDUCATION/TRAINING PROGRAM

## 2024-06-20 PROCEDURE — 25010000002 PROPOFOL 10 MG/ML EMULSION: Performed by: NURSE ANESTHETIST, CERTIFIED REGISTERED

## 2024-06-20 PROCEDURE — 99233 SBSQ HOSP IP/OBS HIGH 50: CPT | Performed by: STUDENT IN AN ORGANIZED HEALTH CARE EDUCATION/TRAINING PROGRAM

## 2024-06-20 PROCEDURE — 25010000002 DEXAMETHASONE PER 1 MG: Performed by: NURSE ANESTHETIST, CERTIFIED REGISTERED

## 2024-06-20 PROCEDURE — 25010000002 SUGAMMADEX 200 MG/2ML SOLUTION: Performed by: NURSE ANESTHETIST, CERTIFIED REGISTERED

## 2024-06-20 PROCEDURE — 31623 DX BRONCHOSCOPE/BRUSH: CPT | Performed by: STUDENT IN AN ORGANIZED HEALTH CARE EDUCATION/TRAINING PROGRAM

## 2024-06-20 PROCEDURE — 85025 COMPLETE CBC W/AUTO DIFF WBC: CPT | Performed by: INTERNAL MEDICINE

## 2024-06-20 PROCEDURE — 94760 N-INVAS EAR/PLS OXIMETRY 1: CPT

## 2024-06-20 PROCEDURE — 31654 BRONCH EBUS IVNTJ PERPH LES: CPT | Performed by: STUDENT IN AN ORGANIZED HEALTH CARE EDUCATION/TRAINING PROGRAM

## 2024-06-20 PROCEDURE — 83735 ASSAY OF MAGNESIUM: CPT | Performed by: INTERNAL MEDICINE

## 2024-06-20 PROCEDURE — 80048 BASIC METABOLIC PNL TOTAL CA: CPT | Performed by: INTERNAL MEDICINE

## 2024-06-20 RX ORDER — LIDOCAINE HYDROCHLORIDE 20 MG/ML
INJECTION, SOLUTION EPIDURAL; INFILTRATION; INTRACAUDAL; PERINEURAL AS NEEDED
Status: DISCONTINUED | OUTPATIENT
Start: 2024-06-20 | End: 2024-06-20 | Stop reason: SURG

## 2024-06-20 RX ORDER — SODIUM CHLORIDE, SODIUM LACTATE, POTASSIUM CHLORIDE, CALCIUM CHLORIDE 600; 310; 30; 20 MG/100ML; MG/100ML; MG/100ML; MG/100ML
INJECTION, SOLUTION INTRAVENOUS CONTINUOUS PRN
Status: DISCONTINUED | OUTPATIENT
Start: 2024-06-20 | End: 2024-06-20 | Stop reason: SURG

## 2024-06-20 RX ORDER — PROMETHAZINE HYDROCHLORIDE 25 MG/1
25 SUPPOSITORY RECTAL ONCE AS NEEDED
Status: DISCONTINUED | OUTPATIENT
Start: 2024-06-20 | End: 2024-06-20 | Stop reason: HOSPADM

## 2024-06-20 RX ORDER — ONDANSETRON 2 MG/ML
INJECTION INTRAMUSCULAR; INTRAVENOUS AS NEEDED
Status: DISCONTINUED | OUTPATIENT
Start: 2024-06-20 | End: 2024-06-20 | Stop reason: SURG

## 2024-06-20 RX ORDER — PROMETHAZINE HYDROCHLORIDE 12.5 MG/1
25 TABLET ORAL ONCE AS NEEDED
Status: DISCONTINUED | OUTPATIENT
Start: 2024-06-20 | End: 2024-06-20 | Stop reason: HOSPADM

## 2024-06-20 RX ORDER — DEXAMETHASONE SODIUM PHOSPHATE 4 MG/ML
INJECTION, SOLUTION INTRA-ARTICULAR; INTRALESIONAL; INTRAMUSCULAR; INTRAVENOUS; SOFT TISSUE AS NEEDED
Status: DISCONTINUED | OUTPATIENT
Start: 2024-06-20 | End: 2024-06-20 | Stop reason: SURG

## 2024-06-20 RX ORDER — MEPERIDINE HYDROCHLORIDE 25 MG/ML
12.5 INJECTION INTRAMUSCULAR; INTRAVENOUS; SUBCUTANEOUS
Status: DISCONTINUED | OUTPATIENT
Start: 2024-06-20 | End: 2024-06-20 | Stop reason: HOSPADM

## 2024-06-20 RX ORDER — ROCURONIUM BROMIDE 10 MG/ML
INJECTION, SOLUTION INTRAVENOUS AS NEEDED
Status: DISCONTINUED | OUTPATIENT
Start: 2024-06-20 | End: 2024-06-20 | Stop reason: SURG

## 2024-06-20 RX ORDER — PROPOFOL 10 MG/ML
VIAL (ML) INTRAVENOUS AS NEEDED
Status: DISCONTINUED | OUTPATIENT
Start: 2024-06-20 | End: 2024-06-20 | Stop reason: SURG

## 2024-06-20 RX ORDER — OXYCODONE HYDROCHLORIDE 5 MG/1
5 TABLET ORAL
Status: DISCONTINUED | OUTPATIENT
Start: 2024-06-20 | End: 2024-06-20 | Stop reason: HOSPADM

## 2024-06-20 RX ORDER — PHENYLEPHRINE HCL IN 0.9% NACL 1 MG/10 ML
SYRINGE (ML) INTRAVENOUS AS NEEDED
Status: DISCONTINUED | OUTPATIENT
Start: 2024-06-20 | End: 2024-06-20 | Stop reason: SURG

## 2024-06-20 RX ORDER — ONDANSETRON 2 MG/ML
4 INJECTION INTRAMUSCULAR; INTRAVENOUS ONCE AS NEEDED
Status: DISCONTINUED | OUTPATIENT
Start: 2024-06-20 | End: 2024-06-20 | Stop reason: HOSPADM

## 2024-06-20 RX ORDER — FENTANYL CITRATE 50 UG/ML
INJECTION, SOLUTION INTRAMUSCULAR; INTRAVENOUS AS NEEDED
Status: DISCONTINUED | OUTPATIENT
Start: 2024-06-20 | End: 2024-06-20 | Stop reason: SURG

## 2024-06-20 RX ADMIN — SENNOSIDES AND DOCUSATE SODIUM 2 TABLET: 50; 8.6 TABLET ORAL at 08:19

## 2024-06-20 RX ADMIN — Medication 5 MG: at 00:51

## 2024-06-20 RX ADMIN — DEXAMETHASONE SODIUM PHOSPHATE 8 MG: 4 INJECTION, SOLUTION INTRAMUSCULAR; INTRAVENOUS at 09:55

## 2024-06-20 RX ADMIN — ACETAMINOPHEN 650 MG: 325 TABLET ORAL at 12:48

## 2024-06-20 RX ADMIN — SODIUM CHLORIDE, POTASSIUM CHLORIDE, SODIUM LACTATE AND CALCIUM CHLORIDE: 600; 310; 30; 20 INJECTION, SOLUTION INTRAVENOUS at 11:09

## 2024-06-20 RX ADMIN — LIDOCAINE HYDROCHLORIDE 60 MG: 20 INJECTION, SOLUTION INTRAVENOUS at 09:49

## 2024-06-20 RX ADMIN — Medication 400 MG: at 08:19

## 2024-06-20 RX ADMIN — FENTANYL CITRATE 25 MCG: 50 INJECTION, SOLUTION INTRAMUSCULAR; INTRAVENOUS at 09:49

## 2024-06-20 RX ADMIN — ROCURONIUM BROMIDE 50 MG: 10 INJECTION, SOLUTION INTRAVENOUS at 10:10

## 2024-06-20 RX ADMIN — Medication 10 ML: at 20:36

## 2024-06-20 RX ADMIN — Medication 100 MCG: at 09:57

## 2024-06-20 RX ADMIN — SENNOSIDES AND DOCUSATE SODIUM 2 TABLET: 50; 8.6 TABLET ORAL at 20:31

## 2024-06-20 RX ADMIN — ACETAMINOPHEN 650 MG: 325 TABLET ORAL at 23:04

## 2024-06-20 RX ADMIN — HYDROCODONE BITARTRATE AND ACETAMINOPHEN 1 TABLET: 5; 325 TABLET ORAL at 04:27

## 2024-06-20 RX ADMIN — METOPROLOL SUCCINATE 50 MG: 50 TABLET, EXTENDED RELEASE ORAL at 20:31

## 2024-06-20 RX ADMIN — ROCURONIUM BROMIDE 50 MG: 10 INJECTION, SOLUTION INTRAVENOUS at 09:49

## 2024-06-20 RX ADMIN — Medication 100 MCG: at 10:00

## 2024-06-20 RX ADMIN — Medication 100 MCG: at 10:02

## 2024-06-20 RX ADMIN — PROPOFOL 100 MG: 10 INJECTION, EMULSION INTRAVENOUS at 09:49

## 2024-06-20 RX ADMIN — CYANOCOBALAMIN TAB 500 MCG 1000 MCG: 500 TAB at 08:18

## 2024-06-20 RX ADMIN — BUDESONIDE AND FORMOTEROL FUMARATE DIHYDRATE 2 PUFF: 160; 4.5 AEROSOL RESPIRATORY (INHALATION) at 19:15

## 2024-06-20 RX ADMIN — METOPROLOL SUCCINATE 50 MG: 50 TABLET, EXTENDED RELEASE ORAL at 08:19

## 2024-06-20 RX ADMIN — SODIUM CHLORIDE, POTASSIUM CHLORIDE, SODIUM LACTATE AND CALCIUM CHLORIDE: 600; 310; 30; 20 INJECTION, SOLUTION INTRAVENOUS at 09:45

## 2024-06-20 RX ADMIN — Medication 100 MCG: at 10:06

## 2024-06-20 RX ADMIN — Medication 10 ML: at 08:20

## 2024-06-20 RX ADMIN — SUGAMMADEX 200 MG: 100 INJECTION, SOLUTION INTRAVENOUS at 11:09

## 2024-06-20 RX ADMIN — Medication 100 MCG: at 10:21

## 2024-06-20 RX ADMIN — ONDANSETRON HYDROCHLORIDE 4 MG: 2 SOLUTION INTRAMUSCULAR; INTRAVENOUS at 10:07

## 2024-06-20 RX ADMIN — Medication 1 TABLET: at 08:18

## 2024-06-20 RX ADMIN — Medication 100 MCG: at 10:16

## 2024-06-20 NOTE — PLAN OF CARE
Goal Outcome Evaluation:  Plan of Care Reviewed With: patient        Progress: no change  Outcome Evaluation: Pt. VSS, NPO as of 0055 this shift. This RN notified hospitalist PA of Dr. Elizalde's note regarding NPO status. Orders received to place pt NPO. Norco given once for pain control at 0427 with sip of water. Neurovascular checks remain unchanged.

## 2024-06-20 NOTE — ANESTHESIA POSTPROCEDURE EVALUATION
Patient: Oswaldo Bowen    Procedure Summary       Date: 06/20/24 Room / Location: [facility] OR 04 / [facility] MAIN OR    Anesthesia Start: 0945 Anesthesia Stop: 1128    Procedure: BRONCHOSCOPY NAVIGATION WITH ENDOBRONCHIAL ULTRASOUND AND ION ROBOT. REBUS, EBUS, BRUSHINGS, WASHINGS, BAL, BIOPSIES AND NEEDLE ASPIRATE ([place]chus) Diagnosis:       Pulmonary nodule      (Pulmonary nodule [R91.1])    Surgeons: John Elizalde MD Provider: Iveth Last MD    Anesthesia Type: general ASA Status: 3            Anesthesia Type: general    Vitals  Vitals Value Taken Time   /67 06/20/24 1133   Temp 36.6 °C (97.8 °F) 06/20/24 1121   Pulse 109 06/20/24 1135   Resp 18 06/20/24 1121   SpO2 100 % 06/20/24 1135   Vitals shown include unfiled device data.        Post Anesthesia Care and Evaluation    Patient location during evaluation: bedside  Patient participation: complete - patient participated  Level of consciousness: awake  Pain management: adequate    Airway patency: patent  PONV Status: none  Cardiovascular status: acceptable and stable  Respiratory status: acceptable  Hydration status: acceptable

## 2024-06-20 NOTE — PLAN OF CARE
Goal Outcome Evaluation:  Plan of Care Reviewed With: patient        Progress: no change  Outcome Evaluation: VSS, NPO for bronch with biopsy. Resume regular diet. Tylenol given once for headache. No new concerns at this time. Will continue plan of care.

## 2024-06-20 NOTE — PROGRESS NOTES
"Nutrition Services    Patient Name: Oswaldo Bowen  YOB: 1980  MRN: [id number]  Admission date: 5/20/2024      CLINICAL NUTRITION ASSESSMENT      Reason for Assessment  Follow up      H&P:  History reviewed. No pertinent past medical history.     Current Problems:   Active Hospital Problems    Diagnosis     **Closed intertrochanteric fracture of right femur     Pulmonary nodule         Nutrition/Diet History         Narrative   Pt alert at time of visit. Currently, pt reports eating 100% of his meals w/ no issues. Pt currently receiving ONS BID and continues to drink. No n/v/c/d complaints. RD to monitor.      Anthropometrics        Current Height, Weight Height: 182.9 cm (72.01\")  Weight: 73.1 kg (161 lb 2.5 oz)   Current BMI Body mass index is 21.85 kg/m².   BMI Classification Normal range   % IBW 84%    Adjusted Body Weight (ABW)    Weight Hx  Wt Readings from Last 30 Encounters:   06/08/24 0526 73.1 kg (161 lb 2.5 oz)   05/21/24 0219 65 kg (143 lb 4.8 oz)   05/20/24 2134 66 kg (145 lb 8.1 oz)          Wt Change Observation None documented prior to this admission.      Estimated/Assessed Needs  Estimated Needs based on: Current Body Weight       Energy Requirements 30-35 kcal/kg    EST Needs (kcal/day) 0446-4612 kcal/day       Protein Requirements 1.2-1.5 g.kg   EST Daily Needs (g/day) 78-98 g/day        Fluid Requirements 1 ml/kcal    Estimated Needs (mL/day) 2634-7438 ml/day      Labs/Medications         Pertinent Labs Reviewed.   Results from last 7 days   Lab Units 06/20/24  0516 06/19/24  0617 06/15/24  0642   SODIUM mmol/L 132* 136 129*   POTASSIUM mmol/L 3.9 4.2 4.1   CHLORIDE mmol/L 94* 97* 91*   CO2 mmol/L 27.9 29.2* 26.9   BUN mg/dL 12 13 14   CREATININE mg/dL 0.36* 0.41* 0.45*   CALCIUM mg/dL 9.5 9.6 8.7   GLUCOSE mg/dL 115* 103* 117*       Results from last 7 days   Lab Units 06/20/24  0516 06/19/24  0617   MAGNESIUM mg/dL 1.7 2.0   PHOSPHORUS mg/dL 4.4 4.7*   HEMOGLOBIN g/dL 7.7* 8.1* " "  HEMATOCRIT % 25.5* 27.0*       No results found for: \"COVID19\"  No results found for: \"HGBA1C\"      Pertinent Medications Reviewed.     Malnutrition Severity Assessment              Nutrition Diagnosis         Nutrition Dx Problem 1 Unintentional weight loss related to  decreased intake  as evidenced by  reported recent weight loss of 2-13#'s recently      Nutrition Intervention           Current Nutrition Orders & Evaluation of Intake       Current PO Diet Diet: Regular/House; Texture: Soft to Chew (NDD 3); Soft to Chew: Chopped Meat; Fluid Consistency: Thin (IDDSI 0)   Supplement Orders Placed This Encounter      Dietary Nutrition Supplements Boost Plus (Ensure Plus)           Nutrition Intervention/Prescription        Recommend to continue current diet order   Recommend to continue  Boost plus BID (Each supplement contains 360 kcal, 14g protein)         Medical Nutrition Therapy/Nutrition Education          Learner     Readiness N/A Unable to discuss with pt today   N/A     Method     Response N/A  N/A     Monitor/Evaluation        Monitor PO intake, Supplement intake, Skin status, Swallow function     Nutrition Discharge Plan         Recommend to continue oral nutrition supplements on discharge.      Electronically signed by:  Heavenly Agarwal RD  06/20/24 14:44 EDT   "

## 2024-06-20 NOTE — OP NOTE
Procedure:   Robotic navigational assisted bronchoscopy with radial probe endobronchial ultrasound  Transbronchial needle aspiration  Transbronchial lung biopsies, brushings, bronchoalveolar lavage, bronchial washings.    Bronchoscopy with linear endobronchial ultrasound/fine-needle aspiration.    Bronchoscopy with clearance of airways     Pre-Operative Diagnosis: Lung nodule     Post-Operative Diagnosis: Same, hilar adenopathy     Timeout performed     Anesthesia: General anesthesia     Procedure Details: The patient was consented for the procedure with all risk and benefit of the procedure explained in detail.  Patient was given the opportunity to ask questions and all concerns were answered. The bronchoscope was inserted into the main airway via the endotracheal tube. An anatomical survey was done of the main airways and the subsegmental bronchus.      Findings: First, fiberoptic bronchoscope airway inspection was performed.  Endotracheal tube was in adequate position in the mid thoracic trachea.  The trachea was normal in caliber and had no mucosal abnormalities. The left tracheobronchial tree appeared anatomically normal with no mucosal abnormalities. The right tracheobronchial tree appeared anatomically normal with no mucosal abnormalities.  All airways were evaluated and cleared.     Next the fiberoptic bronchoscope was removed and Ion robotic navigational bronchoscope was inserted into the endotracheal tube.  Using navigational guidance, we approach to the lung nodule in the CHRISTIANO of lung.  Identification of CHRISTIANO  lung nodule and appropriate positioning was confirmed via radial probe EBUS and fluoroscopy.  Under fluoroscopic guidance, transbronchial needle aspiration was performed at the lung nodule.  Radial probe EBUS was then inserted to again confirm appropriate position under fluoroscopic guidance.  Transbronchial lung biopsies were performed under fluoroscopic guidance at the lung nodule in the CHRISTIANO of the  lung. Radial probe EBUS was then inserted to again confirm appropriate position under fluoroscopic guidance.  Brushings  were performed under fluoroscopic guidance at the lung nodule. A bronchoalveolar lavage was performed using 15 mL aliquots of normal saline  instilled into CHRISTIANO lung nodule then aspirated back. There was 10 mL blood-tinged fluid in return.  Bronchial washings were collected.     After Ion navigational bronchoscope was removed, linear endobronchial ultrasound was inserted through the endotracheal tube. Using ultrasound, the following lymph nodes were identified and found to be adequate for sampling: Lymph Nodes hilar.  Using endobronchial ultrasound, fine-needle aspiration was performed in station 11L with 5 passes and station 10R with 5 passes.      Findings:     Estimated Blood Loss: Less than 10 mL     Specimens:  Fine-needle aspiration of lung nodule CHRISTIANO lobe of lung using radial probe ultrasound  Transbronchial lung biopsies lung nodule CHRISTIANO lobe of lung using radial probe ultrasound  Brushings of lung nodule using radial probe ultrasound guidance  Fine-needle aspiration of lymph node stations 11L and 10R under ultrasound guidance  Bronchoalveolar lavage CHRISTIANO  Bronchial washings      Complications: None; patient tolerated the procedure well.     Disposition: To floor. Follow-up test results.

## 2024-06-20 NOTE — SIGNIFICANT NOTE
06/20/24 0850   Physical Therapy Time and Intention   Session Not Performed   (Pt is going for a brochoscopy shortly.)        13-Apr-2020 18:00

## 2024-06-20 NOTE — PROGRESS NOTES
Pulmonary / Critical Care Progress Note      Patient Name: Oswaldo Bowen  : 1980  MRN: [id number]  Primary Care Physician:  Provider, No Known  Date of admission: 2024    Subjective   Subjective   Follow-up for left upper lobe lung nodule with mediastinal adenopathy    No acute issues overnight  Lying in bed  Will proceed with Ion navigational bronchoscopy today  No new complaints    Objective   Objective     Vitals:   Temp:  [97.3 °F (36.3 °C)-98 °F (36.7 °C)] 97.3 °F (36.3 °C)  Heart Rate:  [106-121] 109  Resp:  [18-20] 18  BP: (104-132)/(71-81) 113/81  Physical Exam   Vital Signs Reviewed   General:  WDWN, Alert, in no distress, lying in bed  Chest:  good aeration, clear to auscultation bilaterally, tympanic to percussion bilaterally, no work of breathing noted on room air  CV: RRR, no MGR, pulses 2+, equal.  Abd:  Soft, NT, ND, + BS, no HSM  EXT:  no clubbing, no cyanosis, no edema  Neuro:  A&Ox3, CN grossly intact, no focal deficits.  Skin: No rashes or lesions noted      Result Review    Result Review:  I have personally reviewed the results from the time of this admission to 2024 09:30 EDT and agree with these findings:  [x]  Laboratory  [x]  Microbiology  [x]  Radiology  [x]  EKG/Telemetry   [x]  Cardiology/Vascular   []  Pathology  []  Old records  []  Other:  Most notable findings include:         Lab 24  0516 24  0617 24  0643 24  0816 06/15/24  0642   WBC 14.98* 15.92* 9.57  --  13.16*   HEMOGLOBIN 7.7* 8.1* 7.9* 7.0* 7.0*   HEMATOCRIT 25.5* 27.0* 25.7* 23.0* 22.9*   PLATELETS 469* 481* 475*  --  406   SODIUM 132* 136  --   --  129*   POTASSIUM 3.9 4.2  --   --  4.1   CHLORIDE 94* 97*  --   --  91*   CO2 27.9 29.2*  --   --  26.9   BUN 12 13  --   --  14   CREATININE 0.36* 0.41*  --   --  0.45*   GLUCOSE 115* 103*  --   --  117*   CALCIUM 9.5 9.6  --   --  8.7   PHOSPHORUS 4.4 4.7*  --   --   --             CT Chest Without Contrast Diagnostic    Result Date:  6/18/2024  CT CHEST WO CONTRAST DIAGNOSTIC Date of Exam: 6/18/2024 6:21 PM EDT Indication: Ion Navigational Bronchoscopy. Comparison: 6/4/2024 Technique: Axial CT images were obtained of the chest without contrast administration.  Reconstructed coronal and sagittal images were also obtained. Automated exposure control and iterative construction methods were used. Findings: No coronary artery calcification. Left hilum remains prominent compatible with underlying adenopathy. No definite subcarinal or axillary adenopathy. There are no pleural effusions. Within the left upper lobe there is a noncalcified 2.6 cm pulmonary nodule which is unchanged. No new or enlarging pulmonary nodule. No new focal airspace consolidation. Again right lower lobe is a 4 mm subpleural noncalcified nodule. Or enlarging nodule seen within the right lung. Bilateral adrenal glands are within normal limits. Again seen are multiple lytic bone lesions throughout the spine and ribs. There are additional lytic areas within both scapula. There is a stable compression fracture of the T5 vertebral body. No new compression fracture.     Impression: Impression: 1. No change in 2.6 cm nodule within the left upper lobe. There is prominence of the left hilum compatible with known hilar adenopathy. 2. No change in multiple lytic bone lesions consistent with metastatic disease. Electronically Signed: Reese Jara MD  6/18/2024 7:32 PM EDT  Workstation ID: [id number]    CT Chest With Contrast Diagnostic    Result Date: 6/4/2024  CT CHEST W CONTRAST DIAGNOSTIC Date of Exam: 6/4/2024 4:43 PM EDT Indication: leukocytosis worsening, subjective fevers, worsening tachycardia - question pe vs pna. Comparison: 5/21/2024 Technique: Axial CT images were obtained of the chest after the uneventful intravenous administration of iodinated contrast.  Reconstructed coronal and sagittal images were also obtained. Automated exposure control and iterative construction methods  were  used. Findings: Pulmonary Arteries: Adequately opacified. Some motion distortion of peripheral pulmonary vessels on the left. Within that limitation, no emboli demonstrated Marta/mediastinum: Mildly enlarged bilateral hilar and AP window lymph nodes. No pericardial effusion. Indeterminate for coronary calcification due to motion Lungs/pleura: 2.1 cm lobular mass in the inferior left upper lobe again demonstrated. No acute infiltrate. No pleural effusion Upper Abdomen: Unremarkable Bones/soft tissues: Multiple lytic bone lesions including expansile lytic lesions of the right seventh rib and left eighth rib, multiple vertebral bodies including stable pathologic compression of T5, sternum, left scapula. The T5 compression fracture and infiltrating tumor results in significant central canal stenosis. There is some tumor encroachment on the T8 central canal and T8-T9 left foramina, and T11-T12 right foramina     Impression: 1. No evidence of pulmonary embolus 2. No evidence of pneumonia 3. 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 malignancy, with bilateral hilar and AP window adenopathy and extensive osteolytic metastatic disease as described Electronically Signed: Sree Hawthorne  6/4/2024 5:17 PM EDT  Workstation ID: [id number]       Assessment & Plan   Assessment / Plan     Active Hospital Problems:  Active Hospital Problems    Diagnosis    • **Closed intertrochanteric fracture of right femur    • Pulmonary nodule          Impression:   Acute comminuted displaced intertrochanteric right femoral fracture  Status post IM nailing  Left upper lobe lung nodule with mediastinal lymphadenopathy  Chronic heavy smoking  Unintentional weight loss    Plan:   Status post bronchoscopy with EBUS 5/23.  Pathology from mediastinal lymph nodes were all negative for malignancy.    Bone biopsy x 2 showed atypical and inflammatory cells without definitive diagnosis  Imaging still very concerning and consistent with  metastatic cancer.  Will plan for Ion navigational bronchoscopy of the left lobe lesion today.  Risk and benefits of procedure explained to patient.  Risk include bleeding, pneumothorax, infection, even death can occur.  Patient expressed understanding and would like to proceed.  Continue to be N.p.o. and hold anticoagulation.  Completed 5 days of Unasyn for haemophilus pneumonia growing on bronchoscopy BAL  Continue Symbicort and Spiriva, albuterol as needed.    Encourage activity and incentive spirometer use    DVT prophylaxis:  Pharmacologic & mechanical VTE prophylaxis orders are present.    CODE STATUS:   Level Of Support Discussed With: Patient  Code Status (Patient has no pulse and is not breathing): CPR (Attempt to Resuscitate)  Medical Interventions (Patient has pulse or is breathing): Full Support    I have reviewed labs, imaging, pertinent clinical data and provider notes.   I have discussed with bedside nurse and primary service.     Electronically signed by John Elizalde MD, 6/20/2024, 09:30 EDT.

## 2024-06-21 LAB
ANION GAP SERPL CALCULATED.3IONS-SCNC: 11.9 MMOL/L (ref 5–15)
BASOPHILS # BLD AUTO: 0.02 10*3/MM3 (ref 0–0.2)
BASOPHILS NFR BLD AUTO: 0.2 % (ref 0–1.5)
BUN SERPL-MCNC: 12 MG/DL (ref 6–20)
BUN/CREAT SERPL: 24 (ref 7–25)
CALCIUM SPEC-SCNC: 9.8 MG/DL (ref 8.6–10.5)
CHLORIDE SERPL-SCNC: 95 MMOL/L (ref 98–107)
CO2 SERPL-SCNC: 28.1 MMOL/L (ref 22–29)
CREAT SERPL-MCNC: 0.5 MG/DL (ref 0.76–1.27)
DEPRECATED RDW RBC AUTO: 47 FL (ref 37–54)
EGFRCR SERPLBLD CKD-EPI 2021: 129 ML/MIN/1.73
EOSINOPHIL # BLD AUTO: 0.05 10*3/MM3 (ref 0–0.4)
EOSINOPHIL NFR BLD AUTO: 0.4 % (ref 0.3–6.2)
ERYTHROCYTE [DISTWIDTH] IN BLOOD BY AUTOMATED COUNT: 14.6 % (ref 12.3–15.4)
GLUCOSE SERPL-MCNC: 112 MG/DL (ref 65–99)
HCT VFR BLD AUTO: 27.4 % (ref 37.5–51)
HGB BLD-MCNC: 8.1 G/DL (ref 13–17.7)
IMM GRANULOCYTES # BLD AUTO: 0.19 10*3/MM3 (ref 0–0.05)
IMM GRANULOCYTES NFR BLD AUTO: 1.5 % (ref 0–0.5)
LYMPHOCYTES # BLD AUTO: 1.48 10*3/MM3 (ref 0.7–3.1)
LYMPHOCYTES NFR BLD AUTO: 11.9 % (ref 19.6–45.3)
MAGNESIUM SERPL-MCNC: 1.8 MG/DL (ref 1.6–2.6)
MCH RBC QN AUTO: 25.9 PG (ref 26.6–33)
MCHC RBC AUTO-ENTMCNC: 29.6 G/DL (ref 31.5–35.7)
MCV RBC AUTO: 87.5 FL (ref 79–97)
MONOCYTES # BLD AUTO: 1.38 10*3/MM3 (ref 0.1–0.9)
MONOCYTES NFR BLD AUTO: 11.1 % (ref 5–12)
NEUTROPHILS NFR BLD AUTO: 74.9 % (ref 42.7–76)
NEUTROPHILS NFR BLD AUTO: 9.28 10*3/MM3 (ref 1.7–7)
NIGHT BLUE STAIN TISS: NORMAL
NRBC BLD AUTO-RTO: 0 /100 WBC (ref 0–0.2)
PHOSPHATE SERPL-MCNC: 3.7 MG/DL (ref 2.5–4.5)
PLATELET # BLD AUTO: 514 10*3/MM3 (ref 140–450)
PMV BLD AUTO: 9.5 FL (ref 6–12)
POTASSIUM SERPL-SCNC: 3.8 MMOL/L (ref 3.5–5.2)
RBC # BLD AUTO: 3.13 10*6/MM3 (ref 4.14–5.8)
SODIUM SERPL-SCNC: 135 MMOL/L (ref 136–145)
WBC NRBC COR # BLD AUTO: 12.4 10*3/MM3 (ref 3.4–10.8)

## 2024-06-21 PROCEDURE — 97535 SELF CARE MNGMENT TRAINING: CPT

## 2024-06-21 PROCEDURE — 84100 ASSAY OF PHOSPHORUS: CPT | Performed by: STUDENT IN AN ORGANIZED HEALTH CARE EDUCATION/TRAINING PROGRAM

## 2024-06-21 PROCEDURE — 80048 BASIC METABOLIC PNL TOTAL CA: CPT | Performed by: STUDENT IN AN ORGANIZED HEALTH CARE EDUCATION/TRAINING PROGRAM

## 2024-06-21 PROCEDURE — 94799 UNLISTED PULMONARY SVC/PX: CPT

## 2024-06-21 PROCEDURE — 97116 GAIT TRAINING THERAPY: CPT

## 2024-06-21 PROCEDURE — 99232 SBSQ HOSP IP/OBS MODERATE 35: CPT | Performed by: STUDENT IN AN ORGANIZED HEALTH CARE EDUCATION/TRAINING PROGRAM

## 2024-06-21 PROCEDURE — 83735 ASSAY OF MAGNESIUM: CPT | Performed by: STUDENT IN AN ORGANIZED HEALTH CARE EDUCATION/TRAINING PROGRAM

## 2024-06-21 PROCEDURE — 97110 THERAPEUTIC EXERCISES: CPT

## 2024-06-21 PROCEDURE — 99232 SBSQ HOSP IP/OBS MODERATE 35: CPT | Performed by: INTERNAL MEDICINE

## 2024-06-21 PROCEDURE — 85025 COMPLETE CBC W/AUTO DIFF WBC: CPT | Performed by: STUDENT IN AN ORGANIZED HEALTH CARE EDUCATION/TRAINING PROGRAM

## 2024-06-21 RX ADMIN — BUDESONIDE AND FORMOTEROL FUMARATE DIHYDRATE 2 PUFF: 160; 4.5 AEROSOL RESPIRATORY (INHALATION) at 09:39

## 2024-06-21 RX ADMIN — Medication 1 TABLET: at 08:58

## 2024-06-21 RX ADMIN — METOPROLOL SUCCINATE 50 MG: 50 TABLET, EXTENDED RELEASE ORAL at 22:16

## 2024-06-21 RX ADMIN — Medication 400 MG: at 08:59

## 2024-06-21 RX ADMIN — METOPROLOL SUCCINATE 50 MG: 50 TABLET, EXTENDED RELEASE ORAL at 08:59

## 2024-06-21 RX ADMIN — TIOTROPIUM BROMIDE INHALATION SPRAY 2 PUFF: 3.12 SPRAY, METERED RESPIRATORY (INHALATION) at 09:39

## 2024-06-21 RX ADMIN — BUDESONIDE AND FORMOTEROL FUMARATE DIHYDRATE 2 PUFF: 160; 4.5 AEROSOL RESPIRATORY (INHALATION) at 21:11

## 2024-06-21 RX ADMIN — Medication 5 MG: at 22:16

## 2024-06-21 RX ADMIN — LIDOCAINE 1 PATCH: 560 PATCH PERCUTANEOUS; TOPICAL; TRANSDERMAL at 09:02

## 2024-06-21 RX ADMIN — Medication 10 ML: at 09:02

## 2024-06-21 RX ADMIN — Medication 10 ML: at 22:16

## 2024-06-21 RX ADMIN — HYDROCODONE BITARTRATE AND ACETAMINOPHEN 1 TABLET: 5; 325 TABLET ORAL at 17:24

## 2024-06-21 RX ADMIN — CYANOCOBALAMIN TAB 500 MCG 1000 MCG: 500 TAB at 08:59

## 2024-06-21 NOTE — PLAN OF CARE
Goal Outcome Evaluation:              Outcome Evaluation: Pt is alert and oriented but slow to respond and quiet. Given tylenol for pain PRN. Continue plan of care.

## 2024-06-21 NOTE — PLAN OF CARE
Goal Outcome Evaluation:              Outcome Evaluation: pt alert and oriented. no complaints of pain this shift. slow to respond when asked questions.

## 2024-06-21 NOTE — PROGRESS NOTES
Pulmonary / Critical Care Progress Note      Patient Name: Oswaldo Bowen  : 1980  MRN: [id number]  Primary Care Physician:  Provider, No Known  Date of admission: 2024    Subjective   Subjective   Follow-up for left upper lobe lung nodule with mediastinal adenopathy    No acute issues overnight  Lying in bed  Tolerated navigational bronchoscopy yesterday  Pneumonia panel negative  Minutes on room air    Objective   Objective     Vitals:   Temp:  [97.7 °F (36.5 °C)-98.7 °F (37.1 °C)] 98.7 °F (37.1 °C)  Heart Rate:  [100-112] 108  Resp:  [16-18] 16  BP: ()/(68-77) 109/71  Physical Exam   Vital Signs Reviewed   General:  WDWN, Alert, in no distress, lying in bed  Chest:  good aeration, clear to auscultation bilaterally, tympanic to percussion bilaterally, no work of breathing noted on room air  CV: RRR, no MGR, pulses 2+, equal.  Abd:  Soft, NT, ND, + BS, no HSM  EXT:  no clubbing, no cyanosis, no edema  Neuro:  A&Ox3, CN grossly intact, no focal deficits.  Skin: No rashes or lesions noted      Result Review    Result Review:  I have personally reviewed the results from the time of this admission to 2024 13:13 EDT and agree with these findings:  [x]  Laboratory  [x]  Microbiology  [x]  Radiology  [x]  EKG/Telemetry   [x]  Cardiology/Vascular   []  Pathology  []  Old records  []  Other:  Most notable findings include:         Lab 24  0531 24  0516 24  0617 24  0643 24  0816 06/15/24  0642   WBC 12.40* 14.98* 15.92* 9.57  --  13.16*   HEMOGLOBIN 8.1* 7.7* 8.1* 7.9* 7.0* 7.0*   HEMATOCRIT 27.4* 25.5* 27.0* 25.7* 23.0* 22.9*   PLATELETS 514* 469* 481* 475*  --  406   SODIUM 135* 132* 136  --   --  129*   POTASSIUM 3.8 3.9 4.2  --   --  4.1   CHLORIDE 95* 94* 97*  --   --  91*   CO2 28.1 27.9 29.2*  --   --  26.9   BUN 12 12 13  --   --  14   CREATININE 0.50* 0.36* 0.41*  --   --  0.45*   GLUCOSE 112* 115* 103*  --   --  117*   CALCIUM 9.8 9.5 9.6  --   --  8.7    PHOSPHORUS 3.7 4.4 4.7*  --   --   --             CT Chest Without Contrast Diagnostic    Result Date: 6/18/2024  CT CHEST WO CONTRAST DIAGNOSTIC Date of Exam: 6/18/2024 6:21 PM EDT Indication: Ion Navigational Bronchoscopy. Comparison: 6/4/2024 Technique: Axial CT images were obtained of the chest without contrast administration.  Reconstructed coronal and sagittal images were also obtained. Automated exposure control and iterative construction methods were used. Findings: No coronary artery calcification. Left hilum remains prominent compatible with underlying adenopathy. No definite subcarinal or axillary adenopathy. There are no pleural effusions. Within the left upper lobe there is a noncalcified 2.6 cm pulmonary nodule which is unchanged. No new or enlarging pulmonary nodule. No new focal airspace consolidation. Again right lower lobe is a 4 mm subpleural noncalcified nodule. Or enlarging nodule seen within the right lung. Bilateral adrenal glands are within normal limits. Again seen are multiple lytic bone lesions throughout the spine and ribs. There are additional lytic areas within both scapula. There is a stable compression fracture of the T5 vertebral body. No new compression fracture.     Impression: Impression: 1. No change in 2.6 cm nodule within the left upper lobe. There is prominence of the left hilum compatible with known hilar adenopathy. 2. No change in multiple lytic bone lesions consistent with metastatic disease. Electronically Signed: Reese Jara MD  6/18/2024 7:32 PM EDT  Workstation ID: [id number]    CT Chest With Contrast Diagnostic    Result Date: 6/4/2024  CT CHEST W CONTRAST DIAGNOSTIC Date of Exam: 6/4/2024 4:43 PM EDT Indication: leukocytosis worsening, subjective fevers, worsening tachycardia - question pe vs pna. Comparison: 5/21/2024 Technique: Axial CT images were obtained of the chest after the uneventful intravenous administration of iodinated contrast.  Reconstructed coronal  and sagittal images were also obtained. Automated exposure control and iterative construction methods were  used. Findings: Pulmonary Arteries: Adequately opacified. Some motion distortion of peripheral pulmonary vessels on the left. Within that limitation, no emboli demonstrated Marta/mediastinum: Mildly enlarged bilateral hilar and AP window lymph nodes. No pericardial effusion. Indeterminate for coronary calcification due to motion Lungs/pleura: 2.1 cm lobular mass in the inferior left upper lobe again demonstrated. No acute infiltrate. No pleural effusion Upper Abdomen: Unremarkable Bones/soft tissues: Multiple lytic bone lesions including expansile lytic lesions of the right seventh rib and left eighth rib, multiple vertebral bodies including stable pathologic compression of T5, sternum, left scapula. The T5 compression fracture and infiltrating tumor results in significant central canal stenosis. There is some tumor encroachment on the T8 central canal and T8-T9 left foramina, and T11-T12 right foramina     Impression: 1. No evidence of pulmonary embolus 2. No evidence of pneumonia 3. 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 malignancy, with bilateral hilar and AP window adenopathy and extensive osteolytic metastatic disease as described Electronically Signed: Sree Hawthorne  6/4/2024 5:17 PM EDT  Workstation ID: [id number]       Assessment & Plan   Assessment / Plan     Active Hospital Problems:  Active Hospital Problems    Diagnosis    • **Closed intertrochanteric fracture of right femur    • Pulmonary nodule          Impression:   Acute comminuted displaced intertrochanteric right femoral fracture  Status post IM nailing  Left upper lobe lung nodule with mediastinal lymphadenopathy  Chronic heavy smoking  Unintentional weight loss    Plan:   Status post Ion navigational bronchoscopy for his left upper lobe lung nodule, Pathology pending  Status post bronchoscopy with EBUS 5/23.  Pathology  from mediastinal lymph nodes were all negative for malignancy.    Bone biopsy x 2 showed atypical and inflammatory cells without definitive diagnosis  Imaging still very concerning and consistent with metastatic cancer.  Completed 5 days of Unasyn for haemophilus pneumonia growing on bronchoscopy BAL  Continue Symbicort and Spiriva, albuterol as needed.    Encourage activity and incentive spirometer use    DVT prophylaxis:  Pharmacologic & mechanical VTE prophylaxis orders are present.    CODE STATUS:   Level Of Support Discussed With: Patient  Code Status (Patient has no pulse and is not breathing): CPR (Attempt to Resuscitate)  Medical Interventions (Patient has pulse or is breathing): Full Support    I have reviewed labs, imaging, pertinent clinical data and provider notes.   I have discussed with bedside nurse and primary service.     Electronically signed by John Elizalde MD, 6/21/2024, 13:13 EDT.

## 2024-06-21 NOTE — PROGRESS NOTES
[facility]   Hospitalist Progress Note  Date: 2024  Patient Name: Oswaldo Bowen  : 1980  MRN: [id number]  Date of admission: 2024      Subjective   Subjective     Chief Complaint: Fall, leg pain    Summary: 44 y.o. with intellectual disability, 1-2PPD smoking, depression, who presented after a fall (possibly 1 month ago but may have been more recently), found to have right femoral fracture and VLAD.  Ortho assisting with initial surgical treatment.  Initial lung mass concerning on chest x-ray additional CT imaging with suspected new metastatic colon cancer with mets to the bone, pulmonology consulted patient had a biopsy results pending.  Bronchoscopy also with findings of Haemophilus influenzae treatment with antibiotics.  Palliative assisting.  Awaiting pathology results from right rib biopsy (initial testing inadequate total sample, resulted with atypical cells), repeat biopsy obtained  and results pending.  Patient is working better with physical therapy and is ambulating with minimal assistance.  Initial placement declined, P2p attempted but this was also declined. Awaiting appeal        Interval Followup: No acute events overnight.  Having some nausea this morning but has not vomited.  Remains on room air.    Objective   Objective     Vitals:   Temp:  [97.9 °F (36.6 °C)-98.7 °F (37.1 °C)] 98.7 °F (37.1 °C)  Heart Rate:  [100-112] 108  Resp:  [16-18] 16  BP: ()/(68-77) 109/71  Physical Exam    Constitutional: Chronically ill-appearing, resting in bed   Respiratory: Clear to auscultation bilaterally, nonlabored respirations    Cardiovascular: RRR, no MRG   Gastrointestinal: Positive bowel sounds, soft, nontender, nondistended   Neurologic: Oriented x 3, strength symmetric in all extremities, Cranial Nerves grossly intact to confrontation, speech clear    Result Review    I have personally reviewed the results below:  [x]  Laboratory personally reviewed BMP, CBC, magnesium,  phosphorus  []  Microbiology  []  Radiology  []  EKG/Telemetry   []  Cardiology/Vascular   []  Pathology  []  Old records  []  Other:  CBC          6/19/2024    06:17 6/20/2024    05:16 6/21/2024    05:31   CBC   WBC 15.92  14.98  12.40    RBC 3.07  2.95  3.13    Hemoglobin 8.1  7.7  8.1    Hematocrit 27.0  25.5  27.4    MCV 87.9  86.4  87.5    MCH 26.4  26.1  25.9    MCHC 30.0  30.2  29.6    RDW 14.6  14.8  14.6    Platelets 481  469  514      CMP          6/19/2024    06:17 6/20/2024    05:16 6/21/2024    05:31   CMP   Glucose 103  115  112    BUN 13  12  12    Creatinine 0.41  0.36  0.50    EGFR 137.0  142.4  129.0    Sodium 136  132  135    Potassium 4.2  3.9  3.8    Chloride 97  94  95    Calcium 9.6  9.5  9.8    BUN/Creatinine Ratio 31.7  33.3  24.0    Anion Gap 9.8  10.1  11.9        Assessment & Plan   Assessment / Plan   Mechanical fall with acute comminuted displaced intertrochanteric right femoral fracture  S/p 5/21 right hip subtrochanteric nailing of closed displaced right femur fracture by Dr. Nelson  VLAD creatinine 1.7 leukocytosis suspect reactive in setting of acute fracture  Suspected new metastatic lung cancer with mets to the bone  2.6 cm nodule within the left upper lobe   Mediastinal lymphadenopathy  Haemophilus influenzae pneumonia   1 to 2 pack/day smoker, chronic  Intellectual disability  Depression  Normocytic anemia    Continue to monitor in the hospital for workup and management of the above  Discussed with pulmonology-performed successful Ion navigational bronchoscopy today-pathology pending  BAL and cultures negative thus far, will continue to monitor  Completed a course of Unasyn for haemophilus pneumonia  Continue Symbicort, Spiriva, respiratory hygiene  Xopenex as needed  Bowel regimen scheduled  PT/OT following-will have a better rehab hopefully tomorrow  Lab holiday tomorrow     Discussed plan with RN, pulmonology    VTE Prophylaxis:  Pharmacologic & mechanical VTE prophylaxis orders  are present.        CODE STATUS:   Level Of Support Discussed With: Patient  Code Status (Patient has no pulse and is not breathing): CPR (Attempt to Resuscitate)  Medical Interventions (Patient has pulse or is breathing): Full Support

## 2024-06-21 NOTE — THERAPY EVALUATION
Patient Name: Oswaldo Bowen  : 1980    MRN: [id number]                              Today's Date: 2024       Admit Date: 2024    Visit Dx:     ICD-10-CM ICD-9-CM   1. Closed displaced intertrochanteric fracture of right femur, initial encounter  S72.141A 820.21   2. Pulmonary nodule  R91.1 793.11   3. Difficulty walking  R26.2 719.7   4. Dysphagia, oropharyngeal  R13.12 787.22   5. Difficulty in walking  R26.2 719.7   6. Decreased activities of daily living (ADL)  Z78.9 V49.89     Patient Active Problem List   Diagnosis    Closed intertrochanteric fracture of right femur    Pulmonary nodule     History reviewed. No pertinent past medical history.  Past Surgical History:   Procedure Laterality Date    BRONCHOSCOPY N/A 2024    Procedure: BRONCHOSCOPY WITH ENDOBRONCHIAL ULTRASOUND, FINE NEEDLE ASPIRATE, BIOPSIES, BRUSHINGS, BRONCHOALVEOLAR LAVAGE;  Surgeon: Kendell Stern MD;  Location: [facility] ENDOSCOPY;  Service: Pulmonary;  Laterality: N/A;  LUNG NODULE, MUCOUS PLUGGING    HIP INTERTROCHANTERIC NAILING Right 2024    Procedure: HIP INTERTROCHANTERIC NAILING;  Surgeon: Molina Clayton MD;  Location: [facility] MAIN OR;  Service: Orthopedics;  Laterality: Right;    US GUIDED FINE NEEDLE ASPIRATION  2024      General Information       Row Name 24 0918          OT Time and Intention    Document Type therapy note (daily note)  -PG     Mode of Treatment individual therapy;occupational therapy  -PG               User Key  (r) = Recorded By, (t) = Taken By, (c) = Cosigned By      Initials Name Provider Type    PG Felix Villasenor OT Occupational Therapist                     Mobility/ADL's       Row Name 24 0918          Bed-Chair Transfer    Bed-Chair [place] (Transfers) contact guard;verbal cues  -PG     Assistive Device (Bed-Chair Transfers) walker, front-wheeled  -PG       Row Name 24 0918          Grooming Assessment/Training    [place] Level (Grooming) grooming  skills;oral care regimen;contact guard assist  -PG               User Key  (r) = Recorded By, (t) = Taken By, (c) = Cosigned By      Initials Name Provider Type    PG Fleix Villasenor OT Occupational Therapist                   Obj/Interventions       Row Name 06/21/24 0919          Shoulder (Therapeutic Exercise)    Shoulder (Therapeutic Exercise) strengthening exercise  -PG     Shoulder Strengthening (Therapeutic Exercise) 15 repititions;resistance band;green  -PG       Row Name 06/21/24 0919          Elbow/Forearm (Therapeutic Exercise)    Elbow/Forearm (Therapeutic Exercise) strengthening exercise  -PG     Elbow/Forearm Strengthening (Therapeutic Exercise) resistance band;15 repititions;green  -PG       Row Name 06/21/24 0919          Motor Skills    Therapeutic Exercise shoulder;elbow/forearm  -PG               User Key  (r) = Recorded By, (t) = Taken By, (c) = Cosigned By      Initials Name Provider Type    Felix Taylor OT Occupational Therapist                   Goals/Plan    No documentation.                  Clinical Impression       Row Name 06/21/24 0919          Plan of Care Review    Plan of Care Reviewed With patient  -PG     Progress no change  -PG               User Key  (r) = Recorded By, (t) = Taken By, (c) = Cosigned By      Initials Name Provider Type    Felix Taylor OT Occupational Therapist                   Outcome Measures       Row Name 06/21/24 0919          How much help from another is currently needed...    Putting on and taking off regular lower body clothing? 2  -PG     Bathing (including washing, rinsing, and drying) 3  -PG     Toileting (which includes using toilet bed pan or urinal) 3  -PG     Putting on and taking off regular upper body clothing 3  -PG     Taking care of personal grooming (such as brushing teeth) 3  -PG     Eating meals 4  -PG     AM-PAC 6 Clicks Score (OT) 18  -PG       Row Name 06/21/24 0919          Functional Assessment    Outcome Measure Options AM-PAC  6 Clicks Daily Activity (OT);Optimal Instrument  -PG       Row Name 06/21/24 0919          Optimal Instrument    Optimal Instrument Optimal - 3  -PG     Bending/Stooping 3  -PG     Standing 2  -PG     Reaching 1  -PG               User Key  (r) = Recorded By, (t) = Taken By, (c) = Cosigned By      Initials Name Provider Type    PG Felix Villasenor OT Occupational Therapist                    Occupational Therapy Education       Title: PT OT SLP Therapies (Done)       Topic: Occupational Therapy (Done)       Point: ADL training (Done)       Description:   Instruct learner(s) on proper safety adaptation and remediation techniques during self care or transfers.   Instruct in proper use of assistive devices.                  Learning Progress Summary             Patient Acceptance, E, Bed IU by DS at 6/2/2024 0830    Eager, E, VU by  at 5/23/2024 2223    Acceptance, E,D, NR by PG at 5/22/2024 0917                         Point: Home exercise program (Done)       Description:   Instruct learner(s) on appropriate technique for monitoring, assisting and/or progressing therapeutic exercises/activities.                  Learning Progress Summary             Patient Acceptance, E, Bed IU by DS at 6/2/2024 0830    Eager, E, VU by  at 5/23/2024 2223    Acceptance, E,D, NR by PG at 5/22/2024 0917                         Point: Precautions (Done)       Description:   Instruct learner(s) on prescribed precautions during self-care and functional transfers.                  Learning Progress Summary             Patient Acceptance, E, Bed IU by DS at 6/2/2024 0830    Eager, E, VU by  at 5/23/2024 2223    Acceptance, E,D, NR by PG at 5/22/2024 0917                         Point: Body mechanics (Done)       Description:   Instruct learner(s) on proper positioning and spine alignment during self-care, functional mobility activities and/or exercises.                  Learning Progress Summary             Patient Acceptance, E, Bed IU  by ROLANDO at 6/2/2024 0830    TREV Falcon VU by  at 5/23/2024 2223    Acceptance, E,D, NR by  at 5/22/2024 0917                                         User Key       Initials Effective Dates Name Provider Type Discipline     06/16/21 -  Felix Villasenor, OT Occupational Therapist OT    DS 06/26/23 -  Immanuel Beck, RN Registered Nurse Nurse     05/31/23 -  Angela Casillas, RN Registered Nurse Nurse                  OT Recommendation and Plan  Planned Therapy Interventions (OT): activity tolerance training, BADL retraining, strengthening exercise, transfer/mobility retraining, occupation/activity based interventions, patient/caregiver education/training  Therapy Frequency (OT): 5 times/wk  Plan of Care Review  Plan of Care Reviewed With: patient  Progress: no change  Outcome Evaluation: Patient making good progress in occupational therapy.  Patient now able to walk to the sink and complete grooming task in standing with contact-guard assist and rolling walker for safety.  Patient is performing functional transfers with contact-guard/minimal assist and lower body self-care with maximal assistance.  Recommend continued skilled OT services to maximize ADL performance and return patient to his prior level of function     Time Calculation:   Evaluation Complexity (OT)  Review Occupational Profile/Medical/Therapy History Complexity: brief/low complexity  Assessment, Occupational Performance/Identification of Deficit Complexity: 3-5 performance deficits  Clinical Decision Making Complexity (OT): problem focused assessment/low complexity  Overall Complexity of Evaluation (OT): low complexity     Time Calculation- OT       Row Name 06/21/24 0920             Time Calculation- OT    OT Received On 06/21/24  -PG      OT Goal Re-Cert Due Date 06/28/24  -PG         Timed Charges    64623 - OT Therapeutic Exercise Minutes 8  -PG      10609 - OT Therapeutic Activity Minutes 5  -PG      85021 - OT Self Care/Mgmt Minutes 10  -PG          Total Minutes    Timed Charges Total Minutes 23  -PG       Total Minutes 23  -PG                User Key  (r) = Recorded By, (t) = Taken By, (c) = Cosigned By      Initials Name Provider Type    PG Felix Villasenor OT Occupational Therapist                  Therapy Charges for Today       Code Description Service Date Service Provider Modifiers Qty    83128607086  OT THER PROC EA 15 MIN 6/21/2024 Felix Villasenor OT GO 1    [id number]  OT SELF CARE/MGMT/TRAIN EA 15 MIN 6/21/2024 Felix Villasenor OT GO 1                 Felix Villasenor OT  6/21/2024

## 2024-06-21 NOTE — THERAPY TREATMENT NOTE
Acute Care - Physical Therapy Treatment Note   [place]     Patient Name: Oswaldo Bowen  : 1980  MRN: [id number]  Today's Date: 2024      Visit Dx:     ICD-10-CM ICD-9-CM   1. Closed displaced intertrochanteric fracture of right femur, initial encounter  S72.141A 820.21   2. Pulmonary nodule  R91.1 793.11   3. Difficulty walking  R26.2 719.7   4. Dysphagia, oropharyngeal  R13.12 787.22   5. Decreased activities of daily living (ADL)  Z78.9 V49.89   6. Difficulty in walking  R26.2 719.7     Patient Active Problem List   Diagnosis    Closed intertrochanteric fracture of right femur    Pulmonary nodule     History reviewed. No pertinent past medical history.  Past Surgical History:   Procedure Laterality Date    BRONCHOSCOPY N/A 2024    Procedure: BRONCHOSCOPY WITH ENDOBRONCHIAL ULTRASOUND, FINE NEEDLE ASPIRATE, BIOPSIES, BRUSHINGS, BRONCHOALVEOLAR LAVAGE;  Surgeon: Kendell Stern MD;  Location: [facility] ENDOSCOPY;  Service: Pulmonary;  Laterality: N/A;  LUNG NODULE, MUCOUS PLUGGING    HIP INTERTROCHANTERIC NAILING Right 2024    Procedure: HIP INTERTROCHANTERIC NAILING;  Surgeon: Molina Clayton MD;  Location: [facility] MAIN OR;  Service: Orthopedics;  Laterality: Right;    US GUIDED FINE NEEDLE ASPIRATION  2024     PT Assessment (Last 12 Hours)       PT Evaluation and Treatment       Row Name 24 1100          Physical Therapy Time and Intention    Subjective Information no complaints (P)   -TR     Document Type therapy note (daily note) (P)   -TR     Mode of Treatment individual therapy;physical therapy (P)   -TR     Patient Effort good (P)   -TR     Symptoms Noted During/After Treatment fatigue (P)   -TR       Row Name 24 1100          Bed Mobility    Bed Mobility supine-sit;sit-supine;scooting/bridging (P)   -TR     Scooting/Bridging [place] (Bed Mobility) verbal cues;standby assist (P)   -TR     Supine-Sit [place] (Bed Mobility) minimum assist (75% patient effort);1  person assist (P)   -TR     Sit-Supine [place] (Bed Mobility) minimum assist (75% patient effort);1 person assist (P)   -TR     Bed Mobility, Safety Issues decreased use of legs for bridging/pushing (P)   -TR     Assistive Device (Bed Mobility) bed rails;head of bed elevated (P)   -TR       Row Name 06/21/24 1100          Transfers    Transfers sit-stand transfer;stand-sit transfer (P)   -TR     Maintains Weight-bearing Status (Transfers) able to maintain (P)   -TR       Row Name 06/21/24 1100          Sit-Stand Transfer    Sit-Stand [place] (Transfers) contact guard (P)   -TR     Assistive Device (Sit-Stand Transfers) walker, front-wheeled (P)   -TR       Row Name 06/21/24 1100          Stand-Sit Transfer    Stand-Sit [place] (Transfers) contact guard (P)   -TR     Assistive Device (Stand-Sit Transfers) walker, front-wheeled (P)   -TR       Row Name 06/21/24 1100          Gait/Stairs (Locomotion)    Gait/Stairs Locomotion gait/ambulation independence;gait/ambulation assistive device;distance ambulated (P)   -TR     [place] Level (Gait) standby assist (P)   -TR     Assistive Device (Gait) walker, front-wheeled (P)   -TR     Patient was able to Ambulate yes (P)   -TR     Distance in Feet (Gait) 50 (P)   limited due to fatigue  -TR     Pattern (Gait) step-through (P)   gait swing through getting better  -TR       Row Name 06/21/24 1100          Safety Issues, Functional Mobility    Impairments Affecting Function (Mobility) balance;cognition;endurance/activity tolerance;pain;range of motion (ROM);strength (P)   -TR       Row Name 06/21/24 1100          Balance    Balance Assessment standing dynamic balance (P)   -TR     Dynamic Standing Balance standby assist (P)   -TR     Position/Device Used, Standing Balance walker, front-wheeled (P)   -TR       Row Name 06/21/24 1100          Motor Skills    Motor Skills coordination;functional endurance (P)   -TR     Therapeutic Exercise -- (P)   mini squats  x6, LAQ x10, Bed SLR x5  -TR       Row Name             Wound 05/21/24 0251 Bilateral coccyx    Wound - Properties Group Placement Date: 05/21/24  -SR Placement Time: 0251 -SR Present on Original Admission: Y  -SR Side: Bilateral  -SR Location: coccyx  -SR    Retired Wound - Properties Group Placement Date: 05/21/24  -SR Placement Time: 0251 -SR Present on Original Admission: Y  -SR Side: Bilateral  -SR Location: coccyx  -SR    Retired Wound - Properties Group Date first assessed: 05/21/24  -SR Time first assessed: 0251  -SR Present on Original Admission: Y  -SR Side: Bilateral  -SR Location: coccyx  -SR      Row Name             Wound 05/21/24 Right lateral thigh Incision    Wound - Properties Group Placement Date: 05/21/24  -SF Present on Original Admission: N  -SF Side: Right  -SF Orientation: lateral  -SF Location: thigh  -SF Primary Wound Type: Incision  -SF Additional Comments: incision sites x 3 to lateral right thigh  -SF    Retired Wound - Properties Group Placement Date: 05/21/24  -SF Present on Original Admission: N  -SF Side: Right  -SF Orientation: lateral  -SF Location: thigh  -SF Primary Wound Type: Incision  -SF Additional Comments: incision sites x 3 to lateral right thigh  -SF    Retired Wound - Properties Group Date first assessed: 05/21/24  -SF Present on Original Admission: N  -SF Side: Right  -SF Location: thigh  -SF Primary Wound Type: Incision  -SF Additional Comments: incision sites x 3 to lateral right thigh  -SF      Row Name 06/21/24 1100          Positioning and Restraints    Pre-Treatment Position in bed (P)   -TR     Post Treatment Position bed (P)   -TR     In Bed call light within reach;encouraged to call for assist;exit alarm on (P)   -TR       Row Name 06/21/24 1100          Progress Summary (PT)    Daily Progress Summary (PT) Pt presents 1 day post bronchoscopy. Pt presents with decreased functional endurance and fatigued. Pt able to ambulate with better swing through, but is  fatigued after short distances. Continue with POC to return to baseline. (P)   -TR               User Key  (r) = Recorded By, (t) = Taken By, (c) = Cosigned By      Initials Name Provider Type    SF Saritha Ramirez, RN Registered Nurse    Saritha Becker, RN Registered Nurse    Aaron Cooney, PT Student PT Student                    Physical Therapy Education       Title: PT OT SLP Therapies (Done)       Topic: Physical Therapy (Done)       Point: Mobility training (Done)       Learning Progress Summary             Patient Acceptance, E,TB, VU by TR at 6/10/2024 1507    Acceptance, E, Bed IU by DS at 6/2/2024 0830    Acceptance, E, VU by PS at 6/1/2024 0627    Acceptance, E,TB, VU by TR at 5/31/2024 1449    Eager, E, VU by AH at 5/23/2024 2223    Acceptance, E,TB, VU by AV at 5/22/2024 1339                         Point: Home exercise program (Done)       Learning Progress Summary             Patient Acceptance, E, Bed IU by DS at 6/2/2024 0830                         Point: Body mechanics (Done)       Learning Progress Summary             Patient Acceptance, E, Bed IU by DS at 6/2/2024 0830    Acceptance, E, VU by PS at 6/1/2024 0627    Acceptance, E,TB, VU by AV at 5/22/2024 1339                         Point: Precautions (Done)       Learning Progress Summary             Patient Acceptance, E,TB, VU by TR at 6/10/2024 1507    Acceptance, E, Bed IU by DS at 6/2/2024 0830    Acceptance, E,TB, VU by TR at 5/31/2024 1449    Acceptance, E,TB, VU by AV at 5/22/2024 1339                                         User Key       Initials Effective Dates Name Provider Type Discipline    PS 06/16/21 -  Darcie Fiore, RN Registered Nurse Nurse    AV 06/11/21 -  Eh Martínez, PT Physical Therapist PT    DS 06/26/23 -  Immanuel Beck, GRIS Registered Nurse Nurse    MICHAELA 05/31/23 -  Angela Casillas, RN Registered Nurse Nurse    TR 05/21/24 -  Aaron Fowler, PT Student PT Student PT                   PT Recommendation and Plan  Anticipated Discharge Disposition (PT): inpatient rehabilitation facility  Planned Therapy Interventions (PT): balance training, strengthening, gait training, bed mobility training, transfer training  Therapy Frequency (PT): daily  Progress Summary (PT)  Progress Toward Functional Goals (PT): progress toward functional goals is good  Daily Progress Summary (PT): (P) Pt presents 1 day post bronchoscopy. Pt presents with decreased functional endurance and fatigued. Pt able to ambulate with better swing through, but is fatigued after short distances. Continue with POC to return to baseline.  Plan of Care Reviewed With: patient  Progress: improving  Outcome Evaluation: Pt presented to session with good tolerance to ambulation, but complained of right hip pain. Pt presented with deficits in strength, endurance, and pain. PT recommended to address above deficits   Outcome Measures       Row Name 06/21/24 1100 06/19/24 1147 06/18/24 1152       How much help from another person do you currently need...    Turning from your back to your side while in flat bed without using bedrails? 3 (P)   -TR 4  -JAYRO 4  -DK    Moving from lying on back to sitting on the side of a flat bed without bedrails? 3 (P)   -TR 3  -JAYRO 4  -DK    Moving to and from a bed to a chair (including a wheelchair)? 3 (P)   -TR 3  -JAYRO 3  -DK    Standing up from a chair using your arms (e.g., wheelchair, bedside chair)? 3 (P)   -TR 4  -JAYRO 4  -DK    Climbing 3-5 steps with a railing? 2 (P)   -TR 3  -JAYRO 3  -DK    To walk in hospital room? 4 (P)   -TR 3  -JAYRO 3  -DK    AM-PAC 6 Clicks Score (PT) 18 (P)   -TR 20  -JAYRO 21  -DK    Highest Level of Mobility Goal 6 --> Walk 10 steps or more (P)   -TR 6 --> Walk 10 steps or more  -JAYRO 6 --> Walk 10 steps or more  -DK       Functional Assessment    Outcome Measure Options AM-PAC 6 Clicks Daily Activity (OT) (P)   -TR AM-PAC 6 Clicks Basic Mobility (PT)  -JAYRO AM-PAC 6 Clicks Basic Mobility  (PT)  -DK              User Key  (r) = Recorded By, (t) = Taken By, (c) = Cosigned By      Initials Name Provider Type    Sheila Maharaj, PTA Physical Therapist Assistant    Darius Srivastava, PT Physical Therapist    Aaron Cooney, PT Student PT Student                     Time Calculation:    PT Charges       Row Name 06/21/24 1142             Time Calculation    PT Received On 06/21/24 (P)   -TR         Timed Charges    35217 - PT Therapeutic Exercise Minutes 3 (P)   -TR      43122 - Gait Training Minutes  8 (P)   -TR      34720 - PT Therapeutic Activity Minutes 2 (P)   -TR         Total Minutes    Timed Charges Total Minutes 13 (P)   -TR       Total Minutes 13 (P)   -TR                User Key  (r) = Recorded By, (t) = Taken By, (c) = Cosigned By      Initials Name Provider Type    Aaron Cooney, PT Student PT Student                  Therapy Charges for Today       Code Description Service Date Service Provider Modifiers Qty    [id number] HC GAIT TRAINING EA 15 MIN 6/21/2024 Aaron Fowler, PT Student GP 1            PT G-Codes  Outcome Measure Options: (P) AM-PAC 6 Clicks Daily Activity (OT)  AM-PAC 6 Clicks Score (PT): (P) 18  AM-PAC 6 Clicks Score (OT): 18    Aaron Fowler PT Student  6/21/2024

## 2024-06-22 LAB
BACTERIA SPEC AEROBE CULT: NO GROWTH
GRAM STN SPEC: NORMAL
GRAM STN SPEC: NORMAL

## 2024-06-22 PROCEDURE — 97110 THERAPEUTIC EXERCISES: CPT

## 2024-06-22 PROCEDURE — 97530 THERAPEUTIC ACTIVITIES: CPT

## 2024-06-22 PROCEDURE — 94799 UNLISTED PULMONARY SVC/PX: CPT

## 2024-06-22 PROCEDURE — 97116 GAIT TRAINING THERAPY: CPT

## 2024-06-22 PROCEDURE — 99239 HOSP IP/OBS DSCHRG MGMT >30: CPT | Performed by: INTERNAL MEDICINE

## 2024-06-22 PROCEDURE — 99232 SBSQ HOSP IP/OBS MODERATE 35: CPT | Performed by: INTERNAL MEDICINE

## 2024-06-22 PROCEDURE — 94760 N-INVAS EAR/PLS OXIMETRY 1: CPT

## 2024-06-22 PROCEDURE — 25010000002 ENOXAPARIN PER 10 MG: Performed by: INTERNAL MEDICINE

## 2024-06-22 RX ORDER — ONDANSETRON 4 MG/1
4 TABLET, ORALLY DISINTEGRATING ORAL EVERY 6 HOURS PRN
Status: ON HOLD
Start: 2024-06-22

## 2024-06-22 RX ORDER — LIDOCAINE 4 G/G
1 PATCH TOPICAL DAILY PRN
Status: ON HOLD
Start: 2024-06-22

## 2024-06-22 RX ORDER — MULTIPLE VITAMINS W/ MINERALS TAB 9MG-400MCG
1 TAB ORAL DAILY
Status: ON HOLD
Start: 2024-06-22

## 2024-06-22 RX ORDER — NICOTINE 21 MG/24HR
1 PATCH, TRANSDERMAL 24 HOURS TRANSDERMAL DAILY PRN
Status: ON HOLD
Start: 2024-06-22

## 2024-06-22 RX ORDER — METOPROLOL SUCCINATE 50 MG/1
50 TABLET, EXTENDED RELEASE ORAL EVERY 12 HOURS SCHEDULED
Status: ON HOLD
Start: 2024-06-22

## 2024-06-22 RX ORDER — BUDESONIDE AND FORMOTEROL FUMARATE DIHYDRATE 160; 4.5 UG/1; UG/1
2 AEROSOL RESPIRATORY (INHALATION)
Start: 2024-06-22 | End: 2024-06-27

## 2024-06-22 RX ORDER — POLYETHYLENE GLYCOL 3350 17 G/17G
17 POWDER, FOR SOLUTION ORAL DAILY
Status: ON HOLD
Start: 2024-06-22

## 2024-06-22 RX ORDER — ACETAMINOPHEN 325 MG/1
650 TABLET ORAL EVERY 6 HOURS PRN
Status: ON HOLD
Start: 2024-06-22

## 2024-06-22 RX ADMIN — SENNOSIDES AND DOCUSATE SODIUM 2 TABLET: 50; 8.6 TABLET ORAL at 09:47

## 2024-06-22 RX ADMIN — METOPROLOL SUCCINATE 50 MG: 50 TABLET, EXTENDED RELEASE ORAL at 22:11

## 2024-06-22 RX ADMIN — SENNOSIDES AND DOCUSATE SODIUM 2 TABLET: 50; 8.6 TABLET ORAL at 22:12

## 2024-06-22 RX ADMIN — CYANOCOBALAMIN TAB 500 MCG 1000 MCG: 500 TAB at 09:48

## 2024-06-22 RX ADMIN — Medication 10 ML: at 09:48

## 2024-06-22 RX ADMIN — AVOBENZONE, HOMOSALATE, OCTISALATE, OCTOCRYLENE, AND OXYBENZONE 1 PACKET: 29.4; 147; 49; 25.4; 58.8 LOTION TOPICAL at 09:47

## 2024-06-22 RX ADMIN — Medication 10 ML: at 22:11

## 2024-06-22 RX ADMIN — BUDESONIDE AND FORMOTEROL FUMARATE DIHYDRATE 2 PUFF: 160; 4.5 AEROSOL RESPIRATORY (INHALATION) at 09:08

## 2024-06-22 RX ADMIN — Medication 1 TABLET: at 09:47

## 2024-06-22 RX ADMIN — METOPROLOL SUCCINATE 50 MG: 50 TABLET, EXTENDED RELEASE ORAL at 09:47

## 2024-06-22 RX ADMIN — TIOTROPIUM BROMIDE INHALATION SPRAY 2 PUFF: 3.12 SPRAY, METERED RESPIRATORY (INHALATION) at 09:08

## 2024-06-22 RX ADMIN — HYDROCODONE BITARTRATE AND ACETAMINOPHEN 1 TABLET: 10; 325 TABLET ORAL at 22:10

## 2024-06-22 RX ADMIN — ENOXAPARIN SODIUM 40 MG: 100 INJECTION SUBCUTANEOUS at 06:24

## 2024-06-22 RX ADMIN — Medication 400 MG: at 09:47

## 2024-06-22 RX ADMIN — HYDROCODONE BITARTRATE AND ACETAMINOPHEN 1 TABLET: 5; 325 TABLET ORAL at 01:52

## 2024-06-22 RX ADMIN — BUDESONIDE AND FORMOTEROL FUMARATE DIHYDRATE 2 PUFF: 160; 4.5 AEROSOL RESPIRATORY (INHALATION) at 19:45

## 2024-06-22 RX ADMIN — POLYETHYLENE GLYCOL 3350 17 G: 17 POWDER, FOR SOLUTION ORAL at 09:47

## 2024-06-22 RX ADMIN — HYDROCODONE BITARTRATE AND ACETAMINOPHEN 1 TABLET: 10; 325 TABLET ORAL at 09:46

## 2024-06-22 RX ADMIN — LIDOCAINE 1 PATCH: 560 PATCH PERCUTANEOUS; TOPICAL; TRANSDERMAL at 10:21

## 2024-06-22 NOTE — PLAN OF CARE
Goal Outcome Evaluation:                 Vitals stable during shift. Complaints of pain x1 during shift. See MAR. Pt ambulated out into [place] with walker this morning and sit in chair for at least 1 hour. Became very tired after getting pain medication but woke up for meals. Call light within reach. Continue care plans.

## 2024-06-22 NOTE — PROGRESS NOTES
Pulmonary / Critical Care Progress Note      Patient Name: Oswaldo Bowen  : 1980  MRN: [id number]  Primary Care Physician:  Provider, No Known  Date of admission: 2024    Subjective   Subjective   Follow-up for left upper lobe lung nodule with mediastinal adenopathy    Doing well this morning  Biopsy results pending  Cultures negative from bronchoscopy  On room air  Denies respiratory complaints at this time  Weak and fatigue with no fevers      Objective   Objective     Vitals:   Temp:  [97.5 °F (36.4 °C)-98.7 °F (37.1 °C)] 97.5 °F (36.4 °C)  Heart Rate:  [107-122] 117  Resp:  [16-18] 16  BP: (109-130)/(71-84) 129/75  Physical Exam   Vital Signs Reviewed   General:  WDWN, Alert, in no distress, lying in bed  Chest:  good aeration, clear to auscultation bilaterally, tympanic to percussion bilaterally, no work of breathing noted on room air  CV: RRR, no MGR, pulses 2+, equal.  Abd:  Soft, NT, ND, + BS, no HSM  EXT:  no clubbing, no cyanosis, no edema  Neuro:  A&Ox3, CN grossly intact, no focal deficits.  Skin: No rashes or lesions noted      Result Review    Result Review:  I have personally reviewed the results from the time of this admission to 2024 10:23 EDT and agree with these findings:  [x]  Laboratory  [x]  Microbiology  [x]  Radiology  [x]  EKG/Telemetry   [x]  Cardiology/Vascular   []  Pathology  []  Old records  []  Other:  Most notable findings include:         Lab 24  0531 24  0516 24  0617 24  0643 24  0816   WBC 12.40* 14.98* 15.92* 9.57  --    HEMOGLOBIN 8.1* 7.7* 8.1* 7.9* 7.0*   HEMATOCRIT 27.4* 25.5* 27.0* 25.7* 23.0*   PLATELETS 514* 469* 481* 475*  --    SODIUM 135* 132* 136  --   --    POTASSIUM 3.8 3.9 4.2  --   --    CHLORIDE 95* 94* 97*  --   --    CO2 28.1 27.9 29.2*  --   --    BUN 12 12 13  --   --    CREATININE 0.50* 0.36* 0.41*  --   --    GLUCOSE 112* 115* 103*  --   --    CALCIUM 9.8 9.5 9.6  --   --    PHOSPHORUS 3.7 4.4 4.7*  --   --              CT Chest Without Contrast Diagnostic    Result Date: 6/18/2024  CT CHEST WO CONTRAST DIAGNOSTIC Date of Exam: 6/18/2024 6:21 PM EDT Indication: Ion Navigational Bronchoscopy. Comparison: 6/4/2024 Technique: Axial CT images were obtained of the chest without contrast administration.  Reconstructed coronal and sagittal images were also obtained. Automated exposure control and iterative construction methods were used. Findings: No coronary artery calcification. Left hilum remains prominent compatible with underlying adenopathy. No definite subcarinal or axillary adenopathy. There are no pleural effusions. Within the left upper lobe there is a noncalcified 2.6 cm pulmonary nodule which is unchanged. No new or enlarging pulmonary nodule. No new focal airspace consolidation. Again right lower lobe is a 4 mm subpleural noncalcified nodule. Or enlarging nodule seen within the right lung. Bilateral adrenal glands are within normal limits. Again seen are multiple lytic bone lesions throughout the spine and ribs. There are additional lytic areas within both scapula. There is a stable compression fracture of the T5 vertebral body. No new compression fracture.     Impression: Impression: 1. No change in 2.6 cm nodule within the left upper lobe. There is prominence of the left hilum compatible with known hilar adenopathy. 2. No change in multiple lytic bone lesions consistent with metastatic disease. Electronically Signed: Reese Jara MD  6/18/2024 7:32 PM EDT  Workstation ID: [id number]    CT Chest With Contrast Diagnostic    Result Date: 6/4/2024  CT CHEST W CONTRAST DIAGNOSTIC Date of Exam: 6/4/2024 4:43 PM EDT Indication: leukocytosis worsening, subjective fevers, worsening tachycardia - question pe vs pna. Comparison: 5/21/2024 Technique: Axial CT images were obtained of the chest after the uneventful intravenous administration of iodinated contrast.  Reconstructed coronal and sagittal images were also obtained.  Automated exposure control and iterative construction methods were  used. Findings: Pulmonary Arteries: Adequately opacified. Some motion distortion of peripheral pulmonary vessels on the left. Within that limitation, no emboli demonstrated Marta/mediastinum: Mildly enlarged bilateral hilar and AP window lymph nodes. No pericardial effusion. Indeterminate for coronary calcification due to motion Lungs/pleura: 2.1 cm lobular mass in the inferior left upper lobe again demonstrated. No acute infiltrate. No pleural effusion Upper Abdomen: Unremarkable Bones/soft tissues: Multiple lytic bone lesions including expansile lytic lesions of the right seventh rib and left eighth rib, multiple vertebral bodies including stable pathologic compression of T5, sternum, left scapula. The T5 compression fracture and infiltrating tumor results in significant central canal stenosis. There is some tumor encroachment on the T8 central canal and T8-T9 left foramina, and T11-T12 right foramina     Impression: 1. No evidence of pulmonary embolus 2. No evidence of pneumonia 3. 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 malignancy, with bilateral hilar and AP window adenopathy and extensive osteolytic metastatic disease as described Electronically Signed: Sree Hawthorne  6/4/2024 5:17 PM EDT  Workstation ID: [id number]       Assessment & Plan   Assessment / Plan     Active Hospital Problems:  Active Hospital Problems    Diagnosis    • **Closed intertrochanteric fracture of right femur    • Pulmonary nodule          Impression:   Acute comminuted displaced intertrochanteric right femoral fracture  Status post IM nailing  Left upper lobe lung nodule with mediastinal lymphadenopathy  Chronic heavy smoking  Unintentional weight loss    Plan:   Status post Ion navigational bronchoscopy for his left upper lobe lung nodule, Pathology pending  Status post bronchoscopy with EBUS 5/23.  Pathology from mediastinal lymph nodes were all  negative for malignancy.    Bone biopsy x 2 showed atypical and inflammatory cells without definitive diagnosis  Imaging still very concerning and consistent with metastatic cancer.  Completed 5 days of Unasyn for haemophilus pneumonia growing on bronchoscopy BAL earlier this admission  Continue Symbicort and Spiriva, albuterol as needed.    Encourage activity and incentive spirometer use  On room air    DVT prophylaxis:  Pharmacologic & mechanical VTE prophylaxis orders are present.    CODE STATUS:   Level Of Support Discussed With: Patient  Code Status (Patient has no pulse and is not breathing): CPR (Attempt to Resuscitate)  Medical Interventions (Patient has pulse or is breathing): Full Support      Labs, imaging, microbiology, notes and medications personally reviewed  Discussed with primary     Electronically signed by Manjinder Dash MD, 6/22/2024, 10:23 EDT.

## 2024-06-22 NOTE — CASE MANAGEMENT/SOCIAL WORK
Patient will have bed available and can discharge to [facility] on Sunday 6/23/24.    [facility]   [address] [place] [address] [place], [place], [address]  [phone]

## 2024-06-22 NOTE — PLAN OF CARE
Goal Outcome Evaluation:      VSS, no significant changes. Pt is AAOx4, slow to respond, and at times mumbles in response to repeated questions.

## 2024-06-22 NOTE — THERAPY TREATMENT NOTE
Acute Care - Physical Therapy Progress Note   [facility]     Patient Name: Oswaldo Bowen  : 1980  MRN: [id number]  Today's Date: 2024      Visit Dx:     ICD-10-CM ICD-9-CM   1. Closed displaced intertrochanteric fracture of right femur, initial encounter  S72.141A 820.21   2. Pulmonary nodule  R91.1 793.11   3. Difficulty walking  R26.2 719.7   4. Dysphagia, oropharyngeal  R13.12 787.22   5. Decreased activities of daily living (ADL)  Z78.9 V49.89   6. Difficulty in walking  R26.2 719.7     Patient Active Problem List   Diagnosis    Closed intertrochanteric fracture of right femur    Pulmonary nodule     History reviewed. No pertinent past medical history.  Past Surgical History:   Procedure Laterality Date    BRONCHOSCOPY N/A 2024    Procedure: BRONCHOSCOPY WITH ENDOBRONCHIAL ULTRASOUND, FINE NEEDLE ASPIRATE, BIOPSIES, BRUSHINGS, BRONCHOALVEOLAR LAVAGE;  Surgeon: Kendell Stern MD;  Location: [facility] ENDOSCOPY;  Service: Pulmonary;  Laterality: N/A;  LUNG NODULE, MUCOUS PLUGGING    HIP INTERTROCHANTERIC NAILING Right 2024    Procedure: HIP INTERTROCHANTERIC NAILING;  Surgeon: Molina Clayton MD;  Location: [facility] MAIN OR;  Service: Orthopedics;  Laterality: Right;    US GUIDED FINE NEEDLE ASPIRATION  2024     PT Assessment (Last 12 Hours)       PT Evaluation and Treatment       Row Name 24 0800          Physical Therapy Time and Intention    Subjective Information no complaints  -CS     Document Type therapy note (daily note)  -CS     Mode of Treatment individual therapy;physical therapy  -CS     Patient Effort good  -CS     Symptoms Noted During/After Treatment increased pain  -CS       Row Name 24 0800          Pain    Pretreatment Pain Rating 0/10 - no pain  -CS     Posttreatment Pain Rating 10/10  -CS     Pain Location - Side/Orientation Right  -CS     Pain Location - hip  -CS     Pain Intervention(s) Repositioned;Distraction;Emotional support;Nursing Notified  -CS        Row Name 06/22/24 0800          Bed Mobility    Supine-Sit [place] (Bed Mobility) verbal cues;minimum assist (75% patient effort);1 person assist  -CS     Bed Mobility, Safety Issues decreased use of legs for bridging/pushing  -CS     Assistive Device (Bed Mobility) bed rails;head of bed elevated  -CS       Row Name 06/22/24 0800          Sit-Stand Transfer    Sit-Stand [place] (Transfers) contact guard  -CS     Assistive Device (Sit-Stand Transfers) walker, front-wheeled  -CS       Row Name 06/22/24 0800          Stand-Sit Transfer    Stand-Sit [place] (Transfers) contact guard  -CS     Assistive Device (Stand-Sit Transfers) walker, front-wheeled  -CS       Row Name 06/22/24 0800          Gait/Stairs (Locomotion)    [place] Level (Gait) contact guard  -CS     Assistive Device (Gait) walker, front-wheeled  -CS     Distance in Feet (Gait) 90  -CS     Pattern (Gait) 4-point;step-to  -CS     Deviations/Abnormal Patterns (Gait) shannon decreased;base of support, narrow;gait speed decreased;stride length decreased;weight shifting decreased  -CS     Bilateral Gait Deviations forward flexed posture  -CS     Right Sided Gait Deviations heel strike decreased;decreased knee extension;weight shift ability decreased  -CS       Row Name             Wound 05/21/24 0251 Bilateral coccyx    Wound - Properties Group Placement Date: 05/21/24  -SR Placement Time: 0251  -SR Present on Original Admission: Y  -SR Side: Bilateral  -SR Location: coccyx  -SR    Retired Wound - Properties Group Placement Date: 05/21/24  -SR Placement Time: 0251  -SR Present on Original Admission: Y  -SR Side: Bilateral  -SR Location: coccyx  -SR    Retired Wound - Properties Group Date first assessed: 05/21/24  -SR Time first assessed: 0251  -SR Present on Original Admission: Y  -SR Side: Bilateral  -SR Location: coccyx  -SR      Row Name             Wound 05/21/24 Right lateral thigh Incision    Wound - Properties Group Placement Date:  05/21/24  -SF Present on Original Admission: N  -SF Side: Right  -SF Orientation: lateral  -SF Location: thigh  -SF Primary Wound Type: Incision  -SF Additional Comments: incision sites x 3 to lateral right thigh  -SF    Retired Wound - Properties Group Placement Date: 05/21/24  -SF Present on Original Admission: N  -SF Side: Right  -SF Orientation: lateral  -SF Location: thigh  -SF Primary Wound Type: Incision  -SF Additional Comments: incision sites x 3 to lateral right thigh  -SF    Retired Wound - Properties Group Date first assessed: 05/21/24  -SF Present on Original Admission: N  -SF Side: Right  -SF Location: thigh  -SF Primary Wound Type: Incision  -SF Additional Comments: incision sites x 3 to lateral right thigh  -SF      Row Name 06/22/24 0800          Positioning and Restraints    Pre-Treatment Position in bed  -CS     Post Treatment Position chair  -CS     In Chair reclined;call light within reach;encouraged to call for assist;exit alarm on;legs elevated;heels elevated  -CS       Row Name 06/22/24 0800          Progress Summary (PT)    Progress Toward Functional Goals (PT) progress toward functional goals is good  -CS               User Key  (r) = Recorded By, (t) = Taken By, (c) = Cosigned By      Initials Name Provider Type    SF Saritha Ramirez, RN Registered Nurse    Phu Hdez PTA Physical Therapist Assistant    SR Saritha Long, RN Registered Nurse                    Physical Therapy Education       Title: PT OT SLP Therapies (Done)       Topic: Physical Therapy (Done)       Point: Mobility training (Done)       Learning Progress Summary             Patient Acceptance, E,TB, VU by TR at 6/10/2024 1507    Acceptance, E, Bed IU by DS at 6/2/2024 0830    Acceptance, E, VU by PS at 6/1/2024 0627    Acceptance, E,TB, VU by TR at 5/31/2024 1449    Eager, E, VU by AH at 5/23/2024 2223    Acceptance, E,TB, VU by AV at 5/22/2024 1339                         Point: Home exercise program (Done)        Learning Progress Summary             Patient Acceptance, E, Bed IU by DS at 6/2/2024 0830                         Point: Body mechanics (Done)       Learning Progress Summary             Patient Acceptance, E, Bed IU by DS at 6/2/2024 0830    Acceptance, E, VU by PS at 6/1/2024 0627    Acceptance, E,TB, VU by AV at 5/22/2024 1339                         Point: Precautions (Done)       Learning Progress Summary             Patient Acceptance, E,TB, VU by TR at 6/10/2024 1507    Acceptance, E, Bed IU by DS at 6/2/2024 0830    Acceptance, E,TB, VU by TR at 5/31/2024 1449    Acceptance, E,TB, VU by AV at 5/22/2024 1339                                         User Key       Initials Effective Dates Name Provider Type Discipline    PS 06/16/21 -  Darcie Fiore, RN Registered Nurse Nurse    AV 06/11/21 -  Eh Martínez, PT Physical Therapist PT    DS 06/26/23 -  Immanuel eBck, RN Registered Nurse Nurse     05/31/23 -  Angela Casillas, RN Registered Nurse Nurse    TR 05/21/24 -  Aaron Fowler, JERROD Student PT Student PT                  PT Recommendation and Plan     Progress Summary (PT)  Progress Toward Functional Goals (PT): progress toward functional goals is good   Outcome Measures       Row Name 06/22/24 0800 06/21/24 1100 06/19/24 1147       How much help from another person do you currently need...    Turning from your back to your side while in flat bed without using bedrails? 3  -CS 3  -JAYRO (r) TR (t) JAYRO (c) 4  -JAYRO    Moving from lying on back to sitting on the side of a flat bed without bedrails? 3  -CS 3  -JAYRO (r) TR (t) JAYRO (c) 3  -JAYRO    Moving to and from a bed to a chair (including a wheelchair)? 3  -CS 3  -JAYRO (r) TR (t) JAYRO (c) 3  -JAYRO    Standing up from a chair using your arms (e.g., wheelchair, bedside chair)? 3  -CS 3  -JAYRO (r) TR (t) JAYRO (c) 4  -JAYRO    Climbing 3-5 steps with a railing? 2  -CS 2  -JAYRO (r) SHAWNA (sukhjinder) JAYRO (c) 3  -JAYRO    To walk in hospital room? 3  -CS 4  -JAYRO (r) SHAWNA (t) JAYRO  (c) 3  -JAYRO    AM-PAC 6 Clicks Score (PT) 17  -CS 18  -JAYRO (r) TR (t) 20  -JAYRO    Highest Level of Mobility Goal 5 --> Static standing  -CS 6 --> Walk 10 steps or more  -JAYRO (r) TR (t) 6 --> Walk 10 steps or more  -JAYRO       Functional Assessment    Outcome Measure Options AM-PAC 6 Clicks Basic Mobility (PT)  -CS AM-PAC 6 Clicks Daily Activity (OT)  -JAYRO (r) TR (t) JAYRO (c) AM-PAC 6 Clicks Basic Mobility (PT)  -JAYRO              User Key  (r) = Recorded By, (t) = Taken By, (c) = Cosigned By      Initials Name Provider Type    JAYRO Darius Preston, PT Physical Therapist    CS Phu Oquendo PTA Physical Therapist Assistant    TR Aaron Fowler, JERROD Student PT Student                     Time Calculation:    PT Charges       Row Name 06/22/24 0829             Time Calculation    Start Time 0642  -CS      PT Received On 06/22/24  -CS         Timed Charges    08866 - Gait Training Minutes  8  -CS      68169 - PT Therapeutic Activity Minutes 3  -CS         Total Minutes    Timed Charges Total Minutes 11  -CS       Total Minutes 11  -CS                User Key  (r) = Recorded By, (t) = Taken By, (c) = Cosigned By      Initials Name Provider Type    Phu Hdez PTA Physical Therapist Assistant                  Therapy Charges for Today       Code Description Service Date Service Provider Modifiers Qty    [id number] HC GAIT TRAINING EA 15 MIN 6/22/2024 Phu Oquendo PTA GP 1            PT G-Codes  Outcome Measure Options: AM-PAC 6 Clicks Basic Mobility (PT)  AM-PAC 6 Clicks Score (PT): 17  AM-PAC 6 Clicks Score (OT): 18    hPu Oquendo PTA  6/22/2024

## 2024-06-22 NOTE — DISCHARGE SUMMARY
[facility]         HOSPITALIST  DISCHARGE SUMMARY    Patient Name: Oswaldo Bowen  : 1980  MRN: [id number]    Date of Admission: 2024  Date of Discharge: 2024  Primary Care Physician: Provider, No Known    Consults       Date and Time Order Name Status Description    2024  3:32 PM Inpatient Pulmonology Consult      2024  7:30 AM Hematology & Oncology Inpatient Consult Completed     2024  7:29 AM Inpatient Pulmonology Consult Completed     2024 11:13 PM IP Consult to Orthopedic Surgery      2024 10:55 PM Inpatient Hospitalist Consult              Active and Resolved Hospital Problems:  Active Hospital Problems    Diagnosis POA   • **Closed intertrochanteric fracture of right femur [S72.141A] Yes   • Pulmonary nodule [R91.1] Unknown      Resolved Hospital Problems   No resolved problems to display.   Mechanical fall with acute comminuted displaced intertrochanteric right femoral fracture  S/p  right hip subtrochanteric nailing of closed displaced right femur fracture by Dr. Nelson  VLAD creatinine 1.7 leukocytosis suspect reactive in setting of acute fracture  Suspected new metastatic lung cancer with mets to the bone  2.6 cm nodule within the left upper lobe   Mediastinal lymphadenopathy  Haemophilus influenzae pneumonia   1 to 2 pack/day smoker, chronic  Intellectual disability  Depression  Normocytic anemia    Hospital Course     Hospital Course:  Oswaldo Bowen is a 44 y.o. male with intellectual disability, 1-2PPD smoking, depression, who presented after a fall (possibly 1 month ago but may have been more recently), found to have right femoral fracture and VLAD.  Ortho consulted for initial surgical treatment.  He underwent ORIF and tolerated the procedure well.  PT/OT recommended rehab.  Initial lung mass concerning on chest x-ray additional CT imaging with suspected new metastatic colon cancer with mets to the bone, pulmonology consulted patient had a  biopsy results pending.  Bronchoscopy also with findings of Haemophilus influenzae treatment with antibiotics.  Oncology was consulted.  Underwent bronc with EBUS biopsy, results inconclusive.  Also underwent to left rib biopsies which showed scattered chronic inflammatory cells but no definitive malignancy.  Even though he had 3 inconclusive biopsies, still high concern for malignancy.  Ultimately, pulmonology performed an Ion guided bronchoscopy with biopsies on 6/21.  Biopsies are pending at the time of discharge.  Hospital stay was prolonged as PT/OT recommended rehab and his insurance denied rehab.  Fortunately, appeal was approved and he was discharged stable condition to [facility] for rehab on 6/23/2024.  He will have close follow-up with pulmonology and hematology within 1 week to follow-up on biopsy results.  He is aware that this could be cancer and he needs to make sure he follows up closely for further recommendations and treatment.  Recommend follow-up with orthopedic surgery within 2 weeks after discharge.    DISCHARGE Follow Up Recommendations for labs and diagnostics: Follow-up pathology from ionnavigational bronchoscopy      Day of Discharge     Vital Signs:  Temp:  [97.5 °F (36.4 °C)-98.7 °F (37.1 °C)] 97.5 °F (36.4 °C)  Heart Rate:  [107-118] 107  Resp:  [16-18] 16  BP: (109-130)/(71-84) 115/84  Physical Exam:    Constitutional: Chronically ill-appearing, resting in bed              Respiratory: Clear to auscultation bilaterally, nonlabored respirations               Cardiovascular: RRR, no MRG              Gastrointestinal: Positive bowel sounds, soft, nontender, nondistended              Neurologic: Oriented x 3, strength symmetric in all extremities, Cranial Nerves grossly intact to confrontation, speech clear    Discharge Details        Discharge Medications        New Medications        Instructions Start Date   acetaminophen 325 MG tablet  Commonly known as: TYLENOL   650 mg, Oral, Every 6  Hours PRN      budesonide-formoterol 160-4.5 MCG/ACT inhaler  Commonly known as: SYMBICORT   2 puffs, Inhalation, 2 Times Daily - RT      cyanocobalamin 1000 MCG tablet  Commonly known as: VITAMIN B-12   1,000 mcg, Oral, Daily      Lidocaine 4 %   1 patch, Transdermal, Daily PRN, Remove & Discard patch within 12 hours or as directed by MD      metoprolol succinate XL 50 MG 24 hr tablet  Commonly known as: TOPROL-XL   50 mg, Oral, Every 12 Hours Scheduled      multivitamin with minerals tablet tablet   1 tablet, Oral, Daily      nicotine 21 MG/24HR patch  Commonly known as: NICODERM CQ   1 patch, Transdermal, Daily PRN      ondansetron ODT 4 MG disintegrating tablet  Commonly known as: ZOFRAN-ODT   4 mg, Oral, Every 6 Hours PRN      polyethylene glycol 17 g packet  Commonly known as: MIRALAX   17 g, Oral, Daily      Psyllium 51.7 % packet  Commonly known as: METAMUCIL MULTIHEALTH FIBER   1 packet, Oral, Daily      tiotropium bromide monohydrate 2.5 MCG/ACT aerosol solution inhaler  Commonly known as: SPIRIVA RESPIMAT   2 puffs, Inhalation, Daily - RT               No Known Allergies    Discharge Disposition:  Rehab Facility or Unit (DC - External)    Diet:  Hospital:  Diet Order   Procedures   • Diet: Regular/House; Texture: Soft to Chew (NDD 3); Soft to Chew: Chopped Meat; Fluid Consistency: Thin (IDDSI 0)       Discharge Activity:   Activity Instructions       Activity as Tolerated              CODE STATUS:  Code Status and Medical Interventions:   Ordered at: 05/21/24 0058     Level Of Support Discussed With:    Patient     Code Status (Patient has no pulse and is not breathing):    CPR (Attempt to Resuscitate)     Medical Interventions (Patient has pulse or is breathing):    Full Support         No future appointments.    Additional Instructions for the Follow-ups that You Need to Schedule       Ambulatory Referral to Hematology / Oncology   As directed      2 weeks    Discharge Follow-up with PCP   As directed        Currently Documented PCP:    Provider, No Known    PCP Phone Number:    None     Follow Up Details: 3-5 days                Pertinent  and/or Most Recent Results     LAB RESULTS:      Lab 06/21/24  0531 06/20/24  0516 06/19/24  0617 06/17/24  0643 06/16/24  0816   WBC 12.40* 14.98* 15.92* 9.57  --    HEMOGLOBIN 8.1* 7.7* 8.1* 7.9* 7.0*   HEMATOCRIT 27.4* 25.5* 27.0* 25.7* 23.0*   PLATELETS 514* 469* 481* 475*  --    NEUTROS ABS 9.28* 11.72* 12.31*  --   --    IMMATURE GRANS (ABS) 0.19* 0.21* 0.26*  --   --    LYMPHS ABS 1.48 1.42 1.59  --   --    MONOS ABS 1.38* 1.53* 1.60*  --   --    EOS ABS 0.05 0.07 0.11  --   --    MCV 87.5 86.4 87.9 86.5  --          Lab 06/21/24  0531 06/20/24  0516 06/19/24  0617   SODIUM 135* 132* 136   POTASSIUM 3.8 3.9 4.2   CHLORIDE 95* 94* 97*   CO2 28.1 27.9 29.2*   ANION GAP 11.9 10.1 9.8   BUN 12 12 13   CREATININE 0.50* 0.36* 0.41*   EGFR 129.0 142.4 137.0   GLUCOSE 112* 115* 103*   CALCIUM 9.8 9.5 9.6   MAGNESIUM 1.8 1.7 2.0   PHOSPHORUS 3.7 4.4 4.7*                         Brief Urine Lab Results       None          Microbiology Results (last 10 days)       Procedure Component Value - Date/Time    BAL Culture, Quantitative - Lavage, Lung, Left Upper Lobe [[id number]] Collected: 06/20/24 1043    Lab Status: Preliminary result Specimen: Lavage from Lung, Left Upper Lobe Updated: 06/21/24 1057     BAL Culture No growth     Gram Stain No organisms seen      Few (2+) WBCs seen    Pneumonia Panel - Lavage, Lung, Left Upper Lobe [[id number]]  (Normal) Collected: 06/20/24 1043    Lab Status: Final result Specimen: Lavage from Lung, Left Upper Lobe Updated: 06/20/24 1248     Escherichia coli PCR Not Detected     Acinetobacter calcoaceticus-baumannii complex PCR Not Detected     Enterobacter cloacae PCR Not Detected     Klebsiella oxytoca PCR Not Detected     Klebsiella pneumoniae group PCR Not Detected     Klebsiella aerogenes PCR Not Detected     Moraxella catarrhalis PCR Not Detected      Proteus species PCR Not Detected     Pseudomonas aeroginosa PCR Not Detected     Serratia marcescens PCR Not Detected     Staphylococcus aureus PCR Not Detected     Streptococcus pyogenes PCR Not Detected     Haemophilus influenzae PCR Not Detected     Streptococcus agalactiae PCR Not Detected     Streptococcus pneumoniae PCR Not Detected     Chlamydophila pneumoniae PCR Not Detected     Legionella pneumophilia PCR Not Detected     Mycoplasma pneumo by PCR Not Detected     ADENOVIRUS, PCR Not Detected     CTX-M Gene N/A     IMP Gene N/A     KPC Gene N/A     mecA/C and MREJ Gene N/A     NDM Gene N/A     OXA-48-like Gene N/A     VIM Gene N/A     Coronavirus Not Detected     Human Metapneumovirus Not Detected     Human Rhinovirus/Enterovirus Not Detected     Influenza A PCR Not Detected     Influenza B PCR Not Detected     RSV, PCR Not Detected     Parainfluenza virus PCR Not Detected    Gram Stain - No Culture [[id number]] Collected: 06/20/24 1028    Lab Status: Final result Specimen: Brushing from Lung, Left Upper Lobe Updated: 06/20/24 1230     Gram Stain No organisms seen      Few (2+) WBCs seen    AFB Stain - No Culture - Brushing, Lung, Left Upper Lobe [[id number]] Collected: 06/20/24 1028    Lab Status: Final result Specimen: Brushing from Lung, Left Upper Lobe Updated: 06/21/24 1113     AFB Stain No acid fast bacilli seen            CT Chest Without Contrast Diagnostic    Result Date: 6/18/2024  Impression: 1. No change in 2.6 cm nodule within the left upper lobe. There is prominence of the left hilum compatible with known hilar adenopathy. 2. No change in multiple lytic bone lesions consistent with metastatic disease. Electronically Signed: Reese Jara MD  6/18/2024 7:32 PM EDT  Workstation ID: [id number]                  Labs Pending at Discharge:  Pending Labs       Order Current Status    Non-gynecologic Cytology In process    Tissue Pathology Exam In process    AFB Culture - Lavage, Lung, Left Upper  Lobe Preliminary result    BAL Culture, Quantitative - Lavage, Lung, Left Upper Lobe Preliminary result              Time spent on Discharge including face to face service:  35 minutes    Electronically signed by Sy Martinez MD, 06/22/24, 9:03 AM EDT.

## 2024-06-23 VITALS
RESPIRATION RATE: 18 BRPM | HEART RATE: 118 BPM | HEIGHT: 72 IN | TEMPERATURE: 97.8 F | OXYGEN SATURATION: 100 % | SYSTOLIC BLOOD PRESSURE: 122 MMHG | BODY MASS INDEX: 21.83 KG/M2 | DIASTOLIC BLOOD PRESSURE: 73 MMHG | WEIGHT: 161.16 LBS

## 2024-06-23 PROCEDURE — 99232 SBSQ HOSP IP/OBS MODERATE 35: CPT | Performed by: INTERNAL MEDICINE

## 2024-06-23 PROCEDURE — 94799 UNLISTED PULMONARY SVC/PX: CPT

## 2024-06-23 PROCEDURE — 25010000002 ENOXAPARIN PER 10 MG: Performed by: INTERNAL MEDICINE

## 2024-06-23 RX ADMIN — Medication 10 ML: at 09:20

## 2024-06-23 RX ADMIN — BUDESONIDE AND FORMOTEROL FUMARATE DIHYDRATE 2 PUFF: 160; 4.5 AEROSOL RESPIRATORY (INHALATION) at 08:46

## 2024-06-23 RX ADMIN — Medication 400 MG: at 09:19

## 2024-06-23 RX ADMIN — BISACODYL 10 MG: 10 SUPPOSITORY RECTAL at 11:10

## 2024-06-23 RX ADMIN — ENOXAPARIN SODIUM 40 MG: 100 INJECTION SUBCUTANEOUS at 06:13

## 2024-06-23 RX ADMIN — CYANOCOBALAMIN TAB 500 MCG 1000 MCG: 500 TAB at 09:19

## 2024-06-23 RX ADMIN — Medication 1 TABLET: at 09:19

## 2024-06-23 RX ADMIN — LIDOCAINE 1 PATCH: 560 PATCH PERCUTANEOUS; TOPICAL; TRANSDERMAL at 09:20

## 2024-06-23 RX ADMIN — ACETAMINOPHEN 650 MG: 325 TABLET ORAL at 13:12

## 2024-06-23 RX ADMIN — METOPROLOL SUCCINATE 50 MG: 50 TABLET, EXTENDED RELEASE ORAL at 09:19

## 2024-06-23 RX ADMIN — TIOTROPIUM BROMIDE INHALATION SPRAY 2 PUFF: 3.12 SPRAY, METERED RESPIRATORY (INHALATION) at 08:47

## 2024-06-23 NOTE — PROGRESS NOTES
Pulmonary / Critical Care Progress Note      Patient Name: Oswaldo Bowen  : 1980  MRN: [id number]  Primary Care Physician:  Provider, No Known  Date of admission: 2024    Subjective   Subjective   Follow-up for left upper lobe lung nodule with mediastinal adenopathy    Doing well this morning  Navigational bronchoscopy  pathology results pending  Musculoskeletal pain well-controlled  Weak and fatigued  Going to rehab today  On room air  Denies respiratory complaints at this time  Weak and fatigue with no fevers      Objective   Objective     Vitals:   Temp:  [97.3 °F (36.3 °C)-98.1 °F (36.7 °C)] 97.9 °F (36.6 °C)  Heart Rate:  [110-115] 112  Resp:  [16-18] 18  BP: (111-133)/(65-79) 119/70  Physical Exam   Vital Signs Reviewed   General:  WDWN, Alert, in no distress, lying in bed  Chest:  good aeration, clear to auscultation bilaterally, tympanic to percussion bilaterally, no work of breathing noted on room air  CV: Sinus tachycardia, no MGR, pulses 2+, equal.  Abd:  Soft, NT, ND, + BS, no HSM  EXT:  no clubbing, no cyanosis, no edema  Neuro:  A&Ox3, CN grossly intact, no focal deficits.  Skin: No rashes or lesions noted      Result Review    Result Review:  I have personally reviewed the results from the time of this admission to 2024 10:30 EDT and agree with these findings:  [x]  Laboratory  [x]  Microbiology  [x]  Radiology  [x]  EKG/Telemetry   [x]  Cardiology/Vascular   []  Pathology  []  Old records  []  Other:  Most notable findings include:         Lab 24  0531 24  0516 24  0617 24  0643   WBC 12.40* 14.98* 15.92* 9.57   HEMOGLOBIN 8.1* 7.7* 8.1* 7.9*   HEMATOCRIT 27.4* 25.5* 27.0* 25.7*   PLATELETS 514* 469* 481* 475*   SODIUM 135* 132* 136  --    POTASSIUM 3.8 3.9 4.2  --    CHLORIDE 95* 94* 97*  --    CO2 28.1 27.9 29.2*  --    BUN 12 12 13  --    CREATININE 0.50* 0.36* 0.41*  --    GLUCOSE 112* 115* 103*  --    CALCIUM 9.8 9.5 9.6  --    PHOSPHORUS 3.7 4.4 4.7*  --         CT Chest Without Contrast Diagnostic    Result Date: 6/18/2024  CT CHEST WO CONTRAST DIAGNOSTIC Date of Exam: 6/18/2024 6:21 PM EDT Indication: Ion Navigational Bronchoscopy. Comparison: 6/4/2024 Technique: Axial CT images were obtained of the chest without contrast administration.  Reconstructed coronal and sagittal images were also obtained. Automated exposure control and iterative construction methods were used. Findings: No coronary artery calcification. Left hilum remains prominent compatible with underlying adenopathy. No definite subcarinal or axillary adenopathy. There are no pleural effusions. Within the left upper lobe there is a noncalcified 2.6 cm pulmonary nodule which is unchanged. No new or enlarging pulmonary nodule. No new focal airspace consolidation. Again right lower lobe is a 4 mm subpleural noncalcified nodule. Or enlarging nodule seen within the right lung. Bilateral adrenal glands are within normal limits. Again seen are multiple lytic bone lesions throughout the spine and ribs. There are additional lytic areas within both scapula. There is a stable compression fracture of the T5 vertebral body. No new compression fracture.     Impression: Impression: 1. No change in 2.6 cm nodule within the left upper lobe. There is prominence of the left hilum compatible with known hilar adenopathy. 2. No change in multiple lytic bone lesions consistent with metastatic disease. Electronically Signed: Reese Jara MD  6/18/2024 7:32 PM EDT  Workstation ID: [id number]    CT Chest With Contrast Diagnostic    Result Date: 6/4/2024  CT CHEST W CONTRAST DIAGNOSTIC Date of Exam: 6/4/2024 4:43 PM EDT Indication: leukocytosis worsening, subjective fevers, worsening tachycardia - question pe vs pna. Comparison: 5/21/2024 Technique: Axial CT images were obtained of the chest after the uneventful intravenous administration of iodinated contrast.  Reconstructed coronal and sagittal images were also obtained.  Automated exposure control and iterative construction methods were  used. Findings: Pulmonary Arteries: Adequately opacified. Some motion distortion of peripheral pulmonary vessels on the left. Within that limitation, no emboli demonstrated Marta/mediastinum: Mildly enlarged bilateral hilar and AP window lymph nodes. No pericardial effusion. Indeterminate for coronary calcification due to motion Lungs/pleura: 2.1 cm lobular mass in the inferior left upper lobe again demonstrated. No acute infiltrate. No pleural effusion Upper Abdomen: Unremarkable Bones/soft tissues: Multiple lytic bone lesions including expansile lytic lesions of the right seventh rib and left eighth rib, multiple vertebral bodies including stable pathologic compression of T5, sternum, left scapula. The T5 compression fracture and infiltrating tumor results in significant central canal stenosis. There is some tumor encroachment on the T8 central canal and T8-T9 left foramina, and T11-T12 right foramina     Impression: 1. No evidence of pulmonary embolus 2. No evidence of pneumonia 3. 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 malignancy, with bilateral hilar and AP window adenopathy and extensive osteolytic metastatic disease as described Electronically Signed: Sree Hawthorne  6/4/2024 5:17 PM EDT  Workstation ID: [id number]       Assessment & Plan   Assessment / Plan     Active Hospital Problems:  Active Hospital Problems    Diagnosis    • **Closed intertrochanteric fracture of right femur    • Pulmonary nodule          Impression:   Acute comminuted displaced intertrochanteric right femoral fracture  Status post IM nailing  Left upper lobe lung nodule with mediastinal lymphadenopathy  Chronic heavy smoking  Unintentional weight loss    Plan:   Status post Ion navigational bronchoscopy for his left upper lobe lung nodule, Pathology pending  Status post bronchoscopy with EBUS 5/23.  Pathology from mediastinal lymph nodes were all  negative for malignancy.    Bone biopsy x 2 showed atypical and inflammatory cells without definitive diagnosis  Imaging  very concerning and consistent with metastatic cancer.  Completed antibiotics earlier this admission for haemophilus pneumonia seen on bronchoscopy  Continue Symbicort and Spiriva, albuterol as needed.    Encourage activity and incentive spirometer use  On room air  Stable to discharge to rehab from my perspective    DVT prophylaxis:  Pharmacologic & mechanical VTE prophylaxis orders are present.    CODE STATUS:   Level Of Support Discussed With: Patient  Code Status (Patient has no pulse and is not breathing): CPR (Attempt to Resuscitate)  Medical Interventions (Patient has pulse or is breathing): Full Support      Labs, imaging, microbiology, notes and medications personally reviewed  Discussed with primary     Electronically signed by Manjinder Dash MD, 6/23/2024, 10:30 EDT.

## 2024-06-23 NOTE — PLAN OF CARE
Goal Outcome Evaluation:      Vitals stable during shift. Pt received suppository during shift to have bowel movement and was successful. Right hip incisions were cleaned and redressed with steri strips. Pt tolerated well. Pt will be discharging today and going to [facility] in [place]. Pt is eager to get there and start physical therapy. Complaints of back and hip pain x1. See MAR. Report given to Latosha at [facility]. Care plan complete.

## 2024-06-23 NOTE — PLAN OF CARE
Goal Outcome Evaluation:      VSS, no significant changes. Encouraged patient to move more in bed and reposition side to side.

## 2024-06-23 NOTE — SIGNIFICANT NOTE
06/23/24 0831   Physical Therapy Time and Intention   Session Not Performed   (Pt has orders for discharge shortly.)

## 2024-06-23 NOTE — PROGRESS NOTES
[facility]   Hospitalist Progress Note  Date: 2024  Patient Name: Oswaldo Bowen  : 1980  MRN: [id number]  Date of admission: 2024      Subjective   Subjective     Chief Complaint: Fall, leg pain    Summary: 44 y.o. with intellectual disability, 1-2PPD smoking, depression, who presented after a fall (possibly 1 month ago but may have been more recently), found to have right femoral fracture and VLAD.  Ortho assisting with initial surgical treatment.  Initial lung mass concerning on chest x-ray additional CT imaging with suspected new metastatic colon cancer with mets to the bone, pulmonology consulted patient had a biopsy results pending.  Bronchoscopy also with findings of Haemophilus influenzae treatment with antibiotics.  Palliative assisting.  Awaiting pathology results from right rib biopsy (initial testing inadequate total sample, resulted with atypical cells), repeat biopsy obtained  and results pending.  Patient is working better with physical therapy and is ambulating with minimal assistance.  Initial placement declined, P2p attempted but this was also declined. Awaiting appeal        Interval Followup: No acute events overnight.  Was unable to be discharged yesterday as [facility] did not have a [facility].  States his pain is under better control today.  No new complaints    Objective   Objective     Vitals:   Temp:  [97.3 °F (36.3 °C)-98.1 °F (36.7 °C)] 97.9 °F (36.6 °C)  Heart Rate:  [110-122] 115  Resp:  [16-18] 18  BP: (111-133)/(65-79) 119/70  Physical Exam    Constitutional: Chronically ill-appearing, NAD   Respiratory: Clear to auscultation bilaterally, nonlabored respirations    Cardiovascular: RRR, no MRG   Gastrointestinal: Positive bowel sounds, soft, nontender, nondistended   Neurologic: Oriented x 3, strength symmetric in all extremities, Cranial Nerves grossly intact to confrontation, speech clear    Result Review    I have personally reviewed the results below:  [x]   Laboratory   []  Microbiology  []  Radiology  []  EKG/Telemetry   []  Cardiology/Vascular   []  Pathology  []  Old records  []  Other:  CBC          6/19/2024    06:17 6/20/2024    05:16 6/21/2024    05:31   CBC   WBC 15.92  14.98  12.40    RBC 3.07  2.95  3.13    Hemoglobin 8.1  7.7  8.1    Hematocrit 27.0  25.5  27.4    MCV 87.9  86.4  87.5    MCH 26.4  26.1  25.9    MCHC 30.0  30.2  29.6    RDW 14.6  14.8  14.6    Platelets 481  469  514      CMP          6/19/2024    06:17 6/20/2024    05:16 6/21/2024    05:31   CMP   Glucose 103  115  112    BUN 13  12  12    Creatinine 0.41  0.36  0.50    EGFR 137.0  142.4  129.0    Sodium 136  132  135    Potassium 4.2  3.9  3.8    Chloride 97  94  95    Calcium 9.6  9.5  9.8    BUN/Creatinine Ratio 31.7  33.3  24.0    Anion Gap 9.8  10.1  11.9        Assessment & Plan   Assessment / Plan   Mechanical fall with acute comminuted displaced intertrochanteric right femoral fracture  S/p 5/21 right hip subtrochanteric nailing of closed displaced right femur fracture by Dr. Nelson  VLAD creatinine 1.7 leukocytosis suspect reactive in setting of acute fracture  Suspected new metastatic lung cancer with mets to the bone  2.6 cm nodule within the left upper lobe   Mediastinal lymphadenopathy  Haemophilus influenzae pneumonia   1 to 2 pack/day smoker, chronic  Intellectual disability  Depression  Normocytic anemia    Plan to discharge to rehab today  Discussed with pulmonology-pathology from ion navigational bronchoscopy pending, will be followed on outpatient  BAL and cultures negative   Completed a course of Unasyn for haemophilus pneumonia  Continue Symbicort, Spiriva, respiratory hygiene  Xopenex as needed  Bowel regimen scheduled     Discussed plan with RN, pulmonology    VTE Prophylaxis:  Pharmacologic & mechanical VTE prophylaxis orders are present.        CODE STATUS:   Level Of Support Discussed With: Patient  Code Status (Patient has no pulse and is not breathing): CPR (Attempt  to Resuscitate)  Medical Interventions (Patient has pulse or is breathing): Full Support

## 2024-06-25 ENCOUNTER — TELEPHONE (OUTPATIENT)
Dept: PULMONOLOGY | Facility: CLINIC | Age: 44
End: 2024-06-25
Payer: COMMERCIAL

## 2024-06-25 LAB
CYTO UR: NORMAL
CYTO UR: NORMAL
LAB AP CASE REPORT: NORMAL
LAB AP CASE REPORT: NORMAL
LAB AP CLINICAL INFORMATION: NORMAL
LAB AP CLINICAL INFORMATION: NORMAL
LAB AP DIAGNOSIS COMMENT: NORMAL
LAB AP DIAGNOSIS COMMENT: NORMAL
LAB AP SPECIAL STAINS: NORMAL
PATH REPORT.FINAL DX SPEC: NORMAL
PATH REPORT.FINAL DX SPEC: NORMAL
PATH REPORT.GROSS SPEC: NORMAL
PATH REPORT.GROSS SPEC: NORMAL

## 2024-06-26 ENCOUNTER — TELEPHONE (OUTPATIENT)
Dept: PULMONOLOGY | Facility: CLINIC | Age: 44
End: 2024-06-26
Payer: COMMERCIAL

## 2024-06-26 NOTE — TELEPHONE ENCOUNTER
Spoke with patient's sister, she advised patient is currently at [facility].  Called [facility] and spoke with patient's nurse Nikolas.  Advised of pathology results.  Nikolas advised Dr. Mehta is currently in the facility and requested pathology be faxed to [phone] for Dr. Mehta to review.      1102:  Verified with Nikolas at [facility] fax was received.

## 2024-06-27 ENCOUNTER — HOSPITAL ENCOUNTER (INPATIENT)
Facility: HOSPITAL | Age: 44
LOS: 19 days | Discharge: NURSING, REHAB OR LONG-TERM CARE | End: 2024-07-16
Attending: EMERGENCY MEDICINE | Admitting: STUDENT IN AN ORGANIZED HEALTH CARE EDUCATION/TRAINING PROGRAM
Payer: COMMERCIAL

## 2024-06-27 ENCOUNTER — APPOINTMENT (OUTPATIENT)
Dept: GENERAL RADIOLOGY | Facility: HOSPITAL | Age: 44
End: 2024-06-27
Payer: COMMERCIAL

## 2024-06-27 DIAGNOSIS — R26.2 DIFFICULTY IN WALKING: Primary | ICD-10-CM

## 2024-06-27 DIAGNOSIS — A41.9 SEPSIS, DUE TO UNSPECIFIED ORGANISM, UNSPECIFIED WHETHER ACUTE ORGAN DYSFUNCTION PRESENT: ICD-10-CM

## 2024-06-27 DIAGNOSIS — C34.90 ADENOCARCINOMA OF LUNG, STAGE 4, UNSPECIFIED LATERALITY: ICD-10-CM

## 2024-06-27 PROBLEM — D64.9 ANEMIA: Status: ACTIVE | Noted: 2024-06-27

## 2024-06-27 LAB
ALBUMIN SERPL-MCNC: 2.7 G/DL (ref 3.5–5.2)
ALBUMIN/GLOB SERPL: 0.7 G/DL
ALP SERPL-CCNC: 181 U/L (ref 39–117)
ALT SERPL W P-5'-P-CCNC: 38 U/L (ref 1–41)
ANION GAP SERPL CALCULATED.3IONS-SCNC: 13.1 MMOL/L (ref 5–15)
AST SERPL-CCNC: 45 U/L (ref 1–40)
BASOPHILS # BLD AUTO: 0.02 10*3/MM3 (ref 0–0.2)
BASOPHILS NFR BLD AUTO: 0.1 % (ref 0–1.5)
BILIRUB SERPL-MCNC: 0.3 MG/DL (ref 0–1.2)
BILIRUB UR QL STRIP: NEGATIVE
BUN SERPL-MCNC: 10 MG/DL (ref 6–20)
BUN/CREAT SERPL: 26.3 (ref 7–25)
CALCIUM SPEC-SCNC: 9.3 MG/DL (ref 8.6–10.5)
CHLORIDE SERPL-SCNC: 95 MMOL/L (ref 98–107)
CLARITY UR: CLEAR
CO2 SERPL-SCNC: 24.9 MMOL/L (ref 22–29)
COLOR UR: YELLOW
CREAT SERPL-MCNC: 0.38 MG/DL (ref 0.76–1.27)
D-LACTATE SERPL-SCNC: 1.5 MMOL/L (ref 0.5–2)
DEPRECATED RDW RBC AUTO: 45.3 FL (ref 37–54)
EGFRCR SERPLBLD CKD-EPI 2021: 140.1 ML/MIN/1.73
EOSINOPHIL # BLD AUTO: 0.01 10*3/MM3 (ref 0–0.4)
EOSINOPHIL NFR BLD AUTO: 0.1 % (ref 0.3–6.2)
ERYTHROCYTE [DISTWIDTH] IN BLOOD BY AUTOMATED COUNT: 15 % (ref 12.3–15.4)
GLOBULIN UR ELPH-MCNC: 3.7 GM/DL
GLUCOSE SERPL-MCNC: 124 MG/DL (ref 65–99)
GLUCOSE UR STRIP-MCNC: NEGATIVE MG/DL
HCT VFR BLD AUTO: 23.7 % (ref 37.5–51)
HGB BLD-MCNC: 7.4 G/DL (ref 13–17.7)
HGB UR QL STRIP.AUTO: NEGATIVE
IMM GRANULOCYTES # BLD AUTO: 0.14 10*3/MM3 (ref 0–0.05)
IMM GRANULOCYTES NFR BLD AUTO: 0.7 % (ref 0–0.5)
KETONES UR QL STRIP: NEGATIVE
LEUKOCYTE ESTERASE UR QL STRIP.AUTO: NEGATIVE
LYMPHOCYTES # BLD AUTO: 1.44 10*3/MM3 (ref 0.7–3.1)
LYMPHOCYTES NFR BLD AUTO: 7.4 % (ref 19.6–45.3)
MCH RBC QN AUTO: 26 PG (ref 26.6–33)
MCHC RBC AUTO-ENTMCNC: 31.2 G/DL (ref 31.5–35.7)
MCV RBC AUTO: 83.2 FL (ref 79–97)
MONOCYTES # BLD AUTO: 1.99 10*3/MM3 (ref 0.1–0.9)
MONOCYTES NFR BLD AUTO: 10.2 % (ref 5–12)
MYCOBACTERIUM SPEC CULT: NORMAL
NEUTROPHILS NFR BLD AUTO: 15.95 10*3/MM3 (ref 1.7–7)
NEUTROPHILS NFR BLD AUTO: 81.5 % (ref 42.7–76)
NIGHT BLUE STAIN TISS: NORMAL
NIGHT BLUE STAIN TISS: NORMAL
NITRITE UR QL STRIP: NEGATIVE
NRBC BLD AUTO-RTO: 0 /100 WBC (ref 0–0.2)
PH UR STRIP.AUTO: 6.5 [PH] (ref 5–8)
PLATELET # BLD AUTO: 442 10*3/MM3 (ref 140–450)
PMV BLD AUTO: 9.2 FL (ref 6–12)
POTASSIUM SERPL-SCNC: 3.6 MMOL/L (ref 3.5–5.2)
PROT SERPL-MCNC: 6.4 G/DL (ref 6–8.5)
PROT UR QL STRIP: ABNORMAL
RBC # BLD AUTO: 2.85 10*6/MM3 (ref 4.14–5.8)
SODIUM SERPL-SCNC: 133 MMOL/L (ref 136–145)
SP GR UR STRIP: 1.02 (ref 1–1.03)
UROBILINOGEN UR QL STRIP: ABNORMAL
WBC NRBC COR # BLD AUTO: 19.55 10*3/MM3 (ref 3.4–10.8)

## 2024-06-27 PROCEDURE — 36415 COLL VENOUS BLD VENIPUNCTURE: CPT

## 2024-06-27 PROCEDURE — 25810000003 SODIUM CHLORIDE 0.9 % SOLUTION

## 2024-06-27 PROCEDURE — 87040 BLOOD CULTURE FOR BACTERIA: CPT

## 2024-06-27 PROCEDURE — 25010000002 VANCOMYCIN 5 G RECONSTITUTED SOLUTION

## 2024-06-27 PROCEDURE — 25010000002 CEFEPIME PER 500 MG

## 2024-06-27 PROCEDURE — 84145 PROCALCITONIN (PCT): CPT | Performed by: STUDENT IN AN ORGANIZED HEALTH CARE EDUCATION/TRAINING PROGRAM

## 2024-06-27 PROCEDURE — 73552 X-RAY EXAM OF FEMUR 2/>: CPT

## 2024-06-27 PROCEDURE — 84100 ASSAY OF PHOSPHORUS: CPT | Performed by: STUDENT IN AN ORGANIZED HEALTH CARE EDUCATION/TRAINING PROGRAM

## 2024-06-27 PROCEDURE — 71045 X-RAY EXAM CHEST 1 VIEW: CPT

## 2024-06-27 PROCEDURE — 81003 URINALYSIS AUTO W/O SCOPE: CPT

## 2024-06-27 PROCEDURE — 83735 ASSAY OF MAGNESIUM: CPT | Performed by: STUDENT IN AN ORGANIZED HEALTH CARE EDUCATION/TRAINING PROGRAM

## 2024-06-27 PROCEDURE — 99222 1ST HOSP IP/OBS MODERATE 55: CPT | Performed by: STUDENT IN AN ORGANIZED HEALTH CARE EDUCATION/TRAINING PROGRAM

## 2024-06-27 PROCEDURE — 83605 ASSAY OF LACTIC ACID: CPT

## 2024-06-27 PROCEDURE — 99285 EMERGENCY DEPT VISIT HI MDM: CPT

## 2024-06-27 PROCEDURE — 80053 COMPREHEN METABOLIC PANEL: CPT

## 2024-06-27 PROCEDURE — 85025 COMPLETE CBC W/AUTO DIFF WBC: CPT

## 2024-06-27 PROCEDURE — 93005 ELECTROCARDIOGRAM TRACING: CPT

## 2024-06-27 RX ORDER — ALBUTEROL SULFATE 90 UG/1
2 AEROSOL, METERED RESPIRATORY (INHALATION) 2 TIMES DAILY
Status: ON HOLD | COMMUNITY

## 2024-06-27 RX ORDER — AMOXICILLIN 250 MG
2 CAPSULE ORAL 2 TIMES DAILY PRN
Status: DISCONTINUED | OUTPATIENT
Start: 2024-06-27 | End: 2024-07-16 | Stop reason: HOSPADM

## 2024-06-27 RX ORDER — BISACODYL 10 MG
10 SUPPOSITORY, RECTAL RECTAL DAILY PRN
Status: DISCONTINUED | OUTPATIENT
Start: 2024-06-27 | End: 2024-07-16 | Stop reason: HOSPADM

## 2024-06-27 RX ORDER — FLUTICASONE FUROATE AND VILANTEROL 100; 25 UG/1; UG/1
1 POWDER RESPIRATORY (INHALATION)
COMMUNITY
End: 2024-07-29

## 2024-06-27 RX ORDER — LOPERAMIDE HYDROCHLORIDE 2 MG/1
2 CAPSULE ORAL EVERY 6 HOURS PRN
COMMUNITY
End: 2024-07-29

## 2024-06-27 RX ORDER — BISACODYL 10 MG
10 SUPPOSITORY, RECTAL RECTAL DAILY PRN
Status: ON HOLD | COMMUNITY

## 2024-06-27 RX ORDER — SODIUM CHLORIDE 0.9 % (FLUSH) 0.9 %
10 SYRINGE (ML) INJECTION AS NEEDED
Status: DISCONTINUED | OUTPATIENT
Start: 2024-06-27 | End: 2024-07-16 | Stop reason: HOSPADM

## 2024-06-27 RX ORDER — HYDROCODONE BITARTRATE AND ACETAMINOPHEN 5; 325 MG/1; MG/1
1 TABLET ORAL EVERY 6 HOURS PRN
COMMUNITY
End: 2024-07-16 | Stop reason: HOSPADM

## 2024-06-27 RX ORDER — ACETAMINOPHEN 650 MG/1
650 SUPPOSITORY RECTAL EVERY 4 HOURS PRN
Status: DISCONTINUED | OUTPATIENT
Start: 2024-06-27 | End: 2024-07-16 | Stop reason: HOSPADM

## 2024-06-27 RX ORDER — ONDANSETRON 4 MG/1
4 TABLET, ORALLY DISINTEGRATING ORAL EVERY 6 HOURS PRN
Status: DISCONTINUED | OUTPATIENT
Start: 2024-06-27 | End: 2024-07-16 | Stop reason: HOSPADM

## 2024-06-27 RX ORDER — AZITHROMYCIN 250 MG/1
250 TABLET, FILM COATED ORAL DAILY
COMMUNITY
Start: 2024-06-27 | End: 2024-07-16 | Stop reason: HOSPADM

## 2024-06-27 RX ORDER — ALUMINA, MAGNESIA, AND SIMETHICONE 2400; 2400; 240 MG/30ML; MG/30ML; MG/30ML
15 SUSPENSION ORAL EVERY 6 HOURS PRN
Status: DISCONTINUED | OUTPATIENT
Start: 2024-06-27 | End: 2024-07-16 | Stop reason: HOSPADM

## 2024-06-27 RX ORDER — ACETAMINOPHEN 325 MG/1
650 TABLET ORAL EVERY 4 HOURS PRN
Status: DISCONTINUED | OUTPATIENT
Start: 2024-06-27 | End: 2024-07-16 | Stop reason: HOSPADM

## 2024-06-27 RX ORDER — ONDANSETRON 2 MG/ML
4 INJECTION INTRAMUSCULAR; INTRAVENOUS EVERY 6 HOURS PRN
Status: DISCONTINUED | OUTPATIENT
Start: 2024-06-27 | End: 2024-07-16 | Stop reason: HOSPADM

## 2024-06-27 RX ORDER — HEPARIN SODIUM 5000 [USP'U]/.5ML
5000 INJECTION, SOLUTION INTRAVENOUS; SUBCUTANEOUS EVERY 12 HOURS SCHEDULED
COMMUNITY
End: 2024-07-16 | Stop reason: HOSPADM

## 2024-06-27 RX ORDER — VANCOMYCIN/0.9 % SOD CHLORIDE 1.5G/250ML
20 PLASTIC BAG, INJECTION (ML) INTRAVENOUS ONCE
Status: COMPLETED | OUTPATIENT
Start: 2024-06-27 | End: 2024-06-27

## 2024-06-27 RX ORDER — AMOXICILLIN 250 MG
2 CAPSULE ORAL 3 TIMES DAILY
Status: ON HOLD | COMMUNITY

## 2024-06-27 RX ORDER — LIDOCAINE 4 G/G
1 PATCH TOPICAL ONCE
Status: COMPLETED | OUTPATIENT
Start: 2024-06-27 | End: 2024-06-28

## 2024-06-27 RX ORDER — SODIUM CHLORIDE 9 MG/ML
40 INJECTION, SOLUTION INTRAVENOUS AS NEEDED
Status: DISCONTINUED | OUTPATIENT
Start: 2024-06-27 | End: 2024-07-16 | Stop reason: HOSPADM

## 2024-06-27 RX ORDER — ACETAMINOPHEN 160 MG/5ML
650 SOLUTION ORAL EVERY 4 HOURS PRN
Status: DISCONTINUED | OUTPATIENT
Start: 2024-06-27 | End: 2024-07-16 | Stop reason: HOSPADM

## 2024-06-27 RX ORDER — POLYETHYLENE GLYCOL 3350 17 G/17G
17 POWDER, FOR SOLUTION ORAL DAILY PRN
Status: DISCONTINUED | OUTPATIENT
Start: 2024-06-27 | End: 2024-07-16 | Stop reason: HOSPADM

## 2024-06-27 RX ORDER — SODIUM CHLORIDE 0.9 % (FLUSH) 0.9 %
10 SYRINGE (ML) INJECTION EVERY 12 HOURS SCHEDULED
Status: DISCONTINUED | OUTPATIENT
Start: 2024-06-27 | End: 2024-07-16 | Stop reason: HOSPADM

## 2024-06-27 RX ORDER — BISACODYL 5 MG/1
5 TABLET, DELAYED RELEASE ORAL DAILY PRN
Status: DISCONTINUED | OUTPATIENT
Start: 2024-06-27 | End: 2024-07-16 | Stop reason: HOSPADM

## 2024-06-27 RX ORDER — MAGNESIUM HYDROXIDE/ALUMINUM HYDROXICE/SIMETHICONE 120; 1200; 1200 MG/30ML; MG/30ML; MG/30ML
30 SUSPENSION ORAL EVERY 4 HOURS PRN
Status: ON HOLD | COMMUNITY

## 2024-06-27 RX ADMIN — VANCOMYCIN HYDROCHLORIDE 1500 MG: 5 INJECTION, POWDER, LYOPHILIZED, FOR SOLUTION INTRAVENOUS at 19:12

## 2024-06-27 RX ADMIN — SODIUM CHLORIDE 2223 ML: 9 INJECTION, SOLUTION INTRAVENOUS at 19:12

## 2024-06-27 RX ADMIN — CEFEPIME 2000 MG: 2 INJECTION, POWDER, FOR SOLUTION INTRAVENOUS at 18:57

## 2024-06-27 RX ADMIN — LIDOCAINE 1 PATCH: 4 PATCH TOPICAL at 17:46

## 2024-06-27 NOTE — Clinical Note
Level of Care: Telemetry [5]   Diagnosis: Sepsis [[id number]]   Admitting Physician: ASHWINI PHILLIPS [[id number]]   Certification: I Certify That Inpatient Hospital Services Are Medically Necessary For Greater Than 2 Midnights

## 2024-06-27 NOTE — H&P
Patient Care Team:  Provider, No Known as PCP - General    Chief complaint fever    Subjective     Patient is a 44 y.o. male with history of recent right femur fracture status postrepair presents to the emergency department for fever.  At time of my interview, patient was not forthcoming with history.  He states that he has had a fever that had just broken however when I asked him how long it has been going on he said a while.  He has had some weakness as well.  When he arrived to the emergency department he had tachycardia, and respirations of 22 along with a increasing white count of 19.55.  Patient did not have a fever in the emergency department.  There was concern for sepsis and patient was covered with broad-spectrum antibiotics.  He does state that he has had a cough and otherwise has pain at the postoperative area.  Denies any other symptoms.    Review of Systems   Pertinent items are noted in HPI    History  No past medical history on file.  Past Surgical History:   Procedure Laterality Date    BRONCHOSCOPY N/A 5/23/2024    Procedure: BRONCHOSCOPY WITH ENDOBRONCHIAL ULTRASOUND, FINE NEEDLE ASPIRATE, BIOPSIES, BRUSHINGS, BRONCHOALVEOLAR LAVAGE;  Surgeon: Kendell Stern MD;  Location: [facility] ENDOSCOPY;  Service: Pulmonary;  Laterality: N/A;  LUNG NODULE, MUCOUS PLUGGING    BRONCHOSCOPY WITH ION ROBOTIC ASSIST N/A 6/20/2024    Procedure: BRONCHOSCOPY NAVIGATION WITH ENDOBRONCHIAL ULTRASOUND AND ION ROBOT. REBUS, EBUS, BRUSHINGS, WASHINGS, BAL, BIOPSIES AND NEEDLE ASPIRATE;  Surgeon: John Elizalde MD;  Location: [facility] MAIN OR;  Service: Robotics - Pulmonary;  Laterality: N/A;    HIP INTERTROCHANTERIC NAILING Right 5/21/2024    Procedure: HIP INTERTROCHANTERIC NAILING;  Surgeon: Molina Clayton MD;  Location: [facility] MAIN OR;  Service: Orthopedics;  Laterality: Right;    US GUIDED FINE NEEDLE ASPIRATION  6/11/2024     No family history on file.  Social History     Tobacco Use    Smoking status: Every Day      Current packs/day: 1.50     Average packs/day: 1.5 packs/day for 24.5 years (36.7 ttl pk-yrs)     Types: Cigarettes     Start date: 2000    Smokeless tobacco: Former   Vaping Use    Vaping status: Never Used   Substance Use Topics    Alcohol use: Never    Drug use: Never     Medications Prior to Admission   Medication Sig Dispense Refill Last Dose    acetaminophen (TYLENOL) 325 MG tablet Take 2 tablets by mouth Every 6 (Six) Hours As Needed for Mild Pain or Fever.       albuterol sulfate  (90 Base) MCG/ACT inhaler Inhale 2 puffs 2 (Two) Times a Day.   6/27/2024    aluminum-magnesium hydroxide-simethicone (MAALOX/MYLANTA) 200-200-20 MG/5ML suspension Take 30 mL by mouth Every 4 (Four) Hours As Needed for Indigestion or Heartburn.       azithromycin (ZITHROMAX) 250 MG tablet Take 1 tablet by mouth Daily.   6/27/2024    bisacodyl (DULCOLAX) 10 MG suppository Insert 1 suppository into the rectum 1 (One) Time.   Past Week    cefTRIAXone (ROCEPHIN) 2000 mg/100 mL 0.9% NS IVPB (MBP) Infuse 100 mL into a venous catheter Daily.   6/27/2024    Fluticasone Furoate-Vilanterol 100-25 MCG/ACT aerosol powder  Inhale 1 puff Daily.   6/26/2024    Heparin Sodium, Porcine, 5000 UNIT/0.5ML solution prefilled syringe Inject 5,000 Units under the skin into the appropriate area as directed Every 12 (Twelve) Hours.   6/27/2024    HYDROcodone-acetaminophen (NORCO) 5-325 MG per tablet Take 1 tablet by mouth Every 6 (Six) Hours As Needed.       Lidocaine 4 % Place 1 patch on the skin as directed by provider Daily As Needed for Mild Pain. Remove & Discard patch within 12 hours or as directed by MD       loperamide (IMODIUM) 2 MG capsule Take 1 capsule by mouth Every 6 (Six) Hours As Needed for Diarrhea.       metoprolol succinate XL (TOPROL-XL) 50 MG 24 hr tablet Take 1 tablet by mouth Every 12 (Twelve) Hours.   6/27/2024    multivitamin with minerals tablet tablet Take 1 tablet by mouth Daily.   6/27/2024    nicotine (NICODERM CQ)  21 MG/24HR patch Place 1 patch on the skin as directed by provider Daily As Needed (smoking cessation).   6/27/2024    ondansetron ODT (ZOFRAN-ODT) 4 MG disintegrating tablet Take 1 tablet by mouth Every 6 (Six) Hours As Needed for Nausea or Vomiting.       polyethylene glycol (MIRALAX) 17 g packet Take 17 g by mouth Daily.   6/26/2024    Psyllium (METAMUCIL MULTIHEALTH FIBER) 51.7 % packet Take 1 packet by mouth Daily.   6/27/2024    sennosides-docusate (senna-docusate sodium) 8.6-50 MG per tablet Take 2 tablets by mouth 3 times a day.   6/27/2024    Umeclidinium Bromide (INCRUSE ELLIPTA) 62.5 MCG/ACT aerosol powder  Inhale 1 puff Daily.   6/27/2024    vitamin B-12 (VITAMIN B-12) 1000 MCG tablet Take 1 tablet by mouth Daily. (Patient taking differently: Take 2 tablets by mouth Daily.)   6/27/2024     Allergies:  Patient has no known allergies.    Objective     Vital Signs  Temp:  [98.2 °F (36.8 °C)] 98.2 °F (36.8 °C)  Heart Rate:  [126-133] 126  Resp:  [18] 18  BP: (122-127)/(70-78) 124/78    Physical Exam:      General Appearance:  Alert, cooperative, in no acute distress   Head:  Normocephalic, without obvious abnormality, atraumatic   Eyes:  Lids and lashes normal, conjunctivae and sclerae normal, no icterus, no pallor, corneas clear, PERRLA   Ears:  Ears appear intact with no abnormalities noted   Throat:  No oral lesions, no thrush, oral mucosa moist   Neck:  No adenopathy, supple, trachea midline, no thyromegaly, no carotid bruit, no JVD   Back:  No kyphosis present, no scoliosis present, no skin lesions, erythema or scars, no tenderness to percussion or palpation, range of motion normal   Lungs:  Clear to auscultation, respirations regular, even and unlabored    Heart:  Regular rhythm and normal rate, normal S1 and S2, no murmur, no gallop, no rub, no click   Chest Wall:  No abnormalities observed   Abdomen:  Normal bowel sounds, no masses, no organomegaly, soft non-tender, non-distended, no guarding, no  rebound tenderness   Rectal:  Deferred   Extremities:  Moves all extremities well, no edema, no cyanosis, no redness   Pulses:  Pulses palpable and equal bilaterally   Skin:  No bleeding, bruising or rash   Lymph nodes:  No palpable adenopathy   Neurologic:  Cranial nerves 2 - 12 grossly intact, sensation intact, DTR present and equal bilaterally       Results Review:    I reviewed the patient's new clinical results.  I reviewed the patient's new imaging results and agree with the interpretation.  I reviewed the patient's other test results and agree with the interpretation  I personally viewed and interpreted the patient's EKG/Telemetry data    Assessment & Plan       Sepsis    Anemia      Patient presents with subjective fever, tachycardia, tachypnea and increasing leukocytosis.  There is concern for sepsis particularly considering patient was hospitalized.  Identified source was not found at present time.  Blood cultures were taken.    Admit to telemetry  Follow-up cultures   IV fluids   empiric antibiotics  Restart home medications  Supportive care    I discussed the patients findings and my recommendations with patient.     Farzad Posada MD  06/27/24  19:28 EDT

## 2024-06-27 NOTE — ED NOTES
Attempted to get Labs & Urine Specimen on patient. X-Ray is currently still in the room with this patient. RN. Notified.

## 2024-06-28 ENCOUNTER — APPOINTMENT (OUTPATIENT)
Dept: CT IMAGING | Facility: HOSPITAL | Age: 44
End: 2024-06-28
Payer: COMMERCIAL

## 2024-06-28 LAB
ANION GAP SERPL CALCULATED.3IONS-SCNC: 9.7 MMOL/L (ref 5–15)
BASOPHILS # BLD AUTO: 0.05 10*3/MM3 (ref 0–0.2)
BASOPHILS NFR BLD AUTO: 0.2 % (ref 0–1.5)
BUN SERPL-MCNC: 9 MG/DL (ref 6–20)
BUN/CREAT SERPL: 20 (ref 7–25)
CALCIUM SPEC-SCNC: 9.1 MG/DL (ref 8.6–10.5)
CHLORIDE SERPL-SCNC: 98 MMOL/L (ref 98–107)
CO2 SERPL-SCNC: 27.3 MMOL/L (ref 22–29)
CREAT SERPL-MCNC: 0.45 MG/DL (ref 0.76–1.27)
DEPRECATED RDW RBC AUTO: 48.1 FL (ref 37–54)
DEPRECATED RDW RBC AUTO: 48.2 FL (ref 37–54)
EGFRCR SERPLBLD CKD-EPI 2021: 133.2 ML/MIN/1.73
EOSINOPHIL # BLD AUTO: 0.01 10*3/MM3 (ref 0–0.4)
EOSINOPHIL NFR BLD AUTO: 0 % (ref 0.3–6.2)
ERYTHROCYTE [DISTWIDTH] IN BLOOD BY AUTOMATED COUNT: 15.3 % (ref 12.3–15.4)
ERYTHROCYTE [DISTWIDTH] IN BLOOD BY AUTOMATED COUNT: 15.4 % (ref 12.3–15.4)
FERRITIN SERPL-MCNC: 3516 NG/ML (ref 30–400)
FLUAV SUBTYP SPEC NAA+PROBE: NOT DETECTED
FLUBV RNA ISLT QL NAA+PROBE: NOT DETECTED
GLUCOSE SERPL-MCNC: 109 MG/DL (ref 65–99)
HCT VFR BLD AUTO: 24.2 % (ref 37.5–51)
HCT VFR BLD AUTO: 25.5 % (ref 37.5–51)
HGB BLD-MCNC: 7.3 G/DL (ref 13–17.7)
HGB BLD-MCNC: 7.6 G/DL (ref 13–17.7)
IMM GRANULOCYTES # BLD AUTO: 0.2 10*3/MM3 (ref 0–0.05)
IMM GRANULOCYTES NFR BLD AUTO: 0.9 % (ref 0–0.5)
IRON 24H UR-MRATE: 14 MCG/DL (ref 59–158)
IRON SATN MFR SERPL: 9 % (ref 20–50)
LDH SERPL-CCNC: 1450 U/L (ref 135–225)
LYMPHOCYTES # BLD AUTO: 1.54 10*3/MM3 (ref 0.7–3.1)
LYMPHOCYTES # BLD MANUAL: 0.95 10*3/MM3 (ref 0.7–3.1)
LYMPHOCYTES NFR BLD AUTO: 6.6 % (ref 19.6–45.3)
LYMPHOCYTES NFR BLD MANUAL: 5 % (ref 5–12)
MAGNESIUM SERPL-MCNC: 1.5 MG/DL (ref 1.6–2.6)
MAGNESIUM SERPL-MCNC: 1.7 MG/DL (ref 1.6–2.6)
MCH RBC QN AUTO: 25.7 PG (ref 26.6–33)
MCH RBC QN AUTO: 26 PG (ref 26.6–33)
MCHC RBC AUTO-ENTMCNC: 29.8 G/DL (ref 31.5–35.7)
MCHC RBC AUTO-ENTMCNC: 30.2 G/DL (ref 31.5–35.7)
MCV RBC AUTO: 86.1 FL (ref 79–97)
MCV RBC AUTO: 86.1 FL (ref 79–97)
MONOCYTES # BLD AUTO: 2.09 10*3/MM3 (ref 0.1–0.9)
MONOCYTES # BLD: 0.95 10*3/MM3 (ref 0.1–0.9)
MONOCYTES NFR BLD AUTO: 8.9 % (ref 5–12)
NEUTROPHILS # BLD AUTO: 17.07 10*3/MM3 (ref 1.7–7)
NEUTROPHILS NFR BLD AUTO: 19.53 10*3/MM3 (ref 1.7–7)
NEUTROPHILS NFR BLD AUTO: 83.4 % (ref 42.7–76)
NEUTROPHILS NFR BLD MANUAL: 90 % (ref 42.7–76)
NRBC BLD AUTO-RTO: 0 /100 WBC (ref 0–0.2)
PATHOLOGY REVIEW: YES
PHOSPHATE SERPL-MCNC: 2.9 MG/DL (ref 2.5–4.5)
PHOSPHATE SERPL-MCNC: 3.6 MG/DL (ref 2.5–4.5)
PLAT MORPH BLD: NORMAL
PLATELET # BLD AUTO: 421 10*3/MM3 (ref 140–450)
PLATELET # BLD AUTO: 431 10*3/MM3 (ref 140–450)
PMV BLD AUTO: 9.4 FL (ref 6–12)
PMV BLD AUTO: 9.7 FL (ref 6–12)
POTASSIUM SERPL-SCNC: 3.7 MMOL/L (ref 3.5–5.2)
PROCALCITONIN SERPL-MCNC: 0.48 NG/ML (ref 0–0.25)
RBC # BLD AUTO: 2.81 10*6/MM3 (ref 4.14–5.8)
RBC # BLD AUTO: 2.96 10*6/MM3 (ref 4.14–5.8)
RBC MORPH BLD: NORMAL
RSV RNA NPH QL NAA+NON-PROBE: NOT DETECTED
SARS-COV-2 RNA RESP QL NAA+PROBE: NOT DETECTED
SODIUM SERPL-SCNC: 135 MMOL/L (ref 136–145)
TIBC SERPL-MCNC: 161 MCG/DL (ref 298–536)
TRANSFERRIN SERPL-MCNC: 108 MG/DL (ref 200–360)
VARIANT LYMPHS NFR BLD MANUAL: 2 % (ref 0–5)
VARIANT LYMPHS NFR BLD MANUAL: 3 % (ref 19.6–45.3)
WBC MORPH BLD: NORMAL
WBC NRBC COR # BLD AUTO: 18.97 10*3/MM3 (ref 3.4–10.8)
WBC NRBC COR # BLD AUTO: 23.42 10*3/MM3 (ref 3.4–10.8)

## 2024-06-28 PROCEDURE — 74177 CT ABD & PELVIS W/CONTRAST: CPT

## 2024-06-28 PROCEDURE — 25510000001 IOPAMIDOL PER 1 ML: Performed by: FAMILY MEDICINE

## 2024-06-28 PROCEDURE — 99233 SBSQ HOSP IP/OBS HIGH 50: CPT | Performed by: FAMILY MEDICINE

## 2024-06-28 PROCEDURE — 99222 1ST HOSP IP/OBS MODERATE 55: CPT | Performed by: INTERNAL MEDICINE

## 2024-06-28 PROCEDURE — 71250 CT THORAX DX C-: CPT

## 2024-06-28 PROCEDURE — 25810000003 LACTATED RINGERS SOLUTION: Performed by: FAMILY MEDICINE

## 2024-06-28 PROCEDURE — 25810000003 SODIUM CHLORIDE 0.9 % SOLUTION: Performed by: STUDENT IN AN ORGANIZED HEALTH CARE EDUCATION/TRAINING PROGRAM

## 2024-06-28 PROCEDURE — 86480 TB TEST CELL IMMUN MEASURE: CPT | Performed by: INTERNAL MEDICINE

## 2024-06-28 PROCEDURE — 70450 CT HEAD/BRAIN W/O DYE: CPT

## 2024-06-28 PROCEDURE — 83615 LACTATE (LD) (LDH) ENZYME: CPT | Performed by: INTERNAL MEDICINE

## 2024-06-28 PROCEDURE — 93005 ELECTROCARDIOGRAM TRACING: CPT | Performed by: FAMILY MEDICINE

## 2024-06-28 PROCEDURE — 0 IOHEXOL 12 MG/ML SOLUTION: Performed by: FAMILY MEDICINE

## 2024-06-28 PROCEDURE — 83735 ASSAY OF MAGNESIUM: CPT | Performed by: STUDENT IN AN ORGANIZED HEALTH CARE EDUCATION/TRAINING PROGRAM

## 2024-06-28 PROCEDURE — 83540 ASSAY OF IRON: CPT | Performed by: INTERNAL MEDICINE

## 2024-06-28 PROCEDURE — 97161 PT EVAL LOW COMPLEX 20 MIN: CPT

## 2024-06-28 PROCEDURE — 99223 1ST HOSP IP/OBS HIGH 75: CPT | Performed by: INTERNAL MEDICINE

## 2024-06-28 PROCEDURE — 84100 ASSAY OF PHOSPHORUS: CPT | Performed by: STUDENT IN AN ORGANIZED HEALTH CARE EDUCATION/TRAINING PROGRAM

## 2024-06-28 PROCEDURE — 87637 SARSCOV2&INF A&B&RSV AMP PRB: CPT | Performed by: FAMILY MEDICINE

## 2024-06-28 PROCEDURE — 25010000002 CEFEPIME PER 500 MG: Performed by: STUDENT IN AN ORGANIZED HEALTH CARE EDUCATION/TRAINING PROGRAM

## 2024-06-28 PROCEDURE — 85025 COMPLETE CBC W/AUTO DIFF WBC: CPT | Performed by: STUDENT IN AN ORGANIZED HEALTH CARE EDUCATION/TRAINING PROGRAM

## 2024-06-28 PROCEDURE — 82728 ASSAY OF FERRITIN: CPT | Performed by: INTERNAL MEDICINE

## 2024-06-28 PROCEDURE — 85025 COMPLETE CBC W/AUTO DIFF WBC: CPT | Performed by: INTERNAL MEDICINE

## 2024-06-28 PROCEDURE — 80048 BASIC METABOLIC PNL TOTAL CA: CPT | Performed by: STUDENT IN AN ORGANIZED HEALTH CARE EDUCATION/TRAINING PROGRAM

## 2024-06-28 PROCEDURE — 85007 BL SMEAR W/DIFF WBC COUNT: CPT | Performed by: INTERNAL MEDICINE

## 2024-06-28 PROCEDURE — 25010000002 VANCOMYCIN 5 G RECONSTITUTED SOLUTION: Performed by: STUDENT IN AN ORGANIZED HEALTH CARE EDUCATION/TRAINING PROGRAM

## 2024-06-28 PROCEDURE — 84466 ASSAY OF TRANSFERRIN: CPT | Performed by: INTERNAL MEDICINE

## 2024-06-28 RX ORDER — METOPROLOL SUCCINATE 50 MG/1
50 TABLET, EXTENDED RELEASE ORAL EVERY 12 HOURS SCHEDULED
Status: DISCONTINUED | OUTPATIENT
Start: 2024-06-28 | End: 2024-06-28

## 2024-06-28 RX ORDER — SODIUM CHLORIDE 9 MG/ML
100 INJECTION, SOLUTION INTRAVENOUS CONTINUOUS
Status: DISCONTINUED | OUTPATIENT
Start: 2024-06-28 | End: 2024-06-30

## 2024-06-28 RX ORDER — METOPROLOL TARTRATE 1 MG/ML
INJECTION, SOLUTION INTRAVENOUS
Status: COMPLETED
Start: 2024-06-28 | End: 2024-06-28

## 2024-06-28 RX ORDER — VANCOMYCIN/0.9 % SOD CHLORIDE 1.5G/250ML
1500 PLASTIC BAG, INJECTION (ML) INTRAVENOUS EVERY 12 HOURS
Status: DISCONTINUED | OUTPATIENT
Start: 2024-06-28 | End: 2024-06-30

## 2024-06-28 RX ADMIN — Medication 10 ML: at 08:00

## 2024-06-28 RX ADMIN — METOPROLOL SUCCINATE 50 MG: 50 TABLET, EXTENDED RELEASE ORAL at 08:00

## 2024-06-28 RX ADMIN — IOPAMIDOL 100 ML: 755 INJECTION, SOLUTION INTRAVENOUS at 22:45

## 2024-06-28 RX ADMIN — METOPROLOL SUCCINATE 75 MG: 50 TABLET, EXTENDED RELEASE ORAL at 23:02

## 2024-06-28 RX ADMIN — CEFEPIME 2000 MG: 2 INJECTION, POWDER, FOR SOLUTION INTRAVENOUS at 04:00

## 2024-06-28 RX ADMIN — VANCOMYCIN HYDROCHLORIDE 1500 MG: 5 INJECTION, POWDER, LYOPHILIZED, FOR SOLUTION INTRAVENOUS at 22:02

## 2024-06-28 RX ADMIN — CEFEPIME 2000 MG: 2 INJECTION, POWDER, FOR SOLUTION INTRAVENOUS at 20:02

## 2024-06-28 RX ADMIN — SODIUM CHLORIDE 100 ML/HR: 9 INJECTION, SOLUTION INTRAVENOUS at 22:01

## 2024-06-28 RX ADMIN — ACETAMINOPHEN 650 MG: 325 TABLET ORAL at 12:10

## 2024-06-28 RX ADMIN — SODIUM CHLORIDE 100 ML/HR: 9 INJECTION, SOLUTION INTRAVENOUS at 04:34

## 2024-06-28 RX ADMIN — SODIUM CHLORIDE, POTASSIUM CHLORIDE, SODIUM LACTATE AND CALCIUM CHLORIDE 1000 ML: 600; 310; 30; 20 INJECTION, SOLUTION INTRAVENOUS at 15:43

## 2024-06-28 RX ADMIN — IOHEXOL 500 ML: 12 SOLUTION ORAL at 16:57

## 2024-06-28 RX ADMIN — Medication 10 ML: at 00:07

## 2024-06-28 RX ADMIN — CEFEPIME 2000 MG: 2 INJECTION, POWDER, FOR SOLUTION INTRAVENOUS at 11:53

## 2024-06-28 RX ADMIN — VANCOMYCIN HYDROCHLORIDE 1500 MG: 5 INJECTION, POWDER, LYOPHILIZED, FOR SOLUTION INTRAVENOUS at 07:35

## 2024-06-28 RX ADMIN — METOPROLOL TARTRATE: 1 INJECTION, SOLUTION INTRAVENOUS at 03:30

## 2024-06-28 RX ADMIN — ACETAMINOPHEN 650 MG: 325 TABLET ORAL at 00:50

## 2024-06-28 NOTE — CONSULTS
Pulmonary / Critical Care Consult Note      Patient Name: Oswaldo Bowen  : 1980  MRN: [id number]  Primary Care Physician:  Provider, No Known  Referring Physician: No Known Provider  Date of admission: 2024    Subjective   Subjective     Reason for Consult/ Chief Complaint: Fevers, concern of pneumonia, lung cancer    HPI:  Oswaldo Bowen is a 44 y.o. male with history of intellectual disability, tobacco smoking, depression, chronic anemia, with recent hospitalization for mechanical fall with displaced intertrochanteric fracture requiring orthopedic intervention, VLAD with suspected new metastatic lung cancer and mets to the bone with 2.6 cm nodule in left upper lobe which came back positive for adenocarcinoma.  He was admitted to hospital with fever of unknown source, has been on broad-spectrum antibiotics.  Pulmonary services consulted for wide spread adenocarcinoma of the lung and fever.  Patient is having mild abdominal discomfort.  No shortness of breath.  No cough.  Had fever up to 102.6 °F.  He has been tachycardic.  CT scan on admission showed very small bilateral pleural effusion and atelectasis at bases.  Urinalysis is negative.  Pro-Alvaro is mildly elevated.  Has significant leukocytosis.  Has been bedridden.    Review of Systems  General:  Fatigue, No Fever  HEENT: No dysphagia, No Visual Changes, no rhinorrhea  Respiratory:  + cough,+Dyspnea, no phlegm, No Pleuritic Pain, no wheezing, no hemoptysis  Cardiovascular: Denies chest pain, denies palpitations,+ALEXANDRA, No Chest Pressure  Gastrointestinal:  mild abdominal discomfort, No Nausea, No Vomiting, No Diarrhea  Genitourinary:  No Dysuria, No Frequency, No Hesitancy  Musculoskeletal: No muscle pain or swelling  Endocrine:  No Heat Intolerance, No Cold Intolerance  Hematologic:  No Bleeding, No Bruising  Psychiatric:  No Anxiety, No Depression  Neurologic:  No Confusion, no Dysarthria, No Headaches, bed ridden, weak+  Skin:  No Rash, No Open  Wounds        Personal History     History reviewed. No pertinent past medical history.    Past Surgical History:   Procedure Laterality Date    BRONCHOSCOPY N/A 5/23/2024    Procedure: BRONCHOSCOPY WITH ENDOBRONCHIAL ULTRASOUND, FINE NEEDLE ASPIRATE, BIOPSIES, BRUSHINGS, BRONCHOALVEOLAR LAVAGE;  Surgeon: Kendell Stern MD;  Location: [facility] ENDOSCOPY;  Service: Pulmonary;  Laterality: N/A;  LUNG NODULE, MUCOUS PLUGGING    BRONCHOSCOPY WITH ION ROBOTIC ASSIST N/A 6/20/2024    Procedure: BRONCHOSCOPY NAVIGATION WITH ENDOBRONCHIAL ULTRASOUND AND ION ROBOT. REBUS, EBUS, BRUSHINGS, WASHINGS, BAL, BIOPSIES AND NEEDLE ASPIRATE;  Surgeon: John Elizalde MD;  Location: [facility] MAIN OR;  Service: Robotics - Pulmonary;  Laterality: N/A;    HIP INTERTROCHANTERIC NAILING Right 5/21/2024    Procedure: HIP INTERTROCHANTERIC NAILING;  Surgeon: Molina Clayton MD;  Location: [facility] MAIN OR;  Service: Orthopedics;  Laterality: Right;    US GUIDED FINE NEEDLE ASPIRATION  6/11/2024       Family History: No known family history of chronic lung disease.     Social History:  reports that he has been smoking cigarettes. He started smoking about 24 years ago. He has a 36.7 pack-year smoking history. He has quit using smokeless tobacco. He reports that he does not drink alcohol and does not use drugs.    Home Medications:  Fluticasone Furoate-Vilanterol, HYDROcodone-acetaminophen, Heparin Sodium (Porcine), Lidocaine, Psyllium, Umeclidinium Bromide, acetaminophen, albuterol sulfate HFA, aluminum-magnesium hydroxide-simethicone, azithromycin, bisacodyl, cefTRIAXone, cyanocobalamin, loperamide, metoprolol succinate XL, multivitamin with minerals, nicotine, ondansetron ODT, polyethylene glycol, and sennosides-docusate    Allergies:  No Known Allergies    Objective    Objective     Vitals:   Temp:  [97.2 °F (36.2 °C)-102.6 °F (39.2 °C)] 98.4 °F (36.9 °C)  Heart Rate:  [109-144] 109  Resp:  [16-22] 16  BP: (103-135)/(64-78) 113/65    Physical  Exam:  Vital Signs Reviewed   General:  WDWN, Alert, NAD.    HEENT:  PERRL, EOMI.  OP, nares clear, no sinus tenderness  Neck:  Supple, no JVD, no thyromegaly  Lymph: no axillary, cervical, supraclavicular lymphadenopathy noted bilaterally  Chest:  good aeration, clear to auscultation bilaterally, tympanic to percussion bilaterally, no work of breathing noted  CV: RRR, no MGR, pulses 2+, equal.  Abd:  Soft, NT, ND, + BS, no HSM  EXT:  no clubbing, no cyanosis, no edema, no joint tenderness  Neuro:  A&Ox3, CN grossly intact, no focal deficits.  Skin: No rashes or lesions noted      Result Review    Result Review:  I have personally reviewed the results from the time of this admission to 6/28/2024 15:24 EDT and agree with these findings:  [x]  Laboratory  [x]  Microbiology  [x]  Radiology  [x]  EKG/Telemetry   [x]  Cardiology/Vascular   []  Pathology  []  Old records  []  Other:  Most notable findings include:         Lab 06/28/24  0539 06/27/24  1756 06/27/24  1755   WBC 23.42*  --  19.55*   HEMOGLOBIN 7.6*  --  7.4*   HEMATOCRIT 25.5*  --  23.7*   PLATELETS 431  --  442   SODIUM 135* 133*  --    POTASSIUM 3.7 3.6  --    CHLORIDE 98 95*  --    CO2 27.3 24.9  --    BUN 9 10  --    CREATININE 0.45* 0.38*  --    GLUCOSE 109* 124*  --    CALCIUM 9.1 9.3  --    PHOSPHORUS 3.6 2.9  --    TOTAL PROTEIN  --  6.4  --    ALBUMIN  --  2.7*  --    GLOBULIN  --  3.7  --        XR Chest 1 View    Result Date: 6/27/2024  XR CHEST 1 VW Date of Exam: 6/27/2024 6:15 PM EDT Indication: septic, attempting to locate source Comparison: 6/18/2024 chest CT Findings: Unchanged cardiomediastinal silhouette. Prominence of the left hilum is likely secondary to known left upper lobe nodule. No new focal airspace consolidation, pleural effusion, or pneumothorax. No acute osseous abnormality. Expansile lytic lesion of the right posterior seventh rib consistent with previously seen metastatic disease.     Impression: Impression: No focal airspace  consolidation. Electronically Signed: Michael Chand MD  6/27/2024 7:06 PM EDT  Workstation ID: [id number]     Procal 0.48      Assessment & Plan   Assessment / Plan     Active Hospital Problems:  Active Hospital Problems    Diagnosis     **Sepsis     Anemia          Impression:  Fever of unknown origin  Bilateral small pleural effusion  Atelectasis at bases  Recently diagnosed metastatic adenocarcinoma of the lung  Acute comminuted displaced intertrochanteric right femoral fracture  Status post IM nailing  Left upper lobe lung nodule with mediastinal lymphadenopathy  Chronic heavy smoking  Unintentional weight loss    Plan:  Check CT scan of the abdomen and pelvis.  Urinalysis is negative.  Had significant leukocytosis.  Start incentive spirometer.  Check ESR, CRP.  Blood culture has been sent.  May need echocardiogram.  Unclear source at this time. Check QuantiFERON gold.  Mildly increased procalcitonin.  Continue with IV vancomycin and cefepime.  Given his issues with transportation, would consult oncology for treatment options for patient is inpatient.  As needed albuterol.  Pain control per primary service.  Postop surgical care per Ortho service.     I have reviewed labs, imaging, pertinent clinical data and provider notes.   I have discussed with bedside nurse and primary service.     Electronically signed by Kendell Stern MD, 6/28/2024, 15:24 EDT.

## 2024-06-28 NOTE — THERAPY EVALUATION
Acute Care - Physical Therapy Initial Evaluation   [place]     Patient Name: Oswaldo Bowen  : 1980  MRN: [id number]  Today's Date: 2024     Admit date: 2024     Referring Physician: Rosalinda Esquivel MD     Surgery Date:* No surgery found *           Visit Dx:     ICD-10-CM ICD-9-CM   1. Difficulty in walking  R26.2 719.7     Patient Active Problem List   Diagnosis    Closed intertrochanteric fracture of right femur    Pulmonary nodule    Sepsis    Anemia     History reviewed. No pertinent past medical history.  Past Surgical History:   Procedure Laterality Date    BRONCHOSCOPY N/A 2024    Procedure: BRONCHOSCOPY WITH ENDOBRONCHIAL ULTRASOUND, FINE NEEDLE ASPIRATE, BIOPSIES, BRUSHINGS, BRONCHOALVEOLAR LAVAGE;  Surgeon: Kendell Stern MD;  Location: [facility] ENDOSCOPY;  Service: Pulmonary;  Laterality: N/A;  LUNG NODULE, MUCOUS PLUGGING    BRONCHOSCOPY WITH ION ROBOTIC ASSIST N/A 2024    Procedure: BRONCHOSCOPY NAVIGATION WITH ENDOBRONCHIAL ULTRASOUND AND ION ROBOT. REBUS, EBUS, BRUSHINGS, WASHINGS, BAL, BIOPSIES AND NEEDLE ASPIRATE;  Surgeon: John Elizalde MD;  Location: [facility] MAIN OR;  Service: Robotics - Pulmonary;  Laterality: N/A;    HIP INTERTROCHANTERIC NAILING Right 2024    Procedure: HIP INTERTROCHANTERIC NAILING;  Surgeon: Molina Clayton MD;  Location: [facility] MAIN OR;  Service: Orthopedics;  Laterality: Right;    US GUIDED FINE NEEDLE ASPIRATION  2024     PT Assessment (Last 12 Hours)       PT Evaluation and Treatment       Row Name 24 1100          Physical Therapy Time and Intention    Subjective Information complains of;weakness;fatigue (P)   -TR     Document Type evaluation (P)   -TR     Mode of Treatment individual therapy;physical therapy (P)   -TR     Patient Effort good (P)   -TR     Symptoms Noted During/After Treatment fatigue (P)   -TR       Row Name 24 1100          General Information    Patient Profile Reviewed yes (P)   -TR     Patient  Observations alert;cooperative;agree to therapy (P)   -TR     Prior Level of Function independent: (P)   -TR     Risks Reviewed patient: (P)   -TR     Benefits Reviewed patient: (P)   -TR       [facility] Name 06/28/24 1100          Range of Motion (ROM)    Range of Motion bilateral lower extremities;ROM is WFL (P)   -TR       Row Name 06/28/24 1100          Strength (Manual Muscle Testing)    Strength (Manual Muscle Testing) bilateral lower extremities;strength is WFL (P)   -TR       [facility] Name 06/28/24 1100          Bed Mobility    Bed Mobility supine-sit;sit-supine;scooting/bridging (P)   -TR     Scooting/Bridging [place] (Bed Mobility) minimum assist (75% patient effort) (P)   -TR     Supine-Sit [place] (Bed Mobility) minimum assist (75% patient effort) (P)   -TR     Sit-Supine [place] (Bed Mobility) minimum assist (75% patient effort) (P)   -TR     Bed Mobility, Safety Issues decreased use of arms for pushing/pulling;decreased use of legs for bridging/pushing (P)   -TR     Assistive Device (Bed Mobility) draw sheet;bed rails;head of bed elevated (P)   -TR       Row Name 06/28/24 1100          Transfers    Transfers stand-sit transfer;sit-stand transfer (P)   -TR     Maintains Weight-bearing Status (Transfers) able to maintain (P)   -TR       Row Name 06/28/24 1100          Sit-Stand Transfer    Sit-Stand [place] (Transfers) contact guard (P)   -TR     Assistive Device (Sit-Stand Transfers) walker, front-wheeled (P)   -TR       Row Name 06/28/24 1100          Stand-Sit Transfer    Stand-Sit [place] (Transfers) contact guard (P)   -TR     Assistive Device (Stand-Sit Transfers) walker, front-wheeled (P)   -TR       Row Name 06/28/24 1100          Gait/Stairs (Locomotion)    Gait/Stairs Locomotion gait/ambulation independence;gait/ambulation assistive device;distance ambulated (P)   -TR     [place] Level (Gait) contact guard (P)   -TR     Assistive Device (Gait) walker, front-wheeled (P)   -TR      Patient was able to Ambulate yes (P)   -TR     Distance in Feet (Gait) 4 (P)   -TR     Pattern (Gait) step-to (P)   -TR       Row Name 06/28/24 1100          Safety Issues, Functional Mobility    Impairments Affecting Function (Mobility) balance;coordination;endurance/activity tolerance;pain;strength;range of motion (ROM) (P)   -TR       Row Name 06/28/24 1100          Balance    Balance Assessment standing dynamic balance (P)   very shaky  -TR     Dynamic Standing Balance contact guard (P)   -TR     Position/Device Used, Standing Balance walker, front-wheeled (P)   -TR       Row Name 06/28/24 1100          Motor Skills    Motor Skills coordination;functional endurance (P)   -TR       Row Name 06/28/24 1100          Plan of Care Review    Plan of Care Reviewed With patient (P)   -TR     Outcome Evaluation Pt presents with deficits in balance, coordination, endurance, strength, and mobility. PT recommended to address the above deficits. Pt presents very fatigued with only enough energy to ambulate a couple feet within the room before requesting to be seated. Pt has difficulty standing from low positioned seats and commodes. Pt may require modA to stand from these positions (P)   -TR       Row Name 06/28/24 1100          Positioning and Restraints    Pre-Treatment Position in bed (P)   -TR     Post Treatment Position bed (P)   -TR     In Bed encouraged to call for assist;call light within reach (P)   -TR       Row Name 06/28/24 1100          Therapy Assessment/Plan (PT)    Rehab Potential (PT) good, to achieve stated therapy goals (P)   -TR     Criteria for Skilled Interventions Met (PT) skilled treatment is necessary (P)   -TR     Therapy Frequency (PT) daily (P)   -TR     Predicted Duration of Therapy Intervention (PT) 10 days (P)   -TR     Problem List (PT) problems related to;balance;coordination;mobility;strength;range of motion (ROM);pain (P)   -TR     Activity Limitations Related to Problem List (PT) unable  to ambulate safely (P)   -TR       [facility] Name 06/28/24 1100          PT Evaluation Complexity    History, PT Evaluation Complexity 1-2 personal factors and/or comorbidities (P)   -TR     Examination of Body Systems (PT Eval Complexity) total of 4 or more elements (P)   -TR     Clinical Presentation (PT Evaluation Complexity) stable (P)   -TR     Clinical Decision Making (PT Evaluation Complexity) low complexity (P)   -TR     Overall Complexity (PT Evaluation Complexity) low complexity (P)   -TR       [facility] Name 06/28/24 1100          Therapy Plan Review/Discharge Plan (PT)    Therapy Plan Review (PT) evaluation/treatment results reviewed (P)   -TR       [facility] Name 06/28/24 1100          Physical Therapy Goals    Bed Mobility Goal Selection (PT) bed mobility, PT goal 1 (P)   -TR     Transfer Goal Selection (PT) transfer, PT goal 1 (P)   -TR     Gait Training Goal Selection (PT) gait training, PT goal 1 (P)   -TR       Row Name 06/28/24 1100          Bed Mobility Goal 1 (PT)    Activity/Assistive Device (Bed Mobility Goal 1, PT) bed mobility activities, all (P)   -TR     [place] Level/Cues Needed (Bed Mobility Goal 1, PT) modified independence (P)   -TR     Time Frame (Bed Mobility Goal 1, PT) long term goal (LTG);10 days (P)   -TR       [facility] Name 06/28/24 1100          Transfer Goal 1 (PT)    Activity/Assistive Device (Transfer Goal 1, PT) transfers, all;walker, standard (P)   -TR     [place] Level/Cues Needed (Transfer Goal 1, PT) standby assist (P)   -TR     Time Frame (Transfer Goal 1, PT) long term goal (LTG);10 days (P)   -TR       [facility] Name 06/28/24 1100          Gait Training Goal 1 (PT)    Activity/Assistive Device (Gait Training Goal 1, PT) gait (walking locomotion);assistive device use (P)   -TR     [place] Level (Gait Training Goal 1, PT) modified independence (P)   -TR     Distance (Gait Training Goal 1, PT) 100 (P)   -TR     Time Frame (Gait Training Goal 1, PT) long term goal (LTG);10 days (P)    -TR               User Key  (r) = Recorded By, (t) = Taken By, (c) = Cosigned By      Initials Name Provider Type    TR Aaron Fowler, PT Student PT Student                    Physical Therapy Education       Title: PT OT SLP Therapies (Done)       Topic: Physical Therapy (Done)       Point: Mobility training (Done)       Learning Progress Summary             Patient Acceptance, E,TB, VU by TR at 6/28/2024 1112                         Point: Precautions (Done)       Learning Progress Summary             Patient Acceptance, E,TB, VU by TR at 6/28/2024 1112                                         User Key       Initials Effective Dates Name Provider Type Discipline    TR 05/21/24 -  Aaron Fowler, PT Student PT Student PT                  PT Recommendation and Plan  Anticipated Discharge Disposition (PT): (P) sub acute care setting  Planned Therapy Interventions (PT): (P) balance training, bed mobility training, gait training, strengthening, ROM (range of motion), stretching  Therapy Frequency (PT): (P) daily  Plan of Care Reviewed With: (P) patient  Outcome Evaluation: (P) Pt presents with deficits in balance, coordination, endurance, strength, and mobility. PT recommended to address the above deficits. Pt presents very fatigued with only enough energy to ambulate a couple feet within the room before requesting to be seated. Pt has difficulty standing from low positioned seats and commodes. Pt may require modA to stand from these positions   Outcome Measures       Row Name 06/28/24 1100             How much help from another person do you currently need...    Turning from your back to your side while in flat bed without using bedrails? 3 (P)   -TR      Moving from lying on back to sitting on the side of a flat bed without bedrails? 3 (P)   -TR      Moving to and from a bed to a chair (including a wheelchair)? 3 (P)   -TR      Standing up from a chair using your arms (e.g., wheelchair, bedside  chair)? 3 (P)   -TR      Climbing 3-5 steps with a railing? 1 (P)   -TR      To walk in hospital room? 2 (P)   -TR      AM-PAC 6 Clicks Score (PT) 15 (P)   -TR      Highest Level of Mobility Goal 4 --> Transfer to chair/commode (P)   -TR         Functional Assessment    Outcome Measure Options AM-PAC 6 Clicks Basic Mobility (PT) (P)   -TR                User Key  (r) = Recorded By, (t) = Taken By, (c) = Cosigned By      Initials Name Provider Type    Aaron Cooney, PT Student PT Student                     Time Calculation:    PT Charges       Row Name 06/28/24 1100             Time Calculation    PT Received On 06/28/24 (P)   -TR      PT Goal Re-Cert Due Date 07/07/24 (P)   -TR         Untimed Charges    PT Eval/Re-eval Minutes 30 (P)   -TR         Total Minutes    Untimed Charges Total Minutes 30 (P)   -TR       Total Minutes 30 (P)   -TR                User Key  (r) = Recorded By, (t) = Taken By, (c) = Cosigned By      Initials Name Provider Type    Aaron Cooney, PT Student PT Student                  Therapy Charges for Today       Code Description Service Date Service Provider Modifiers Qty    [id number] HC PT EVAL LOW COMPLEXITY 3 6/28/2024 Aaron Fowler PT Student GP 1            PT G-Codes  Outcome Measure Options: (P) AM-PAC 6 Clicks Basic Mobility (PT)  AM-PAC 6 Clicks Score (PT): (P) 15    Aaron Fowler PT Student  6/28/2024

## 2024-06-28 NOTE — PLAN OF CARE
Goal Outcome Evaluation:  Plan of Care Reviewed With: (P) patient           Outcome Evaluation: (P) Pt presents with deficits in balance, coordination, endurance, strength, and mobility. PT recommended to address the above deficits. Pt presents very fatigued with only enough energy to ambulate a couple feet within the room before requesting to be seated. Pt has difficulty standing from low positioned seats and commodes. Pt may require modA to stand from these positions      Anticipated Discharge Disposition (PT): (P) sub acute care setting

## 2024-06-28 NOTE — PROGRESS NOTES
[facility]   Hospitalist Progress Note  Date: 2024  Patient Name: Oswaldo Bowen  : 1980  MRN: [id number]  Date of admission: 2024      Subjective   Subjective     Chief complaint: Sent from facility for fevers    Summary:  44-year-old male with history of intellectual disability, tobacco smoker, depression, chronic anemia, with recent hospitalization for mechanical fall with displaced intertrochanteric femur fracture requiring orthopedic intervention, VLAD, with suspected new metastatic lung cancer and mets to the bone with a 2.6 cm nodule in the left upper lobe, which came back positive for adenocarcinoma, hospitalized on 2024 for chief complaint of fevers, pancultured, placed on broad-spectrum antibiotics, pulmonary consulted with underlying adenocarcinoma of the lung which appears to be widely metastatic to bone, could be causing fevers.  Reaching out to heme-onc to see if he needs an appointment, transportation issues while in rehab may limit consultation in the outpatient setting    Interval follow-up: Seen and examined this morning, no acute distress, no acute major night events, mentation flat, still febrile 102.6 °F, intermittently tachycardic up to the 120s to 130s, seemed to be not bothered but has some urinary tenderness but his urinalysis was essentially unremarkable.  Blood cultures are pending.  Tested negative for SARS-CoV-2, RSV, flu.  Chest CT shows small bilateral pleural effusions but no pneumonias, head CT shows lytic lesions of the calvarium, no other changes.  Tachycardic.    Review of systems:  Unable to obtain review of systems due to altered mental status      Objective   Objective     Vitals:   Temp:  [97.2 °F (36.2 °C)-102.6 °F (39.2 °C)] 97.2 °F (36.2 °C)  Heart Rate:  [116-144] 131  Resp:  [16-22] 16  BP: (103-135)/(64-78) 103/64  Physical Exam    Constitutional: Awake, alert, no acute distress   Eyes: Pupils equal, sclerae anicteric, no conjunctival  injection   HENT: NCAT, mucous membranes moist   Neck: Supple, no thyromegaly, no lymphadenopathy, trachea midline   Respiratory: Clear to auscultation bilaterally, nonlabored respirations    Cardiovascular: RRR, no murmurs, rubs, or gallops, palpable pedal pulses bilaterally   Gastrointestinal: Positive bowel sounds, soft, nontender, nondistended   Musculoskeletal: No bilateral ankle edema, no clubbing or cyanosis to extremities   Psychiatric: Flat   Neurologic: Oriented x 1 to self, strength symmetric in all extremities, Cranial Nerves grossly intact to confrontation, speech slow   Skin: No rashes visible on exposed skin      Result Review    Result Review:  I have personally reviewed the pertinent results from the past 24 hours to 6/28/2024 15:18 EDT and agree with these findings:  [x]  Laboratory   CBC          6/21/2024    05:31 6/27/2024    17:55 6/28/2024    05:39   CBC   WBC 12.40  19.55  23.42    RBC 3.13  2.85  2.96    Hemoglobin 8.1  7.4  7.6    Hematocrit 27.4  23.7  25.5    MCV 87.5  83.2  86.1    MCH 25.9  26.0  25.7    MCHC 29.6  31.2  29.8    RDW 14.6  15.0  15.4    Platelets 514  442  431      BMP          6/21/2024    05:31 6/27/2024    17:56 6/28/2024    05:39   BMP   BUN 12  10  9    Creatinine 0.50  0.38  0.45    Sodium 135  133  135    Potassium 3.8  3.6  3.7    Chloride 95  95  98    CO2 28.1  24.9  27.3    Calcium 9.8  9.3  9.1      LIVER FUNCTION TESTS:      Lab 06/27/24  1756   TOTAL PROTEIN 6.4   ALBUMIN 2.7*   GLOBULIN 3.7   ALT (SGPT) 38   AST (SGOT) 45*   BILIRUBIN 0.3   ALK PHOS 181*       [x]  Microbiology   Microbiology Results (last 10 days)       Procedure Component Value - Date/Time    COVID PRE-OP / PRE-PROCEDURE SCREENING ORDER (NO ISOLATION) - Swab, Nasopharynx [[id number]]  (Normal) Collected: 06/28/24 1155    Lab Status: Final result Specimen: Swab from Nasopharynx Updated: 06/28/24 1252    Narrative:      The following orders were created for panel order COVID PRE-OP /  PRE-PROCEDURE SCREENING ORDER (NO ISOLATION) - Swab, Nasopharynx.  Procedure                               Abnormality         Status                     ---------                               -----------         ------                     COVID-19, FLU A/B, RSV P...[[id number]]  Normal              Final result                 Please view results for these tests on the individual orders.    COVID-19, FLU A/B, RSV PCR 1 HR TAT - Swab, Nasopharynx [[id number]]  (Normal) Collected: 06/28/24 1155    Lab Status: Final result Specimen: Swab from Nasopharynx Updated: 06/28/24 1252     COVID19 Not Detected     Influenza A PCR Not Detected     Influenza B PCR Not Detected     RSV, PCR Not Detected    Narrative:      Fact sheet for providers: https://www.fda.gov/media/591078/download    Fact sheet for patients: https://www.fda.gov/media/494797/download    Test performed by PCR.    BAL Culture, Quantitative - Lavage, Lung, Left Upper Lobe [[id number]] Collected: 06/20/24 1043    Lab Status: Final result Specimen: Lavage from Lung, Left Upper Lobe Updated: 06/22/24 1132     BAL Culture No growth     Gram Stain No organisms seen      Few (2+) WBCs seen    Pneumonia Panel - Lavage, Lung, Left Upper Lobe [[id number]]  (Normal) Collected: 06/20/24 1043    Lab Status: Final result Specimen: Lavage from Lung, Left Upper Lobe Updated: 06/20/24 1248     Escherichia coli PCR Not Detected     Acinetobacter calcoaceticus-baumannii complex PCR Not Detected     Enterobacter cloacae PCR Not Detected     Klebsiella oxytoca PCR Not Detected     Klebsiella pneumoniae group PCR Not Detected     Klebsiella aerogenes PCR Not Detected     Moraxella catarrhalis PCR Not Detected     Proteus species PCR Not Detected     Pseudomonas aeroginosa PCR Not Detected     Serratia marcescens PCR Not Detected     Staphylococcus aureus PCR Not Detected     Streptococcus pyogenes PCR Not Detected     Haemophilus influenzae PCR Not Detected     Streptococcus  agalactiae PCR Not Detected     Streptococcus pneumoniae PCR Not Detected     Chlamydophila pneumoniae PCR Not Detected     Legionella pneumophilia PCR Not Detected     Mycoplasma pneumo by PCR Not Detected     ADENOVIRUS, PCR Not Detected     CTX-M Gene N/A     IMP Gene N/A     KPC Gene N/A     mecA/C and MREJ Gene N/A     NDM Gene N/A     OXA-48-like Gene N/A     VIM Gene N/A     Coronavirus Not Detected     Human Metapneumovirus Not Detected     Human Rhinovirus/Enterovirus Not Detected     Influenza A PCR Not Detected     Influenza B PCR Not Detected     RSV, PCR Not Detected     Parainfluenza virus PCR Not Detected    Gram Stain - No Culture [[id number]] Collected: 06/20/24 1028    Lab Status: Final result Specimen: Brushing from Lung, Left Upper Lobe Updated: 06/20/24 1230     Gram Stain No organisms seen      Few (2+) WBCs seen    AFB Stain - No Culture - Brushing, Lung, Left Upper Lobe [[id number]] Collected: 06/20/24 1028    Lab Status: Final result Specimen: Brushing from Lung, Left Upper Lobe Updated: 06/21/24 1113     AFB Stain No acid fast bacilli seen              [x]  Radiology CT Chest Without Contrast Diagnostic    Result Date: 6/28/2024  1. No evidence of pneumonia 2. Small bilateral pleural effusions 3. Stable left upper lobe mass and widespread skeletal metastatic disease Electronically Signed: Sree Hawthorne  6/28/2024 1:10 PM EDT  Workstation ID: [id number]    CT Head Without Contrast    Result Date: 6/28/2024  Impression: 1. No acute intracranial process 2. Lytic lesion of the calvarium concerning for metastasis Electronically Signed: Sree Hawthorne  6/28/2024 12:49 PM EDT  Workstation ID: [id number]    XR Femur 2 View Right    Result Date: 6/27/2024  Impression: No acute abnormality. Postsurgical and degenerative findings as above without evidence of complication. Electronically Signed: Michael Chand MD  6/27/2024 7:11 PM EDT  Workstation ID: [id number]    XR Chest 1 View    Result Date:  6/27/2024  Impression: No focal airspace consolidation. Electronically Signed: Michael Chand MD  6/27/2024 7:06 PM EDT  Workstation ID: [id number]       [x]  EKG/Telemetry   ECG 12 Lead Rhythm Change   Preliminary Result   HEART QOKF=628  bpm   RR Jikuwqaq=564  ms   RI Bwfpjdll=850  ms   P Horizontal Axis=-35  deg   P Front Axis=47  deg   QRSD Interval=92  ms   QT Liebfzff=148  ms   TUeD=123  ms   QRS Axis=-8  deg   T Wave Axis=53  deg   - BORDERLINE ECG -   Sinus tachycardia   RSR' in V1 or V2, probably normal variant   Borderline  T wave abnormalities   Date and Time of Study:2024-06-28 01:08:28      ECG 12 Lead Tachycardia   Preliminary Result   HEART TVPB=234  bpm   RR Xnadxqfw=299  ms   RI Cbevhtjc=641  ms   P Horizontal Axis=-3  deg   P Front Axis=34  deg   QRSD Interval=90  ms   QT Zbdpkgnz=910  ms   WJlW=393  ms   QRS Axis=1  deg   T Wave Axis=52  deg   - OTHERWISE NORMAL ECG -   Sinus tachycardia   When compared with ECG of 14-Jun-2024 03:46:24,   Significant axis, voltage or hypertrophy change   Date and Time of Study:2024-06-27 17:33:32          [x]  Cardiology/Vascular   []  Pathology  [x]  Old records  []  Other:    Assessment & Plan   Assessment / Plan     Assessment/Plan:  Assessment:  Unknown source of fevers  Sepsis secondary to unknown source of infection suspect urinary  Metastatic adenocarcinoma of the lung; likely primary and assumed  Current tobacco smoker  Intellectual disability  Chronic anemia  Depression  Recent hospitalization for femur fracture    Plan:  Labs and imaging reviewed  Continue vancomycin and cefepime until cultures are negative  Additional bolus of LR 1 L  Continue normal saline at 100 cc/h  Consulted pulmonary discussed with Dr. Stern, recommendations appreciated; requested consultation from oncology  Oncology consulted discussed with Dr. Johnson, follow-up recommendations; not sure if patient will be able to make outpatient appointments anytime soon  Palliative ca consulted to  establish goals of care  Increase metoprolol XL to 75 mg twice a day  Pain control Tylenol  Zofran for nausea  PT/OT  Will scan abdomen and pelvis for purpose of completion as patient has difficulty communicating his localized pain  A.m. labs  Full code  DVT prophylaxis with SCD  Clinical course dictate further management  Discussed with nurse at the bedside    VTE Prophylaxis:  Pharmacologic & mechanical VTE prophylaxis orders are present.        CODE STATUS:   Code Status (Patient has no pulse and is not breathing): CPR (Attempt to Resuscitate)  Medical Interventions (Patient has pulse or is breathing): Full Support        Electronically signed by Rosalinda Esquivel MD, 6/28/2024, 15:18 EDT.    Portions of this documentation were transcribed electronically from a voice recognition software.  I confirm all data accurately represents the service(s) I performed at today's visit.

## 2024-06-28 NOTE — CONSULTS
[facility]   Hematology/Oncology  Consult Note    Patient Name: Oswaldo Bowen  : 1980  MRN: [id number]  Primary Care Physician:  Provider, No Known  Referring Physician: No Known Provider  Date of admission: 2024    Subjective   Subjective     Reason for Consult/ Chief Complaint: fever    HPI:  Oswaldo Bowen is a 44 y.o. male with intellectual disability, tobacco use, depression, anemia recently diagnosed lung cancer with mets to bone recently discharged after prolonged admission after mechanical fall with displaced intertrochanteric fracture requiring orthopedic intervention who presented with fever of unknown source. Fever up to 102.6.  He remains weak.  He denies any new pain.  White count has been very high upon readmission.  He remains anemic. he has been placed on broad-spectrum antibiotics.  Chest x-ray and CT chest did not show any pneumonia.  Of note his lung biopsy did return positive adenocarcinoma from .  This was discussed with patient today.    Review of Systems   All systems were reviewed and negative except for as mentioned above.     Personal History     History reviewed. No pertinent past medical history.    Past Surgical History:   Procedure Laterality Date    BRONCHOSCOPY N/A 2024    Procedure: BRONCHOSCOPY WITH ENDOBRONCHIAL ULTRASOUND, FINE NEEDLE ASPIRATE, BIOPSIES, BRUSHINGS, BRONCHOALVEOLAR LAVAGE;  Surgeon: Kendell Stern MD;  Location: [facility] ENDOSCOPY;  Service: Pulmonary;  Laterality: N/A;  LUNG NODULE, MUCOUS PLUGGING    BRONCHOSCOPY WITH ION ROBOTIC ASSIST N/A 2024    Procedure: BRONCHOSCOPY NAVIGATION WITH ENDOBRONCHIAL ULTRASOUND AND ION ROBOT. REBUS, EBUS, BRUSHINGS, WASHINGS, BAL, BIOPSIES AND NEEDLE ASPIRATE;  Surgeon: John Elizalde MD;  Location: [facility] MAIN OR;  Service: Robotics - Pulmonary;  Laterality: N/A;    HIP INTERTROCHANTERIC NAILING Right 2024    Procedure: HIP INTERTROCHANTERIC NAILING;  Surgeon: Molina Clayton MD;  Location:  [facility] MAIN OR;  Service: Orthopedics;  Laterality: Right;    US GUIDED FINE NEEDLE ASPIRATION  6/11/2024       Family History: family history is not on file. Otherwise pertinent FHx was reviewed and not pertinent to current issue.    Social History:  reports that he has been smoking cigarettes. He started smoking about 24 years ago. He has a 36.7 pack-year smoking history. He has quit using smokeless tobacco. He reports that he does not drink alcohol and does not use drugs.    Home Medications:  Fluticasone Furoate-Vilanterol, HYDROcodone-acetaminophen, Heparin Sodium (Porcine), Lidocaine, Psyllium, Umeclidinium Bromide, acetaminophen, albuterol sulfate HFA, aluminum-magnesium hydroxide-simethicone, azithromycin, bisacodyl, cefTRIAXone, cyanocobalamin, loperamide, metoprolol succinate XL, multivitamin with minerals, nicotine, ondansetron ODT, polyethylene glycol, and sennosides-docusate    Allergies:  No Known Allergies    Objective    Objective     Vitals:   Temp:  [97.2 °F (36.2 °C)-102.6 °F (39.2 °C)] 98.4 °F (36.9 °C)  Heart Rate:  [109-144] 109  Resp:  [16-22] 16  BP: (103-135)/(64-78) 113/65    Physical Exam:   Constitutional: Awake, alert, chronically ill appearing   Eyes: PERRLA, sclerae anicteric, no conjunctival injection   HENT: NCAT, mucous membranes moist   Respiratory:  nonlabored respirations    Cardiovascular:  no edema in the extremities   Gastrointestinal:  nondistended   Psychiatric: Appropriate affect, cooperative   Neurologic: Awake, alert, speech dysarthric, weakness in legs   Skin: Normal tone, no rashes    Result Review    Result Review:  I have personally reviewed the results from the time of this admission to 6/28/2024 16:41 EDT and agree with these findings:  [x]  Laboratory  [x]  Microbiology  []  Radiology  []  EKG/Telemetry   []  Cardiology/Vascular   []  Pathology  []  Old records  []  Other:  Most notable findings include: H and P personally reviewed, pathology report personally  reviewed, cultures pending, CXR personally reviewed and per my independent read without acute process, WBC elevated, anemia present      Assessment & Plan   Assessment / Plan     Brief Patient Summary:  Oswaldo Bowen is a 44 y.o. male  with  intellectual disability, 1-2PPD smoking, depression, who presented after a fall (possibly 1 month ago but may have been more recently), found to have right femoral fracture and VLAD. S/p fixation in OR on 5/21/24. CT chest showed 2.1 cm RUL nodule, bilateral hilar adenopathy and multiple lytic lesions throughout the spine, ribs and scapula consistent with metastatic disease. Repeat lung biopsy confirmed lung adenocarcinoma. Admitted now with fever.     Active Hospital Problems:  Active Hospital Problems    Diagnosis     **Sepsis     Anemia        Plan:   Metastatic lung cancer  CT chest obtained due to left sided pulmonary nodule on chest x-ray showed 2.1 cm RUL nodule, bilateral hilar adenopathy and multiple lytic lesions throughout the spine, ribs and scapula consistent with metastatic disease.  CT abdomen/pelvis with contrast with multiple bony mets. No visceral mets seen. MRI brain without any intracranial pathology. Multiple myeloma labs with elevated kappa and lambda FLC but normal ratio, so this is normal. M-spike not observed and no monoclonal protein on MARTINEZ. Therefore patient does not have any signs of multiple myeloma.     Pulm performed biopsy via bronchoscopy 5/23/24, results negative. Bone biopsy performed on 5/30/24 was sent to [facility] and returned as atypical cells and not enough tissue for diagnosis. Repeat bone biopsy 6/11/24 also non diagnostic.     He had repeat lung biopsy on 6/20/24 with pathology positive for lung adenocarcinoma. Discussed that this likely diagnoses him with metastatic lung adenocarcinoma. No further utility in more bone biopsies at this point. PDL1 and EGFR testing added on today. This will help guide treatment. If PDL1 positive,  would favor treatment with immunotherapy alone. Recommend completing inpatient admission with improvement and then getting rehab prior to outpatient follow up in effort to be a maximum strength prior to systemic therapy.     Unable to order NGS on lung specimen until patient follows up outpatient. Will also plan for [employer] liquid NGS.       Sepsis with fever  Unknown source at this time. CT Chest clear. Cultures pending. Agree with broad spectrum antibiotics.     Leukocytosis  Mainly neutrophils and immature grans indicative of left shift. Reactive. Treat per above.    Anemia  Essentially stable with prior. Iron studies, LDH, hapto, smear    Electronically signed by Sukhwinder Johnson MD, 06/28/24, 4:41 PM EDT.

## 2024-06-28 NOTE — PLAN OF CARE
Goal Outcome Evaluation:  Plan of Care Reviewed With: patient           Outcome Evaluation: Patient remains diaphoretic throughout shift and HR ranging from 110s-130s, MD aware, started on metoprolol XL. CT without contrast of chest and head performed and shown small bilateral pleural effusions and possible metastasis. 1,000ml bolus of LR given per provider order. IV abx continued throughout shift. Respiratory panel collected via nasal swab with negative results. Patient ordered for CT of abdomen, working on drinking oral contrast at shift change. Patient remained afebrile throughout shift. Sister notified of patients admission, would like MD to call her on 6/29

## 2024-06-28 NOTE — PAYOR COMM NOTE
"AUTHORIZATION REQUEST for EMERGENCY DEPARTMENT ADMISSION        PATIENT INFORMATION  Name:             Oswaldo Bowen  MRN#:            [id number]        ADMISSION INFORMATION  CLASS:          Inpatient   DOS:               6/27/2024        ADMISSION DIAGNOSIS AND HOSPITAL PROBLEMS    Problems Addressed this Visit    None  Visit Diagnoses       Difficulty in walking    -  Primary          Diagnoses         Codes Comments    Difficulty in walking    -  Primary ICD-10-CM: R26.2  ICD-9-CM: 719.7            A41.9 SEPSIS      ADMITTING PROVIDER INFORMATION  Name/NPI       Rosalinda Esquivel MD [[id number]]  Phone:            Phone: [phone]        RENDERING FACILITY  Name:             [facility]   NPI:                 [id number]  TID:                 [id number]  Address:        [address] [place], [place] [address] [address]        CASE MANAGEMENT CONTACT INFORMATION  Name:             JAKE Pinto  Phone:            [phone]  Fax:                [phone]      Date of Birth   1980    Social Security Number       Address   [address] [place] [address]    Home Phone       MRN   [id number]       Spiritism   None    Marital Status   Single                            Admission Date   6/27/24    Admission Type   Emergency    Admitting Provider   Farzad Posada MD    Attending Provider   Rosalinda Esquivel MD    Department, Room/Bed   [address], [facility]/       Discharge Date       Discharge Disposition       Discharge Destination                                 Attending Provider: Rosalinda Esquivel MD    Allergies: No Known Allergies    Isolation: None   Infection: COVID Screen (preop/placement) (06/28/24)   Code Status: CPR    Ht: 182.9 cm (72.01\")   Wt: 76.2 kg (167 lb 15.9 oz)    Admission Cmt: None   Principal Problem: Sepsis [A41.9]                   Active Insurance as of 6/27/2024       Primary Coverage       Payor Plan Insurance Group Employer/Plan Group    " [facility] BY AVENDANO [facility] BY AVENDANO [id number]       Payor Plan Address Payor Plan Phone Number Payor Plan Fax Number Effective Dates    [address] [address]   1/1/2021 - None Entered    [facility] [id number]         Subscriber Name Subscriber Birth Date Member ID       BEBA FREEMAN 1980 [id number]                     Emergency Contacts        (Rel.) Home Phone Work Phone Mobile Phone    faye ramos (Sister) -- -- [phone]    jimbo ramos (Other) -- -- [phone]           Sepsis and Other Febrile Illness, without Focal Infection [facility] Inpatient Care       Indications Met   Last updated by Lola Benitez RN on 6/28/2024 1208     Review Status Created By   Primary Completed Lola Benitez RN      Criteria Review   Sepsis and Other Febrile Illness, without Focal Infection [facility] Inpatient Care     Overall Determination: Indications Met     Criteria:  [×] Admission is indicated for  1 or more  of the following  [B] [D]:      [×] Hemodynamic instability          6/28/2024 12:07 PM              -- 6/28/2024 12:07 PM by Lola Benitez RN --                                    (X) Hemodynamic instability, as indicated by  1 or more  of the following  (1) (2) (3) (4) (5) (6):                  (X) Vital sign abnormality not readily corrected by appropriate treatment, as indicated by  1 or more  of the following  [A]:                  (X) Tachycardia that persists despite appropriate treatment (eg, volume repletion, treatment of pain, treatment of underlying cause)          6/28/2024 12:07 PM              -- 6/28/2024 12:07 PM by Lola Benitez RN --                  98.2   133   18  122/70  SATS 97%   C/O FEVER ,WEAKNESS                                    HR STILL ELEVATED DAY 2 @ 141   WITH TEMP 102.6                   WBC 19.55      [  ] Bacteremia (if blood cultures performed)          6/28/2024 12:07 PM              -- 6/28/2024 12:07 PM by Lola Benitez RN --                  BC  PENDING     Notes:  -- 6/28/2024 12:07 PM by Lola Benitez RN --            Assessment & Plan       Sepsis       Anemia                  Patient presents with subjective fever, tachycardia, tachypnea and increasing leukocytosis. There is concern for sepsis particularly considering patient was hospitalized. Identified source was not found at present time. Blood cultures were taken.            Admit to telemetry      Follow-up cultures       IV fluids       empiric antibiotics      Restart home medications      Supportive care            I discussed the patients findings and my recommendations with patient.                                                History & Physical        Farzad Posada MD at 06/27/24 1927              Patient Care Team:  Provider, No Known as PCP - General    Chief complaint fever    Subjective    Patient is a 44 y.o. male with history of recent right femur fracture status postrepair presents to the emergency department for fever.  At time of my interview, patient was not forthcoming with history.  He states that he has had a fever that had just broken however when I asked him how long it has been going on he said a while.  He has had some weakness as well.  When he arrived to the emergency department he had tachycardia, and respirations of 22 along with a increasing white count of 19.55.  Patient did not have a fever in the emergency department.  There was concern for sepsis and patient was covered with broad-spectrum antibiotics.  He does state that he has had a cough and otherwise has pain at the postoperative area.  Denies any other symptoms.    Review of Systems   Pertinent items are noted in HPI    History  No past medical history on file.  Past Surgical History:   Procedure Laterality Date    BRONCHOSCOPY N/A 5/23/2024    Procedure: BRONCHOSCOPY WITH ENDOBRONCHIAL ULTRASOUND, FINE NEEDLE ASPIRATE, BIOPSIES, BRUSHINGS, BRONCHOALVEOLAR LAVAGE;  Surgeon: Kendell Stern MD;  Location:  [facility] ENDOSCOPY;  Service: Pulmonary;  Laterality: N/A;  LUNG NODULE, MUCOUS PLUGGING    BRONCHOSCOPY WITH ION ROBOTIC ASSIST N/A 6/20/2024    Procedure: BRONCHOSCOPY NAVIGATION WITH ENDOBRONCHIAL ULTRASOUND AND ION ROBOT. REBUS, EBUS, BRUSHINGS, WASHINGS, BAL, BIOPSIES AND NEEDLE ASPIRATE;  Surgeon: John Elizalde MD;  Location: [facility] MAIN OR;  Service: Robotics - Pulmonary;  Laterality: N/A;    HIP INTERTROCHANTERIC NAILING Right 5/21/2024    Procedure: HIP INTERTROCHANTERIC NAILING;  Surgeon: Molina Clayton MD;  Location: [facility] MAIN OR;  Service: Orthopedics;  Laterality: Right;    US GUIDED FINE NEEDLE ASPIRATION  6/11/2024     No family history on file.  Social History     Tobacco Use    Smoking status: Every Day     Current packs/day: 1.50     Average packs/day: 1.5 packs/day for 24.5 years (36.7 ttl pk-yrs)     Types: Cigarettes     Start date: 2000    Smokeless tobacco: Former   Vaping Use    Vaping status: Never Used   Substance Use Topics    Alcohol use: Never    Drug use: Never     Medications Prior to Admission   Medication Sig Dispense Refill Last Dose    acetaminophen (TYLENOL) 325 MG tablet Take 2 tablets by mouth Every 6 (Six) Hours As Needed for Mild Pain or Fever.       albuterol sulfate  (90 Base) MCG/ACT inhaler Inhale 2 puffs 2 (Two) Times a Day.   6/27/2024    aluminum-magnesium hydroxide-simethicone (MAALOX/MYLANTA) 200-200-20 MG/5ML suspension Take 30 mL by mouth Every 4 (Four) Hours As Needed for Indigestion or Heartburn.       azithromycin (ZITHROMAX) 250 MG tablet Take 1 tablet by mouth Daily.   6/27/2024    bisacodyl (DULCOLAX) 10 MG suppository Insert 1 suppository into the rectum 1 (One) Time.   Past Week    cefTRIAXone (ROCEPHIN) 2000 mg/100 mL 0.9% NS IVPB (MBP) Infuse 100 mL into a venous catheter Daily.   6/27/2024    Fluticasone Furoate-Vilanterol 100-25 MCG/ACT aerosol powder  Inhale 1 puff Daily.   6/26/2024    Heparin Sodium, Porcine, 5000 UNIT/0.5ML solution  prefilled syringe Inject 5,000 Units under the skin into the appropriate area as directed Every 12 (Twelve) Hours.   6/27/2024    HYDROcodone-acetaminophen (NORCO) 5-325 MG per tablet Take 1 tablet by mouth Every 6 (Six) Hours As Needed.       Lidocaine 4 % Place 1 patch on the skin as directed by provider Daily As Needed for Mild Pain. Remove & Discard patch within 12 hours or as directed by MD       loperamide (IMODIUM) 2 MG capsule Take 1 capsule by mouth Every 6 (Six) Hours As Needed for Diarrhea.       metoprolol succinate XL (TOPROL-XL) 50 MG 24 hr tablet Take 1 tablet by mouth Every 12 (Twelve) Hours.   6/27/2024    multivitamin with minerals tablet tablet Take 1 tablet by mouth Daily.   6/27/2024    nicotine (NICODERM CQ) 21 MG/24HR patch Place 1 patch on the skin as directed by provider Daily As Needed (smoking cessation).   6/27/2024    ondansetron ODT (ZOFRAN-ODT) 4 MG disintegrating tablet Take 1 tablet by mouth Every 6 (Six) Hours As Needed for Nausea or Vomiting.       polyethylene glycol (MIRALAX) 17 g packet Take 17 g by mouth Daily.   6/26/2024    Psyllium (METAMUCIL MULTIHEALTH FIBER) 51.7 % packet Take 1 packet by mouth Daily.   6/27/2024    sennosides-docusate (senna-docusate sodium) 8.6-50 MG per tablet Take 2 tablets by mouth 3 times a day.   6/27/2024    Umeclidinium Bromide (INCRUSE ELLIPTA) 62.5 MCG/ACT aerosol powder  Inhale 1 puff Daily.   6/27/2024    vitamin B-12 (VITAMIN B-12) 1000 MCG tablet Take 1 tablet by mouth Daily. (Patient taking differently: Take 2 tablets by mouth Daily.)   6/27/2024     Allergies:  Patient has no known allergies.    Objective    Vital Signs  Temp:  [98.2 °F (36.8 °C)] 98.2 °F (36.8 °C)  Heart Rate:  [126-133] 126  Resp:  [18] 18  BP: (122-127)/(70-78) 124/78    Physical Exam:      General Appearance:  Alert, cooperative, in no acute distress   Head:  Normocephalic, without obvious abnormality, atraumatic   Eyes:  Lids and lashes normal, conjunctivae and  sclerae normal, no icterus, no pallor, corneas clear, PERRLA   Ears:  Ears appear intact with no abnormalities noted   Throat:  No oral lesions, no thrush, oral mucosa moist   Neck:  No adenopathy, supple, trachea midline, no thyromegaly, no carotid bruit, no JVD   Back:  No kyphosis present, no scoliosis present, no skin lesions, erythema or scars, no tenderness to percussion or palpation, range of motion normal   Lungs:  Clear to auscultation, respirations regular, even and unlabored    Heart:  Regular rhythm and normal rate, normal S1 and S2, no murmur, no gallop, no rub, no click   Chest Wall:  No abnormalities observed   Abdomen:  Normal bowel sounds, no masses, no organomegaly, soft non-tender, non-distended, no guarding, no rebound tenderness   Rectal:  Deferred   Extremities:  Moves all extremities well, no edema, no cyanosis, no redness   Pulses:  Pulses palpable and equal bilaterally   Skin:  No bleeding, bruising or rash   Lymph nodes:  No palpable adenopathy   Neurologic:  Cranial nerves 2 - 12 grossly intact, sensation intact, DTR present and equal bilaterally       Results Review:    I reviewed the patient's new clinical results.  I reviewed the patient's new imaging results and agree with the interpretation.  I reviewed the patient's other test results and agree with the interpretation  I personally viewed and interpreted the patient's EKG/Telemetry data    Assessment & Plan      Sepsis    Anemia      Patient presents with subjective fever, tachycardia, tachypnea and increasing leukocytosis.  There is concern for sepsis particularly considering patient was hospitalized.  Identified source was not found at present time.  Blood cultures were taken.    Admit to telemetry  Follow-up cultures   IV fluids   empiric antibiotics  Restart home medications  Supportive care    I discussed the patients findings and my recommendations with patient.     Farzad Posada MD  06/27/24  19:28  EDT            Electronically signed by Farzad Posada MD at 06/28/24 0620          Emergency Department Notes        Izzy Grace RN Extern at 06/27/24 2202          Patient's telepack is on and ready for transport.     Electronically signed by Izzy Grace RN Extern at 06/27/24 2202       Renae Cosme RN at 06/27/24 2157          Report given to GRIS Esparza    Electronically signed by Renae Cosme RN at 06/27/24 2157       Izzy Grace RN Extern at 06/27/24 2150          Called for patient's telepack. Monitor room is sending it down.     Electronically signed by Izzy Grace RN Extern at 06/27/24 2150       Shayy Sherman PCT at 06/27/24 1839          Attempted to get Labs & Urine Specimen on patient. X-Ray is currently still in the room with this patient. RN. Notified.     Electronically signed by Shayy Sherman PCT at 06/27/24 1840       Physician Progress Notes (last 24 hours)  Notes from 06/27/24 1212 through 06/28/24 1212   No notes of this type exist for this encounter.       Consult Notes (last 24 hours)  Notes from 06/27/24 1212 through 06/28/24 1212   No notes of this type exist for this encounter.

## 2024-06-28 NOTE — NURSING NOTE
Called sister back per her request, patient also wanted sister notified and updated on care. Patients sister updated on plan of care as of 6/28/2024. Informed sister oncologist was consulted for his newly diagnosed metastatic cancer, sister verbalized understanding on the phone. Informed patients sister of CT imaging being done to help find source of infection. Patients sister would still like an attending or consulting doctor to call her with more definitive updates and plan of care progress tomorrow on 6/29.

## 2024-06-28 NOTE — PROGRESS NOTES
"[facility] Clinical Pharmacy Services: Vancomycin Pharmacokinetic Initial Consult Note    Oswaldo Bowen is a 44 y.o. male who is on day 1 of pharmacy to dose vancomycin for Sepsis.    Consult Information  Consulting Provider: BUSHRA Posada  Planned Duration of Therapy: 7 days  Was Patient Receiving Prior to Admission/Consult?: No  Loading Dose Ordered or Given: 1500 mg on  at 1912  PK/PD Target: -600 mg/L.hr   Relevant ID History: N/A  Other Antimicrobials: Cefepime    Imaging Reviewed?: Yes. Unremarkable.    Microbiology Data  MRSA PCR performed: No; Result: Not ordered due to excluded indication or presence of suspected abscess  Culture/Source:   : Blood - Pending   : Blood - Pending    Vitals/Labs  Ht: 182.9 cm (72.01\"); Wt: 76.2 kg (167 lb 15.9 oz)  Temp (24hrs), Av.9 °F (37.2 °C), Min:97.3 °F (36.3 °C), Max:102.6 °F (39.2 °C)   Estimated Creatinine Clearance: 225.8 mL/min (A) (by C-G formula based on SCr of 0.45 mg/dL (L)).       Results from last 7 days   Lab Units 24  0539 24  1756 24  1755   CREATININE mg/dL 0.45* 0.38*  --    WBC 10*3/mm3 23.42*  --  19.55*     Assessment/Plan:    Vancomycin Dose:  1500 mg IV every 12 hours; which provides the following predicted parameters:  AUC24,ss: 556 mg/L.hr  PAUC*: 80 %  Ctrough,ss: 15.7 mg/L  Pconc*: 33 %  Tox.: 11 %  Vanc Random ordered for  at 0600  Patient has order for Basic Metabolic Panel    Pharmacy will follow patient's kidney function and will adjust doses and obtain levels as necessary. Thank you for involving pharmacy in this patient's care. Please contact pharmacy with any questions or concerns.                           Xiomara Thomas  Clinical Pharmacist    "

## 2024-06-28 NOTE — CONSULTS
Palliative care consult for goals of care. Patient is known to palliative care from previous admission, see previous notes. At time of visit patient is confused. Call placed to patients sister mian, Sergo. No answer, left message requesting return call. Palliative care will continue to follow up.    Kimmie AGUIRRE, RN, PN  Palliative Care

## 2024-06-29 LAB
ABO GROUP BLD: NORMAL
ALBUMIN SERPL-MCNC: 2.2 G/DL (ref 3.5–5.2)
ALP SERPL-CCNC: 120 U/L (ref 39–117)
ALT SERPL W P-5'-P-CCNC: 28 U/L (ref 1–41)
ANION GAP SERPL CALCULATED.3IONS-SCNC: 10.6 MMOL/L (ref 5–15)
AST SERPL-CCNC: 17 U/L (ref 1–40)
BASOPHILS # BLD AUTO: 0.04 10*3/MM3 (ref 0–0.2)
BASOPHILS NFR BLD AUTO: 0.3 % (ref 0–1.5)
BILIRUB CONJ SERPL-MCNC: <0.2 MG/DL (ref 0–0.3)
BILIRUB INDIRECT SERPL-MCNC: ABNORMAL MG/DL
BILIRUB SERPL-MCNC: 0.2 MG/DL (ref 0–1.2)
BLD GP AB SCN SERPL QL: NEGATIVE
BUN SERPL-MCNC: 10 MG/DL (ref 6–20)
BUN/CREAT SERPL: 31.3 (ref 7–25)
CALCIUM SPEC-SCNC: 8.8 MG/DL (ref 8.6–10.5)
CHLORIDE SERPL-SCNC: 102 MMOL/L (ref 98–107)
CO2 SERPL-SCNC: 23.4 MMOL/L (ref 22–29)
CREAT SERPL-MCNC: 0.32 MG/DL (ref 0.76–1.27)
DEPRECATED RDW RBC AUTO: 49 FL (ref 37–54)
DEPRECATED RDW RBC AUTO: 49.2 FL (ref 37–54)
EGFRCR SERPLBLD CKD-EPI 2021: 147.6 ML/MIN/1.73
EOSINOPHIL # BLD AUTO: 0.08 10*3/MM3 (ref 0–0.4)
EOSINOPHIL NFR BLD AUTO: 0.6 % (ref 0.3–6.2)
ERYTHROCYTE [DISTWIDTH] IN BLOOD BY AUTOMATED COUNT: 15.4 % (ref 12.3–15.4)
ERYTHROCYTE [DISTWIDTH] IN BLOOD BY AUTOMATED COUNT: 15.8 % (ref 12.3–15.4)
GLUCOSE SERPL-MCNC: 101 MG/DL (ref 65–99)
HAPTOGLOB SERPL-MCNC: 501 MG/DL (ref 30–200)
HCT VFR BLD AUTO: 20.6 % (ref 37.5–51)
HCT VFR BLD AUTO: 29.4 % (ref 37.5–51)
HGB BLD-MCNC: 6.2 G/DL (ref 13–17.7)
HGB BLD-MCNC: 9 G/DL (ref 13–17.7)
IMM GRANULOCYTES # BLD AUTO: 0.1 10*3/MM3 (ref 0–0.05)
IMM GRANULOCYTES NFR BLD AUTO: 0.7 % (ref 0–0.5)
LYMPHOCYTES # BLD AUTO: 1.38 10*3/MM3 (ref 0.7–3.1)
LYMPHOCYTES NFR BLD AUTO: 9.5 % (ref 19.6–45.3)
MAGNESIUM SERPL-MCNC: 1.6 MG/DL (ref 1.6–2.6)
MCH RBC QN AUTO: 26.1 PG (ref 26.6–33)
MCH RBC QN AUTO: 26.3 PG (ref 26.6–33)
MCHC RBC AUTO-ENTMCNC: 30.1 G/DL (ref 31.5–35.7)
MCHC RBC AUTO-ENTMCNC: 30.6 G/DL (ref 31.5–35.7)
MCV RBC AUTO: 86 FL (ref 79–97)
MCV RBC AUTO: 86.6 FL (ref 79–97)
MONOCYTES # BLD AUTO: 1.35 10*3/MM3 (ref 0.1–0.9)
MONOCYTES NFR BLD AUTO: 9.3 % (ref 5–12)
NEUTROPHILS NFR BLD AUTO: 11.55 10*3/MM3 (ref 1.7–7)
NEUTROPHILS NFR BLD AUTO: 79.6 % (ref 42.7–76)
NRBC BLD AUTO-RTO: 0 /100 WBC (ref 0–0.2)
PHOSPHATE SERPL-MCNC: 2.9 MG/DL (ref 2.5–4.5)
PLATELET # BLD AUTO: 352 10*3/MM3 (ref 140–450)
PLATELET # BLD AUTO: 373 10*3/MM3 (ref 140–450)
PMV BLD AUTO: 9.4 FL (ref 6–12)
PMV BLD AUTO: 9.4 FL (ref 6–12)
POTASSIUM SERPL-SCNC: 2.8 MMOL/L (ref 3.5–5.2)
PROT SERPL-MCNC: 5.3 G/DL (ref 6–8.5)
RBC # BLD AUTO: 2.38 10*6/MM3 (ref 4.14–5.8)
RBC # BLD AUTO: 3.42 10*6/MM3 (ref 4.14–5.8)
RH BLD: POSITIVE
SODIUM SERPL-SCNC: 136 MMOL/L (ref 136–145)
T&S EXPIRATION DATE: NORMAL
WBC NRBC COR # BLD AUTO: 14.32 10*3/MM3 (ref 3.4–10.8)
WBC NRBC COR # BLD AUTO: 14.5 10*3/MM3 (ref 3.4–10.8)

## 2024-06-29 PROCEDURE — 86900 BLOOD TYPING SEROLOGIC ABO: CPT

## 2024-06-29 PROCEDURE — 25010000002 MAGNESIUM SULFATE 4 GM/100ML SOLUTION: Performed by: FAMILY MEDICINE

## 2024-06-29 PROCEDURE — 25810000003 SODIUM CHLORIDE 0.9 % SOLUTION: Performed by: STUDENT IN AN ORGANIZED HEALTH CARE EDUCATION/TRAINING PROGRAM

## 2024-06-29 PROCEDURE — 80048 BASIC METABOLIC PNL TOTAL CA: CPT | Performed by: FAMILY MEDICINE

## 2024-06-29 PROCEDURE — 84100 ASSAY OF PHOSPHORUS: CPT | Performed by: FAMILY MEDICINE

## 2024-06-29 PROCEDURE — 25010000002 POTASSIUM CHLORIDE 10 MEQ/100ML SOLUTION: Performed by: FAMILY MEDICINE

## 2024-06-29 PROCEDURE — 86900 BLOOD TYPING SEROLOGIC ABO: CPT | Performed by: PHYSICIAN ASSISTANT

## 2024-06-29 PROCEDURE — 86850 RBC ANTIBODY SCREEN: CPT | Performed by: PHYSICIAN ASSISTANT

## 2024-06-29 PROCEDURE — 25010000002 CEFEPIME PER 500 MG: Performed by: FAMILY MEDICINE

## 2024-06-29 PROCEDURE — 25810000003 SODIUM CHLORIDE 0.9 % SOLUTION 500 ML FLEX CONT: Performed by: STUDENT IN AN ORGANIZED HEALTH CARE EDUCATION/TRAINING PROGRAM

## 2024-06-29 PROCEDURE — 36430 TRANSFUSION BLD/BLD COMPNT: CPT

## 2024-06-29 PROCEDURE — 99233 SBSQ HOSP IP/OBS HIGH 50: CPT | Performed by: STUDENT IN AN ORGANIZED HEALTH CARE EDUCATION/TRAINING PROGRAM

## 2024-06-29 PROCEDURE — 85025 COMPLETE CBC W/AUTO DIFF WBC: CPT | Performed by: FAMILY MEDICINE

## 2024-06-29 PROCEDURE — 86923 COMPATIBILITY TEST ELECTRIC: CPT

## 2024-06-29 PROCEDURE — 25010000002 MORPHINE PER 10 MG: Performed by: STUDENT IN AN ORGANIZED HEALTH CARE EDUCATION/TRAINING PROGRAM

## 2024-06-29 PROCEDURE — 99233 SBSQ HOSP IP/OBS HIGH 50: CPT | Performed by: FAMILY MEDICINE

## 2024-06-29 PROCEDURE — P9016 RBC LEUKOCYTES REDUCED: HCPCS

## 2024-06-29 PROCEDURE — 85027 COMPLETE CBC AUTOMATED: CPT | Performed by: FAMILY MEDICINE

## 2024-06-29 PROCEDURE — 83010 ASSAY OF HAPTOGLOBIN QUANT: CPT | Performed by: INTERNAL MEDICINE

## 2024-06-29 PROCEDURE — 83735 ASSAY OF MAGNESIUM: CPT | Performed by: FAMILY MEDICINE

## 2024-06-29 PROCEDURE — 80076 HEPATIC FUNCTION PANEL: CPT | Performed by: FAMILY MEDICINE

## 2024-06-29 PROCEDURE — 25010000002 VANCOMYCIN 5 G RECONSTITUTED SOLUTION: Performed by: STUDENT IN AN ORGANIZED HEALTH CARE EDUCATION/TRAINING PROGRAM

## 2024-06-29 PROCEDURE — 86901 BLOOD TYPING SEROLOGIC RH(D): CPT | Performed by: PHYSICIAN ASSISTANT

## 2024-06-29 PROCEDURE — 25010000002 CEFEPIME PER 500 MG: Performed by: STUDENT IN AN ORGANIZED HEALTH CARE EDUCATION/TRAINING PROGRAM

## 2024-06-29 RX ORDER — MORPHINE SULFATE 2 MG/ML
2 INJECTION, SOLUTION INTRAMUSCULAR; INTRAVENOUS ONCE
Status: COMPLETED | OUTPATIENT
Start: 2024-06-29 | End: 2024-06-29

## 2024-06-29 RX ORDER — MAGNESIUM SULFATE HEPTAHYDRATE 40 MG/ML
4 INJECTION, SOLUTION INTRAVENOUS ONCE
Status: COMPLETED | OUTPATIENT
Start: 2024-06-29 | End: 2024-06-29

## 2024-06-29 RX ORDER — NICOTINE 21 MG/24HR
1 PATCH, TRANSDERMAL 24 HOURS TRANSDERMAL
Status: DISCONTINUED | OUTPATIENT
Start: 2024-06-29 | End: 2024-07-16 | Stop reason: HOSPADM

## 2024-06-29 RX ORDER — POTASSIUM CHLORIDE 7.45 MG/ML
10 INJECTION INTRAVENOUS
Status: DISPENSED | OUTPATIENT
Start: 2024-06-29 | End: 2024-06-29

## 2024-06-29 RX ADMIN — POTASSIUM CHLORIDE 10 MEQ: 7.46 INJECTION, SOLUTION INTRAVENOUS at 10:39

## 2024-06-29 RX ADMIN — POTASSIUM CHLORIDE 10 MEQ: 7.46 INJECTION, SOLUTION INTRAVENOUS at 11:56

## 2024-06-29 RX ADMIN — SODIUM CHLORIDE 40 ML: 9 INJECTION, SOLUTION INTRAVENOUS at 08:30

## 2024-06-29 RX ADMIN — ACETAMINOPHEN 650 MG: 325 TABLET ORAL at 20:33

## 2024-06-29 RX ADMIN — ACETAMINOPHEN 650 MG: 325 TABLET ORAL at 09:33

## 2024-06-29 RX ADMIN — METOPROLOL SUCCINATE 75 MG: 50 TABLET, EXTENDED RELEASE ORAL at 09:33

## 2024-06-29 RX ADMIN — MORPHINE SULFATE 2 MG: 2 INJECTION, SOLUTION INTRAMUSCULAR; INTRAVENOUS at 01:47

## 2024-06-29 RX ADMIN — METOPROLOL SUCCINATE 75 MG: 50 TABLET, EXTENDED RELEASE ORAL at 20:33

## 2024-06-29 RX ADMIN — Medication 10 MG: at 20:33

## 2024-06-29 RX ADMIN — SODIUM CHLORIDE 100 ML/HR: 9 INJECTION, SOLUTION INTRAVENOUS at 05:42

## 2024-06-29 RX ADMIN — SODIUM CHLORIDE 40 ML: 9 INJECTION, SOLUTION INTRAVENOUS at 09:37

## 2024-06-29 RX ADMIN — ACETAMINOPHEN 650 MG: 325 TABLET ORAL at 05:41

## 2024-06-29 RX ADMIN — VANCOMYCIN HYDROCHLORIDE 1500 MG: 5 INJECTION, POWDER, LYOPHILIZED, FOR SOLUTION INTRAVENOUS at 06:47

## 2024-06-29 RX ADMIN — CEFEPIME 2000 MG: 2 INJECTION, POWDER, FOR SOLUTION INTRAVENOUS at 02:56

## 2024-06-29 RX ADMIN — ACETAMINOPHEN 650 MG: 325 TABLET ORAL at 14:45

## 2024-06-29 RX ADMIN — SODIUM CHLORIDE 100 ML/HR: 9 INJECTION, SOLUTION INTRAVENOUS at 18:04

## 2024-06-29 RX ADMIN — Medication 10 ML: at 20:34

## 2024-06-29 RX ADMIN — MAGNESIUM SULFATE HEPTAHYDRATE 4 G: 4 INJECTION, SOLUTION INTRAVENOUS at 08:27

## 2024-06-29 RX ADMIN — CEFEPIME 2000 MG: 2 INJECTION, POWDER, FOR SOLUTION INTRAVENOUS at 10:41

## 2024-06-29 RX ADMIN — POLYETHYLENE GLYCOL 3350 17 G: 17 POWDER, FOR SOLUTION ORAL at 18:20

## 2024-06-29 RX ADMIN — VANCOMYCIN HYDROCHLORIDE 1500 MG: 5 INJECTION, POWDER, LYOPHILIZED, FOR SOLUTION INTRAVENOUS at 18:20

## 2024-06-29 RX ADMIN — POTASSIUM CHLORIDE 10 MEQ: 7.46 INJECTION, SOLUTION INTRAVENOUS at 13:06

## 2024-06-29 RX ADMIN — POTASSIUM CHLORIDE 10 MEQ: 7.46 INJECTION, SOLUTION INTRAVENOUS at 15:17

## 2024-06-29 RX ADMIN — Medication 10 ML: at 08:28

## 2024-06-29 RX ADMIN — POTASSIUM CHLORIDE 10 MEQ: 7.46 INJECTION, SOLUTION INTRAVENOUS at 09:29

## 2024-06-29 RX ADMIN — NICOTINE 1 PATCH: 21 PATCH, EXTENDED RELEASE TRANSDERMAL at 09:37

## 2024-06-29 RX ADMIN — SODIUM CHLORIDE 40 ML: 9 INJECTION, SOLUTION INTRAVENOUS at 09:29

## 2024-06-29 RX ADMIN — POTASSIUM CHLORIDE 10 MEQ: 7.46 INJECTION, SOLUTION INTRAVENOUS at 14:08

## 2024-06-29 RX ADMIN — CEFEPIME 2000 MG: 2 INJECTION, POWDER, FOR SOLUTION INTRAVENOUS at 18:20

## 2024-06-29 RX ADMIN — SENNOSIDES AND DOCUSATE SODIUM 2 TABLET: 50; 8.6 TABLET ORAL at 14:45

## 2024-06-29 NOTE — PLAN OF CARE
Goal Outcome Evaluation:               Pt is assisted to turn and reposition. Alert and oriented. Pt went for CT of abdomen tonight. Pt c/o pain in upper back . New order for Morphine 2 mg iv. Informed Kristen MARION of pt upper generalized back pain and put in order. Pt resting well at this time. Diaphoretic at times. Pt pale in color. Low grade fever tonight of 99.9. will continue to monitor. Pt getting cefepime and vancomycin tonight iv antibiotics. Continue to monitor pt.

## 2024-06-29 NOTE — PROGRESS NOTES
Pulmonary / Critical Care Progress Note      Patient Name: Oswaldo Bowen  : 1980  MRN: [id number]  Attending:  Rosalinda Esquivel MD  Date of admission: 2024    Subjective   Subjective   Follow-up for fevers, lung cancer, concern for pneumonia    Over past 24 hours:  Remains on room air  Mental status more or less the same  Denies shortness of breath  Family members at bedside  Chest CT personally reviewed.  No obvious consolidation concerning for pneumonia.  Very small bilateral pleural effusion noted.  Metastatic disease noted.    Review of Systems  General: Denied complaints  Cardiovascular:  Denied complaints  Respiratory: Denied complaints  Gastrointestinal: Denied complaints        Objective   Objective     Vitals:   Temp:  [97.3 °F (36.3 °C)-99.7 °F (37.6 °C)] 99.5 °F (37.5 °C)  Heart Rate:  [] 106  Resp:  [16-20] 16  BP: (106-122)/(61-80) 114/70    Physical Exam   Vital Signs Reviewed   General:  WDWN, thin, chronically ill-appearing male  HEENT:  PERRL, EOMI.  OP, nares clear  Chest:  good aeration, clear to auscultation bilaterally, no work of breathing noted on room air  CV: RRR, no MGR, pulses 2+, equal.  Abd:  Soft, NT, ND, + BS  EXT:  no clubbing, no cyanosis, no edema  Neuro:  A&Ox3, CN grossly intact, no focal deficits.  Skin: No rashes or lesions noted      Result Review    Result Review:  I have personally reviewed the results from the time of this admission to 2024 15:53 EDT and agree with these findings:  []  Laboratory  []  Microbiology  []  Radiology  []  EKG/Telemetry   []  Cardiology/Vascular   []  Pathology  []  Old records  []  Other:  Most notable findings include:   -     Assessment & Plan   Assessment / Plan     Active Hospital Problems:  Active Hospital Problems    Diagnosis     **Sepsis     Anemia        Impression:  Fever of unknown origin  Bilateral small pleural effusion  Atelectasis at bases  Recently diagnosed metastatic adenocarcinoma of the lung  Acute  comminuted displaced intertrochanteric right femoral fracture  Status post IM nailing  Left upper lobe lung nodule with mediastinal lymphadenopathy  Chronic heavy smoking  Unintentional weight loss  Acute anemia     Plan:  Currently on room air  Tmax 102.6 yesterday around 7 AM.  Chest CT personally reviewed.  No obvious consolidation concerning for pneumonia.  Very small bilateral pleural effusion noted.  This is unlikely the source of infection.  Not amenable for thoracentesis at this time.  Metastatic disease also noted.  Urinalysis is negative.   Encourage I-S and flutter valve use   Inflammatory markers elevated as expected in the history of metastatic cancer  Consider echocardiogram  Unclear source at this time. Check QuantiFERON gold.  Mildly increased procalcitonin.  Continue with IV vancomycin and cefepime for now.  De-escalate when appropriate based on cultures.  Leukocytosis improving.  Continue as needed albuterol.  Pain control per primary service.  Postsurgical management per Ortho  Recommend oncology consult  Recommend palliative consult    VTE Prophylaxis:  Pharmacologic & mechanical VTE prophylaxis orders are present.        CODE STATUS:   Code Status (Patient has no pulse and is not breathing): CPR (Attempt to Resuscitate)  Medical Interventions (Patient has pulse or is breathing): Full Support    Labs, images, and medications personally reviewed.    Discussed with patient    Electronically signed by John Elizalde MD, 06/29/24, 3:53 PM EDT.

## 2024-06-29 NOTE — PROGRESS NOTES
[facility]   Hospitalist Progress Note  Date: 2024  Patient Name: Oswaldo Bowen  : 1980  MRN: [id number]  Date of admission: 2024      Subjective   Subjective   Chief complaint: Sent from facility for fevers    Summary:  44-year-old male with history of intellectual disability, tobacco smoker, depression, chronic anemia, with recent hospitalization for mechanical fall with displaced intertrochanteric femur fracture requiring orthopedic intervention, VLAD, with suspected new metastatic lung cancer and mets to the bone with a 2.6 cm nodule in the left upper lobe, which came back positive for adenocarcinoma, hospitalized on 2024 for chief complaint of fevers, pancultured, placed on broad-spectrum antibiotics, pulmonary consulted with underlying adenocarcinoma of the lung which appears to be widely metastatic to bone, could be causing fevers.  Reaching out to heme-onc to see if he needs an appointment, transportation issues while in rehab may limit consultation in the outpatient setting    Interval follow-up: Seen and examined this morning, no acute distress, no acute major night events, mentation flat, more alert and awake today, fever curve trending down Tmax 99.0 °F.  No focal signs of infection, CT chest without major infiltrates, CT abdomen pelvis shows no definitive acute infectious process.  Progressive osseous metastatic disease with enlarging lesions seen, oncology following.  No diarrhea.  Hemoglobin trending down to 6.2, hematocrit 20, white blood cell count 14,000.  Primarily neutrophils.  2 units PRBC transfusion.  Potassium low at 2.8, potassium replacement ordered.  Calcium 8.8, sodium 136, creatinine 0.32.    Review of systems:  All systems reviewed negative except weakness and fatigue, intermittent confusion        Objective   Objective     Vitals:   Temp:  [97.3 °F (36.3 °C)-99.7 °F (37.6 °C)] 98.8 °F (37.1 °C)  Heart Rate:  [] 100  Resp:  [16-18] 18  BP:  (106-122)/(61-80) 106/65  Physical Exam      Constitutional: Awake, alert, no acute distress pleasant mood   Eyes: Pupils equal, sclerae anicteric, no conjunctival injection   HENT: NCAT, mucous membranes moist   Neck: Supple, no thyromegaly, no lymphadenopathy, trachea midline   Respiratory: Clear to auscultation bilaterally, nonlabored respirations    Cardiovascular: RRR, no murmurs, rubs, or gallops, palpable pedal pulses bilaterally   Gastrointestinal: Positive bowel sounds, soft, nontender, nondistended   Musculoskeletal: No bilateral ankle edema, no clubbing or cyanosis to extremities   Psychiatric: Flat   Neurologic: Oriented x 2 self and surroundings, strength symmetric in all extremities, Cranial Nerves grossly intact to confrontation, speech slow   Skin: No rashes visible on exposed skin    Result Review    Result Review:  I have personally reviewed the pertinent results from the past 24 hours to 6/29/2024 14:28 EDT and agree with these findings:  [x]  Laboratory   CBC          6/27/2024    17:55 6/28/2024    05:39 6/28/2024    17:47 6/29/2024    05:21   CBC   WBC 19.55  23.42  18.97  14.50    RBC 2.85  2.96  2.81  2.38    Hemoglobin 7.4  7.6  7.3  6.2    Hematocrit 23.7  25.5  24.2  20.6    MCV 83.2  86.1  86.1  86.6    MCH 26.0  25.7  26.0  26.1    MCHC 31.2  29.8  30.2  30.1    RDW 15.0  15.4  15.3  15.4    Platelets 442  431  421  352      BMP          6/27/2024    17:56 6/28/2024    05:39 6/29/2024    05:21   BMP   BUN 10  9  10    Creatinine 0.38  0.45  0.32    Sodium 133  135  136    Potassium 3.6  3.7  2.8    Chloride 95  98  102    CO2 24.9  27.3  23.4    Calcium 9.3  9.1  8.8      LIVER FUNCTION TESTS:      Lab 06/29/24  0521 06/27/24  1756   TOTAL PROTEIN 5.3* 6.4   ALBUMIN 2.2* 2.7*   GLOBULIN  --  3.7   ALT (SGPT) 28 38   AST (SGOT) 17 45*   BILIRUBIN 0.2 0.3   BILIRUBIN DIRECT <0.2  --    ALK PHOS 120* 181*       [x]  Microbiology   Microbiology Results (last 10 days)       Procedure  Component Value - Date/Time    COVID PRE-OP / PRE-PROCEDURE SCREENING ORDER (NO ISOLATION) - Swab, Nasopharynx [[id number]]  (Normal) Collected: 06/28/24 1155    Lab Status: Final result Specimen: Swab from Nasopharynx Updated: 06/28/24 1252    Narrative:      The following orders were created for panel order COVID PRE-OP / PRE-PROCEDURE SCREENING ORDER (NO ISOLATION) - Swab, Nasopharynx.  Procedure                               Abnormality         Status                     ---------                               -----------         ------                     COVID-19, FLU A/B, RSV P...[[id number]]  Normal              Final result                 Please view results for these tests on the individual orders.    COVID-19, FLU A/B, RSV PCR 1 HR TAT - Swab, Nasopharynx [[id number]]  (Normal) Collected: 06/28/24 1155    Lab Status: Final result Specimen: Swab from Nasopharynx Updated: 06/28/24 1252     COVID19 Not Detected     Influenza A PCR Not Detected     Influenza B PCR Not Detected     RSV, PCR Not Detected    Narrative:      Fact sheet for providers: https://www.fda.gov/media/182793/download    Fact sheet for patients: https://www.fda.gov/media/415442/download    Test performed by PCR.    Blood Culture - Blood, Blood, Venous [[id number]]  (Normal) Collected: 06/27/24 1856    Lab Status: Preliminary result Specimen: Blood, Venous Updated: 06/28/24 1916     Blood Culture No growth at 24 hours    Blood Culture - Blood, Blood, Venous [[id number]]  (Normal) Collected: 06/27/24 1856    Lab Status: Preliminary result Specimen: Blood, Venous Updated: 06/28/24 1916     Blood Culture No growth at 24 hours    BAL Culture, Quantitative - Lavage, Lung, Left Upper Lobe [[id number]] Collected: 06/20/24 1043    Lab Status: Final result Specimen: Lavage from Lung, Left Upper Lobe Updated: 06/22/24 1132     BAL Culture No growth     Gram Stain No organisms seen      Few (2+) WBCs seen    Pneumonia Panel - Lavage, Lung, Left Upper Lobe  [[id number]]  (Normal) Collected: 06/20/24 1043    Lab Status: Final result Specimen: Lavage from Lung, Left Upper Lobe Updated: 06/20/24 1248     Escherichia coli PCR Not Detected     Acinetobacter calcoaceticus-baumannii complex PCR Not Detected     Enterobacter cloacae PCR Not Detected     Klebsiella oxytoca PCR Not Detected     Klebsiella pneumoniae group PCR Not Detected     Klebsiella aerogenes PCR Not Detected     Moraxella catarrhalis PCR Not Detected     Proteus species PCR Not Detected     Pseudomonas aeroginosa PCR Not Detected     Serratia marcescens PCR Not Detected     Staphylococcus aureus PCR Not Detected     Streptococcus pyogenes PCR Not Detected     Haemophilus influenzae PCR Not Detected     Streptococcus agalactiae PCR Not Detected     Streptococcus pneumoniae PCR Not Detected     Chlamydophila pneumoniae PCR Not Detected     Legionella pneumophilia PCR Not Detected     Mycoplasma pneumo by PCR Not Detected     ADENOVIRUS, PCR Not Detected     CTX-M Gene N/A     IMP Gene N/A     KPC Gene N/A     mecA/C and MREJ Gene N/A     NDM Gene N/A     OXA-48-like Gene N/A     VIM Gene N/A     Coronavirus Not Detected     Human Metapneumovirus Not Detected     Human Rhinovirus/Enterovirus Not Detected     Influenza A PCR Not Detected     Influenza B PCR Not Detected     RSV, PCR Not Detected     Parainfluenza virus PCR Not Detected    Gram Stain - No Culture [[id number]] Collected: 06/20/24 1028    Lab Status: Final result Specimen: Brushing from Lung, Left Upper Lobe Updated: 06/20/24 1230     Gram Stain No organisms seen      Few (2+) WBCs seen    AFB Stain - No Culture - Brushing, Lung, Left Upper Lobe [[id number]] Collected: 06/20/24 1028    Lab Status: Final result Specimen: Brushing from Lung, Left Upper Lobe Updated: 06/21/24 1113     AFB Stain No acid fast bacilli seen              [x]  Radiology CT Abdomen Pelvis With Contrast    Result Date: 6/28/2024  Impression: 1.No definite acute infectious  process is identified. 2.There are now multiple small bladder calculi. 3.Progressive osseous metastatic disease with enlarging lesions. No definite acute fracture identified. Electronically Signed: Johann Ramírez MD  6/28/2024 10:55 PM EDT  Workstation ID: [id number]    CT Chest Without Contrast Diagnostic    Result Date: 6/28/2024  1. No evidence of pneumonia 2. Small bilateral pleural effusions 3. Stable left upper lobe mass and widespread skeletal metastatic disease Electronically Signed: Sree Hawthorne  6/28/2024 1:10 PM EDT  Workstation ID: [id number]    CT Head Without Contrast    Result Date: 6/28/2024  Impression: 1. No acute intracranial process 2. Lytic lesion of the calvarium concerning for metastasis Electronically Signed: Sree Hawthorne  6/28/2024 12:49 PM EDT  Workstation ID: [id number]    XR Femur 2 View Right    Result Date: 6/27/2024  Impression: No acute abnormality. Postsurgical and degenerative findings as above without evidence of complication. Electronically Signed: Michael Chand MD  6/27/2024 7:11 PM EDT  Workstation ID: [id number]    XR Chest 1 View    Result Date: 6/27/2024  Impression: No focal airspace consolidation. Electronically Signed: Michael Chand MD  6/27/2024 7:06 PM EDT  Workstation ID: [id number]       [x]  EKG/Telemetry   ECG 12 Lead Rhythm Change   Preliminary Result   HEART XAPN=360  bpm   RR Nozawtov=356  ms   AZ Njearpcx=515  ms   P Horizontal Axis=-35  deg   P Front Axis=47  deg   QRSD Interval=92  ms   QT Kkaabktc=294  ms   NZtY=495  ms   QRS Axis=-8  deg   T Wave Axis=53  deg   - BORDERLINE ECG -   Sinus tachycardia   RSR' in V1 or V2, probably normal variant   Borderline  T wave abnormalities   Date and Time of Study:2024-06-28 01:08:28      ECG 12 Lead Tachycardia   Preliminary Result   HEART VMGP=369  bpm   RR Cnxuqqmb=816  ms   AZ Mngncbxg=193  ms   P Horizontal Axis=-3  deg   P Front Axis=34  deg   QRSD Interval=90  ms   QT Dznfvktq=768  ms   XQaK=826  ms   QRS Axis=1  deg   T Wave  Axis=52  deg   - OTHERWISE NORMAL ECG -   Sinus tachycardia   When compared with ECG of 14-Jun-2024 03:46:24,   Significant axis, voltage or hypertrophy change   Date and Time of Study:2024-06-27 17:33:32          [x]  Cardiology/Vascular   []  Pathology  [x]  Old records  []  Other:    Assessment & Plan   Assessment / Plan     Assessment/Plan:  Assessment:  Unknown source of fevers  Sepsis secondary to unknown source of infection suspect urinary  Metastatic adenocarcinoma of the lung; likely primary and assumed  Current tobacco smoker  Intellectual disability  Acute on chronic anemia  Symptomatic anemia  Depression  Recent hospitalization for femur fracture    Plan:  Labs and imaging reviewed  6 runs of IV potassium  4 g of IV magnesium  Monitor telemetry when replacing IV potassium this is a high risk process which can induce arrhythmias  2 unit PRBC transfusion  Risk versus benefits of transfusion reviewed, monitor for transfusion reaction  Continue vancomycin and cefepime until cultures are negative  Continue normal saline at 100 cc/h  Consulted pulmonary discussed with Dr. Stern, recommendations appreciated; requested consultation from oncology  Oncology consulted discussed with Dr. Johnson, recommendations appreciated  Palliative ca consulted to establish goals of care  Continue metoprolol XL to 75 mg twice a day  Pain control Tylenol  Zofran for nausea  PT/OT  A.m. labs  Full code  DVT prophylaxis with SCD  Clinical course dictate further management  Discussed with nurse at the bedside    VTE Prophylaxis:  Pharmacologic & mechanical VTE prophylaxis orders are present.        CODE STATUS:   Code Status (Patient has no pulse and is not breathing): CPR (Attempt to Resuscitate)  Medical Interventions (Patient has pulse or is breathing): Full Support        Electronically signed by Rosalinda Esquivel MD, 6/29/2024, 14:28 EDT.    Portions of this documentation were transcribed electronically from a voice recognition  software.  I confirm all data accurately represents the service(s) I performed at today's visit.

## 2024-06-30 LAB
ALBUMIN SERPL-MCNC: 2 G/DL (ref 3.5–5.2)
ALP SERPL-CCNC: 120 U/L (ref 39–117)
ALT SERPL W P-5'-P-CCNC: 58 U/L (ref 1–41)
ANION GAP SERPL CALCULATED.3IONS-SCNC: 10.8 MMOL/L (ref 5–15)
AST SERPL-CCNC: 33 U/L (ref 1–40)
BASOPHILS # BLD AUTO: 0.03 10*3/MM3 (ref 0–0.2)
BASOPHILS NFR BLD AUTO: 0.3 % (ref 0–1.5)
BILIRUB CONJ SERPL-MCNC: <0.2 MG/DL (ref 0–0.3)
BILIRUB INDIRECT SERPL-MCNC: ABNORMAL MG/DL
BILIRUB SERPL-MCNC: 0.3 MG/DL (ref 0–1.2)
BUN SERPL-MCNC: 9 MG/DL (ref 6–20)
BUN/CREAT SERPL: 34.6 (ref 7–25)
CALCIUM SPEC-SCNC: 8.2 MG/DL (ref 8.6–10.5)
CHLORIDE SERPL-SCNC: 102 MMOL/L (ref 98–107)
CO2 SERPL-SCNC: 23.2 MMOL/L (ref 22–29)
CREAT SERPL-MCNC: 0.26 MG/DL (ref 0.76–1.27)
DEPRECATED RDW RBC AUTO: 48.1 FL (ref 37–54)
EGFRCR SERPLBLD CKD-EPI 2021: 157.1 ML/MIN/1.73
EOSINOPHIL # BLD AUTO: 0.16 10*3/MM3 (ref 0–0.4)
EOSINOPHIL NFR BLD AUTO: 1.4 % (ref 0.3–6.2)
ERYTHROCYTE [DISTWIDTH] IN BLOOD BY AUTOMATED COUNT: 15.7 % (ref 12.3–15.4)
GLUCOSE SERPL-MCNC: 113 MG/DL (ref 65–99)
HCT VFR BLD AUTO: 26.4 % (ref 37.5–51)
HGB BLD-MCNC: 8.1 G/DL (ref 13–17.7)
IMM GRANULOCYTES # BLD AUTO: 0.12 10*3/MM3 (ref 0–0.05)
IMM GRANULOCYTES NFR BLD AUTO: 1 % (ref 0–0.5)
LYMPHOCYTES # BLD AUTO: 1.13 10*3/MM3 (ref 0.7–3.1)
LYMPHOCYTES NFR BLD AUTO: 9.8 % (ref 19.6–45.3)
MAGNESIUM SERPL-MCNC: 1.6 MG/DL (ref 1.6–2.6)
MCH RBC QN AUTO: 26 PG (ref 26.6–33)
MCHC RBC AUTO-ENTMCNC: 30.7 G/DL (ref 31.5–35.7)
MCV RBC AUTO: 84.9 FL (ref 79–97)
MONOCYTES # BLD AUTO: 0.84 10*3/MM3 (ref 0.1–0.9)
MONOCYTES NFR BLD AUTO: 7.3 % (ref 5–12)
NEUTROPHILS NFR BLD AUTO: 80.2 % (ref 42.7–76)
NEUTROPHILS NFR BLD AUTO: 9.24 10*3/MM3 (ref 1.7–7)
NRBC BLD AUTO-RTO: 0 /100 WBC (ref 0–0.2)
PHOSPHATE SERPL-MCNC: 3.1 MG/DL (ref 2.5–4.5)
PLATELET # BLD AUTO: 381 10*3/MM3 (ref 140–450)
PMV BLD AUTO: 9.7 FL (ref 6–12)
POTASSIUM SERPL-SCNC: 3.2 MMOL/L (ref 3.5–5.2)
PROT SERPL-MCNC: 5.5 G/DL (ref 6–8.5)
RBC # BLD AUTO: 3.11 10*6/MM3 (ref 4.14–5.8)
SODIUM SERPL-SCNC: 136 MMOL/L (ref 136–145)
VANCOMYCIN SERPL-MCNC: 11.55 MCG/ML (ref 5–40)
WBC NRBC COR # BLD AUTO: 11.52 10*3/MM3 (ref 3.4–10.8)

## 2024-06-30 PROCEDURE — 80076 HEPATIC FUNCTION PANEL: CPT | Performed by: FAMILY MEDICINE

## 2024-06-30 PROCEDURE — 25010000002 VANCOMYCIN 5 G RECONSTITUTED SOLUTION: Performed by: STUDENT IN AN ORGANIZED HEALTH CARE EDUCATION/TRAINING PROGRAM

## 2024-06-30 PROCEDURE — 83735 ASSAY OF MAGNESIUM: CPT | Performed by: FAMILY MEDICINE

## 2024-06-30 PROCEDURE — 85025 COMPLETE CBC W/AUTO DIFF WBC: CPT | Performed by: FAMILY MEDICINE

## 2024-06-30 PROCEDURE — 84100 ASSAY OF PHOSPHORUS: CPT | Performed by: FAMILY MEDICINE

## 2024-06-30 PROCEDURE — 25010000002 MAGNESIUM SULFATE 2 GM/50ML SOLUTION: Performed by: FAMILY MEDICINE

## 2024-06-30 PROCEDURE — 25010000002 CEFEPIME PER 500 MG: Performed by: FAMILY MEDICINE

## 2024-06-30 PROCEDURE — 99232 SBSQ HOSP IP/OBS MODERATE 35: CPT | Performed by: FAMILY MEDICINE

## 2024-06-30 PROCEDURE — 80202 ASSAY OF VANCOMYCIN: CPT | Performed by: STUDENT IN AN ORGANIZED HEALTH CARE EDUCATION/TRAINING PROGRAM

## 2024-06-30 PROCEDURE — 80048 BASIC METABOLIC PNL TOTAL CA: CPT | Performed by: FAMILY MEDICINE

## 2024-06-30 PROCEDURE — 25810000003 SODIUM CHLORIDE 0.9 % SOLUTION: Performed by: STUDENT IN AN ORGANIZED HEALTH CARE EDUCATION/TRAINING PROGRAM

## 2024-06-30 RX ORDER — MAGNESIUM SULFATE HEPTAHYDRATE 40 MG/ML
2 INJECTION, SOLUTION INTRAVENOUS ONCE
Status: COMPLETED | OUTPATIENT
Start: 2024-06-30 | End: 2024-06-30

## 2024-06-30 RX ORDER — POTASSIUM CHLORIDE 1.5 G/1.58G
20 POWDER, FOR SOLUTION ORAL
Status: COMPLETED | OUTPATIENT
Start: 2024-06-30 | End: 2024-06-30

## 2024-06-30 RX ADMIN — Medication 10 ML: at 11:44

## 2024-06-30 RX ADMIN — VANCOMYCIN HYDROCHLORIDE 1750 MG: 5 INJECTION, POWDER, LYOPHILIZED, FOR SOLUTION INTRAVENOUS at 17:57

## 2024-06-30 RX ADMIN — Medication 10 ML: at 20:48

## 2024-06-30 RX ADMIN — POLYETHYLENE GLYCOL 3350 17 G: 17 POWDER, FOR SOLUTION ORAL at 11:40

## 2024-06-30 RX ADMIN — Medication 10 MG: at 20:47

## 2024-06-30 RX ADMIN — MAGNESIUM SULFATE HEPTAHYDRATE 2 G: 40 INJECTION, SOLUTION INTRAVENOUS at 11:41

## 2024-06-30 RX ADMIN — CEFEPIME 2000 MG: 2 INJECTION, POWDER, FOR SOLUTION INTRAVENOUS at 11:41

## 2024-06-30 RX ADMIN — ACETAMINOPHEN 650 MG: 325 TABLET ORAL at 11:42

## 2024-06-30 RX ADMIN — POTASSIUM CHLORIDE 20 MEQ: 1.5 POWDER, FOR SOLUTION ORAL at 11:43

## 2024-06-30 RX ADMIN — CEFEPIME 2000 MG: 2 INJECTION, POWDER, FOR SOLUTION INTRAVENOUS at 01:33

## 2024-06-30 RX ADMIN — SENNOSIDES AND DOCUSATE SODIUM 2 TABLET: 50; 8.6 TABLET ORAL at 11:42

## 2024-06-30 RX ADMIN — NICOTINE 1 PATCH: 21 PATCH, EXTENDED RELEASE TRANSDERMAL at 11:40

## 2024-06-30 RX ADMIN — POTASSIUM CHLORIDE 20 MEQ: 1.5 POWDER, FOR SOLUTION ORAL at 14:02

## 2024-06-30 RX ADMIN — BISACODYL 10 MG: 10 SUPPOSITORY RECTAL at 01:33

## 2024-06-30 RX ADMIN — ACETAMINOPHEN 650 MG: 325 TABLET ORAL at 20:52

## 2024-06-30 RX ADMIN — VANCOMYCIN HYDROCHLORIDE 1500 MG: 5 INJECTION, POWDER, LYOPHILIZED, FOR SOLUTION INTRAVENOUS at 08:01

## 2024-06-30 RX ADMIN — METOPROLOL SUCCINATE 75 MG: 50 TABLET, EXTENDED RELEASE ORAL at 20:47

## 2024-06-30 RX ADMIN — METOPROLOL SUCCINATE 75 MG: 50 TABLET, EXTENDED RELEASE ORAL at 11:42

## 2024-06-30 RX ADMIN — CEFEPIME 2000 MG: 2 INJECTION, POWDER, FOR SOLUTION INTRAVENOUS at 17:58

## 2024-06-30 RX ADMIN — POTASSIUM CHLORIDE 20 MEQ: 1.5 POWDER, FOR SOLUTION ORAL at 17:57

## 2024-06-30 NOTE — PROGRESS NOTES
"[facility] Clinical Pharmacy Services: Vancomycin    Oswaldo Bowen is a 44 y.o. male who is on day 3 of pharmacy to dose vancomycin for Sepsis.    Consult Information  Consulting Provider: BUSHRA Posada  Planned Duration of Therapy: 7 days  Was Patient Receiving Prior to Admission/Consult?: No  Loading Dose Ordered or Given: 1500 mg on  at 1912  PK/PD Target: -600 mg/L.hr   Relevant ID History: N/A  Other Antimicrobials: Cefepime    Imaging Reviewed?: Yes. Unremarkable.    Microbiology Data  MRSA PCR performed: No; Result: Not ordered due to excluded indication or presence of suspected abscess  Culture/Source:   : Blood - NG x2 days   : Blood - NG x2 days    Vitals/Labs  Ht: 182.9 cm (72.01\"); Wt: 76.2 kg (167 lb 15.9 oz)  Temp (24hrs), Av.3 °F (36.8 °C), Min:97.2 °F (36.2 °C), Max:99.5 °F (37.5 °C)   Estimated Creatinine Clearance: 390.8 mL/min (A) (by C-G formula based on SCr of 0.26 mg/dL (L)).       Results from last 7 days   Lab Units 24  0548 24  1924 24  0521 24  1747 24  0539   VANCOMYCIN RM mcg/mL 11.55  --   --   --   --    CREATININE mg/dL 0.26*  --  0.32*  --  0.45*   WBC 10*3/mm3 11.52* 14.32* 14.50*   < > 23.42*    < > = values in this interval not displayed.     Assessment/Plan:    Vancomycin Dose:  1750 mg IV every 12 hours; which provides the following predicted parameters:  AUC24,ss: 535 mg/L.hr  PAUC*: 97 %  Ctrough,ss: 13 mg/L  Pconc*: 0 %  Tox.: 8 %  Vanc Random planned for  at 0600  Patient has order for Basic Metabolic Panel    Pharmacy will follow patient's kidney function and will adjust doses and obtain levels as necessary. Thank you for involving pharmacy in this patient's care. Please contact pharmacy with any questions or concerns.                           Xiomara Thomas  Clinical Pharmacist    "

## 2024-06-30 NOTE — PLAN OF CARE
Goal Outcome Evaluation:  Plan of Care Reviewed With: patient        Progress: improving  Outcome Evaluation: c/o pain in back/legs alleviated with prn tylenol. continues diaphoretic with low grade temps, remote telemetry in use. electrolyte replacement, antibiotics, and fluids per orders. blood infused this shift. frequent body hygiene d/t moisture saturation of bedding/gowns. no new issues/needs noted at this time.

## 2024-06-30 NOTE — PROGRESS NOTES
[facility]   Hospitalist Progress Note  Date: 2024  Patient Name: Oswaldo Bowen  : 1980  MRN: [id number]  Date of admission: 2024      Subjective   Subjective   Chief complaint: Sent from facility for fevers    Summary:  44-year-old male with history of intellectual disability, tobacco smoker, depression, chronic anemia, with recent hospitalization for mechanical fall with displaced intertrochanteric femur fracture requiring orthopedic intervention, VLAD, with suspected new metastatic lung cancer and mets to the bone with a 2.6 cm nodule in the left upper lobe, which came back positive for adenocarcinoma, hospitalized on 2024 for chief complaint of fevers, pancultured, placed on broad-spectrum antibiotics, pulmonary consulted with underlying adenocarcinoma of the lung which appears to be widely metastatic to bone, could be causing fevers.  Reaching out to heme-onc to see if he needs an appointment, transportation issues while in rehab may limit consultation in the outpatient setting    Interval follow-up: Seen and examined this morning, no acute distress, no acute major night events, afebrile past 24 hours, heart rate still in the 100s, satting well on room air, tolerating oral intake.  Creatinine 0.26, potassium 3.2, replaced potassium ordered, sodium 136, white blood cell count down to 11,000, hemoglobin 8.1.    Review of systems:  All systems reviewed negative except weakness and fatigue      Objective   Objective     Vitals:   Temp:  [97.2 °F (36.2 °C)-99.5 °F (37.5 °C)] 97.7 °F (36.5 °C)  Heart Rate:  [] 105  Resp:  [16-22] 20  BP: (106-139)/(65-82) 128/70  Physical Exam      Constitutional: Awake, alert, no acute distress, pleasant mood   Eyes: Pupils equal, sclerae anicteric, no conjunctival injection   HENT: NCAT, mucous membranes moist   Neck: Supple, no thyromegaly, no lymphadenopathy, trachea midline   Respiratory: Clear to auscultation bilaterally, nonlabored respirations     Cardiovascular: RRR, no murmurs, rubs, or gallops, palpable pedal pulses bilaterally   Gastrointestinal: Positive bowel sounds, soft, nontender, nondistended   Musculoskeletal: No bilateral ankle edema, no clubbing or cyanosis to extremities   Psychiatric: Flat   Neurologic: Oriented x 2 self and surroundings, strength symmetric in all extremities, Cranial Nerves grossly intact to confrontation, speech slow   Skin: No rashes visible on exposed skin    Result Review    Result Review:  I have personally reviewed the pertinent results from the past 24 hours to 6/30/2024 10:10 EDT and agree with these findings:  [x]  Laboratory   CBC          6/28/2024    05:39 6/28/2024    17:47 6/29/2024    05:21 6/29/2024    19:24 6/30/2024    05:48   CBC   WBC 23.42  18.97  14.50  14.32  11.52    RBC 2.96  2.81  2.38  3.42  3.11    Hemoglobin 7.6  7.3  6.2  9.0  8.1    Hematocrit 25.5  24.2  20.6  29.4  26.4    MCV 86.1  86.1  86.6  86.0  84.9    MCH 25.7  26.0  26.1  26.3  26.0    MCHC 29.8  30.2  30.1  30.6  30.7    RDW 15.4  15.3  15.4  15.8  15.7    Platelets 431  421  352  373  381      BMP          6/28/2024    05:39 6/29/2024    05:21 6/30/2024    05:48   BMP   BUN 9  10  9    Creatinine 0.45  0.32  0.26    Sodium 135  136  136    Potassium 3.7  2.8  3.2    Chloride 98  102  102    CO2 27.3  23.4  23.2    Calcium 9.1  8.8  8.2      LIVER FUNCTION TESTS:      Lab 06/30/24  0548 06/29/24  0521 06/27/24  1756   TOTAL PROTEIN 5.5* 5.3* 6.4   ALBUMIN 2.0* 2.2* 2.7*   GLOBULIN  --   --  3.7   ALT (SGPT) 58* 28 38   AST (SGOT) 33 17 45*   BILIRUBIN 0.3 0.2 0.3   BILIRUBIN DIRECT <0.2 <0.2  --    ALK PHOS 120* 120* 181*       [x]  Microbiology   Microbiology Results (last 10 days)       Procedure Component Value - Date/Time    COVID PRE-OP / PRE-PROCEDURE SCREENING ORDER (NO ISOLATION) - Swab, Nasopharynx [[id number]]  (Normal) Collected: 06/28/24 1155    Lab Status: Final result Specimen: Swab from Nasopharynx Updated: 06/28/24  1252    Narrative:      The following orders were created for panel order COVID PRE-OP / PRE-PROCEDURE SCREENING ORDER (NO ISOLATION) - Swab, Nasopharynx.  Procedure                               Abnormality         Status                     ---------                               -----------         ------                     COVID-19, FLU A/B, RSV P...[[id number]]  Normal              Final result                 Please view results for these tests on the individual orders.    COVID-19, FLU A/B, RSV PCR 1 HR TAT - Swab, Nasopharynx [[id number]]  (Normal) Collected: 06/28/24 1155    Lab Status: Final result Specimen: Swab from Nasopharynx Updated: 06/28/24 1252     COVID19 Not Detected     Influenza A PCR Not Detected     Influenza B PCR Not Detected     RSV, PCR Not Detected    Narrative:      Fact sheet for providers: https://www.fda.gov/media/427794/download    Fact sheet for patients: https://www.fda.gov/media/680002/download    Test performed by PCR.    Blood Culture - Blood, Blood, Venous [[id number]]  (Normal) Collected: 06/27/24 1856    Lab Status: Preliminary result Specimen: Blood, Venous Updated: 06/29/24 1916     Blood Culture No growth at 2 days    Blood Culture - Blood, Blood, Venous [[id number]]  (Normal) Collected: 06/27/24 1856    Lab Status: Preliminary result Specimen: Blood, Venous Updated: 06/29/24 1916     Blood Culture No growth at 2 days    BAL Culture, Quantitative - Lavage, Lung, Left Upper Lobe [[id number]] Collected: 06/20/24 1043    Lab Status: Final result Specimen: Lavage from Lung, Left Upper Lobe Updated: 06/22/24 1132     BAL Culture No growth     Gram Stain No organisms seen      Few (2+) WBCs seen    Pneumonia Panel - Lavage, Lung, Left Upper Lobe [[id number]]  (Normal) Collected: 06/20/24 1043    Lab Status: Final result Specimen: Lavage from Lung, Left Upper Lobe Updated: 06/20/24 1248     Escherichia coli PCR Not Detected     Acinetobacter calcoaceticus-baumannii complex PCR Not  Detected     Enterobacter cloacae PCR Not Detected     Klebsiella oxytoca PCR Not Detected     Klebsiella pneumoniae group PCR Not Detected     Klebsiella aerogenes PCR Not Detected     Moraxella catarrhalis PCR Not Detected     Proteus species PCR Not Detected     Pseudomonas aeroginosa PCR Not Detected     Serratia marcescens PCR Not Detected     Staphylococcus aureus PCR Not Detected     Streptococcus pyogenes PCR Not Detected     Haemophilus influenzae PCR Not Detected     Streptococcus agalactiae PCR Not Detected     Streptococcus pneumoniae PCR Not Detected     Chlamydophila pneumoniae PCR Not Detected     Legionella pneumophilia PCR Not Detected     Mycoplasma pneumo by PCR Not Detected     ADENOVIRUS, PCR Not Detected     CTX-M Gene N/A     IMP Gene N/A     KPC Gene N/A     mecA/C and MREJ Gene N/A     NDM Gene N/A     OXA-48-like Gene N/A     VIM Gene N/A     Coronavirus Not Detected     Human Metapneumovirus Not Detected     Human Rhinovirus/Enterovirus Not Detected     Influenza A PCR Not Detected     Influenza B PCR Not Detected     RSV, PCR Not Detected     Parainfluenza virus PCR Not Detected    Gram Stain - No Culture [[id number]] Collected: 06/20/24 1028    Lab Status: Final result Specimen: Brushing from Lung, Left Upper Lobe Updated: 06/20/24 1230     Gram Stain No organisms seen      Few (2+) WBCs seen    AFB Stain - No Culture - Brushing, Lung, Left Upper Lobe [[id number]] Collected: 06/20/24 1028    Lab Status: Final result Specimen: Brushing from Lung, Left Upper Lobe Updated: 06/21/24 1113     AFB Stain No acid fast bacilli seen              [x]  Radiology CT Abdomen Pelvis With Contrast    Result Date: 6/28/2024  Impression: 1.No definite acute infectious process is identified. 2.There are now multiple small bladder calculi. 3.Progressive osseous metastatic disease with enlarging lesions. No definite acute fracture identified. Electronically Signed: Johann Ramírez MD  6/28/2024 10:55 PM EDT   Workstation ID: [id number]    CT Chest Without Contrast Diagnostic    Result Date: 6/28/2024  1. No evidence of pneumonia 2. Small bilateral pleural effusions 3. Stable left upper lobe mass and widespread skeletal metastatic disease Electronically Signed: Sree Marine  6/28/2024 1:10 PM EDT  Workstation ID: [id number]    CT Head Without Contrast    Result Date: 6/28/2024  Impression: 1. No acute intracranial process 2. Lytic lesion of the calvarium concerning for metastasis Electronically Signed: Sree Hawthorne  6/28/2024 12:49 PM EDT  Workstation ID: [id number]    XR Femur 2 View Right    Result Date: 6/27/2024  Impression: No acute abnormality. Postsurgical and degenerative findings as above without evidence of complication. Electronically Signed: Michael Chand MD  6/27/2024 7:11 PM EDT  Workstation ID: [id number]    XR Chest 1 View    Result Date: 6/27/2024  Impression: No focal airspace consolidation. Electronically Signed: Michael Chand MD  6/27/2024 7:06 PM EDT  Workstation ID: [id number]       [x]  EKG/Telemetry   ECG 12 Lead Rhythm Change   Preliminary Result   HEART OJNF=811  bpm   RR Kddnrqtv=238  ms   DC Owabphma=558  ms   P Horizontal Axis=-35  deg   P Front Axis=47  deg   QRSD Interval=92  ms   QT Corndqxq=710  ms   VLyM=627  ms   QRS Axis=-8  deg   T Wave Axis=53  deg   - BORDERLINE ECG -   Sinus tachycardia   RSR' in V1 or V2, probably normal variant   Borderline  T wave abnormalities   Date and Time of Study:2024-06-28 01:08:28      ECG 12 Lead Tachycardia   Preliminary Result   HEART RFYL=749  bpm   RR Jxxjxobq=818  ms   DC Ontcjhrk=847  ms   P Horizontal Axis=-3  deg   P Front Axis=34  deg   QRSD Interval=90  ms   QT Zxgfootp=228  ms   XYxB=522  ms   QRS Axis=1  deg   T Wave Axis=52  deg   - OTHERWISE NORMAL ECG -   Sinus tachycardia   When compared with ECG of 14-Jun-2024 03:46:24,   Significant axis, voltage or hypertrophy change   Date and Time of Study:2024-06-27 17:33:32          [x]  Cardiology/Vascular    []  Pathology  [x]  Old records  []  Other:    Assessment & Plan   Assessment / Plan     Assessment/Plan:  Assessment:  Unknown source of fevers  Sepsis secondary to unknown source of infection suspect urinary  Metastatic adenocarcinoma of the lung; likely primary and assumed  Current tobacco smoker  Intellectual disability  Acute on chronic anemia  Symptomatic anemia  Depression  Recent hospitalization for femur fracture    Plan:  Labs and imaging reviewed  Potassium replacement orally  Magnesium replacement IV  Continue vancomycin and cefepime; probably should treat for 7 days  Stop IV fluids  Consulted pulmonary discussed with Dr. Elizalde, recommendations appreciated  Oncology consulted discussed with Dr. Johnson, recommendations appreciated  Palliative care consulted to establish goals of care  Continue metoprolol XL to 75 mg twice a day  Pain control Tylenol  Zofran for nausea  PT/OT  A.m. labs  Full code  DVT prophylaxis with SCD  Clinical course dictate further management  Discussed with nurse at the bedside    VTE Prophylaxis:  Pharmacologic & mechanical VTE prophylaxis orders are present.        CODE STATUS:   Code Status (Patient has no pulse and is not breathing): CPR (Attempt to Resuscitate)  Medical Interventions (Patient has pulse or is breathing): Full Support        Electronically signed by Rosalinda Esquivel MD, 6/30/2024, 10:10 EDT.    Portions of this documentation were transcribed electronically from a voice recognition software.  I confirm all data accurately represents the service(s) I performed at today's visit.

## 2024-06-30 NOTE — PLAN OF CARE
Goal Outcome Evaluation:  Plan of Care Reviewed With: patient        Progress: improving  Outcome Evaluation: continued diaphoretic with multiple linen/gown changes. continued remote telemetry, maintained sinus tachycardic. no new issues/needs noted at this time.

## 2024-06-30 NOTE — PLAN OF CARE
Goal Outcome Evaluation:  Plan of Care Reviewed With: patient        Progress: no change    Pt's WBC count is elevated however decreasing from previous count. Pt during the day received, blood transfusion as well as potassium and magnesium iv replacements. Pt states does state feels stronger than the day before. Pt resting in bed. Pt does use urinal, however requires help due to spillage . Pt states knows is having bm however is incontinent. Pt c/o constipation and given dulculox suppository and having results from suppository.pt is assisted to turn and posiiton. Episodes of sweating and linen changes are needed. Pt resting at this time. Will continue to monitor.

## 2024-07-01 LAB
ALBUMIN SERPL-MCNC: 2.2 G/DL (ref 3.5–5.2)
ALP SERPL-CCNC: 117 U/L (ref 39–117)
ALT SERPL W P-5'-P-CCNC: 58 U/L (ref 1–41)
ANION GAP SERPL CALCULATED.3IONS-SCNC: 10.3 MMOL/L (ref 5–15)
AST SERPL-CCNC: 23 U/L (ref 1–40)
BASOPHILS # BLD AUTO: 0.02 10*3/MM3 (ref 0–0.2)
BASOPHILS NFR BLD AUTO: 0.2 % (ref 0–1.5)
BH BB BLOOD EXPIRATION DATE: NORMAL
BH BB BLOOD EXPIRATION DATE: NORMAL
BH BB BLOOD TYPE BARCODE: 5100
BH BB BLOOD TYPE BARCODE: 5100
BH BB DISPENSE STATUS: NORMAL
BH BB DISPENSE STATUS: NORMAL
BH BB PRODUCT CODE: NORMAL
BH BB PRODUCT CODE: NORMAL
BH BB UNIT NUMBER: NORMAL
BH BB UNIT NUMBER: NORMAL
BILIRUB CONJ SERPL-MCNC: <0.2 MG/DL (ref 0–0.3)
BILIRUB INDIRECT SERPL-MCNC: ABNORMAL MG/DL
BILIRUB SERPL-MCNC: 0.2 MG/DL (ref 0–1.2)
BUN SERPL-MCNC: 8 MG/DL (ref 6–20)
BUN/CREAT SERPL: 27.6 (ref 7–25)
CALCIUM SPEC-SCNC: 8.6 MG/DL (ref 8.6–10.5)
CHLORIDE SERPL-SCNC: 100 MMOL/L (ref 98–107)
CO2 SERPL-SCNC: 24.7 MMOL/L (ref 22–29)
CREAT SERPL-MCNC: 0.29 MG/DL (ref 0.76–1.27)
CROSSMATCH INTERPRETATION: NORMAL
CROSSMATCH INTERPRETATION: NORMAL
CYTO UR: NORMAL
DEPRECATED RDW RBC AUTO: 47.3 FL (ref 37–54)
EGFRCR SERPLBLD CKD-EPI 2021: 152 ML/MIN/1.73
EOSINOPHIL # BLD AUTO: 0.22 10*3/MM3 (ref 0–0.4)
EOSINOPHIL NFR BLD AUTO: 2.2 % (ref 0.3–6.2)
ERYTHROCYTE [DISTWIDTH] IN BLOOD BY AUTOMATED COUNT: 15.5 % (ref 12.3–15.4)
GLUCOSE SERPL-MCNC: 105 MG/DL (ref 65–99)
HCT VFR BLD AUTO: 26.9 % (ref 37.5–51)
HGB BLD-MCNC: 8.3 G/DL (ref 13–17.7)
IMM GRANULOCYTES # BLD AUTO: 0.11 10*3/MM3 (ref 0–0.05)
IMM GRANULOCYTES NFR BLD AUTO: 1.1 % (ref 0–0.5)
LAB AP CASE REPORT: NORMAL
LAB AP CLINICAL INFORMATION: NORMAL
LYMPHOCYTES # BLD AUTO: 1.17 10*3/MM3 (ref 0.7–3.1)
LYMPHOCYTES NFR BLD AUTO: 11.8 % (ref 19.6–45.3)
MAGNESIUM SERPL-MCNC: 1.7 MG/DL (ref 1.6–2.6)
MCH RBC QN AUTO: 25.9 PG (ref 26.6–33)
MCHC RBC AUTO-ENTMCNC: 30.9 G/DL (ref 31.5–35.7)
MCV RBC AUTO: 83.8 FL (ref 79–97)
MONOCYTES # BLD AUTO: 0.84 10*3/MM3 (ref 0.1–0.9)
MONOCYTES NFR BLD AUTO: 8.5 % (ref 5–12)
NEUTROPHILS NFR BLD AUTO: 7.57 10*3/MM3 (ref 1.7–7)
NEUTROPHILS NFR BLD AUTO: 76.2 % (ref 42.7–76)
NRBC BLD AUTO-RTO: 0 /100 WBC (ref 0–0.2)
PATH REPORT.FINAL DX SPEC: NORMAL
PATH REPORT.GROSS SPEC: NORMAL
PHOSPHATE SERPL-MCNC: 3.8 MG/DL (ref 2.5–4.5)
PLATELET # BLD AUTO: 404 10*3/MM3 (ref 140–450)
PMV BLD AUTO: 9.8 FL (ref 6–12)
POTASSIUM SERPL-SCNC: 3.1 MMOL/L (ref 3.5–5.2)
PROT SERPL-MCNC: 5.6 G/DL (ref 6–8.5)
RBC # BLD AUTO: 3.21 10*6/MM3 (ref 4.14–5.8)
SODIUM SERPL-SCNC: 135 MMOL/L (ref 136–145)
UNIT  ABO: NORMAL
UNIT  ABO: NORMAL
UNIT  RH: NORMAL
UNIT  RH: NORMAL
WBC NRBC COR # BLD AUTO: 9.93 10*3/MM3 (ref 3.4–10.8)

## 2024-07-01 PROCEDURE — 63710000001 ONDANSETRON ODT 4 MG TABLET DISPERSIBLE: Performed by: STUDENT IN AN ORGANIZED HEALTH CARE EDUCATION/TRAINING PROGRAM

## 2024-07-01 PROCEDURE — 99233 SBSQ HOSP IP/OBS HIGH 50: CPT | Performed by: INTERNAL MEDICINE

## 2024-07-01 PROCEDURE — 85025 COMPLETE CBC W/AUTO DIFF WBC: CPT | Performed by: FAMILY MEDICINE

## 2024-07-01 PROCEDURE — 80076 HEPATIC FUNCTION PANEL: CPT | Performed by: FAMILY MEDICINE

## 2024-07-01 PROCEDURE — 84100 ASSAY OF PHOSPHORUS: CPT | Performed by: FAMILY MEDICINE

## 2024-07-01 PROCEDURE — 25010000002 CEFEPIME PER 500 MG: Performed by: FAMILY MEDICINE

## 2024-07-01 PROCEDURE — 99232 SBSQ HOSP IP/OBS MODERATE 35: CPT | Performed by: FAMILY MEDICINE

## 2024-07-01 PROCEDURE — 25810000003 SODIUM CHLORIDE 0.9 % SOLUTION: Performed by: FAMILY MEDICINE

## 2024-07-01 PROCEDURE — 83735 ASSAY OF MAGNESIUM: CPT | Performed by: FAMILY MEDICINE

## 2024-07-01 PROCEDURE — 25010000002 VANCOMYCIN 5 G RECONSTITUTED SOLUTION: Performed by: FAMILY MEDICINE

## 2024-07-01 PROCEDURE — 80048 BASIC METABOLIC PNL TOTAL CA: CPT | Performed by: FAMILY MEDICINE

## 2024-07-01 RX ORDER — POTASSIUM CHLORIDE 1.5 G/1.58G
40 POWDER, FOR SOLUTION ORAL 2 TIMES DAILY
Status: COMPLETED | OUTPATIENT
Start: 2024-07-01 | End: 2024-07-01

## 2024-07-01 RX ADMIN — POTASSIUM CHLORIDE 40 MEQ: 1.5 POWDER, FOR SOLUTION ORAL at 20:16

## 2024-07-01 RX ADMIN — VANCOMYCIN HYDROCHLORIDE 1750 MG: 5 INJECTION, POWDER, LYOPHILIZED, FOR SOLUTION INTRAVENOUS at 09:17

## 2024-07-01 RX ADMIN — Medication 10 ML: at 20:23

## 2024-07-01 RX ADMIN — CEFEPIME 2000 MG: 2 INJECTION, POWDER, FOR SOLUTION INTRAVENOUS at 09:43

## 2024-07-01 RX ADMIN — ACETAMINOPHEN 650 MG: 325 TABLET ORAL at 11:34

## 2024-07-01 RX ADMIN — SENNOSIDES AND DOCUSATE SODIUM 2 TABLET: 50; 8.6 TABLET ORAL at 08:15

## 2024-07-01 RX ADMIN — ONDANSETRON 4 MG: 4 TABLET, ORALLY DISINTEGRATING ORAL at 21:11

## 2024-07-01 RX ADMIN — Medication 10 ML: at 08:09

## 2024-07-01 RX ADMIN — METOPROLOL SUCCINATE 75 MG: 50 TABLET, EXTENDED RELEASE ORAL at 20:16

## 2024-07-01 RX ADMIN — NICOTINE 1 PATCH: 21 PATCH, EXTENDED RELEASE TRANSDERMAL at 08:10

## 2024-07-01 RX ADMIN — ACETAMINOPHEN 650 MG: 325 TABLET ORAL at 01:44

## 2024-07-01 RX ADMIN — CEFEPIME 2000 MG: 2 INJECTION, POWDER, FOR SOLUTION INTRAVENOUS at 01:44

## 2024-07-01 RX ADMIN — ACETAMINOPHEN 650 MG: 325 TABLET ORAL at 06:47

## 2024-07-01 RX ADMIN — VANCOMYCIN HYDROCHLORIDE 1750 MG: 5 INJECTION, POWDER, LYOPHILIZED, FOR SOLUTION INTRAVENOUS at 21:01

## 2024-07-01 RX ADMIN — ACETAMINOPHEN 650 MG: 325 TABLET ORAL at 17:19

## 2024-07-01 RX ADMIN — CEFEPIME 2000 MG: 2 INJECTION, POWDER, FOR SOLUTION INTRAVENOUS at 17:13

## 2024-07-01 RX ADMIN — METOPROLOL SUCCINATE 75 MG: 50 TABLET, EXTENDED RELEASE ORAL at 08:09

## 2024-07-01 RX ADMIN — POTASSIUM CHLORIDE 40 MEQ: 1.5 POWDER, FOR SOLUTION ORAL at 08:09

## 2024-07-01 NOTE — PROGRESS NOTES
[facility]   Hospitalist Progress Note  Date: 2024  Patient Name: Oswaldo Bowen  : 1980  MRN: [id number]  Date of admission: 2024      Subjective   Subjective   Chief complaint: Sent from facility for fevers    Summary:  44-year-old male with history of intellectual disability, tobacco smoker, depression, chronic anemia, with recent hospitalization for mechanical fall with displaced intertrochanteric femur fracture requiring orthopedic intervention, VLAD, with suspected new metastatic lung cancer and mets to the bone with a 2.6 cm nodule in the left upper lobe, which came back positive for adenocarcinoma, hospitalized on 2024 for chief complaint of fevers, pancultured, placed on broad-spectrum antibiotics, pulmonary consulted with underlying adenocarcinoma of the lung which appears to be widely metastatic to bone, could be causing fevers.  Reaching out to heme-onc to see if he needs an appointment, transportation issues while in rehab may limit consultation in the outpatient setting    Interval follow-up: Seen and examined this morning, no acute distress, no acute major night events, doing better, more alert and awake, no fevers, blood pressures and other vital signs have been stable, potassium 3.1, replacement potassium ordered, creatinine 0.29, sodium 135, white blood cell count 9000, hemoglobin 8.3.  Discussed with patient completing course of antibiotics for 7 days given improvements while on this regimen.  Likely will return back to primary care facility.  Needs to follow-up with oncology as outpatient.    Review of systems:  All systems reviewed negative except weakness and fatigue      Objective   Objective     Vitals:   Temp:  [97.3 °F (36.3 °C)-99 °F (37.2 °C)] 97.3 °F (36.3 °C)  Heart Rate:  [] 95  Resp:  [18-20] 18  BP: (125-134)/(76-86) 125/76  Physical Exam      Constitutional: Awake, alert, no acute distress, pleasant mood urinating with bedside urinal   Eyes: Pupils  equal, sclerae anicteric, no conjunctival injection   HENT: NCAT, mucous membranes moist   Neck: Supple, no thyromegaly, no lymphadenopathy, trachea midline   Respiratory: Clear to auscultation bilaterally, nonlabored respirations    Cardiovascular: RRR, no murmurs, rubs, or gallops, palpable pedal pulses bilaterally   Gastrointestinal: Positive bowel sounds, soft, nontender, nondistended   Musculoskeletal: No bilateral ankle edema, no clubbing or cyanosis to extremities   Psychiatric: Flat though pleasant mood   Neurologic: Oriented x 2 self and surroundings, strength symmetric in all extremities, Cranial Nerves grossly intact to confrontation, speech slow, does respond to questions   Skin: No rashes visible on exposed skin      Result Review    Result Review:  I have personally reviewed the pertinent results from the past 24 hours to 7/1/2024 09:17 EDT and agree with these findings:  [x]  Laboratory   CBC          6/29/2024    05:21 6/29/2024    19:24 6/30/2024    05:48 7/1/2024    04:54   CBC   WBC 14.50  14.32  11.52  9.93    RBC 2.38  3.42  3.11  3.21    Hemoglobin 6.2  9.0  8.1  8.3    Hematocrit 20.6  29.4  26.4  26.9    MCV 86.6  86.0  84.9  83.8    MCH 26.1  26.3  26.0  25.9    MCHC 30.1  30.6  30.7  30.9    RDW 15.4  15.8  15.7  15.5    Platelets 352  373  381  404      BMP          6/29/2024    05:21 6/30/2024    05:48 7/1/2024    04:54   BMP   BUN 10  9  8    Creatinine 0.32  0.26  0.29    Sodium 136  136  135    Potassium 2.8  3.2  3.1    Chloride 102  102  100    CO2 23.4  23.2  24.7    Calcium 8.8  8.2  8.6      LIVER FUNCTION TESTS:      Lab 07/01/24  0454 06/30/24  0548 06/29/24  0521 06/27/24  1756   TOTAL PROTEIN 5.6* 5.5* 5.3* 6.4   ALBUMIN 2.2* 2.0* 2.2* 2.7*   GLOBULIN  --   --   --  3.7   ALT (SGPT) 58* 58* 28 38   AST (SGOT) 23 33 17 45*   BILIRUBIN 0.2 0.3 0.2 0.3   BILIRUBIN DIRECT <0.2 <0.2 <0.2  --    ALK PHOS 117 120* 120* 181*       [x]  Microbiology   Microbiology Results (last 10  days)       Procedure Component Value - Date/Time    COVID PRE-OP / PRE-PROCEDURE SCREENING ORDER (NO ISOLATION) - Swab, Nasopharynx [[id number]]  (Normal) Collected: 06/28/24 1155    Lab Status: Final result Specimen: Swab from Nasopharynx Updated: 06/28/24 1252    Narrative:      The following orders were created for panel order COVID PRE-OP / PRE-PROCEDURE SCREENING ORDER (NO ISOLATION) - Swab, Nasopharynx.  Procedure                               Abnormality         Status                     ---------                               -----------         ------                     COVID-19, FLU A/B, RSV P...[[id number]]  Normal              Final result                 Please view results for these tests on the individual orders.    COVID-19, FLU A/B, RSV PCR 1 HR TAT - Swab, Nasopharynx [[id number]]  (Normal) Collected: 06/28/24 1155    Lab Status: Final result Specimen: Swab from Nasopharynx Updated: 06/28/24 1252     COVID19 Not Detected     Influenza A PCR Not Detected     Influenza B PCR Not Detected     RSV, PCR Not Detected    Narrative:      Fact sheet for providers: https://www.fda.gov/media/087213/download    Fact sheet for patients: https://www.fda.gov/media/424664/download    Test performed by PCR.    Blood Culture - Blood, Blood, Venous [[id number]]  (Normal) Collected: 06/27/24 1856    Lab Status: Preliminary result Specimen: Blood, Venous Updated: 06/30/24 1916     Blood Culture No growth at 3 days    Blood Culture - Blood, Blood, Venous [[id number]]  (Normal) Collected: 06/27/24 1856    Lab Status: Preliminary result Specimen: Blood, Venous Updated: 06/30/24 1916     Blood Culture No growth at 3 days              [x]  Radiology CT Abdomen Pelvis With Contrast    Result Date: 6/28/2024  Impression: 1.No definite acute infectious process is identified. 2.There are now multiple small bladder calculi. 3.Progressive osseous metastatic disease with enlarging lesions. No definite acute fracture identified.  Electronically Signed: Johann Ramírez MD  6/28/2024 10:55 PM EDT  Workstation ID: [id number]    CT Chest Without Contrast Diagnostic    Result Date: 6/28/2024  1. No evidence of pneumonia 2. Small bilateral pleural effusions 3. Stable left upper lobe mass and widespread skeletal metastatic disease Electronically Signed: Sree Hawthorne  6/28/2024 1:10 PM EDT  Workstation ID: [id number]    CT Head Without Contrast    Result Date: 6/28/2024  Impression: 1. No acute intracranial process 2. Lytic lesion of the calvarium concerning for metastasis Electronically Signed: Sree Hawthorne  6/28/2024 12:49 PM EDT  Workstation ID: [id number]    XR Femur 2 View Right    Result Date: 6/27/2024  Impression: No acute abnormality. Postsurgical and degenerative findings as above without evidence of complication. Electronically Signed: Michael Chand MD  6/27/2024 7:11 PM EDT  Workstation ID: [id number]    XR Chest 1 View    Result Date: 6/27/2024  Impression: No focal airspace consolidation. Electronically Signed: Michael Chand MD  6/27/2024 7:06 PM EDT  Workstation ID: [id number]       [x]  EKG/Telemetry   ECG 12 Lead Rhythm Change   Preliminary Result   HEART QTBE=497  bpm   RR Xlvxdzax=798  ms   IL Lvztphab=930  ms   P Horizontal Axis=-35  deg   P Front Axis=47  deg   QRSD Interval=92  ms   QT Lwubumwq=727  ms   WEtZ=053  ms   QRS Axis=-8  deg   T Wave Axis=53  deg   - BORDERLINE ECG -   Sinus tachycardia   RSR' in V1 or V2, probably normal variant   Borderline  T wave abnormalities   Date and Time of Study:2024-06-28 01:08:28      ECG 12 Lead Tachycardia   Preliminary Result   HEART XYYF=348  bpm   RR Mkrytvun=250  ms   IL Iypehoyc=307  ms   P Horizontal Axis=-3  deg   P Front Axis=34  deg   QRSD Interval=90  ms   QT Oajevabn=340  ms   FElI=598  ms   QRS Axis=1  deg   T Wave Axis=52  deg   - OTHERWISE NORMAL ECG -   Sinus tachycardia   When compared with ECG of 14-Jun-2024 03:46:24,   Significant axis, voltage or hypertrophy change   Date and  Time of Study:2024-06-27 17:33:32          [x]  Cardiology/Vascular   []  Pathology  [x]  Old records  []  Other:    Assessment & Plan   Assessment / Plan     Assessment/Plan:  Assessment:  Unknown source of fevers  Sepsis secondary to unknown source of infection suspect urinary  Metastatic adenocarcinoma of the lung; likely primary and assumed  Current tobacco smoker  Intellectual disability  Acute on chronic anemia  Symptomatic anemia  Depression  Recent hospitalization for femur fracture    Plan:  Labs and imaging reviewed  Day 4 of 7 of antibiotics  Potassium replacement orally  Continue vancomycin and cefepime; probably should treat for 7 days given improvements while on this regimen, did not do well on ceftriaxone in the outpatient setting  Consulted pulmonary discussed with Dr. Torres, recommendations appreciated  Oncology consulted discussed with Dr. Johnson, recommendations appreciated needs to follow-up as outpatient  Palliative care consulted to establish goals of care  Continue metoprolol XL to 75 mg twice a day  Pain control Tylenol  Zofran for nausea  PT/OT  A.m. labs  Full code  DVT prophylaxis with SCD  Clinical course dictate further management  Discussed with nurse at the bedside  Anticipate returning back to primary living facility once antibiotics are complete    VTE Prophylaxis:  Pharmacologic & mechanical VTE prophylaxis orders are present.        CODE STATUS:   Code Status (Patient has no pulse and is not breathing): CPR (Attempt to Resuscitate)  Medical Interventions (Patient has pulse or is breathing): Full Support        Electronically signed by Rosalinda Esquivel MD, 7/1/2024, 09:17 EDT.    Portions of this documentation were transcribed electronically from a voice recognition software.  I confirm all data accurately represents the service(s) I performed at today's visit.

## 2024-07-01 NOTE — CONSULTS
"Purpose of the visit was to evaluate for: goals of care/advanced care planning, support for patient/family, and pain/symptom management. Spoke with RN and patient and discussed palliative care, goals of care, and resuscitation status.      Assessment: Collaborated with primary RN.   Mr Bowen has a hx of tobacco smoker, depression, chronic anemia, with recent hospitalization for mechanical fall with displaced intertrochanteric femur fracture requiring orthopedic intervention, VLAD, with suspected new metastatic lung cancer and mets to the bone with a 2.6 cm nodule in the left upper lobe, which came back positive for adenocarcinoma, hospitalized on 6/27/2024 for chief complaint of fevers, pancultured, placed on broad-spectrum antibiotics, pulmonary consulted with underlying adenocarcinoma of the lung which appears to be widely metastatic to bone, could be causing fevers.   I spoke with Mr Bowen in his room on 4 NT>  He is alert and oriented. He seems pretty confused understanding the medical issues he is having. He understands he has cancer but doesn't comprehend how \"bad \"it is.   He lives with his sister and brother in law. I was able to speak to his sister. I have explained in great detail what the MD have charted from test results. She was very upset with the diagnosis. She tells me she wants to bring him home if at all possible to live out his life.   We discussed completing a living will and a [place] MOST tomorrow    Recommendations/Plan: Further decisions to be made pending clinical course. .    Tasks Completed: Code Status clarification and Emotional Support.    Kiersten TOMLINSON RN, BSN  Palliative Care.  "

## 2024-07-01 NOTE — PROGRESS NOTES
"[facility] Clinical Pharmacy Services: Vancomycin Monitoring Note    Oswaldo Bowen is a 44 y.o. male who is on day  of pharmacy to dose vancomycin for Sepsis.    Previous Vancomycin Dose:   1750 mg IV every  12  hours  Imaging Reviewed?: Yes  Updated Cultures and Sensitivities:    COVID/flu/RSV: negative   Blood cx : NGTD    Vitals/Labs  Ht: 182.9 cm (72.01\"); Wt: 76.2 kg (167 lb 15.9 oz)   Temp (24hrs), Av.7 °F (36.5 °C), Min:97.3 °F (36.3 °C), Max:99 °F (37.2 °C)   Estimated Creatinine Clearance: 350.3 mL/min (A) (by C-G formula based on SCr of 0.29 mg/dL (L)).     Results from last 7 days   Lab Units 24  0454 24  0548 24  1924 24  0521   VANCOMYCIN RM mcg/mL  --  11.55  --   --    CREATININE mg/dL 0.29* 0.26*  --  0.32*   WBC 10*3/mm3 9.93 11.52* 14.32* 14.50*     Assessment/Plan  Current Vancomycin Dose:  1750 mg IV every 12 hours; which provides the following predicted parameters:  AUC24,ss: 535 mg/L.hr  PAUC*: 97 %  Ctrough,ss: 13 mg/L  Pconc*: 0 %  Tox.: 8 %  Next Vanc Random ordered for  at 0600 with AM labs  We will continue to monitor patient changes and renal function     Thank you for involving pharmacy in this patient's care. Please contact pharmacy with any questions or concerns.    Makeda Morgan [facility]  Clinical Pharmacist  "

## 2024-07-01 NOTE — PROGRESS NOTES
Pulmonary / Critical Care Progress Note      Patient Name: Oswaldo Bowen  : 1980  MRN: [id number]  Attending:  Rosalinda Esquivel MD  Date of admission: 2024    Subjective   Subjective   Follow-up for fevers, lung cancer, concern for pneumonia    Over past 24 hours:  Remains on room air  Mental status more or less the same  Denies shortness of breath  Family members at bedside  Chest CT personally reviewed.  No obvious consolidation concerning for pneumonia.  Very small bilateral pleural effusion noted.  Metastatic disease noted.    Review of Systems  General: Denied complaints  Cardiovascular:  Denied complaints  Respiratory: Denied complaints  Gastrointestinal: Denied complaints        Objective   Objective     Vitals:   Temp:  [97.2 °F (36.2 °C)-99 °F (37.2 °C)] 97.3 °F (36.3 °C)  Heart Rate:  [] 96  Resp:  [18-20] 18  BP: (121-136)/(73-86) 136/79    Physical Exam   Vital Signs Reviewed  General WDWN, Alert, NAD.    Chest:  good aeration, clear to auscultation bilaterally, tympanic to percussion bilaterally, no work of breathing noted  CV: RRR, no MGR, .  EXT:  no clubbing, no cyanosis, no edema, no joint tenderness  Neuro:  A&Ox3, CN grossly intact, no focal deficits.  Skin: No rashes or lesions noted      Result Review    Result Review:  I have personally reviewed the results from the time of this admission to 2024 15:17 EDT and agree with these findings:  [x]  Laboratory  []  Microbiology  [x]  Radiology  []  EKG/Telemetry   []  Cardiology/Vascular   [x]  Pathology  []  Old records  []  Other:  Most notable findings include:   -     Assessment & Plan   Assessment / Plan     Active Hospital Problems:  Active Hospital Problems    Diagnosis    • **Sepsis    • Anemia        Impression:  Fever of unknown origin  Bilateral small pleural effusion  Atelectasis at bases  Recently diagnosed metastatic adenocarcinoma of the lung  Acute comminuted displaced intertrochanteric right femoral  fracture  Status post IM nailing  Left upper lobe lung nodule with mediastinal lymphadenopathy  Chronic heavy smoking  Unintentional weight loss  Acute anemia     Plan:  Currently on room air  CT scan of the brain discussed with the patient 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  CT scan of the chest reviewed no significant fluid to drain from the chest  Long discussion had with the patient as well as his family today explained to them that he does indeed have lung cancer that does appear to be widespread  Antibiotics to continue course  Oncology following  Bronchodilator therapies as needed    Extensive discussion had with the patient as well as his family member today time spent exceeds 35 minutes speaking with family, speaking with hospitalist, reviewing the patient's pathology report, reviewing the patient's CT imaging    VTE Prophylaxis:  Pharmacologic & mechanical VTE prophylaxis orders are present.        CODE STATUS:   Code Status (Patient has no pulse and is not breathing): CPR (Attempt to Resuscitate)  Medical Interventions (Patient has pulse or is breathing): Full Support    Labs, images, and medications personally reviewed.    Discussed with patient    Electronically signed by Vicente Torres DO, 07/01/24, 3:17 PM EDT.

## 2024-07-01 NOTE — PLAN OF CARE
Goal Outcome Evaluation:  Plan of Care Reviewed With: patient        Progress: improving  Outcome Evaluation: Intermittent diaphoresis, requiring linen and gown changes. Temperature WDL on vital sign checks. PRN tylenol given for generalized aches and pains. Patient slept intermittently over night. Respirations even and unlabored.

## 2024-07-01 NOTE — PLAN OF CARE
Goal Outcome Evaluation:  Plan of Care Reviewed With: patient        Progress: improving  Outcome Evaluation: Patient remains alert and oriented x4. Medicated with PRN tylenol per MAR. Patient remains afebrile this shift. No new issues at this time.

## 2024-07-02 LAB
ALBUMIN SERPL-MCNC: 2.4 G/DL (ref 3.5–5.2)
ALP SERPL-CCNC: 119 U/L (ref 39–117)
ALT SERPL W P-5'-P-CCNC: 46 U/L (ref 1–41)
ANION GAP SERPL CALCULATED.3IONS-SCNC: 8.4 MMOL/L (ref 5–15)
AST SERPL-CCNC: 20 U/L (ref 1–40)
BACTERIA SPEC AEROBE CULT: NORMAL
BACTERIA SPEC AEROBE CULT: NORMAL
BASOPHILS # BLD AUTO: 0.04 10*3/MM3 (ref 0–0.2)
BASOPHILS NFR BLD AUTO: 0.3 % (ref 0–1.5)
BILIRUB CONJ SERPL-MCNC: <0.2 MG/DL (ref 0–0.3)
BILIRUB INDIRECT SERPL-MCNC: ABNORMAL MG/DL
BILIRUB SERPL-MCNC: 0.2 MG/DL (ref 0–1.2)
BUN SERPL-MCNC: 5 MG/DL (ref 6–20)
BUN/CREAT SERPL: 14.7 (ref 7–25)
CALCIUM SPEC-SCNC: 8.7 MG/DL (ref 8.6–10.5)
CHLORIDE SERPL-SCNC: 99 MMOL/L (ref 98–107)
CO2 SERPL-SCNC: 27.6 MMOL/L (ref 22–29)
CREAT SERPL-MCNC: 0.34 MG/DL (ref 0.76–1.27)
DEPRECATED RDW RBC AUTO: 47.5 FL (ref 37–54)
EGFRCR SERPLBLD CKD-EPI 2021: 144.9 ML/MIN/1.73
EOSINOPHIL # BLD AUTO: 0.12 10*3/MM3 (ref 0–0.4)
EOSINOPHIL NFR BLD AUTO: 1 % (ref 0.3–6.2)
ERYTHROCYTE [DISTWIDTH] IN BLOOD BY AUTOMATED COUNT: 15.5 % (ref 12.3–15.4)
GAMMA INTERFERON BACKGROUND BLD IA-ACNC: 0.3 IU/ML
GLUCOSE SERPL-MCNC: 111 MG/DL (ref 65–99)
HCT VFR BLD AUTO: 28.6 % (ref 37.5–51)
HGB BLD-MCNC: 8.7 G/DL (ref 13–17.7)
IMM GRANULOCYTES # BLD AUTO: 0.21 10*3/MM3 (ref 0–0.05)
IMM GRANULOCYTES NFR BLD AUTO: 1.7 % (ref 0–0.5)
LYMPHOCYTES # BLD AUTO: 1.39 10*3/MM3 (ref 0.7–3.1)
LYMPHOCYTES NFR BLD AUTO: 11.1 % (ref 19.6–45.3)
M TB IFN-G BLD-IMP: ABNORMAL
M TB IFN-G CD4+ BCKGRND COR BLD-ACNC: 0.01 IU/ML
M TB IFN-G CD4+CD8+ BCKGRND COR BLD-ACNC: 0.16 IU/ML
MAGNESIUM SERPL-MCNC: 1.5 MG/DL (ref 1.6–2.6)
MCH RBC QN AUTO: 25.7 PG (ref 26.6–33)
MCHC RBC AUTO-ENTMCNC: 30.4 G/DL (ref 31.5–35.7)
MCV RBC AUTO: 84.6 FL (ref 79–97)
MITOGEN IGNF BCKGRD COR BLD-ACNC: 0.35 IU/ML
MONOCYTES # BLD AUTO: 1.1 10*3/MM3 (ref 0.1–0.9)
MONOCYTES NFR BLD AUTO: 8.8 % (ref 5–12)
NEUTROPHILS NFR BLD AUTO: 77.1 % (ref 42.7–76)
NEUTROPHILS NFR BLD AUTO: 9.7 10*3/MM3 (ref 1.7–7)
NRBC BLD AUTO-RTO: 0 /100 WBC (ref 0–0.2)
PHOSPHATE SERPL-MCNC: 3.7 MG/DL (ref 2.5–4.5)
PLATELET # BLD AUTO: 401 10*3/MM3 (ref 140–450)
PMV BLD AUTO: 9.5 FL (ref 6–12)
POTASSIUM SERPL-SCNC: 3.6 MMOL/L (ref 3.5–5.2)
PROT SERPL-MCNC: 5.6 G/DL (ref 6–8.5)
QUANTIFERON INCUBATION: ABNORMAL
RBC # BLD AUTO: 3.38 10*6/MM3 (ref 4.14–5.8)
SERVICE CMNT-IMP: ABNORMAL
SODIUM SERPL-SCNC: 135 MMOL/L (ref 136–145)
VANCOMYCIN SERPL-MCNC: 12.92 MCG/ML (ref 5–40)
WBC NRBC COR # BLD AUTO: 12.56 10*3/MM3 (ref 3.4–10.8)

## 2024-07-02 PROCEDURE — 80076 HEPATIC FUNCTION PANEL: CPT | Performed by: FAMILY MEDICINE

## 2024-07-02 PROCEDURE — 83735 ASSAY OF MAGNESIUM: CPT | Performed by: FAMILY MEDICINE

## 2024-07-02 PROCEDURE — 99232 SBSQ HOSP IP/OBS MODERATE 35: CPT | Performed by: STUDENT IN AN ORGANIZED HEALTH CARE EDUCATION/TRAINING PROGRAM

## 2024-07-02 PROCEDURE — 87070 CULTURE OTHR SPECIMN AEROBIC: CPT | Performed by: NURSE PRACTITIONER

## 2024-07-02 PROCEDURE — 25010000002 MAGNESIUM SULFATE 2 GM/50ML SOLUTION: Performed by: STUDENT IN AN ORGANIZED HEALTH CARE EDUCATION/TRAINING PROGRAM

## 2024-07-02 PROCEDURE — 87205 SMEAR GRAM STAIN: CPT | Performed by: NURSE PRACTITIONER

## 2024-07-02 PROCEDURE — 97110 THERAPEUTIC EXERCISES: CPT

## 2024-07-02 PROCEDURE — 25810000003 SODIUM CHLORIDE 0.9 % SOLUTION: Performed by: FAMILY MEDICINE

## 2024-07-02 PROCEDURE — 84100 ASSAY OF PHOSPHORUS: CPT | Performed by: FAMILY MEDICINE

## 2024-07-02 PROCEDURE — 25010000002 VANCOMYCIN 5 G RECONSTITUTED SOLUTION: Performed by: FAMILY MEDICINE

## 2024-07-02 PROCEDURE — 85025 COMPLETE CBC W/AUTO DIFF WBC: CPT | Performed by: FAMILY MEDICINE

## 2024-07-02 PROCEDURE — 25010000002 CEFEPIME PER 500 MG: Performed by: FAMILY MEDICINE

## 2024-07-02 PROCEDURE — 80202 ASSAY OF VANCOMYCIN: CPT | Performed by: FAMILY MEDICINE

## 2024-07-02 PROCEDURE — 80048 BASIC METABOLIC PNL TOTAL CA: CPT | Performed by: FAMILY MEDICINE

## 2024-07-02 RX ORDER — MAGNESIUM SULFATE HEPTAHYDRATE 40 MG/ML
2 INJECTION, SOLUTION INTRAVENOUS ONCE
Status: COMPLETED | OUTPATIENT
Start: 2024-07-02 | End: 2024-07-02

## 2024-07-02 RX ADMIN — CEFEPIME 2000 MG: 2 INJECTION, POWDER, FOR SOLUTION INTRAVENOUS at 17:46

## 2024-07-02 RX ADMIN — Medication 10 ML: at 21:49

## 2024-07-02 RX ADMIN — CEFEPIME 2000 MG: 2 INJECTION, POWDER, FOR SOLUTION INTRAVENOUS at 01:46

## 2024-07-02 RX ADMIN — VANCOMYCIN HYDROCHLORIDE 1750 MG: 5 INJECTION, POWDER, LYOPHILIZED, FOR SOLUTION INTRAVENOUS at 21:49

## 2024-07-02 RX ADMIN — MAGNESIUM SULFATE HEPTAHYDRATE 2 G: 40 INJECTION, SOLUTION INTRAVENOUS at 11:00

## 2024-07-02 RX ADMIN — ACETAMINOPHEN 650 MG: 325 TABLET ORAL at 17:46

## 2024-07-02 RX ADMIN — Medication 10 ML: at 09:14

## 2024-07-02 RX ADMIN — NICOTINE 1 PATCH: 21 PATCH, EXTENDED RELEASE TRANSDERMAL at 09:11

## 2024-07-02 RX ADMIN — ACETAMINOPHEN 650 MG: 325 TABLET ORAL at 01:00

## 2024-07-02 RX ADMIN — Medication 10 MG: at 01:00

## 2024-07-02 RX ADMIN — ACETAMINOPHEN 650 MG: 325 TABLET ORAL at 06:07

## 2024-07-02 RX ADMIN — METOPROLOL SUCCINATE 75 MG: 50 TABLET, EXTENDED RELEASE ORAL at 09:14

## 2024-07-02 RX ADMIN — ACETAMINOPHEN 650 MG: 325 TABLET ORAL at 12:19

## 2024-07-02 RX ADMIN — METOPROLOL SUCCINATE 75 MG: 50 TABLET, EXTENDED RELEASE ORAL at 21:49

## 2024-07-02 RX ADMIN — ACETAMINOPHEN 650 MG: 325 TABLET ORAL at 22:28

## 2024-07-02 RX ADMIN — VANCOMYCIN HYDROCHLORIDE 1750 MG: 5 INJECTION, POWDER, LYOPHILIZED, FOR SOLUTION INTRAVENOUS at 09:14

## 2024-07-02 NOTE — PROGRESS NOTES
"[facility] Clinical Pharmacy Services: Vancomycin Monitoring Note    Oswaldo Bowen is a 44 y.o. male who is on day  of pharmacy to dose vancomycin for Sepsis.    Previous Vancomycin Dose:   1750 mg IV every  12  hours  Imaging Reviewed?: Yes  Updated Cultures and Sensitivities:    COVID/flu/RSV: negative   Blood cx : NGTD    Vitals/Labs  Ht: 182.9 cm (72.01\"); Wt: 76.2 kg (167 lb 15.9 oz)   Temp (24hrs), Av.8 °F (36.6 °C), Min:97.2 °F (36.2 °C), Max:99.3 °F (37.4 °C)   Estimated Creatinine Clearance: 298.8 mL/min (A) (by C-G formula based on SCr of 0.34 mg/dL (L)).     Results from last 7 days   Lab Units 24  0553 24  0454 24  0548   VANCOMYCIN RM mcg/mL 12.92  --  11.55   CREATININE mg/dL 0.34* 0.29* 0.26*   WBC 10*3/mm3 12.56* 9.93 11.52*     Assessment/Plan  Vancomycin level of 12.92 mcg/mL this morning, will continue with current regimen.   Current Vancomycin Dose:  1750 mg IV every 12 hours; which provides the following predicted parameters:  AUC24,ss: 504 mg/L.hr  PAUC*: 94 %  Ctrough,ss: 11.6 mg/L  Pconc*: 0 %  Tox.: 7 %  No more levels to be ordered.   We will continue to monitor patient changes and renal function     Thank you for involving pharmacy in this patient's care. Please contact pharmacy with any questions or concerns.    Makeda Morgan [facility]  Clinical Pharmacist  "

## 2024-07-02 NOTE — PLAN OF CARE
Goal Outcome Evaluation:              Outcome Evaluation: aox4 with forgetfulness. afebrile this shift, continues to be diaphoretic at times. medicated for c/o pain per mar. remains on remote cardiac monitor. magnesium replaced as ordered. frequently turned and repositioned. fall risk measures in place. encouraged use of incentive spirometer. patient wore SCDs some this shift.

## 2024-07-02 NOTE — PROGRESS NOTES
[facility]   Hospitalist Progress Note  Date: 2024  Patient Name: Oswaldo Bowen  : 1980  MRN: [id number]  Date of admission: 2024  Room/Bed: [facility]/1      Subjective   Subjective     Chief Complaint: Fever    Summary:Oswaldo Bowen is a 44 y.o. male with history of intellectual disability, tobacco smoker, depression, chronic anemia, with recent hospitalization for mechanical fall with displaced intertrochanteric femur fracture requiring orthopedic intervention, VLAD, with suspected new metastatic lung cancer and mets to the bone with a 2.6 cm nodule in the left upper lobe, which came back positive for adenocarcinoma, hospitalized on 2024 for chief complaint of fevers, pancultured, placed on broad-spectrum antibiotics, pulmonary consulted with underlying adenocarcinoma of the lung which appears to be widely metastatic to bone, could be causing fevers. Reaching out to heme-onc to see if he needs an appointment, transportation issues while in rehab may limit consultation in the outpatient setting     Interval Followup: No acute overnight events.  No acute distress.  Patient resting comfortably in bed.  He was sleeping when I entered the room, awoke appropriately.  He denies any needs, denies chest pain, shortness of breath, abdominal pain.  Says that he is comfortable currently.  No family at bedside.    Review of Systems    All systems reviewed and negative except for what is outlined above.      Objective   Objective     Vitals:   Temp:  [97.3 °F (36.3 °C)-99.3 °F (37.4 °C)] 98.1 °F (36.7 °C)  Heart Rate:  [101-114] 106  Resp:  [18] 18  BP: (128-143)/(79-86) 130/86    Physical Exam   Gen: NAD, Alert and Oriented  Cards: RRR, no murmur   Pulm: CTA b/l, no wheezing  Abd: soft, nondistended  Extremities: no pitting edema    Result Review    Result Review:  I have personally reviewed these results:  [x]  Laboratory      Lab 24  0553 24  0454 24  0548 24  1747 24  0539  06/27/24  1756 06/27/24  1755   WBC 12.56* 9.93 11.52*   < > 23.42*  --  19.55*   HEMOGLOBIN 8.7* 8.3* 8.1*   < > 7.6*  --  7.4*   HEMATOCRIT 28.6* 26.9* 26.4*   < > 25.5*  --  23.7*   PLATELETS 401 404 381   < > 431  --  442   NEUTROS ABS 9.70* 7.57* 9.24*   < > 19.53*  --  15.95*   IMMATURE GRANS (ABS) 0.21* 0.11* 0.12*   < > 0.20*  --  0.14*   LYMPHS ABS 1.39 1.17 1.13   < > 1.54  --  1.44   MONOS ABS 1.10* 0.84 0.84   < > 2.09*  --  1.99*   EOS ABS 0.12 0.22 0.16   < > 0.01  --  0.01   MCV 84.6 83.8 84.9   < > 86.1  --  83.2   PROCALCITONIN  --   --   --   --   --  0.48*  --    LACTATE  --   --   --   --   --   --  1.5   LDH  --   --   --   --  1,450*  --   --     < > = values in this interval not displayed.         Lab 07/02/24  0553 07/01/24  0454 06/30/24  0548   SODIUM 135* 135* 136   POTASSIUM 3.6 3.1* 3.2*   CHLORIDE 99 100 102   CO2 27.6 24.7 23.2   ANION GAP 8.4 10.3 10.8   BUN 5* 8 9   CREATININE 0.34* 0.29* 0.26*   EGFR 144.9 152.0 157.1   GLUCOSE 111* 105* 113*   CALCIUM 8.7 8.6 8.2*   MAGNESIUM 1.5* 1.7 1.6   PHOSPHORUS 3.7 3.8 3.1         Lab 07/02/24  0553 07/01/24  0454 06/30/24  0548 06/29/24  0521 06/27/24  1756   TOTAL PROTEIN 5.6* 5.6* 5.5*   < > 6.4   ALBUMIN 2.4* 2.2* 2.0*   < > 2.7*   GLOBULIN  --   --   --   --  3.7   ALT (SGPT) 46* 58* 58*   < > 38   AST (SGOT) 20 23 33   < > 45*   BILIRUBIN 0.2 0.2 0.3   < > 0.3   BILIRUBIN DIRECT <0.2 <0.2 <0.2   < >  --    ALK PHOS 119* 117 120*   < > 181*    < > = values in this interval not displayed.                 Lab 06/29/24  0620 06/28/24  0539   IRON  --  14*   IRON SATURATION (TSAT)  --  9*   TIBC  --  161*   TRANSFERRIN  --  108*   FERRITIN  --  3,516.00*   ABO TYPING O  --    RH TYPING Positive  --    ANTIBODY SCREEN Negative  --          Brief Urine Lab Results  (Last result in the past 365 days)        Color   Clarity   Blood   Leuk Est   Nitrite   Protein   CREAT   Urine HCG        06/27/24 1856 Yellow   Clear   Negative   Negative    Negative   Trace                 [x]  Microbiology   Microbiology Results (last 10 days)       Procedure Component Value - Date/Time    Respiratory Culture - Sputum, Cough [[id number]] Collected: 07/02/24 1239    Lab Status: Preliminary result Specimen: Sputum from Cough Updated: 07/02/24 1454     Gram Stain Occasional WBCs seen      No organisms seen    COVID PRE-OP / PRE-PROCEDURE SCREENING ORDER (NO ISOLATION) - Swab, Nasopharynx [[id number]]  (Normal) Collected: 06/28/24 1155    Lab Status: Final result Specimen: Swab from Nasopharynx Updated: 06/28/24 1252    Narrative:      The following orders were created for panel order COVID PRE-OP / PRE-PROCEDURE SCREENING ORDER (NO ISOLATION) - Swab, Nasopharynx.  Procedure                               Abnormality         Status                     ---------                               -----------         ------                     COVID-19, FLU A/B, RSV P...[[id number]]  Normal              Final result                 Please view results for these tests on the individual orders.    COVID-19, FLU A/B, RSV PCR 1 HR TAT - Swab, Nasopharynx [[id number]]  (Normal) Collected: 06/28/24 1155    Lab Status: Final result Specimen: Swab from Nasopharynx Updated: 06/28/24 1252     COVID19 Not Detected     Influenza A PCR Not Detected     Influenza B PCR Not Detected     RSV, PCR Not Detected    Narrative:      Fact sheet for providers: https://www.fda.gov/media/719898/download    Fact sheet for patients: https://www.fda.gov/media/439909/download    Test performed by PCR.    Blood Culture - Blood, Blood, Venous [[id number]]  (Normal) Collected: 06/27/24 1856    Lab Status: Preliminary result Specimen: Blood, Venous Updated: 07/01/24 1915     Blood Culture No growth at 4 days    Blood Culture - Blood, Blood, Venous [[id number]]  (Normal) Collected: 06/27/24 1856    Lab Status: Preliminary result Specimen: Blood, Venous Updated: 07/01/24 1915     Blood Culture No growth at 4 days           [x]  Radiology  CT Abdomen Pelvis With Contrast    Result Date: 6/28/2024  Impression: 1.No definite acute infectious process is identified. 2.There are now multiple small bladder calculi. 3.Progressive osseous metastatic disease with enlarging lesions. No definite acute fracture identified. Electronically Signed: Johann Ramírez MD  6/28/2024 10:55 PM EDT  Workstation ID: [id number]    CT Chest Without Contrast Diagnostic    Result Date: 6/28/2024  1. No evidence of pneumonia 2. Small bilateral pleural effusions 3. Stable left upper lobe mass and widespread skeletal metastatic disease Electronically Signed: Sree Hawthorne  6/28/2024 1:10 PM EDT  Workstation ID: [id number]    CT Head Without Contrast    Result Date: 6/28/2024  Impression: 1. No acute intracranial process 2. Lytic lesion of the calvarium concerning for metastasis Electronically Signed: Sree Hawthorne  6/28/2024 12:49 PM EDT  Workstation ID: [id number]    XR Femur 2 View Right    Result Date: 6/27/2024  Impression: No acute abnormality. Postsurgical and degenerative findings as above without evidence of complication. Electronically Signed: Michael Chand MD  6/27/2024 7:11 PM EDT  Workstation ID: [id number]    XR Chest 1 View    Result Date: 6/27/2024  Impression: No focal airspace consolidation. Electronically Signed: Michael Chand MD  6/27/2024 7:06 PM EDT  Workstation ID: [id number]   []  EKG/Telemetry   []  Cardiology/Vascular   []  Pathology  []  Old records  []  Other:    Assessment & Plan   Assessment / Plan     Assessment:  Subjective fevers, unknown source  Metastatic adenocarcinoma of the lung; likely primary and assumed  Current tobacco smoker  Intellectual disability  Acute on chronic anemia  Symptomatic anemia  Depression  Recent hospitalization for femur fracture  Hypomagnesemia  Iron deficiency    Plan:  Continue inpatient admission  Continue vancomycin and cefepime, day 5/7  Blood cultures negative.  COVID/flu/RSV negative.  BAL PCR negative.   Sputum culture negative.  Urinalysis negative.  Pulmonology following  Oncology following  Continue metoprolol 75 mg twice daily  Multimodal pain control  Zofran as needed  PT/OT  Palliative care following  Mag 1.5, 2 g IV replacement ordered  Hemoglobin stable at 8.7.  Iron deficiency, however, ferritin 3500.  Defer IV iron for now.  WBC count stable.  A.m. labs      Disposition: Ongoing hospice care talks with patient/family.       Discussed with RN.    VTE Prophylaxis:  Pharmacologic & mechanical VTE prophylaxis orders are present.        CODE STATUS:   Code Status (Patient has no pulse and is not breathing): CPR (Attempt to Resuscitate)  Medical Interventions (Patient has pulse or is breathing): Full Support      Electronically signed by Sena Cantrell DO, 7/2/2024, 17:41 EDT.

## 2024-07-02 NOTE — PLAN OF CARE
Goal Outcome Evaluation:  Plan of Care Reviewed With: patient      Pt. Remains alert and oriented Generalized weakness with low garbled speech noted. Needs encouragement and assist x1 to turn q2 hours.  Fever present this shift, medicated with Tylenol for pain, stated a 4 at 0100. Fever broke and pt. verbalized feeling comfortable. Gown/bed change x2 for diaphoresis. Pt. Stated he was thinking about death and is somber. Much emotional support given.

## 2024-07-02 NOTE — CONSULTS
07/02/24 1045   Coping/Psychosocial   Additional Documentation Spiritual Care (Group)   Spiritual Care   Use of Spiritual Resources spirituality for coping, indicated strong use of   Spiritual Care Source  initiative   Spiritual Care Follow-Up will follow closely;other (see comments)  (oet therapy will visit with Oswaldo after lunch)   Response to Spiritual Care receptive of support   Spiritual Care Interventions theological discussion provided;resource assistance provided  (Plan is to have the blinds open to allow in sunshine and the door to be left open to allow the noise in from the hallway. Oswaldo does not want to be left alone in a dark room.)   Spiritual Care Visit Type initial   Spiritual Care Request coping/stress of illness support;end-of-life issue(s) support   Receptivity to Spiritual Care visit welcomed

## 2024-07-03 LAB
ANION GAP SERPL CALCULATED.3IONS-SCNC: 9 MMOL/L (ref 5–15)
BASOPHILS # BLD AUTO: 0.04 10*3/MM3 (ref 0–0.2)
BASOPHILS NFR BLD AUTO: 0.3 % (ref 0–1.5)
BUN SERPL-MCNC: 7 MG/DL (ref 6–20)
BUN/CREAT SERPL: 26.9 (ref 7–25)
CALCIUM SPEC-SCNC: 8.8 MG/DL (ref 8.6–10.5)
CHLORIDE SERPL-SCNC: 99 MMOL/L (ref 98–107)
CO2 SERPL-SCNC: 27 MMOL/L (ref 22–29)
CREAT SERPL-MCNC: 0.26 MG/DL (ref 0.76–1.27)
DEPRECATED RDW RBC AUTO: 47.7 FL (ref 37–54)
EGFRCR SERPLBLD CKD-EPI 2021: 157.1 ML/MIN/1.73
EOSINOPHIL # BLD AUTO: 0.23 10*3/MM3 (ref 0–0.4)
EOSINOPHIL NFR BLD AUTO: 1.6 % (ref 0.3–6.2)
ERYTHROCYTE [DISTWIDTH] IN BLOOD BY AUTOMATED COUNT: 15.6 % (ref 12.3–15.4)
GLUCOSE SERPL-MCNC: 109 MG/DL (ref 65–99)
HCT VFR BLD AUTO: 26.9 % (ref 37.5–51)
HGB BLD-MCNC: 8.2 G/DL (ref 13–17.7)
IMM GRANULOCYTES # BLD AUTO: 0.2 10*3/MM3 (ref 0–0.05)
IMM GRANULOCYTES NFR BLD AUTO: 1.4 % (ref 0–0.5)
LYMPHOCYTES # BLD AUTO: 1.22 10*3/MM3 (ref 0.7–3.1)
LYMPHOCYTES NFR BLD AUTO: 8.4 % (ref 19.6–45.3)
MCH RBC QN AUTO: 25.9 PG (ref 26.6–33)
MCHC RBC AUTO-ENTMCNC: 30.5 G/DL (ref 31.5–35.7)
MCV RBC AUTO: 84.9 FL (ref 79–97)
MONOCYTES # BLD AUTO: 1.1 10*3/MM3 (ref 0.1–0.9)
MONOCYTES NFR BLD AUTO: 7.6 % (ref 5–12)
NEUTROPHILS NFR BLD AUTO: 11.71 10*3/MM3 (ref 1.7–7)
NEUTROPHILS NFR BLD AUTO: 80.7 % (ref 42.7–76)
NRBC BLD AUTO-RTO: 0 /100 WBC (ref 0–0.2)
PLATELET # BLD AUTO: 427 10*3/MM3 (ref 140–450)
PMV BLD AUTO: 9.8 FL (ref 6–12)
POTASSIUM SERPL-SCNC: 3 MMOL/L (ref 3.5–5.2)
RBC # BLD AUTO: 3.17 10*6/MM3 (ref 4.14–5.8)
SODIUM SERPL-SCNC: 135 MMOL/L (ref 136–145)
WBC NRBC COR # BLD AUTO: 14.5 10*3/MM3 (ref 3.4–10.8)

## 2024-07-03 PROCEDURE — 80048 BASIC METABOLIC PNL TOTAL CA: CPT | Performed by: STUDENT IN AN ORGANIZED HEALTH CARE EDUCATION/TRAINING PROGRAM

## 2024-07-03 PROCEDURE — 85025 COMPLETE CBC W/AUTO DIFF WBC: CPT | Performed by: STUDENT IN AN ORGANIZED HEALTH CARE EDUCATION/TRAINING PROGRAM

## 2024-07-03 PROCEDURE — 25810000003 SODIUM CHLORIDE 0.9 % SOLUTION: Performed by: FAMILY MEDICINE

## 2024-07-03 PROCEDURE — 99232 SBSQ HOSP IP/OBS MODERATE 35: CPT | Performed by: STUDENT IN AN ORGANIZED HEALTH CARE EDUCATION/TRAINING PROGRAM

## 2024-07-03 PROCEDURE — 25010000002 VANCOMYCIN 5 G RECONSTITUTED SOLUTION: Performed by: FAMILY MEDICINE

## 2024-07-03 PROCEDURE — 25010000002 CEFEPIME PER 500 MG: Performed by: FAMILY MEDICINE

## 2024-07-03 RX ORDER — POTASSIUM CHLORIDE 750 MG/1
40 CAPSULE, EXTENDED RELEASE ORAL ONCE
Status: COMPLETED | OUTPATIENT
Start: 2024-07-03 | End: 2024-07-03

## 2024-07-03 RX ORDER — LIDOCAINE 4 G/G
1 PATCH TOPICAL DAILY PRN
Status: DISCONTINUED | OUTPATIENT
Start: 2024-07-03 | End: 2024-07-16 | Stop reason: HOSPADM

## 2024-07-03 RX ADMIN — ACETAMINOPHEN 650 MG: 325 TABLET ORAL at 03:30

## 2024-07-03 RX ADMIN — VANCOMYCIN HYDROCHLORIDE 1750 MG: 5 INJECTION, POWDER, LYOPHILIZED, FOR SOLUTION INTRAVENOUS at 21:59

## 2024-07-03 RX ADMIN — POTASSIUM CHLORIDE 40 MEQ: 750 CAPSULE, EXTENDED RELEASE ORAL at 08:51

## 2024-07-03 RX ADMIN — Medication 10 ML: at 21:59

## 2024-07-03 RX ADMIN — Medication 10 ML: at 08:53

## 2024-07-03 RX ADMIN — VANCOMYCIN HYDROCHLORIDE 1750 MG: 5 INJECTION, POWDER, LYOPHILIZED, FOR SOLUTION INTRAVENOUS at 11:29

## 2024-07-03 RX ADMIN — SODIUM CHLORIDE 40 ML: 9 INJECTION, SOLUTION INTRAVENOUS at 01:06

## 2024-07-03 RX ADMIN — LIDOCAINE 1 PATCH: 4 PATCH TOPICAL at 12:29

## 2024-07-03 RX ADMIN — METOPROLOL SUCCINATE 75 MG: 50 TABLET, EXTENDED RELEASE ORAL at 19:46

## 2024-07-03 RX ADMIN — NICOTINE 1 PATCH: 21 PATCH, EXTENDED RELEASE TRANSDERMAL at 08:52

## 2024-07-03 RX ADMIN — CEFEPIME 2000 MG: 2 INJECTION, POWDER, FOR SOLUTION INTRAVENOUS at 01:06

## 2024-07-03 RX ADMIN — ACETAMINOPHEN 650 MG: 325 TABLET ORAL at 19:46

## 2024-07-03 RX ADMIN — CEFEPIME 2000 MG: 2 INJECTION, POWDER, FOR SOLUTION INTRAVENOUS at 11:25

## 2024-07-03 RX ADMIN — Medication 10 MG: at 23:05

## 2024-07-03 RX ADMIN — Medication 10 MG: at 00:07

## 2024-07-03 RX ADMIN — METOPROLOL SUCCINATE 75 MG: 50 TABLET, EXTENDED RELEASE ORAL at 08:51

## 2024-07-03 RX ADMIN — ACETAMINOPHEN 650 MG: 160 SOLUTION ORAL at 08:51

## 2024-07-03 NOTE — PLAN OF CARE
Goal Outcome Evaluation:  Plan of Care Reviewed With: patient        Progress: no change               Patient diaphoretic throughout shift. No fevers. All other VSS stable. See MAR for pain control. POC reviewed with patient and family.

## 2024-07-03 NOTE — PLAN OF CARE
Goal Outcome Evaluation:  Plan of Care Reviewed With: patient        Progress: no change  Outcome Evaluation: Pt c/o pain/discomfort this shift, administered prn pain med per MAR. Pt is still diaphoretic at times, skin care provided as needed. Pt has been turned/repositioned Q2H to promote skin integrity. Pt is still recieving 2 IV abx's to manage infection. Fall precaution has been maintained. No new issues or new needs noted at this time.

## 2024-07-03 NOTE — NURSING NOTE
"Patients sister did not show again for our meeting to discuss goals of care for Mr Bowen. At this time, I dont think Mr Bowen can fully comprehend  his condition or the treatment options.   I have again left a message for his sister, in which he lives, to try to arrange a meeting.  I will await a return call.  Palliative care will follow up \"if\" she reaches out for a goals of care meeting.  Kiersten TOMLINSON RN, BSN  Palliative Care  "

## 2024-07-03 NOTE — PROGRESS NOTES
[facility]   Hospitalist Progress Note  Date: 7/3/2024  Patient Name: Oswaldo Bowen  : 1980  MRN: [id number]  Date of admission: 2024  Room/Bed: [facility]/      Subjective   Subjective     Chief Complaint: Fever    Summary:Oswaldo Bowen is a 44 y.o. male with history of intellectual disability, tobacco smoker, depression, chronic anemia, with recent hospitalization for mechanical fall with displaced intertrochanteric femur fracture requiring orthopedic intervention, VLAD, with suspected new metastatic lung cancer and mets to the bone with a 2.6 cm nodule in the left upper lobe, which came back positive for adenocarcinoma, hospitalized on 2024 for chief complaint of fevers, pancultured, placed on broad-spectrum antibiotics, pulmonary consulted with underlying adenocarcinoma of the lung which appears to be widely metastatic to bone, could be causing fevers. Reaching out to heme-onc to see if he needs an appointment, transportation issues while in rehab may limit consultation in the outpatient setting     Interval Followup: No acute overnight events.  No acute distress.  Patient was resting comfortably.  He is awake and alert and answering questions.  Not really wanting to talk to me today.  No family at bedside.    Review of Systems    All systems reviewed and negative except for what is outlined above.      Objective   Objective     Vitals:   Temp:  [97.5 °F (36.4 °C)-98.2 °F (36.8 °C)] 97.7 °F (36.5 °C)  Heart Rate:  [] 103  Resp:  [18] 18  BP: (126-140)/(73-89) 126/89    Physical Exam   Gen: NAD, Alert and Oriented  Cards: RRR, no murmur   Pulm: CTA b/l, no wheezing  Abd: soft, nondistended  Extremities: no pitting edema    Result Review    Result Review:  I have personally reviewed these results:  [x]  Laboratory      Lab 24  0530 24  0553 24  0454 24  1747 24  0539 24  1756 24  1755   WBC 14.50* 12.56* 9.93   < > 23.42*  --  19.55*   HEMOGLOBIN 8.2*  8.7* 8.3*   < > 7.6*  --  7.4*   HEMATOCRIT 26.9* 28.6* 26.9*   < > 25.5*  --  23.7*   PLATELETS 427 401 404   < > 431  --  442   NEUTROS ABS 11.71* 9.70* 7.57*   < > 19.53*  --  15.95*   IMMATURE GRANS (ABS) 0.20* 0.21* 0.11*   < > 0.20*  --  0.14*   LYMPHS ABS 1.22 1.39 1.17   < > 1.54  --  1.44   MONOS ABS 1.10* 1.10* 0.84   < > 2.09*  --  1.99*   EOS ABS 0.23 0.12 0.22   < > 0.01  --  0.01   MCV 84.9 84.6 83.8   < > 86.1  --  83.2   PROCALCITONIN  --   --   --   --   --  0.48*  --    LACTATE  --   --   --   --   --   --  1.5   LDH  --   --   --   --  1,450*  --   --     < > = values in this interval not displayed.         Lab 07/03/24  0530 07/02/24  0553 07/01/24  0454 06/30/24  0548   SODIUM 135* 135* 135* 136   POTASSIUM 3.0* 3.6 3.1* 3.2*   CHLORIDE 99 99 100 102   CO2 27.0 27.6 24.7 23.2   ANION GAP 9.0 8.4 10.3 10.8   BUN 7 5* 8 9   CREATININE 0.26* 0.34* 0.29* 0.26*   EGFR 157.1 144.9 152.0 157.1   GLUCOSE 109* 111* 105* 113*   CALCIUM 8.8 8.7 8.6 8.2*   MAGNESIUM  --  1.5* 1.7 1.6   PHOSPHORUS  --  3.7 3.8 3.1         Lab 07/02/24  0553 07/01/24  0454 06/30/24  0548 06/29/24  0521 06/27/24  1756   TOTAL PROTEIN 5.6* 5.6* 5.5*   < > 6.4   ALBUMIN 2.4* 2.2* 2.0*   < > 2.7*   GLOBULIN  --   --   --   --  3.7   ALT (SGPT) 46* 58* 58*   < > 38   AST (SGOT) 20 23 33   < > 45*   BILIRUBIN 0.2 0.2 0.3   < > 0.3   BILIRUBIN DIRECT <0.2 <0.2 <0.2   < >  --    ALK PHOS 119* 117 120*   < > 181*    < > = values in this interval not displayed.                 Lab 06/29/24  0620 06/28/24  0539   IRON  --  14*   IRON SATURATION (TSAT)  --  9*   TIBC  --  161*   TRANSFERRIN  --  108*   FERRITIN  --  3,516.00*   ABO TYPING O  --    RH TYPING Positive  --    ANTIBODY SCREEN Negative  --          Brief Urine Lab Results  (Last result in the past 365 days)        Color   Clarity   Blood   Leuk Est   Nitrite   Protein   CREAT   Urine HCG        06/27/24 1856 Yellow   Clear   Negative   Negative   Negative   Trace                  [x]  Microbiology   Microbiology Results (last 10 days)       Procedure Component Value - Date/Time    Respiratory Culture - Sputum, Cough [[id number]] Collected: 07/02/24 1239    Lab Status: Preliminary result Specimen: Sputum from Cough Updated: 07/03/24 1058     Respiratory Culture Rare growth The culture consists of normal respiratory zeinab. This is a preliminary report; final report to follow.     Gram Stain Occasional WBCs seen      No organisms seen    COVID PRE-OP / PRE-PROCEDURE SCREENING ORDER (NO ISOLATION) - Swab, Nasopharynx [[id number]]  (Normal) Collected: 06/28/24 1155    Lab Status: Final result Specimen: Swab from Nasopharynx Updated: 06/28/24 1252    Narrative:      The following orders were created for panel order COVID PRE-OP / PRE-PROCEDURE SCREENING ORDER (NO ISOLATION) - Swab, Nasopharynx.  Procedure                               Abnormality         Status                     ---------                               -----------         ------                     COVID-19, FLU A/B, RSV P...[[id number]]  Normal              Final result                 Please view results for these tests on the individual orders.    COVID-19, FLU A/B, RSV PCR 1 HR TAT - Swab, Nasopharynx [[id number]]  (Normal) Collected: 06/28/24 1155    Lab Status: Final result Specimen: Swab from Nasopharynx Updated: 06/28/24 1252     COVID19 Not Detected     Influenza A PCR Not Detected     Influenza B PCR Not Detected     RSV, PCR Not Detected    Narrative:      Fact sheet for providers: https://www.fda.gov/media/372264/download    Fact sheet for patients: https://www.fda.gov/media/181913/download    Test performed by PCR.    Blood Culture - Blood, Blood, Venous [[id number]]  (Normal) Collected: 06/27/24 1856    Lab Status: Final result Specimen: Blood, Venous Updated: 07/02/24 1915     Blood Culture No growth at 5 days    Blood Culture - Blood, Blood, Venous [[id number]]  (Normal) Collected: 06/27/24 1856    Lab Status: Final  result Specimen: Blood, Venous Updated: 07/02/24 1915     Blood Culture No growth at 5 days          [x]  Radiology  CT Abdomen Pelvis With Contrast    Result Date: 6/28/2024  Impression: 1.No definite acute infectious process is identified. 2.There are now multiple small bladder calculi. 3.Progressive osseous metastatic disease with enlarging lesions. No definite acute fracture identified. Electronically Signed: Johann Ramírez MD  6/28/2024 10:55 PM EDT  Workstation ID: [id number]    CT Chest Without Contrast Diagnostic    Result Date: 6/28/2024  1. No evidence of pneumonia 2. Small bilateral pleural effusions 3. Stable left upper lobe mass and widespread skeletal metastatic disease Electronically Signed: Sree Hawthorne  6/28/2024 1:10 PM EDT  Workstation ID: [id number]    CT Head Without Contrast    Result Date: 6/28/2024  Impression: 1. No acute intracranial process 2. Lytic lesion of the calvarium concerning for metastasis Electronically Signed: Sree Hawthorne  6/28/2024 12:49 PM EDT  Workstation ID: [id number]    XR Femur 2 View Right    Result Date: 6/27/2024  Impression: No acute abnormality. Postsurgical and degenerative findings as above without evidence of complication. Electronically Signed: Michael Chand MD  6/27/2024 7:11 PM EDT  Workstation ID: [id number]    XR Chest 1 View    Result Date: 6/27/2024  Impression: No focal airspace consolidation. Electronically Signed: Michael Chand MD  6/27/2024 7:06 PM EDT  Workstation ID: [id number]   []  EKG/Telemetry   []  Cardiology/Vascular   []  Pathology  []  Old records  []  Other:    Assessment & Plan   Assessment / Plan     Assessment:  Subjective fevers, unknown source  Metastatic adenocarcinoma of the lung  Current tobacco smoker  Intellectual disability  Acute on chronic anemia  Symptomatic anemia  Depression  Recent hospitalization for femur fracture  Hypomagnesemia  Iron deficiency    Plan:  Continue inpatient admission  Continue vancomycin and cefepime, day 6/7  Blood  cultures negative.  COVID/flu/RSV negative.  BAL PCR negative.  Sputum culture negative.  Urinalysis negative.  Pulmonology following  Oncology following  Continue metoprolol 75 mg twice daily  Multimodal pain control  Zofran as needed  PT/OT  Palliative care following  Repeat mag in a.m.  Hemoglobin stable at 8.7.  Iron deficiency, however, ferritin 3500.  Defer IV iron for now.  WBC count stable.  A.m. labs      Disposition: Ongoing hospice care talks with patient/family.       Discussed with RN.    VTE Prophylaxis:  Pharmacologic & mechanical VTE prophylaxis orders are present.        CODE STATUS:   Code Status (Patient has no pulse and is not breathing): CPR (Attempt to Resuscitate)  Medical Interventions (Patient has pulse or is breathing): Full Support      Electronically signed by Sena Cantrell DO, 7/3/2024, 16:14 EDT.

## 2024-07-03 NOTE — PROGRESS NOTES
"[facility] Clinical Pharmacy Services: Vancomycin Monitoring Note    Oswaldo Bowen is a 44 y.o. male who is on day  of pharmacy to dose vancomycin for Sepsis.    Previous Vancomycin Dose:   1750 mg IV every  12  hours  Imaging Reviewed?: Yes  Updated Cultures and Sensitivities:    Resp cx, sputum: no organisms seen on gram stain   COVID/flu/RSV: negative   Blood cx : NGTD    Vitals/Labs  Ht: 182.9 cm (72.01\"); Wt: 76.2 kg (167 lb 15.9 oz)   Temp (24hrs), Av °F (36.7 °C), Min:97.5 °F (36.4 °C), Max:98.8 °F (37.1 °C)   Estimated Creatinine Clearance: 390.8 mL/min (A) (by C-G formula based on SCr of 0.26 mg/dL (L)).     Results from last 7 days   Lab Units 24  0530 24  0553 24  0454 24  0548   VANCOMYCIN RM mcg/mL  --  12.92  --  11.55   CREATININE mg/dL 0.26* 0.34* 0.29* 0.26*   WBC 10*3/mm3 14.50* 12.56* 9.93 11.52*     Assessment/Plan  Current Vancomycin Dose:  1750 mg IV every 12 hours; which provides the following predicted parameters:  AUC24,ss: 504 mg/L.hr  PAUC*: 94 %  Ctrough,ss: 11.6 mg/L  Pconc*: 0 %  Tox.: 7 %  No more levels to be ordered.   We will continue to monitor patient changes and renal function     Thank you for involving pharmacy in this patient's care. Please contact pharmacy with any questions or concerns.    Makeda Morgan [facility]  Clinical Pharmacist  "

## 2024-07-04 LAB
ANION GAP SERPL CALCULATED.3IONS-SCNC: 7.8 MMOL/L (ref 5–15)
BACTERIA SPEC RESP CULT: NORMAL
BASOPHILS # BLD AUTO: 0.03 10*3/MM3 (ref 0–0.2)
BASOPHILS NFR BLD AUTO: 0.2 % (ref 0–1.5)
BUN SERPL-MCNC: 8 MG/DL (ref 6–20)
BUN/CREAT SERPL: 27.6 (ref 7–25)
CALCIUM SPEC-SCNC: 9.1 MG/DL (ref 8.6–10.5)
CHLORIDE SERPL-SCNC: 100 MMOL/L (ref 98–107)
CO2 SERPL-SCNC: 28.2 MMOL/L (ref 22–29)
CREAT SERPL-MCNC: 0.29 MG/DL (ref 0.76–1.27)
DEPRECATED RDW RBC AUTO: 49.1 FL (ref 37–54)
EGFRCR SERPLBLD CKD-EPI 2021: 152 ML/MIN/1.73
EOSINOPHIL # BLD AUTO: 0.24 10*3/MM3 (ref 0–0.4)
EOSINOPHIL NFR BLD AUTO: 1.9 % (ref 0.3–6.2)
ERYTHROCYTE [DISTWIDTH] IN BLOOD BY AUTOMATED COUNT: 15.8 % (ref 12.3–15.4)
GLUCOSE SERPL-MCNC: 104 MG/DL (ref 65–99)
GRAM STN SPEC: NORMAL
GRAM STN SPEC: NORMAL
HCT VFR BLD AUTO: 27.9 % (ref 37.5–51)
HGB BLD-MCNC: 8.5 G/DL (ref 13–17.7)
IMM GRANULOCYTES # BLD AUTO: 0.21 10*3/MM3 (ref 0–0.05)
IMM GRANULOCYTES NFR BLD AUTO: 1.7 % (ref 0–0.5)
LYMPHOCYTES # BLD AUTO: 1.14 10*3/MM3 (ref 0.7–3.1)
LYMPHOCYTES NFR BLD AUTO: 9 % (ref 19.6–45.3)
MAGNESIUM SERPL-MCNC: 1.6 MG/DL (ref 1.6–2.6)
MCH RBC QN AUTO: 26 PG (ref 26.6–33)
MCHC RBC AUTO-ENTMCNC: 30.5 G/DL (ref 31.5–35.7)
MCV RBC AUTO: 85.3 FL (ref 79–97)
MONOCYTES # BLD AUTO: 0.93 10*3/MM3 (ref 0.1–0.9)
MONOCYTES NFR BLD AUTO: 7.4 % (ref 5–12)
MYCOBACTERIUM SPEC CULT: NORMAL
NEUTROPHILS NFR BLD AUTO: 10.05 10*3/MM3 (ref 1.7–7)
NEUTROPHILS NFR BLD AUTO: 79.8 % (ref 42.7–76)
NIGHT BLUE STAIN TISS: NORMAL
NIGHT BLUE STAIN TISS: NORMAL
NRBC BLD AUTO-RTO: 0 /100 WBC (ref 0–0.2)
PLATELET # BLD AUTO: 458 10*3/MM3 (ref 140–450)
PMV BLD AUTO: 9.5 FL (ref 6–12)
POTASSIUM SERPL-SCNC: 3 MMOL/L (ref 3.5–5.2)
QT INTERVAL: 302 MS
QT INTERVAL: 321 MS
QTC INTERVAL: 462 MS
QTC INTERVAL: 469 MS
RBC # BLD AUTO: 3.27 10*6/MM3 (ref 4.14–5.8)
SODIUM SERPL-SCNC: 136 MMOL/L (ref 136–145)
WBC NRBC COR # BLD AUTO: 12.6 10*3/MM3 (ref 3.4–10.8)

## 2024-07-04 PROCEDURE — 80048 BASIC METABOLIC PNL TOTAL CA: CPT | Performed by: STUDENT IN AN ORGANIZED HEALTH CARE EDUCATION/TRAINING PROGRAM

## 2024-07-04 PROCEDURE — 83735 ASSAY OF MAGNESIUM: CPT | Performed by: STUDENT IN AN ORGANIZED HEALTH CARE EDUCATION/TRAINING PROGRAM

## 2024-07-04 PROCEDURE — 85025 COMPLETE CBC W/AUTO DIFF WBC: CPT | Performed by: STUDENT IN AN ORGANIZED HEALTH CARE EDUCATION/TRAINING PROGRAM

## 2024-07-04 PROCEDURE — 99232 SBSQ HOSP IP/OBS MODERATE 35: CPT | Performed by: STUDENT IN AN ORGANIZED HEALTH CARE EDUCATION/TRAINING PROGRAM

## 2024-07-04 PROCEDURE — 99232 SBSQ HOSP IP/OBS MODERATE 35: CPT | Performed by: INTERNAL MEDICINE

## 2024-07-04 RX ORDER — POTASSIUM CHLORIDE 750 MG/1
40 CAPSULE, EXTENDED RELEASE ORAL ONCE
Status: COMPLETED | OUTPATIENT
Start: 2024-07-04 | End: 2024-07-04

## 2024-07-04 RX ADMIN — Medication 10 ML: at 09:54

## 2024-07-04 RX ADMIN — ACETAMINOPHEN 650 MG: 325 TABLET ORAL at 01:27

## 2024-07-04 RX ADMIN — ACETAMINOPHEN 650 MG: 325 TABLET ORAL at 05:35

## 2024-07-04 RX ADMIN — METOPROLOL SUCCINATE 75 MG: 50 TABLET, EXTENDED RELEASE ORAL at 22:48

## 2024-07-04 RX ADMIN — ACETAMINOPHEN 650 MG: 325 TABLET ORAL at 09:55

## 2024-07-04 RX ADMIN — ACETAMINOPHEN 650 MG: 325 TABLET ORAL at 22:48

## 2024-07-04 RX ADMIN — NICOTINE 1 PATCH: 21 PATCH, EXTENDED RELEASE TRANSDERMAL at 09:55

## 2024-07-04 RX ADMIN — POTASSIUM CHLORIDE 40 MEQ: 750 CAPSULE, EXTENDED RELEASE ORAL at 09:57

## 2024-07-04 RX ADMIN — ACETAMINOPHEN 650 MG: 325 TABLET ORAL at 16:42

## 2024-07-04 RX ADMIN — Medication 10 ML: at 22:48

## 2024-07-04 RX ADMIN — METOPROLOL SUCCINATE 75 MG: 50 TABLET, EXTENDED RELEASE ORAL at 09:54

## 2024-07-04 RX ADMIN — LIDOCAINE 1 PATCH: 4 PATCH TOPICAL at 10:11

## 2024-07-04 NOTE — PLAN OF CARE
Goal Outcome Evaluation:  Plan of Care Reviewed With: patient           Outcome Evaluation: Patient A/Ox 3-4. PRN Tylenol administered as ordered for pain. On room air. No other issues/needs at this time.

## 2024-07-04 NOTE — PROGRESS NOTES
[facility]   Hospitalist Progress Note  Date: 2024  Patient Name: Oswaldo Bowen  : 1980  MRN: [id number]  Date of admission: 2024  Room/Bed: [facility]3/1      Subjective   Subjective     Chief Complaint: Fever    Summary:Oswaldo Bowen is a 44 y.o. male with history of intellectual disability, tobacco smoker, depression, chronic anemia, with recent hospitalization for mechanical fall with displaced intertrochanteric femur fracture requiring orthopedic intervention, VLAD, with suspected new metastatic lung cancer and mets to the bone with a 2.6 cm nodule in the left upper lobe, which came back positive for adenocarcinoma, hospitalized on 2024 for chief complaint of fevers, pancultured, placed on broad-spectrum antibiotics, pulmonary consulted with underlying adenocarcinoma of the lung which appears to be widely metastatic to bone, could be causing fevers. Reaching out to heme-onc to see if he needs an appointment, transportation issues while in rehab may limit consultation in the outpatient setting     Interval Followup: No acute overnight events.  No acute distress.  Patient was resting comfortably.  Patient was sleeping when into the room, he did awaken appropriately.  He says he just wants to sleep today.  States that his pain is controlled.    Review of Systems    All systems reviewed and negative except for what is outlined above.      Objective   Objective     Vitals:   Temp:  [97.3 °F (36.3 °C)-98.1 °F (36.7 °C)] 98.1 °F (36.7 °C)  Heart Rate:  [] 93  Resp:  [16-20] 16  BP: (126-141)/(86-95) 132/86    Physical Exam   Gen: NAD, Alert and Oriented  Cards: RRR, no murmur   Pulm: CTA b/l, no wheezing  Abd: soft, nondistended  Extremities: no pitting edema    Result Review    Result Review:  I have personally reviewed these results:  [x]  Laboratory      Lab 24  0514 24  0530 24  0553 24  1747 24  0539 24  1756 24  1755   WBC 12.60* 14.50* 12.56*   <  > 23.42*  --  19.55*   HEMOGLOBIN 8.5* 8.2* 8.7*   < > 7.6*  --  7.4*   HEMATOCRIT 27.9* 26.9* 28.6*   < > 25.5*  --  23.7*   PLATELETS 458* 427 401   < > 431  --  442   NEUTROS ABS 10.05* 11.71* 9.70*   < > 19.53*  --  15.95*   IMMATURE GRANS (ABS) 0.21* 0.20* 0.21*   < > 0.20*  --  0.14*   LYMPHS ABS 1.14 1.22 1.39   < > 1.54  --  1.44   MONOS ABS 0.93* 1.10* 1.10*   < > 2.09*  --  1.99*   EOS ABS 0.24 0.23 0.12   < > 0.01  --  0.01   MCV 85.3 84.9 84.6   < > 86.1  --  83.2   PROCALCITONIN  --   --   --   --   --  0.48*  --    LACTATE  --   --   --   --   --   --  1.5   LDH  --   --   --   --  1,450*  --   --     < > = values in this interval not displayed.         Lab 07/04/24  0514 07/03/24 0530 07/02/24 0553 07/01/24 0454 06/30/24  0548   SODIUM 136 135* 135* 135* 136   POTASSIUM 3.0* 3.0* 3.6 3.1* 3.2*   CHLORIDE 100 99 99 100 102   CO2 28.2 27.0 27.6 24.7 23.2   ANION GAP 7.8 9.0 8.4 10.3 10.8   BUN 8 7 5* 8 9   CREATININE 0.29* 0.26* 0.34* 0.29* 0.26*   EGFR 152.0 157.1 144.9 152.0 157.1   GLUCOSE 104* 109* 111* 105* 113*   CALCIUM 9.1 8.8 8.7 8.6 8.2*   MAGNESIUM  --   --  1.5* 1.7 1.6   PHOSPHORUS  --   --  3.7 3.8 3.1         Lab 07/02/24  0553 07/01/24 0454 06/30/24  0548 06/29/24  0521 06/27/24  1756   TOTAL PROTEIN 5.6* 5.6* 5.5*   < > 6.4   ALBUMIN 2.4* 2.2* 2.0*   < > 2.7*   GLOBULIN  --   --   --   --  3.7   ALT (SGPT) 46* 58* 58*   < > 38   AST (SGOT) 20 23 33   < > 45*   BILIRUBIN 0.2 0.2 0.3   < > 0.3   BILIRUBIN DIRECT <0.2 <0.2 <0.2   < >  --    ALK PHOS 119* 117 120*   < > 181*    < > = values in this interval not displayed.                 Lab 06/29/24  0620 06/28/24  0539   IRON  --  14*   IRON SATURATION (TSAT)  --  9*   TIBC  --  161*   TRANSFERRIN  --  108*   FERRITIN  --  3,516.00*   ABO TYPING O  --    RH TYPING Positive  --    ANTIBODY SCREEN Negative  --          Brief Urine Lab Results  (Last result in the past 365 days)        Color   Clarity   Blood   Leuk Est   Nitrite    Protein   CREAT   Urine HCG        06/27/24 1856 Yellow   Clear   Negative   Negative   Negative   Trace                 [x]  Microbiology   Microbiology Results (last 10 days)       Procedure Component Value - Date/Time    Respiratory Culture - Sputum, Cough [[id number]] Collected: 07/02/24 1239    Lab Status: Final result Specimen: Sputum from Cough Updated: 07/04/24 1055     Respiratory Culture Rare growth Normal respiratory zeinab. No S. aureus or Pseudomonas aeruginosa detected. Final report.     Gram Stain Occasional WBCs seen      No organisms seen    COVID PRE-OP / PRE-PROCEDURE SCREENING ORDER (NO ISOLATION) - Swab, Nasopharynx [[id number]]  (Normal) Collected: 06/28/24 1155    Lab Status: Final result Specimen: Swab from Nasopharynx Updated: 06/28/24 1252    Narrative:      The following orders were created for panel order COVID PRE-OP / PRE-PROCEDURE SCREENING ORDER (NO ISOLATION) - Swab, Nasopharynx.  Procedure                               Abnormality         Status                     ---------                               -----------         ------                     COVID-19, FLU A/B, RSV P...[[id number]]  Normal              Final result                 Please view results for these tests on the individual orders.    COVID-19, FLU A/B, RSV PCR 1 HR TAT - Swab, Nasopharynx [[id number]]  (Normal) Collected: 06/28/24 1155    Lab Status: Final result Specimen: Swab from Nasopharynx Updated: 06/28/24 1252     COVID19 Not Detected     Influenza A PCR Not Detected     Influenza B PCR Not Detected     RSV, PCR Not Detected    Narrative:      Fact sheet for providers: https://www.fda.gov/media/053001/download    Fact sheet for patients: https://www.fda.gov/media/656078/download    Test performed by PCR.    Blood Culture - Blood, Blood, Venous [[id number]]  (Normal) Collected: 06/27/24 1856    Lab Status: Final result Specimen: Blood, Venous Updated: 07/02/24 1915     Blood Culture No growth at 5 days    Blood  Culture - Blood, Blood, Venous [[id number]]  (Normal) Collected: 06/27/24 1856    Lab Status: Final result Specimen: Blood, Venous Updated: 07/02/24 1915     Blood Culture No growth at 5 days          [x]  Radiology  CT Abdomen Pelvis With Contrast    Result Date: 6/28/2024  Impression: 1.No definite acute infectious process is identified. 2.There are now multiple small bladder calculi. 3.Progressive osseous metastatic disease with enlarging lesions. No definite acute fracture identified. Electronically Signed: Johann Ramírez MD  6/28/2024 10:55 PM EDT  Workstation ID: [id number]    CT Chest Without Contrast Diagnostic    Result Date: 6/28/2024  1. No evidence of pneumonia 2. Small bilateral pleural effusions 3. Stable left upper lobe mass and widespread skeletal metastatic disease Electronically Signed: Sree Hawthorne  6/28/2024 1:10 PM EDT  Workstation ID: [id number]    CT Head Without Contrast    Result Date: 6/28/2024  Impression: 1. No acute intracranial process 2. Lytic lesion of the calvarium concerning for metastasis Electronically Signed: Sree Hawthorne  6/28/2024 12:49 PM EDT  Workstation ID: [id number]    XR Femur 2 View Right    Result Date: 6/27/2024  Impression: No acute abnormality. Postsurgical and degenerative findings as above without evidence of complication. Electronically Signed: Michael Chand MD  6/27/2024 7:11 PM EDT  Workstation ID: [id number]    XR Chest 1 View    Result Date: 6/27/2024  Impression: No focal airspace consolidation. Electronically Signed: Michael Chand MD  6/27/2024 7:06 PM EDT  Workstation ID: [id number]   []  EKG/Telemetry   []  Cardiology/Vascular   []  Pathology  []  Old records  []  Other:    Assessment & Plan   Assessment / Plan     Assessment:  Subjective fevers, unknown source  Metastatic adenocarcinoma of the lung  Current tobacco smoker  Intellectual disability  Acute on chronic anemia  Symptomatic anemia  Depression  Recent hospitalization for femur fracture  Hypomagnesemia  Iron  deficiency    Plan:  Continue inpatient admission  Completed 7 days of broad-spectrum antibiotics with vancomycin and cefepime  Blood cultures negative.  COVID/flu/RSV negative.  BAL PCR negative.  Sputum culture negative.  Urinalysis negative.  Pulmonology following  Oncology following  Continue metoprolol 75 mg twice daily  Multimodal pain control  Zofran as needed  PT/OT  Palliative care following  Mag pending  P.o. potassium  Hemoglobin stable at 8.7.  Iron deficiency, however, ferritin 3500.  Defer IV iron for now.  WBC count stable.  A.m. labs      Disposition: Ongoing hospice care talks with patient/family.       Discussed with RN.    VTE Prophylaxis:  Pharmacologic & mechanical VTE prophylaxis orders are present.        CODE STATUS:   Code Status (Patient has no pulse and is not breathing): CPR (Attempt to Resuscitate)  Medical Interventions (Patient has pulse or is breathing): Full Support      Electronically signed by Sena Cantrell DO, 7/4/2024, 13:59 EDT.

## 2024-07-04 NOTE — PLAN OF CARE
Goal Outcome Evaluation:  Plan of Care Reviewed With: patient        Progress: no change  Outcome Evaluation: Pt frequently seeked out staff this shift and c/o pain/discomfort this shift, administered prn pain med per MAR. Pt had refused turns first half of the night, educated pt at the bedisde of importance of being repoitioned/turned Q2H to promote skin integrity as pt's skin is very diaphorectic and increased weakness increases change of skin breakdown; pt verbally understood and became complaint with intervention. Falls precaution maintained. No new issues or new needs noted at this time.

## 2024-07-04 NOTE — PROGRESS NOTES
Pulmonary / Critical Care Progress Note      Patient Name: Oswaldo Bowen  : 1980  MRN: [id number]  Attending:  Sena Cantrell*  Date of admission: 2024    Subjective   Subjective   Follow-up for fevers, lung cancer, concern for pneumonia    Over past 24 hours: Remained on room air.  Continued nicotine patch.    This morning,  Sleeping, arousable.  No fever noted.  Remains on room air  Mental status more or less the same  Denies shortness of breath  Chest CT personally reviewed.  No obvious consolidation concerning for pneumonia.  Very small bilateral pleural effusion noted.  Metastatic disease noted.    Review of Systems  General: Denied complaints  Cardiovascular:  Denied complaints  Respiratory: Denied complaints  Gastrointestinal: Denied complaints        Objective   Objective     Vitals:   Temp:  [97.3 °F (36.3 °C)-98.2 °F (36.8 °C)] 97.3 °F (36.3 °C)  Heart Rate:  [] 92  Resp:  [18-20] 18  BP: (126-141)/(82-95) 138/95    Physical Exam   Vital Signs Reviewed  General WDWN, Alert, NAD.    Chest:  good aeration, clear to auscultation bilaterally, tympanic to percussion bilaterally, no work of breathing noted  CV: RRR, no MGR, .  EXT:  no clubbing, no cyanosis, no edema, no joint tenderness  Neuro:  A&Ox3, CN grossly intact, no focal deficits.  Skin: No rashes or lesions noted      Result Review    Result Review:  I have personally reviewed the results from the time of this admission to 2024 06:56 EDT and agree with these findings:  [x]  Laboratory  []  Microbiology  [x]  Radiology  []  EKG/Telemetry   []  Cardiology/Vascular   [x]  Pathology  []  Old records  []  Other:  Most notable findings include:   -     Assessment & Plan   Assessment / Plan     Active Hospital Problems:  Active Hospital Problems    Diagnosis    • **Sepsis    • Anemia        Impression:  Fever of unknown origin  Bilateral small pleural effusion  Atelectasis at bases  Recently diagnosed metastatic adenocarcinoma  of the lung  Acute comminuted displaced intertrochanteric right femoral fracture  Status post IM nailing  Left upper lobe lung nodule with mediastinal lymphadenopathy  Chronic heavy smoking  Unintentional weight loss  Acute anemia     Plan:  Currently on room air  CT scan of the brain discussed with the patient 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  Oncology following  Currently not on any bronchodilators.      VTE Prophylaxis:  Pharmacologic & mechanical VTE prophylaxis orders are present.        CODE STATUS:   Code Status (Patient has no pulse and is not breathing): CPR (Attempt to Resuscitate)  Medical Interventions (Patient has pulse or is breathing): Full Support    I have reviewed labs, imaging, pertinent clinical data and provider notes.   I have discussed with bedside nurse and primary service.     Electronically signed by Kendell Stern MD, 07/04/24, 6:56 AM EDT.

## 2024-07-05 LAB
CYTO UR: NORMAL
LAB AP CASE REPORT: NORMAL
LAB AP CLINICAL INFORMATION: NORMAL
LAB AP DIAGNOSIS COMMENT: NORMAL
LAB AP SPECIAL STAINS: NORMAL
PATH REPORT.ADDENDUM SPEC: NORMAL
PATH REPORT.FINAL DX SPEC: NORMAL
PATH REPORT.GROSS SPEC: NORMAL

## 2024-07-05 PROCEDURE — 99232 SBSQ HOSP IP/OBS MODERATE 35: CPT | Performed by: STUDENT IN AN ORGANIZED HEALTH CARE EDUCATION/TRAINING PROGRAM

## 2024-07-05 RX ORDER — HYDROCODONE BITARTRATE AND ACETAMINOPHEN 5; 325 MG/1; MG/1
1 TABLET ORAL EVERY 6 HOURS PRN
Status: DISCONTINUED | OUTPATIENT
Start: 2024-07-05 | End: 2024-07-09

## 2024-07-05 RX ADMIN — Medication 10 ML: at 20:21

## 2024-07-05 RX ADMIN — ACETAMINOPHEN 650 MG: 325 TABLET ORAL at 04:08

## 2024-07-05 RX ADMIN — SENNOSIDES AND DOCUSATE SODIUM 2 TABLET: 50; 8.6 TABLET ORAL at 20:21

## 2024-07-05 RX ADMIN — HYDROCODONE BITARTRATE AND ACETAMINOPHEN 1 TABLET: 5; 325 TABLET ORAL at 22:36

## 2024-07-05 RX ADMIN — LIDOCAINE 1 PATCH: 4 PATCH TOPICAL at 10:17

## 2024-07-05 RX ADMIN — Medication 10 ML: at 08:01

## 2024-07-05 RX ADMIN — METOPROLOL SUCCINATE 75 MG: 50 TABLET, EXTENDED RELEASE ORAL at 20:21

## 2024-07-05 RX ADMIN — NICOTINE 1 PATCH: 21 PATCH, EXTENDED RELEASE TRANSDERMAL at 08:02

## 2024-07-05 RX ADMIN — ACETAMINOPHEN 650 MG: 325 TABLET ORAL at 08:01

## 2024-07-05 RX ADMIN — METOPROLOL SUCCINATE 75 MG: 50 TABLET, EXTENDED RELEASE ORAL at 08:01

## 2024-07-05 RX ADMIN — Medication 10 MG: at 20:21

## 2024-07-05 RX ADMIN — HYDROCODONE BITARTRATE AND ACETAMINOPHEN 1 TABLET: 5; 325 TABLET ORAL at 16:32

## 2024-07-05 NOTE — CONSULTS
"Nutrition Services    Patient Name: Oswaldo Bowen  YOB: 1980  MRN: [id number]  Admission date: 6/27/2024      CLINICAL NUTRITION ASSESSMENT      Reason for Assessment  LOS   H&P:  History reviewed. No pertinent past medical history.     Current Problems:   Active Hospital Problems    Diagnosis     **Sepsis     Anemia         Nutrition/Diet History         Narrative     Patient assessed by RD for LOS. Per chart graphics, patient with adequate po intake. No recent weight loss per EMR. Patient is at low risk per nutrition risk screening (NRS-2002).     No acute nutrition concerns or interventions at this time.      Anthropometrics        Current Height, Weight Height: 182.9 cm (72.01\")  Weight: 76.2 kg (167 lb 15.9 oz)   Current BMI Body mass index is 22.78 kg/m².   BMI Classification Normal range   % IBW 97%   Adjusted Body Weight (ABW) N/A   Weight Hx  Wt Readings from Last 30 Encounters:   06/27/24 2253 76.2 kg (167 lb 15.9 oz)   06/27/24 1702 74.1 kg (163 lb 5.8 oz)   06/08/24 0526 73.1 kg (161 lb 2.5 oz)   05/21/24 0219 65 kg (143 lb 4.8 oz)   05/20/24 2134 66 kg (145 lb 8.1 oz)            Wt Change Observation Trending up     Estimated/Assessed Needs  Estimated Needs based on: Current Body Weight 76 kg       Energy Requirements 25-30 kcal/kg   EST Needs (kcal/day) 5498-8298 kcal       Protein Requirements 0.8-1.0 g/kg   EST Daily Needs (g/day) 61-76 g       Fluid Requirements 1 ml/kcal    Estimated Needs (mL/day) 7495-1042 ml     Labs/Medications         Pertinent Labs Reviewed.   Results from last 7 days   Lab Units 07/04/24  0514 07/03/24  0530 07/02/24  0553 07/01/24  0454 06/30/24  0548   SODIUM mmol/L 136 135* 135* 135* 136   POTASSIUM mmol/L 3.0* 3.0* 3.6 3.1* 3.2*   CHLORIDE mmol/L 100 99 99 100 102   CO2 mmol/L 28.2 27.0 27.6 24.7 23.2   BUN mg/dL 8 7 5* 8 9   CREATININE mg/dL 0.29* 0.26* 0.34* 0.29* 0.26*   CALCIUM mg/dL 9.1 8.8 8.7 8.6 8.2*   BILIRUBIN mg/dL  --   --  0.2 0.2 0.3   ALK " "PHOS U/L  --   --  119* 117 120*   ALT (SGPT) U/L  --   --  46* 58* 58*   AST (SGOT) U/L  --   --  20 23 33   GLUCOSE mg/dL 104* 109* 111* 105* 113*     Results from last 7 days   Lab Units 07/04/24  0514 07/03/24  0530 07/02/24  0553 07/01/24  0454   MAGNESIUM mg/dL 1.6  --  1.5* 1.7   PHOSPHORUS mg/dL  --   --  3.7 3.8   HEMOGLOBIN g/dL 8.5*   < > 8.7* 8.3*   HEMATOCRIT % 27.9*   < > 28.6* 26.9*    < > = values in this interval not displayed.     COVID19   Date Value Ref Range Status   06/28/2024 Not Detected Not Detected - Ref. Range Final     No results found for: \"HGBA1C\"      Pertinent Medications Reviewed.     Malnutrition Severity Assessment              Nutrition Diagnosis         Nutrition Dx Problem 1 No nutrition diagnosis at this time.       Nutrition Intervention        Current Nutrition Orders & Evaluation of Intake     Current Nutrition Orders & Evaluation of Intake       Current PO Diet Diet: Regular/House; Fluid Consistency: Thin (IDDSI 0)   Supplement No active supplement orders       Nutrition Intervention/Prescription        No further nutrition intervention indicated.       Medical Nutrition Therapy/Nutrition Education          Learner     Readiness Patient  Education not indicated.      Method     Response N/A  N/A     Monitor/Evaluation        Monitor Per protocol     Nutrition Discharge Plan         No discharge nutrition needs identified.      Electronically signed by:  Kiersten King RD  07/05/24 08:40 EDT    "

## 2024-07-05 NOTE — PLAN OF CARE
Goal Outcome Evaluation:  Plan of Care Reviewed With: patient        Progress: no change  Outcome Evaluation: Pt c/o pain/discomfort this shift, administered prn tylenol per MAR. Skin care provided as needed. Pt has been more complaint with turns/repositions compared to the other night. Pt is currently resting with no apparent distress at this time, call light in reach. No new issues or new needs noted at this time.

## 2024-07-05 NOTE — PROGRESS NOTES
[facility]   Hospitalist Progress Note  Date: 2024  Patient Name: Oswaldo Bowen  : 1980  MRN: [id number]  Date of admission: 2024  Room/Bed: [facility]/      Subjective   Subjective     Chief Complaint: Fever    Summary:Oswaldo Bowen is a 44 y.o. male with history of intellectual disability, tobacco smoker, depression, chronic anemia, with recent hospitalization for mechanical fall with displaced intertrochanteric femur fracture requiring orthopedic intervention, VLAD, with suspected new metastatic lung cancer and mets to the bone with a 2.6 cm nodule in the left upper lobe, which came back positive for adenocarcinoma, hospitalized on 2024 for chief complaint of fevers, pancultured, placed on broad-spectrum antibiotics, pulmonary consulted with underlying adenocarcinoma of the lung which appears to be widely metastatic to bone, could be causing fevers. Reaching out to heme-onc to see if he needs an appointment, transportation issues while in rehab may limit consultation in the outpatient setting     Interval Followup: No acute overnight events.  No acute distress.  Patient was resting comfortably.  He reports that his pain is uncontrolled and asking for stronger pain medication.  Otherwise, he denies any nausea, shortness of breath, abdominal pain.  We discussed his lung cancer diagnosis.    Review of Systems    All systems reviewed and negative except for what is outlined above.      Objective   Objective     Vitals:   Temp:  [97.3 °F (36.3 °C)-97.9 °F (36.6 °C)] 97.9 °F (36.6 °C)  Heart Rate:  [] 97  Resp:  [18] 18  BP: (132-145)/(86-96) 145/89    Physical Exam   Gen: NAD, Alert and Oriented, cachexia  Cards: RRR, no murmur   Pulm: CTA b/l, no wheezing  Abd: soft, nondistended  Extremities: no pitting edema    Result Review    Result Review:  I have personally reviewed these results:  [x]  Laboratory      Lab 24  0514 24  0530 24  0553   WBC 12.60* 14.50* 12.56*    HEMOGLOBIN 8.5* 8.2* 8.7*   HEMATOCRIT 27.9* 26.9* 28.6*   PLATELETS 458* 427 401   NEUTROS ABS 10.05* 11.71* 9.70*   IMMATURE GRANS (ABS) 0.21* 0.20* 0.21*   LYMPHS ABS 1.14 1.22 1.39   MONOS ABS 0.93* 1.10* 1.10*   EOS ABS 0.24 0.23 0.12   MCV 85.3 84.9 84.6         Lab 07/04/24  0514 07/03/24  0530 07/02/24  0553 07/01/24  0454 06/30/24  0548   SODIUM 136 135* 135* 135* 136   POTASSIUM 3.0* 3.0* 3.6 3.1* 3.2*   CHLORIDE 100 99 99 100 102   CO2 28.2 27.0 27.6 24.7 23.2   ANION GAP 7.8 9.0 8.4 10.3 10.8   BUN 8 7 5* 8 9   CREATININE 0.29* 0.26* 0.34* 0.29* 0.26*   EGFR 152.0 157.1 144.9 152.0 157.1   GLUCOSE 104* 109* 111* 105* 113*   CALCIUM 9.1 8.8 8.7 8.6 8.2*   MAGNESIUM 1.6  --  1.5* 1.7 1.6   PHOSPHORUS  --   --  3.7 3.8 3.1         Lab 07/02/24  0553 07/01/24  0454 06/30/24  0548   TOTAL PROTEIN 5.6* 5.6* 5.5*   ALBUMIN 2.4* 2.2* 2.0*   ALT (SGPT) 46* 58* 58*   AST (SGOT) 20 23 33   BILIRUBIN 0.2 0.2 0.3   BILIRUBIN DIRECT <0.2 <0.2 <0.2   ALK PHOS 119* 117 120*                 Lab 06/29/24  0620   ABO TYPING O   RH TYPING Positive   ANTIBODY SCREEN Negative         Brief Urine Lab Results  (Last result in the past 365 days)        Color   Clarity   Blood   Leuk Est   Nitrite   Protein   CREAT   Urine HCG        06/27/24 1856 Yellow   Clear   Negative   Negative   Negative   Trace                 [x]  Microbiology   Microbiology Results (last 10 days)       Procedure Component Value - Date/Time    Respiratory Culture - Sputum, Cough [[id number]] Collected: 07/02/24 1239    Lab Status: Final result Specimen: Sputum from Cough Updated: 07/04/24 1055     Respiratory Culture Rare growth Normal respiratory zeinab. No S. aureus or Pseudomonas aeruginosa detected. Final report.     Gram Stain Occasional WBCs seen      No organisms seen    COVID PRE-OP / PRE-PROCEDURE SCREENING ORDER (NO ISOLATION) - Swab, Nasopharynx [[id number]]  (Normal) Collected: 06/28/24 1155    Lab Status: Final result Specimen: Swab from  Nasopharynx Updated: 06/28/24 1252    Narrative:      The following orders were created for panel order COVID PRE-OP / PRE-PROCEDURE SCREENING ORDER (NO ISOLATION) - Swab, Nasopharynx.  Procedure                               Abnormality         Status                     ---------                               -----------         ------                     COVID-19, FLU A/B, RSV P...[[id number]]  Normal              Final result                 Please view results for these tests on the individual orders.    COVID-19, FLU A/B, RSV PCR 1 HR TAT - Swab, Nasopharynx [[id number]]  (Normal) Collected: 06/28/24 1155    Lab Status: Final result Specimen: Swab from Nasopharynx Updated: 06/28/24 1252     COVID19 Not Detected     Influenza A PCR Not Detected     Influenza B PCR Not Detected     RSV, PCR Not Detected    Narrative:      Fact sheet for providers: https://www.fda.gov/media/548256/download    Fact sheet for patients: https://www.fda.gov/media/476623/download    Test performed by PCR.    Blood Culture - Blood, Blood, Venous [[id number]]  (Normal) Collected: 06/27/24 1856    Lab Status: Final result Specimen: Blood, Venous Updated: 07/02/24 1915     Blood Culture No growth at 5 days    Blood Culture - Blood, Blood, Venous [[id number]]  (Normal) Collected: 06/27/24 1856    Lab Status: Final result Specimen: Blood, Venous Updated: 07/02/24 1915     Blood Culture No growth at 5 days          [x]  Radiology  CT Abdomen Pelvis With Contrast    Result Date: 6/28/2024  Impression: 1.No definite acute infectious process is identified. 2.There are now multiple small bladder calculi. 3.Progressive osseous metastatic disease with enlarging lesions. No definite acute fracture identified. Electronically Signed: Johann Ramírez MD  6/28/2024 10:55 PM EDT  Workstation ID: [id number]    CT Chest Without Contrast Diagnostic    Result Date: 6/28/2024  1. No evidence of pneumonia 2. Small bilateral pleural effusions 3. Stable left upper lobe  mass and widespread skeletal metastatic disease Electronically Signed: Sree Porternes  6/28/2024 1:10 PM EDT  Workstation ID: [id number]    CT Head Without Contrast    Result Date: 6/28/2024  Impression: 1. No acute intracranial process 2. Lytic lesion of the calvarium concerning for metastasis Electronically Signed: Sree Marine  6/28/2024 12:49 PM EDT  Workstation ID: [id number]    XR Femur 2 View Right    Result Date: 6/27/2024  Impression: No acute abnormality. Postsurgical and degenerative findings as above without evidence of complication. Electronically Signed: Michael Chand MD  6/27/2024 7:11 PM EDT  Workstation ID: [id number]    XR Chest 1 View    Result Date: 6/27/2024  Impression: No focal airspace consolidation. Electronically Signed: Michael Chand MD  6/27/2024 7:06 PM EDT  Workstation ID: [id number]   []  EKG/Telemetry   []  Cardiology/Vascular   []  Pathology  []  Old records  []  Other:    Assessment & Plan   Assessment / Plan     Assessment:  Subjective fevers, unknown source  Metastatic adenocarcinoma of the lung  Current tobacco smoker  Intellectual disability  Acute on chronic anemia  Symptomatic anemia  Depression  Recent hospitalization for femur fracture  Hypomagnesemia  Iron deficiency    Plan:  Continue inpatient admission  Completed 7 days of broad-spectrum antibiotics with vancomycin and cefepime  Blood cultures negative.  COVID/flu/RSV negative.  BAL PCR negative.  Sputum culture negative.  Urinalysis negative.  Pulmonology following  Oncology following  Continue metoprolol 75 mg twice daily  Multimodal pain control.  Add Norco today.  Zofran as needed  PT/OT  Palliative care following  Mag 1.6  P.o. potassium  Hemoglobin stable.  Iron deficiency, however, ferritin 3500.  Defer IV iron for now.  WBC count stable.  A.m. labs      Disposition: Ongoing hospice care talks with patient/family.  Multiple members of the care team have tried to reach out to the patient's sister who is his caregiver to  discuss treatment plan and/for transition to hospice.       Discussed with RN.    VTE Prophylaxis:  Pharmacologic & mechanical VTE prophylaxis orders are present.        CODE STATUS:   Code Status (Patient has no pulse and is not breathing): CPR (Attempt to Resuscitate)  Medical Interventions (Patient has pulse or is breathing): Full Support      Electronically signed by Sena Cantrell DO, 7/5/2024, 16:06 EDT.

## 2024-07-05 NOTE — ED PROVIDER NOTES
Time: 7:08 PM EDT  Date of encounter:  6/27/2024  Independent Historian/Clinical History and Information was obtained by:   Patient    History is limited by: N/A    Chief Complaint: Weakness      History of Present Illness:  Patient is a 44 y.o. year old male who presents to the emergency department for evaluation of weakness.    HPI    Patient Care Team  Primary Care Provider: Provider, Princess Known    Past Medical History:     No Known Allergies  History reviewed. No pertinent past medical history.  Past Surgical History:   Procedure Laterality Date    BRONCHOSCOPY N/A 5/23/2024    Procedure: BRONCHOSCOPY WITH ENDOBRONCHIAL ULTRASOUND, FINE NEEDLE ASPIRATE, BIOPSIES, BRUSHINGS, BRONCHOALVEOLAR LAVAGE;  Surgeon: Kendell Stern MD;  Location: [facility] ENDOSCOPY;  Service: Pulmonary;  Laterality: N/A;  LUNG NODULE, MUCOUS PLUGGING    BRONCHOSCOPY WITH ION ROBOTIC ASSIST N/A 6/20/2024    Procedure: BRONCHOSCOPY NAVIGATION WITH ENDOBRONCHIAL ULTRASOUND AND ION ROBOT. REBUS, EBUS, BRUSHINGS, WASHINGS, BAL, BIOPSIES AND NEEDLE ASPIRATE;  Surgeon: John Elizalde MD;  Location: [facility] MAIN OR;  Service: Robotics - Pulmonary;  Laterality: N/A;    HIP INTERTROCHANTERIC NAILING Right 5/21/2024    Procedure: HIP INTERTROCHANTERIC NAILING;  Surgeon: Molina Clayton MD;  Location: [facility] MAIN OR;  Service: Orthopedics;  Laterality: Right;    US GUIDED FINE NEEDLE ASPIRATION  6/11/2024     History reviewed. No pertinent family history.    Home Medications:  Prior to Admission medications    Medication Sig Start Date End Date Taking? Authorizing Provider   acetaminophen (TYLENOL) 325 MG tablet Take 2 tablets by mouth Every 6 (Six) Hours As Needed for Mild Pain or Fever. 6/22/24  Yes Sy Guzman MD   albuterol sulfate  (90 Base) MCG/ACT inhaler Inhale 2 puffs 2 (Two) Times a Day.   Yes Provider, MD Agnieszka   aluminum-magnesium hydroxide-simethicone (MAALOX/MYLANTA) 200-200-20 MG/5ML suspension Take 30 mL by mouth  Every 4 (Four) Hours As Needed for Indigestion or Heartburn.   Yes Agnieszka Keys MD   bisacodyl (DULCOLAX) 10 MG suppository Insert 1 suppository into the rectum 1 (One) Time.   Yes Agnieszka Keys MD   cefTRIAXone (ROCEPHIN) 2000 mg/100 mL 0.9% NS IVPB (MBP) Infuse 100 mL into a venous catheter Daily.   Yes Agnieszka Keys MD   Fluticasone Furoate-Vilanterol 100-25 MCG/ACT aerosol powder  Inhale 1 puff Daily.   Yes Agnieszka Keys MD   Heparin Sodium, Porcine, 5000 UNIT/0.5ML solution prefilled syringe Inject 5,000 Units under the skin into the appropriate area as directed Every 12 (Twelve) Hours.   Yes Agnieszka Keys MD   HYDROcodone-acetaminophen (NORCO) 5-325 MG per tablet Take 1 tablet by mouth Every 6 (Six) Hours As Needed.   Yes Agnieszka Keys MD   Lidocaine 4 % Place 1 patch on the skin as directed by provider Daily As Needed for Mild Pain. Remove & Discard patch within 12 hours or as directed by MD 6/22/24  Yes Sy Guzman MD   loperamide (IMODIUM) 2 MG capsule Take 1 capsule by mouth Every 6 (Six) Hours As Needed for Diarrhea.   Yes Agnieszka Keys MD   metoprolol succinate XL (TOPROL-XL) 50 MG 24 hr tablet Take 1 tablet by mouth Every 12 (Twelve) Hours. 6/22/24  Yes Sy Guzman MD   multivitamin with minerals tablet tablet Take 1 tablet by mouth Daily. 6/22/24  Yes Sy Guzman MD   nicotine (NICODERM CQ) 21 MG/24HR patch Place 1 patch on the skin as directed by provider Daily As Needed (smoking cessation). 6/22/24  Yes Sy Guzman MD   ondansetron ODT (ZOFRAN-ODT) 4 MG disintegrating tablet Take 1 tablet by mouth Every 6 (Six) Hours As Needed for Nausea or Vomiting. 6/22/24  Yes Sy Guzman MD   polyethylene glycol (MIRALAX) 17 g packet Take 17 g by mouth Daily. 6/22/24  Yes Sy Guzman MD   Psyllium (METAMUCIL MULTIHEALTH FIBER) 51.7 % packet Take 1 packet by mouth Daily. 6/22/24  Yes Sy Guzman MD  "  sennosides-docusate (senna-docusate sodium) 8.6-50 MG per tablet Take 2 tablets by mouth 3 times a day.   Yes Provider, MD Agnieszka   Umeclidinium Bromide (INCRUSE ELLIPTA) 62.5 MCG/ACT aerosol powder  Inhale 1 puff Daily.   Yes Provider, MD Agnieszka   vitamin B-12 (VITAMIN B-12) 1000 MCG tablet Take 1 tablet by mouth Daily.  Patient taking differently: Take 2 tablets by mouth Daily. 6/22/24  Yes Sy Guzman MD        Social History:   Social History     Tobacco Use    Smoking status: Every Day     Current packs/day: 1.50     Average packs/day: 1.5 packs/day for 24.5 years (36.8 ttl pk-yrs)     Types: Cigarettes     Start date: 2000    Smokeless tobacco: Former   Vaping Use    Vaping status: Never Used   Substance Use Topics    Alcohol use: Never    Drug use: Never         Review of Systems:  Review of Systems   Constitutional:  Negative for chills and fever.   HENT:  Negative for congestion, rhinorrhea and sore throat.    Eyes:  Negative for pain and visual disturbance.   Respiratory:  Negative for apnea, cough, chest tightness and shortness of breath.    Cardiovascular:  Negative for chest pain and palpitations.   Gastrointestinal:  Negative for abdominal pain, diarrhea, nausea and vomiting.   Genitourinary:  Negative for difficulty urinating and dysuria.   Musculoskeletal:  Negative for joint swelling and myalgias.   Skin:  Negative for color change.   Neurological:  Positive for weakness. Negative for seizures and headaches.   Psychiatric/Behavioral: Negative.     All other systems reviewed and are negative.       Physical Exam:  /81 (BP Location: Right arm, Patient Position: Lying)   Pulse 100   Temp 97.3 °F (36.3 °C) (Oral)   Resp 18   Ht 182.9 cm (72.01\")   Wt 76.2 kg (167 lb 15.9 oz)   SpO2 94%   BMI 22.78 kg/m²     Physical Exam  Vitals and nursing note reviewed.   Constitutional:       General: He is not in acute distress.     Appearance: Normal appearance. He is not " toxic-appearing.   HENT:      Head: Normocephalic and atraumatic.      Jaw: There is normal jaw occlusion.   Eyes:      General: Lids are normal.      Extraocular Movements: Extraocular movements intact.      Conjunctiva/sclera: Conjunctivae normal.      Pupils: Pupils are equal, round, and reactive to light.   Cardiovascular:      Rate and Rhythm: Normal rate and regular rhythm.      Pulses: Normal pulses.      Heart sounds: Normal heart sounds.   Pulmonary:      Effort: Pulmonary effort is normal. No respiratory distress.      Breath sounds: Normal breath sounds. No wheezing or rhonchi.   Abdominal:      General: Abdomen is flat.      Palpations: Abdomen is soft.      Tenderness: There is no abdominal tenderness. There is no guarding or rebound.   Musculoskeletal:         General: Normal range of motion.      Cervical back: Normal range of motion and neck supple.      Right lower leg: No edema.      Left lower leg: No edema.   Skin:     General: Skin is warm and dry.   Neurological:      Mental Status: He is alert and oriented to person, place, and time. Mental status is at baseline.   Psychiatric:         Mood and Affect: Mood normal.                  Procedures:  Procedures      Medical Decision Making:      Comorbidities that affect care:    Hypertension    External Notes reviewed:    None      The following orders were placed and all results were independently analyzed by me:  Orders Placed This Encounter   Procedures    Blood Culture - Blood,    Blood Culture - Blood,    COVID PRE-OP / PRE-PROCEDURE SCREENING ORDER (NO ISOLATION) - Swab, Nasopharynx    COVID-19, FLU A/B, RSV PCR 1 HR TAT - Swab, Nasopharynx    Respiratory Culture - Sputum, Cough    XR Femur 2 View Right    XR Chest 1 View    CT Chest Without Contrast Diagnostic    CT Head Without Contrast    CT Abdomen Pelvis With Contrast    Comprehensive Metabolic Panel    Lactic Acid, Plasma    CBC Auto Differential    Urinalysis With Culture If Indicated  -    Procalcitonin    CBC Auto Differential    Basic Metabolic Panel    Magnesium    Phosphorus    Hepatic Function Panel    QuantiFERON TB Gold    Iron Profile    Ferritin    Lactate Dehydrogenase    Haptoglobin    CBC Auto Differential    Manual Differential    CBC Auto Differential    Basic Metabolic Panel    Magnesium    Phosphorus    Hepatic Function Panel    Vancomycin, Random    CBC (No Diff)    CBC Auto Differential    Basic Metabolic Panel    Magnesium    Phosphorus    Hepatic Function Panel    CBC Auto Differential    Basic Metabolic Panel    Magnesium    Phosphorus    Hepatic Function Panel    Vancomycin, Random    CBC Auto Differential    Basic Metabolic Panel    CBC Auto Differential    Basic Metabolic Panel    CBC Auto Differential    Magnesium    Basic Metabolic Panel    CBC Auto Differential    Comprehensive Metabolic Panel    CBC Auto Differential    Magnesium    Basic Metabolic Panel    Diet: Regular/House; Fluid Consistency: Thin (IDDSI 0)    Vital Signs    Intake & Output    Weigh Patient    Oral Care    Place Sequential Compression Device    Maintain Sequential Compression Device    Maintain IV Access    Telemetry - Place Orders & Notify Provider of Results When Patient Experiences Acute Chest Pain, Dysrhythmia or Respiratory Distress    Activity - Bed Rest With Exceptions (Specify)    Strict Intake & Output    Notify Provider (With Default Parameters)    Place Sequential Compression Device    Maintain Sequential Compression Device    Verify Informed Consent for Blood Product Administration    RN To Enter The Following Labs When Transfusion Complete    Give magnesium before potassium  Nursing Communication    Code Status and Medical Interventions:    Inpatient Hospitalist Consult    Inpatient Pulmonology Consult    Inpatient Palliative Care Team Consult    Hematology & Oncology Inpatient Consult    Inpatient Palliative Care Team Consult    OT Consult: Eval & Treat ADL Performance Below Baseline,  Discharge Placement Assessment    PT Consult: Eval & Treat Functional Mobility Below Baseline    Incentive Spirometry    ECG 12 Lead Tachycardia    ECG 12 Lead Rhythm Change    Type & Screen    Prepare RBC, 2 Units    Insert Peripheral IV    Inpatient Admission    CBC & Differential    CBC & Differential    CBC & Differential    CBC & Differential    CBC & Differential    CBC & Differential    CBC & Differential    CBC & Differential    CBC & Differential    CBC & Differential       Medications Given in the Emergency Department:  Medications   sodium chloride 0.9 % flush 10 mL (10 mL Intravenous Given 7/8/24 0802)   sodium chloride 0.9 % flush 10 mL (has no administration in time range)   sodium chloride 0.9 % infusion 40 mL (40 mL Intravenous Given 7/3/24 0106)   Pharmacy to dose vancomycin (has no administration in time range)   acetaminophen (TYLENOL) tablet 650 mg (650 mg Oral Given 7/8/24 0802)     Or   acetaminophen (TYLENOL) 160 MG/5ML oral solution 650 mg ( Oral Not Given:  See Alt 7/8/24 0802)     Or   acetaminophen (TYLENOL) suppository 650 mg ( Rectal Not Given:  See Alt 7/8/24 0802)   sennosides-docusate (PERICOLACE) 8.6-50 MG per tablet 2 tablet (2 tablets Oral Given 7/8/24 1708)     And   polyethylene glycol (MIRALAX) packet 17 g (17 g Oral Given 7/6/24 1635)     And   bisacodyl (DULCOLAX) EC tablet 5 mg (5 mg Oral Given 7/6/24 2005)     And   bisacodyl (DULCOLAX) suppository 10 mg (10 mg Rectal Given 7/8/24 1022)   melatonin tablet 10 mg (10 mg Oral Given 7/8/24 0057)   ondansetron ODT (ZOFRAN-ODT) disintegrating tablet 4 mg (4 mg Oral Given 7/1/24 2111)     Or   ondansetron (ZOFRAN) injection 4 mg ( Intravenous Not Given:  See Alt 7/1/24 2111)   aluminum-magnesium hydroxide-simethicone (MAALOX MAX) 400-400-40 MG/5ML suspension 15 mL (has no administration in time range)   metoprolol succinate XL (TOPROL-XL) 24 hr tablet 75 mg (75 mg Oral Given 7/8/24 0802)   potassium chloride 10 mEq in 100 mL IVPB  (10 mEq Intravenous New Bag 6/29/24 1517)   nicotine (NICODERM CQ) 21 MG/24HR patch 1 patch (1 patch Transdermal Medication Applied 7/8/24 0803)   Lidocaine 4 % 1 patch (1 patch Transdermal Medication Applied 7/8/24 1708)   HYDROcodone-acetaminophen (NORCO) 5-325 MG per tablet 1 tablet (1 tablet Oral Given 7/7/24 1656)   HYDROcodone-acetaminophen (NORCO)  MG per tablet 1 tablet (1 tablet Oral Given 7/8/24 1708)   Morphine (MS CONTIN) 12 hr tablet 15 mg (has no administration in time range)   Lidocaine 4 % 1 patch (1 patch Transdermal Medication Removed 6/28/24 0503)   sodium chloride 0.9 % bolus 2,223 mL (0 mL Intravenous Stopped 6/27/24 2118)   vancomycin IVPB 1500 mg in 0.9% NaCl (Premix) 500 mL (0 mg Intravenous Stopped 6/27/24 2118)   cefepime 2000 mg IVPB in 100 mL NS (VTB) (0 mg Intravenous Stopped 6/27/24 1904)   cefepime 2000 mg IVPB in 100 mL NS (VTB) (2,000 mg Intravenous New Bag 7/3/24 1125)   metoprolol tartrate (LOPRESSOR) injection 2.5 mg ( Intravenous Given 6/28/24 0330)   lactated ringers bolus 1,000 mL (1,000 mL Intravenous New Bag 6/28/24 1543)   Iohexol (OMNIPAQUE) 12 MG/ML solution 500 mL (500 mL Oral Given 6/28/24 1657)   iopamidol (ISOVUE-370) 76 % injection 100 mL (100 mL Intravenous Given 6/28/24 2245)   morphine injection 2 mg (2 mg Intravenous Given 6/29/24 0147)   magnesium sulfate 4g/100mL (PREMIX) infusion (4 g Intravenous New Bag 6/29/24 0827)   potassium chloride (KLOR-CON) packet 20 mEq (20 mEq Oral Given 6/30/24 1757)   magnesium sulfate 2g/50 mL (PREMIX) infusion (2 g Intravenous New Bag 6/30/24 1141)   vancomycin 1750 mg/500 mL 0.9% NS IVPB ([facility]) (1,750 mg Intravenous New Bag 6/30/24 1757)   potassium chloride (KLOR-CON) packet 40 mEq (40 mEq Oral Given 7/1/24 2016)   vancomycin 1750 mg/500 mL 0.9% NS IVPB ([facility]) (1,750 mg Intravenous New Bag 7/3/24 2159)   magnesium sulfate 2g/50 mL (PREMIX) infusion (0 g Intravenous Stopped 7/2/24 1330)   potassium chloride  (MICRO-K/KLOR-CON) CR capsule (40 mEq Oral Given 7/3/24 0851)   potassium chloride (MICRO-K/KLOR-CON) CR capsule (40 mEq Oral Given 7/4/24 0957)   HYDROcodone-acetaminophen (NORCO) 5-325 MG per tablet 1 tablet (1 tablet Oral Given 7/7/24 2129)   potassium chloride (MICRO-K/KLOR-CON) CR capsule (40 mEq Oral Given 7/8/24 1708)        ED Course:    ED Course as of 07/08/24 1847   Thu Jun 27, 202427, 2024 1753 ECG 12 Lead Tachycardia [SK]      ED Course User Index  [SK] Reese Salazar PA-C       Labs:    Lab Results (last 24 hours)       Procedure Component Value Units Date/Time    CBC & Differential [[id number]]  (Abnormal) Collected: 07/08/24 0454    Specimen: Blood from Arm, Right Updated: 07/08/24 0607    Narrative:      The following orders were created for panel order CBC & Differential.  Procedure                               Abnormality         Status                     ---------                               -----------         ------                     CBC Auto Differential[[id number]]        Abnormal            Final result                 Please view results for these tests on the individual orders.    Comprehensive Metabolic Panel [[id number]]  (Abnormal) Collected: 07/08/24 0454    Specimen: Blood from Arm, Right Updated: 07/08/24 0627     Glucose 102 mg/dL      BUN 8 mg/dL      Creatinine 0.32 mg/dL      Sodium 138 mmol/L      Potassium 3.0 mmol/L      Chloride 97 mmol/L      CO2 31.2 mmol/L      Calcium 10.0 mg/dL      Total Protein 6.0 g/dL      Albumin 2.6 g/dL      ALT (SGPT) 31 U/L      AST (SGOT) 19 U/L      Alkaline Phosphatase 129 U/L      Total Bilirubin 0.2 mg/dL      Globulin 3.4 gm/dL      A/G Ratio 0.8 g/dL      BUN/Creatinine Ratio 25.0     Anion Gap 9.8 mmol/L      eGFR 147.6 mL/min/1.73     Narrative:      GFR Normal >60  Chronic Kidney Disease <60  Kidney Failure <15      CBC Auto Differential [[id number]]  (Abnormal) Collected: 07/08/24 0454    Specimen: Blood from Arm, Right Updated: 07/08/24  0607     WBC 12.10 10*3/mm3      RBC 3.28 10*6/mm3      Hemoglobin 8.5 g/dL      Hematocrit 27.7 %      MCV 84.5 fL      MCH 25.9 pg      MCHC 30.7 g/dL      RDW 15.5 %      RDW-SD 47.5 fl      MPV 9.4 fL      Platelets 526 10*3/mm3      Neutrophil % 75.7 %      Lymphocyte % 11.7 %      Monocyte % 8.6 %      Eosinophil % 2.2 %      Basophil % 0.3 %      Immature Grans % 1.5 %      Neutrophils, Absolute 9.15 10*3/mm3      Lymphocytes, Absolute 1.42 10*3/mm3      Monocytes, Absolute 1.04 10*3/mm3      Eosinophils, Absolute 0.27 10*3/mm3      Basophils, Absolute 0.04 10*3/mm3      Immature Grans, Absolute 0.18 10*3/mm3      nRBC 0.0 /100 WBC              Imaging:    No Radiology Exams Resulted Within Past 24 Hours      Differential Diagnosis and Discussion:    Weakness: Based on the patient's history, signs, and symptoms, the diffential diagnosis includes but is not limited to meningitis, stroke, sepsis, subarachnoid hemorrhage, intracranial bleeding, encephalitis, acute uti, dehydration, MS, myasthenia gravis, [place], migraine variant, neuromuscular disorders vertigo, electrolyte imbalance, and metabolic disorders.    All labs were reviewed and interpreted by me.    MDM     Amount and/or Complexity of Data Reviewed  Clinical lab tests: reviewed  Tests in the medicine section of CPT®: reviewed  Decide to obtain previous medical records or to obtain history from someone other than the patient: yes    The patient´s CBC that was reviewed and interpreted by me shows no abnormalities of critical concern. Of note, there is no anemia requiring a blood transfusion and the platelet count is acceptable.  The patient´s CMP that was reviewed and interpretted by me shows no abnormalities of critical concern. Of note, the patient´s sodium and potassium are acceptable. The patient´s liver enzymes are unremarkable. The patient´s renal function (creatinine) is preserved. The patient has a normal anion gap.            Patient  Care Considerations:    I considered a CT chest, however the patient will be admitted for sepsis.      Consultants/Shared Management Plan:    Case was discussed with Dr. Cantrell who agrees with admission.    Social Determinants of Health:    Patient is independent, reliable, and has access to care.       Disposition and Care Coordination:    Admit:   Through independent evaluation of the patient's history, physical, and imperical data, the patient meets criteria for inpatient admission to the hospital.        Final diagnoses:   Sepsis, due to unspecified organism, unspecified whether acute organ dysfunction present        ED Disposition       ED Disposition   Decision to Admit    Condition   --    Comment   Level of Care: Remote Telemetry [26]   Diagnosis: Sepsis [[id number]]   Admitting Physician: ASHWINI PHILLIPS [[id number]]   Certification: I Certify That Inpatient Hospital Services Are Medically Necessary For Greater Than 2 Midnights                 This medical record created using voice recognition software.             Maria C Freitas MD  07/08/24 0767

## 2024-07-06 LAB
ANION GAP SERPL CALCULATED.3IONS-SCNC: 13 MMOL/L (ref 5–15)
BASOPHILS # BLD AUTO: 0.05 10*3/MM3 (ref 0–0.2)
BASOPHILS NFR BLD AUTO: 0.4 % (ref 0–1.5)
BUN SERPL-MCNC: 7 MG/DL (ref 6–20)
BUN/CREAT SERPL: 20 (ref 7–25)
CALCIUM SPEC-SCNC: 9.2 MG/DL (ref 8.6–10.5)
CHLORIDE SERPL-SCNC: 95 MMOL/L (ref 98–107)
CO2 SERPL-SCNC: 27 MMOL/L (ref 22–29)
CREAT SERPL-MCNC: 0.35 MG/DL (ref 0.76–1.27)
DEPRECATED RDW RBC AUTO: 47.8 FL (ref 37–54)
EGFRCR SERPLBLD CKD-EPI 2021: 143.7 ML/MIN/1.73
EOSINOPHIL # BLD AUTO: 0.18 10*3/MM3 (ref 0–0.4)
EOSINOPHIL NFR BLD AUTO: 1.5 % (ref 0.3–6.2)
ERYTHROCYTE [DISTWIDTH] IN BLOOD BY AUTOMATED COUNT: 15.9 % (ref 12.3–15.4)
GLUCOSE SERPL-MCNC: 165 MG/DL (ref 65–99)
HCT VFR BLD AUTO: 28.6 % (ref 37.5–51)
HGB BLD-MCNC: 8.6 G/DL (ref 13–17.7)
IMM GRANULOCYTES # BLD AUTO: 0.21 10*3/MM3 (ref 0–0.05)
IMM GRANULOCYTES NFR BLD AUTO: 1.8 % (ref 0–0.5)
LYMPHOCYTES # BLD AUTO: 1.27 10*3/MM3 (ref 0.7–3.1)
LYMPHOCYTES NFR BLD AUTO: 10.8 % (ref 19.6–45.3)
MCH RBC QN AUTO: 25.3 PG (ref 26.6–33)
MCHC RBC AUTO-ENTMCNC: 30.1 G/DL (ref 31.5–35.7)
MCV RBC AUTO: 84.1 FL (ref 79–97)
MONOCYTES # BLD AUTO: 0.98 10*3/MM3 (ref 0.1–0.9)
MONOCYTES NFR BLD AUTO: 8.3 % (ref 5–12)
NEUTROPHILS NFR BLD AUTO: 77.2 % (ref 42.7–76)
NEUTROPHILS NFR BLD AUTO: 9.09 10*3/MM3 (ref 1.7–7)
NRBC BLD AUTO-RTO: 0 /100 WBC (ref 0–0.2)
PLATELET # BLD AUTO: 484 10*3/MM3 (ref 140–450)
PMV BLD AUTO: 9 FL (ref 6–12)
POTASSIUM SERPL-SCNC: 3.3 MMOL/L (ref 3.5–5.2)
RBC # BLD AUTO: 3.4 10*6/MM3 (ref 4.14–5.8)
SODIUM SERPL-SCNC: 135 MMOL/L (ref 136–145)
WBC NRBC COR # BLD AUTO: 11.78 10*3/MM3 (ref 3.4–10.8)

## 2024-07-06 PROCEDURE — 99232 SBSQ HOSP IP/OBS MODERATE 35: CPT | Performed by: STUDENT IN AN ORGANIZED HEALTH CARE EDUCATION/TRAINING PROGRAM

## 2024-07-06 PROCEDURE — 85025 COMPLETE CBC W/AUTO DIFF WBC: CPT | Performed by: STUDENT IN AN ORGANIZED HEALTH CARE EDUCATION/TRAINING PROGRAM

## 2024-07-06 PROCEDURE — 80048 BASIC METABOLIC PNL TOTAL CA: CPT | Performed by: STUDENT IN AN ORGANIZED HEALTH CARE EDUCATION/TRAINING PROGRAM

## 2024-07-06 RX ADMIN — HYDROCODONE BITARTRATE AND ACETAMINOPHEN 1 TABLET: 5; 325 TABLET ORAL at 15:52

## 2024-07-06 RX ADMIN — ACETAMINOPHEN 650 MG: 325 TABLET ORAL at 20:05

## 2024-07-06 RX ADMIN — HYDROCODONE BITARTRATE AND ACETAMINOPHEN 1 TABLET: 5; 325 TABLET ORAL at 06:41

## 2024-07-06 RX ADMIN — LIDOCAINE 1 PATCH: 4 PATCH TOPICAL at 20:05

## 2024-07-06 RX ADMIN — NICOTINE 1 PATCH: 21 PATCH, EXTENDED RELEASE TRANSDERMAL at 09:11

## 2024-07-06 RX ADMIN — Medication 10 ML: at 09:11

## 2024-07-06 RX ADMIN — METOPROLOL SUCCINATE 75 MG: 50 TABLET, EXTENDED RELEASE ORAL at 20:05

## 2024-07-06 RX ADMIN — BISACODYL 5 MG: 5 TABLET, COATED ORAL at 20:05

## 2024-07-06 RX ADMIN — HYDROCODONE BITARTRATE AND ACETAMINOPHEN 1 TABLET: 5; 325 TABLET ORAL at 22:14

## 2024-07-06 RX ADMIN — POLYETHYLENE GLYCOL 3350 17 G: 17 POWDER, FOR SOLUTION ORAL at 16:35

## 2024-07-06 RX ADMIN — Medication 10 MG: at 20:05

## 2024-07-06 RX ADMIN — METOPROLOL SUCCINATE 75 MG: 50 TABLET, EXTENDED RELEASE ORAL at 09:10

## 2024-07-06 RX ADMIN — Medication 10 ML: at 20:06

## 2024-07-06 NOTE — PROGRESS NOTES
[facility]   Hospitalist Progress Note  Date: 2024  Patient Name: Oswaldo Bowen  : 1980  MRN: [id number]  Date of admission: 2024  Room/Bed: [facility]3/1      Subjective   Subjective     Chief Complaint: Fever    Summary:Oswaldo Bowen is a 44 y.o. male with history of intellectual disability, tobacco smoker, depression, chronic anemia, with recent hospitalization for mechanical fall with displaced intertrochanteric femur fracture requiring orthopedic intervention, VLAD, with suspected new metastatic lung cancer and mets to the bone with a 2.6 cm nodule in the left upper lobe, which came back positive for adenocarcinoma, hospitalized on 2024 for chief complaint of fevers, pancultured, placed on broad-spectrum antibiotics, pulmonary consulted with underlying adenocarcinoma of the lung which appears to be widely metastatic to bone, could be causing fevers. Reaching out to heme-onc to see if he needs an appointment, transportation issues while in rehab may limit consultation in the outpatient setting     Interval Followup: No acute overnight events.  No acute distress.  Patient resting comfortably, sleeping when I entered the room.  Reports that his pain is much better with the adjustments we made to his regiment yesterday.  No family at bedside.    Review of Systems    All systems reviewed and negative except for what is outlined above.      Objective   Objective     Vitals:   Temp:  [98.2 °F (36.8 °C)-99 °F (37.2 °C)] 99 °F (37.2 °C)  Heart Rate:  [101-113] 112  Resp:  [18] 18  BP: (132-144)/(84-90) 135/90    Physical Exam   Gen: NAD, Alert and Oriented, cachexia  Cards: RRR, no murmur   Pulm: CTA b/l, no wheezing  Abd: soft, nondistended  Extremities: no pitting edema    Result Review    Result Review:  I have personally reviewed these results:  [x]  Laboratory      Lab 24  1003 24  0514 24  0530   WBC 11.78* 12.60* 14.50*   HEMOGLOBIN 8.6* 8.5* 8.2*   HEMATOCRIT 28.6* 27.9* 26.9*    PLATELETS 484* 458* 427   NEUTROS ABS 9.09* 10.05* 11.71*   IMMATURE GRANS (ABS) 0.21* 0.21* 0.20*   LYMPHS ABS 1.27 1.14 1.22   MONOS ABS 0.98* 0.93* 1.10*   EOS ABS 0.18 0.24 0.23   MCV 84.1 85.3 84.9         Lab 07/06/24  1003 07/04/24  0514 07/03/24  0530 07/02/24  0553 07/01/24  0454 06/30/24  0548   SODIUM 135* 136 135* 135* 135* 136   POTASSIUM 3.3* 3.0* 3.0* 3.6 3.1* 3.2*   CHLORIDE 95* 100 99 99 100 102   CO2 27.0 28.2 27.0 27.6 24.7 23.2   ANION GAP 13.0 7.8 9.0 8.4 10.3 10.8   BUN 7 8 7 5* 8 9   CREATININE 0.35* 0.29* 0.26* 0.34* 0.29* 0.26*   EGFR 143.7 152.0 157.1 144.9 152.0 157.1   GLUCOSE 165* 104* 109* 111* 105* 113*   CALCIUM 9.2 9.1 8.8 8.7 8.6 8.2*   MAGNESIUM  --  1.6  --  1.5* 1.7 1.6   PHOSPHORUS  --   --   --  3.7 3.8 3.1         Lab 07/02/24  0553 07/01/24  0454 06/30/24  0548   TOTAL PROTEIN 5.6* 5.6* 5.5*   ALBUMIN 2.4* 2.2* 2.0*   ALT (SGPT) 46* 58* 58*   AST (SGOT) 20 23 33   BILIRUBIN 0.2 0.2 0.3   BILIRUBIN DIRECT <0.2 <0.2 <0.2   ALK PHOS 119* 117 120*                       Brief Urine Lab Results  (Last result in the past 365 days)        Color   Clarity   Blood   Leuk Est   Nitrite   Protein   CREAT   Urine HCG        06/27/24 1856 Yellow   Clear   Negative   Negative   Negative   Trace                 [x]  Microbiology   Microbiology Results (last 10 days)       Procedure Component Value - Date/Time    Respiratory Culture - Sputum, Cough [[id number]] Collected: 07/02/24 1239    Lab Status: Final result Specimen: Sputum from Cough Updated: 07/04/24 1055     Respiratory Culture Rare growth Normal respiratory zeinab. No S. aureus or Pseudomonas aeruginosa detected. Final report.     Gram Stain Occasional WBCs seen      No organisms seen    COVID PRE-OP / PRE-PROCEDURE SCREENING ORDER (NO ISOLATION) - Swab, Nasopharynx [[id number]]  (Normal) Collected: 06/28/24 1155    Lab Status: Final result Specimen: Swab from Nasopharynx Updated: 06/28/24 1252    Narrative:      The following orders  were created for panel order COVID PRE-OP / PRE-PROCEDURE SCREENING ORDER (NO ISOLATION) - Swab, Nasopharynx.  Procedure                               Abnormality         Status                     ---------                               -----------         ------                     COVID-19, FLU A/B, RSV P...[[id number]]  Normal              Final result                 Please view results for these tests on the individual orders.    COVID-19, FLU A/B, RSV PCR 1 HR TAT - Swab, Nasopharynx [[id number]]  (Normal) Collected: 06/28/24 1155    Lab Status: Final result Specimen: Swab from Nasopharynx Updated: 06/28/24 1252     COVID19 Not Detected     Influenza A PCR Not Detected     Influenza B PCR Not Detected     RSV, PCR Not Detected    Narrative:      Fact sheet for providers: https://www.fda.gov/media/339948/download    Fact sheet for patients: https://www.fda.gov/media/085873/download    Test performed by PCR.    Blood Culture - Blood, Blood, Venous [[id number]]  (Normal) Collected: 06/27/24 1856    Lab Status: Final result Specimen: Blood, Venous Updated: 07/02/24 1915     Blood Culture No growth at 5 days    Blood Culture - Blood, Blood, Venous [[id number]]  (Normal) Collected: 06/27/24 1856    Lab Status: Final result Specimen: Blood, Venous Updated: 07/02/24 1915     Blood Culture No growth at 5 days          [x]  Radiology  CT Abdomen Pelvis With Contrast    Result Date: 6/28/2024  Impression: 1.No definite acute infectious process is identified. 2.There are now multiple small bladder calculi. 3.Progressive osseous metastatic disease with enlarging lesions. No definite acute fracture identified. Electronically Signed: Johann Ramírez MD  6/28/2024 10:55 PM EDT  Workstation ID: [id number]   []  EKG/Telemetry   []  Cardiology/Vascular   []  Pathology  []  Old records  []  Other:    Assessment & Plan   Assessment / Plan     Assessment:  Subjective fevers, unknown source  Metastatic adenocarcinoma of the lung  Current  tobacco smoker  Intellectual disability  Acute on chronic anemia  Symptomatic anemia  Depression  Recent hospitalization for femur fracture  Hypomagnesemia  Iron deficiency    Plan:  Continue inpatient admission  Completed 7 days of broad-spectrum antibiotics with vancomycin and cefepime  Blood cultures negative.  COVID/flu/RSV negative.  BAL PCR negative.  Sputum culture negative.  Urinalysis negative.  Pulmonology following  Oncology following  Continue metoprolol 75 mg twice daily  Multimodal pain control.  Continue Norco today.  Zofran as needed  PT/OT  Palliative care following  Mag 1.6  P.o. potassium  Hemoglobin stable.  Iron deficiency, however, ferritin 3500.  Defer IV iron for now.  WBC count stable.  A.m. labs      Disposition: Ongoing hospice care talks with patient/family.  Multiple members of the care team have tried to reach out to the patient's sister who is his caregiver to discuss treatment plan and/for transition to hospice.       Discussed with RN.    VTE Prophylaxis:  Pharmacologic & mechanical VTE prophylaxis orders are present.        CODE STATUS:   Code Status (Patient has no pulse and is not breathing): CPR (Attempt to Resuscitate)  Medical Interventions (Patient has pulse or is breathing): Full Support      Electronically signed by Sena Cantrell DO, 7/6/2024, 16:01 EDT.

## 2024-07-06 NOTE — PLAN OF CARE
Goal Outcome Evaluation:  Plan of Care Reviewed With: patient        Progress: no change  Outcome Evaluation: alert and oriented x 4. frequently repositioned in bed throughout shift. medicated per orders.

## 2024-07-07 PROCEDURE — 99232 SBSQ HOSP IP/OBS MODERATE 35: CPT | Performed by: STUDENT IN AN ORGANIZED HEALTH CARE EDUCATION/TRAINING PROGRAM

## 2024-07-07 RX ORDER — HYDROCODONE BITARTRATE AND ACETAMINOPHEN 5; 325 MG/1; MG/1
1 TABLET ORAL ONCE
Status: COMPLETED | OUTPATIENT
Start: 2024-07-07 | End: 2024-07-07

## 2024-07-07 RX ORDER — HYDROCODONE BITARTRATE AND ACETAMINOPHEN 10; 325 MG/1; MG/1
1 TABLET ORAL EVERY 6 HOURS PRN
Status: DISCONTINUED | OUTPATIENT
Start: 2024-07-07 | End: 2024-07-16 | Stop reason: HOSPADM

## 2024-07-07 RX ADMIN — ACETAMINOPHEN 650 MG: 325 TABLET ORAL at 15:49

## 2024-07-07 RX ADMIN — METOPROLOL SUCCINATE 75 MG: 50 TABLET, EXTENDED RELEASE ORAL at 10:37

## 2024-07-07 RX ADMIN — LIDOCAINE 1 PATCH: 4 PATCH TOPICAL at 11:44

## 2024-07-07 RX ADMIN — METOPROLOL SUCCINATE 75 MG: 50 TABLET, EXTENDED RELEASE ORAL at 21:30

## 2024-07-07 RX ADMIN — NICOTINE 1 PATCH: 21 PATCH, EXTENDED RELEASE TRANSDERMAL at 10:38

## 2024-07-07 RX ADMIN — HYDROCODONE BITARTRATE AND ACETAMINOPHEN 1 TABLET: 5; 325 TABLET ORAL at 04:05

## 2024-07-07 RX ADMIN — Medication 10 ML: at 10:37

## 2024-07-07 RX ADMIN — HYDROCODONE BITARTRATE AND ACETAMINOPHEN 1 TABLET: 5; 325 TABLET ORAL at 16:56

## 2024-07-07 RX ADMIN — ACETAMINOPHEN 650 MG: 325 TABLET ORAL at 06:40

## 2024-07-07 RX ADMIN — HYDROCODONE BITARTRATE AND ACETAMINOPHEN 1 TABLET: 5; 325 TABLET ORAL at 21:29

## 2024-07-07 RX ADMIN — HYDROCODONE BITARTRATE AND ACETAMINOPHEN 1 TABLET: 5; 325 TABLET ORAL at 10:38

## 2024-07-07 RX ADMIN — Medication 10 ML: at 21:29

## 2024-07-07 NOTE — PLAN OF CARE
Call Dr. Ramirez for any problems and/or concerns    *Walk as much as possible, it is ok to use stairs carefully  *Continue the coughing and deep breathing exercises that your learned in the hospital  *No lifting/straining greater than 10 pounds for 6 weeks (Avoid strenuous activity)  *Vaginal rest for 6 weeks (Do not put anything in your vagina. Do not use tampons or douches. Do not have sex until cleared by physician)  *Prevent constipation by using stool softeners and staying hydrated, so that you do not strain against your stitches or have pain from constipation    Call Doctor right away for:    *Fever above 100.4/shaking chills  *Bright red vaginal bleeding or bleeding that soaks more than one sanitary pad per hour  *A foul smelling discharge from the vagina  *Trouble urinating or burning with urination  *Severe pain or bloating in your abdomen  *Persistent nausea/vomiting  *Redness, swelling, or drainage at your incision sites  *Chest pain/shortness of breath-call 911.    - You will be going home with prescriptions for pain medication. If you are able to take them, alternate a dose of Acetaminophen (Tylenol) and Toradol (ketorolac) every 3 hours. (For example, 1000mg of Tylenol at 09:00am, 10mg Toradol at 12:00pm, 1000mg of Tylenol at 3:00pm, 10mg Toradol at 6:00pm). Use any prescribed narcotic (ie Tramadol) for breakthrough pain as prescribed. Use a stool softener, such as Miralax, to prevent constipation from the narcotic medication.      Goal Outcome Evaluation:  Plan of Care Reviewed With: patient        Progress: no change  Outcome Evaluation: PRN Tylenol and hydrocodone given overnight to achieve adequate pain control. PRN dulcolax tab given, no bowel movement resulted. Patient awake intermittently overnight. Minimal diaphoretic episodes

## 2024-07-07 NOTE — PROGRESS NOTES
[facility]   Hospitalist Progress Note  Date: 2024  Patient Name: Oswaldo Bowen  : 1980  MRN: [id number]  Date of admission: 2024  Room/Bed: [facility]3/1      Subjective   Subjective     Chief Complaint: Fever    Summary:Oswaldo Bowen is a 44 y.o. male with history of intellectual disability, tobacco smoker, depression, chronic anemia, with recent hospitalization for mechanical fall with displaced intertrochanteric femur fracture requiring orthopedic intervention, VLDA, with suspected new metastatic lung cancer and mets to the bone with a 2.6 cm nodule in the left upper lobe, which came back positive for adenocarcinoma, hospitalized on 2024 for chief complaint of fevers, pancultured, placed on broad-spectrum antibiotics, pulmonary consulted with underlying adenocarcinoma of the lung which appears to be widely metastatic to bone, could be causing fevers. Reaching out to heme-onc to see if he needs an appointment, transportation issues while in rehab may limit consultation in the outpatient setting     Interval Followup: No acute overnight events.  No acute distress.  Patient resting comfortably, asking for some pain medication.  Nursing has been notified and is on their way.  Otherwise, he denies any needs.  Wants to just rest today.  When I talked him about his lung cancer he does not know what he would like to do.    Review of Systems    All systems reviewed and negative except for what is outlined above.      Objective   Objective     Vitals:   Temp:  [97.5 °F (36.4 °C)-98.8 °F (37.1 °C)] 98.6 °F (37 °C)  Heart Rate:  [] 100  Resp:  [18] 18  BP: (128-143)/(76-90) 143/90    Physical Exam   Gen: NAD, Alert and Oriented, cachexia  Cards: RRR, no murmur   Pulm: CTA b/l, no wheezing  Abd: soft, nondistended  Extremities: no pitting edema    Result Review    Result Review:  I have personally reviewed these results:  [x]  Laboratory      Lab 24  1003 24  0514 24  0530   WBC  11.78* 12.60* 14.50*   HEMOGLOBIN 8.6* 8.5* 8.2*   HEMATOCRIT 28.6* 27.9* 26.9*   PLATELETS 484* 458* 427   NEUTROS ABS 9.09* 10.05* 11.71*   IMMATURE GRANS (ABS) 0.21* 0.21* 0.20*   LYMPHS ABS 1.27 1.14 1.22   MONOS ABS 0.98* 0.93* 1.10*   EOS ABS 0.18 0.24 0.23   MCV 84.1 85.3 84.9         Lab 07/06/24  1003 07/04/24  0514 07/03/24  0530 07/02/24  0553 07/01/24  0454   SODIUM 135* 136 135* 135* 135*   POTASSIUM 3.3* 3.0* 3.0* 3.6 3.1*   CHLORIDE 95* 100 99 99 100   CO2 27.0 28.2 27.0 27.6 24.7   ANION GAP 13.0 7.8 9.0 8.4 10.3   BUN 7 8 7 5* 8   CREATININE 0.35* 0.29* 0.26* 0.34* 0.29*   EGFR 143.7 152.0 157.1 144.9 152.0   GLUCOSE 165* 104* 109* 111* 105*   CALCIUM 9.2 9.1 8.8 8.7 8.6   MAGNESIUM  --  1.6  --  1.5* 1.7   PHOSPHORUS  --   --   --  3.7 3.8         Lab 07/02/24  0553 07/01/24  0454   TOTAL PROTEIN 5.6* 5.6*   ALBUMIN 2.4* 2.2*   ALT (SGPT) 46* 58*   AST (SGOT) 20 23   BILIRUBIN 0.2 0.2   BILIRUBIN DIRECT <0.2 <0.2   ALK PHOS 119* 117                       Brief Urine Lab Results  (Last result in the past 365 days)        Color   Clarity   Blood   Leuk Est   Nitrite   Protein   CREAT   Urine HCG        06/27/24 1856 Yellow   Clear   Negative   Negative   Negative   Trace                 [x]  Microbiology   Microbiology Results (last 10 days)       Procedure Component Value - Date/Time    Respiratory Culture - Sputum, Cough [[id number]] Collected: 07/02/24 1239    Lab Status: Final result Specimen: Sputum from Cough Updated: 07/04/24 1055     Respiratory Culture Rare growth Normal respiratory zeinab. No S. aureus or Pseudomonas aeruginosa detected. Final report.     Gram Stain Occasional WBCs seen      No organisms seen    COVID PRE-OP / PRE-PROCEDURE SCREENING ORDER (NO ISOLATION) - Swab, Nasopharynx [[id number]]  (Normal) Collected: 06/28/24 115    Lab Status: Final result Specimen: Swab from Nasopharynx Updated: 06/28/24 1252    Narrative:      The following orders were created for panel order COVID  PRE-OP / PRE-PROCEDURE SCREENING ORDER (NO ISOLATION) - Swab, Nasopharynx.  Procedure                               Abnormality         Status                     ---------                               -----------         ------                     COVID-19, FLU A/B, RSV P...[[id number]]  Normal              Final result                 Please view results for these tests on the individual orders.    COVID-19, FLU A/B, RSV PCR 1 HR TAT - Swab, Nasopharynx [[id number]]  (Normal) Collected: 06/28/24 1155    Lab Status: Final result Specimen: Swab from Nasopharynx Updated: 06/28/24 1252     COVID19 Not Detected     Influenza A PCR Not Detected     Influenza B PCR Not Detected     RSV, PCR Not Detected    Narrative:      Fact sheet for providers: https://www.fda.gov/media/745112/download    Fact sheet for patients: https://www.fda.gov/media/492751/download    Test performed by PCR.    Blood Culture - Blood, Blood, Venous [[id number]]  (Normal) Collected: 06/27/24 1856    Lab Status: Final result Specimen: Blood, Venous Updated: 07/02/24 1915     Blood Culture No growth at 5 days    Blood Culture - Blood, Blood, Venous [[id number]]  (Normal) Collected: 06/27/24 1856    Lab Status: Final result Specimen: Blood, Venous Updated: 07/02/24 1915     Blood Culture No growth at 5 days          [x]  Radiology  No radiology results for the last 7 days  []  EKG/Telemetry   []  Cardiology/Vascular   []  Pathology  []  Old records  []  Other:    Assessment & Plan   Assessment / Plan     Assessment:  Subjective fevers, unknown source  Metastatic adenocarcinoma of the lung  Current tobacco smoker  Intellectual disability  Acute on chronic anemia  Symptomatic anemia  Depression  Recent hospitalization for femur fracture  Hypomagnesemia  Iron deficiency    Plan:  Continue inpatient admission  Completed 7 days of broad-spectrum antibiotics with vancomycin and cefepime  Blood cultures negative.  COVID/flu/RSV negative.  BAL PCR negative.   Sputum culture negative.  Urinalysis negative.  Pulmonology following  Oncology following  Continue metoprolol 75 mg twice daily  Multimodal pain control.  Continue Norco today.  Zofran as needed  PT/OT  Palliative care following  Hemoglobin stable.  Iron deficiency, however, ferritin 3500.  Defer IV iron for now.  WBC count stable.  A.m. labs      Disposition: Ongoing hospice care talks with patient/family.  Patient is not able to tell me if he wants treatment or not, I do not believe that he is capable of making his own decision given his level of functioning.  Multiple members of the care team have tried to reach out to the patient's sister who is his caregiver to discuss treatment plan and/for transition to hospice.       Discussed with RN.    VTE Prophylaxis:  Pharmacologic & mechanical VTE prophylaxis orders are present.        CODE STATUS:   Code Status (Patient has no pulse and is not breathing): CPR (Attempt to Resuscitate)  Medical Interventions (Patient has pulse or is breathing): Full Support      Electronically signed by Sena Cantrell DO, 7/7/2024, 16:41 EDT.

## 2024-07-07 NOTE — PLAN OF CARE
Goal Outcome Evaluation:  Plan of Care Reviewed With: patient        Progress: no change  Outcome Evaluation: alert and oriented x 4. encouraged repositioning throughout shift.

## 2024-07-08 LAB
ALBUMIN SERPL-MCNC: 2.6 G/DL (ref 3.5–5.2)
ALBUMIN/GLOB SERPL: 0.8 G/DL
ALP SERPL-CCNC: 129 U/L (ref 39–117)
ALT SERPL W P-5'-P-CCNC: 31 U/L (ref 1–41)
ANION GAP SERPL CALCULATED.3IONS-SCNC: 9.8 MMOL/L (ref 5–15)
AST SERPL-CCNC: 19 U/L (ref 1–40)
BASOPHILS # BLD AUTO: 0.04 10*3/MM3 (ref 0–0.2)
BASOPHILS NFR BLD AUTO: 0.3 % (ref 0–1.5)
BILIRUB SERPL-MCNC: 0.2 MG/DL (ref 0–1.2)
BUN SERPL-MCNC: 8 MG/DL (ref 6–20)
BUN/CREAT SERPL: 25 (ref 7–25)
CALCIUM SPEC-SCNC: 10 MG/DL (ref 8.6–10.5)
CHLORIDE SERPL-SCNC: 97 MMOL/L (ref 98–107)
CO2 SERPL-SCNC: 31.2 MMOL/L (ref 22–29)
CREAT SERPL-MCNC: 0.32 MG/DL (ref 0.76–1.27)
DEPRECATED RDW RBC AUTO: 47.5 FL (ref 37–54)
EGFRCR SERPLBLD CKD-EPI 2021: 147.6 ML/MIN/1.73
EOSINOPHIL # BLD AUTO: 0.27 10*3/MM3 (ref 0–0.4)
EOSINOPHIL NFR BLD AUTO: 2.2 % (ref 0.3–6.2)
ERYTHROCYTE [DISTWIDTH] IN BLOOD BY AUTOMATED COUNT: 15.5 % (ref 12.3–15.4)
GLOBULIN UR ELPH-MCNC: 3.4 GM/DL
GLUCOSE SERPL-MCNC: 102 MG/DL (ref 65–99)
HCT VFR BLD AUTO: 27.7 % (ref 37.5–51)
HGB BLD-MCNC: 8.5 G/DL (ref 13–17.7)
IMM GRANULOCYTES # BLD AUTO: 0.18 10*3/MM3 (ref 0–0.05)
IMM GRANULOCYTES NFR BLD AUTO: 1.5 % (ref 0–0.5)
LYMPHOCYTES # BLD AUTO: 1.42 10*3/MM3 (ref 0.7–3.1)
LYMPHOCYTES NFR BLD AUTO: 11.7 % (ref 19.6–45.3)
MCH RBC QN AUTO: 25.9 PG (ref 26.6–33)
MCHC RBC AUTO-ENTMCNC: 30.7 G/DL (ref 31.5–35.7)
MCV RBC AUTO: 84.5 FL (ref 79–97)
MONOCYTES # BLD AUTO: 1.04 10*3/MM3 (ref 0.1–0.9)
MONOCYTES NFR BLD AUTO: 8.6 % (ref 5–12)
NEUTROPHILS NFR BLD AUTO: 75.7 % (ref 42.7–76)
NEUTROPHILS NFR BLD AUTO: 9.15 10*3/MM3 (ref 1.7–7)
NRBC BLD AUTO-RTO: 0 /100 WBC (ref 0–0.2)
PLATELET # BLD AUTO: 526 10*3/MM3 (ref 140–450)
PMV BLD AUTO: 9.4 FL (ref 6–12)
POTASSIUM SERPL-SCNC: 3 MMOL/L (ref 3.5–5.2)
PROT SERPL-MCNC: 6 G/DL (ref 6–8.5)
RBC # BLD AUTO: 3.28 10*6/MM3 (ref 4.14–5.8)
SODIUM SERPL-SCNC: 138 MMOL/L (ref 136–145)
WBC NRBC COR # BLD AUTO: 12.1 10*3/MM3 (ref 3.4–10.8)

## 2024-07-08 PROCEDURE — 80053 COMPREHEN METABOLIC PANEL: CPT | Performed by: STUDENT IN AN ORGANIZED HEALTH CARE EDUCATION/TRAINING PROGRAM

## 2024-07-08 PROCEDURE — 99232 SBSQ HOSP IP/OBS MODERATE 35: CPT | Performed by: STUDENT IN AN ORGANIZED HEALTH CARE EDUCATION/TRAINING PROGRAM

## 2024-07-08 PROCEDURE — 85025 COMPLETE CBC W/AUTO DIFF WBC: CPT | Performed by: STUDENT IN AN ORGANIZED HEALTH CARE EDUCATION/TRAINING PROGRAM

## 2024-07-08 RX ORDER — POTASSIUM CHLORIDE 750 MG/1
40 CAPSULE, EXTENDED RELEASE ORAL ONCE
Status: COMPLETED | OUTPATIENT
Start: 2024-07-08 | End: 2024-07-08

## 2024-07-08 RX ORDER — MORPHINE SULFATE 15 MG/1
15 TABLET, FILM COATED, EXTENDED RELEASE ORAL EVERY 12 HOURS SCHEDULED
Status: DISCONTINUED | OUTPATIENT
Start: 2024-07-08 | End: 2024-07-09

## 2024-07-08 RX ADMIN — ACETAMINOPHEN 650 MG: 325 TABLET ORAL at 01:47

## 2024-07-08 RX ADMIN — MORPHINE SULFATE 15 MG: 15 TABLET, FILM COATED, EXTENDED RELEASE ORAL at 20:26

## 2024-07-08 RX ADMIN — ACETAMINOPHEN 650 MG: 325 TABLET ORAL at 08:02

## 2024-07-08 RX ADMIN — NICOTINE 1 PATCH: 21 PATCH, EXTENDED RELEASE TRANSDERMAL at 08:03

## 2024-07-08 RX ADMIN — POTASSIUM CHLORIDE 40 MEQ: 750 CAPSULE, EXTENDED RELEASE ORAL at 17:08

## 2024-07-08 RX ADMIN — HYDROCODONE BITARTRATE AND ACETAMINOPHEN 1 TABLET: 10; 325 TABLET ORAL at 17:08

## 2024-07-08 RX ADMIN — SENNOSIDES AND DOCUSATE SODIUM 2 TABLET: 50; 8.6 TABLET ORAL at 17:08

## 2024-07-08 RX ADMIN — Medication 10 MG: at 00:57

## 2024-07-08 RX ADMIN — HYDROCODONE BITARTRATE AND ACETAMINOPHEN 1 TABLET: 10; 325 TABLET ORAL at 10:22

## 2024-07-08 RX ADMIN — LIDOCAINE 1 PATCH: 4 PATCH TOPICAL at 17:08

## 2024-07-08 RX ADMIN — HYDROCODONE BITARTRATE AND ACETAMINOPHEN 1 TABLET: 10; 325 TABLET ORAL at 04:08

## 2024-07-08 RX ADMIN — METOPROLOL SUCCINATE 75 MG: 50 TABLET, EXTENDED RELEASE ORAL at 20:26

## 2024-07-08 RX ADMIN — Medication 10 ML: at 08:02

## 2024-07-08 RX ADMIN — Medication 10 ML: at 20:26

## 2024-07-08 RX ADMIN — METOPROLOL SUCCINATE 75 MG: 50 TABLET, EXTENDED RELEASE ORAL at 08:02

## 2024-07-08 RX ADMIN — BISACODYL 10 MG: 10 SUPPOSITORY RECTAL at 10:22

## 2024-07-08 NOTE — PLAN OF CARE
Goal Outcome Evaluation:  Plan of Care Reviewed With: patient        Progress: no change  Outcome Evaluation: alert and oriented to person, place, and situation. frequently repositioned in the bed this shift. medicated per orders.

## 2024-07-08 NOTE — PLAN OF CARE
Goal Outcome Evaluation:  Plan of Care Reviewed With: patient        Progress: no change  Outcome Evaluation: Pt c/o frequent pain/discomfort this shift, administered pain med per MAR. Pt became diaphoretic later in shift; likely from pain-related and not cardiac-retlated. Pt's VSS and had no fever. Strongly encouraged pt toturn/repositon Q2H to promote skin integrity as pt is high risk of skin injury. Pt was A&O x3 with difficulty with time and was forgetful this shift. No new issues or new needs noted at this time.

## 2024-07-08 NOTE — PROGRESS NOTES
[facility]   Hospitalist Progress Note  Date: 2024  Patient Name: Oswaldo Bowen  : 1980  MRN: [id number]  Date of admission: 2024  Room/Bed: [facility]3/      Subjective   Subjective     Chief Complaint: Fever    Summary:Oswaldo Bowen is a 44 y.o. male with history of intellectual disability, tobacco smoker, depression, chronic anemia, with recent hospitalization for mechanical fall with displaced intertrochanteric femur fracture requiring orthopedic intervention, VLAD, with suspected new metastatic lung cancer and mets to the bone with a 2.6 cm nodule in the left upper lobe, which came back positive for adenocarcinoma.     Patient admitted for chief complaint of fevers, pancultured, placed on broad-spectrum antibiotics. Pulmonary consulted with underlying adenocarcinoma of the lung which appears to be widely metastatic to bone, could be causing fevers.  Oncology evaluated the patient, added receptor testing to guide further treatment recommendations.  Patient completed 7 days of broad-spectrum antibiotics, source not found.  Fever curve improved.  Given his mental delay case was discussed with his sister who is his caregiver, she is requesting rehab placement.    Interval Followup: No acute overnight events.  Patient in mild acute distress this morning secondary to back pain.  Otherwise, he denies chest pain or nausea.  No family at bedside.    Review of Systems    All systems reviewed and negative except for what is outlined above.      Objective   Objective     Vitals:   Temp:  [97.2 °F (36.2 °C)-97.7 °F (36.5 °C)] 97.3 °F (36.3 °C)  Heart Rate:  [] 92  Resp:  [18] 18  BP: (133-144)/(83-85) 136/85    Physical Exam   Gen: NAD, Alert and Oriented, cachexia  Cards: RRR, no murmur   Pulm: CTA b/l, no wheezing  Abd: soft, nondistended  Extremities: no pitting edema    Result Review    Result Review:  I have personally reviewed these results:  [x]  Laboratory      Lab 24  0454 24  1003  07/04/24  0514   WBC 12.10* 11.78* 12.60*   HEMOGLOBIN 8.5* 8.6* 8.5*   HEMATOCRIT 27.7* 28.6* 27.9*   PLATELETS 526* 484* 458*   NEUTROS ABS 9.15* 9.09* 10.05*   IMMATURE GRANS (ABS) 0.18* 0.21* 0.21*   LYMPHS ABS 1.42 1.27 1.14   MONOS ABS 1.04* 0.98* 0.93*   EOS ABS 0.27 0.18 0.24   MCV 84.5 84.1 85.3         Lab 07/08/24  0454 07/06/24  1003 07/04/24  0514 07/03/24  0530 07/02/24  0553   SODIUM 138 135* 136   < > 135*   POTASSIUM 3.0* 3.3* 3.0*   < > 3.6   CHLORIDE 97* 95* 100   < > 99   CO2 31.2* 27.0 28.2   < > 27.6   ANION GAP 9.8 13.0 7.8   < > 8.4   BUN 8 7 8   < > 5*   CREATININE 0.32* 0.35* 0.29*   < > 0.34*   EGFR 147.6 143.7 152.0   < > 144.9   GLUCOSE 102* 165* 104*   < > 111*   CALCIUM 10.0 9.2 9.1   < > 8.7   MAGNESIUM  --   --  1.6  --  1.5*   PHOSPHORUS  --   --   --   --  3.7    < > = values in this interval not displayed.         Lab 07/08/24  0454 07/02/24  0553   TOTAL PROTEIN 6.0 5.6*   ALBUMIN 2.6* 2.4*   GLOBULIN 3.4  --    ALT (SGPT) 31 46*   AST (SGOT) 19 20   BILIRUBIN 0.2 0.2   BILIRUBIN DIRECT  --  <0.2   ALK PHOS 129* 119*                       Brief Urine Lab Results  (Last result in the past 365 days)        Color   Clarity   Blood   Leuk Est   Nitrite   Protein   CREAT   Urine HCG        06/27/24 1856 Yellow   Clear   Negative   Negative   Negative   Trace                 [x]  Microbiology   Microbiology Results (last 10 days)       Procedure Component Value - Date/Time    Respiratory Culture - Sputum, Cough [[id number]] Collected: 07/02/24 1239    Lab Status: Final result Specimen: Sputum from Cough Updated: 07/04/24 1055     Respiratory Culture Rare growth Normal respiratory zeinab. No S. aureus or Pseudomonas aeruginosa detected. Final report.     Gram Stain Occasional WBCs seen      No organisms seen          [x]  Radiology  No radiology results for the last 7 days  []  EKG/Telemetry   []  Cardiology/Vascular   []  Pathology  []  Old records  []  Other:    Assessment & Plan    Assessment / Plan     Assessment:  Subjective fevers, unknown source  Metastatic adenocarcinoma of the lung  Current tobacco smoker  Intellectual disability  Acute on chronic anemia  Symptomatic anemia  Depression  Recent hospitalization for femur fracture  Hypomagnesemia  Iron deficiency    Plan:  Continue inpatient admission  Completed 7 days of broad-spectrum antibiotics with vancomycin and cefepime  Blood cultures negative.  COVID/flu/RSV negative.  BAL PCR negative.  Sputum culture negative.  Urinalysis negative.  CT chest and abdomen without any infection/abscess.  Pulmonology following  Oncology following.  Follow-up outpatient for further treatment plan.  Continue metoprolol 75 mg twice daily  Pain uncontrolled.  As needed Norco increased.  Start MS Contin 15 mg every 12 hours.  Zofran as needed  PT/OT  Palliative care following  Hemoglobin stable.  Iron deficiency, however, ferritin 3500.  Defer IV iron for now.  WBC count stable.  A.m. labs      Disposition: Tried to call patient's sister this morning which went to voicemail.   was able to get a hold of her, declines hospice, she is requesting rehab placement.  Referrals have been made.       Discussed with RN.    VTE Prophylaxis:  Pharmacologic & mechanical VTE prophylaxis orders are present.        CODE STATUS:   Code Status (Patient has no pulse and is not breathing): CPR (Attempt to Resuscitate)  Medical Interventions (Patient has pulse or is breathing): Full Support      Electronically signed by Sena Cantrell DO, 7/8/2024, 16:01 EDT.

## 2024-07-09 LAB
ANION GAP SERPL CALCULATED.3IONS-SCNC: 9.2 MMOL/L (ref 5–15)
BASOPHILS # BLD AUTO: 0.05 10*3/MM3 (ref 0–0.2)
BASOPHILS NFR BLD AUTO: 0.3 % (ref 0–1.5)
BUN SERPL-MCNC: 7 MG/DL (ref 6–20)
BUN/CREAT SERPL: 20.6 (ref 7–25)
CALCIUM SPEC-SCNC: 9.9 MG/DL (ref 8.6–10.5)
CHLORIDE SERPL-SCNC: 96 MMOL/L (ref 98–107)
CO2 SERPL-SCNC: 29.8 MMOL/L (ref 22–29)
CREAT SERPL-MCNC: 0.34 MG/DL (ref 0.76–1.27)
DEPRECATED RDW RBC AUTO: 48.7 FL (ref 37–54)
EGFRCR SERPLBLD CKD-EPI 2021: 144.9 ML/MIN/1.73
EOSINOPHIL # BLD AUTO: 0.12 10*3/MM3 (ref 0–0.4)
EOSINOPHIL NFR BLD AUTO: 0.8 % (ref 0.3–6.2)
ERYTHROCYTE [DISTWIDTH] IN BLOOD BY AUTOMATED COUNT: 15.7 % (ref 12.3–15.4)
GLUCOSE SERPL-MCNC: 104 MG/DL (ref 65–99)
HCT VFR BLD AUTO: 29.6 % (ref 37.5–51)
HGB BLD-MCNC: 8.8 G/DL (ref 13–17.7)
IMM GRANULOCYTES # BLD AUTO: 0.22 10*3/MM3 (ref 0–0.05)
IMM GRANULOCYTES NFR BLD AUTO: 1.5 % (ref 0–0.5)
LYMPHOCYTES # BLD AUTO: 1.36 10*3/MM3 (ref 0.7–3.1)
LYMPHOCYTES NFR BLD AUTO: 9.3 % (ref 19.6–45.3)
MAGNESIUM SERPL-MCNC: 1.5 MG/DL (ref 1.6–2.6)
MCH RBC QN AUTO: 25.3 PG (ref 26.6–33)
MCHC RBC AUTO-ENTMCNC: 29.7 G/DL (ref 31.5–35.7)
MCV RBC AUTO: 85.1 FL (ref 79–97)
MONOCYTES # BLD AUTO: 1.27 10*3/MM3 (ref 0.1–0.9)
MONOCYTES NFR BLD AUTO: 8.7 % (ref 5–12)
NEUTROPHILS NFR BLD AUTO: 11.55 10*3/MM3 (ref 1.7–7)
NEUTROPHILS NFR BLD AUTO: 79.4 % (ref 42.7–76)
NRBC BLD AUTO-RTO: 0 /100 WBC (ref 0–0.2)
PLATELET # BLD AUTO: 512 10*3/MM3 (ref 140–450)
PMV BLD AUTO: 9.2 FL (ref 6–12)
POTASSIUM SERPL-SCNC: 3.5 MMOL/L (ref 3.5–5.2)
RBC # BLD AUTO: 3.48 10*6/MM3 (ref 4.14–5.8)
SODIUM SERPL-SCNC: 135 MMOL/L (ref 136–145)
WBC NRBC COR # BLD AUTO: 14.57 10*3/MM3 (ref 3.4–10.8)

## 2024-07-09 PROCEDURE — 99232 SBSQ HOSP IP/OBS MODERATE 35: CPT | Performed by: INTERNAL MEDICINE

## 2024-07-09 PROCEDURE — 25010000002 MAGNESIUM SULFATE 4 GM/100ML SOLUTION: Performed by: INTERNAL MEDICINE

## 2024-07-09 PROCEDURE — 97165 OT EVAL LOW COMPLEX 30 MIN: CPT

## 2024-07-09 PROCEDURE — 83735 ASSAY OF MAGNESIUM: CPT | Performed by: STUDENT IN AN ORGANIZED HEALTH CARE EDUCATION/TRAINING PROGRAM

## 2024-07-09 PROCEDURE — 85025 COMPLETE CBC W/AUTO DIFF WBC: CPT | Performed by: STUDENT IN AN ORGANIZED HEALTH CARE EDUCATION/TRAINING PROGRAM

## 2024-07-09 PROCEDURE — 97164 PT RE-EVAL EST PLAN CARE: CPT

## 2024-07-09 PROCEDURE — 80048 BASIC METABOLIC PNL TOTAL CA: CPT | Performed by: STUDENT IN AN ORGANIZED HEALTH CARE EDUCATION/TRAINING PROGRAM

## 2024-07-09 RX ORDER — HYDROCODONE BITARTRATE AND ACETAMINOPHEN 5; 325 MG/1; MG/1
1 TABLET ORAL EVERY 4 HOURS PRN
Status: DISCONTINUED | OUTPATIENT
Start: 2024-07-09 | End: 2024-07-16 | Stop reason: HOSPADM

## 2024-07-09 RX ORDER — MORPHINE SULFATE 30 MG/1
30 TABLET, FILM COATED, EXTENDED RELEASE ORAL EVERY 12 HOURS SCHEDULED
Status: DISCONTINUED | OUTPATIENT
Start: 2024-07-09 | End: 2024-07-11

## 2024-07-09 RX ORDER — POTASSIUM CHLORIDE 750 MG/1
40 CAPSULE, EXTENDED RELEASE ORAL ONCE
Status: COMPLETED | OUTPATIENT
Start: 2024-07-09 | End: 2024-07-09

## 2024-07-09 RX ORDER — MAGNESIUM SULFATE HEPTAHYDRATE 40 MG/ML
4 INJECTION, SOLUTION INTRAVENOUS ONCE
Status: COMPLETED | OUTPATIENT
Start: 2024-07-09 | End: 2024-07-09

## 2024-07-09 RX ADMIN — MORPHINE SULFATE 30 MG: 30 TABLET, FILM COATED, EXTENDED RELEASE ORAL at 20:46

## 2024-07-09 RX ADMIN — Medication 10 ML: at 20:46

## 2024-07-09 RX ADMIN — MORPHINE SULFATE 15 MG: 15 TABLET, FILM COATED, EXTENDED RELEASE ORAL at 08:34

## 2024-07-09 RX ADMIN — ACETAMINOPHEN 650 MG: 325 TABLET ORAL at 12:50

## 2024-07-09 RX ADMIN — Medication 10 MG: at 00:51

## 2024-07-09 RX ADMIN — HYDROCODONE BITARTRATE AND ACETAMINOPHEN 1 TABLET: 10; 325 TABLET ORAL at 03:36

## 2024-07-09 RX ADMIN — HYDROCODONE BITARTRATE AND ACETAMINOPHEN 1 TABLET: 10; 325 TABLET ORAL at 10:32

## 2024-07-09 RX ADMIN — NICOTINE 1 PATCH: 21 PATCH, EXTENDED RELEASE TRANSDERMAL at 08:33

## 2024-07-09 RX ADMIN — POTASSIUM CHLORIDE 40 MEQ: 750 CAPSULE, EXTENDED RELEASE ORAL at 10:32

## 2024-07-09 RX ADMIN — METOPROLOL SUCCINATE 75 MG: 50 TABLET, EXTENDED RELEASE ORAL at 20:46

## 2024-07-09 RX ADMIN — MAGNESIUM SULFATE HEPTAHYDRATE 4 G: 4 INJECTION, SOLUTION INTRAVENOUS at 10:32

## 2024-07-09 RX ADMIN — METOPROLOL SUCCINATE 75 MG: 50 TABLET, EXTENDED RELEASE ORAL at 08:33

## 2024-07-09 RX ADMIN — HYDROCODONE BITARTRATE AND ACETAMINOPHEN 1 TABLET: 5; 325 TABLET ORAL at 15:48

## 2024-07-09 RX ADMIN — ACETAMINOPHEN 650 MG: 325 TABLET ORAL at 07:34

## 2024-07-09 RX ADMIN — Medication 10 ML: at 08:34

## 2024-07-09 NOTE — THERAPY EVALUATION
Patient Name: Oswaldo Bowen  : 1980    MRN: [id number]                              Today's Date: 2024       Admit Date: 2024    Visit Dx:     ICD-10-CM ICD-9-CM   1. Difficulty in walking  R26.2 719.7   2. Sepsis, due to unspecified organism, unspecified whether acute organ dysfunction present  A41.9 038.9     995.91     Patient Active Problem List   Diagnosis    Closed intertrochanteric fracture of right femur    Pulmonary nodule    Sepsis    Anemia     History reviewed. No pertinent past medical history.  Past Surgical History:   Procedure Laterality Date    BRONCHOSCOPY N/A 2024    Procedure: BRONCHOSCOPY WITH ENDOBRONCHIAL ULTRASOUND, FINE NEEDLE ASPIRATE, BIOPSIES, BRUSHINGS, BRONCHOALVEOLAR LAVAGE;  Surgeon: Kendell Stern MD;  Location: [facility] ENDOSCOPY;  Service: Pulmonary;  Laterality: N/A;  LUNG NODULE, MUCOUS PLUGGING    BRONCHOSCOPY WITH ION ROBOTIC ASSIST N/A 2024    Procedure: BRONCHOSCOPY NAVIGATION WITH ENDOBRONCHIAL ULTRASOUND AND ION ROBOT. REBUS, EBUS, BRUSHINGS, WASHINGS, BAL, BIOPSIES AND NEEDLE ASPIRATE;  Surgeon: John Elizalde MD;  Location: [facility] MAIN OR;  Service: Robotics - Pulmonary;  Laterality: N/A;    HIP INTERTROCHANTERIC NAILING Right 2024    Procedure: HIP INTERTROCHANTERIC NAILING;  Surgeon: Molina Clayton MD;  Location: [facility] MAIN OR;  Service: Orthopedics;  Laterality: Right;    US GUIDED FINE NEEDLE ASPIRATION  2024      General Information       Row Name 24 0921          OT Time and Intention    Document Type evaluation  -PG     Mode of Treatment individual therapy;occupational therapy  -PG       Row Name 24 0921          General Information    Patient Profile Reviewed --  Patient lives with his sister and previously independent with all ADLs prior to his hip fracture.  Now presenting with metastatic lung cancer with significant pain in his back.  Patient came from [facility] rehab  -PG     Prior Level of Function ADL's;max  assist:  -PG     Existing Precautions/Restrictions fall  -PG     Barriers to Rehab none identified  -       Row Name 07/09/24 0921          Occupational Profile    Reason for Services/Referral (Occupational Profile) Patient is a unfortunate 44-year-old male admitted for sepsis and lung cancer with metastasis to the bones.  [facility]  Patient being evaluated by Occupational Therapy due to recent decline in ADL function  -[facility] Name 07/09/24 0921          Living Environment    People in Home facility resident  -[facility] Name 07/09/24 0921          Cognition    Orientation Status (Cognition) oriented to;person;situation  -[facility] Name 07/09/24 0921          Safety Issues, Functional Mobility    Impairments Affecting Function (Mobility) balance;coordination;endurance/activity tolerance;pain;strength;range of motion (ROM)  -               User Key  (r) = Recorded By, (t) = Taken By, (c) = Cosigned By      Initials Name Provider Type    PG Felix Villasenor, OT Occupational Therapist                     Mobility/ADL's       [facility] Name 07/09/24 0923          Bed Mobility    Supine-Sit [place] (Bed Mobility) moderate assist (50% patient effort)  -[facility] Name 07/09/24 0923          Transfers    Comment, (Transfers) Attempted to stand and transfer to recliner but unable due to significant back pain  -[facility] Name 07/09/24 0923          Activities of Daily Living    BADL Assessment/Intervention bathing;upper body dressing;lower body dressing;grooming;toileting  -[facility] Name 07/09/24 0923          Bathing Assessment/Intervention    [place] Level (Bathing) bathing skills;maximum assist (25% patient effort)  -[facility] Name 07/09/24 0923          Upper Body Dressing Assessment/Training    [place] Level (Upper Body Dressing) upper body dressing skills;moderate assist (50% patient effort)  -[facility] Name 07/09/24 0923           Lower Body Dressing Assessment/Training    [place] Level (Lower Body Dressing) lower body dressing skills;dependent (less than 25% patient effort)  -PG       Row Name 07/09/24 0923          Grooming Assessment/Training    [place] Level (Grooming) grooming skills;set up  -PG       Row Name 07/09/24 0923          Toileting Assessment/Training    [place] Level (Toileting) toileting skills;dependent (less than 25% patient effort)  -PG               User Key  (r) = Recorded By, (t) = Taken By, (c) = Cosigned By      Initials Name Provider Type    PG Felix Villasenor OT Occupational Therapist                   Obj/Interventions       Row Name 07/09/24 0924          Sensory Assessment (Somatosensory)    Sensory Assessment (Somatosensory) unable/difficult to assess  -PG       Row Name 07/09/24 0924          Vision Assessment/Intervention    Visual Impairment/Limitations unable/difficult to assess  -PG       Row Name 07/09/24 0924          Range of Motion Comprehensive    General Range of Motion no range of motion deficits identified  -PG       Row Name 07/09/24 0924          Strength Comprehensive (MMT)    Comment, General Manual Muscle Testing (MMT) Assessment 4 - to 4/5 BUE  -PG       Row Name 07/09/24 0924          Motor Skills    Motor Skills coordination;functional endurance  -PG     Coordination WFL  -PG     Functional Endurance Poor plus  -PG               User Key  (r) = Recorded By, (t) = Taken By, (c) = Cosigned By      Initials Name Provider Type    PG Felix Villasenor OT Occupational Therapist                   Goals/Plan       [facility] Name 07/09/24 0927          Transfer Goal 1 (OT)    Activity/Assistive Device (Transfer Goal 1, OT) transfers, all  -PG     [place] Level/Cues Needed (Transfer Goal 1, OT) modified independence  -PG     Time Frame (Transfer Goal 1, OT) long term goal (LTG);10 days  -PG       Row Name 07/09/24 0927          Bathing Goal 1 (OT)    Activity/Device (Bathing Goal 1, OT)  bathing skills, all  -PG     [place] Level/Cues Needed (Bathing Goal 1, OT) modified independence  -PG     Time Frame (Bathing Goal 1, OT) long term goal (LTG);10 days  -PG       Row Name 07/09/24 0927          Dressing Goal 1 (OT)    Activity/Device (Dressing Goal 1, OT) dressing skills, all  -PG     [place]/Cues Needed (Dressing Goal 1, OT) modified independence  -PG     Time Frame (Dressing Goal 1, OT) long term goal (LTG);10 days  -PG       Row Name 07/09/24 0927          Toileting Goal 1 (OT)    Activity/Device (Toileting Goal 1, OT) toileting skills, all  -PG     [place] Level/Cues Needed (Toileting Goal 1, OT) modified independence  -PG     Time Frame (Toileting Goal 1, OT) long term goal (LTG);10 days  -PG       Row Name 07/09/24 0927          Grooming Goal 1 (OT)    Activity/Device (Grooming Goal 1, OT) grooming skills, all  -PG     [place] (Grooming Goal 1, OT) modified independence  -PG     Time Frame (Grooming Goal 1, OT) long term goal (LTG);10 days  -PG       Row Name 07/09/24 0927          Strength Goal 1 (OT)    Strength Goal 1 (OT) Patient will improve bilateral upper extremity strength to 4+/5 to support independence with self-care activities  -PG     Time Frame (Strength Goal 1, OT) long term goal (LTG);10 days  -PG       Row Name 07/09/24 0927          Problem Specific Goal 1 (OT)    Problem Specific Goal 1 (OT) Patient will improve activity tolerance to fair plus to support independence with self-care tasks and functional transfers  -PG     Time Frame (Problem Specific Goal 1, OT) long term goal (LTG);10 days  -PG       Row Name 07/09/24 0927          Therapy Assessment/Plan (OT)    Planned Therapy Interventions (OT) activity tolerance training;BADL retraining;strengthening exercise;transfer/mobility retraining;patient/caregiver education/training;occupation/activity based interventions  -PG               User Key  (r) = Recorded By, (t) = Taken By, (c) = Cosigned By       Initials Name Provider Type    PG Felix Villasenor OT Occupational Therapist                   Clinical Impression       Row Name 07/09/24 0924          Pain Assessment    Pain Intervention(s) Nursing Notified  -PG     Additional Documentation Pain Scale: FACES Pre/Post-Treatment (Group)  -PG       Row Name 07/09/24 0924          Pain Scale: FACES Pre/Post-Treatment    Pain: FACES Scale, Pretreatment 10-->hurts worst  -PG     Posttreatment Pain Rating 10-->hurts worst  -PG     Pain Location - back  -PG       Row Name 07/09/24 0924          Plan of Care Review    Plan of Care Reviewed With patient  -PG     Progress no change  -PG     Outcome Evaluation OT evaluation completed.  Patient has had a significant decline in ADL function since his last tx session in acute care.  He is reporting significant pain in his back and unable to transfer this a.m.  Patient presents with deficits affecting his strength and endurance but pain is limiting factor.  Recommend subacute rehab or hospice care at this time.  -PG       Row Name 07/09/24 0924          Therapy Assessment/Plan (OT)    Patient/Family Therapy Goal Statement (OT) Unknown  -PG     Rehab Potential (OT) fair, will monitor progress closely  -PG     Criteria for Skilled Therapeutic Interventions Met (OT) yes;meets criteria;no problems identified which require skilled intervention  -PG     Therapy Frequency (OT) 5 times/wk  -PG       Row Name 07/09/24 0924          Therapy Plan Review/Discharge Plan (OT)    Anticipated Discharge Disposition (OT) sub acute care setting  -PG               User Key  (r) = Recorded By, (t) = Taken By, (c) = Cosigned By      Initials Name Provider Type    PG Felix Villasenor OT Occupational Therapist                   Outcome Measures       Row Name 07/09/24 0928          How much help from another is currently needed...    Putting on and taking off regular lower body clothing? 1  -PG     Bathing (including washing, rinsing, and drying) 1  -PG      Toileting (which includes using toilet bed pan or urinal) 1  -PG     Putting on and taking off regular upper body clothing 2  -PG     Taking care of personal grooming (such as brushing teeth) 3  -PG     Eating meals 4  -PG     AM-PAC 6 Clicks Score (OT) 12  -PG       Row Name 07/09/24 0928          Functional Assessment    Outcome Measure Options AM-PAC 6 Clicks Daily Activity (OT);Optimal Instrument  -PG       Row Name 07/09/24 0928          Optimal Instrument    Optimal Instrument Optimal - 3  -PG     Bending/Stooping 5  -PG     Standing 5  -PG     Reaching 2  -PG     From the list, choose the 3 activities you would most like to be able to do without any difficulty Bending/stooping;Standing;Reaching  -PG     Total Score Optimal - 3 12  -PG               User Key  (r) = Recorded By, (t) = Taken By, (c) = Cosigned By      Initials Name Provider Type    PG Felix Villasenor OT Occupational Therapist                    Occupational Therapy Education       Title: PT OT SLP Therapies (In Progress)       Topic: Occupational Therapy (In Progress)       Point: ADL training (In Progress)       Description:   Instruct learner(s) on proper safety adaptation and remediation techniques during self care or transfers.   Instruct in proper use of assistive devices.                  Learning Progress Summary             Patient Acceptance, E,D, NR by PG at 7/9/2024 0929                         Point: Home exercise program (In Progress)       Description:   Instruct learner(s) on appropriate technique for monitoring, assisting and/or progressing therapeutic exercises/activities.                  Learning Progress Summary             Patient Acceptance, E,D, NR by PG at 7/9/2024 0929                         Point: Precautions (In Progress)       Description:   Instruct learner(s) on prescribed precautions during self-care and functional transfers.                  Learning Progress Summary             Patient Acceptance, E,D, NR by  PG at 7/9/2024 0929                         Point: Body mechanics (In Progress)       Description:   Instruct learner(s) on proper positioning and spine alignment during self-care, functional mobility activities and/or exercises.                  Learning Progress Summary             Patient Acceptance, E,D, NR by PG at 7/9/2024 0929                                         User Key       Initials Effective Dates Name Provider Type Discipline    PG 06/16/21 -  Felix Villasenor, OT Occupational Therapist OT                  OT Recommendation and Plan  Planned Therapy Interventions (OT): activity tolerance training, BADL retraining, strengthening exercise, transfer/mobility retraining, patient/caregiver education/training, occupation/activity based interventions  Therapy Frequency (OT): 5 times/wk  Plan of Care Review  Plan of Care Reviewed With: patient  Progress: no change  Outcome Evaluation: OT evaluation completed.  Patient has had a significant decline in ADL function since his last tx session in acute care.  He is reporting significant pain in his back and unable to transfer this a.m.  Patient presents with deficits affecting his strength and endurance but pain is limiting factor.  Recommend subacute rehab or hospice care at this time.     Time Calculation:   Evaluation Complexity (OT)  Review Occupational Profile/Medical/Therapy History Complexity: brief/low complexity  Assessment, Occupational Performance/Identification of Deficit Complexity: 3-5 performance deficits  Clinical Decision Making Complexity (OT): problem focused assessment/low complexity  Overall Complexity of Evaluation (OT): low complexity     Time Calculation- OT       Row Name 07/09/24 0931             Time Calculation- OT    OT Received On 07/09/24  -PG      OT Goal Re-Cert Due Date 07/18/24  -PG         Untimed Charges    OT Eval/Re-eval Minutes 35  -PG         Total Minutes    Untimed Charges Total Minutes 35  -PG       Total Minutes 35  -PG                 User Key  (r) = Recorded By, (t) = Taken By, (c) = Cosigned By      Initials Name Provider Type    PG Felix Villasenor OT Occupational Therapist                  Therapy Charges for Today       Code Description Service Date Service Provider Modifiers Qty    [id number]  OT EVAL LOW COMPLEXITY 3 7/9/2024 Felix Villasenor OT GO 1                 Felix Villasenor OT  7/9/2024

## 2024-07-09 NOTE — PROGRESS NOTES
[facility]   Hematology/Oncology  Progress Note    Patient Name: Oswaldo Bowen  : 1980  MRN: [id number]  Primary Care Physician:  Provider, No Known  Date of admission: 2024    Subjective   Subjective     Chief Complaint: pain    HPI:  Patient Reports pain is not well controlled. This is confirmed by nursing. Patient has only a few hours of response to pain medication. Awaiting rehab placement.     Review of Systems   All systems were reviewed and negative except for: as stated above    Objective   Objective     Vitals:   Temp:  [97.3 °F (36.3 °C)-98.2 °F (36.8 °C)] 97.5 °F (36.4 °C)  Heart Rate:  [100-116] 100  Resp:  [18-20] 18  BP: (135-159)/(79-89) 135/86  Physical Exam        Result Review    Result Review:  I have personally reviewed the results from the time of this admission to 2024 13:02 EDT and agree with these findings:  [x]  Laboratory  [x]  Microbiology  []  Radiology  []  EKG/Telemetry   []  Cardiology/Vascular   []  Pathology  []  Old records  []  Other:  Most notable findings include: leukocytosis, anemia, elevated platelets, cultures clear, progress notes personally reviewed, elevated ferritin, low iron sat      Assessment & Plan   Assessment / Plan     Brief Patient Summary:  Oswaldo Bowen is a 44 y.o. male with  intellectual disability, 1-2PPD smoking, depression, who presented after a fall (possibly 1 month ago but may have been more recently), found to have right femoral fracture and VLAD. S/p fixation in OR on 24. CT chest showed 2.1 cm RUL nodule, bilateral hilar adenopathy and multiple lytic lesions throughout the spine, ribs and scapula consistent with metastatic disease. Repeat lung biopsy confirmed lung adenocarcinoma. Admitted now with fever s/p treatment with antibiotics with negative cultures     Active Hospital Problems:  Active Hospital Problems    Diagnosis     **Sepsis     Anemia        Plan:   Metastatic lung cancer  CT chest obtained due to left sided  pulmonary nodule on chest x-ray showed 2.1 cm RUL nodule, bilateral hilar adenopathy and multiple lytic lesions throughout the spine, ribs and scapula consistent with metastatic disease.  CT abdomen/pelvis with contrast with multiple bony mets. No visceral mets seen. MRI brain without any intracranial pathology. Multiple myeloma labs with elevated kappa and lambda FLC but normal ratio, so this is normal. M-spike not observed and no monoclonal protein on MARTINEZ. Therefore patient does not have any signs of multiple myeloma.      Pulm performed biopsy via bronchoscopy 5/23/24, results negative. Bone biopsy performed on 5/30/24 was sent to [facility] and returned as atypical cells and not enough tissue for diagnosis. Repeat bone biopsy 6/11/24 also non diagnostic.      He had repeat lung biopsy on 6/20/24 with pathology positive for lung adenocarcinoma. Discussed that this likely diagnoses him with metastatic lung adenocarcinoma. No further utility in more bone biopsies at this point. EGFR negative. PDL1 only elevated at 5%. Due to low PDL1, would favor combination chemoimmunotherapy over immunotherapy alone. Would recommend 2 cycles of Carbo/Pem combined with Ipi/Nivo followed by immunotherapy alone. This is given outpatient. Of course if his performance status does not improve much, then we may need to proceed with immunotherapy alone. Recommend completing rehab prior to outpatient follow up in effort to be a maximum strength prior to systemic therapy.      Unable to order NGS on lung specimen until patient follows up outpatient. Will also plan for [employer] liquid NGS as outpatient.     Neoplasm related pain  Increase long acting morphine to 30 mg BID. Increase frequency of short acting to every 4 hours. Changes have been made.       Sepsis with fever  Unknown source at this time. CT Chest clear. Cultures negative. Completed 7 days of IV vanc and cefepime    Patient currently awaiting rehab placement.       Leukocytosis  Mainly neutrophils and immature grans indicative of left shift. Reactive.     Anemia  Essentially stable with prior. Iron studies consistent with chronic disease. Hold on iron replacement. Hgb remains stable.     Electronically signed by Sukhwinder Johnson MD, 07/09/24, 1:02 PM EDT.

## 2024-07-09 NOTE — PLAN OF CARE
Goal Outcome Evaluation:  Plan of Care Reviewed With: patient        Progress: no change  Outcome Evaluation: Pt c/o pain/discomfort this shift, administered pain meds per MAR. Pt remains diaphoretic; skin care provided as needed and encouraged pt to be turned/repositioned to maintain skin integrity. Pt is A&Ox3 with difficulty with time this shift. Rehab placement is pending at this time. No new issues or new needs noted at this time.

## 2024-07-09 NOTE — SIGNIFICANT NOTE
07/09/24 1145   Coping/Psychosocial   Observed Emotional State anxious;agitated   Verbalized Emotional State grief;anxiety   Trust Relationship/Rapport empathic listening provided   Involvement in Care interacting with patient   Additional Documentation Spiritual Care (Group)   Spiritual Care   Use of Spiritual Resources non-Shinto use of spiritual care   Spiritual Care Source  initiative   Spiritual Care Follow-Up follow-up planned regularly for general support   Response to Spiritual Care engagement, unsatisfactory   Spiritual Care Interventions supportive conversation provided   Spiritual Care Visit Type initial   Receptivity to Spiritual Care visit welcomed

## 2024-07-09 NOTE — PLAN OF CARE
Goal Outcome Evaluation:  Plan of Care Reviewed With: patient        Progress: no change  Outcome Evaluation: patient remains alert and oriented x4, on room air. complained of frequent pain throughout shift, medicated prn per mar, MD addressed pain and adjusted dose. patient refused OT and PT today due to pain, as well as it affecting his appetite. patient refused most turns this shift, favoring their left side. RN educated about importance of changing positions to reduce skin breakdown, patient v/u and continued to refuse turns. no new issues this shift.

## 2024-07-09 NOTE — THERAPY RE-EVALUATION
Acute Care - Physical Therapy Re-Evaluation   [place]     Patient Name: Oswaldo Bowen  : 1980  MRN: [id number]  Today's Date: 2024    Admit date: 2024     Referring Physician: Buster Ford MD     Surgery Date:* No surgery found *           Visit Dx:     ICD-10-CM ICD-9-CM   1. Difficulty in walking  R26.2 719.7   2. Sepsis, due to unspecified organism, unspecified whether acute organ dysfunction present  A41.9 038.9     995.91     Patient Active Problem List   Diagnosis    Closed intertrochanteric fracture of right femur    Pulmonary nodule    Sepsis    Anemia     History reviewed. No pertinent past medical history.  Past Surgical History:   Procedure Laterality Date    BRONCHOSCOPY N/A 2024    Procedure: BRONCHOSCOPY WITH ENDOBRONCHIAL ULTRASOUND, FINE NEEDLE ASPIRATE, BIOPSIES, BRUSHINGS, BRONCHOALVEOLAR LAVAGE;  Surgeon: Kendell Stern MD;  Location: [facility] ENDOSCOPY;  Service: Pulmonary;  Laterality: N/A;  LUNG NODULE, MUCOUS PLUGGING    BRONCHOSCOPY WITH ION ROBOTIC ASSIST N/A 2024    Procedure: BRONCHOSCOPY NAVIGATION WITH ENDOBRONCHIAL ULTRASOUND AND ION ROBOT. REBUS, EBUS, BRUSHINGS, WASHINGS, BAL, BIOPSIES AND NEEDLE ASPIRATE;  Surgeon: John Elizalde MD;  Location: [facility] MAIN OR;  Service: Robotics - Pulmonary;  Laterality: N/A;    HIP INTERTROCHANTERIC NAILING Right 2024    Procedure: HIP INTERTROCHANTERIC NAILING;  Surgeon: Molina Clayton MD;  Location: [facility] MAIN OR;  Service: Orthopedics;  Laterality: Right;    US GUIDED FINE NEEDLE ASPIRATION  2024     PT Assessment (Last 12 Hours)       PT Evaluation and Treatment       Row Name 24 0900          Physical Therapy Time and Intention    Subjective Information complains of;pain (P)   -SJ     Document Type re-evaluation (P)   -SJ     Mode of Treatment individual therapy;physical therapy (P)   -SJ     Patient Effort fair (P)   -SJ     Symptoms Noted During/After Treatment increased pain (P)   -SJ       Row  Name 07/09/24 0900          General Information    Patient Profile Reviewed yes (P)   -SJ     Patient Observations alert;cooperative;agree to therapy (P)   -SJ     Prior Level of Function all household mobility;community mobility (P)   Pt came from [facility] rehab where he was walking with RW.  -SJ     Equipment Currently Used at Home walker, rolling (P)   -SJ     Risks Reviewed patient: (P)   -SJ       Row Name 07/09/24 0900          Pain    Additional Documentation Pain Scale: FACES Pre/Post-Treatment (Group) (P)   -SJ       Row Name 07/09/24 0900          Pain Scale: FACES Pre/Post-Treatment    Pain: FACES Scale, Pretreatment 8-->hurts whole lot (P)   -SJ     Posttreatment Pain Rating 10-->hurts worst (P)   -SJ     Pain Location - Side/Orientation Right (P)   -SJ     Pain Location lower (P)   -SJ     Pain Location - extremity;hip (P)   -SJ     Pre/Posttreatment Pain Comment Pt also confirms he has back pain. (P)   -SJ       Row Name 07/09/24 0900          Cognition    Orientation Status (Cognition) oriented x 3 (P)   -SJ       Row Name 07/09/24 0900          Range of Motion (ROM)    Range of Motion bilateral lower extremities (P)   R LE Hip limited by pain, Knee WFL, Ankle WFL; L LE Hip WFL, Knee WFL, Ankle WFL  -SJ       Row Name 07/09/24 0900          Strength (Manual Muscle Testing)    Strength (Manual Muscle Testing) bilateral lower extremities (P)   R LE not tested due to pain; L LE WFL  -SJ       Row Name 07/09/24 0900          Bed Mobility    Bed Mobility supine-sit;sit-supine;scooting/bridging (P)   -SJ     Scooting/Bridging [place] (Bed Mobility) moderate assist (50% patient effort);2 person assist (P)   -SJ     Supine-Sit [place] (Bed Mobility) minimum assist (75% patient effort);1 person assist (P)   -SJ     Sit-Supine [place] (Bed Mobility) minimum assist (75% patient effort);1 person assist (P)   -SJ     Bed Mobility, Safety Issues decreased use of legs for bridging/pushing (P)   R LE  and Back pain.  -     Assistive Device (Bed Mobility) bed rails;draw sheet;head of bed elevated (P)   -       Row Name 07/09/24 0900          Transfers    Transfers sit-stand transfer (P)   -       Row Name 07/09/24 0900          Sit-Stand Transfer    Sit-Stand [place] (Transfers) unable to assess (P)   -     Assistive Device (Sit-Stand Transfers) walker, front-wheeled (P)   -     Comment, (Sit-Stand Transfer) Pt refused sit-stand transfer due to uncontrolled pain in R LE and back. Nursing notified. (P)   -       Row Name 07/09/24 0900          Gait/Stairs (Locomotion)    Gait/Stairs Locomotion gait/ambulation assistive device (P)   -     [place] Level (Gait) not tested (P)   -     Assistive Device (Gait) walker, front-wheeled (P)   -SJ     Patient was able to Ambulate no, other medical factors prevent ambulation (P)   -     Reason Patient was unable to Ambulate Uncontrolled Pain (P)   Pt refused to stand and ambulate.  -       Row Name 07/09/24 0900          Safety Issues, Functional Mobility    Impairments Affecting Function (Mobility) pain;endurance/activity tolerance;strength;range of motion (ROM) (P)   -       Row Name 07/09/24 0900          Balance    Balance Assessment sitting static balance (P)   -     Static Sitting Balance standby assist (P)   -     Position, Sitting Balance unsupported;sitting edge of bed (P)   -       Row Name             Wound 05/21/24 Right lateral thigh Incision    Wound - Properties Group Placement Date: 05/21/24  -SF Present on Original Admission: N  -SF Side: Right  -SF Orientation: lateral  -SF Location: thigh  -SF Primary Wound Type: Incision  -SF Additional Comments: incision sites x 3 to lateral right thigh  -SF    Retired Wound - Properties Group Placement Date: 05/21/24  -SF Present on Original Admission: N  -SF Side: Right  -SF Orientation: lateral  -SF Location: thigh  -SF Primary Wound Type: Incision  -SF Additional Comments: incision  sites x 3 to lateral right thigh  -SF    Retired Wound - Properties Group Date first assessed: 05/21/24  -SF Present on Original Admission: N  -SF Side: Right  -SF Location: thigh  -SF Primary Wound Type: Incision  -SF Additional Comments: incision sites x 3 to lateral right thigh  -SF      Row Name             Wound 05/21/24 0251 Bilateral coccyx    Wound - Properties Group Placement Date: 05/21/24  -SR Placement Time: 0251  -SR Present on Original Admission: Y  -SR Side: Bilateral  -SR Location: coccyx  -SR    Retired Wound - Properties Group Placement Date: 05/21/24  -SR Placement Time: 0251  -SR Present on Original Admission: Y  -SR Side: Bilateral  -SR Location: coccyx  -SR    Retired Wound - Properties Group Date first assessed: 05/21/24  -SR Time first assessed: 0251  -SR Present on Original Admission: Y  -SR Side: Bilateral  -SR Location: coccyx  -SR      Row Name 07/09/24 0900          Positioning and Restraints    Pre-Treatment Position in bed (P)   -SJ     Post Treatment Position bed (P)   -SJ     In Bed supine;call light within reach;encouraged to call for assist;exit alarm on;notified nsg (P)   -SJ       Row Name 07/09/24 0900          Therapy Assessment/Plan (PT)    Rehab Potential (PT) fair, will monitor progress closely (P)   -     Criteria for Skilled Interventions Met (PT) yes;skilled treatment is necessary (P)   -SJ     Therapy Frequency (PT) daily (P)   -SJ     Predicted Duration of Therapy Intervention (PT) 10 days (P)   -SJ     Problem List (PT) problems related to;balance;mobility;strength;pain (P)   -SJ     Activity Limitations Related to Problem List (PT) unable to ambulate safely;unable to transfer safely (P)   -SJ       Row Name 07/09/24 0900          Progress Summary (PT)    Progress Toward Functional Goals (PT) unable to show any progress toward functional goals (P)   -SJ     Daily Progress Summary (PT) Pt did not tolerate treatment well with complaints of uncontrolled pain. Pt unable  to stand EOB and ambulate due to pain in R LE Hip and Back. Will continue with plan of care while in hospital once pain is manageable. (P)   -SJ     Barriers to Overall Progress (PT) Pain (P)   -SJ       Row Name 07/09/24 0900          Bed Mobility Goal 1 (PT)    Activity/Assistive Device (Bed Mobility Goal 1, PT) bed mobility activities, all (P)   -SJ     [place] Level/Cues Needed (Bed Mobility Goal 1, PT) modified independence (P)   -SJ     Time Frame (Bed Mobility Goal 1, PT) long term goal (LTG);10 days (P)   -SJ     Progress/Outcomes (Bed Mobility Goal 1, PT) progress slower than expected (P)   -SJ       Row Name 07/09/24 0900          Transfer Goal 1 (PT)    Activity/Assistive Device (Transfer Goal 1, PT) transfers, all;walker, standard (P)   -SJ     [place] Level/Cues Needed (Transfer Goal 1, PT) standby assist (P)   -SJ     Time Frame (Transfer Goal 1, PT) long term goal (LTG);10 days (P)   -SJ     Progress/Outcome (Transfer Goal 1, PT) progress slower than expected (P)   -SJ       Row Name 07/09/24 0900          Gait Training Goal 1 (PT)    Activity/Assistive Device (Gait Training Goal 1, PT) gait (walking locomotion);assistive device use (P)   -SJ     [place] Level (Gait Training Goal 1, PT) modified independence (P)   -SJ     Distance (Gait Training Goal 1, PT) 100 (P)   -SJ     Time Frame (Gait Training Goal 1, PT) long term goal (LTG);10 days (P)   -SJ     Progress/Outcome (Gait Training Goal 1, PT) progress slower than expected (P)   -SJ               User Key  (r) = Recorded By, (t) = Taken By, (c) = Cosigned By      Initials Name Provider Type    Saritha Boudreaux, RN Registered Nurse    Saritha Becker, RN Registered Nurse    Joni Wall, PT Student PT Student                    Physical Therapy Education       Title: PT OT SLP Therapies (In Progress)       Topic: Physical Therapy (In Progress)       Point: Mobility training (In Progress)       Learning Progress Summary              Patient Acceptance, E,D, NR by PG at 7/9/2024 0929    Acceptance, E,TB, VU by TR at 6/28/2024 1112                         Point: Precautions (In Progress)       Learning Progress Summary             Patient Acceptance, E,D, NR by PG at 7/9/2024 0929    Acceptance, E,TB, VU by TR at 6/28/2024 1112                                         User Key       Initials Effective Dates Name Provider Type Discipline    PG 06/16/21 -  Felix Villasenor, RAJIV Occupational Therapist OT    TR 05/21/24 -  Aaron Fowler, JERROD Student PT Student PT                  PT Recommendation and Plan  Planned Therapy Interventions (PT): (P) balance training, bed mobility training, gait training, motor coordination training, neuromuscular re-education, patient/family education, postural re-education, ROM (range of motion), strengthening, transfer training  Therapy Frequency (PT): (P) daily  Progress Summary (PT)  Progress Toward Functional Goals (PT): (P) unable to show any progress toward functional goals  Daily Progress Summary (PT): (P) Pt did not tolerate treatment well with complaints of uncontrolled pain. Pt unable to stand EOB and ambulate due to pain in R LE Hip and Back. Will continue with plan of care while in hospital once pain is manageable.  Barriers to Overall Progress (PT): (P) Pain   Outcome Measures       Row Name 07/09/24 0936             How much help from another person do you currently need...    Turning from your back to your side while in flat bed without using bedrails? 3 (P)   -SJ      Moving from lying on back to sitting on the side of a flat bed without bedrails? 3 (P)   -SJ      Moving to and from a bed to a chair (including a wheelchair)? 1 (P)   limited by pain  -SJ      Standing up from a chair using your arms (e.g., wheelchair, bedside chair)? 1 (P)   limited by pain  -SJ      Climbing 3-5 steps with a railing? 1 (P)   limited by pain  -SJ      To walk in hospital room? 1 (P)   limited by pain   -      AM-PAC 6 Clicks Score (PT) 10 (P)   -      Highest Level of Mobility Goal 4 --> Transfer to chair/commode (P)   -         Functional Assessment    Outcome Measure Options AM-PAC 6 Clicks Basic Mobility (PT) (P)   -                User Key  (r) = Recorded By, (t) = Taken By, (c) = Cosigned By      Initials Name Provider Type     Joni Najera PT Student PT Student                     Time Calculation:    PT Charges       Row Name 07/09/24 0918             Time Calculation    PT Received On 07/09/24 (P)   -      PT Goal Re-Cert Due Date 07/18/24 (P)   -         Untimed Charges    PT Eval/Re-eval Minutes 15 (P)   -         Total Minutes    Untimed Charges Total Minutes 15 (P)   -       Total Minutes 15 (P)   -                User Key  (r) = Recorded By, (t) = Taken By, (c) = Cosigned By      Initials Name Provider Type     Joni Najera PT Student PT Student                  Therapy Charges for Today       Code Description Service Date Service Provider Modifiers Qty    [id number]  PT RE-EVAL ESTABLISHED PLAN 2 7/9/2024 Joni Najera PT Student GP 1            PT G-Codes  Outcome Measure Options: (P) AM-PAC 6 Clicks Basic Mobility (PT)  AM-PAC 6 Clicks Score (PT): (P) 10  AM-PAC 6 Clicks Score (OT): 12    JERROD Morse  7/9/2024

## 2024-07-09 NOTE — PROGRESS NOTES
[facility]   Hospitalist Progress Note  Date: 2024  Patient Name: Oswaldo Bowen  : 1980  MRN: [id number]  Date of admission: 2024  Room/Bed: [facility]3/      Subjective   Subjective     Chief Complaint: Fever    Summary:Oswaldo Bowen is a 44 y.o. male with history of intellectual disability, tobacco smoker, depression, chronic anemia, with recent hospitalization for mechanical fall with displaced intertrochanteric femur fracture requiring orthopedic intervention, VLAD, with suspected new metastatic lung cancer and mets to the bone with a 2.6 cm nodule in the left upper lobe, which came back positive for adenocarcinoma.     Patient admitted for chief complaint of fevers, pancultured, placed on broad-spectrum antibiotics. Pulmonary consulted with underlying adenocarcinoma of the lung which appears to be widely metastatic to bone, could be causing fevers.  Oncology evaluated the patient, added receptor testing to guide further treatment recommendations.  Patient completed 7 days of broad-spectrum antibiotics, source not found.  Fever curve improved.  Given his mental delay case was discussed with his sister who is his caregiver, she is requesting rehab placement.    Interval Followup:   Continues to have issues with pain.  MS Contin has been increased to 30 twice daily by oncology.  Patient continues to have as needed medications available as well.    Objective   Objective     Vitals:   Temp:  [97.3 °F (36.3 °C)-98.2 °F (36.8 °C)] 97.5 °F (36.4 °C)  Heart Rate:  [100-116] 101  Resp:  [18-20] 18  BP: (134-159)/(79-89) 134/89    Physical Exam   Gen: NAD, Alert and Oriented, cachexia  Cards: RRR, no murmur   Pulm: CTA b/l, no wheezing  Abd: soft, nondistended  Extremities: no pitting edema    Result Review    Result Review:  I have personally reviewed these results:  [x]  Laboratory      Lab 24  0506 24  0454 24  1003   WBC 14.57* 12.10* 11.78*   HEMOGLOBIN 8.8* 8.5* 8.6*   HEMATOCRIT  29.6* 27.7* 28.6*   PLATELETS 512* 526* 484*   NEUTROS ABS 11.55* 9.15* 9.09*   IMMATURE GRANS (ABS) 0.22* 0.18* 0.21*   LYMPHS ABS 1.36 1.42 1.27   MONOS ABS 1.27* 1.04* 0.98*   EOS ABS 0.12 0.27 0.18   MCV 85.1 84.5 84.1         Lab 07/09/24  0506 07/08/24  0454 07/06/24  1003 07/04/24  0514   SODIUM 135* 138 135* 136   POTASSIUM 3.5 3.0* 3.3* 3.0*   CHLORIDE 96* 97* 95* 100   CO2 29.8* 31.2* 27.0 28.2   ANION GAP 9.2 9.8 13.0 7.8   BUN 7 8 7 8   CREATININE 0.34* 0.32* 0.35* 0.29*   EGFR 144.9 147.6 143.7 152.0   GLUCOSE 104* 102* 165* 104*   CALCIUM 9.9 10.0 9.2 9.1   MAGNESIUM 1.5*  --   --  1.6         Lab 07/08/24  0454   TOTAL PROTEIN 6.0   ALBUMIN 2.6*   GLOBULIN 3.4   ALT (SGPT) 31   AST (SGOT) 19   BILIRUBIN 0.2   ALK PHOS 129*                       Brief Urine Lab Results  (Last result in the past 365 days)        Color   Clarity   Blood   Leuk Est   Nitrite   Protein   CREAT   Urine HCG        06/27/24 1856 Yellow   Clear   Negative   Negative   Negative   Trace                 [x]  Microbiology   Microbiology Results (last 10 days)       Procedure Component Value - Date/Time    Respiratory Culture - Sputum, Cough [[id number]] Collected: 07/02/24 1239    Lab Status: Final result Specimen: Sputum from Cough Updated: 07/04/24 1055     Respiratory Culture Rare growth Normal respiratory zeinab. No S. aureus or Pseudomonas aeruginosa detected. Final report.     Gram Stain Occasional WBCs seen      No organisms seen          [x]  Radiology  No radiology results for the last 7 days  []  EKG/Telemetry   []  Cardiology/Vascular   []  Pathology  []  Old records  []  Other:    Assessment & Plan   Assessment / Plan     Assessment:  Subjective fevers, unknown source  Metastatic adenocarcinoma of the lung  Current tobacco smoker  Intellectual disability  Acute on chronic anemia  Symptomatic anemia  Depression  Recent hospitalization for femur fracture  Hypomagnesemia  Iron deficiency    Plan:  Continue inpatient  admission  Completed 7 days of broad-spectrum antibiotics with vancomycin and cefepime  Blood cultures negative.  COVID/flu/RSV negative.  BAL PCR negative.  Sputum culture negative.  Urinalysis negative.  CT chest and abdomen without any infection/abscess.  Pulmonology following  Oncology following.  Follow-up outpatient for further treatment plan.  Continue metoprolol 75 mg twice daily  Pain uncontrolled.  As needed Norco increased.  Continue patient on MS Contin, increased dose to 30 mg twice daily  Zofran as needed  PT/OT  Palliative care following  Hemoglobin stable.  Iron deficiency, however, ferritin 3500.  Defer IV iron for now.  WBC count stable.  A.m. labs     Discussed with RN.    VTE Prophylaxis:  Pharmacologic & mechanical VTE prophylaxis orders are present.        CODE STATUS:   Code Status (Patient has no pulse and is not breathing): CPR (Attempt to Resuscitate)  Medical Interventions (Patient has pulse or is breathing): Full Support      Electronically signed by Buster Ford MD, 7/9/2024, 17:38 EDT.

## 2024-07-09 NOTE — PLAN OF CARE
Goal Outcome Evaluation:  Plan of Care Reviewed With: patient        Progress: no change  Outcome Evaluation: OT evaluation completed.  Patient has had a significant decline in ADL function since his last tx session in acute care.  He is reporting significant pain in his back and unable to transfer this a.m.  Patient presents with deficits affecting his strength and endurance but pain is limiting factor.  Recommend subacute rehab or hospice care at this time.      Anticipated Discharge Disposition (OT): sub acute care setting

## 2024-07-10 LAB
ANION GAP SERPL CALCULATED.3IONS-SCNC: 10 MMOL/L (ref 5–15)
BUN SERPL-MCNC: 9 MG/DL (ref 6–20)
BUN/CREAT SERPL: 15.3 (ref 7–25)
CALCIUM SPEC-SCNC: 10.8 MG/DL (ref 8.6–10.5)
CHLORIDE SERPL-SCNC: 93 MMOL/L (ref 98–107)
CO2 SERPL-SCNC: 33 MMOL/L (ref 22–29)
CREAT SERPL-MCNC: 0.59 MG/DL (ref 0.76–1.27)
DEPRECATED RDW RBC AUTO: 50.4 FL (ref 37–54)
EGFRCR SERPLBLD CKD-EPI 2021: 122.7 ML/MIN/1.73
ERYTHROCYTE [DISTWIDTH] IN BLOOD BY AUTOMATED COUNT: 16.1 % (ref 12.3–15.4)
GLUCOSE SERPL-MCNC: 100 MG/DL (ref 65–99)
HCT VFR BLD AUTO: 36.3 % (ref 37.5–51)
HGB BLD-MCNC: 10.6 G/DL (ref 13–17.7)
MAGNESIUM SERPL-MCNC: 2 MG/DL (ref 1.6–2.6)
MCH RBC QN AUTO: 25.1 PG (ref 26.6–33)
MCHC RBC AUTO-ENTMCNC: 29.2 G/DL (ref 31.5–35.7)
MCV RBC AUTO: 85.8 FL (ref 79–97)
PLATELET # BLD AUTO: 595 10*3/MM3 (ref 140–450)
PMV BLD AUTO: 9.4 FL (ref 6–12)
POTASSIUM SERPL-SCNC: 4.1 MMOL/L (ref 3.5–5.2)
RBC # BLD AUTO: 4.23 10*6/MM3 (ref 4.14–5.8)
SODIUM SERPL-SCNC: 136 MMOL/L (ref 136–145)
WBC NRBC COR # BLD AUTO: 15.52 10*3/MM3 (ref 3.4–10.8)

## 2024-07-10 PROCEDURE — 85027 COMPLETE CBC AUTOMATED: CPT | Performed by: INTERNAL MEDICINE

## 2024-07-10 PROCEDURE — 99232 SBSQ HOSP IP/OBS MODERATE 35: CPT | Performed by: INTERNAL MEDICINE

## 2024-07-10 PROCEDURE — 83735 ASSAY OF MAGNESIUM: CPT | Performed by: INTERNAL MEDICINE

## 2024-07-10 PROCEDURE — 80048 BASIC METABOLIC PNL TOTAL CA: CPT | Performed by: INTERNAL MEDICINE

## 2024-07-10 RX ADMIN — Medication 10 ML: at 21:24

## 2024-07-10 RX ADMIN — SENNOSIDES AND DOCUSATE SODIUM 2 TABLET: 50; 8.6 TABLET ORAL at 21:24

## 2024-07-10 RX ADMIN — HYDROCODONE BITARTRATE AND ACETAMINOPHEN 1 TABLET: 5; 325 TABLET ORAL at 18:38

## 2024-07-10 RX ADMIN — HYDROCODONE BITARTRATE AND ACETAMINOPHEN 1 TABLET: 10; 325 TABLET ORAL at 00:11

## 2024-07-10 RX ADMIN — HYDROCODONE BITARTRATE AND ACETAMINOPHEN 1 TABLET: 10; 325 TABLET ORAL at 10:45

## 2024-07-10 RX ADMIN — Medication 10 MG: at 00:11

## 2024-07-10 RX ADMIN — NICOTINE 1 PATCH: 21 PATCH, EXTENDED RELEASE TRANSDERMAL at 08:11

## 2024-07-10 RX ADMIN — Medication 10 ML: at 10:42

## 2024-07-10 RX ADMIN — Medication 10 MG: at 21:24

## 2024-07-10 RX ADMIN — METOPROLOL SUCCINATE 75 MG: 50 TABLET, EXTENDED RELEASE ORAL at 21:24

## 2024-07-10 RX ADMIN — HYDROCODONE BITARTRATE AND ACETAMINOPHEN 1 TABLET: 5; 325 TABLET ORAL at 05:32

## 2024-07-10 RX ADMIN — MORPHINE SULFATE 30 MG: 30 TABLET, FILM COATED, EXTENDED RELEASE ORAL at 08:07

## 2024-07-10 RX ADMIN — METOPROLOL SUCCINATE 75 MG: 50 TABLET, EXTENDED RELEASE ORAL at 08:07

## 2024-07-10 RX ADMIN — MORPHINE SULFATE 30 MG: 30 TABLET, FILM COATED, EXTENDED RELEASE ORAL at 21:24

## 2024-07-10 NOTE — PLAN OF CARE
Goal Outcome Evaluation:  Plan of Care Reviewed With: patient        Progress: no change  Outcome Evaluation: PT is A&Ox3 with forgetfulness and difficulty with time. Pt c/o pain/discomfort this shift, administered pain meds per MAR and encouraged pt to be turned/repositioned to promote pt comfort and pain reduction. Pt at times has refused turns and has been educated multiple times that he is high risk for skin breakdown and injury r/t bed bound and being diaphoretic, pt verbally understands. Skin care provided as needed. Rehab placement is pending at this time. No new issues or new needs noted at this time.

## 2024-07-10 NOTE — CONSULTS
Patient is well known by palliative care. He is currently on 4NT. He is alert with intermit confusion.   I have attempted to call patients niece (HCS). There is no answer and unable to leave a message. I did call patiens sister to ask for additional contact information but she has refused and has threatened the staff, not to call the patients niece. She tells me she is his next of kin and she is to make decisions. I explained the HCS and the ACP. She became very upset and telling me she will get POA.  I have if she speaks to the niece that she call the hospital.   Patients status has declined this past week to 2 weeks. He is in continous pain. Medications are limited per order.   Will continue to reach out to contact niece.  Made RN, SW and primary RN aware of the above.  Kiersten TOMLINSON RN, BSN  Palliative Care

## 2024-07-10 NOTE — PROGRESS NOTES
Psychiatric   Hospitalist Progress Note  Date: 7/10/2024  Patient Name: Oswaldo Bowen  : 1980  MRN: [id number]  Date of admission: 2024  Room/Bed: 4003/1      Subjective   Subjective     Chief Complaint: Fever    Summary:Oswaldo Bowen is a 44 y.o. male with history of intellectual disability, tobacco smoker, depression, chronic anemia, with recent hospitalization for mechanical fall with displaced intertrochanteric femur fracture requiring orthopedic intervention, VLAD, with suspected new metastatic lung cancer and mets to the bone with a 2.6 cm nodule in the left upper lobe, which came back positive for adenocarcinoma.     Patient admitted for chief complaint of fevers, pancultured, placed on broad-spectrum antibiotics. Pulmonary consulted with underlying adenocarcinoma of the lung which appears to be widely metastatic to bone, could be causing fevers.  Oncology evaluated the patient, added receptor testing to guide further treatment recommendations.  Patient completed 7 days of broad-spectrum antibiotics, source not found.  Fever curve improved.  Given his mental delay case was discussed with his sister who is his caregiver, she is requesting rehab placement.  Documentation has found that patient's niece is actually the documented healthcare surrogate    Interval Followup:   Patient endorses controlled pain with me on rounds today following increase in his MS Contin.  Patient still has as needed medications available.  Palliative care able to find documentation that patient's niece is actually healthcare surrogate.    Objective   Objective     Vitals:   Temp:  [97.5 °F (36.4 °C)-98.6 °F (37 °C)] 97.5 °F (36.4 °C)  Heart Rate:  [] 110  Resp:  [18-22] 18  BP: (125-138)/(73-87) 133/77    Physical Exam   Gen: NAD, Alert and Oriented, cachexia  Cards: RRR, no murmur   Pulm: CTA b/l, no wheezing  Abd: soft, nondistended  Extremities: no pitting edema    Result Review    Result Review:  I have  personally reviewed these results:  [x]  Laboratory      Lab 07/10/24  0522 07/09/24  0506 07/08/24  0454 07/06/24  1003   WBC 15.52* 14.57* 12.10* 11.78*   HEMOGLOBIN 10.6* 8.8* 8.5* 8.6*   HEMATOCRIT 36.3* 29.6* 27.7* 28.6*   PLATELETS 595* 512* 526* 484*   NEUTROS ABS  --  11.55* 9.15* 9.09*   IMMATURE GRANS (ABS)  --  0.22* 0.18* 0.21*   LYMPHS ABS  --  1.36 1.42 1.27   MONOS ABS  --  1.27* 1.04* 0.98*   EOS ABS  --  0.12 0.27 0.18   MCV 85.8 85.1 84.5 84.1         Lab 07/10/24  0522 07/09/24  0506 07/08/24  0454 07/06/24  1003 07/04/24  0514   SODIUM 136 135* 138   < > 136   POTASSIUM 4.1 3.5 3.0*   < > 3.0*   CHLORIDE 93* 96* 97*   < > 100   CO2 33.0* 29.8* 31.2*   < > 28.2   ANION GAP 10.0 9.2 9.8   < > 7.8   BUN 9 7 8   < > 8   CREATININE 0.59* 0.34* 0.32*   < > 0.29*   EGFR 122.7 144.9 147.6   < > 152.0   GLUCOSE 100* 104* 102*   < > 104*   CALCIUM 10.8* 9.9 10.0   < > 9.1   MAGNESIUM 2.0 1.5*  --   --  1.6    < > = values in this interval not displayed.         Lab 07/08/24  0454   TOTAL PROTEIN 6.0   ALBUMIN 2.6*   GLOBULIN 3.4   ALT (SGPT) 31   AST (SGOT) 19   BILIRUBIN 0.2   ALK PHOS 129*                       Brief Urine Lab Results  (Last result in the past 365 days)        Color   Clarity   Blood   Leuk Est   Nitrite   Protein   CREAT   Urine HCG        06/27/24 1856 Yellow   Clear   Negative   Negative   Negative   Trace                 [x]  Microbiology   Microbiology Results (last 10 days)       Procedure Component Value - Date/Time    Respiratory Culture - Sputum, Cough [[id number]] Collected: 07/02/24 1239    Lab Status: Final result Specimen: Sputum from Cough Updated: 07/04/24 1055     Respiratory Culture Rare growth Normal respiratory zeinab. No S. aureus or Pseudomonas aeruginosa detected. Final report.     Gram Stain Occasional WBCs seen      No organisms seen          [x]  Radiology  No radiology results for the last 7 days  []  EKG/Telemetry   []  Cardiology/Vascular   []  Pathology  []   Old records  []  Other:    Assessment & Plan   Assessment / Plan     Assessment:  Subjective fevers, unknown source  Metastatic adenocarcinoma of the lung  Current tobacco smoker  Intellectual disability  Acute on chronic anemia  Symptomatic anemia  Depression  Recent hospitalization for femur fracture  Hypomagnesemia  Iron deficiency    Plan:  Continue inpatient admission  Completed 7 days of broad-spectrum antibiotics with vancomycin and cefepime  Blood cultures negative.  COVID/flu/RSV negative.  BAL PCR negative.  Sputum culture negative.  Urinalysis negative.  CT chest and abdomen without any infection/abscess.  Pulmonology following  Oncology following.  Follow-up outpatient for further treatment plan.  Continue metoprolol 75 mg twice daily  Pain better controlled.  As needed Norco increased.  Continue patient on MS Contin, on increased dose to 30 mg twice daily.  If needed will further titrate up  Zofran as needed  PT/OT  Palliative care following  Hemoglobin stable.  Iron deficiency, however, ferritin 3500.  Defer IV iron for now.  WBC count stable.  A.m. labs     Discussed with RN.  Case management, palliative care    VTE Prophylaxis:  Pharmacologic & mechanical VTE prophylaxis orders are present.        CODE STATUS:   Code Status (Patient has no pulse and is not breathing): CPR (Attempt to Resuscitate)  Medical Interventions (Patient has pulse or is breathing): Full Support      Electronically signed by Buster Ford MD, 7/10/2024, 15:32 EDT.

## 2024-07-10 NOTE — PLAN OF CARE
"Goal Outcome Evaluation:  Plan of Care Reviewed With: patient        Progress: no change  Outcome Evaluation: Pt is alert and oriented to self and sometimes situation, with forgetfulness. Pt complained of pain this shift and was given Norco 10 which was not effective per pt. Reached out to Dr. Ford who went back to see pt and pt stated he was \"feeling fine,\" no new orders.  Pt has refused turns throughout shift and after being educated on importance of repositioning. Palliative team was reconsulted to determine goals of care. Pt was able to rest between care. No issues or needs at this time.                               "

## 2024-07-11 LAB
CYTO UR: NORMAL
LAB AP CASE REPORT: NORMAL
LAB AP CLINICAL INFORMATION: NORMAL
LAB AP DIAGNOSIS COMMENT: NORMAL
LAB AP SPECIAL STAINS: NORMAL
Lab: NORMAL
Lab: NORMAL
PATH REPORT.ADDENDUM SPEC: NORMAL
PATH REPORT.FINAL DX SPEC: NORMAL
PATH REPORT.GROSS SPEC: NORMAL

## 2024-07-11 PROCEDURE — 99232 SBSQ HOSP IP/OBS MODERATE 35: CPT | Performed by: INTERNAL MEDICINE

## 2024-07-11 RX ORDER — MORPHINE SULFATE 30 MG/1
60 TABLET, FILM COATED, EXTENDED RELEASE ORAL EVERY 12 HOURS SCHEDULED
Status: DISCONTINUED | OUTPATIENT
Start: 2024-07-11 | End: 2024-07-11

## 2024-07-11 RX ORDER — ENOXAPARIN SODIUM 100 MG/ML
40 INJECTION SUBCUTANEOUS DAILY
Status: DISCONTINUED | OUTPATIENT
Start: 2024-07-11 | End: 2024-07-16 | Stop reason: HOSPADM

## 2024-07-11 RX ORDER — MENTHOL AND METHYL SALICYLATE 7.6; 29 G/100G; G/100G
1 OINTMENT TOPICAL EVERY 6 HOURS PRN
Status: DISCONTINUED | OUTPATIENT
Start: 2024-07-11 | End: 2024-07-16 | Stop reason: HOSPADM

## 2024-07-11 RX ADMIN — HYDROCODONE BITARTRATE AND ACETAMINOPHEN 1 TABLET: 10; 325 TABLET ORAL at 05:29

## 2024-07-11 RX ADMIN — MORPHINE SULFATE 45 MG: 30 TABLET, FILM COATED, EXTENDED RELEASE ORAL at 21:09

## 2024-07-11 RX ADMIN — NICOTINE 1 PATCH: 21 PATCH, EXTENDED RELEASE TRANSDERMAL at 10:02

## 2024-07-11 RX ADMIN — Medication 10 MG: at 21:09

## 2024-07-11 RX ADMIN — Medication 10 ML: at 21:10

## 2024-07-11 RX ADMIN — Medication 10 ML: at 10:02

## 2024-07-11 RX ADMIN — HYDROCODONE BITARTRATE AND ACETAMINOPHEN 1 TABLET: 5; 325 TABLET ORAL at 10:01

## 2024-07-11 RX ADMIN — METOPROLOL SUCCINATE 75 MG: 50 TABLET, EXTENDED RELEASE ORAL at 10:01

## 2024-07-11 RX ADMIN — METOPROLOL SUCCINATE 75 MG: 50 TABLET, EXTENDED RELEASE ORAL at 21:09

## 2024-07-11 RX ADMIN — HYDROCODONE BITARTRATE AND ACETAMINOPHEN 1 TABLET: 10; 325 TABLET ORAL at 13:26

## 2024-07-11 RX ADMIN — MORPHINE SULFATE 30 MG: 30 TABLET, FILM COATED, EXTENDED RELEASE ORAL at 10:02

## 2024-07-11 NOTE — PLAN OF CARE
Goal Outcome Evaluation:         Pt aox4, vs stable on room air. Pt c/o pain in back and BLE. Prn norco 10 given for pain per mar. Pt refusing turns at times, educated on risk of skin breakdown.

## 2024-07-11 NOTE — PROGRESS NOTES
[facility]   Hospitalist Progress Note  Date: 2024  Patient Name: Oswaldo Bowen  : 1980  MRN: [id number]  Date of admission: 2024  Room/Bed: 4003/1      Subjective   Subjective     Chief Complaint: Fever    Summary:Oswaldo Bowen is a 44 y.o. male with history of intellectual disability, tobacco smoker, depression, chronic anemia, with recent hospitalization for mechanical fall with displaced intertrochanteric femur fracture requiring orthopedic intervention, VLAD, with suspected new metastatic lung cancer and mets to the bone with a 2.6 cm nodule in the left upper lobe, which came back positive for adenocarcinoma.     Patient admitted for chief complaint of fevers, pancultured, placed on broad-spectrum antibiotics. Pulmonary consulted with underlying adenocarcinoma of the lung which appears to be widely metastatic to bone, could be causing fevers.  Oncology evaluated the patient, added receptor testing to guide further treatment recommendations.  Patient completed 7 days of broad-spectrum antibiotics, source not found.  Fever curve improved.  Given his mental delay case was discussed with his sister who is his caregiver, she is requesting rehab placement.  Documentation has found that patient's niece is actually the documented healthcare surrogate    Interval Followup:   Patient seen resting in bed, appears comfortable.  However states pain is still significant especially when trying to ambulate.  Will increase MS Contin again today and monitor response.    Objective   Objective     Vitals:   Temp:  [97.7 °F (36.5 °C)-97.9 °F (36.6 °C)] 97.7 °F (36.5 °C)  Heart Rate:  [102-119] 102  Resp:  [16-18] 18  BP: (118-130)/(67-83) 118/67    Physical Exam   Gen: NAD, Alert and Oriented, cachexia  Cards: RRR, no murmur   Pulm: CTA b/l, no wheezing  Abd: soft, nondistended  Extremities: no pitting edema    Result Review    Result Review:  I have personally reviewed these results:  [x]  Laboratory      Lab  07/10/24  0522 07/09/24  0506 07/08/24  0454 07/06/24  1003   WBC 15.52* 14.57* 12.10* 11.78*   HEMOGLOBIN 10.6* 8.8* 8.5* 8.6*   HEMATOCRIT 36.3* 29.6* 27.7* 28.6*   PLATELETS 595* 512* 526* 484*   NEUTROS ABS  --  11.55* 9.15* 9.09*   IMMATURE GRANS (ABS)  --  0.22* 0.18* 0.21*   LYMPHS ABS  --  1.36 1.42 1.27   MONOS ABS  --  1.27* 1.04* 0.98*   EOS ABS  --  0.12 0.27 0.18   MCV 85.8 85.1 84.5 84.1         Lab 07/10/24  0522 07/09/24  0506 07/08/24  0454   SODIUM 136 135* 138   POTASSIUM 4.1 3.5 3.0*   CHLORIDE 93* 96* 97*   CO2 33.0* 29.8* 31.2*   ANION GAP 10.0 9.2 9.8   BUN 9 7 8   CREATININE 0.59* 0.34* 0.32*   EGFR 122.7 144.9 147.6   GLUCOSE 100* 104* 102*   CALCIUM 10.8* 9.9 10.0   MAGNESIUM 2.0 1.5*  --          Lab 07/08/24  0454   TOTAL PROTEIN 6.0   ALBUMIN 2.6*   GLOBULIN 3.4   ALT (SGPT) 31   AST (SGOT) 19   BILIRUBIN 0.2   ALK PHOS 129*                       Brief Urine Lab Results  (Last result in the past 365 days)        Color   Clarity   Blood   Leuk Est   Nitrite   Protein   CREAT   Urine HCG        06/27/24 1856 Yellow   Clear   Negative   Negative   Negative   Trace                 [x]  Microbiology   Microbiology Results (last 10 days)       Procedure Component Value - Date/Time    Respiratory Culture - Sputum, Cough [[id number]] Collected: 07/02/24 1239    Lab Status: Final result Specimen: Sputum from Cough Updated: 07/04/24 1055     Respiratory Culture Rare growth Normal respiratory zeinab. No S. aureus or Pseudomonas aeruginosa detected. Final report.     Gram Stain Occasional WBCs seen      No organisms seen          [x]  Radiology  No radiology results for the last 7 days  []  EKG/Telemetry   []  Cardiology/Vascular   []  Pathology  []  Old records  []  Other:    Assessment & Plan   Assessment / Plan     Assessment:  Subjective fevers, unknown source  Metastatic adenocarcinoma of the lung  Current tobacco smoker  Intellectual disability  Acute on chronic anemia  Symptomatic  anemia  Depression  Recent hospitalization for femur fracture  Hypomagnesemia  Iron deficiency    Plan:  Continue inpatient admission  Completed 7 days of broad-spectrum antibiotics with vancomycin and cefepime  Blood cultures negative.  COVID/flu/RSV negative.  BAL PCR negative.  Sputum culture negative.  Urinalysis negative.  CT chest and abdomen without any infection/abscess.  Oncology following.  Follow-up outpatient for further treatment plan.  Continue metoprolol 75 mg twice daily  Will increase patient's MS Contin again today, continues to have Norco as needed  Zofran as needed  PT/OT  Palliative care following  Hemoglobin stable.  Iron deficiency, however, ferritin 3500.    A.m. labs     Discussed with RN.  Case management    VTE Prophylaxis:  Pharmacologic & mechanical VTE prophylaxis orders are present.        CODE STATUS:   Code Status (Patient has no pulse and is not breathing): CPR (Attempt to Resuscitate)  Medical Interventions (Patient has pulse or is breathing): Full Support      Electronically signed by Buster Ford MD, 7/11/2024, 12:44 EDT.

## 2024-07-12 LAB
ANION GAP SERPL CALCULATED.3IONS-SCNC: 8.1 MMOL/L (ref 5–15)
BUN SERPL-MCNC: 11 MG/DL (ref 6–20)
BUN/CREAT SERPL: 23.4 (ref 7–25)
CALCIUM SPEC-SCNC: 10.8 MG/DL (ref 8.6–10.5)
CHLORIDE SERPL-SCNC: 94 MMOL/L (ref 98–107)
CO2 SERPL-SCNC: 32.9 MMOL/L (ref 22–29)
CREAT SERPL-MCNC: 0.47 MG/DL (ref 0.76–1.27)
DEPRECATED RDW RBC AUTO: 50.3 FL (ref 37–54)
EGFRCR SERPLBLD CKD-EPI 2021: 131.4 ML/MIN/1.73
ERYTHROCYTE [DISTWIDTH] IN BLOOD BY AUTOMATED COUNT: 15.9 % (ref 12.3–15.4)
GLUCOSE SERPL-MCNC: 96 MG/DL (ref 65–99)
HCT VFR BLD AUTO: 28.1 % (ref 37.5–51)
HGB BLD-MCNC: 8.4 G/DL (ref 13–17.7)
MAGNESIUM SERPL-MCNC: 1.7 MG/DL (ref 1.6–2.6)
MCH RBC QN AUTO: 25.7 PG (ref 26.6–33)
MCHC RBC AUTO-ENTMCNC: 29.9 G/DL (ref 31.5–35.7)
MCV RBC AUTO: 85.9 FL (ref 79–97)
PLATELET # BLD AUTO: 510 10*3/MM3 (ref 140–450)
PMV BLD AUTO: 9.6 FL (ref 6–12)
POTASSIUM SERPL-SCNC: 3.4 MMOL/L (ref 3.5–5.2)
RBC # BLD AUTO: 3.27 10*6/MM3 (ref 4.14–5.8)
SODIUM SERPL-SCNC: 135 MMOL/L (ref 136–145)
WBC NRBC COR # BLD AUTO: 12.78 10*3/MM3 (ref 3.4–10.8)

## 2024-07-12 PROCEDURE — 80048 BASIC METABOLIC PNL TOTAL CA: CPT | Performed by: INTERNAL MEDICINE

## 2024-07-12 PROCEDURE — 25010000002 ENOXAPARIN PER 10 MG: Performed by: INTERNAL MEDICINE

## 2024-07-12 PROCEDURE — 85027 COMPLETE CBC AUTOMATED: CPT | Performed by: INTERNAL MEDICINE

## 2024-07-12 PROCEDURE — 99232 SBSQ HOSP IP/OBS MODERATE 35: CPT | Performed by: INTERNAL MEDICINE

## 2024-07-12 PROCEDURE — 83735 ASSAY OF MAGNESIUM: CPT | Performed by: INTERNAL MEDICINE

## 2024-07-12 RX ORDER — POTASSIUM CHLORIDE 750 MG/1
40 CAPSULE, EXTENDED RELEASE ORAL ONCE
Status: COMPLETED | OUTPATIENT
Start: 2024-07-12 | End: 2024-07-12

## 2024-07-12 RX ADMIN — Medication 10 ML: at 20:21

## 2024-07-12 RX ADMIN — MORPHINE SULFATE 45 MG: 30 TABLET, FILM COATED, EXTENDED RELEASE ORAL at 20:20

## 2024-07-12 RX ADMIN — POTASSIUM CHLORIDE 40 MEQ: 750 CAPSULE, EXTENDED RELEASE ORAL at 11:58

## 2024-07-12 RX ADMIN — HYDROCODONE BITARTRATE AND ACETAMINOPHEN 1 TABLET: 10; 325 TABLET ORAL at 23:46

## 2024-07-12 RX ADMIN — NICOTINE 1 PATCH: 21 PATCH, EXTENDED RELEASE TRANSDERMAL at 10:03

## 2024-07-12 RX ADMIN — HYDROCODONE BITARTRATE AND ACETAMINOPHEN 1 TABLET: 10; 325 TABLET ORAL at 00:56

## 2024-07-12 RX ADMIN — MORPHINE SULFATE 45 MG: 30 TABLET, FILM COATED, EXTENDED RELEASE ORAL at 10:01

## 2024-07-12 RX ADMIN — ENOXAPARIN SODIUM 40 MG: 100 INJECTION SUBCUTANEOUS at 10:01

## 2024-07-12 RX ADMIN — HYDROCODONE BITARTRATE AND ACETAMINOPHEN 1 TABLET: 10; 325 TABLET ORAL at 15:11

## 2024-07-12 RX ADMIN — Medication 10 ML: at 10:02

## 2024-07-12 RX ADMIN — METOPROLOL SUCCINATE 75 MG: 50 TABLET, EXTENDED RELEASE ORAL at 20:20

## 2024-07-12 RX ADMIN — HYDROCODONE BITARTRATE AND ACETAMINOPHEN 1 TABLET: 5; 325 TABLET ORAL at 05:37

## 2024-07-12 RX ADMIN — METOPROLOL SUCCINATE 75 MG: 50 TABLET, EXTENDED RELEASE ORAL at 10:01

## 2024-07-12 NOTE — CONSULTS
"Nutrition Services    Patient Name: Oswaldo Bowen  YOB: 1980  MRN: [id number]  Admission date: 6/27/2024      CLINICAL NUTRITION ASSESSMENT      Reason for Assessment  Follow Up    H&P:  History reviewed. No pertinent past medical history.     Current Problems:   Active Hospital Problems    Diagnosis     **Sepsis     Anemia         Nutrition/Diet History         Narrative     Pt is alert and eating his breakfast upon RD's arrival. Pt report fair appetite and consumed about 55% x 9 meals. Pt reports pain and unable to eat sometimes. RN aware of it. Pt denies any chewing or swallowing difficulties at this time. Agree to trial ONS. RD continue to follow.      Anthropometrics        Current Height, Weight Height: 182.9 cm (72.01\")  Weight: 76.2 kg (167 lb 15.9 oz)   Current BMI Body mass index is 22.78 kg/m².   BMI Classification Normal range   % IBW 97%   Adjusted Body Weight (ABW) N/A   Weight Hx  Wt Readings from Last 30 Encounters:   06/27/24 2253 76.2 kg (167 lb 15.9 oz)   06/27/24 1702 74.1 kg (163 lb 5.8 oz)   06/08/24 0526 73.1 kg (161 lb 2.5 oz)   05/21/24 0219 65 kg (143 lb 4.8 oz)   05/20/24 2134 66 kg (145 lb 8.1 oz)            Wt Change Observation Pending updated wt since admission.      Estimated/Assessed Needs  Estimated Needs based on: Current Body Weight 76 kg       Energy Requirements 25-30 kcal/kg   EST Needs (kcal/day) 2533-4764 kcal       Protein Requirements 0.8-1.0 g/kg   EST Daily Needs (g/day) 61-76 g       Fluid Requirements 1 ml/kcal    Estimated Needs (mL/day) 7587-8866 ml     Labs/Medications         Pertinent Labs Reviewed.   Results from last 7 days   Lab Units 07/12/24  0533 07/10/24  0522 07/09/24  0506 07/08/24  0454   SODIUM mmol/L 135* 136 135* 138   POTASSIUM mmol/L 3.4* 4.1 3.5 3.0*   CHLORIDE mmol/L 94* 93* 96* 97*   CO2 mmol/L 32.9* 33.0* 29.8* 31.2*   BUN mg/dL 11 9 7 8   CREATININE mg/dL 0.47* 0.59* 0.34* 0.32*   CALCIUM mg/dL 10.8* 10.8* 9.9 10.0   BILIRUBIN " "mg/dL  --   --   --  0.2   ALK PHOS U/L  --   --   --  129*   ALT (SGPT) U/L  --   --   --  31   AST (SGOT) U/L  --   --   --  19   GLUCOSE mg/dL 96 100* 104* 102*     Results from last 7 days   Lab Units 07/12/24  0533 07/10/24  0522 07/09/24  0506   MAGNESIUM mg/dL 1.7 2.0 1.5*   HEMOGLOBIN g/dL 8.4* 10.6* 8.8*   HEMATOCRIT % 28.1* 36.3* 29.6*     COVID19   Date Value Ref Range Status   06/28/2024 Not Detected Not Detected - Ref. Range Final     No results found for: \"HGBA1C\"      Pertinent Medications Reviewed.     Malnutrition Severity Assessment              Nutrition Diagnosis         Nutrition Dx Problem 1  Inadequate oral/food beverage intake related to decline of appetite, nausea, pain as evidenced by patient’s report and observed breakfast tray at bedside.      Nutrition Intervention        Current Nutrition Orders & Evaluation of Intake     Current Nutrition Orders & Evaluation of Intake       Current PO Diet Diet: Regular/House; Fluid Consistency: Thin (IDDSI 0)   Supplement No active supplement orders       Nutrition Intervention/Prescription        Recommend to add Boost Plus BID with meals to aid with caloric intake.   Continue encourage po intake.      Medical Nutrition Therapy/Nutrition Education          Learner     Readiness Patient  Education not indicated.      Method     Response N/A  N/A     Monitor/Evaluation        Monitor Per protocol     Nutrition Discharge Plan         To be determined.      Electronically signed by:  Leslie Zurita RD  07/12/24 14:03 EDT    "

## 2024-07-12 NOTE — PROGRESS NOTES
[facility]   Hospitalist Progress Note  Date: 2024  Patient Name: Oswaldo Bowen  : 1980  MRN: [id number]  Date of admission: 2024  Room/Bed: 4003/1      Subjective   Subjective     Chief Complaint: Fever    Summary:Oswaldo Bowen is a 44 y.o. male with history of intellectual disability, tobacco smoker, depression, chronic anemia, with recent hospitalization for mechanical fall with displaced intertrochanteric femur fracture requiring orthopedic intervention, VLAD, with suspected new metastatic lung cancer and mets to the bone with a 2.6 cm nodule in the left upper lobe, which came back positive for adenocarcinoma.     Patient admitted for chief complaint of fevers, pancultured, placed on broad-spectrum antibiotics. Pulmonary consulted with underlying adenocarcinoma of the lung which appears to be widely metastatic to bone, could be causing fevers.  Oncology evaluated the patient, added receptor testing to guide further treatment recommendations.  Patient completed 7 days of broad-spectrum antibiotics, source not found.  Fever curve improved.  Given his mental delay case was discussed with his sister who is his caregiver, she is requesting rehab placement.  Healthcare surrogacy has now shifted back to patient's sister.    Interval Followup:   Continued issues with pain.  While at rest patient with no pain, however with turns are trying to get him out of bed patient with significant pain.  Therefore patient has been more or less bedbound for several days now.  Unless patient is up and moving, he will not be candidate to get into rehab to build strength.  Without baseline strength he will not be able to make it into oncology clinic for treatment of his cancer.  Discussed at length current situation with patient at bedside.  Also discussed with nurse to continue encouragement of patient to try to get him up and moving.  Discussed with oncologist as well.    Objective   Objective     Vitals:   Temp:   [97.3 °F (36.3 °C)-97.7 °F (36.5 °C)] 97.3 °F (36.3 °C)  Heart Rate:  [] 100  Resp:  [16-18] 18  BP: (127-145)/(75-83) 129/79    Physical Exam   Gen: NAD, Alert and Oriented, cachexia  Cards: RRR, no murmur   Pulm: CTA b/l, no wheezing  Abd: soft, nondistended  Extremities: no pitting edema    Result Review    Result Review:  I have personally reviewed these results:  [x]  Laboratory      Lab 07/12/24  0533 07/10/24  0522 07/09/24  0506 07/08/24  0454 07/06/24  1003   WBC 12.78* 15.52* 14.57* 12.10* 11.78*   HEMOGLOBIN 8.4* 10.6* 8.8* 8.5* 8.6*   HEMATOCRIT 28.1* 36.3* 29.6* 27.7* 28.6*   PLATELETS 510* 595* 512* 526* 484*   NEUTROS ABS  --   --  11.55* 9.15* 9.09*   IMMATURE GRANS (ABS)  --   --  0.22* 0.18* 0.21*   LYMPHS ABS  --   --  1.36 1.42 1.27   MONOS ABS  --   --  1.27* 1.04* 0.98*   EOS ABS  --   --  0.12 0.27 0.18   MCV 85.9 85.8 85.1 84.5 84.1         Lab 07/12/24  0533 07/10/24  0522 07/09/24  0506   SODIUM 135* 136 135*   POTASSIUM 3.4* 4.1 3.5   CHLORIDE 94* 93* 96*   CO2 32.9* 33.0* 29.8*   ANION GAP 8.1 10.0 9.2   BUN 11 9 7   CREATININE 0.47* 0.59* 0.34*   EGFR 131.4 122.7 144.9   GLUCOSE 96 100* 104*   CALCIUM 10.8* 10.8* 9.9   MAGNESIUM 1.7 2.0 1.5*         Lab 07/08/24  0454   TOTAL PROTEIN 6.0   ALBUMIN 2.6*   GLOBULIN 3.4   ALT (SGPT) 31   AST (SGOT) 19   BILIRUBIN 0.2   ALK PHOS 129*                       Brief Urine Lab Results  (Last result in the past 365 days)        Color   Clarity   Blood   Leuk Est   Nitrite   Protein   CREAT   Urine HCG        06/27/24 1856 Yellow   Clear   Negative   Negative   Negative   Trace                 [x]  Microbiology   Microbiology Results (last 10 days)       ** No results found for the last 240 hours. **          [x]  Radiology  No radiology results for the last 7 days  []  EKG/Telemetry   []  Cardiology/Vascular   []  Pathology  []  Old records  []  Other:    Assessment & Plan   Assessment / Plan     Assessment:  Subjective fevers, unknown  source  Metastatic adenocarcinoma of the lung  Current tobacco smoker  Intellectual disability  Acute on chronic anemia  Symptomatic anemia  Depression  Recent hospitalization for femur fracture  Hypomagnesemia  Iron deficiency    Plan:  Continue inpatient admission  Completed 7 days of broad-spectrum antibiotics with vancomycin and cefepime  Blood cultures negative.  COVID/flu/RSV negative.  BAL PCR negative.  Sputum culture negative.  Urinalysis negative.  CT chest and abdomen without any infection/abscess.  Oncology following.  Follow-up outpatient for further treatment plan.  Continue metoprolol 75 mg twice daily  Continue MS Contin at current dose, as needed Norco available  Zofran as needed  PT/OT  Palliative care following  Hemoglobin stable.  Iron deficiency, however, ferritin 3500.    A.m. labs     Discussed with RN.  Case management, oncologist    VTE Prophylaxis:  Pharmacologic & mechanical VTE prophylaxis orders are present.        CODE STATUS:   Code Status (Patient has no pulse and is not breathing): CPR (Attempt to Resuscitate)  Medical Interventions (Patient has pulse or is breathing): Full Support      Electronically signed by Buster Ford MD, 7/12/2024, 16:55 EDT.

## 2024-07-12 NOTE — PLAN OF CARE
Goal Outcome Evaluation:  Plan of Care Reviewed With: patient        Progress: no change  Outcome Evaluation: pt remains aox4 on room air. intermitten confusion and garbled speech at times. complaints of pain and discomfort this shift, medicated PRN per the MAR. Pt repositioned in bed Q2. Pt remains resting in bed throughout the shift. Medicated per the MAR. pt awaiting rehab placement at this time. will continue to monitor

## 2024-07-12 NOTE — NURSING NOTE
Patient remains on 4NT. He is very resistant to care. He frequently refuses turns. He continues to receive prn pain medications along with his scheduled pain meds.   He has completed a new living will designating his sister has his HCS.   Social Work submitting referral for placement.   Palliative care will sign off at this time  Please re consult if further needs arise.  Kiersten TOMLINSON RN, BSN  Palliative Care

## 2024-07-12 NOTE — PLAN OF CARE
Goal Outcome Evaluation:  Plan of Care Reviewed With: patient        Progress: no change  Outcome Evaluation: PRN pain meds given along with scheduled ms contin. Patient observed to be sleeping upon safety checks overnight. Minimal diaphoresis overnight

## 2024-07-13 LAB
ANION GAP SERPL CALCULATED.3IONS-SCNC: 11.2 MMOL/L (ref 5–15)
BUN SERPL-MCNC: 11 MG/DL (ref 6–20)
BUN/CREAT SERPL: 28.9 (ref 7–25)
CALCIUM SPEC-SCNC: 10.7 MG/DL (ref 8.6–10.5)
CHLORIDE SERPL-SCNC: 94 MMOL/L (ref 98–107)
CO2 SERPL-SCNC: 29.8 MMOL/L (ref 22–29)
CREAT SERPL-MCNC: 0.38 MG/DL (ref 0.76–1.27)
DEPRECATED RDW RBC AUTO: 49.8 FL (ref 37–54)
EGFRCR SERPLBLD CKD-EPI 2021: 140.1 ML/MIN/1.73
ERYTHROCYTE [DISTWIDTH] IN BLOOD BY AUTOMATED COUNT: 15.9 % (ref 12.3–15.4)
GLUCOSE SERPL-MCNC: 100 MG/DL (ref 65–99)
HCT VFR BLD AUTO: 28.7 % (ref 37.5–51)
HGB BLD-MCNC: 8.6 G/DL (ref 13–17.7)
MAGNESIUM SERPL-MCNC: 1.5 MG/DL (ref 1.6–2.6)
MCH RBC QN AUTO: 25.7 PG (ref 26.6–33)
MCHC RBC AUTO-ENTMCNC: 30 G/DL (ref 31.5–35.7)
MCV RBC AUTO: 85.9 FL (ref 79–97)
PLATELET # BLD AUTO: 464 10*3/MM3 (ref 140–450)
PMV BLD AUTO: 9.4 FL (ref 6–12)
POTASSIUM SERPL-SCNC: 3.5 MMOL/L (ref 3.5–5.2)
RBC # BLD AUTO: 3.34 10*6/MM3 (ref 4.14–5.8)
SODIUM SERPL-SCNC: 135 MMOL/L (ref 136–145)
WBC NRBC COR # BLD AUTO: 10.22 10*3/MM3 (ref 3.4–10.8)

## 2024-07-13 PROCEDURE — 99232 SBSQ HOSP IP/OBS MODERATE 35: CPT | Performed by: INTERNAL MEDICINE

## 2024-07-13 PROCEDURE — 85027 COMPLETE CBC AUTOMATED: CPT | Performed by: INTERNAL MEDICINE

## 2024-07-13 PROCEDURE — 83735 ASSAY OF MAGNESIUM: CPT | Performed by: INTERNAL MEDICINE

## 2024-07-13 PROCEDURE — 80048 BASIC METABOLIC PNL TOTAL CA: CPT | Performed by: INTERNAL MEDICINE

## 2024-07-13 PROCEDURE — 25010000002 MAGNESIUM SULFATE 4 GM/100ML SOLUTION: Performed by: INTERNAL MEDICINE

## 2024-07-13 RX ORDER — POTASSIUM CHLORIDE 750 MG/1
40 CAPSULE, EXTENDED RELEASE ORAL ONCE
Status: COMPLETED | OUTPATIENT
Start: 2024-07-13 | End: 2024-07-13

## 2024-07-13 RX ORDER — MAGNESIUM SULFATE HEPTAHYDRATE 40 MG/ML
4 INJECTION, SOLUTION INTRAVENOUS ONCE
Status: COMPLETED | OUTPATIENT
Start: 2024-07-13 | End: 2024-07-13

## 2024-07-13 RX ADMIN — Medication 10 ML: at 09:33

## 2024-07-13 RX ADMIN — MAGNESIUM SULFATE HEPTAHYDRATE 4 G: 4 INJECTION, SOLUTION INTRAVENOUS at 09:32

## 2024-07-13 RX ADMIN — HYDROCODONE BITARTRATE AND ACETAMINOPHEN 1 TABLET: 10; 325 TABLET ORAL at 06:35

## 2024-07-13 RX ADMIN — Medication 10 ML: at 20:42

## 2024-07-13 RX ADMIN — HYDROCODONE BITARTRATE AND ACETAMINOPHEN 1 TABLET: 10; 325 TABLET ORAL at 14:55

## 2024-07-13 RX ADMIN — MORPHINE SULFATE 45 MG: 30 TABLET, FILM COATED, EXTENDED RELEASE ORAL at 09:33

## 2024-07-13 RX ADMIN — MORPHINE SULFATE 45 MG: 30 TABLET, FILM COATED, EXTENDED RELEASE ORAL at 20:41

## 2024-07-13 RX ADMIN — HYDROCODONE BITARTRATE AND ACETAMINOPHEN 1 TABLET: 5; 325 TABLET ORAL at 12:06

## 2024-07-13 RX ADMIN — POTASSIUM CHLORIDE 40 MEQ: 750 CAPSULE, EXTENDED RELEASE ORAL at 09:33

## 2024-07-13 RX ADMIN — METOPROLOL SUCCINATE 75 MG: 50 TABLET, EXTENDED RELEASE ORAL at 20:41

## 2024-07-13 RX ADMIN — METOPROLOL SUCCINATE 75 MG: 50 TABLET, EXTENDED RELEASE ORAL at 09:33

## 2024-07-13 NOTE — PROGRESS NOTES
[facility]   Hospitalist Progress Note  Date: 2024  Patient Name: Oswaldo Bowen  : 1980  MRN: [id number]  Date of admission: 2024  Room/Bed: 4003/1      Subjective   Subjective     Chief Complaint: Fever    Summary:Oswaldo Bowen is a 44 y.o. male with history of intellectual disability, tobacco smoker, depression, chronic anemia, with recent hospitalization for mechanical fall with displaced intertrochanteric femur fracture requiring orthopedic intervention, VLAD, with suspected new metastatic lung cancer and mets to the bone with a 2.6 cm nodule in the left upper lobe, which came back positive for adenocarcinoma.     Patient admitted for chief complaint of fevers, pancultured, placed on broad-spectrum antibiotics. Pulmonary consulted with underlying adenocarcinoma of the lung which appears to be widely metastatic to bone, could be causing fevers.  Oncology evaluated the patient, added receptor testing to guide further treatment recommendations.  Patient completed 7 days of broad-spectrum antibiotics, source not found.  Fever curve improved.  Given his mental delay case was discussed with his sister who is his caregiver, she is requesting rehab placement.  Healthcare surrogacy has now shifted back to patient's sister.  Continue to push patient to be out of bed and ambulatory.  Difficult placement in SNF the patient has not been up and ambulating.    Interval Followup:   Continue to push patient, indicating how important it is for him to be up and moving.  Nursing staff indicates the plan to try and have patient up in bedside chair today    Objective   Objective     Vitals:   Temp:  [97.2 °F (36.2 °C)-97.7 °F (36.5 °C)] 97.5 °F (36.4 °C)  Heart Rate:  [] 108  Resp:  [16-18] 18  BP: (125-141)/(77-87) 140/77    Physical Exam   Gen: NAD, Alert and Oriented, cachexia  Cards: RRR, no murmur   Pulm: CTA b/l, no wheezing  Abd: soft, nondistended  Extremities: no pitting edema    Result Review     Result Review:  I have personally reviewed these results:  [x]  Laboratory      Lab 07/13/24  0539 07/12/24  0533 07/10/24  0522 07/09/24  0506 07/08/24  0454   WBC 10.22 12.78* 15.52* 14.57* 12.10*   HEMOGLOBIN 8.6* 8.4* 10.6* 8.8* 8.5*   HEMATOCRIT 28.7* 28.1* 36.3* 29.6* 27.7*   PLATELETS 464* 510* 595* 512* 526*   NEUTROS ABS  --   --   --  11.55* 9.15*   IMMATURE GRANS (ABS)  --   --   --  0.22* 0.18*   LYMPHS ABS  --   --   --  1.36 1.42   MONOS ABS  --   --   --  1.27* 1.04*   EOS ABS  --   --   --  0.12 0.27   MCV 85.9 85.9 85.8 85.1 84.5         Lab 07/13/24  0539 07/12/24  0533 07/10/24  0522   SODIUM 135* 135* 136   POTASSIUM 3.5 3.4* 4.1   CHLORIDE 94* 94* 93*   CO2 29.8* 32.9* 33.0*   ANION GAP 11.2 8.1 10.0   BUN 11 11 9   CREATININE 0.38* 0.47* 0.59*   EGFR 140.1 131.4 122.7   GLUCOSE 100* 96 100*   CALCIUM 10.7* 10.8* 10.8*   MAGNESIUM 1.5* 1.7 2.0         Lab 07/08/24  0454   TOTAL PROTEIN 6.0   ALBUMIN 2.6*   GLOBULIN 3.4   ALT (SGPT) 31   AST (SGOT) 19   BILIRUBIN 0.2   ALK PHOS 129*                       Brief Urine Lab Results  (Last result in the past 365 days)        Color   Clarity   Blood   Leuk Est   Nitrite   Protein   CREAT   Urine HCG        06/27/24 1856 Yellow   Clear   Negative   Negative   Negative   Trace                 [x]  Microbiology   Microbiology Results (last 10 days)       ** No results found for the last 240 hours. **          [x]  Radiology  No radiology results for the last 7 days  []  EKG/Telemetry   []  Cardiology/Vascular   []  Pathology  []  Old records  []  Other:    Assessment & Plan   Assessment / Plan     Assessment:  Subjective fevers, unknown source  Metastatic adenocarcinoma of the lung  Current tobacco smoker  Intellectual disability  Acute on chronic anemia  Symptomatic anemia  Depression  Recent hospitalization for femur fracture  Hypomagnesemia  Iron deficiency    Plan:  Continue inpatient admission  Continue to push ambulation, patient must be out of  bed as tolerated  Completed 7 days of broad-spectrum antibiotics with vancomycin and cefepime  Blood cultures negative.  COVID/flu/RSV negative.  BAL PCR negative.  Sputum culture negative.  Urinalysis negative.  CT chest and abdomen without any infection/abscess.  Oncology following.  Follow-up outpatient for further treatment plan.  Continue metoprolol 75 mg twice daily  Continue MS Contin at current dose, as needed Norco available  Zofran as needed  PT/OT  Palliative care following  Hemoglobin stable.  Iron deficiency, however, ferritin 3500.    A.m. labs     Discussed with RN    VTE Prophylaxis:  Pharmacologic & mechanical VTE prophylaxis orders are present.        CODE STATUS:   Code Status (Patient has no pulse and is not breathing): CPR (Attempt to Resuscitate)  Medical Interventions (Patient has pulse or is breathing): Full Support      Electronically signed by Buster Ford MD, 7/13/2024, 16:44 EDT.

## 2024-07-13 NOTE — PLAN OF CARE
Goal Outcome Evaluation:  Plan of Care Reviewed With: patient        Progress: no change  Outcome Evaluation: pt remains aox4 on room air with intermittent forgetfulness. speech still garbled at times. pt on room air. q2 turns this shift, pt allowing. complaints of pain, medicated per the MAR. Pt with assisst x2 was up in bedside chair for roughly six hours. pt frequently calling out demanding to get back into bed during this time, pt reminded and educated on the importance of getting up and moving and staying out of the bed to promote skin integrity and gain strength. Tele discontinued this shift. skin care completed. pt currently resting in bed with call light in reach.

## 2024-07-13 NOTE — PLAN OF CARE
Goal Outcome Evaluation:              Outcome Evaluation: Pt AOx4 throughout the shift, with intermittent forgetfulness, speech can be garbbled at times. Pt on room air and currently bedfast, Q2 turns completed as allowed, pt often refuses, but educated on reasoning for turns, pt states understanding. Complaints of pain multiple times during the shift, medicated per MAR. Tele monitoring maintained per orders. Skin care provided per orders. No acute changes noted this shift. Pt appears to be in no apparent distress at this time, denies any current needs, and has call light within reach.

## 2024-07-14 LAB
ANION GAP SERPL CALCULATED.3IONS-SCNC: 11.2 MMOL/L (ref 5–15)
BUN SERPL-MCNC: 11 MG/DL (ref 6–20)
BUN/CREAT SERPL: 21.2 (ref 7–25)
CALCIUM SPEC-SCNC: 11.4 MG/DL (ref 8.6–10.5)
CHLORIDE SERPL-SCNC: 93 MMOL/L (ref 98–107)
CO2 SERPL-SCNC: 29.8 MMOL/L (ref 22–29)
CREAT SERPL-MCNC: 0.52 MG/DL (ref 0.76–1.27)
DEPRECATED RDW RBC AUTO: 48.8 FL (ref 37–54)
EGFRCR SERPLBLD CKD-EPI 2021: 127.5 ML/MIN/1.73
ERYTHROCYTE [DISTWIDTH] IN BLOOD BY AUTOMATED COUNT: 15.9 % (ref 12.3–15.4)
GLUCOSE SERPL-MCNC: 111 MG/DL (ref 65–99)
HCT VFR BLD AUTO: 29.6 % (ref 37.5–51)
HGB BLD-MCNC: 9 G/DL (ref 13–17.7)
MAGNESIUM SERPL-MCNC: 1.9 MG/DL (ref 1.6–2.6)
MCH RBC QN AUTO: 25.6 PG (ref 26.6–33)
MCHC RBC AUTO-ENTMCNC: 30.4 G/DL (ref 31.5–35.7)
MCV RBC AUTO: 84.3 FL (ref 79–97)
PLATELET # BLD AUTO: 454 10*3/MM3 (ref 140–450)
PMV BLD AUTO: 9.8 FL (ref 6–12)
POTASSIUM SERPL-SCNC: 3.8 MMOL/L (ref 3.5–5.2)
RBC # BLD AUTO: 3.51 10*6/MM3 (ref 4.14–5.8)
SODIUM SERPL-SCNC: 134 MMOL/L (ref 136–145)
WBC NRBC COR # BLD AUTO: 13.87 10*3/MM3 (ref 3.4–10.8)

## 2024-07-14 PROCEDURE — 25010000002 ENOXAPARIN PER 10 MG: Performed by: INTERNAL MEDICINE

## 2024-07-14 PROCEDURE — 85027 COMPLETE CBC AUTOMATED: CPT | Performed by: INTERNAL MEDICINE

## 2024-07-14 PROCEDURE — 83735 ASSAY OF MAGNESIUM: CPT | Performed by: INTERNAL MEDICINE

## 2024-07-14 PROCEDURE — 99232 SBSQ HOSP IP/OBS MODERATE 35: CPT | Performed by: INTERNAL MEDICINE

## 2024-07-14 PROCEDURE — 80048 BASIC METABOLIC PNL TOTAL CA: CPT | Performed by: INTERNAL MEDICINE

## 2024-07-14 RX ORDER — CALCIUM CARBONATE 500 MG/1
2 TABLET, CHEWABLE ORAL 3 TIMES DAILY PRN
Status: DISCONTINUED | OUTPATIENT
Start: 2024-07-14 | End: 2024-07-16 | Stop reason: HOSPADM

## 2024-07-14 RX ADMIN — HYDROCODONE BITARTRATE AND ACETAMINOPHEN 1 TABLET: 10; 325 TABLET ORAL at 02:04

## 2024-07-14 RX ADMIN — HYDROCODONE BITARTRATE AND ACETAMINOPHEN 1 TABLET: 10; 325 TABLET ORAL at 09:03

## 2024-07-14 RX ADMIN — Medication 10 ML: at 21:43

## 2024-07-14 RX ADMIN — MORPHINE SULFATE 45 MG: 30 TABLET, FILM COATED, EXTENDED RELEASE ORAL at 09:03

## 2024-07-14 RX ADMIN — METOPROLOL SUCCINATE 75 MG: 50 TABLET, EXTENDED RELEASE ORAL at 21:43

## 2024-07-14 RX ADMIN — NICOTINE 1 PATCH: 21 PATCH, EXTENDED RELEASE TRANSDERMAL at 09:04

## 2024-07-14 RX ADMIN — CALCIUM CARBONATE 2 TABLET: 500 TABLET, CHEWABLE ORAL at 03:07

## 2024-07-14 RX ADMIN — MORPHINE SULFATE 45 MG: 30 TABLET, FILM COATED, EXTENDED RELEASE ORAL at 21:43

## 2024-07-14 RX ADMIN — METOPROLOL SUCCINATE 75 MG: 50 TABLET, EXTENDED RELEASE ORAL at 09:03

## 2024-07-14 RX ADMIN — ENOXAPARIN SODIUM 40 MG: 100 INJECTION SUBCUTANEOUS at 09:03

## 2024-07-14 RX ADMIN — Medication 10 ML: at 09:03

## 2024-07-14 NOTE — PLAN OF CARE
Goal Outcome Evaluation:  Plan of Care Reviewed With: patient        Progress: no change  Outcome Evaluation: pt aox4 on room air with intermittent confusion and garbled speech. heavy x2 assist to get up in bedside chair. pt remains sitting in bedside chair for roughly 7 hours this shift and remains sitting there, will allow pt to get back into bed when he requests. complaints of pain, medicated per the MAR. pt resting majority of shift in bedside chair. Skin care completed as ordered. No complaints at this time. will continue to monitor, call light within reach

## 2024-07-14 NOTE — PROGRESS NOTES
[facility]   Hospitalist Progress Note  Date: 2024  Patient Name: Oswaldo Bowen  : 1980  MRN: [id number]  Date of admission: 2024  Room/Bed: 4003/1      Subjective   Subjective     Chief Complaint: Fever    Summary:Oswaldo Bowen is a 44 y.o. male with history of intellectual disability, tobacco smoker, depression, chronic anemia, with recent hospitalization for mechanical fall with displaced intertrochanteric femur fracture requiring orthopedic intervention, VLAD, with suspected new metastatic lung cancer and mets to the bone with a 2.6 cm nodule in the left upper lobe, which came back positive for adenocarcinoma.     Patient admitted for chief complaint of fevers, pancultured, placed on broad-spectrum antibiotics. Pulmonary consulted with underlying adenocarcinoma of the lung which appears to be widely metastatic to bone, could be causing fevers.  Oncology evaluated the patient, added receptor testing to guide further treatment recommendations.  Patient completed 7 days of broad-spectrum antibiotics, source not found.  Fever curve improved.  Given his mental delay case was discussed with his sister who is his caregiver, she is requesting rehab placement.  Healthcare surrogacy has now shifted back to patient's sister.  Continue to push patient to be out of bed and ambulatory.  Difficult placement in SNF the patient has not been up and ambulating.    Interval Followup:   From heavy encouragement from nursing staff, patient has not been out of bed twice, once yesterday and today.  Seen sitting in bedside chair, appears comfortable, however states he wishes to get back to bed soon.  Will continue to push ambulation and activity is much as possible to find appropriate rehab    Objective   Objective     Vitals:   Temp:  [97.2 °F (36.2 °C)-97.9 °F (36.6 °C)] 97.2 °F (36.2 °C)  Heart Rate:  [104-125] 104  Resp:  [18] 18  BP: (134-141)/(77-85) 134/79    Physical Exam   Gen: NAD, Alert and Oriented,  cachexia, sitting in bedside chair  Cards: RRR, no murmur   Pulm: CTA b/l, no wheezing  Abd: soft, nondistended  Extremities: no pitting edema    Result Review    Result Review:  I have personally reviewed these results:  [x]  Laboratory      Lab 07/14/24  0530 07/13/24  0539 07/12/24  0533 07/10/24  0522 07/09/24  0506 07/08/24  0454   WBC 13.87* 10.22 12.78*   < > 14.57* 12.10*   HEMOGLOBIN 9.0* 8.6* 8.4*   < > 8.8* 8.5*   HEMATOCRIT 29.6* 28.7* 28.1*   < > 29.6* 27.7*   PLATELETS 454* 464* 510*   < > 512* 526*   NEUTROS ABS  --   --   --   --  11.55* 9.15*   IMMATURE GRANS (ABS)  --   --   --   --  0.22* 0.18*   LYMPHS ABS  --   --   --   --  1.36 1.42   MONOS ABS  --   --   --   --  1.27* 1.04*   EOS ABS  --   --   --   --  0.12 0.27   MCV 84.3 85.9 85.9   < > 85.1 84.5    < > = values in this interval not displayed.         Lab 07/14/24  0530 07/13/24  0539 07/12/24  0533   SODIUM 134* 135* 135*   POTASSIUM 3.8 3.5 3.4*   CHLORIDE 93* 94* 94*   CO2 29.8* 29.8* 32.9*   ANION GAP 11.2 11.2 8.1   BUN 11 11 11   CREATININE 0.52* 0.38* 0.47*   EGFR 127.5 140.1 131.4   GLUCOSE 111* 100* 96   CALCIUM 11.4* 10.7* 10.8*   MAGNESIUM 1.9 1.5* 1.7         Lab 07/08/24  0454   TOTAL PROTEIN 6.0   ALBUMIN 2.6*   GLOBULIN 3.4   ALT (SGPT) 31   AST (SGOT) 19   BILIRUBIN 0.2   ALK PHOS 129*                       Brief Urine Lab Results  (Last result in the past 365 days)        Color   Clarity   Blood   Leuk Est   Nitrite   Protein   CREAT   Urine HCG        06/27/24 1856 Yellow   Clear   Negative   Negative   Negative   Trace                 [x]  Microbiology   Microbiology Results (last 10 days)       ** No results found for the last 240 hours. **          [x]  Radiology  No radiology results for the last 7 days  []  EKG/Telemetry   []  Cardiology/Vascular   []  Pathology  []  Old records  []  Other:    Assessment & Plan   Assessment / Plan     Assessment:  Subjective fevers, unknown source  Metastatic adenocarcinoma of the  lung  Current tobacco smoker  Intellectual disability  Acute on chronic anemia  Symptomatic anemia  Depression  Recent hospitalization for femur fracture  Hypomagnesemia  Iron deficiency    Plan:  Continue inpatient admission  Continue to push ambulation, patient must be out of bed as tolerated.  Seen in bedside chair today  Completed 7 days of broad-spectrum antibiotics with vancomycin and cefepime  Blood cultures negative.  COVID/flu/RSV negative.  BAL PCR negative.  Sputum culture negative.  Urinalysis negative.  CT chest and abdomen without any infection/abscess.  Oncology following.  Follow-up outpatient for further treatment plan.  Continue metoprolol 75 mg twice daily  Continue MS Contin at current dose, as needed Norco available  Zofran as needed  PT/OT  Palliative care following  Hemoglobin stable.  Iron deficiency, however, ferritin 3500.    A.m. labs     Discussed with RN    VTE Prophylaxis:  Pharmacologic & mechanical VTE prophylaxis orders are present.        CODE STATUS:   Code Status (Patient has no pulse and is not breathing): CPR (Attempt to Resuscitate)  Medical Interventions (Patient has pulse or is breathing): Full Support      Electronically signed by Buster Ford MD, 7/14/2024, 14:49 EDT.

## 2024-07-14 NOTE — PLAN OF CARE
Goal Outcome Evaluation:              Outcome Evaluation: Pt AOx4 throughout the shift on room air and heavy x2 when getting up to the chair. Q2 turns completed per orders. Pt complaint of pain and heartburn this shift, medicated per MAR and PRN TUMS added to MAR. Skin care completed per orders. No acute changes noted this shift. Pt appears to be in no apparent distress at this time, denies any current needs, and has call light within reach.

## 2024-07-15 LAB
ANION GAP SERPL CALCULATED.3IONS-SCNC: 11.9 MMOL/L (ref 5–15)
BUN SERPL-MCNC: 16 MG/DL (ref 6–20)
BUN/CREAT SERPL: 31.4 (ref 7–25)
CALCIUM SPEC-SCNC: 11.8 MG/DL (ref 8.6–10.5)
CHLORIDE SERPL-SCNC: 93 MMOL/L (ref 98–107)
CO2 SERPL-SCNC: 30.1 MMOL/L (ref 22–29)
CREAT SERPL-MCNC: 0.51 MG/DL (ref 0.76–1.27)
DEPRECATED RDW RBC AUTO: 51 FL (ref 37–54)
EGFRCR SERPLBLD CKD-EPI 2021: 128.2 ML/MIN/1.73
ERYTHROCYTE [DISTWIDTH] IN BLOOD BY AUTOMATED COUNT: 16.3 % (ref 12.3–15.4)
GLUCOSE SERPL-MCNC: 110 MG/DL (ref 65–99)
HCT VFR BLD AUTO: 29.5 % (ref 37.5–51)
HGB BLD-MCNC: 8.8 G/DL (ref 13–17.7)
MAGNESIUM SERPL-MCNC: 1.7 MG/DL (ref 1.6–2.6)
MCH RBC QN AUTO: 25.4 PG (ref 26.6–33)
MCHC RBC AUTO-ENTMCNC: 29.8 G/DL (ref 31.5–35.7)
MCV RBC AUTO: 85.3 FL (ref 79–97)
PLATELET # BLD AUTO: 443 10*3/MM3 (ref 140–450)
PMV BLD AUTO: 10.1 FL (ref 6–12)
POTASSIUM SERPL-SCNC: 3.7 MMOL/L (ref 3.5–5.2)
RBC # BLD AUTO: 3.46 10*6/MM3 (ref 4.14–5.8)
SODIUM SERPL-SCNC: 135 MMOL/L (ref 136–145)
WBC NRBC COR # BLD AUTO: 15.92 10*3/MM3 (ref 3.4–10.8)

## 2024-07-15 PROCEDURE — 25010000002 ENOXAPARIN PER 10 MG: Performed by: INTERNAL MEDICINE

## 2024-07-15 PROCEDURE — 99232 SBSQ HOSP IP/OBS MODERATE 35: CPT | Performed by: INTERNAL MEDICINE

## 2024-07-15 PROCEDURE — 83735 ASSAY OF MAGNESIUM: CPT | Performed by: INTERNAL MEDICINE

## 2024-07-15 PROCEDURE — 80048 BASIC METABOLIC PNL TOTAL CA: CPT | Performed by: INTERNAL MEDICINE

## 2024-07-15 PROCEDURE — 85027 COMPLETE CBC AUTOMATED: CPT | Performed by: INTERNAL MEDICINE

## 2024-07-15 RX ORDER — SODIUM PHOSPHATE,MONO-DIBASIC 19G-7G/118
1 ENEMA (ML) RECTAL ONCE
Status: COMPLETED | OUTPATIENT
Start: 2024-07-15 | End: 2024-07-15

## 2024-07-15 RX ADMIN — Medication 10 ML: at 10:15

## 2024-07-15 RX ADMIN — MORPHINE SULFATE 45 MG: 30 TABLET, FILM COATED, EXTENDED RELEASE ORAL at 10:11

## 2024-07-15 RX ADMIN — METOPROLOL SUCCINATE 75 MG: 50 TABLET, EXTENDED RELEASE ORAL at 10:11

## 2024-07-15 RX ADMIN — BISACODYL 10 MG: 10 SUPPOSITORY RECTAL at 02:48

## 2024-07-15 RX ADMIN — HYDROCODONE BITARTRATE AND ACETAMINOPHEN 1 TABLET: 5; 325 TABLET ORAL at 05:50

## 2024-07-15 RX ADMIN — MORPHINE SULFATE 45 MG: 30 TABLET, FILM COATED, EXTENDED RELEASE ORAL at 20:26

## 2024-07-15 RX ADMIN — SODIUM PHOSPHATE 1 ENEMA: 7; 19 ENEMA RECTAL at 13:03

## 2024-07-15 RX ADMIN — METOPROLOL SUCCINATE 75 MG: 50 TABLET, EXTENDED RELEASE ORAL at 20:26

## 2024-07-15 RX ADMIN — Medication 10 ML: at 20:26

## 2024-07-15 RX ADMIN — Medication 10 MG: at 20:26

## 2024-07-15 RX ADMIN — ENOXAPARIN SODIUM 40 MG: 100 INJECTION SUBCUTANEOUS at 10:15

## 2024-07-15 RX ADMIN — HYDROCODONE BITARTRATE AND ACETAMINOPHEN 1 TABLET: 10; 325 TABLET ORAL at 01:54

## 2024-07-15 RX ADMIN — NICOTINE 1 PATCH: 21 PATCH, EXTENDED RELEASE TRANSDERMAL at 10:16

## 2024-07-15 RX ADMIN — HYDROCODONE BITARTRATE AND ACETAMINOPHEN 1 TABLET: 10; 325 TABLET ORAL at 16:13

## 2024-07-15 RX ADMIN — LIDOCAINE 1 PATCH: 4 PATCH TOPICAL at 04:50

## 2024-07-15 RX ADMIN — SENNOSIDES AND DOCUSATE SODIUM 2 TABLET: 50; 8.6 TABLET ORAL at 23:07

## 2024-07-15 RX ADMIN — POLYETHYLENE GLYCOL 3350 17 G: 17 POWDER, FOR SOLUTION ORAL at 05:50

## 2024-07-15 NOTE — PLAN OF CARE
Goal Outcome Evaluation:           Progress: no change   Patient is alert and oriented x4 throughout shift. Medicated for pain per MAR. Patient forgetful at times throughout shift. Patient refused to get up to chair multiple times this shift,refused to sit up in bed, educated patient on importance of moving and shifting weight . Patient given fleet enema, had large BM. Bladder scanned per order. Patient turned and repositioned frequently throughout shift. no new issues at this time.

## 2024-07-15 NOTE — NURSING NOTE
Patients sister will arrive at approx 1430 for meeting with Dr Ford to further discuss goals of care and treatment options.  Notified MD.

## 2024-07-15 NOTE — PROGRESS NOTES
[facility]   Hospitalist Progress Note  Date: 7/15/2024  Patient Name: Oswaldo Bowen  : 1980  MRN: [id number]  Date of admission: 2024  Room/Bed: 4003/1      Subjective   Subjective     Chief Complaint: Fever    Summary:Oswaldo Bowen is a 44 y.o. male with history of intellectual disability, tobacco smoker, depression, chronic anemia, with recent hospitalization for mechanical fall with displaced intertrochanteric femur fracture requiring orthopedic intervention, VLAD, with suspected new metastatic lung cancer and mets to the bone with a 2.6 cm nodule in the left upper lobe, which came back positive for adenocarcinoma.     Patient admitted for chief complaint of fevers, pancultured, placed on broad-spectrum antibiotics. Pulmonary consulted with underlying adenocarcinoma of the lung which appears to be widely metastatic to bone, could be causing fevers.  Oncology evaluated the patient, added receptor testing to guide further treatment recommendations.  Patient completed 7 days of broad-spectrum antibiotics, source not found.  Fever curve improved.  Given his mental delay case was discussed with his sister who is his caregiver, she is requesting rehab placement.  Healthcare surrogacy has now shifted back to patient's sister.  Continue to push patient to be out of bed and ambulatory.  Difficult placement in SNF the patient has not been up and ambulating.    Interval Followup:   Patient's white count is up trended over the past 2 days, however no other signs of systemic infection, continue to monitor.  Able to meet at bedside with patient's sister.  Updated on patient's current medical condition.  Also updated her on her current situation, if patient is interested in receiving chemotherapy he has to become stronger.  Patient to get stronger recommendations would be SNF discharge.  Family will continue to try and motivate patient.  Have discussed with patient if interested, hospice/palliative care is  always an option.  However at this time, appears everybody is on board with continued strengthening before starting chemo/immunotherapy.  Discussed with case management, [facility] has a bed available for patient, they are looking into patient currently for possible discharge soon.    Objective   Objective     Vitals:   Temp:  [97.2 °F (36.2 °C)-99.5 °F (37.5 °C)] 99.5 °F (37.5 °C)  Heart Rate:  [] 122  Resp:  [16-18] 18  BP: (118-123)/(71-76) 118/76    Physical Exam   Gen: NAD, Alert and Oriented, cachexia, laying in bed  Cards: RRR, no murmur   Pulm: CTA b/l, no wheezing  Abd: soft, nondistended  Extremities: no pitting edema    Result Review    Result Review:  I have personally reviewed these results:  [x]  Laboratory      Lab 07/15/24  0510 07/14/24  0530 07/13/24  0539 07/10/24  0522 07/09/24  0506   WBC 15.92* 13.87* 10.22   < > 14.57*   HEMOGLOBIN 8.8* 9.0* 8.6*   < > 8.8*   HEMATOCRIT 29.5* 29.6* 28.7*   < > 29.6*   PLATELETS 443 454* 464*   < > 512*   NEUTROS ABS  --   --   --   --  11.55*   IMMATURE GRANS (ABS)  --   --   --   --  0.22*   LYMPHS ABS  --   --   --   --  1.36   MONOS ABS  --   --   --   --  1.27*   EOS ABS  --   --   --   --  0.12   MCV 85.3 84.3 85.9   < > 85.1    < > = values in this interval not displayed.         Lab 07/15/24  0510 07/14/24  0530 07/13/24  0539   SODIUM 135* 134* 135*   POTASSIUM 3.7 3.8 3.5   CHLORIDE 93* 93* 94*   CO2 30.1* 29.8* 29.8*   ANION GAP 11.9 11.2 11.2   BUN 16 11 11   CREATININE 0.51* 0.52* 0.38*   EGFR 128.2 127.5 140.1   GLUCOSE 110* 111* 100*   CALCIUM 11.8* 11.4* 10.7*   MAGNESIUM 1.7 1.9 1.5*                             Brief Urine Lab Results  (Last result in the past 365 days)        Color   Clarity   Blood   Leuk Est   Nitrite   Protein   CREAT   Urine HCG        06/27/24 1856 Yellow   Clear   Negative   Negative   Negative   Trace                 [x]  Microbiology   Microbiology Results (last 10 days)       ** No results found for the last 240  hours. **          [x]  Radiology  No radiology results for the last 7 days  []  EKG/Telemetry   []  Cardiology/Vascular   []  Pathology  []  Old records  []  Other:    Assessment & Plan   Assessment / Plan     Assessment:  Subjective fevers, unknown source  Metastatic adenocarcinoma of the lung  Current tobacco smoker  Intellectual disability  Acute on chronic anemia  Symptomatic anemia  Depression  Recent hospitalization for femur fracture  Hypomagnesemia  Iron deficiency    Plan:  Continue inpatient admission  Continue to push ambulation, patient must be out of bed as tolerated.  Patient was in bedside chair over the weekend  Completed 7 days of broad-spectrum antibiotics with vancomycin and cefepime  Blood cultures negative.  COVID/flu/RSV negative.  BAL PCR negative.  Sputum culture negative.  Urinalysis negative.  CT chest and abdomen without any infection/abscess.  Oncology following.  Follow-up outpatient for further treatment plan.  Continue metoprolol 75 mg twice daily  Continue MS Contin at current dose, as needed Norco available.  If needed further titrations can be made  Zofran as needed  PT/OT  Palliative care following  Hemoglobin stable.  Iron deficiency, however, ferritin 3500.    A.m. labs  Family meeting at bedside on 7/15/2024.  Discussed with patient's sister, POA, the urgency will try and get patient stronger to make it into the clinic for further discussions about chemotherapy/immunotherapy.  Case management believes patient can hopefully get into [facility] within the next couple of days.     Discussed with RN    VTE Prophylaxis:  Pharmacologic & mechanical VTE prophylaxis orders are present.        CODE STATUS:   Code Status (Patient has no pulse and is not breathing): CPR (Attempt to Resuscitate)  Medical Interventions (Patient has pulse or is breathing): Full Support      Electronically signed by Buster Ford MD, 7/15/2024, 16:55 EDT.

## 2024-07-15 NOTE — PLAN OF CARE
Goal Outcome Evaluation:  Plan of Care Reviewed With: patient        Progress: no change  Outcome Evaluation: Pt is alert and oriented x3 with difficulty with time and forgetfulness this shift. Pt c/o pain/discomfort this shift, administered pain meds per MAR. PT has been up in chair for majority of the night, pt went back to bed around 0240 this morning with max assist of 3 with gait belt and walker. Nutrition consult placed due to being severely malnourished r/t poor oral intake with meals. I think pt would greatly benefit with supplemental drinks to meet his nutrition needs as he tolerates liquids better than solid foods. Pt is currently resting comfortably in bed with no apparent distress at this time, call light in reach. No new issues or new needs noted at this time.    As of 0604, Pt remains in bed resting with no apparent distress at this time, call light in reach. Dc'd nutrition consult as dietitian is following pt's case already. At 0435, pt became more confused and was experiencing visual hallucinations seeing clouds and puffs of smoke in room; I reassured pt at the bedside that there were no clouds or smoke detected in the room. I notified provider that pt's mentation has worsened over the last 2 weeks and his hallucinations and new onset confusion is something new concerning of possible delirium vs. Possible metastasis to brain, provider ordered CT head scan without contrast and pt will have that done the am closer to 0700. No new issues or new needs noted at this time.

## 2024-07-15 NOTE — SIGNIFICANT NOTE
07/15/24 0853   OTHER   Discipline occupational therapist   Rehab Time/Intention   Session Not Performed patient unavailable for treatment

## 2024-07-15 NOTE — SIGNIFICANT NOTE
07/15/24 1422   Physical Therapy Time and Intention   Session Not Performed patient/family declined treatment  (Pt stated that he is not going to do any PT today because he is having lots of pain.)

## 2024-07-16 VITALS
HEIGHT: 72 IN | BODY MASS INDEX: 22.75 KG/M2 | HEART RATE: 99 BPM | DIASTOLIC BLOOD PRESSURE: 78 MMHG | WEIGHT: 167.99 LBS | OXYGEN SATURATION: 91 % | SYSTOLIC BLOOD PRESSURE: 112 MMHG | TEMPERATURE: 97.3 F | RESPIRATION RATE: 18 BRPM

## 2024-07-16 LAB
ANION GAP SERPL CALCULATED.3IONS-SCNC: 11.1 MMOL/L (ref 5–15)
BASOPHILS # BLD AUTO: 0.03 10*3/MM3 (ref 0–0.2)
BASOPHILS NFR BLD AUTO: 0.2 % (ref 0–1.5)
BUN SERPL-MCNC: 17 MG/DL (ref 6–20)
BUN/CREAT SERPL: 33.3 (ref 7–25)
CALCIUM SPEC-SCNC: 11.6 MG/DL (ref 8.6–10.5)
CHLORIDE SERPL-SCNC: 96 MMOL/L (ref 98–107)
CO2 SERPL-SCNC: 30.9 MMOL/L (ref 22–29)
CREAT SERPL-MCNC: 0.51 MG/DL (ref 0.76–1.27)
DEPRECATED RDW RBC AUTO: 50.3 FL (ref 37–54)
EGFRCR SERPLBLD CKD-EPI 2021: 128.2 ML/MIN/1.73
EOSINOPHIL # BLD AUTO: 0.08 10*3/MM3 (ref 0–0.4)
EOSINOPHIL NFR BLD AUTO: 0.6 % (ref 0.3–6.2)
ERYTHROCYTE [DISTWIDTH] IN BLOOD BY AUTOMATED COUNT: 16 % (ref 12.3–15.4)
GLUCOSE SERPL-MCNC: 122 MG/DL (ref 65–99)
HCT VFR BLD AUTO: 28.1 % (ref 37.5–51)
HGB BLD-MCNC: 8.3 G/DL (ref 13–17.7)
IMM GRANULOCYTES # BLD AUTO: 0.1 10*3/MM3 (ref 0–0.05)
IMM GRANULOCYTES NFR BLD AUTO: 0.8 % (ref 0–0.5)
LYMPHOCYTES # BLD AUTO: 0.99 10*3/MM3 (ref 0.7–3.1)
LYMPHOCYTES NFR BLD AUTO: 7.6 % (ref 19.6–45.3)
MAGNESIUM SERPL-MCNC: 1.7 MG/DL (ref 1.6–2.6)
MCH RBC QN AUTO: 25.2 PG (ref 26.6–33)
MCHC RBC AUTO-ENTMCNC: 29.5 G/DL (ref 31.5–35.7)
MCV RBC AUTO: 85.2 FL (ref 79–97)
MONOCYTES # BLD AUTO: 1.26 10*3/MM3 (ref 0.1–0.9)
MONOCYTES NFR BLD AUTO: 9.6 % (ref 5–12)
NEUTROPHILS NFR BLD AUTO: 10.63 10*3/MM3 (ref 1.7–7)
NEUTROPHILS NFR BLD AUTO: 81.2 % (ref 42.7–76)
NRBC BLD AUTO-RTO: 0 /100 WBC (ref 0–0.2)
PHOSPHATE SERPL-MCNC: 3.4 MG/DL (ref 2.5–4.5)
PLATELET # BLD AUTO: 459 10*3/MM3 (ref 140–450)
PMV BLD AUTO: 10.2 FL (ref 6–12)
POTASSIUM SERPL-SCNC: 3.3 MMOL/L (ref 3.5–5.2)
RBC # BLD AUTO: 3.3 10*6/MM3 (ref 4.14–5.8)
SODIUM SERPL-SCNC: 138 MMOL/L (ref 136–145)
WBC NRBC COR # BLD AUTO: 13.09 10*3/MM3 (ref 3.4–10.8)

## 2024-07-16 PROCEDURE — 25010000002 ENOXAPARIN PER 10 MG: Performed by: INTERNAL MEDICINE

## 2024-07-16 PROCEDURE — 99239 HOSP IP/OBS DSCHRG MGMT >30: CPT | Performed by: STUDENT IN AN ORGANIZED HEALTH CARE EDUCATION/TRAINING PROGRAM

## 2024-07-16 PROCEDURE — 85025 COMPLETE CBC W/AUTO DIFF WBC: CPT | Performed by: INTERNAL MEDICINE

## 2024-07-16 PROCEDURE — 83735 ASSAY OF MAGNESIUM: CPT | Performed by: INTERNAL MEDICINE

## 2024-07-16 PROCEDURE — 80048 BASIC METABOLIC PNL TOTAL CA: CPT | Performed by: INTERNAL MEDICINE

## 2024-07-16 PROCEDURE — 84100 ASSAY OF PHOSPHORUS: CPT | Performed by: INTERNAL MEDICINE

## 2024-07-16 RX ORDER — MORPHINE SULFATE 15 MG/1
45 TABLET, FILM COATED, EXTENDED RELEASE ORAL EVERY 12 HOURS SCHEDULED
Qty: 42 TABLET | Refills: 0 | Status: SHIPPED | OUTPATIENT
Start: 2024-07-16 | End: 2024-07-23

## 2024-07-16 RX ORDER — METOPROLOL SUCCINATE 25 MG/1
75 TABLET, EXTENDED RELEASE ORAL EVERY 12 HOURS SCHEDULED
Qty: 180 TABLET | Refills: 0 | Status: ON HOLD | OUTPATIENT
Start: 2024-07-16 | End: 2024-08-15

## 2024-07-16 RX ORDER — HYDROCODONE BITARTRATE AND ACETAMINOPHEN 10; 325 MG/1; MG/1
1 TABLET ORAL EVERY 6 HOURS PRN
Qty: 28 TABLET | Refills: 0 | Status: SHIPPED | OUTPATIENT
Start: 2024-07-16 | End: 2024-07-23

## 2024-07-16 RX ADMIN — HYDROCODONE BITARTRATE AND ACETAMINOPHEN 1 TABLET: 10; 325 TABLET ORAL at 02:37

## 2024-07-16 RX ADMIN — HYDROCODONE BITARTRATE AND ACETAMINOPHEN 1 TABLET: 5; 325 TABLET ORAL at 06:53

## 2024-07-16 RX ADMIN — MORPHINE SULFATE 45 MG: 30 TABLET, FILM COATED, EXTENDED RELEASE ORAL at 10:11

## 2024-07-16 RX ADMIN — NICOTINE 1 PATCH: 21 PATCH, EXTENDED RELEASE TRANSDERMAL at 10:11

## 2024-07-16 RX ADMIN — METOPROLOL SUCCINATE 75 MG: 50 TABLET, EXTENDED RELEASE ORAL at 10:11

## 2024-07-16 RX ADMIN — ENOXAPARIN SODIUM 40 MG: 100 INJECTION SUBCUTANEOUS at 10:11

## 2024-07-16 RX ADMIN — Medication 10 ML: at 10:11

## 2024-07-16 NOTE — NURSING NOTE
Wound Eval / Progress Noted     [facility]     Patient Name: Oswaldo Boewn  : 1980  MRN: [id number]  Today's Date: 2024                 Admit Date: 2024    Visit Dx:    ICD-10-CM ICD-9-CM   1. Difficulty in walking  R26.2 719.7   2. Sepsis, due to unspecified organism, unspecified whether acute organ dysfunction present  A41.9 038.9     995.91   3. Adenocarcinoma of lung, stage 4, unspecified laterality  C34.90 162.9         Sepsis    Anemia        History reviewed. No pertinent past medical history.     Past Surgical History:   Procedure Laterality Date    BRONCHOSCOPY N/A 2024    Procedure: BRONCHOSCOPY WITH ENDOBRONCHIAL ULTRASOUND, FINE NEEDLE ASPIRATE, BIOPSIES, BRUSHINGS, BRONCHOALVEOLAR LAVAGE;  Surgeon: Kendell Stern MD;  Location: [facility] ENDOSCOPY;  Service: Pulmonary;  Laterality: N/A;  LUNG NODULE, MUCOUS PLUGGING    BRONCHOSCOPY WITH ION ROBOTIC ASSIST N/A 2024    Procedure: BRONCHOSCOPY NAVIGATION WITH ENDOBRONCHIAL ULTRASOUND AND ION ROBOT. REBUS, EBUS, BRUSHINGS, WASHINGS, BAL, BIOPSIES AND NEEDLE ASPIRATE;  Surgeon: John Elizalde MD;  Location: [facility] MAIN OR;  Service: Robotics - Pulmonary;  Laterality: N/A;    HIP INTERTROCHANTERIC NAILING Right 2024    Procedure: HIP INTERTROCHANTERIC NAILING;  Surgeon: Molina Clayton MD;  Location: [facility] MAIN OR;  Service: Orthopedics;  Laterality: Right;    US GUIDED FINE NEEDLE ASPIRATION  2024         Physical Assessment:  Wound 24 1019 Right lower gluteal Pressure Injury (Active)   Wound Image   24 1019   Pressure Injury Stage DTPI 24 1019   Dressing Appearance open to air 24 1019   Closure None 24 1019   Base closed/resurfaced 24 1019   Periwound non-blanchable;maroon/purple; blanchable redness 24 1019   Periwound Temperature warm 24 1019   Periwound Skin Turgor soft 24 1019   Edges rolled/closed 24 1019   Wound Length (cm) 4.8 cm 24 1019   Wound Width  (cm) 4.5 cm 07/16/24 1019   Depth (cm) Less than 0.1 cm    Wound Surface Area (cm^2) 21.6 cm^2 07/16/24 1019   Drainage Amount none 07/16/24 1019   Care, Wound cleansed with;sterile normal saline 07/16/24 1019   Dressing Care dressing applied;non-adherent;petroleum-based;gauze;border dressing;silicone 07/16/24 1019   Periwound Care dry periwound area maintained 07/16/24 1019       Wound 07/16/24 1019 Left posterior greater trochanter Pressure Injury (Active)   Wound Image   07/16/24 1019   Pressure Injury Stage DTPI 07/16/24 1019   Dressing Appearance open to air 07/16/24 1019   Closure None 07/16/24 1019   Base closed/resurfaced 07/16/24 1019   Periwound non-blanchable;maroon/purple;blanchable redness 07/16/24 1019   Periwound Temperature warm 07/16/24 1019   Periwound Skin Turgor soft 07/16/24 1019   Edges rolled/closed 07/16/24 1019   Wound Length (cm) 2.1 cm 07/16/24 1019   Wound Width (cm) 2.3 cm 07/16/24 1019   Depth (cm) Less than 0.1 cm    Wound Surface Area (cm^2) 4.83 cm^2 07/16/24 1019   Drainage Amount none 07/16/24 1019   Care, Wound cleansed with;sterile normal saline 07/16/24 1019   Dressing Care dressing applied;non-adherent;petroleum-based;gauze;border dressing;silicone 07/16/24 1019   Periwound Care dry periwound area maintained 07/16/24 1019        Wound Check / Follow-up:  Patient seen today for a wound consult. Patient is resting with eyes closed, lying on his left side at time of assessment. Patient responds to name but does not openly communicate with staff. Patient is agreeable to assessment. Primary nurse at bedside.    Areas of Deep Tissue Pressure Injury noted to left posterior greater trochanter and right lower gluteal aspect. Tissue is dry and intact with areas of non-blanchable maroon/purple and blanchable redness at borders.  Cleansed with normal saline. Recommending daily dressing changes with non-adherent, petroleum-based gauze applied to DTPIs with silicone border dressing  securement.    Delbert Score 11: : Implement Q2H turns and offload at all times. Keep the patient clean and free from all moisture. Recommend to provide quality skin care and hygiene, apply blue top moisture barrier TID / PRN to bony prominences and leave open to air.    Impression: Deep Tissue Pressure Injuries    Short term goals: Regain skin integrity, skin protection, moisture prevention, pressure reduction, quality skin care and hygiene, once a day dressing changes    Micheline Edwards RN    7/16/2024    12:52 EDT

## 2024-07-16 NOTE — PLAN OF CARE
Problem: Adult Inpatient Plan of Care  Goal: Plan of Care Review  Outcome: Met   Goal Outcome Evaluation:

## 2024-07-16 NOTE — DISCHARGE SUMMARY
[facility]         HOSPITALIST  DISCHARGE SUMMARY    Patient Name: Oswaldo Bowen  : 1980  MRN: [id number]    Date of Admission: 2024  Date of Discharge:  2024  Primary Care Physician: Provider, No Known    Consults       Date and Time Order Name Status Description    2024  3:10 PM Hematology & Oncology Inpatient Consult      2024  7:13 AM Inpatient Pulmonology Consult Completed     2024  7:16 PM Inpatient Hospitalist Consult      2024  7:30 AM Hematology & Oncology Inpatient Consult Completed     2024  7:29 AM Inpatient Pulmonology Consult Completed             Active and Resolved Hospital Problems:  Active Hospital Problems    Diagnosis POA    **Sepsis [A41.9] Yes    Anemia [D64.9] Unknown      Resolved Hospital Problems   No resolved problems to display.     Assessment:  Subjective fevers, unknown source  Metastatic adenocarcinoma of the lung, left  Neoplasm related pain  Current tobacco smoker  Intellectual disability  Acute on chronic anemia  Symptomatic anemia  Depression  Recent hospitalization for femur fracture  Hypomagnesemia  Iron deficiency    Hospital Course     Hospital Course:  Oswaldo Bowen is a 44 y.o. male  with history of intellectual disability, tobacco smoker, depression, chronic anemia, with recent hospitalization for mechanical fall with displaced intertrochanteric femur fracture requiring orthopedic intervention, VLAD, with suspected new metastatic lung cancer and mets to the bone with a 2.6 cm nodule in the left upper lobe, which came back positive for adenocarcinoma.      Patient admitted for chief complaint of fevers, pancultured, placed on broad-spectrum antibiotics. Pulmonary consulted with underlying adenocarcinoma of the lung which appears to be widely metastatic to bone, could be causing fevers.  Oncology evaluated the patient, added receptor testing to guide further treatment recommendations.  Patient completed 7 days of  broad-spectrum antibiotics, source not found.  Fever curve improved.  Given his mental delay case was discussed with his sister who is his caregiver, she is requesting rehab placement.  Healthcare surrogacy has now shifted back to patient's sister.  Continue to push patient to be out of bed and ambulatory. Patient's sister agreed on SNF placement and possible chemo-immunotherapy as outpatient by Oncologist. Patient has been accepted by [facility]; pain well controlled with current pain regimen.    Patient is being discharged to rehab today. He is stable at the time of discharge. He will follow up with PCP in 3-7 days; needs CBC and chemistries trended during follow up. Follow up with Oncologist as outpatient, possible Chemo-immunotherapy, to be discussed with Oncologist. Continue PT/OT. High risk of readmission due to patient's underlying metastatic lung cancer.        DISCHARGE Follow Up Recommendations for labs and diagnostics: as mentioned above.      Day of Discharge     Vital Signs:  Temp:  [97.3 °F (36.3 °C)-99.5 °F (37.5 °C)] 97.3 °F (36.3 °C)  Heart Rate:  [] 96  Resp:  [16-18] 16  BP: (105-119)/(64-76) 105/76  Physical Exam:   en: NAD, Alert and Oriented, cachexia, laying in bed, sleepy but answers all questions appropriately and awake throughout the conversation. Seems like his current baseline.  Cards: RRR, no murmur   Pulm: CTA b/l, no wheezing  Abd: soft, nondistended  Extremities: no pitting edema    Discharge Details        Discharge Medications        New Medications        Instructions Start Date   HYDROcodone-acetaminophen  MG per tablet  Commonly known as: NORCO  Replaces: HYDROcodone-acetaminophen 5-325 MG per tablet   1 tablet, Oral, Every 6 Hours PRN      Morphine 15 MG 12 hr tablet  Commonly known as: MS CONTIN   45 mg, Oral, Every 12 Hours Scheduled      naloxone 4 MG/0.1ML nasal spray  Commonly known as: NARCAN   Call 911. Don't prime. [place] in 1 nostril for overdose.  Repeat in 2-3 minutes in other nostril if no or minimal breathing/responsiveness.             Changes to Medications        Instructions Start Date   cyanocobalamin 1000 MCG tablet  Commonly known as: VITAMIN B-12  What changed: how much to take   1,000 mcg, Oral, Daily      metoprolol succinate XL 25 MG 24 hr tablet  Commonly known as: TOPROL-XL  What changed:   medication strength  how much to take   75 mg, Oral, Every 12 Hours Scheduled             Continue These Medications        Instructions Start Date   acetaminophen 325 MG tablet  Commonly known as: TYLENOL   650 mg, Oral, Every 6 Hours PRN      albuterol sulfate  (90 Base) MCG/ACT inhaler  Commonly known as: PROVENTIL HFA;VENTOLIN HFA;PROAIR HFA   2 puffs, Inhalation, 2 Times Daily      aluminum-magnesium hydroxide-simethicone 200-200-20 MG/5ML suspension  Commonly known as: MAALOX/MYLANTA   30 mL, Oral, Every 4 Hours PRN      bisacodyl 10 MG suppository  Commonly known as: DULCOLAX   10 mg, Rectal, Once      Fluticasone Furoate-Vilanterol 100-25 MCG/ACT aerosol powder    1 puff, Inhalation, Daily - RT      Lidocaine 4 %   1 patch, Transdermal, Daily PRN, Remove & Discard patch within 12 hours or as directed by MD      loperamide 2 MG capsule  Commonly known as: IMODIUM   2 mg, Oral, Every 6 Hours PRN      multivitamin with minerals tablet tablet   1 tablet, Oral, Daily      nicotine 21 MG/24HR patch  Commonly known as: NICODERM CQ   1 patch, Transdermal, Daily PRN      ondansetron ODT 4 MG disintegrating tablet  Commonly known as: ZOFRAN-ODT   4 mg, Oral, Every 6 Hours PRN      polyethylene glycol 17 g packet  Commonly known as: MIRALAX   17 g, Oral, Daily      Psyllium 51.7 % packet  Commonly known as: METAMUCIL MULTIHEALTH FIBER   1 packet, Oral, Daily      sennosides-docusate 8.6-50 MG per tablet  Commonly known as: PERICOLACE   2 tablets, Oral, 3 times daily      Umeclidinium Bromide 62.5 MCG/ACT aerosol powder   Commonly known as: INCRUSE  ELLIPTA   1 puff, Inhalation, Daily             Stop These Medications      azithromycin 250 MG tablet  Commonly known as: ZITHROMAX     cefTRIAXone  Commonly known as: ROCEPHIN     Heparin Sodium (Porcine) 5000 UNIT/0.5ML solution prefilled syringe     HYDROcodone-acetaminophen 5-325 MG per tablet  Commonly known as: NORCO  Replaced by: HYDROcodone-acetaminophen  MG per tablet              No Known Allergies    Discharge Disposition:  Rehab Facility or Unit (DC - External)    Diet:  Hospital:  Diet Order   Procedures    Diet: Regular/House; Fluid Consistency: Thin (IDDSI 0)       Discharge Activity:       CODE STATUS:  Code Status and Medical Interventions:   Ordered at: 06/27/24 1768     Code Status (Patient has no pulse and is not breathing):    CPR (Attempt to Resuscitate)     Medical Interventions (Patient has pulse or is breathing):    Full Support       No future appointments.    Additional Instructions for the Follow-ups that You Need to Schedule       Ambulatory Referral to Oncology   As directed      In 1-2 weeks    Discharge Follow-up with PCP   As directed       Currently Documented PCP:    Provider, No Known    PCP Phone Number:    None     Follow Up Details: In 3-7 days; needs CBC and chemistries trended during follow up.                Pertinent  and/or Most Recent Results     PROCEDURES:       LAB RESULTS:      Lab 07/16/24  0503 07/15/24  0510 07/14/24  0530 07/13/24  0539 07/12/24  0533   WBC 13.09* 15.92* 13.87* 10.22 12.78*   HEMOGLOBIN 8.3* 8.8* 9.0* 8.6* 8.4*   HEMATOCRIT 28.1* 29.5* 29.6* 28.7* 28.1*   PLATELETS 459* 443 454* 464* 510*   NEUTROS ABS 10.63*  --   --   --   --    IMMATURE GRANS (ABS) 0.10*  --   --   --   --    LYMPHS ABS 0.99  --   --   --   --    MONOS ABS 1.26*  --   --   --   --    EOS ABS 0.08  --   --   --   --    MCV 85.2 85.3 84.3 85.9 85.9         Lab 07/16/24  0504 07/15/24  0510 07/14/24  0530 07/13/24  0539 07/12/24  0533   SODIUM 138 135* 134* 135* 135*    POTASSIUM 3.3* 3.7 3.8 3.5 3.4*   CHLORIDE 96* 93* 93* 94* 94*   CO2 30.9* 30.1* 29.8* 29.8* 32.9*   ANION GAP 11.1 11.9 11.2 11.2 8.1   BUN 17 16 11 11 11   CREATININE 0.51* 0.51* 0.52* 0.38* 0.47*   EGFR 128.2 128.2 127.5 140.1 131.4   GLUCOSE 122* 110* 111* 100* 96   CALCIUM 11.6* 11.8* 11.4* 10.7* 10.8*   MAGNESIUM 1.7 1.7 1.9 1.5* 1.7   PHOSPHORUS 3.4  --   --   --   --                          Brief Urine Lab Results  (Last result in the past 365 days)        Color   Clarity   Blood   Leuk Est   Nitrite   Protein   CREAT   Urine HCG        06/27/24 1856 Yellow   Clear   Negative   Negative   Negative   Trace                 Microbiology Results (last 10 days)       ** No results found for the last 240 hours. **            No radiology results for the last 7 days                 Labs Pending at Discharge:        Time spent on Discharge including face to face service:  35 minutes    Electronically signed by Donavan Shelley MD, 07/16/24, 9:37 AM EDT.

## 2024-07-16 NOTE — PLAN OF CARE
Goal Outcome Evaluation:  Plan of Care Reviewed With: patient        Progress: no change  Outcome Evaluation: at beginning of shift pt confused to place and time, easily reoriented. during the shift pt became more oriented and was aox4. pt very flat affect, refuses to sit the head of the bed up, educated on risk of aspiration. pt refuses turns at times, pt with new pressure injuries to bilateral buttocks/thighs. educated on importance of turning to prevent further breakdown. prn norco given twice for pain per mar.

## 2024-07-16 NOTE — SIGNIFICANT NOTE
07/16/24 0839   OTHER   Discipline occupational therapist   Rehab Time/Intention   Session Not Performed patient unavailable for treatment  (With PCA)

## 2024-07-29 ENCOUNTER — APPOINTMENT (OUTPATIENT)
Dept: GENERAL RADIOLOGY | Facility: HOSPITAL | Age: 44
End: 2024-07-29
Payer: COMMERCIAL

## 2024-07-29 ENCOUNTER — HOSPITAL ENCOUNTER (INPATIENT)
Facility: HOSPITAL | Age: 44
LOS: 3 days | Discharge: NURSING, REHAB OR LONG-TERM CARE | End: 2024-08-01
Attending: EMERGENCY MEDICINE | Admitting: INTERNAL MEDICINE
Payer: COMMERCIAL

## 2024-07-29 DIAGNOSIS — A41.9 SEPSIS, DUE TO UNSPECIFIED ORGANISM, UNSPECIFIED WHETHER ACUTE ORGAN DYSFUNCTION PRESENT: Primary | ICD-10-CM

## 2024-07-29 DIAGNOSIS — R13.12 DYSPHAGIA, OROPHARYNGEAL: ICD-10-CM

## 2024-07-29 DIAGNOSIS — R26.2 DIFFICULTY IN WALKING: ICD-10-CM

## 2024-07-29 PROBLEM — E83.52 HYPERCALCEMIA: Status: ACTIVE | Noted: 2024-07-29

## 2024-07-29 LAB
ALBUMIN SERPL-MCNC: 3 G/DL (ref 3.5–5.2)
ALBUMIN/GLOB SERPL: 0.6 G/DL
ALP SERPL-CCNC: 161 U/L (ref 39–117)
ALT SERPL W P-5'-P-CCNC: 14 U/L (ref 1–41)
ANION GAP SERPL CALCULATED.3IONS-SCNC: 16 MMOL/L (ref 5–15)
AST SERPL-CCNC: 28 U/L (ref 1–40)
BASOPHILS # BLD AUTO: 0.03 10*3/MM3 (ref 0–0.2)
BASOPHILS NFR BLD AUTO: 0.2 % (ref 0–1.5)
BILIRUB SERPL-MCNC: 0.6 MG/DL (ref 0–1.2)
BILIRUB UR QL STRIP: NEGATIVE
BUN SERPL-MCNC: 22 MG/DL (ref 6–20)
BUN/CREAT SERPL: 39.3 (ref 7–25)
CALCIUM SPEC-SCNC: 12.1 MG/DL (ref 8.6–10.5)
CHLORIDE SERPL-SCNC: 100 MMOL/L (ref 98–107)
CLARITY UR: CLEAR
CO2 SERPL-SCNC: 32 MMOL/L (ref 22–29)
COLOR UR: ABNORMAL
CREAT SERPL-MCNC: 0.56 MG/DL (ref 0.76–1.27)
D-LACTATE SERPL-SCNC: 2 MMOL/L (ref 0.5–2)
D-LACTATE SERPL-SCNC: 2.6 MMOL/L (ref 0.5–2)
DEPRECATED RDW RBC AUTO: 53.5 FL (ref 37–54)
EGFRCR SERPLBLD CKD-EPI 2021: 124.6 ML/MIN/1.73
EOSINOPHIL # BLD AUTO: 0.01 10*3/MM3 (ref 0–0.4)
EOSINOPHIL NFR BLD AUTO: 0.1 % (ref 0.3–6.2)
ERYTHROCYTE [DISTWIDTH] IN BLOOD BY AUTOMATED COUNT: 16.7 % (ref 12.3–15.4)
FLUAV SUBTYP SPEC NAA+PROBE: NOT DETECTED
FLUBV RNA ISLT QL NAA+PROBE: NOT DETECTED
GLOBULIN UR ELPH-MCNC: 4.7 GM/DL
GLUCOSE SERPL-MCNC: 122 MG/DL (ref 65–99)
GLUCOSE UR STRIP-MCNC: NEGATIVE MG/DL
HCT VFR BLD AUTO: 34.5 % (ref 37.5–51)
HGB BLD-MCNC: 9.8 G/DL (ref 13–17.7)
HGB UR QL STRIP.AUTO: NEGATIVE
HOLD SPECIMEN: NORMAL
HOLD SPECIMEN: NORMAL
IMM GRANULOCYTES # BLD AUTO: 0.35 10*3/MM3 (ref 0–0.05)
IMM GRANULOCYTES NFR BLD AUTO: 1.8 % (ref 0–0.5)
KETONES UR QL STRIP: ABNORMAL
LEUKOCYTE ESTERASE UR QL STRIP.AUTO: NEGATIVE
LYMPHOCYTES # BLD AUTO: 1.43 10*3/MM3 (ref 0.7–3.1)
LYMPHOCYTES NFR BLD AUTO: 7.5 % (ref 19.6–45.3)
MCH RBC QN AUTO: 24.9 PG (ref 26.6–33)
MCHC RBC AUTO-ENTMCNC: 28.4 G/DL (ref 31.5–35.7)
MCV RBC AUTO: 87.6 FL (ref 79–97)
MONOCYTES # BLD AUTO: 0.96 10*3/MM3 (ref 0.1–0.9)
MONOCYTES NFR BLD AUTO: 5.1 % (ref 5–12)
NEUTROPHILS NFR BLD AUTO: 16.18 10*3/MM3 (ref 1.7–7)
NEUTROPHILS NFR BLD AUTO: 85.3 % (ref 42.7–76)
NITRITE UR QL STRIP: NEGATIVE
NRBC BLD AUTO-RTO: 0 /100 WBC (ref 0–0.2)
PH UR STRIP.AUTO: 5.5 [PH] (ref 5–8)
PLATELET # BLD AUTO: 414 10*3/MM3 (ref 140–450)
PMV BLD AUTO: 9.9 FL (ref 6–12)
POTASSIUM SERPL-SCNC: 3.6 MMOL/L (ref 3.5–5.2)
PROCALCITONIN SERPL-MCNC: 0.31 NG/ML (ref 0–0.25)
PROCALCITONIN SERPL-MCNC: 0.33 NG/ML (ref 0–0.25)
PROT SERPL-MCNC: 7.7 G/DL (ref 6–8.5)
PROT UR QL STRIP: ABNORMAL
QT INTERVAL: 374 MS
QTC INTERVAL: 491 MS
RBC # BLD AUTO: 3.94 10*6/MM3 (ref 4.14–5.8)
RSV RNA NPH QL NAA+NON-PROBE: NOT DETECTED
SARS-COV-2 RNA RESP QL NAA+PROBE: NOT DETECTED
SODIUM SERPL-SCNC: 148 MMOL/L (ref 136–145)
SP GR UR STRIP: 1.02 (ref 1–1.03)
UROBILINOGEN UR QL STRIP: ABNORMAL
WBC NRBC COR # BLD AUTO: 18.96 10*3/MM3 (ref 3.4–10.8)
WHOLE BLOOD HOLD COAG: NORMAL
WHOLE BLOOD HOLD SPECIMEN: NORMAL

## 2024-07-29 PROCEDURE — 99223 1ST HOSP IP/OBS HIGH 75: CPT | Performed by: INTERNAL MEDICINE

## 2024-07-29 PROCEDURE — 80053 COMPREHEN METABOLIC PANEL: CPT | Performed by: EMERGENCY MEDICINE

## 2024-07-29 PROCEDURE — 25010000002 VANCOMYCIN 5 G RECONSTITUTED SOLUTION: Performed by: EMERGENCY MEDICINE

## 2024-07-29 PROCEDURE — 36415 COLL VENOUS BLD VENIPUNCTURE: CPT | Performed by: EMERGENCY MEDICINE

## 2024-07-29 PROCEDURE — 84145 PROCALCITONIN (PCT): CPT | Performed by: INTERNAL MEDICINE

## 2024-07-29 PROCEDURE — 82746 ASSAY OF FOLIC ACID SERUM: CPT | Performed by: INTERNAL MEDICINE

## 2024-07-29 PROCEDURE — 25010000002 CEFEPIME PER 500 MG: Performed by: EMERGENCY MEDICINE

## 2024-07-29 PROCEDURE — 82607 VITAMIN B-12: CPT | Performed by: INTERNAL MEDICINE

## 2024-07-29 PROCEDURE — 25010000002 PIPERACILLIN SOD-TAZOBACTAM PER 1 G: Performed by: INTERNAL MEDICINE

## 2024-07-29 PROCEDURE — 83605 ASSAY OF LACTIC ACID: CPT | Performed by: EMERGENCY MEDICINE

## 2024-07-29 PROCEDURE — 99291 CRITICAL CARE FIRST HOUR: CPT

## 2024-07-29 PROCEDURE — 87637 SARSCOV2&INF A&B&RSV AMP PRB: CPT | Performed by: EMERGENCY MEDICINE

## 2024-07-29 PROCEDURE — 85025 COMPLETE CBC W/AUTO DIFF WBC: CPT | Performed by: EMERGENCY MEDICINE

## 2024-07-29 PROCEDURE — 25010000002 HEPARIN (PORCINE) PER 1000 UNITS: Performed by: INTERNAL MEDICINE

## 2024-07-29 PROCEDURE — 87040 BLOOD CULTURE FOR BACTERIA: CPT | Performed by: EMERGENCY MEDICINE

## 2024-07-29 PROCEDURE — 25810000003 SODIUM CHLORIDE 0.9 % SOLUTION: Performed by: EMERGENCY MEDICINE

## 2024-07-29 PROCEDURE — 25810000003 SODIUM CHLORIDE 0.9 % SOLUTION: Performed by: INTERNAL MEDICINE

## 2024-07-29 PROCEDURE — 71045 X-RAY EXAM CHEST 1 VIEW: CPT

## 2024-07-29 PROCEDURE — 93005 ELECTROCARDIOGRAM TRACING: CPT | Performed by: INTERNAL MEDICINE

## 2024-07-29 PROCEDURE — 81003 URINALYSIS AUTO W/O SCOPE: CPT | Performed by: EMERGENCY MEDICINE

## 2024-07-29 RX ORDER — FLUTICASONE PROPIONATE AND SALMETEROL 50; 250 UG/1; UG/1
1 POWDER RESPIRATORY (INHALATION)
COMMUNITY
Start: 2024-07-16

## 2024-07-29 RX ORDER — HYDROCODONE BITARTRATE AND ACETAMINOPHEN 10; 325 MG/1; MG/1
1 TABLET ORAL EVERY 6 HOURS PRN
COMMUNITY
End: 2024-08-01

## 2024-07-29 RX ORDER — ACETAMINOPHEN 160 MG/5ML
650 SOLUTION ORAL EVERY 4 HOURS PRN
Status: DISCONTINUED | OUTPATIENT
Start: 2024-07-29 | End: 2024-08-01 | Stop reason: HOSPADM

## 2024-07-29 RX ORDER — SODIUM CHLORIDE 0.9 % (FLUSH) 0.9 %
10 SYRINGE (ML) INJECTION AS NEEDED
Status: DISCONTINUED | OUTPATIENT
Start: 2024-07-29 | End: 2024-07-29

## 2024-07-29 RX ORDER — AMOXICILLIN AND CLAVULANATE POTASSIUM 875; 125 MG/1; MG/1
1 TABLET, FILM COATED ORAL 2 TIMES DAILY
COMMUNITY
Start: 2024-07-26 | End: 2024-08-01

## 2024-07-29 RX ORDER — ENEMA 19; 7 G/133ML; G/133ML
1 ENEMA RECTAL ONCE AS NEEDED
COMMUNITY
End: 2024-08-01

## 2024-07-29 RX ORDER — AMOXICILLIN 250 MG
2 CAPSULE ORAL 2 TIMES DAILY PRN
Status: DISCONTINUED | OUTPATIENT
Start: 2024-07-29 | End: 2024-08-01 | Stop reason: HOSPADM

## 2024-07-29 RX ORDER — AZITHROMYCIN 250 MG/1
250 TABLET, FILM COATED ORAL DAILY
COMMUNITY
End: 2024-07-29

## 2024-07-29 RX ORDER — BISACODYL 5 MG/1
5 TABLET, DELAYED RELEASE ORAL DAILY PRN
Status: DISCONTINUED | OUTPATIENT
Start: 2024-07-29 | End: 2024-08-01 | Stop reason: HOSPADM

## 2024-07-29 RX ORDER — SODIUM CHLORIDE 9 MG/ML
125 INJECTION, SOLUTION INTRAVENOUS CONTINUOUS
Status: DISCONTINUED | OUTPATIENT
Start: 2024-07-29 | End: 2024-07-29

## 2024-07-29 RX ORDER — MAGNESIUM HYDROXIDE 1200 MG/15ML
30 SUSPENSION ORAL DAILY PRN
COMMUNITY
Start: 2024-07-16 | End: 2024-08-01

## 2024-07-29 RX ORDER — ACETAMINOPHEN 325 MG/1
650 TABLET ORAL EVERY 4 HOURS PRN
Status: DISCONTINUED | OUTPATIENT
Start: 2024-07-29 | End: 2024-08-01 | Stop reason: HOSPADM

## 2024-07-29 RX ORDER — HEPARIN SODIUM 5000 [USP'U]/ML
5000 INJECTION, SOLUTION INTRAVENOUS; SUBCUTANEOUS EVERY 8 HOURS SCHEDULED
Status: DISCONTINUED | OUTPATIENT
Start: 2024-07-29 | End: 2024-07-31

## 2024-07-29 RX ORDER — ACETAMINOPHEN 650 MG/1
650 SUPPOSITORY RECTAL EVERY 4 HOURS PRN
Status: DISCONTINUED | OUTPATIENT
Start: 2024-07-29 | End: 2024-08-01 | Stop reason: HOSPADM

## 2024-07-29 RX ORDER — HEPARIN SODIUM 5000 [USP'U]/.5ML
5000 INJECTION, SOLUTION INTRAVENOUS; SUBCUTANEOUS EVERY 12 HOURS SCHEDULED
COMMUNITY
End: 2024-07-29

## 2024-07-29 RX ORDER — ACETAMINOPHEN 325 MG/1
975 TABLET ORAL ONCE
Status: DISCONTINUED | OUTPATIENT
Start: 2024-07-29 | End: 2024-07-29

## 2024-07-29 RX ORDER — SERTRALINE HYDROCHLORIDE 25 MG/1
25 TABLET, FILM COATED ORAL DAILY
COMMUNITY
Start: 2024-07-24 | End: 2024-08-01

## 2024-07-29 RX ORDER — SODIUM CHLORIDE 0.9 % (FLUSH) 0.9 %
10 SYRINGE (ML) INJECTION AS NEEDED
Status: DISCONTINUED | OUTPATIENT
Start: 2024-07-29 | End: 2024-08-01 | Stop reason: HOSPADM

## 2024-07-29 RX ORDER — BISACODYL 10 MG
10 SUPPOSITORY, RECTAL RECTAL DAILY PRN
Status: DISCONTINUED | OUTPATIENT
Start: 2024-07-29 | End: 2024-08-01 | Stop reason: HOSPADM

## 2024-07-29 RX ORDER — SODIUM CHLORIDE 9 MG/ML
40 INJECTION, SOLUTION INTRAVENOUS AS NEEDED
Status: DISCONTINUED | OUTPATIENT
Start: 2024-07-29 | End: 2024-08-01 | Stop reason: HOSPADM

## 2024-07-29 RX ORDER — ACETAMINOPHEN 650 MG/1
650 SUPPOSITORY RECTAL ONCE
Status: DISCONTINUED | OUTPATIENT
Start: 2024-07-29 | End: 2024-08-01 | Stop reason: HOSPADM

## 2024-07-29 RX ORDER — MORPHINE SULFATE 15 MG/1
45 TABLET, FILM COATED, EXTENDED RELEASE ORAL 2 TIMES DAILY
Status: ON HOLD | COMMUNITY
Start: 2024-07-28 | End: 2024-08-01

## 2024-07-29 RX ORDER — AMMONIUM LACTATE 12 G/100G
1 LOTION TOPICAL NIGHTLY
COMMUNITY
End: 2024-08-01

## 2024-07-29 RX ORDER — POLYETHYLENE GLYCOL 3350 17 G/17G
17 POWDER, FOR SOLUTION ORAL DAILY PRN
Status: DISCONTINUED | OUTPATIENT
Start: 2024-07-29 | End: 2024-08-01 | Stop reason: HOSPADM

## 2024-07-29 RX ORDER — SODIUM CHLORIDE 450 MG/100ML
150 INJECTION, SOLUTION INTRAVENOUS CONTINUOUS
Status: DISCONTINUED | OUTPATIENT
Start: 2024-07-29 | End: 2024-07-30

## 2024-07-29 RX ORDER — NITROGLYCERIN 0.4 MG/1
0.4 TABLET SUBLINGUAL
Status: DISCONTINUED | OUTPATIENT
Start: 2024-07-29 | End: 2024-07-31

## 2024-07-29 RX ORDER — LOPERAMIDE HYDROCHLORIDE 2 MG/1
2 CAPSULE ORAL 4 TIMES DAILY PRN
COMMUNITY

## 2024-07-29 RX ORDER — SODIUM CHLORIDE 0.9 % (FLUSH) 0.9 %
10 SYRINGE (ML) INJECTION EVERY 12 HOURS SCHEDULED
Status: DISCONTINUED | OUTPATIENT
Start: 2024-07-29 | End: 2024-08-01 | Stop reason: HOSPADM

## 2024-07-29 RX ADMIN — PIPERACILLIN AND TAZOBACTAM 4.5 G: 4; .5 INJECTION, POWDER, FOR SOLUTION INTRAVENOUS at 21:57

## 2024-07-29 RX ADMIN — Medication 10 ML: at 20:15

## 2024-07-29 RX ADMIN — CEFEPIME 2000 MG: 2 INJECTION, POWDER, FOR SOLUTION INTRAVENOUS at 10:41

## 2024-07-29 RX ADMIN — VANCOMYCIN HYDROCHLORIDE 1250 MG: 5 INJECTION, POWDER, LYOPHILIZED, FOR SOLUTION INTRAVENOUS at 12:16

## 2024-07-29 RX ADMIN — SODIUM CHLORIDE 1905 ML: 9 INJECTION, SOLUTION INTRAVENOUS at 10:40

## 2024-07-29 RX ADMIN — HEPARIN SODIUM 5000 UNITS: 5000 INJECTION INTRAVENOUS; SUBCUTANEOUS at 21:58

## 2024-07-29 RX ADMIN — SODIUM CHLORIDE 150 ML/HR: 4.5 INJECTION, SOLUTION INTRAVENOUS at 20:15

## 2024-07-29 RX ADMIN — SODIUM CHLORIDE 125 ML/HR: 9 INJECTION, SOLUTION INTRAVENOUS at 17:03

## 2024-07-29 NOTE — PLAN OF CARE
Problem: Adult Inpatient Plan of Care  Goal: Plan of Care Review     Problem: Adult Inpatient Plan of Care  Goal: Plan of Care Review   Goal Outcome Evaluation:

## 2024-07-29 NOTE — CONSULTS
Patient Care Team:  Provider, No Known as PCP - General    Chief complaint: Hypercalcemia    Consults   Subjective     History of Present Illness  43yo with h/o mental disability with metastatic adenocarcinoma of lung with mets to bone.  He had been discharged to rehab to try and improve mobility for chemo/immunotherapy.  He remains faril and week.  He was sent to ER with fever and leukocytosis.  He had noted hypercalcemia and we were asked to see him.    Review of Systems   Constitutional:  Negative for fatigue.   Respiratory:  Negative for cough and shortness of breath.    Cardiovascular:  Negative for chest pain and leg swelling.   Gastrointestinal:  Negative for diarrhea, nausea and vomiting.   Genitourinary:  Negative for dysuria.   Neurological:  Negative for seizures.        Past Medical History:   Diagnosis Date    Anemia     Cancer     Depression     Hypertension     Hypomagnesemia    ,   Past Surgical History:   Procedure Laterality Date    BRONCHOSCOPY N/A 5/23/2024    Procedure: BRONCHOSCOPY WITH ENDOBRONCHIAL ULTRASOUND, FINE NEEDLE ASPIRATE, BIOPSIES, BRUSHINGS, BRONCHOALVEOLAR LAVAGE;  Surgeon: Kendell Stern MD;  Location: [facility] ENDOSCOPY;  Service: Pulmonary;  Laterality: N/A;  LUNG NODULE, MUCOUS PLUGGING    BRONCHOSCOPY WITH ION ROBOTIC ASSIST N/A 6/20/2024    Procedure: BRONCHOSCOPY NAVIGATION WITH ENDOBRONCHIAL ULTRASOUND AND ION ROBOT. REBUS, EBUS, BRUSHINGS, WASHINGS, BAL, BIOPSIES AND NEEDLE ASPIRATE;  Surgeon: John Elizalde MD;  Location: [facility] MAIN OR;  Service: Robotics - Pulmonary;  Laterality: N/A;    HIP INTERTROCHANTERIC NAILING Right 5/21/2024    Procedure: HIP INTERTROCHANTERIC NAILING;  Surgeon: Molina Clayton MD;  Location: [facility] MAIN OR;  Service: Orthopedics;  Laterality: Right;    US GUIDED FINE NEEDLE ASPIRATION  6/11/2024   , History reviewed. No pertinent family history.,   Social History     Socioeconomic History    Marital status: Single   Tobacco Use    Smoking  status: Every Day     Current packs/day: 1.50     Average packs/day: 1.5 packs/day for 24.6 years (36.9 ttl pk-yrs)     Types: Cigarettes     Start date: 2000    Smokeless tobacco: Former   Vaping Use    Vaping status: Never Used   Substance and Sexual Activity    Alcohol use: Never    Drug use: Never    Sexual activity: Defer     E-cigarette/Vaping    E-cigarette/Vaping Use Never User      E-cigarette/Vaping Substances     E-cigarette/Vaping Devices         ,   Medications Prior to Admission   Medication Sig Dispense Refill Last Dose    acetaminophen (TYLENOL) 325 MG tablet Take 2 tablets by mouth Every 6 (Six) Hours As Needed for Mild Pain or Fever.       Advair Diskus 250-50 MCG/ACT DISKUS Inhale 1 puff Daily.       albuterol sulfate  (90 Base) MCG/ACT inhaler Inhale 2 puffs 2 (Two) Times a Day.       aluminum-magnesium hydroxide-simethicone (MAALOX/MYLANTA) 200-200-20 MG/5ML suspension Take 30 mL by mouth Every 4 (Four) Hours As Needed for Heartburn.       ammonium lactate (LAC-HYDRIN) 12 % lotion Apply 1 Application topically to the appropriate area as directed Every Night. For feet       amoxicillin-clavulanate (AUGMENTIN) 875-125 MG per tablet Take 1 tablet by mouth 2 (Two) Times a Day. For 5 days       bisacodyl (DULCOLAX) 10 MG suppository Insert 1 suppository into the rectum Daily As Needed for Constipation.       HYDROcodone-acetaminophen (NORCO)  MG per tablet Take 1 tablet by mouth Every 6 (Six) Hours As Needed for Moderate Pain.       Lidocaine 4 % Place 1 patch on the skin as directed by provider Daily As Needed for Mild Pain. Remove & Discard patch within 12 hours or as directed by MD       loperamide (IMODIUM) 2 MG capsule Take 1 capsule by mouth 4 (Four) Times a Day As Needed for Diarrhea.       metoprolol succinate XL (TOPROL-XL) 25 MG 24 hr tablet Take 3 tablets by mouth Every 12 (Twelve) Hours for 30 days. 180 tablet 0     Milk of Magnesia 400 MG/5ML suspension Take 30 mL by mouth  Daily As Needed for Constipation.       Morphine (MS CONTIN) 15 MG 12 hr tablet Take 3 tablets by mouth 2 (Two) Times a Day.       naloxone (NARCAN) 4 MG/0.1ML nasal spray Call 911. Don't prime. [place] in 1 nostril for overdose. Repeat in 2-3 minutes in other nostril if no or minimal breathing/responsiveness. 2 each 0     nicotine (NICODERM CQ) 21 MG/24HR patch Place 1 patch on the skin as directed by provider Daily As Needed (smoking cessation).       ondansetron ODT (ZOFRAN-ODT) 4 MG disintegrating tablet Take 1 tablet by mouth Every 6 (Six) Hours As Needed for Nausea or Vomiting.       polyethylene glycol (MIRALAX) 17 g packet Take 17 g by mouth Daily. (Patient taking differently: Take 17 g by mouth Daily As Needed (constipation).)       Psyllium (METAMUCIL MULTIHEALTH FIBER) 51.7 % packet Take 1 packet by mouth Daily.       sennosides-docusate (senna-docusate sodium) 8.6-50 MG per tablet Take 2 tablets by mouth 3 times a day.       sertraline (ZOLOFT) 25 MG tablet Take 1 tablet by mouth Daily.       Sodium Phosphates (fleet enema) 7-19 GM/118ML enema Insert 1 enema into the rectum 1 (One) Time As Needed.       Umeclidinium Bromide (INCRUSE ELLIPTA) 62.5 MCG/ACT aerosol powder  Inhale 1 puff Daily.       vitamin B-12 (VITAMIN B-12) 1000 MCG tablet Take 1 tablet by mouth Daily.      , Scheduled Meds:  acetaminophen, 650 mg, Rectal, Once  heparin (porcine), 5,000 Units, Subcutaneous, Q8H  piperacillin-tazobactam, 4.5 g, Intravenous, Once  piperacillin-tazobactam, 4.5 g, Intravenous, Q8H  sodium chloride, 10 mL, Intravenous, Q12H  [START ON 7/30/2024] vancomycin, 1,250 mg, Intravenous, Q12H   , Continuous Infusions:  Pharmacy to dose vancomycin,   Pharmacy to Dose Zosyn,   sodium chloride, 125 mL/hr   , PRN Meds:    acetaminophen **OR** acetaminophen **OR** acetaminophen    senna-docusate sodium **AND** polyethylene glycol **AND** bisacodyl **AND** bisacodyl    nitroglycerin    Pharmacy to dose vancomycin    Pharmacy  "to Dose Zosyn    sodium chloride    sodium chloride, and Allergies:  Patient has no known allergies.    Objective     Vital Signs  Temp:  [100 °F (37.8 °C)-101.8 °F (38.8 °C)] 100 °F (37.8 °C)  Heart Rate:  [] 104  Resp:  [14-18] 14  BP: (144-165)/(93-99) 153/93    I/O this shift:  In: 2005 [IV Piggyback:2005]  Out: -   No intake/output data recorded.    Physical Exam  Constitutional:       Appearance: Normal appearance.      Comments: Very thin   HENT:      Nose: Nose normal.      Mouth/Throat:      Mouth: Mucous membranes are dry.   Eyes:      General: No scleral icterus.     Pupils: Pupils are equal, round, and reactive to light.   Cardiovascular:      Rate and Rhythm: Normal rate and regular rhythm.      Pulses: Normal pulses.   Pulmonary:      Effort: Pulmonary effort is normal.   Abdominal:      General: Abdomen is flat.      Palpations: Abdomen is soft.   Musculoskeletal:      Cervical back: Normal range of motion.      Right lower leg: No edema.      Left lower leg: No edema.   Skin:     General: Skin is warm and dry.   Neurological:      General: No focal deficit present.      Mental Status: He is alert. Mental status is at baseline.         Results Review:    I reviewed the patient's new clinical results.  I reviewed the patient's new imaging results and agree with the interpretation.  I reviewed the patient's other test results and agree with the interpretation    WBC WBC   Date Value Ref Range Status   07/29/2024 18.96 (H) 3.40 - 10.80 10*3/mm3 Final   07/27/2024 19.24 (H) 3.40 - 10.80 10*3/mm3 Final      HGB Hemoglobin   Date Value Ref Range Status   07/29/2024 9.8 (L) 13.0 - 17.7 g/dL Final   07/27/2024 8.2 (L) 13.0 - 17.7 g/dL Final      HCT Hematocrit   Date Value Ref Range Status   07/29/2024 34.5 (L) 37.5 - 51.0 % Final   07/27/2024 27.0 (L) 37.5 - 51.0 % Final      Platlets No results found for: \"LABPLAT\"   MCV MCV   Date Value Ref Range Status   07/29/2024 87.6 79.0 - 97.0 fL Final " "  07/27/2024 82.6 79.0 - 97.0 fL Final          Sodium Sodium   Date Value Ref Range Status   07/29/2024 148 (H) 136 - 145 mmol/L Final   07/27/2024 148 (H) 136 - 145 mmol/L Final      Potassium Potassium   Date Value Ref Range Status   07/29/2024 3.6 3.5 - 5.2 mmol/L Final     Comment:     Slight hemolysis detected by analyzer. Result may be falsely elevated.   07/27/2024 3.3 (L) 3.5 - 5.2 mmol/L Final      Chloride Chloride   Date Value Ref Range Status   07/29/2024 100 98 - 107 mmol/L Final   07/27/2024 98 98 - 107 mmol/L Final      CO2 CO2   Date Value Ref Range Status   07/29/2024 32.0 (H) 22.0 - 29.0 mmol/L Final   07/27/2024 31.3 (H) 22.0 - 29.0 mmol/L Final      BUN BUN   Date Value Ref Range Status   07/29/2024 22 (H) 6 - 20 mg/dL Final   07/27/2024 18 6 - 20 mg/dL Final      Creatinine Creatinine   Date Value Ref Range Status   07/29/2024 0.56 (L) 0.76 - 1.27 mg/dL Final   07/27/2024 0.42 (L) 0.76 - 1.27 mg/dL Final      Calcium Calcium   Date Value Ref Range Status   07/29/2024 12.1 (H) 8.6 - 10.5 mg/dL Final   07/27/2024 11.8 (H) 8.6 - 10.5 mg/dL Final      PO4 No results found for: \"CAPO4\"   Albumin Albumin   Date Value Ref Range Status   07/29/2024 3.0 (L) 3.5 - 5.2 g/dL Final      Magnesium No results found for: \"MG\"   Uric Acid No results found for: \"URICACID\"         Assessment & Plan       Hypercalcemia      Assessment & Plan  Hypercalcemia-  calcium elevated to 11.8->12.1 today.  Normal renal function.  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  Agree with IVF's for now.  Could give pamidronate, will monitor for now.    Hypernatremia-  elevated at 148.  Changing ivf's to 1/2 NS at 150cc/hr.    Adenocarcinoma of lung-  mets to bone.    Fevers-  had previous fevers attributed to met adenocarcinoma per notes.  Also on broad spectrum abx now for possible sepsis.    I discussed the patients findings and my recommendations with patient, nursing " staff, and primary care team    Matthew Oviedo MD  07/29/24  14:55 EDT

## 2024-07-29 NOTE — PAYOR COMM NOTE
"UR Dept.    [phone]  F [phone]     [facility]  [address] [address]   [phone]  NPI-[id number]  Tax ID- [id number]    Admission Date- 07/29/2024    Type of Admission: ED    Bed Type Telemetry    ICD-10 Code E83.52, A41.9    MD Info: Arturo Tong MD NPI: [id number]    Beba Bowen (44 y.o. Male)       Date of Birth   1980    Social Security Number       Address   [address] [facility] [address]    Home Phone   [phone]    MRN   [id number]       Taoist   None    Marital Status   Single                            Admission Date   7/29/24    Admission Type   Emergency    Admitting Provider   Arturo Tong MD    Attending Provider   Arturo Tong MD    Department, Room/Bed   [facility] 4TH FLOOR MEDICAL TELEMETRY UNIT, 405/1       Discharge Date       Discharge Disposition       Discharge Destination                                 Attending Provider: Arturo Tong MD    Allergies: No Known Allergies    Isolation: Contact Air   Infection: None   Code Status: No CPR    Ht: 182.9 cm (72\")   Wt: 63.5 kg (139 lb 15.9 oz)    Admission Cmt: None   Principal Problem: Hypercalcemia [E83.52]                   Active Insurance as of 7/29/2024       Primary Coverage       Payor Plan Insurance Group Employer/Plan Group    [facility] BY JUAN ALBERTO [id number]       Payor Plan Address Payor Plan Phone Number Payor Plan Fax Number Effective Dates    [address] [address]   1/1/2021 - None Entered    [facility] [phone]         Subscriber Name Subscriber Birth Date Member ID       BEBA BOWEN 1980 [id number]                     Emergency Contacts        (Rel.) Home Phone Work Phone Mobile Phone    faye ramos (Sister) -- -- [phone]    jimbo ramos (Other) -- -- [phone]              Sellers: NPI [id number] Tax ID [id number]  Problem List             Codes Noted - [facility]       Hospital    " * (Principal) Hypercalcemia ICD-10-CM: E83.52  ICD-9-CM: 275.42 2024 - Present       Non-Hospital    Sepsis ICD-10-CM: A41.9  ICD-9-CM: 038.9, 995.91 2024 - Present    Anemia ICD-10-CM: D64.9  ICD-9-CM: 285.9 2024 - Present    Closed intertrochanteric fracture of right femur ICD-10-CM: S72.141A  ICD-9-CM: 820.21 2024 - Present    Pulmonary nodule ICD-10-CM: R91.1  ICD-9-CM: 793.11 2024 - Present        History & Physical        Arturo Tong MD at [address] [facility]           [facility]  [facility]: [facility]24   Patient Name: Oswaldo Bowen  : 1980  MRN: [id number]  Primary Care Physician:  Provider, No Known  Date of admission: 2024    Subjective  Subjective     Chief Complaint: Fever    HPI:  Oswaldo Bowen is a 44 y.o. 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  Patient was discharged to rehab facility at that time with goal of patient improving his level of debility in hopes of possibly initiating chemo-immunotherapy as an outpatient.  Patient did not progress well at rehab, remains very frail and weak.  Labs were obtained on 2024 and patient noted to have elevated WBC when compared to prior labs.  Patient also found to have fever and was brought to ED for evaluation on 2024.  In the ED patient noted to have temperature of 101.8 °F and patient also tachycardic.  Labs were significant for hypercalcemia and leukocytosis.  Lactate slightly elevated 2.6.  In the ED patient was given IV fluid bolus per sepsis protocol as well as dose of cefepime and vancomycin.  Hospitalist service contacted for further evaluation and management.  Patient stated  he was not in any acute pain or discomfort, he was slow to respond and patient does have intellectual disability.    Personal History     Past Medical History:  Metastatic adenocarcinoma of the lung  Essential hypertension  Depression  Anemia  Intellectual disability    Past Surgical History:  Bronchoscopy  Intertrochanteric right hip nailing    Family History:   Unable to obtain secondary to patient's baseline intellectual disability    Social History:   Unable to obtain secondary to patient's baseline intellectual disability    Home Medications:  Fluticasone Furoate-Vilanterol, HYDROcodone-acetaminophen, Lidocaine, Psyllium, Umeclidinium Bromide, acetaminophen, albuterol sulfate HFA, aluminum-magnesium hydroxide-simethicone, azithromycin, bisacodyl, (cefTRIAXone 2,000 mg in sodium chloride 0.9 % 100 mL IVPB), cyanocobalamin, heparin (porcine), metoprolol succinate XL, multivitamin with minerals, naloxone, nicotine, ondansetron ODT, polyethylene glycol, and sennosides-docusate    Allergies:  No Known Allergies    Review of Systems  Unable to obtain secondary to patient's baseline intellectual disability    Objective  Objective     Vitals:   Temp:  [101.8 °F (38.8 °C)] 101.8 °F (38.8 °C)  Heart Rate:  [103-115] 103  Resp:  [18] 18  BP: (154-165)/(94-96) 154/94    Physical Exam    Constitutional: Awake, alert, lying in bed, frail, cachectic male   Eyes: Pupils equal, sclerae anicteric, no conjunctival injection   HENT: NCAT, mucous membranes moist   Neck: Supple, no thyromegaly, no lymphadenopathy, trachea midline   Respiratory: Clear to auscultation bilaterally, nonlabored respirations    Cardiovascular: Regular rhythm, tachycardic, no murmurs, rubs, or gallops, palpable pedal pulses bilaterally   Gastrointestinal: Positive bowel sounds, soft, nontender, nondistended   Musculoskeletal: No bilateral ankle edema, no clubbing or cyanosis to extremities   Psychiatric: Flat affect, cooperative   Neurologic: Oriented x 1  (self), weak in all extremities, Cranial Nerves grossly intact, speech clear   Skin: No rashes     Result Review   Result Review:  I have personally reviewed the results from the time of this admission to 7/29/2024 12:16 EDT and agree with these findings:  [x]  Laboratory:  CMP          7/23/2024    15:07 7/27/2024    14:28 7/29/2024    10:29   CMP   Glucose 102  72  122    BUN 15  18  22    Creatinine 0.33  0.42  0.56    EGFR 146.2  136.0  124.6    Sodium 140  148  148    Potassium 3.0  3.3  3.6    Chloride 93  98  100    Calcium 11.5  11.8  12.1    Total Protein 5.9   7.7    Albumin 2.7   3.0    Globulin 3.2   4.7    Total Bilirubin 0.2   0.6    Alkaline Phosphatase 146   161    AST (SGOT) 26   28    ALT (SGPT) 31   14    Albumin/Globulin Ratio 0.8   0.6    BUN/Creatinine Ratio 45.5  42.9  39.3    Anion Gap 13.1  18.7  16.0      CBC          7/23/2024    15:07 7/27/2024    14:28 7/29/2024    10:29   CBC   WBC 14.96  19.24  18.96    RBC 3.29  3.27  3.94    Hemoglobin 8.2  8.2  9.8    Hematocrit 27.4  27.0  34.5    MCV 83.3  82.6  87.6    MCH 24.9  25.1  24.9    MCHC 29.9  30.4  28.4    RDW 15.4  15.2  16.7    Platelets 428  421  414    Lactate: 2.6.  []  Microbiology:  [x]  Radiology: CXR imaging personally reviewed.  No acute infiltrates noted.  Metastatic disease appreciated with fracture of right clavicle noted.  [x]  EKG/Telemetry:   []  Cardiology/Vascular:   []  Pathology:  []  Old records:  []  Other:    Assessment & Plan  Assessment / Plan     Assessment:  Sepsis of unknown origin  Hypercalcemia likely secondary to malignancy  Elevated lactic acid  Metastatic adenocarcinoma of the left lung  Significant pain secondary to metastatic adenocarcinoma of the left lung  Depression  Severe protein calorie malnutrition evidenced by cachexia  Intellectual disability    Plan:  -Admit to monitored bed  -Start broad-spectrum antibiotics vancomycin and Zosyn.  Monitor vancomycin level.  -Start IV fluids  -Monitor  calcium level closely.  Nephrology consulted to assist in management of hypercalcemia  -Obtain Pro-Alvaro and trend  -Trend lactic acid  -CT chest/abdomen/pelvis ordered to see if possible source of infection  -Is possible that patient's fever secondary to his metastatic cancer  -Start appropriate home medications once medication list is reconciled and reviewed  -Hematology/oncology consulted to assist in care of the patient.  Plan had initially been for patient to possibly pursue chemo-immunotherapy but it appears that patient not able to progress much at rehab in terms of his strength and level of debility and patient also with new pathologic fracture on imaging.  -Palliative care services consulted  -Speech therapy consulted  -PT/OT services consulted  -Monitor electrolytes and renal function with BMP and magnesium level in the AM  -Monitor WBC and Hgb with CBC in the AM  -Clinical course will dictate further management     DVT Prophylaxis: Heparin  Diet: N.p.o.  Dispo: Admit to monitored bed  Code Status: DNR/DNI     Personally reviewed patients labs and imaging, discussed with patient and nurse at bedside. Discussed management with the ED physician (Dr. Nito Matos) and the following consultants: Nephrology and Hematology/Oncology.     Part of this note may be an electronic transcription/translation of spoken language to printed text using the Dragon dictation system.      VTE Prophylaxis:  Pharmacologic VTE prophylaxis orders are present.        CODE STATUS:    Medical Intervention Limits: No intubation (DNI)  Code Status (Patient has no pulse and is not breathing): No CPR (Do Not Attempt to Resuscitate)  Medical Interventions (Patient has pulse or is breathing): Limited Support      Admission Status: I believe this patient meets inpatient status.    Electronically signed by Arturo Tong MD, 07/29/24, 12:16 PM EDT.    Electronically signed by Arturo Tong MD at 07/29/24 9964          Emergency Department  Notes        Nito Matos DO at 07/29/24 1018          Time: 10:18 AM EDT  Date of encounter:  7/29/2024  Independent Historian/Clinical History and Information was obtained by:   Patient    History is limited by: Cognitive Impairment    Chief Complaint: Elevated white blood cell count and abnormal calcium level      History of Present Illness:  Patient is a 44 y.o. year old male who presents to the emergency department for evaluation of elevated white blood cell count and abnormal calcium level.  Patient is currently residing at [facility] post recent hospitalization with findings of metastatic adenocarcinoma of the left lung, sepsis, anemia and electrolyte abnormalities.  On arrival patient has a fever of 101.8 and a heart rate of 115.    HPI    Patient Care Team  Primary Care Provider: Provider, No Known    Past Medical History:     No Known Allergies  Past Medical History:   Diagnosis Date   • Anemia    • Cancer    • Depression    • Hypertension    • Hypomagnesemia      Past Surgical History:   Procedure Laterality Date   • BRONCHOSCOPY N/A 5/23/2024    Procedure: BRONCHOSCOPY WITH ENDOBRONCHIAL ULTRASOUND, FINE NEEDLE ASPIRATE, BIOPSIES, BRUSHINGS, BRONCHOALVEOLAR LAVAGE;  Surgeon: Kendell Stern MD;  Location: [facility] ENDOSCOPY;  Service: Pulmonary;  Laterality: N/A;  LUNG NODULE, MUCOUS PLUGGING   • BRONCHOSCOPY WITH ION ROBOTIC ASSIST N/A 6/20/2024    Procedure: BRONCHOSCOPY NAVIGATION WITH ENDOBRONCHIAL ULTRASOUND AND ION ROBOT. REBUS, EBUS, BRUSHINGS, WASHINGS, BAL, BIOPSIES AND NEEDLE ASPIRATE;  Surgeon: John Elizalde MD;  Location: [facility] MAIN OR;  Service: Robotics - Pulmonary;  Laterality: N/A;   • HIP INTERTROCHANTERIC NAILING Right 5/21/2024    Procedure: HIP INTERTROCHANTERIC NAILING;  Surgeon: Molina Clayton MD;  Location: [facility] MAIN OR;  Service: Orthopedics;  Laterality: Right;   • US GUIDED FINE NEEDLE ASPIRATION  6/11/2024     History reviewed. No pertinent family  history.    Home Medications:  Prior to Admission medications    Medication Sig Start Date End Date Taking? Authorizing Provider   acetaminophen (TYLENOL) 325 MG tablet Take 2 tablets by mouth Every 6 (Six) Hours As Needed for Mild Pain or Fever. 6/22/24   Sy Guzman MD   albuterol sulfate  (90 Base) MCG/ACT inhaler Inhale 2 puffs 2 (Two) Times a Day.    ProviderAgnieszka MD   aluminum-magnesium hydroxide-simethicone (MAALOX/MYLANTA) 200-200-20 MG/5ML suspension Take 30 mL by mouth Every 4 (Four) Hours As Needed for Indigestion or Heartburn.    ProviderAgnieszka MD   bisacodyl (DULCOLAX) 10 MG suppository Insert 1 suppository into the rectum 1 (One) Time.    ProviderAgnieszka MD   Fluticasone Furoate-Vilanterol 100-25 MCG/ACT aerosol powder  Inhale 1 puff Daily.    ProviderAgnieszka MD   Lidocaine 4 % Place 1 patch on the skin as directed by provider Daily As Needed for Mild Pain. Remove & Discard patch within 12 hours or as directed by MD 6/22/24   Sy Guzman MD   loperamide (IMODIUM) 2 MG capsule Take 1 capsule by mouth Every 6 (Six) Hours As Needed for Diarrhea.    ProviderAgnieszka MD   metoprolol succinate XL (TOPROL-XL) 25 MG 24 hr tablet Take 3 tablets by mouth Every 12 (Twelve) Hours for 30 days. 7/16/24 8/15/24  Donavan Shelley MD   multivitamin with minerals tablet tablet Take 1 tablet by mouth Daily. 6/22/24   Sy Guzman MD   naloxone (NARCAN) 4 MG/0.1ML nasal spray Call 911. Don't prime. [place] in 1 nostril for overdose. Repeat in 2-3 minutes in other nostril if no or minimal breathing/responsiveness. 7/16/24   Donavan Shelley MD   nicotine (NICODERM CQ) 21 MG/24HR patch Place 1 patch on the skin as directed by provider Daily As Needed (smoking cessation). 6/22/24   Sy Guzman MD   ondansetron ODT (ZOFRAN-ODT) 4 MG disintegrating tablet Take 1 tablet by mouth Every 6 (Six) Hours As Needed for Nausea or Vomiting. 6/22/24   Sy Guzman,  "MD   polyethylene glycol (MIRALAX) 17 g packet Take 17 g by mouth Daily. 6/22/24   Sy Guzman MD   Psyllium (METAMUCIL MULTIHEALTH FIBER) 51.7 % packet Take 1 packet by mouth Daily. 6/22/24   Sy Guzman MD   sennosides-docusate (senna-docusate sodium) 8.6-50 MG per tablet Take 2 tablets by mouth 3 times a day.    Provider, MD Agnieszka   Umeclidinium Bromide (INCRUSE ELLIPTA) 62.5 MCG/ACT aerosol powder  Inhale 1 puff Daily.    Provider, MD Agnieszka   vitamin B-12 (VITAMIN B-12) 1000 MCG tablet Take 1 tablet by mouth Daily. 6/22/24   Sy Guzman MD        Social History:   Social History     Tobacco Use   • Smoking status: Every Day     Current packs/day: 1.50     Average packs/day: 1.5 packs/day for 24.6 years (36.9 ttl pk-yrs)     Types: Cigarettes     Start date: 2000   • Smokeless tobacco: Former   Vaping Use   • Vaping status: Never Used   Substance Use Topics   • Alcohol use: Never   • Drug use: Never         Review of Systems:  Review of Systems   Unable to perform ROS: Patient nonverbal        Physical Exam:  /94   Pulse 103   Temp (!) 101.8 °F (38.8 °C) (Rectal)   Resp 18   Ht 182.9 cm (72\")   Wt 63.5 kg (139 lb 15.9 oz)   SpO2 94%   BMI 18.99 kg/m²     Physical Exam  Vitals and nursing note reviewed.   Constitutional:       General: He is not in acute distress.  HENT:      Head: Normocephalic and atraumatic.   Eyes:      Extraocular Movements: Extraocular movements intact.   Cardiovascular:      Rate and Rhythm: Regular rhythm. Tachycardia present.      Heart sounds: Normal heart sounds.   Pulmonary:      Effort: Pulmonary effort is normal. No respiratory distress.      Breath sounds: Normal breath sounds.   Abdominal:      General: Abdomen is flat.      Palpations: Abdomen is soft.      Tenderness: There is no abdominal tenderness.   Musculoskeletal:         General: Normal range of motion.      Cervical back: Normal range of motion.   Skin:     General: Skin is " warm and dry.   Neurological:      Mental Status: He is alert. Mental status is at baseline.      Comments: Patient has cognitive development history and is nonverbal, patient is at baseline per nursing home                  Procedures:  Procedures      Medical Decision Making:      Comorbidities that affect care:    Metastatic lung cancer, chronic anemia    External Notes reviewed:    Hospital Discharge Summary: Patient discharged on 7/16/2024 after admission on 6/27/2024 for sepsis and anemia.  Patient has history of metastatic adenocarcinoma of the left lung      The following orders were placed and all results were independently analyzed by me:  Orders Placed This Encounter   Procedures   • Blood Culture - Blood,   • Blood Culture - Blood,   • COVID-19, FLU A/B, RSV PCR 1 HR TAT - Swab, Nasopharynx   • XR Chest 1 View   • Lactic Acid, Plasma   • [place] Draw   • Comprehensive Metabolic Panel   • CBC Auto Differential   • Urinalysis With Microscopic If Indicated (No Culture) - Urine, Clean Catch   • STAT Lactic Acid, Reflex   • Inpatient Hospitalist Consult   • Insert peripheral IV   • CBC & Differential   • Green Top (Gel)   • Lavender Top   • Gold Top - SST   • Light Blue Top       Medications Given in the Emergency Department:  Medications   sodium chloride 0.9 % flush 10 mL (has no administration in time range)   acetaminophen (TYLENOL) tablet 975 mg (has no administration in time range)   vancomycin 1250 mg/250 mL 0.9% NS IVPB ([facility]) (has no administration in time range)   sodium chloride 0.9 % bolus 1,905 mL (1,905 mL Intravenous New Bag 7/29/24 1040)   cefepime 2000 mg IVPB in 100 mL NS (VTB) (0 mg Intravenous Stopped 7/29/24 1111)        ED Course:         Labs:    Lab Results (last 24 hours)       Procedure Component Value Units Date/Time    Blood Culture - Blood, Arm, Left [[id number]] Collected: 07/29/24 1029    Specimen: Blood from Arm, Left Updated: 07/29/24 1033    Blood Culture - Blood, Arm, Right  [[id number]] Collected: 07/29/24 1029    Specimen: Blood from Arm, Right Updated: 07/29/24 1033    Lactic Acid, Plasma [[id number]]  (Abnormal) Collected: 07/29/24 1029    Specimen: Blood Updated: 07/29/24 1110     Lactate 2.6 mmol/L     CBC & Differential [[id number]]  (Abnormal) Collected: 07/29/24 1029    Specimen: Blood Updated: 07/29/24 1103    Narrative:      The following orders were created for panel order CBC & Differential.  Procedure                               Abnormality         Status                     ---------                               -----------         ------                     CBC Auto Differential[[id number]]        Abnormal            Final result                 Please view results for these tests on the individual orders.    Comprehensive Metabolic Panel [[id number]]  (Abnormal) Collected: 07/29/24 1029    Specimen: Blood Updated: 07/29/24 1101     Glucose 122 mg/dL      BUN 22 mg/dL      Creatinine 0.56 mg/dL      Sodium 148 mmol/L      Potassium 3.6 mmol/L      Comment: Slight hemolysis detected by analyzer. Result may be falsely elevated.        Chloride 100 mmol/L      CO2 32.0 mmol/L      Calcium 12.1 mg/dL      Total Protein 7.7 g/dL      Albumin 3.0 g/dL      ALT (SGPT) 14 U/L      AST (SGOT) 28 U/L      Comment: Slight hemolysis detected by analyzer. Result may be falsely elevated.        Alkaline Phosphatase 161 U/L      Total Bilirubin 0.6 mg/dL      Globulin 4.7 gm/dL      A/G Ratio 0.6 g/dL      BUN/Creatinine Ratio 39.3     Anion Gap 16.0 mmol/L      eGFR 124.6 mL/min/1.73     Narrative:      GFR Normal >60  Chronic Kidney Disease <60  Kidney Failure <15      CBC Auto Differential [[id number]]  (Abnormal) Collected: 07/29/24 1029    Specimen: Blood Updated: 07/29/24 1103     WBC 18.96 10*3/mm3      RBC 3.94 10*6/mm3      Hemoglobin 9.8 g/dL      Hematocrit 34.5 %      MCV 87.6 fL      MCH 24.9 pg      MCHC 28.4 g/dL      RDW 16.7 %      RDW-SD 53.5 fl      MPV 9.9 fL       Platelets 414 10*3/mm3      Neutrophil % 85.3 %      Lymphocyte % 7.5 %      Monocyte % 5.1 %      Eosinophil % 0.1 %      Basophil % 0.2 %      Immature Grans % 1.8 %      Neutrophils, Absolute 16.18 10*3/mm3      Lymphocytes, Absolute 1.43 10*3/mm3      Monocytes, Absolute 0.96 10*3/mm3      Eosinophils, Absolute 0.01 10*3/mm3      Basophils, Absolute 0.03 10*3/mm3      Immature Grans, Absolute 0.35 10*3/mm3      nRBC 0.0 /100 WBC     COVID-19, FLU A/B, RSV PCR 1 HR TAT - Swab, Nasopharynx [[id number]]  (Normal) Collected: 07/29/24 1049    Specimen: Swab from Nasopharynx Updated: 07/29/24 1145     COVID19 Not Detected     Influenza A PCR Not Detected     Influenza B PCR Not Detected     RSV, PCR Not Detected    Narrative:      Fact sheet for providers: https://www.fda.gov/media/600641/download    Fact sheet for patients: https://www.fda.gov/media/043715/download    Test performed by PCR.    Urinalysis With Microscopic If Indicated (No Culture) - Urine, Clean Catch [[id number]]  (Abnormal) Collected: 07/29/24 1102    Specimen: Urine, Clean Catch Updated: 07/29/24 1113     Color, UA Dark Yellow     Appearance, UA Clear     pH, UA 5.5     Specific Gravity, UA 1.022     Glucose, UA Negative     Ketones, UA Trace     Bilirubin, UA Negative     Blood, UA Negative     Protein, UA Trace     Leuk Esterase, UA Negative     Nitrite, UA Negative     Urobilinogen, UA 1.0 E.U./dL    Narrative:      Urine microscopic not indicated.             Imaging:    XR Chest 1 View    Result Date: 7/29/2024  XR CHEST 1 VW Date of Exam: 7/29/2024 10:41 AM EDT Indication: Leukocytosis Comparison: 6/27/2024 Findings: The lungs appear adequately aerated without consolidation or mass. No pleural effusion or pneumothorax is identified. The cardiomediastinal silhouette and pulmonary vasculature appear within normal limits. Bilateral destructive rib lesions, expansile lesion of the left coracoid process. Displaced pathologic fracture of the right  mid/distal clavicle, new since 6/28/2024.     Impression: 1.No acute cardiopulmonary abnormality is identified. 2.Osseous metastatic disease. Pathologic fracture of the right mid to distal clavicle is new since chest CT from 6/28/2024. 3.Further evaluation with chest CT may be considered if indicated. Electronically Signed: Everardo Mcdaniels MD  7/29/2024 10:47 AM EDT  Workstation ID: [id number]       Differential Diagnosis and Discussion:    Fever: Based on the complaint of fever, differential diagnosis includes but is not limited to meningitis, pneumonia, pyelonephritis, acute uti,  systemic immune response syndrome, sepsis, viral syndrome, fungal infection, tick born illness and other bacterial infections.    All labs were reviewed and interpreted by me.  All X-rays impressions were independently interpreted by me.    MDM           Sepsis criteria was met in the emergency department and the Sepsis protocol (including antibiotic administration) was initiated.      SIRS criteria considered:   1.  Temperature > 100.4 or <96.8    2.  Heart Rate > 90    3.  Respiratory Rate > 22    4.  WBC > 12K or <4K.             Severe Sepsis:     Respiratory: Mechanical Ventilation or Bipap  Hypotension: SBP > 90 or MAP < 65  Renal: Creatinine > 2  Metabolic: Lactic Acid > 2  Hematologic: Platelets < 100K or INR > 1.5  Hepatic: BILI  >  2  CNS: Sudden AMS     Septic Shock:     Severe Sepsis + Persistent hypotension or Lactic Acid > 4     Normal saline bolus, Antibiotics, and final disposition was based on these definitions.        Sepsis was recognized at 10:25    Antibiotics were ordered.     30 cc/kg bolus was  indicated.         Total Critical Care time of 50 minutes. Total critical care time documented does not include time spent on separately billed procedures for services of nurses or physician assistants. I personally saw and examined the patient. I have reviewed all diagnostic interpretations and treatment plans as written. I  was present for the key portions of any procedures performed and the inclusive time noted in any critical care statement. Critical care time includes patient management by me, time spent at the patients bedside,  time to review lab and imaging results, discussing patient care, documentation in the medical record, and time spent with family or caregiver.    Patient Care Considerations:    CT CHEST: I considered ordering a CT scan of the chest, however chest x-ray was ordered       Consultants/Shared Management Plan:    Hospitalist: I have discussed the case with Dr. Tong who agrees to accept the patient for admission.    Social Determinants of Health:    Patient is a nursing home/assisted living resident and has reliable access to care.      Disposition and Care Coordination:    Admit:   Through independent evaluation of the patient's history, physical, and imperical data, the patient meets criteria for inpatient admission to the hospital.        Final diagnoses:   Sepsis, due to unspecified organism, unspecified whether acute organ dysfunction present        ED Disposition       ED Disposition   Decision to Admit    Condition   --    Comment   --               This medical record created using voice recognition software.             Nito Matos DO  07/29/24 1154      Electronically signed by Nito Matos DO at 07/29/24 1154       Vital Signs (last day)       Date/Time Temp Temp src Pulse Resp BP Patient Position SpO2    07/29/24 1230 -- -- 99 14 144/94 Lying 94    07/29/24 1145 -- -- 100 14 149/99 Lying 96    07/29/24 1115 -- -- 103 -- 154/94 -- 94    07/29/24 1020 101.8 (38.8) Rectal -- -- 165/96 -- --    07/29/24 1012 -- -- 115 18 -- -- 92          Facility-Administered Medications as of 7/29/2024   Medication Dose Route Frequency Provider Last Rate Last Admin   • acetaminophen (TYLENOL) tablet 650 mg  650 mg Oral Q4H PRN Arturo Tong MD        Or   • acetaminophen (TYLENOL) 160 MG/5ML oral solution 650 mg   650 mg Oral Q4H PRN Arturo Tong MD        Or   • acetaminophen (TYLENOL) suppository 650 mg  650 mg Rectal Q4H PRN Arturo Tong MD       • acetaminophen (TYLENOL) suppository 650 mg  650 mg Rectal Once Nito Matos DO       • sennosides-docusate (PERICOLACE) 8.6-50 MG per tablet 2 tablet  2 tablet Oral BID PRN Arturo Tong MD        And   • polyethylene glycol (MIRALAX) packet 17 g  17 g Oral Daily PRN Arturo Tong MD        And   • bisacodyl (DULCOLAX) EC tablet 5 mg  5 mg Oral Daily PRN Arturo Tong MD        And   • bisacodyl (DULCOLAX) suppository 10 mg  10 mg Rectal Daily PRN Arturo Tong MD       • [COMPLETED] cefepime 2000 mg IVPB in 100 mL NS (VTB)  2,000 mg Intravenous Once Nito Matos DO   Stopped at 07/29/24 1111   • heparin (porcine) 5000 UNIT/ML injection 5,000 Units  5,000 Units Subcutaneous Q8H Arturo Tong MD       • nitroglycerin (NITROSTAT) SL tablet 0.4 mg  0.4 mg Sublingual Q5 Min PRN Arturo Tong MD       • Pharmacy to dose vancomycin   Does not apply Continuous PRN Arturo Tong MD       • Pharmacy to Dose Zosyn   Does not apply Continuous PRN Arturo Tong MD       • piperacillin-tazobactam (ZOSYN) IVPB 4.5 g IVPB in 100 mL NS (VTB)  4.5 g Intravenous Once Arturo Tong MD       • piperacillin-tazobactam (ZOSYN) IVPB 4.5 g IVPB in 100 mL NS (VTB)  4.5 g Intravenous Q8H Arturo Tong MD       • [COMPLETED] sodium chloride 0.9 % bolus 1,905 mL  30 mL/kg Intravenous Once Nito Matos DO   Stopped at 07/29/24 1140   • sodium chloride 0.9 % flush 10 mL  10 mL Intravenous Q12H Arturo Tong MD       • sodium chloride 0.9 % flush 10 mL  10 mL Intravenous PRN Arturo Tong MD       • sodium chloride 0.9 % infusion 40 mL  40 mL Intravenous PRN Arturo Tong MD       • sodium chloride 0.9 % infusion  125 mL/hr Intravenous Continuous Arturo Tong MD       • [COMPLETED] vancomycin 1250 mg/250 mL 0.9% NS IVPB ([facility])  20 mg/kg Intravenous Once Nito Matos DO    1,250 mg at 07/29/24 1216   • [START ON 7/30/2024] vancomycin 1250 mg/250 mL 0.9% NS IVPB ([facility])  1,250 mg Intravenous Q12H Arturo Tong MD         Orders (last 24 hrs)        Start     Ordered    07/30/24 1300  vancomycin 1250 mg/250 mL 0.9% NS IVPB ([facility])  Every 12 Hours         07/29/24 1348    07/30/24 0600  Basic Metabolic Panel  Morning Draw         07/29/24 1214    07/30/24 0600  CBC (No Diff)  Morning Draw         07/29/24 1214    07/30/24 0600  Magnesium  Morning Draw         07/29/24 1214    07/30/24 0600  Phosphorus  Morning Draw         07/29/24 1214    07/30/24 0600  Procalcitonin  Morning Draw         07/29/24 1214    07/30/24 0600  Lactic Acid, Plasma  Morning Draw         07/29/24 1348    07/30/24 0600  Vancomycin, Random  Morning Draw         07/29/24 1348    07/29/24 2200  piperacillin-tazobactam (ZOSYN) IVPB 4.5 g IVPB in 100 mL NS (VTB)  Every 8 Hours         07/29/24 1311    07/29/24 1800  Oral Care  2 Times Daily       07/29/24 1214    07/29/24 1800  Procalcitonin  Timed         07/29/24 1214    07/29/24 1600  Vital Signs  Every 4 Hours       07/29/24 1214    07/29/24 1400  heparin (porcine) 5000 UNIT/ML injection 5,000 Units  Every 8 Hours Scheduled         07/29/24 1214    07/29/24 1400  piperacillin-tazobactam (ZOSYN) IVPB 4.5 g IVPB in 100 mL NS (VTB)  Once         07/29/24 1311    07/29/24 1349  CT Chest Without Contrast Diagnostic  1 Time Imaging         07/29/24 1348    07/29/24 1329  STAT Lactic Acid, Reflex  PROCEDURE ONCE         07/29/24 1110    07/29/24 1300  acetaminophen (TYLENOL) suppository 650 mg  Once         07/29/24 1240    07/29/24 1245  piperacillin-tazobactam (ZOSYN) IVPB 3.375 g IVPB in 100 mL NS (VTB)  Once,   Status:  Discontinued         07/29/24 1215    07/29/24 1230  sodium chloride 0.9 % flush 10 mL  Every 12 Hours Scheduled         07/29/24 1214    07/29/24 1230  sodium chloride 0.9 % infusion  Continuous         07/29/24 1214    07/29/24 1215  Daily Weights   "Daily       07/29/24 1214    07/29/24 1211  Pharmacy to dose vancomycin  Continuous PRN         07/29/24 1214    07/29/24 1211  Pharmacy to Dose Zosyn  Continuous PRN         07/29/24 1214    07/29/24 1209  SLP Consult: Eval & Treat RN Dysphagia Screen Failed  Once         07/29/24 1214    07/29/24 1209  Procalcitonin  STAT         07/29/24 1214    07/29/24 1208  ECG 12 Lead QT Measurement  Once         07/29/24 1214    07/29/24 1207  sennosides-docusate (PERICOLACE) 8.6-50 MG per tablet 2 tablet  2 Times Daily PRN        Placed in \"And\" Linked Group    07/29/24 1214    07/29/24 1207  polyethylene glycol (MIRALAX) packet 17 g  Daily PRN        Placed in \"And\" Linked Group    07/29/24 1214    07/29/24 1207  bisacodyl (DULCOLAX) EC tablet 5 mg  Daily PRN        Placed in \"And\" Linked Group    07/29/24 1214    07/29/24 1207  bisacodyl (DULCOLAX) suppository 10 mg  Daily PRN        Placed in \"And\" Linked Group    07/29/24 1214    07/29/24 1207  acetaminophen (TYLENOL) tablet 650 mg  Every 4 Hours PRN        Placed in \"Or\" Linked Group    07/29/24 1214    07/29/24 1207  acetaminophen (TYLENOL) 160 MG/5ML oral solution 650 mg  Every 4 Hours PRN        Placed in \"Or\" Linked Group    07/29/24 1214    07/29/24 1207  acetaminophen (TYLENOL) suppository 650 mg  Every 4 Hours PRN        Placed in \"Or\" Linked Group    07/29/24 1214    07/29/24 1206  CT Abdomen Pelvis With Contrast  1 Time Imaging         07/29/24 1214    07/29/24 1205  Inpatient Nephrology Consult  Once        Specialty:  Nephrology  Provider:  Matthew Oviedo MD    07/29/24 1214    07/29/24 1205  Inpatient Palliative Care Team Consult  Once        Comments: Patient with intellectual disability. Metastatic cancer. Rehab attempted but unable to complete.   Provider:  (Not yet assigned)    07/29/24 1214    07/29/24 1204  Continuous Cardiac Monitoring  Continuous        Comments: Follow Standing Orders As Outlined in Process Instructions (Open Order Report to View " Full Instructions)    07/29/24 1214    07/29/24 1204  Maintain IV Access  Continuous         07/29/24 1214    07/29/24 1204  Telemetry - Place Orders & Notify Provider of Results When Patient Experiences Acute Chest Pain, Dysrhythmia or Respiratory Distress  Continuous        Comments: Open Order Report to View Parameters Requiring Provider Notification    07/29/24 1214    07/29/24 1204  Fall Precautions  Continuous         07/29/24 1214    07/29/24 1204  Aspiration Precautions  Continuous         07/29/24 1214    07/29/24 1204  NPO Diet NPO Type: Strict NPO  Diet Effective Now         07/29/24 1214    07/29/24 1204  Inpatient Hematology & Oncology Consult  Once        Specialty:  Hematology and Oncology  Provider:  Nile Valenzuela MD    07/29/24 1214    07/29/24 1203  nitroglycerin (NITROSTAT) SL tablet 0.4 mg  Every 5 Minutes PRN         07/29/24 1214    07/29/24 1203  Inpatient Admission  Once         07/29/24 1214    07/29/24 1203  Notify Provider (With Default Parameters)  Until Discontinued         07/29/24 1214    07/29/24 1203  Ambulate Patient  Every Shift       07/29/24 1214    07/29/24 1203  Intake & Output  Every Shift       07/29/24 1214    07/29/24 1203  Weigh Patient  Once         07/29/24 1214    07/29/24 1203  Insert Peripheral IV  Once         07/29/24 1214    07/29/24 1203  Saline Lock & Maintain IV Access  Continuous,   Status:  Canceled         07/29/24 1214    07/29/24 1203  Code Status and Medical Interventions: No CPR (Do Not Attempt to Resuscitate); Limited Support; No intubation (DNI)  Continuous         07/29/24 1214    07/29/24 1202  sodium chloride 0.9 % flush 10 mL  As Needed         07/29/24 1214    07/29/24 1202  sodium chloride 0.9 % infusion 40 mL  As Needed         07/29/24 1214    07/29/24 1121  Inpatient Hospitalist Consult  Once,   Status:  Canceled        Specialty:  Hospitalist  Provider:  Arturo Tong MD    07/29/24 1120    07/29/24 1045  acetaminophen (TYLENOL) tablet  975 mg  Once,   Status:  Discontinued         07/29/24 1026    07/29/24 1045  sodium chloride 0.9 % bolus 1,905 mL  Once         07/29/24 1026    07/29/24 1045  vancomycin 1250 mg/250 mL 0.9% NS IVPB ([facility])  Once         07/29/24 1026    07/29/24 1045  cefepime 2000 mg IVPB in 100 mL NS (VTB)  Once         07/29/24 1026    07/29/24 1040  COVID-19, FLU A/B, RSV PCR 1 HR TAT - Swab, Nasopharynx  Once         07/29/24 1039    07/29/24 1026  Urinalysis With Microscopic If Indicated (No Culture) - Urine, Clean Catch  Once         07/29/24 1025    07/29/24 1026  XR Chest 1 View  1 Time Imaging         07/29/24 1025    07/29/24 1015  Blood Culture - Blood, Arm, Left  Once         07/29/24 1014    07/29/24 1015  Blood Culture - Blood, Arm, Right  Once         07/29/24 1014    07/29/24 1015  Lactic Acid, Plasma  Once         07/29/24 1014    07/29/24 1015  Insert peripheral IV  Once         07/29/24 1014    07/29/24 1015  Hunters Draw  Once         07/29/24 1014    07/29/24 1015  CBC & Differential  Once         07/29/24 1014    07/29/24 1015  Comprehensive Metabolic Panel  Once         07/29/24 1014    07/29/24 1015  Green Top (Gel)  PROCEDURE ONCE         07/29/24 1014    07/29/24 1015  Lavender Top  PROCEDURE ONCE         07/29/24 1014    07/29/24 1015  Gold Top - SST  PROCEDURE ONCE         07/29/24 1014    07/29/24 1015  Light Blue Top  PROCEDURE ONCE         07/29/24 1014    07/29/24 1015  CBC Auto Differential  PROCEDURE ONCE         07/29/24 1014    07/29/24 1014  sodium chloride 0.9 % flush 10 mL  As Needed,   Status:  Discontinued         07/29/24 1014    07/28/24 0000  Morphine (MS CONTIN) 15 MG 12 hr tablet  2 Times Daily         07/29/24 1227    07/26/24 0000  amoxicillin-clavulanate (AUGMENTIN) 875-125 MG per tablet  2 Times Daily         07/29/24 1219    07/24/24 0000  sertraline (ZOLOFT) 25 MG tablet  Daily         07/29/24 1229    07/16/24 0000  Advair Diskus 250-50 MCG/ACT DISKUS  Daily - RT         07/29/24  1222    07/16/24 0000  Milk of Magnesia 400 MG/5ML suspension  Daily PRN         07/29/24 1226    Unscheduled  Up in Chair  As Needed       07/29/24 1214    Unscheduled  Up With Assistance  As Needed       07/29/24 1214    Unscheduled  Oxygen Therapy- Nasal Cannula; Titrate 1-6 LPM Per SpO2; 90% or Greater  Continuous PRN       07/29/24 1214    --  azithromycin (ZITHROMAX) 250 MG tablet  Daily,   Status:  Discontinued         07/29/24 1118    --  cefTRIAXone 2,000 mg in sodium chloride 0.9 % 100 mL IVPB  Every 24 Hours,   Status:  Discontinued         07/29/24 1118    --  HYDROcodone-acetaminophen (NORCO)  MG per tablet  Every 6 Hours PRN         07/29/24 1118    --  Heparin Sodium, Porcine, (heparin, porcine,) 5000 UNIT/0.5ML injection  Every 12 Hours Scheduled,   Status:  Discontinued         07/29/24 1118    --  Sodium Phosphates (fleet enema) 7-19 GM/118ML enema  Once As Needed         07/29/24 1221    --  ammonium lactate (LAC-HYDRIN) 12 % lotion  Nightly         07/29/24 1223    --  loperamide (IMODIUM) 2 MG capsule  4 Times Daily PRN         07/29/24 1226                  Physician Progress Notes (last 24 hours)  Notes from 07/28/24 1349 through 07/29/24 1349   No notes of this type exist for this encounter.       Consult Notes (last 24 hours)  Notes from 07/28/24 1349 through 07/29/24 1349   No notes of this type exist for this encounter.

## 2024-07-29 NOTE — ED PROVIDER NOTES
Time: 10:18 AM EDT  Date of encounter:  7/29/2024  Independent Historian/Clinical History and Information was obtained by:   Patient    History is limited by: Cognitive Impairment    Chief Complaint: Elevated white blood cell count and abnormal calcium level      History of Present Illness:  Patient is a 44 y.o. year old male who presents to the emergency department for evaluation of elevated white blood cell count and abnormal calcium level.  Patient is currently residing at [facility] post recent hospitalization with findings of metastatic adenocarcinoma of the left lung, sepsis, anemia and electrolyte abnormalities.  On arrival patient has a fever of 101.8 and a heart rate of 115.    HPI    Patient Care Team  Primary Care Provider: Provider, No Known    Past Medical History:     No Known Allergies  Past Medical History:   Diagnosis Date    Anemia     Cancer     Depression     Hypertension     Hypomagnesemia      Past Surgical History:   Procedure Laterality Date    BRONCHOSCOPY N/A 5/23/2024    Procedure: BRONCHOSCOPY WITH ENDOBRONCHIAL ULTRASOUND, FINE NEEDLE ASPIRATE, BIOPSIES, BRUSHINGS, BRONCHOALVEOLAR LAVAGE;  Surgeon: Kendell Stern MD;  Location: [facility] ENDOSCOPY;  Service: Pulmonary;  Laterality: N/A;  LUNG NODULE, MUCOUS PLUGGING    BRONCHOSCOPY WITH ION ROBOTIC ASSIST N/A 6/20/2024    Procedure: BRONCHOSCOPY NAVIGATION WITH ENDOBRONCHIAL ULTRASOUND AND ION ROBOT. REBUS, EBUS, BRUSHINGS, WASHINGS, BAL, BIOPSIES AND NEEDLE ASPIRATE;  Surgeon: John Elizalde MD;  Location: [facility] MAIN OR;  Service: Robotics - Pulmonary;  Laterality: N/A;    HIP INTERTROCHANTERIC NAILING Right 5/21/2024    Procedure: HIP INTERTROCHANTERIC NAILING;  Surgeon: Molina Clayton MD;  Location: [facility] MAIN OR;  Service: Orthopedics;  Laterality: Right;    US GUIDED FINE NEEDLE ASPIRATION  6/11/2024     History reviewed. No pertinent family history.    Home Medications:  Prior to Admission medications    Medication  Sig Start Date End Date Taking? Authorizing Provider   acetaminophen (TYLENOL) 325 MG tablet Take 2 tablets by mouth Every 6 (Six) Hours As Needed for Mild Pain or Fever. 6/22/24   Sy Guzman MD   albuterol sulfate  (90 Base) MCG/ACT inhaler Inhale 2 puffs 2 (Two) Times a Day.    ProviderAgnieszka MD   aluminum-magnesium hydroxide-simethicone (MAALOX/MYLANTA) 200-200-20 MG/5ML suspension Take 30 mL by mouth Every 4 (Four) Hours As Needed for Indigestion or Heartburn.    ProviderAgnieszka MD   bisacodyl (DULCOLAX) 10 MG suppository Insert 1 suppository into the rectum 1 (One) Time.    ProviderAgnieszak MD   Fluticasone Furoate-Vilanterol 100-25 MCG/ACT aerosol powder  Inhale 1 puff Daily.    ProviderAgnieszka MD   Lidocaine 4 % Place 1 patch on the skin as directed by provider Daily As Needed for Mild Pain. Remove & Discard patch within 12 hours or as directed by MD 6/22/24   Sy Guzman MD   loperamide (IMODIUM) 2 MG capsule Take 1 capsule by mouth Every 6 (Six) Hours As Needed for Diarrhea.    ProviderAgnieszka MD   metoprolol succinate XL (TOPROL-XL) 25 MG 24 hr tablet Take 3 tablets by mouth Every 12 (Twelve) Hours for 30 days. 7/16/24 8/15/24  Donavan Shelley MD   multivitamin with minerals tablet tablet Take 1 tablet by mouth Daily. 6/22/24   Sy Guzman MD   naloxone (NARCAN) 4 MG/0.1ML nasal spray Call 911. Don't prime. [place] in 1 nostril for overdose. Repeat in 2-3 minutes in other nostril if no or minimal breathing/responsiveness. 7/16/24   Donavan Shelley MD   nicotine (NICODERM CQ) 21 MG/24HR patch Place 1 patch on the skin as directed by provider Daily As Needed (smoking cessation). 6/22/24   Sy Guzman MD   ondansetron ODT (ZOFRAN-ODT) 4 MG disintegrating tablet Take 1 tablet by mouth Every 6 (Six) Hours As Needed for Nausea or Vomiting. 6/22/24   Sy Guzman MD   polyethylene glycol (MIRALAX) 17 g packet Take 17 g by mouth Daily.  "6/22/24   Sy Guzman MD   Psyllium (METAMUCIL MULTIHEALTH FIBER) 51.7 % packet Take 1 packet by mouth Daily. 6/22/24   Sy Guzman MD   sennosides-docusate (senna-docusate sodium) 8.6-50 MG per tablet Take 2 tablets by mouth 3 times a day.    ProviderAgnieszka MD   Umeclidinium Bromide (INCRUSE ELLIPTA) 62.5 MCG/ACT aerosol powder  Inhale 1 puff Daily.    Provider, MD Agnieszka   vitamin B-12 (VITAMIN B-12) 1000 MCG tablet Take 1 tablet by mouth Daily. 6/22/24   Sy Guzman MD        Social History:   Social History     Tobacco Use    Smoking status: Every Day     Current packs/day: 1.50     Average packs/day: 1.5 packs/day for 24.6 years (36.9 ttl pk-yrs)     Types: Cigarettes     Start date: 2000    Smokeless tobacco: Former   Vaping Use    Vaping status: Never Used   Substance Use Topics    Alcohol use: Never    Drug use: Never         Review of Systems:  Review of Systems   Unable to perform ROS: Patient nonverbal        Physical Exam:  /94   Pulse 103   Temp (!) 101.8 °F (38.8 °C) (Rectal)   Resp 18   Ht 182.9 cm (72\")   Wt 63.5 kg (139 lb 15.9 oz)   SpO2 94%   BMI 18.99 kg/m²     Physical Exam  Vitals and nursing note reviewed.   Constitutional:       General: He is not in acute distress.  HENT:      Head: Normocephalic and atraumatic.   Eyes:      Extraocular Movements: Extraocular movements intact.   Cardiovascular:      Rate and Rhythm: Regular rhythm. Tachycardia present.      Heart sounds: Normal heart sounds.   Pulmonary:      Effort: Pulmonary effort is normal. No respiratory distress.      Breath sounds: Normal breath sounds.   Abdominal:      General: Abdomen is flat.      Palpations: Abdomen is soft.      Tenderness: There is no abdominal tenderness.   Musculoskeletal:         General: Normal range of motion.      Cervical back: Normal range of motion.   Skin:     General: Skin is warm and dry.   Neurological:      Mental Status: He is alert. Mental status " is at baseline.      Comments: Patient has cognitive development history and is nonverbal, patient is at baseline per nursing home                  Procedures:  Procedures      Medical Decision Making:      Comorbidities that affect care:    Metastatic lung cancer, chronic anemia    External Notes reviewed:    Hospital Discharge Summary: Patient discharged on 7/16/2024 after admission on 6/27/2024 for sepsis and anemia.  Patient has history of metastatic adenocarcinoma of the left lung      The following orders were placed and all results were independently analyzed by me:  Orders Placed This Encounter   Procedures    Blood Culture - Blood,    Blood Culture - Blood,    COVID-19, FLU A/B, RSV PCR 1 HR TAT - Swab, Nasopharynx    XR Chest 1 View    Lactic Acid, Plasma    [place] Draw    Comprehensive Metabolic Panel    CBC Auto Differential    Urinalysis With Microscopic If Indicated (No Culture) - Urine, Clean Catch    STAT Lactic Acid, Reflex    Inpatient Hospitalist Consult    Insert peripheral IV    CBC & Differential    Green Top (Gel)    Lavender Top    Gold Top - SST    Light Blue Top       Medications Given in the Emergency Department:  Medications   sodium chloride 0.9 % flush 10 mL (has no administration in time range)   acetaminophen (TYLENOL) tablet 975 mg (has no administration in time range)   vancomycin 1250 mg/250 mL 0.9% NS IVPB ([facility]) (has no administration in time range)   sodium chloride 0.9 % bolus 1,905 mL (1,905 mL Intravenous New Bag 7/29/24 1040)   cefepime 2000 mg IVPB in 100 mL NS (VTB) (0 mg Intravenous Stopped 7/29/24 1111)        ED Course:         Labs:    Lab Results (last 24 hours)       Procedure Component Value Units Date/Time    Blood Culture - Blood, Arm, Left [[id number]] Collected: 07/29/24 1029    Specimen: Blood from Arm, Left Updated: 07/29/24 1033    Blood Culture - Blood, Arm, Right [[id number]] Collected: 07/29/24 1029    Specimen: Blood from Arm, Right Updated: 07/29/24 1033     Lactic Acid, Plasma [[id number]]  (Abnormal) Collected: 07/29/24 1029    Specimen: Blood Updated: 07/29/24 1110     Lactate 2.6 mmol/L     CBC & Differential [[id number]]  (Abnormal) Collected: 07/29/24 1029    Specimen: Blood Updated: 07/29/24 1103    Narrative:      The following orders were created for panel order CBC & Differential.  Procedure                               Abnormality         Status                     ---------                               -----------         ------                     CBC Auto Differential[[id number]]        Abnormal            Final result                 Please view results for these tests on the individual orders.    Comprehensive Metabolic Panel [[id number]]  (Abnormal) Collected: 07/29/24 1029    Specimen: Blood Updated: 07/29/24 1101     Glucose 122 mg/dL      BUN 22 mg/dL      Creatinine 0.56 mg/dL      Sodium 148 mmol/L      Potassium 3.6 mmol/L      Comment: Slight hemolysis detected by analyzer. Result may be falsely elevated.        Chloride 100 mmol/L      CO2 32.0 mmol/L      Calcium 12.1 mg/dL      Total Protein 7.7 g/dL      Albumin 3.0 g/dL      ALT (SGPT) 14 U/L      AST (SGOT) 28 U/L      Comment: Slight hemolysis detected by analyzer. Result may be falsely elevated.        Alkaline Phosphatase 161 U/L      Total Bilirubin 0.6 mg/dL      Globulin 4.7 gm/dL      A/G Ratio 0.6 g/dL      BUN/Creatinine Ratio 39.3     Anion Gap 16.0 mmol/L      eGFR 124.6 mL/min/1.73     Narrative:      GFR Normal >60  Chronic Kidney Disease <60  Kidney Failure <15      CBC Auto Differential [[id number]]  (Abnormal) Collected: 07/29/24 1029    Specimen: Blood Updated: 07/29/24 1103     WBC 18.96 10*3/mm3      RBC 3.94 10*6/mm3      Hemoglobin 9.8 g/dL      Hematocrit 34.5 %      MCV 87.6 fL      MCH 24.9 pg      MCHC 28.4 g/dL      RDW 16.7 %      RDW-SD 53.5 fl      MPV 9.9 fL      Platelets 414 10*3/mm3      Neutrophil % 85.3 %      Lymphocyte % 7.5 %      Monocyte % 5.1 %       Eosinophil % 0.1 %      Basophil % 0.2 %      Immature Grans % 1.8 %      Neutrophils, Absolute 16.18 10*3/mm3      Lymphocytes, Absolute 1.43 10*3/mm3      Monocytes, Absolute 0.96 10*3/mm3      Eosinophils, Absolute 0.01 10*3/mm3      Basophils, Absolute 0.03 10*3/mm3      Immature Grans, Absolute 0.35 10*3/mm3      nRBC 0.0 /100 WBC     COVID-19, FLU A/B, RSV PCR 1 HR TAT - Swab, Nasopharynx [[id number]]  (Normal) Collected: 07/29/24 1049    Specimen: Swab from Nasopharynx Updated: 07/29/24 1145     COVID19 Not Detected     Influenza A PCR Not Detected     Influenza B PCR Not Detected     RSV, PCR Not Detected    Narrative:      Fact sheet for providers: https://www.fda.gov/media/121505/download    Fact sheet for patients: https://www.fda.gov/media/029682/download    Test performed by PCR.    Urinalysis With Microscopic If Indicated (No Culture) - Urine, Clean Catch [[id number]]  (Abnormal) Collected: 07/29/24 1102    Specimen: Urine, Clean Catch Updated: 07/29/24 1113     Color, UA Dark Yellow     Appearance, UA Clear     pH, UA 5.5     Specific Gravity, UA 1.022     Glucose, UA Negative     Ketones, UA Trace     Bilirubin, UA Negative     Blood, UA Negative     Protein, UA Trace     Leuk Esterase, UA Negative     Nitrite, UA Negative     Urobilinogen, UA 1.0 E.U./dL    Narrative:      Urine microscopic not indicated.             Imaging:    XR Chest 1 View    Result Date: 7/29/2024  XR CHEST 1 VW Date of Exam: 7/29/2024 10:41 AM EDT Indication: Leukocytosis Comparison: 6/27/2024 Findings: The lungs appear adequately aerated without consolidation or mass. No pleural effusion or pneumothorax is identified. The cardiomediastinal silhouette and pulmonary vasculature appear within normal limits. Bilateral destructive rib lesions, expansile lesion of the left coracoid process. Displaced pathologic fracture of the right mid/distal clavicle, new since 6/28/2024.     Impression: 1.No acute cardiopulmonary abnormality is  identified. 2.Osseous metastatic disease. Pathologic fracture of the right mid to distal clavicle is new since chest CT from 6/28/2024. 3.Further evaluation with chest CT may be considered if indicated. Electronically Signed: Everardo Mcdaniels MD  7/29/2024 10:47 AM EDT  Workstation ID: [id number]       Differential Diagnosis and Discussion:    Fever: Based on the complaint of fever, differential diagnosis includes but is not limited to meningitis, pneumonia, pyelonephritis, acute uti,  systemic immune response syndrome, sepsis, viral syndrome, fungal infection, tick born illness and other bacterial infections.    All labs were reviewed and interpreted by me.  All X-rays impressions were independently interpreted by me.    MDM           Sepsis criteria was met in the emergency department and the Sepsis protocol (including antibiotic administration) was initiated.      SIRS criteria considered:   1.  Temperature > 100.4 or <96.8    2.  Heart Rate > 90    3.  Respiratory Rate > 22    4.  WBC > 12K or <4K.             Severe Sepsis:     Respiratory: Mechanical Ventilation or Bipap  Hypotension: SBP > 90 or MAP < 65  Renal: Creatinine > 2  Metabolic: Lactic Acid > 2  Hematologic: Platelets < 100K or INR > 1.5  Hepatic: BILI  >  2  CNS: Sudden AMS     Septic Shock:     Severe Sepsis + Persistent hypotension or Lactic Acid > 4     Normal saline bolus, Antibiotics, and final disposition was based on these definitions.        Sepsis was recognized at 10:25    Antibiotics were ordered.     30 cc/kg bolus was  indicated.         Total Critical Care time of 50 minutes. Total critical care time documented does not include time spent on separately billed procedures for services of nurses or physician assistants. I personally saw and examined the patient. I have reviewed all diagnostic interpretations and treatment plans as written. I was present for the key portions of any procedures performed and the inclusive time noted in any  critical care statement. Critical care time includes patient management by me, time spent at the patients bedside,  time to review lab and imaging results, discussing patient care, documentation in the medical record, and time spent with family or caregiver.    Patient Care Considerations:    CT CHEST: I considered ordering a CT scan of the chest, however chest x-ray was ordered       Consultants/Shared Management Plan:    Hospitalist: I have discussed the case with Dr. Tong who agrees to accept the patient for admission.    Social Determinants of Health:    Patient is a nursing home/assisted living resident and has reliable access to care.      Disposition and Care Coordination:    Admit:   Through independent evaluation of the patient's history, physical, and imperical data, the patient meets criteria for inpatient admission to the hospital.        Final diagnoses:   Sepsis, due to unspecified organism, unspecified whether acute organ dysfunction present        ED Disposition       ED Disposition   Decision to Admit    Condition   --    Comment   --               This medical record created using voice recognition software.             Nito Matos DO  07/29/24 1151

## 2024-07-29 NOTE — H&P
[facility]   HOSPITALIST HISTORY AND PHYSICAL  Date: 2024   Patient Name: Oswaldo Bowen  : 1980  MRN: [id number]  Primary Care Physician:  Provider, No Known  Date of admission: 2024    Subjective   Subjective     Chief Complaint: Fever    HPI:  Oswaldo Bowen is a 44 y.o. 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  Patient was discharged to rehab facility at that time with goal of patient improving his level of debility in hopes of possibly initiating chemo-immunotherapy as an outpatient.  Patient did not progress well at rehab, remains very frail and weak.  Labs were obtained on 2024 and patient noted to have elevated WBC when compared to prior labs.  Patient also found to have fever and was brought to ED for evaluation on 2024.  In the ED patient noted to have temperature of 101.8 °F and patient also tachycardic.  Labs were significant for hypercalcemia and leukocytosis.  Lactate slightly elevated 2.6.  In the ED patient was given IV fluid bolus per sepsis protocol as well as dose of cefepime and vancomycin.  Hospitalist service contacted for further evaluation and management.  Patient stated he was not in any acute pain or discomfort, he was slow to respond and patient does have intellectual disability.    Personal History     Past Medical History:  Metastatic adenocarcinoma of the lung  Essential hypertension  Depression  Anemia  Intellectual disability    Past Surgical History:  Bronchoscopy  Intertrochanteric right hip nailing    Family History:   Unable to obtain secondary to patient's baseline intellectual disability    Social History:   Unable to obtain secondary to patient's  baseline intellectual disability    Home Medications:  Fluticasone Furoate-Vilanterol, HYDROcodone-acetaminophen, Lidocaine, Psyllium, Umeclidinium Bromide, acetaminophen, albuterol sulfate HFA, aluminum-magnesium hydroxide-simethicone, azithromycin, bisacodyl, (cefTRIAXone 2,000 mg in sodium chloride 0.9 % 100 mL IVPB), cyanocobalamin, heparin (porcine), metoprolol succinate XL, multivitamin with minerals, naloxone, nicotine, ondansetron ODT, polyethylene glycol, and sennosides-docusate    Allergies:  No Known Allergies    Review of Systems  Unable to obtain secondary to patient's baseline intellectual disability    Objective   Objective     Vitals:   Temp:  [101.8 °F (38.8 °C)] 101.8 °F (38.8 °C)  Heart Rate:  [103-115] 103  Resp:  [18] 18  BP: (154-165)/(94-96) 154/94    Physical Exam    Constitutional: Awake, alert, lying in bed, frail, cachectic male   Eyes: Pupils equal, sclerae anicteric, no conjunctival injection   HENT: NCAT, mucous membranes moist   Neck: Supple, no thyromegaly, no lymphadenopathy, trachea midline   Respiratory: Clear to auscultation bilaterally, nonlabored respirations    Cardiovascular: Regular rhythm, tachycardic, no murmurs, rubs, or gallops, palpable pedal pulses bilaterally   Gastrointestinal: Positive bowel sounds, soft, nontender, nondistended   Musculoskeletal: No bilateral ankle edema, no clubbing or cyanosis to extremities   Psychiatric: Flat affect, cooperative   Neurologic: Oriented x 1 (self), weak in all extremities, Cranial Nerves grossly intact, speech clear   Skin: No rashes     Result Review    Result Review:  I have personally reviewed the results from the time of this admission to 7/29/2024 12:16 EDT and agree with these findings:  [x]  Laboratory:  CMP          7/23/2024    15:07 7/27/2024    14:28 7/29/2024    10:29   CMP   Glucose 102  72  122    BUN 15  18  22    Creatinine 0.33  0.42  0.56    EGFR 146.2  136.0  124.6    Sodium 140  148  148    Potassium 3.0  3.3   3.6    Chloride 93  98  100    Calcium 11.5  11.8  12.1    Total Protein 5.9   7.7    Albumin 2.7   3.0    Globulin 3.2   4.7    Total Bilirubin 0.2   0.6    Alkaline Phosphatase 146   161    AST (SGOT) 26   28    ALT (SGPT) 31   14    Albumin/Globulin Ratio 0.8   0.6    BUN/Creatinine Ratio 45.5  42.9  39.3    Anion Gap 13.1  18.7  16.0      CBC          7/23/2024    15:07 7/27/2024    14:28 7/29/2024    10:29   CBC   WBC 14.96  19.24  18.96    RBC 3.29  3.27  3.94    Hemoglobin 8.2  8.2  9.8    Hematocrit 27.4  27.0  34.5    MCV 83.3  82.6  87.6    MCH 24.9  25.1  24.9    MCHC 29.9  30.4  28.4    RDW 15.4  15.2  16.7    Platelets 428  421  414    Lactate: 2.6.  []  Microbiology:  [x]  Radiology: CXR imaging personally reviewed.  No acute infiltrates noted.  Metastatic disease appreciated with fracture of right clavicle noted.  [x]  EKG/Telemetry:   []  Cardiology/Vascular:   []  Pathology:  []  Old records:  []  Other:    Assessment & Plan   Assessment / Plan     Assessment:  Sepsis of unknown origin  Hypercalcemia likely secondary to malignancy  Elevated lactic acid  Metastatic adenocarcinoma of the left lung  Significant pain secondary to metastatic adenocarcinoma of the left lung  Depression  Severe protein calorie malnutrition evidenced by cachexia  Intellectual disability    Plan:  -Admit to monitored bed  -Start broad-spectrum antibiotics vancomycin and Zosyn.  Monitor vancomycin level.  -Start IV fluids  -Monitor calcium level closely.  Nephrology consulted to assist in management of hypercalcemia  -Obtain Pro-Alvaro and trend  -Trend lactic acid  -CT chest/abdomen/pelvis ordered to see if possible source of infection  -Is possible that patient's fever secondary to his metastatic cancer  -Start appropriate home medications once medication list is reconciled and reviewed  -Hematology/oncology consulted to assist in care of the patient.  Plan had initially been for patient to possibly pursue chemo-immunotherapy  but it appears that patient not able to progress much at rehab in terms of his strength and level of debility and patient also with new pathologic fracture on imaging.  -Palliative care services consulted  -Speech therapy consulted  -PT/OT services consulted  -Monitor electrolytes and renal function with BMP and magnesium level in the AM  -Monitor WBC and Hgb with CBC in the AM  -Clinical course will dictate further management     DVT Prophylaxis: Heparin  Diet: N.p.o.  Dispo: Admit to monitored bed  Code Status: DNR/DNI     Personally reviewed patients labs and imaging, discussed with patient and nurse at bedside. Discussed management with the ED physician (Dr. Nito Matos) and the following consultants: Nephrology and Hematology/Oncology.     Part of this note may be an electronic transcription/translation of spoken language to printed text using the Dragon dictation system.      VTE Prophylaxis:  Pharmacologic VTE prophylaxis orders are present.        CODE STATUS:    Medical Intervention Limits: No intubation (DNI)  Code Status (Patient has no pulse and is not breathing): No CPR (Do Not Attempt to Resuscitate)  Medical Interventions (Patient has pulse or is breathing): Limited Support      Admission Status: I believe this patient meets inpatient status.    Electronically signed by Arturo Tong MD, 07/29/24, 12:16 PM EDT.

## 2024-07-29 NOTE — CONSULTS
Purpose of the visit was to evaluate for: goals of care/advanced care planning. Spoke with RN and patient and discussed palliative care, goals of care, resuscitation status, and family meeting .      Assessment: Mr Bowen is well known to the palliative care department. He was recently discharged for an attempt for rehab to improve his chances for possible initiation  of chemotherapy.  He has a history of intellectual disability, depression,he had recently been diagnosed with metastatic adenocarcinoma of the lung with metastases to the bones that had recent admission from 06/27/2024 to 07/16/2024 for similar presentation and it was during that admission the patient was diagnosed with metastatic adenocarcinoma of the lung.   He is unable to answer my questions at this time.    I was able to speak to his sister. She has request a meeting with MD to further discuss. She again voices she was unaware of his condition even though this has been discussed on multiple occasions. She has ask for a meeting tomorrow.  I will follow up tomorrow to schedule a time with her and the MD.    Recommendations/Plan: .Family meeting to be scheduled at the request of patients sister, HCS. Will call in the am to schedule with MD.     Tasks Completed: Code Status clarification, Emotional Support, and family meeting to be done tomorrow at the direction of MD .    Palliative care will continue to follow and support.   Kiersten TOMLINSON RN, BSN  Palliative Care

## 2024-07-30 ENCOUNTER — APPOINTMENT (OUTPATIENT)
Dept: CT IMAGING | Facility: HOSPITAL | Age: 44
End: 2024-07-30
Payer: COMMERCIAL

## 2024-07-30 LAB
ANION GAP SERPL CALCULATED.3IONS-SCNC: 14.4 MMOL/L (ref 5–15)
BUN SERPL-MCNC: 21 MG/DL (ref 6–20)
BUN/CREAT SERPL: 38.9 (ref 7–25)
CALCIUM SPEC-SCNC: 11.5 MG/DL (ref 8.6–10.5)
CHLORIDE SERPL-SCNC: 108 MMOL/L (ref 98–107)
CO2 SERPL-SCNC: 28.6 MMOL/L (ref 22–29)
CREAT SERPL-MCNC: 0.54 MG/DL (ref 0.76–1.27)
D-LACTATE SERPL-SCNC: 2.4 MMOL/L (ref 0.5–2)
DEPRECATED RDW RBC AUTO: 54.5 FL (ref 37–54)
EGFRCR SERPLBLD CKD-EPI 2021: 126 ML/MIN/1.73
ERYTHROCYTE [DISTWIDTH] IN BLOOD BY AUTOMATED COUNT: 17 % (ref 12.3–15.4)
FOLATE SERPL-MCNC: 16.6 NG/ML (ref 4.78–24.2)
GLUCOSE SERPL-MCNC: 119 MG/DL (ref 65–99)
HCT VFR BLD AUTO: 27.9 % (ref 37.5–51)
HGB BLD-MCNC: 7.7 G/DL (ref 13–17.7)
MAGNESIUM SERPL-MCNC: 1.8 MG/DL (ref 1.6–2.6)
MCH RBC QN AUTO: 24.4 PG (ref 26.6–33)
MCHC RBC AUTO-ENTMCNC: 27.6 G/DL (ref 31.5–35.7)
MCV RBC AUTO: 88.6 FL (ref 79–97)
PHOSPHATE SERPL-MCNC: 2.2 MG/DL (ref 2.5–4.5)
PLATELET # BLD AUTO: 342 10*3/MM3 (ref 140–450)
PMV BLD AUTO: 10.5 FL (ref 6–12)
POTASSIUM SERPL-SCNC: 2.5 MMOL/L (ref 3.5–5.2)
PROCALCITONIN SERPL-MCNC: 0.33 NG/ML (ref 0–0.25)
RBC # BLD AUTO: 3.15 10*6/MM3 (ref 4.14–5.8)
SODIUM SERPL-SCNC: 151 MMOL/L (ref 136–145)
VANCOMYCIN SERPL-MCNC: 12 MCG/ML (ref 5–40)
VIT B12 BLD-MCNC: >2000 PG/ML (ref 211–946)
WBC NRBC COR # BLD AUTO: 20.07 10*3/MM3 (ref 3.4–10.8)

## 2024-07-30 PROCEDURE — 25010000002 VANCOMYCIN 5 G RECONSTITUTED SOLUTION: Performed by: INTERNAL MEDICINE

## 2024-07-30 PROCEDURE — 84145 PROCALCITONIN (PCT): CPT | Performed by: INTERNAL MEDICINE

## 2024-07-30 PROCEDURE — 25010000002 PIPERACILLIN SOD-TAZOBACTAM PER 1 G: Performed by: INTERNAL MEDICINE

## 2024-07-30 PROCEDURE — 74177 CT ABD & PELVIS W/CONTRAST: CPT

## 2024-07-30 PROCEDURE — 80202 ASSAY OF VANCOMYCIN: CPT | Performed by: INTERNAL MEDICINE

## 2024-07-30 PROCEDURE — 83605 ASSAY OF LACTIC ACID: CPT | Performed by: INTERNAL MEDICINE

## 2024-07-30 PROCEDURE — 25510000001 IOPAMIDOL PER 1 ML: Performed by: INTERNAL MEDICINE

## 2024-07-30 PROCEDURE — 94664 DEMO&/EVAL PT USE INHALER: CPT

## 2024-07-30 PROCEDURE — 94799 UNLISTED PULMONARY SVC/PX: CPT

## 2024-07-30 PROCEDURE — 94640 AIRWAY INHALATION TREATMENT: CPT

## 2024-07-30 PROCEDURE — 71260 CT THORAX DX C+: CPT

## 2024-07-30 PROCEDURE — 99222 1ST HOSP IP/OBS MODERATE 55: CPT | Performed by: INTERNAL MEDICINE

## 2024-07-30 PROCEDURE — 85027 COMPLETE CBC AUTOMATED: CPT | Performed by: INTERNAL MEDICINE

## 2024-07-30 PROCEDURE — 92526 ORAL FUNCTION THERAPY: CPT

## 2024-07-30 PROCEDURE — 25010000002 POTASSIUM CHLORIDE 10 MEQ/100ML SOLUTION: Performed by: INTERNAL MEDICINE

## 2024-07-30 PROCEDURE — 25010000002 HEPARIN (PORCINE) PER 1000 UNITS: Performed by: INTERNAL MEDICINE

## 2024-07-30 PROCEDURE — 84100 ASSAY OF PHOSPHORUS: CPT | Performed by: INTERNAL MEDICINE

## 2024-07-30 PROCEDURE — 92610 EVALUATE SWALLOWING FUNCTION: CPT

## 2024-07-30 PROCEDURE — 80048 BASIC METABOLIC PNL TOTAL CA: CPT | Performed by: INTERNAL MEDICINE

## 2024-07-30 PROCEDURE — 25810000003 SODIUM CHLORIDE 0.9 % SOLUTION: Performed by: INTERNAL MEDICINE

## 2024-07-30 PROCEDURE — 83735 ASSAY OF MAGNESIUM: CPT | Performed by: INTERNAL MEDICINE

## 2024-07-30 PROCEDURE — 99233 SBSQ HOSP IP/OBS HIGH 50: CPT | Performed by: INTERNAL MEDICINE

## 2024-07-30 RX ORDER — NALOXONE HCL 0.4 MG/ML
0.4 VIAL (ML) INJECTION
Status: DISCONTINUED | OUTPATIENT
Start: 2024-07-30 | End: 2024-08-01 | Stop reason: HOSPADM

## 2024-07-30 RX ORDER — POTASSIUM CHLORIDE 7.45 MG/ML
10 INJECTION INTRAVENOUS
Status: DISPENSED | OUTPATIENT
Start: 2024-07-30 | End: 2024-07-30

## 2024-07-30 RX ORDER — DEXTROSE MONOHYDRATE 50 MG/ML
150 INJECTION, SOLUTION INTRAVENOUS CONTINUOUS
Status: DISCONTINUED | OUTPATIENT
Start: 2024-07-30 | End: 2024-07-31

## 2024-07-30 RX ORDER — SERTRALINE HYDROCHLORIDE 25 MG/1
25 TABLET, FILM COATED ORAL DAILY
Status: DISCONTINUED | OUTPATIENT
Start: 2024-07-30 | End: 2024-07-31

## 2024-07-30 RX ORDER — POLYETHYLENE GLYCOL 3350 17 G/17G
17 POWDER, FOR SOLUTION ORAL DAILY
Status: DISCONTINUED | OUTPATIENT
Start: 2024-07-30 | End: 2024-08-01 | Stop reason: HOSPADM

## 2024-07-30 RX ORDER — LIDOCAINE 4 G/G
1 PATCH TOPICAL DAILY PRN
Status: DISCONTINUED | OUTPATIENT
Start: 2024-07-30 | End: 2024-08-01 | Stop reason: HOSPADM

## 2024-07-30 RX ORDER — MORPHINE SULFATE 15 MG/1
15 TABLET, FILM COATED, EXTENDED RELEASE ORAL 2 TIMES DAILY
Status: DISCONTINUED | OUTPATIENT
Start: 2024-07-30 | End: 2024-08-01 | Stop reason: HOSPADM

## 2024-07-30 RX ORDER — HYDROCODONE BITARTRATE AND ACETAMINOPHEN 10; 325 MG/1; MG/1
1 TABLET ORAL EVERY 6 HOURS PRN
Status: DISCONTINUED | OUTPATIENT
Start: 2024-07-30 | End: 2024-07-31

## 2024-07-30 RX ORDER — AMOXICILLIN 250 MG
2 CAPSULE ORAL 2 TIMES DAILY
Status: DISCONTINUED | OUTPATIENT
Start: 2024-07-30 | End: 2024-08-01 | Stop reason: HOSPADM

## 2024-07-30 RX ORDER — BUDESONIDE AND FORMOTEROL FUMARATE DIHYDRATE 160; 4.5 UG/1; UG/1
2 AEROSOL RESPIRATORY (INHALATION)
Status: DISCONTINUED | OUTPATIENT
Start: 2024-07-30 | End: 2024-07-31

## 2024-07-30 RX ADMIN — VANCOMYCIN HYDROCHLORIDE 1250 MG: 5 INJECTION, POWDER, LYOPHILIZED, FOR SOLUTION INTRAVENOUS at 01:09

## 2024-07-30 RX ADMIN — TIOTROPIUM BROMIDE INHALATION SPRAY 2 PUFF: 3.12 SPRAY, METERED RESPIRATORY (INHALATION) at 14:16

## 2024-07-30 RX ADMIN — PIPERACILLIN AND TAZOBACTAM 4.5 G: 4; .5 INJECTION, POWDER, FOR SOLUTION INTRAVENOUS at 21:34

## 2024-07-30 RX ADMIN — BUDESONIDE AND FORMOTEROL FUMARATE DIHYDRATE 2 PUFF: 160; 4.5 AEROSOL RESPIRATORY (INHALATION) at 20:58

## 2024-07-30 RX ADMIN — PIPERACILLIN AND TAZOBACTAM 4.5 G: 4; .5 INJECTION, POWDER, FOR SOLUTION INTRAVENOUS at 05:42

## 2024-07-30 RX ADMIN — HYDROCODONE BITARTRATE AND ACETAMINOPHEN 1 TABLET: 10; 325 TABLET ORAL at 13:44

## 2024-07-30 RX ADMIN — Medication 10 ML: at 21:35

## 2024-07-30 RX ADMIN — HEPARIN SODIUM 5000 UNITS: 5000 INJECTION INTRAVENOUS; SUBCUTANEOUS at 05:43

## 2024-07-30 RX ADMIN — POLYETHYLENE GLYCOL 3350 17 G: 17 POWDER, FOR SOLUTION ORAL at 13:47

## 2024-07-30 RX ADMIN — POTASSIUM CHLORIDE 10 MEQ: 7.46 INJECTION, SOLUTION INTRAVENOUS at 10:21

## 2024-07-30 RX ADMIN — SENNOSIDES AND DOCUSATE SODIUM 2 TABLET: 50; 8.6 TABLET ORAL at 21:35

## 2024-07-30 RX ADMIN — HEPARIN SODIUM 5000 UNITS: 5000 INJECTION INTRAVENOUS; SUBCUTANEOUS at 13:43

## 2024-07-30 RX ADMIN — MORPHINE SULFATE 15 MG: 15 TABLET, FILM COATED, EXTENDED RELEASE ORAL at 13:42

## 2024-07-30 RX ADMIN — POTASSIUM CHLORIDE 10 MEQ: 7.46 INJECTION, SOLUTION INTRAVENOUS at 11:23

## 2024-07-30 RX ADMIN — POTASSIUM CHLORIDE 10 MEQ: 7.46 INJECTION, SOLUTION INTRAVENOUS at 14:25

## 2024-07-30 RX ADMIN — HEPARIN SODIUM 5000 UNITS: 5000 INJECTION INTRAVENOUS; SUBCUTANEOUS at 21:34

## 2024-07-30 RX ADMIN — SENNOSIDES AND DOCUSATE SODIUM 2 TABLET: 50; 8.6 TABLET ORAL at 13:46

## 2024-07-30 RX ADMIN — VANCOMYCIN HYDROCHLORIDE 1250 MG: 5 INJECTION, POWDER, LYOPHILIZED, FOR SOLUTION INTRAVENOUS at 16:51

## 2024-07-30 RX ADMIN — METOPROLOL SUCCINATE 75 MG: 50 TABLET, EXTENDED RELEASE ORAL at 13:42

## 2024-07-30 RX ADMIN — SODIUM CHLORIDE 150 ML/HR: 4.5 INJECTION, SOLUTION INTRAVENOUS at 09:07

## 2024-07-30 RX ADMIN — SERTRALINE 25 MG: 25 TABLET, FILM COATED ORAL at 13:43

## 2024-07-30 RX ADMIN — MORPHINE SULFATE 15 MG: 15 TABLET, FILM COATED, EXTENDED RELEASE ORAL at 21:35

## 2024-07-30 RX ADMIN — BUDESONIDE AND FORMOTEROL FUMARATE DIHYDRATE 2 PUFF: 160; 4.5 AEROSOL RESPIRATORY (INHALATION) at 14:17

## 2024-07-30 RX ADMIN — PIPERACILLIN AND TAZOBACTAM 4.5 G: 4; .5 INJECTION, POWDER, FOR SOLUTION INTRAVENOUS at 13:49

## 2024-07-30 RX ADMIN — Medication 10 ML: at 09:07

## 2024-07-30 RX ADMIN — METOPROLOL SUCCINATE 75 MG: 50 TABLET, EXTENDED RELEASE ORAL at 21:34

## 2024-07-30 RX ADMIN — IOPAMIDOL 100 ML: 755 INJECTION, SOLUTION INTRAVENOUS at 12:48

## 2024-07-30 NOTE — THERAPY EVALUATION
Acute Care - Speech Language Pathology   Swallow Initial Evaluation  [facility]     Patient Name: Oswaldo Bowen  : 1980  MRN: [id number]  Today's Date: 2024               Admit Date: 2024    Visit Dx:     ICD-10-CM ICD-9-CM   1. Sepsis, due to unspecified organism, unspecified whether acute organ dysfunction present  A41.9 038.9     995.91   2. Dysphagia, oropharyngeal  R13.12 787.22     Patient Active Problem List   Diagnosis    Closed intertrochanteric fracture of right femur    Pulmonary nodule    Sepsis    Anemia    Hypercalcemia     Past Medical History:   Diagnosis Date    Anemia     Cancer     Depression     Hypertension     Hypomagnesemia      Past Surgical History:   Procedure Laterality Date    BRONCHOSCOPY N/A 2024    Procedure: BRONCHOSCOPY WITH ENDOBRONCHIAL ULTRASOUND, FINE NEEDLE ASPIRATE, BIOPSIES, BRUSHINGS, BRONCHOALVEOLAR LAVAGE;  Surgeon: Kendell Stern MD;  Location: [facility] ENDOSCOPY;  Service: Pulmonary;  Laterality: N/A;  LUNG NODULE, MUCOUS PLUGGING    BRONCHOSCOPY WITH ION ROBOTIC ASSIST N/A 2024    Procedure: BRONCHOSCOPY NAVIGATION WITH ENDOBRONCHIAL ULTRASOUND AND ION ROBOT. REBUS, EBUS, BRUSHINGS, WASHINGS, BAL, BIOPSIES AND NEEDLE ASPIRATE;  Surgeon: John Elizalde MD;  Location: [facility] MAIN OR;  Service: Robotics - Pulmonary;  Laterality: N/A;    HIP INTERTROCHANTERIC NAILING Right 2024    Procedure: HIP INTERTROCHANTERIC NAILING;  Surgeon: Molina Clayton MD;  Location: [facility] MAIN OR;  Service: Orthopedics;  Laterality: Right;    US GUIDED FINE NEEDLE ASPIRATION  2024     Inpatient Speech Pathology Dysphagia Evaluation    PAIN SCALE: None reported    PRECAUTIONS/CONTRAINDICATIONS:  None noted    SUSPECTED ABUSE/NEGLECT/EXPLOITATION:  None noted    SOCIAL/PSYCHOLOGICAL NEEDS/BARRIERS:  Depression, intellectual impairment    PAST SOCIAL HISTORY:  In rehab prior to admit    PRIOR FUNCTION:  Dependent for care    PATIENT GOALS/EXPECTATIONS:  Did not  report    HISTORY:    Oswaldo Bowen is a 44 y.o. male with history of intellectual disability, depression, who had recently been diagnosed with metastatic adenocarcinoma of the lung with metastases to the bones that had recent admission from 06/27/2024 to 07/16/2024 for similar presentation and it was during that admission the patient was diagnosed with metastatic adenocarcinoma of the lung.  Patient was discharged to rehab facility at that time with goal of patient improving his level of debility in hopes of possibly initiating chemo-immunotherapy as an outpatient.  Patient did not progress well at rehab, remains very frail and weak.     Evaluated in May of 2024 with recommendation for soft solids and thin liquids.      CURRENT DIET LEVEL:  NPO    OBJECTIVE:    TEST ADMINISTERED:  Clinical dysphagia evaluation    COGNITION/SAFETY AWARENESS: Confused    BEHAVIORAL OBSERVATIONS: Requires gentle encouragement to participate.    ORAL MOTOR EXAM: Generalized oral motor weakness is noted.  Reduced palatal elevation, poor dentition    VOICE QUALITY: Hoarse    REFLEX EXAM: Deferred    POSTURE: 90 degrees upright    FEEDING/SWALLOWING FUNCTION: Assessed with thin liquids, nectar thick liquids, purée and soft solids    CLINICAL OBSERVATIONS: Oral stage is characterized by reduced bolus preparation, reduced bolus control.  Spillage with thin liquid trials.  Slow oral transit.  Prolonged mastication with soft solids, patient had to expel portion of bolus.  Swallow completed with thin liquids with clinical signs of aspiration noted.  Patient tolerated single sips of nectar thick liquids without clinical sign or symptom of aspiration.  Reduced laryngeal elevation per palpation.  Verbal cueing required to complete swallow at times.    DYSPHAGIA CRITERIA: Risk of aspiration    FUNCTIONAL ASSESSMENT INSTRUMENT: Patient currently scored a level 4 of 7 on Functional Communication Measures for swallowing indicating a 40-59% limitation  in function.    ASSESSMENT/ PLAN OF CARE:  Pt presents with limitations, noted below, that impede his ability to swallow safely. The skills of a therapist will be required to safely and effectively implement the following treatment plan to restore maximal level of function.       PROBLEMS:  1.  Dysphagia              LTG 1: (30 days) patient will increase ability to tolerate least restrictive diet and improve functional communication measure for swallowing to a 6 of 7              STG 1a: (14 days) patient will tolerate full liquid diet to nectar thick liquids without clinical sign or symptom of aspiration with 8 of 10 trials              STG 1b: (14 days) patient will tolerate therapeutic trials of soft solids and thin liquids without clinical sign or symptom of aspiration with 8 of 10 trials.              STG 1c: (14 days) patient/family teaching regarding diet recommendations, safe swallow strategies and signs and symptoms of aspiration.                            TREATMENT: Dysphagia therapy to ensure diet tolerance, advance to least restrictive diet and analyze aspiration risk     FREQUENCY/DURATION: Daily 5 times a week     REHAB POTENTIAL:  Pt has good rehab potential.  The following limitations may influence improvement/ length of tx medical status.     RECOMMENDATIONS:   1.   DIET: Full liquid diet, nectar thick liquids    2.  POSITION: 90 degrees upright for all intake     3.  COMPENSATORY STRATEGIES: Oral meds in applesauce crushed if needed, small bites/drinks. Small bites/drinks,     Pt/responsible party agrees with plan of care and has been informed of all alternatives, risks and benefits.     EDUCATION  The patient has been educated in the following areas:   Dysphagia (Swallowing Impairment).         Martha Abebe, SLP               5/25/2024

## 2024-07-30 NOTE — NURSING NOTE
"Spoke with patients sister. She is now aware of the increase of the active cancer. She is concerned about taking her brother home to \"die\".  She is ok with patient to go back to [facility] for end of life care. She is aware his insurance will not likely pay for hospice services and nursing home care. We have discussed completing a KYMOST at his return to the nursing home and choosing [facility] with a DNR/DNI status.  Mr Bowen is lying in the bed, moaning at times. He is requiring prn medications for discomfort. See MAR.  Palliative care will follow up with patients sister on Monday for a decision for discharge.  Kiersten TOMLINSON RN, BSN  Palliative Care  "

## 2024-07-30 NOTE — THERAPY TREATMENT NOTE
Acute Care - Speech Language Pathology   Swallow Treatment Note  [facility]     Patient Name: Oswaldo Bowen  : 1980  MRN: [id number]  Today's Date: 2024               Admit Date: 2024    Visit Dx:     ICD-10-CM ICD-9-CM   1. Sepsis, due to unspecified organism, unspecified whether acute organ dysfunction present  A41.9 038.9     995.91   2. Dysphagia, oropharyngeal  R13.12 787.22     Patient Active Problem List   Diagnosis    Closed intertrochanteric fracture of right femur    Pulmonary nodule    Sepsis    Anemia    Hypercalcemia     Past Medical History:   Diagnosis Date    Anemia     Cancer     Depression     Hypertension     Hypomagnesemia      Past Surgical History:   Procedure Laterality Date    BRONCHOSCOPY N/A 2024    Procedure: BRONCHOSCOPY WITH ENDOBRONCHIAL ULTRASOUND, FINE NEEDLE ASPIRATE, BIOPSIES, BRUSHINGS, BRONCHOALVEOLAR LAVAGE;  Surgeon: Kendell Stern MD;  Location: [facility] ENDOSCOPY;  Service: Pulmonary;  Laterality: N/A;  LUNG NODULE, MUCOUS PLUGGING    BRONCHOSCOPY WITH ION ROBOTIC ASSIST N/A 2024    Procedure: BRONCHOSCOPY NAVIGATION WITH ENDOBRONCHIAL ULTRASOUND AND ION ROBOT. REBUS, EBUS, BRUSHINGS, WASHINGS, BAL, BIOPSIES AND NEEDLE ASPIRATE;  Surgeon: John Elizalde MD;  Location: [facility] MAIN OR;  Service: Robotics - Pulmonary;  Laterality: N/A;    HIP INTERTROCHANTERIC NAILING Right 2024    Procedure: HIP INTERTROCHANTERIC NAILING;  Surgeon: Molina Clayton MD;  Location: [facility] MAIN OR;  Service: Orthopedics;  Laterality: Right;    US GUIDED FINE NEEDLE ASPIRATION  2024     SPEECH PATHOLOGY DYSPHAGIA TREATMENT    Subjective/Behavioral Observations: Patient seen for dysphagia therapy.  Attending RN asked speech therapy if patient can be advanced to solids due to patient request.  Reported patient more alert      Current Diet: N.p.o.      Treatment received: Patient seen at bedside.  Patient tolerating therapeutic trials of purée and nectar thick liquids  without clinical sign or symptom of aspiration.  Patient with reduced bolus preparation, holding bolus at times with cues to complete swallow with reduced laryngeal elevation noted.  Attempted soft solids, prolonged mastication, poor bolus preparation patient required to expel portion of bolus.  Scattered oral residue after the swallow.  Swallow completed with trials of thin liquids, patient with nasal regurg of thin liquids and clinical signs of aspiration.    Results of treatment: As stated    Progress toward goals: Limited    Barriers to Achieving goals: Medical status      Plan of care:/changes in plan: Full liquid diet to nectar thick consistencies, feet only when awake and alert, 90 degrees upright for all intake.  Patient will require repositioning throughout.  Patient may need nectar thick liquids spoonfed.  One-to-one supervision with meals  Oral meds crushed in applesauce       EDUCATION  The patient has been educated in the following areas:   Dysphagia (Swallowing Impairment).       Martha Abebe, SLP  7/30/2024

## 2024-07-30 NOTE — SIGNIFICANT NOTE
07/30/24 1015   Coping/Psychosocial   Observed Emotional State calm   Verbalized Emotional State anxiety;other (see comments)  (The Pt doesn't speak clear.)   Trust Relationship/Rapport empathic listening provided   Family/Support Persons sibling   Involvement in Care no interaction with patient   Additional Documentation Spiritual Care (Group)   Spiritual Care   Use of Spiritual Resources prayer   Spiritual Care Source  initiative   Spiritual Care Follow-Up follow-up, none required as presently assessed   Response to Spiritual Care receptive of support   Spiritual Care Interventions supportive conversation provided;prayer support provided   Spiritual Care Visit Type initial   Receptivity to Spiritual Care visit welcomed

## 2024-07-30 NOTE — CONSULTS
[facility]   Consult Note    Patient Name: Oswaldo Bowen  : 1980  MRN: [id number]  Primary Care Physician:  Provider, No Known  Referring Physician: No ref. provider found  Date of admission: 2024    Subjective   Subjective     Reason for Consult/ Chief Complaint: Lung cancer    HPI:  Oswaldo Bowen is a 44 y.o. male known to my partner Dr. Johnson from recent hospitalization where he was found to have metastatic lung cancer.  He was discharged from that hospitalization to rehab in an attempt to improve his performance status.  Patient has intellectual delay but answers brief questions.  He denies pain.  He states he has not been getting out of bed.  He was sent back to the hospital from the rehab facility for elevated white count and fever up to 101.8 in the emergency room.  He has been started on broad-spectrum antibiotics and cultures are currently pending.  He states he did not eat dinner last night.  He remains in bed.      Personal History     Past Medical History:   Diagnosis Date   • Anemia    • Cancer    • Depression    • Hypertension    • Hypomagnesemia        Past Surgical History:   Procedure Laterality Date   • BRONCHOSCOPY N/A 2024    Procedure: BRONCHOSCOPY WITH ENDOBRONCHIAL ULTRASOUND, FINE NEEDLE ASPIRATE, BIOPSIES, BRUSHINGS, BRONCHOALVEOLAR LAVAGE;  Surgeon: Kendell Stern MD;  Location: [facility] ENDOSCOPY;  Service: Pulmonary;  Laterality: N/A;  LUNG NODULE, MUCOUS PLUGGING   • BRONCHOSCOPY WITH ION ROBOTIC ASSIST N/A 2024    Procedure: BRONCHOSCOPY NAVIGATION WITH ENDOBRONCHIAL ULTRASOUND AND ION ROBOT. REBUS, EBUS, BRUSHINGS, WASHINGS, BAL, BIOPSIES AND NEEDLE ASPIRATE;  Surgeon: John Elizalde MD;  Location: [facility] MAIN OR;  Service: Robotics - Pulmonary;  Laterality: N/A;   • HIP INTERTROCHANTERIC NAILING Right 2024    Procedure: HIP INTERTROCHANTERIC NAILING;  Surgeon: Molina Clayton MD;  Location: [facility] MAIN OR;  Service: Orthopedics;  Laterality: Right;    • US GUIDED FINE NEEDLE ASPIRATION  6/11/2024       Family History: family history includes No Known Problems in his brother, daughter, father, maternal aunt, maternal grandfather, maternal grandmother, maternal uncle, mother, paternal aunt, paternal grandfather, paternal grandmother, paternal uncle, sister, son, and another family member. Otherwise pertinent FHx was reviewed and not pertinent to current issue.    Social History:  reports that he has been smoking cigarettes. He started smoking about 24 years ago. He has a 36.9 pack-year smoking history. He has quit using smokeless tobacco. He reports that he does not drink alcohol and does not use drugs.    Home Medications:  Fluticasone-Salmeterol, HYDROcodone-acetaminophen, Lidocaine, Morphine, Psyllium, Umeclidinium Bromide, acetaminophen, albuterol sulfate HFA, aluminum-magnesium hydroxide-simethicone, ammonium lactate, amoxicillin-clavulanate, bisacodyl, cyanocobalamin, fleet enema, loperamide, magnesium hydroxide, metoprolol succinate XL, naloxone, nicotine, ondansetron ODT, polyethylene glycol, sennosides-docusate, and sertraline    Allergies:  No Known Allergies    Objective    Objective     Vitals:   Temp:  [97.2 °F (36.2 °C)-101.8 °F (38.8 °C)] 97.5 °F (36.4 °C)  Heart Rate:  [] 115  Resp:  [14-18] 16  BP: (133-165)/(81-99) 133/81    Physical Exam:   Constitutional: Awake, alert   Eyes: PERRLA, sclerae anicteric, no conjunctival injection   HENT: NCAT, mucous membranes moist   Neck: Supple, no thyromegaly, no lymphadenopathy, trachea midline   Respiratory: Clear to auscultation bilaterally, nonlabored respirations    Cardiovascular: RRR, no murmurs, rubs, or gallop    Gastrointestinal: Positive bowel sounds, soft, nontender, nondistended   Musculoskeletal: No bilateral ankle edema, no clubbing or cyanosis to extremities   Psychiatric: Appropriate affect, cooperative   Neurologic: Oriented x 3, grossly nonfocal, speech clear     Result Review     Result Review:  I have personally reviewed the results from the time of this admission to 7/30/2024 07:32 EDT and agree with these findings:  [x]  Laboratory  [x]  Microbiology cultures negative to date  [x]  Radiology chest x-ray reviewed by me showing a distal right clavicle fracture  []  EKG/Telemetry   []  Cardiology/Vascular   []  Pathology  []  Old records  []  Other:  Most notable findings include: Hypercalcemia.  Distal right clavicle fracture.  Elevated white blood cell count.  Hemoglobin 7.7.    Assessment & Plan   Assessment / Plan     Brief Patient Summary:  Oswaldo Bowen is a 44 y.o. male who is admitted to the hospital for fever, leukocytosis, failure to thrive.  Recently found to have metastatic lung cancer.    Active Hospital Problems:  Active Hospital Problems    Diagnosis    • **Hypercalcemia      Plan:   Lung cancer, metastatic.  Recently diagnosed.  Patient performance status is poor, ECOG PS 4.  He also has acute febrile illness with leukocytosis.  At this point he would not be a candidate for treatment recommend palliative care/hospice evaluation.  If his performance status improves, this can be reassessed with Dr. Johnson as an outpatient.    Bone metastases.  Patient has pathologic fracture of the right clavicle on x-ray.  Prior imaging showed multiple bone metastases.    Hypercalcemia.  Likely related to bone metastases.  Responding to intravenous fluid resuscitation.  Calcium 11.5 g/dL.  A dose of Aredia could be considered if the calcium increases again.    Anemia.  Hemoglobin has drifted down to 7.7 g/dL.  No obvious bleeding.  Workup during the prior hospitalization was suggestive of iron deficiency.  Consider transfusion if hemoglobin drops below 7 g/dL or there is evidence of active bleeding.  Will add B12 and folic acid levels.  Consider oral or dose of IV iron.    Fever.  Patient has elevated total white count, procalcitonin and lactic acid.  This would suggest acute infection.   Tumor fever is a possibility and in the differential but is a diagnosis of exclusion.      Electronically signed by Nile Valenzuela MD, 07/30/24, 7:32 AM EDT.

## 2024-07-30 NOTE — PROGRESS NOTES
"[facility] Clinical Pharmacy Services: Vancomycin Pharmacokinetic Initial Consult Note    Oswaldo Bowen is a 44 y.o. male who is on day  of pharmacy to dose vancomycin for Sepsis.    Consult Information  Consulting Provider: Alycia  Planned Duration of Therapy: 7 days  Was Patient Receiving Prior to Admission/Consult?: No  Loading Dose Ordered or Given: 1250 mg on  at 1200  PK/PD Target: -600 mg/L.hr   Other Antimicrobials: Piperacillin/Tazobactam    Imaging Reviewed?: No    Microbiology Data  MRSA PCR performed: No; Result: Not ordered due to excluded indication or presence of suspected abscess  Culture/Source:   Blood cx: pending  Urine cx: pending    Vitals/Labs  Ht: 182.9 cm (72\"); Wt: 65.8 kg (145 lb 1 oz)  Temp (24hrs), Av.1 °F (36.7 °C), Min:97.2 °F (36.2 °C), Max:100 °F (37.8 °C)   Estimated Creatinine Clearance: 162.5 mL/min (A) (by C-G formula based on SCr of 0.54 mg/dL (L)).        Results from last 7 days   Lab Units 24  1149 24  0453 24  1029 24  1428   VANCOMYCIN RM mcg/mL 12.00  --   --   --    CREATININE mg/dL  --  0.54* 0.56* 0.42*   WBC 10*3/mm3  --  20.07* 18.96* 19.24*     Assessment/Plan:  Vancomycin random was 12 today. Will continue current regimen as listed below.   Vancomycin Dose:  1250 mg IV every 12 hours; which provides the following predicted parameters:  AUC24,ss: 540 mg/L.hr  PAUC*: 97 %  Ctrough,ss: 15.4 mg/L  Pconc*: 13 %  Tox.: 11 %  No levels ordered at this time.   Patient has order for Basic Metabolic Panel    Pharmacy will follow patient's kidney function and will adjust doses and obtain levels as necessary. Thank you for involving pharmacy in this patient's care. Please contact pharmacy with any questions or concerns.                           Julia Agarwal [facility]  Clinical Pharmacist  "

## 2024-07-30 NOTE — PROGRESS NOTES
[facility]   Hospitalist Progress Note  Date: 2024  Patient Name: Oswaldo Bowen  : 1980  MRN: [id number]  Date of admission: 2024  Consultants:   -Nephrology: Dr. Matthew Oviedo  -Hematology/Oncology: Dr. Nile Valenzuela    Subjective   Subjective     Chief Complaint: Fever    Summary:   Oswaldo Bowen is a 44 y.o. 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  Patient was discharged to rehab facility at that time with goal of patient improving his level of debility in hopes of possibly initiating chemo-immunotherapy as an outpatient.  Patient did not progress well at rehab, remains very frail and weak.  Labs were obtained on 2024 and patient noted to have elevated WBC, hypercalcemia, elevated lactic acid when compared to prior labs.  Patient also found to have fever and was brought to ED for evaluation on 2024.  Hospitalist service contacted for further evaluation management.  Nephrology and hematology/oncology consulted.  IV fluids and broad-spectrum antibiotic started.  Palliative care consulted.    Interval Followup:   No acute events overnight.  Tmax since admission 100 °F.  Sodium level increased on a.m. labs.  Potassium low, phosphorus low.  Lactate 2.4 and Pro-Alvaro slightly elevated.  Patient very frail and weak.  Patient with very poor p.o. intake.  Patient denied being in any pain or discomfort.  Nursing with no additional acute issues to report.    Antibiotics:   -Vancomycin  -Zosyn    Objective   Objective     Vitals:   Temp:  [97.2 °F (36.2 °C)-100 °F (37.8 °C)] 98.2 °F (36.8 °C)  Heart Rate:  [] 101  Resp:  [14-16] 16  BP: (133-156)/(81-99) 147/91  Physical Exam   Gen: Frail,  not acutely ill-appearing chronically ill male, cachectic, awake, alert  Resp: CTAB, No w/r/r, No respiratory distress appreciated  Card: RRR, No m/r/g  Abd: Soft, Nontender, Nondistended, + bowel sounds    Result Review    Result Review:  I have personally reviewed the results as below and agree with these findings:  []  Laboratory:   CMP          7/27/2024    14:28 7/29/2024    10:29 7/30/2024    04:53   CMP   Glucose 72  122  119    BUN 18  22  21    Creatinine 0.42  0.56  0.54    EGFR 136.0  124.6  126.0    Sodium 148  148  151    Potassium 3.3  3.6  2.5    Chloride 98  100  108    Calcium 11.8  12.1  11.5    Total Protein  7.7     Albumin  3.0     Globulin  4.7     Total Bilirubin  0.6     Alkaline Phosphatase  161     AST (SGOT)  28     ALT (SGPT)  14     Albumin/Globulin Ratio  0.6     BUN/Creatinine Ratio 42.9  39.3  38.9    Anion Gap 18.7  16.0  14.4      CBC          7/27/2024    14:28 7/29/2024    10:29 7/30/2024    04:53   CBC   WBC 19.24  18.96  20.07    RBC 3.27  3.94  3.15    Hemoglobin 8.2  9.8  7.7    Hematocrit 27.0  34.5  27.9    MCV 82.6  87.6  88.6    MCH 25.1  24.9  24.4    MCHC 30.4  28.4  27.6    RDW 15.2  16.7  17.0    Platelets 421  414  342    Phosphorus low.  Magnesium within normal limits.  Lactate: 2.4.  Procalcitonin: 0.33.  [x]  Microbiology: Blood culture (07/29/2024): No growth to date.  []  Radiology:   [x]  EKG/Telemetry:    []  Cardiology/Vascular:    []  Pathology:  []  Old records:  []  Other:    Assessment & Plan   Assessment / Plan     Assessment:  Sepsis of unknown origin  Hypercalcemia likely secondary to malignancy  Elevated lactic acid  Hypernatremia  Metastatic adenocarcinoma of the left lung  Hypokalemia  Hypophosphatemia  Acute on chronic anemia  Significant pain secondary to metastatic adenocarcinoma of the left lung  Depression  Severe protein calorie malnutrition evidenced by cachexia  Intellectual disability    Plan:  -Nephrology and Hematology/Oncology consulted  and following, appreciate assistance and recommendations in the care of this patient.  -Continue IV fluids.  Changed to D5W given hypernatremia.  Monitor electrolytes closely.  -Replace potassium and phosphorus  -Hypercalcemia likely related to bone metastases.  If patient's calcium level increases can consider dose of Aredia  -Continue broad-spectrum antibiotics vancomycin and Zosyn.  Tailor based on results of infectious workup.  Monitor vancomycin level.  -CT chest/abdomen/pelvis ordered, follow-up results  -Start appropriate home medications  -I have discussed with hematology/oncology.  Given patient's performance status being sober for and significant extent of patient's disease patient would be appropriate for palliative care/hospice.  -Palliative care consulted  -Will monitor electrolytes and renal function with BMP, phosphorus level and magnesium level in the AM  -Will monitor WBC and Hgb with CBC in the AM  -Clinical course will dictate further management     DVT Prophylaxis: Heparin  Diet:   Diet Order   Procedures   • NPO Diet NPO Type: Strict NPO     Dispo: Palliative care consulted     Personally reviewed patients labs and imaging, discussed with patient and nurse at bedside. Discussed management with the following consultants: Nephrology and Hematology/Oncology.     Part of this note may be an electronic transcription/translation of spoken language to printed text using the Dragon dictation system.    VTE Prophylaxis:  Pharmacologic VTE prophylaxis orders are present.        CODE STATUS:   Medical Intervention Limits: No intubation (DNI)  Code Status (Patient has no pulse and is not breathing): No CPR (Do Not Attempt to Resuscitate)  Medical Interventions (Patient has pulse or is breathing): Limited Support        Electronically signed by Arturo Tong MD, 7/30/2024, 11:13 EDT.

## 2024-07-30 NOTE — PLAN OF CARE
Goal Outcome Evaluation:      FUNCTIONAL ASSESSMENT INSTRUMENT: Patient currently scored a level 4 of 7 on Functional Communication Measures for swallowing indicating a 40-59% limitation in function.     ASSESSMENT/ PLAN OF CARE:  Pt presents with limitations, noted below, that impede his ability to swallow safely. The skills of a therapist will be required to safely and effectively implement the following treatment plan to restore maximal level of function.        PROBLEMS:  1.  Dysphagia              LTG 1: (30 days) patient will increase ability to tolerate least restrictive diet and improve functional communication measure for swallowing to a 6 of 7              STG 1a: (14 days) patient will tolerate full liquid diet to nectar thick liquids without clinical sign or symptom of aspiration with 8 of 10 trials              STG 1b: (14 days) patient will tolerate therapeutic trials of soft solids and thin liquids without clinical sign or symptom of aspiration with 8 of 10 trials.              STG 1c: (14 days) patient/family teaching regarding diet recommendations, safe swallow strategies and signs and symptoms of aspiration.                            TREATMENT: Dysphagia therapy to ensure diet tolerance, advance to least restrictive diet and analyze aspiration risk     FREQUENCY/DURATION: Daily 5 times a week     REHAB POTENTIAL:  Pt has good rehab potential.  The following limitations may influence improvement/ length of tx medical status.     RECOMMENDATIONS:   1.   DIET: Full liquid diet, nectar thick liquids     2.  POSITION: 90 degrees upright for all intake     3.  COMPENSATORY STRATEGIES: Oral meds in applesauce crushed if needed, small bites/drinks. Small bites/drinks,      Pt/responsible party agrees with plan of care and has been informed of all alternatives, risks and benefits.     EDUCATION  The patient has been educated in the following areas:   Dysphagia (Swallowing Impairment).

## 2024-07-30 NOTE — PROGRESS NOTES
" LOS: 1 day   Patient Care Team:  Provider, No Known as PCP - General    Chief Complaint: Hypercalcemia    Subjective     History of Present Illness  Pt remains confused but appears baseline    Subjective:  Symptoms:  No anxiety.    Diet:  Poor intake.        History taken from: chart RN    Objective     Vital Sign Min/Max for last 24 hours  Temp  Min: 97.2 °F (36.2 °C)  Max: 100 °F (37.8 °C)   BP  Min: 133/81  Max: 156/97   Pulse  Min: 99  Max: 115   Resp  Min: 14  Max: 16   SpO2  Min: 92 %  Max: 96 %   No data recorded   Weight  Min: 63.5 kg (139 lb 15.9 oz)  Max: 65.8 kg (145 lb 1 oz)     Flowsheet Rows      Flowsheet Row First Filed Value   Admission Height 182.9 cm (72\") Documented at 07/29/2024 1012   Admission Weight 63.5 kg (139 lb 15.9 oz) Documented at 07/29/2024 1012            No intake/output data recorded.  I/O last 3 completed shifts:  In: 2005 [IV Piggyback:2005]  Out: 450 [Urine:450]    Objective:  General Appearance:  Comfortable.    Vital signs: (most recent): Blood pressure 147/91, pulse 101, temperature 98.2 °F (36.8 °C), temperature source Oral, resp. rate 16, height 182.9 cm (72\"), weight 65.8 kg (145 lb 1 oz), SpO2 94%.  Vital signs are normal.    Output: Producing urine.    HEENT: Normal HEENT exam.    Lungs:  Normal effort and normal respiratory rate.    Heart: Normal rate.  Regular rhythm.    Abdomen: Abdomen is soft.  Bowel sounds are normal.   There is no abdominal tenderness.     Extremities: There is no dependent edema.    Pulses: Distal pulses are intact.    Neurological: Patient is alert.    Pupils:  Pupils are equal, round, and reactive to light.                Results Review:     I reviewed the patient's new clinical results.  I reviewed the patient's new imaging results and agree with the interpretation.  I reviewed the patient's other test results and agree with the interpretation    WBC WBC   Date Value Ref Range Status   07/30/2024 20.07 (H) 3.40 - 10.80 10*3/mm3 Final " "  07/29/2024 18.96 (H) 3.40 - 10.80 10*3/mm3 Final   07/27/2024 19.24 (H) 3.40 - 10.80 10*3/mm3 Final      HGB Hemoglobin   Date Value Ref Range Status   07/30/2024 7.7 (L) 13.0 - 17.7 g/dL Final   07/29/2024 9.8 (L) 13.0 - 17.7 g/dL Final   07/27/2024 8.2 (L) 13.0 - 17.7 g/dL Final      HCT Hematocrit   Date Value Ref Range Status   07/30/2024 27.9 (L) 37.5 - 51.0 % Final   07/29/2024 34.5 (L) 37.5 - 51.0 % Final   07/27/2024 27.0 (L) 37.5 - 51.0 % Final      Platlets No results found for: \"LABPLAT\"   MCV MCV   Date Value Ref Range Status   07/30/2024 88.6 79.0 - 97.0 fL Final   07/29/2024 87.6 79.0 - 97.0 fL Final   07/27/2024 82.6 79.0 - 97.0 fL Final          Sodium Sodium   Date Value Ref Range Status   07/30/2024 151 (H) 136 - 145 mmol/L Final   07/29/2024 148 (H) 136 - 145 mmol/L Final   07/27/2024 148 (H) 136 - 145 mmol/L Final      Potassium Potassium   Date Value Ref Range Status   07/30/2024 2.5 (C) 3.5 - 5.2 mmol/L Final   07/29/2024 3.6 3.5 - 5.2 mmol/L Final     Comment:     Slight hemolysis detected by analyzer. Result may be falsely elevated.   07/27/2024 3.3 (L) 3.5 - 5.2 mmol/L Final      Chloride Chloride   Date Value Ref Range Status   07/30/2024 108 (H) 98 - 107 mmol/L Final   07/29/2024 100 98 - 107 mmol/L Final   07/27/2024 98 98 - 107 mmol/L Final      CO2 CO2   Date Value Ref Range Status   07/30/2024 28.6 22.0 - 29.0 mmol/L Final   07/29/2024 32.0 (H) 22.0 - 29.0 mmol/L Final   07/27/2024 31.3 (H) 22.0 - 29.0 mmol/L Final      BUN BUN   Date Value Ref Range Status   07/30/2024 21 (H) 6 - 20 mg/dL Final   07/29/2024 22 (H) 6 - 20 mg/dL Final   07/27/2024 18 6 - 20 mg/dL Final      Creatinine Creatinine   Date Value Ref Range Status   07/30/2024 0.54 (L) 0.76 - 1.27 mg/dL Final   07/29/2024 0.56 (L) 0.76 - 1.27 mg/dL Final   07/27/2024 0.42 (L) 0.76 - 1.27 mg/dL Final      Calcium Calcium   Date Value Ref Range Status   07/30/2024 11.5 (H) 8.6 - 10.5 mg/dL Final   07/29/2024 12.1 (H) 8.6 - " "10.5 mg/dL Final   07/27/2024 11.8 (H) 8.6 - 10.5 mg/dL Final      PO4 No results found for: \"CAPO4\"   Albumin Albumin   Date Value Ref Range Status   07/29/2024 3.0 (L) 3.5 - 5.2 g/dL Final      Magnesium Magnesium   Date Value Ref Range Status   07/30/2024 1.8 1.6 - 2.6 mg/dL Final      Uric Acid No results found for: \"URICACID\"     Medication Review:   acetaminophen, 650 mg, Rectal, Once  heparin (porcine), 5,000 Units, Subcutaneous, Q8H  piperacillin-tazobactam, 4.5 g, Intravenous, Once  piperacillin-tazobactam, 4.5 g, Intravenous, Q8H  potassium chloride, 10 mEq, Intravenous, Q1H  sodium chloride, 10 mL, Intravenous, Q12H  vancomycin, 1,250 mg, Intravenous, Q12H          Assessment & Plan       Hypercalcemia      Assessment & Plan  Hypercalcemia-  calcium better to 11.8->12.1->11.5 today.  Normal renal function.  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  Agree with IVF's for now.  Could give pamidronate, will monitor for now.  Agree with oncology that palliative care likely best course for pt at this time.     Hypernatremia-  elevated at 148->151.  Will change IVF's to d5w at 150cc/hr.    Hypokalemia-  replaced today, monitor.     Adenocarcinoma of lung-  mets to bone.     Fevers-  had previous fevers attributed to met adenocarcinoma per notes.  Also on broad spectrum abx now for possible sepsis.    Matthew Oviedo MD  07/30/24  10:33 EDT          "

## 2024-07-30 NOTE — PLAN OF CARE
Goal Outcome Evaluation:                 VSS, a&o x2-3 with fluctuates with confusion room air, denies pain/discomfort, able to communicate needs to staff, continues abx, remains NPO, no concerns to report, continue POC

## 2024-07-31 LAB
ANION GAP SERPL CALCULATED.3IONS-SCNC: 12.1 MMOL/L (ref 5–15)
BASOPHILS # BLD AUTO: 0.05 10*3/MM3 (ref 0–0.2)
BASOPHILS NFR BLD AUTO: 0.3 % (ref 0–1.5)
BUN SERPL-MCNC: 15 MG/DL (ref 6–20)
BUN/CREAT SERPL: 31.9 (ref 7–25)
CALCIUM SPEC-SCNC: 11 MG/DL (ref 8.6–10.5)
CHLORIDE SERPL-SCNC: 107 MMOL/L (ref 98–107)
CO2 SERPL-SCNC: 28.9 MMOL/L (ref 22–29)
CREAT SERPL-MCNC: 0.47 MG/DL (ref 0.76–1.27)
DEPRECATED RDW RBC AUTO: 57.6 FL (ref 37–54)
EGFRCR SERPLBLD CKD-EPI 2021: 131.4 ML/MIN/1.73
EOSINOPHIL # BLD AUTO: 0.12 10*3/MM3 (ref 0–0.4)
EOSINOPHIL NFR BLD AUTO: 0.7 % (ref 0.3–6.2)
ERYTHROCYTE [DISTWIDTH] IN BLOOD BY AUTOMATED COUNT: 17.1 % (ref 12.3–15.4)
GLUCOSE SERPL-MCNC: 138 MG/DL (ref 65–99)
HCT VFR BLD AUTO: 26.8 % (ref 37.5–51)
HGB BLD-MCNC: 7.1 G/DL (ref 13–17.7)
IMM GRANULOCYTES # BLD AUTO: 0.27 10*3/MM3 (ref 0–0.05)
IMM GRANULOCYTES NFR BLD AUTO: 1.6 % (ref 0–0.5)
LYMPHOCYTES # BLD AUTO: 1.21 10*3/MM3 (ref 0.7–3.1)
LYMPHOCYTES NFR BLD AUTO: 7.3 % (ref 19.6–45.3)
MAGNESIUM SERPL-MCNC: 1.8 MG/DL (ref 1.6–2.6)
MCH RBC QN AUTO: 24.3 PG (ref 26.6–33)
MCHC RBC AUTO-ENTMCNC: 26.5 G/DL (ref 31.5–35.7)
MCV RBC AUTO: 91.8 FL (ref 79–97)
MONOCYTES # BLD AUTO: 0.87 10*3/MM3 (ref 0.1–0.9)
MONOCYTES NFR BLD AUTO: 5.2 % (ref 5–12)
NEUTROPHILS NFR BLD AUTO: 14.1 10*3/MM3 (ref 1.7–7)
NEUTROPHILS NFR BLD AUTO: 84.9 % (ref 42.7–76)
NRBC BLD AUTO-RTO: 0 /100 WBC (ref 0–0.2)
PHOSPHATE SERPL-MCNC: 3.1 MG/DL (ref 2.5–4.5)
PLATELET # BLD AUTO: 255 10*3/MM3 (ref 140–450)
PMV BLD AUTO: 11.6 FL (ref 6–12)
POTASSIUM SERPL-SCNC: 2.4 MMOL/L (ref 3.5–5.2)
RBC # BLD AUTO: 2.92 10*6/MM3 (ref 4.14–5.8)
SODIUM SERPL-SCNC: 148 MMOL/L (ref 136–145)
WBC NRBC COR # BLD AUTO: 16.62 10*3/MM3 (ref 3.4–10.8)

## 2024-07-31 PROCEDURE — 25010000002 MORPHINE PER 10 MG: Performed by: INTERNAL MEDICINE

## 2024-07-31 PROCEDURE — 0 DEXTROSE 5 % SOLUTION: Performed by: INTERNAL MEDICINE

## 2024-07-31 PROCEDURE — 25010000002 HEPARIN (PORCINE) PER 1000 UNITS: Performed by: INTERNAL MEDICINE

## 2024-07-31 PROCEDURE — 84100 ASSAY OF PHOSPHORUS: CPT | Performed by: INTERNAL MEDICINE

## 2024-07-31 PROCEDURE — 92526 ORAL FUNCTION THERAPY: CPT

## 2024-07-31 PROCEDURE — 97161 PT EVAL LOW COMPLEX 20 MIN: CPT

## 2024-07-31 PROCEDURE — 94664 DEMO&/EVAL PT USE INHALER: CPT

## 2024-07-31 PROCEDURE — 85025 COMPLETE CBC W/AUTO DIFF WBC: CPT | Performed by: INTERNAL MEDICINE

## 2024-07-31 PROCEDURE — 25810000003 SODIUM CHLORIDE 0.9 % SOLUTION: Performed by: INTERNAL MEDICINE

## 2024-07-31 PROCEDURE — 25010000002 POTASSIUM CHLORIDE 10 MEQ/100ML SOLUTION: Performed by: PHYSICIAN ASSISTANT

## 2024-07-31 PROCEDURE — 83735 ASSAY OF MAGNESIUM: CPT | Performed by: INTERNAL MEDICINE

## 2024-07-31 PROCEDURE — 94799 UNLISTED PULMONARY SVC/PX: CPT

## 2024-07-31 PROCEDURE — 25010000002 PIPERACILLIN SOD-TAZOBACTAM PER 1 G: Performed by: INTERNAL MEDICINE

## 2024-07-31 PROCEDURE — 25010000002 VANCOMYCIN 5 G RECONSTITUTED SOLUTION: Performed by: INTERNAL MEDICINE

## 2024-07-31 PROCEDURE — 99233 SBSQ HOSP IP/OBS HIGH 50: CPT | Performed by: INTERNAL MEDICINE

## 2024-07-31 PROCEDURE — 80048 BASIC METABOLIC PNL TOTAL CA: CPT | Performed by: INTERNAL MEDICINE

## 2024-07-31 RX ORDER — LORAZEPAM 0.5 MG/1
0.5 TABLET ORAL
Status: DISCONTINUED | OUTPATIENT
Start: 2024-07-31 | End: 2024-08-01 | Stop reason: HOSPADM

## 2024-07-31 RX ORDER — HALOPERIDOL 5 MG/ML
0.5 INJECTION INTRAMUSCULAR
Status: DISCONTINUED | OUTPATIENT
Start: 2024-07-31 | End: 2024-08-01 | Stop reason: HOSPADM

## 2024-07-31 RX ORDER — POTASSIUM CHLORIDE 750 MG/1
40 CAPSULE, EXTENDED RELEASE ORAL ONCE
Status: COMPLETED | OUTPATIENT
Start: 2024-07-31 | End: 2024-07-31

## 2024-07-31 RX ORDER — HALOPERIDOL 2 MG/ML
0.5 SOLUTION ORAL
Status: DISCONTINUED | OUTPATIENT
Start: 2024-07-31 | End: 2024-08-01 | Stop reason: HOSPADM

## 2024-07-31 RX ORDER — MORPHINE SULFATE 20 MG/ML
10 SOLUTION ORAL
Status: DISCONTINUED | OUTPATIENT
Start: 2024-07-31 | End: 2024-08-01 | Stop reason: HOSPADM

## 2024-07-31 RX ORDER — LORAZEPAM 2 MG/ML
0.5 INJECTION INTRAMUSCULAR
Status: DISCONTINUED | OUTPATIENT
Start: 2024-07-31 | End: 2024-08-01 | Stop reason: HOSPADM

## 2024-07-31 RX ORDER — HALOPERIDOL 0.5 MG/1
0.5 TABLET ORAL
Status: DISCONTINUED | OUTPATIENT
Start: 2024-07-31 | End: 2024-08-01 | Stop reason: HOSPADM

## 2024-07-31 RX ORDER — ATROPINE SULFATE 10 MG/ML
2 SOLUTION/ DROPS OPHTHALMIC
Status: DISCONTINUED | OUTPATIENT
Start: 2024-07-31 | End: 2024-08-01 | Stop reason: HOSPADM

## 2024-07-31 RX ORDER — LORAZEPAM 2 MG/ML
0.5 CONCENTRATE ORAL
Status: DISCONTINUED | OUTPATIENT
Start: 2024-07-31 | End: 2024-08-01 | Stop reason: HOSPADM

## 2024-07-31 RX ORDER — POTASSIUM CHLORIDE 7.45 MG/ML
10 INJECTION INTRAVENOUS
Status: DISPENSED | OUTPATIENT
Start: 2024-07-31 | End: 2024-07-31

## 2024-07-31 RX ORDER — POTASSIUM CHLORIDE 1.5 G/1.58G
40 POWDER, FOR SOLUTION ORAL 2 TIMES DAILY
Status: DISCONTINUED | OUTPATIENT
Start: 2024-07-31 | End: 2024-07-31

## 2024-07-31 RX ADMIN — TIOTROPIUM BROMIDE INHALATION SPRAY 2 PUFF: 3.12 SPRAY, METERED RESPIRATORY (INHALATION) at 09:42

## 2024-07-31 RX ADMIN — MORPHINE SULFATE 15 MG: 15 TABLET, FILM COATED, EXTENDED RELEASE ORAL at 10:36

## 2024-07-31 RX ADMIN — SENNOSIDES AND DOCUSATE SODIUM 2 TABLET: 50; 8.6 TABLET ORAL at 10:36

## 2024-07-31 RX ADMIN — METOPROLOL SUCCINATE 75 MG: 50 TABLET, EXTENDED RELEASE ORAL at 10:36

## 2024-07-31 RX ADMIN — Medication 10 ML: at 20:18

## 2024-07-31 RX ADMIN — MORPHINE SULFATE 10 MG: 10 SOLUTION ORAL at 23:33

## 2024-07-31 RX ADMIN — PIPERACILLIN AND TAZOBACTAM 4.5 G: 4; .5 INJECTION, POWDER, FOR SOLUTION INTRAVENOUS at 05:35

## 2024-07-31 RX ADMIN — SENNOSIDES AND DOCUSATE SODIUM 2 TABLET: 50; 8.6 TABLET ORAL at 20:17

## 2024-07-31 RX ADMIN — POLYETHYLENE GLYCOL 3350 17 G: 17 POWDER, FOR SOLUTION ORAL at 10:36

## 2024-07-31 RX ADMIN — HEPARIN SODIUM 5000 UNITS: 5000 INJECTION INTRAVENOUS; SUBCUTANEOUS at 05:35

## 2024-07-31 RX ADMIN — POTASSIUM CHLORIDE 40 MEQ: 750 CAPSULE, EXTENDED RELEASE ORAL at 06:19

## 2024-07-31 RX ADMIN — MORPHINE SULFATE 4 MG: 4 INJECTION, SOLUTION INTRAMUSCULAR; INTRAVENOUS at 14:14

## 2024-07-31 RX ADMIN — POTASSIUM CHLORIDE 10 MEQ: 7.46 INJECTION, SOLUTION INTRAVENOUS at 07:44

## 2024-07-31 RX ADMIN — POTASSIUM CHLORIDE 10 MEQ: 7.46 INJECTION, SOLUTION INTRAVENOUS at 10:38

## 2024-07-31 RX ADMIN — POTASSIUM CHLORIDE 40 MEQ: 1.5 POWDER, FOR SOLUTION ORAL at 10:36

## 2024-07-31 RX ADMIN — VANCOMYCIN HYDROCHLORIDE 1250 MG: 5 INJECTION, POWDER, LYOPHILIZED, FOR SOLUTION INTRAVENOUS at 02:10

## 2024-07-31 RX ADMIN — SERTRALINE 25 MG: 25 TABLET, FILM COATED ORAL at 10:36

## 2024-07-31 RX ADMIN — DEXTROSE MONOHYDRATE 150 ML/HR: 50 INJECTION, SOLUTION INTRAVENOUS at 01:56

## 2024-07-31 RX ADMIN — DEXTROSE MONOHYDRATE 150 ML/HR: 50 INJECTION, SOLUTION INTRAVENOUS at 10:38

## 2024-07-31 RX ADMIN — MORPHINE SULFATE 4 MG: 4 INJECTION, SOLUTION INTRAMUSCULAR; INTRAVENOUS at 15:31

## 2024-07-31 RX ADMIN — MORPHINE SULFATE 4 MG: 4 INJECTION, SOLUTION INTRAMUSCULAR; INTRAVENOUS at 17:33

## 2024-07-31 RX ADMIN — BUDESONIDE AND FORMOTEROL FUMARATE DIHYDRATE 2 PUFF: 160; 4.5 AEROSOL RESPIRATORY (INHALATION) at 09:42

## 2024-07-31 RX ADMIN — MORPHINE SULFATE 15 MG: 15 TABLET, FILM COATED, EXTENDED RELEASE ORAL at 20:18

## 2024-07-31 RX ADMIN — Medication 10 ML: at 10:38

## 2024-07-31 RX ADMIN — POTASSIUM CHLORIDE 10 MEQ: 7.46 INJECTION, SOLUTION INTRAVENOUS at 06:19

## 2024-07-31 NOTE — PLAN OF CARE
Goal Outcome Evaluation:                      VSS, a&o 2-3 with moments of confusion room air, denies pain/discomfort no outward signs of pain/discomfort, remains incontinent this shift, able to voice needs to staff, continues abx, no concerns to report, continue POC.

## 2024-07-31 NOTE — PLAN OF CARE
Goal Outcome Evaluation:  Plan of Care Reviewed With: (P) patient           Outcome Evaluation: (P) Pt presents with limited functional abilities.  Pt refused to attempt to perform bed mobility after one attempt.  Skilled PT is not recommended at this time and plan to discharge back to the nursing home.      Anticipated Discharge Disposition (PT): (P) assisted living

## 2024-07-31 NOTE — THERAPY EVALUATION
Acute Care - Physical Therapy Initial Evaluation   [facility]     Patient Name: Oswaldo Bowen  : 1980  MRN: [id number]  Today's Date: 2024      Visit Dx:     ICD-10-CM ICD-9-CM   1. Sepsis, due to unspecified organism, unspecified whether acute organ dysfunction present  A41.9 038.9     995.91   2. Dysphagia, oropharyngeal  R13.12 787.22   3. Difficulty in walking  R26.2 719.7     Patient Active Problem List   Diagnosis    Closed intertrochanteric fracture of right femur    Pulmonary nodule    Sepsis    Anemia    Hypercalcemia     Past Medical History:   Diagnosis Date    Anemia     Cancer     Depression     Hypertension     Hypomagnesemia      Past Surgical History:   Procedure Laterality Date    BRONCHOSCOPY N/A 2024    Procedure: BRONCHOSCOPY WITH ENDOBRONCHIAL ULTRASOUND, FINE NEEDLE ASPIRATE, BIOPSIES, BRUSHINGS, BRONCHOALVEOLAR LAVAGE;  Surgeon: Kendell Stern MD;  Location: [facility] ENDOSCOPY;  Service: Pulmonary;  Laterality: N/A;  LUNG NODULE, MUCOUS PLUGGING    BRONCHOSCOPY WITH ION ROBOTIC ASSIST N/A 2024    Procedure: BRONCHOSCOPY NAVIGATION WITH ENDOBRONCHIAL ULTRASOUND AND ION ROBOT. REBUS, EBUS, BRUSHINGS, WASHINGS, BAL, BIOPSIES AND NEEDLE ASPIRATE;  Surgeon: John Elizalde MD;  Location: [facility] MAIN OR;  Service: Robotics - Pulmonary;  Laterality: N/A;    HIP INTERTROCHANTERIC NAILING Right 2024    Procedure: HIP INTERTROCHANTERIC NAILING;  Surgeon: Molina Clayton MD;  Location: [facility] MAIN OR;  Service: Orthopedics;  Laterality: Right;    US GUIDED FINE NEEDLE ASPIRATION  2024     PT Assessment (Last 12 Hours)       PT Evaluation and Treatment       Row Name 24 1152          Physical Therapy Time and Intention    Subjective Information complains of;weakness;fatigue;pain (P)   -TL     Document Type evaluation (P)   -TL     Mode of Treatment individual therapy;physical therapy (P)   -TL     Patient Effort fair (P)   -TL     Symptoms Noted During/After Treatment  fatigue;increased pain (P)   -TL       Row Name 07/31/24 1152          General Information    Patient Profile Reviewed yes (P)   -TL     Patient Observations alert;cooperative;agree to therapy (P)   -TL     Prior Level of Function ADL's;bed mobility;max assist: (P)   -TL     Existing Precautions/Restrictions fall (P)   -TL       Row Name 07/31/24 1152          Living Environment    Current Living Arrangements assisted living facility (P)   -TL     People in Home facility resident (P)   -TL     Primary Care Provided by other (see comments) (P)   Facility resident  -TL       Row Name 07/31/24 1152          Cognition    Orientation Status (Cognition) oriented to;person (P)   -TL       Row Name 07/31/24 1152          Range of Motion (ROM)    Range of Motion bilateral lower extremities (P)   limited in all planes due to pain  -TL       Row Name 07/31/24 1152          Strength (Manual Muscle Testing)    Strength (Manual Muscle Testing) bilateral lower extremities (P)   2-/5 bilaterally  -TL       Row Name 07/31/24 1152          Bed Mobility    Bed Mobility rolling right (P)   attempted one time and pt refused to try again.  -TL     Rolling Right Palmer (Bed Mobility) maximum assist (25% patient effort);verbal cues;1 person assist (P)   -TL     Bed Mobility, Safety Issues decreased use of arms for pushing/pulling;decreased use of legs for bridging/pushing (P)   -TL       Row Name 07/31/24 1152          Plan of Care Review    Plan of Care Reviewed With patient (P)   -TL     Outcome Evaluation Pt presents with limited functional abilities.  Pt refused to attempt to perform bed mobility after one attempt.  Skilled PT is not recommended at this time and plan to discharge back to the nursing home. (P)   -TL       Row Name 07/31/24 1152          Positioning and Restraints    Pre-Treatment Position in bed (P)   -TL     Post Treatment Position bed (P)   -TL     In Bed supine;encouraged to call for assist;call light within  reach;exit alarm on (P)   -TL       Row Name 07/31/24 1152          Therapy Assessment/Plan (PT)    Criteria for Skilled Interventions Met (PT) does not meet criteria for skilled intervention (P)   -TL     Therapy Frequency (PT) daily (P)   -TL     Problem List (PT) problems related to;mobility;range of motion (ROM);strength (P)   -TL     Activity Limitations Related to Problem List (PT) unable to ambulate safely;unable to transfer safely (P)   -TL       Row Name 07/31/24 1152          Therapy Plan Review/Discharge Plan (PT)    Therapy Plan Review (PT) evaluation/treatment results reviewed (P)   -TL       Row Name 07/31/24 1152          Physical Therapy Goals    Problem Specific Goal Selection (PT) problem specific goal 1, PT (P)   -TL       Row Name 07/31/24 1152          Problem Specific Goal 1 (PT)    Problem Specific Goal 1 (PT) Pt able to complete evaluation. (P)   -TL     Time Frame (Problem Specific Goal 1, PT) 1 day (P)   -TL     Progress/Outcome (Problem Specific Goal 1, PT) goal met (P)   -TL               User Key  (r) = Recorded By, (t) = Taken By, (c) = Cosigned By      Initials Name Provider Type    TL Makeda Thompson, PT Student PT Student                    Physical Therapy Education       Title: PT OT SLP Therapies (In Progress)       Topic: Physical Therapy (In Progress)       Point: Mobility training (Not Started)       Learner Progress:  Not documented in this visit.                                  PT Recommendation and Plan  Anticipated Discharge Disposition (PT): (P) assisted living  Therapy Frequency (PT): (P) daily  Plan of Care Reviewed With: (P) patient  Outcome Evaluation: (P) Pt presents with limited functional abilities.  Pt refused to attempt to perform bed mobility after one attempt.  Skilled PT is not recommended at this time and plan to discharge back to the nursing home.       Time Calculation:    PT Charges       Row Name 07/31/24 1152             Time Calculation    PT Received On  07/31/24 (P)   -TL         Untimed Charges    PT Eval/Re-eval Minutes 40 (P)   -TL         Total Minutes    Untimed Charges Total Minutes 40 (P)   -TL       Total Minutes 40 (P)   -TL                User Key  (r) = Recorded By, (t) = Taken By, (c) = Cosigned By      Initials Name Provider Type    TL Makeda Thompson, PT Student PT Student                  Therapy Charges for Today       Code Description Service Date Service Provider Modifiers Qty    18155372648 HC PT EVAL LOW COMPLEXITY 3 7/31/2024 Makeda Thompson, PT Student GP 1            PT G-Codes  AM-PAC 6 Clicks Score (PT): 8    Makeda Thompson PT Student  7/31/2024

## 2024-07-31 NOTE — PROGRESS NOTES
" LOS: 2 days   Patient Care Team:  Provider, No Known as PCP - General    Chief Complaint: Hypercalcemia    Subjective     History of Present Illness  Pt remains confused but appears baseline  Now comfort measures per nursing and notes.    Subjective:  Symptoms:  No anxiety.    Diet:  Poor intake.        History taken from: chart RN    Objective     Vital Sign Min/Max for last 24 hours  Temp  Min: 98 °F (36.7 °C)  Max: 98.6 °F (37 °C)   BP  Min: 132/85  Max: 164/79   Pulse  Min: 83  Max: 109   Resp  Min: 16  Max: 20   SpO2  Min: 94 %  Max: 97 %   No data recorded   Weight  Min: 67.2 kg (148 lb 2.4 oz)  Max: 67.2 kg (148 lb 2.4 oz)     Flowsheet Rows      Flowsheet Row First Filed Value   Admission Height 182.9 cm (72\") Documented at 07/29/2024 1012   Admission Weight 63.5 kg (139 lb 15.9 oz) Documented at 07/29/2024 1012            I/O this shift:  In: 120 [P.O.:120]  Out: -   I/O last 3 completed shifts:  In: -   Out: 1150 [Urine:1150]    Objective:  General Appearance:  Comfortable.    Vital signs: (most recent): Blood pressure 152/96, pulse 95, temperature 97.9 °F (36.6 °C), temperature source Oral, resp. rate 18, height 182.9 cm (72\"), weight 67.2 kg (148 lb 2.4 oz), SpO2 94%.  Vital signs are normal.    Output: Producing urine.    HEENT: Normal HEENT exam.    Lungs:  Normal effort and normal respiratory rate.    Heart: Normal rate.  Regular rhythm.    Abdomen: Abdomen is soft.  Bowel sounds are normal.   There is no abdominal tenderness.     Extremities: There is no dependent edema.    Pulses: Distal pulses are intact.    Neurological: Patient is alert.    Pupils:  Pupils are equal, round, and reactive to light.                Results Review:     I reviewed the patient's new clinical results.  I reviewed the patient's new imaging results and agree with the interpretation.  I reviewed the patient's other test results and agree with the interpretation    WBC WBC   Date Value Ref Range Status   07/31/2024 16.62 " "(H) 3.40 - 10.80 10*3/mm3 Final   07/30/2024 20.07 (H) 3.40 - 10.80 10*3/mm3 Final   07/29/2024 18.96 (H) 3.40 - 10.80 10*3/mm3 Final      HGB Hemoglobin   Date Value Ref Range Status   07/31/2024 7.1 (L) 13.0 - 17.7 g/dL Final   07/30/2024 7.7 (L) 13.0 - 17.7 g/dL Final   07/29/2024 9.8 (L) 13.0 - 17.7 g/dL Final      HCT Hematocrit   Date Value Ref Range Status   07/31/2024 26.8 (L) 37.5 - 51.0 % Final   07/30/2024 27.9 (L) 37.5 - 51.0 % Final   07/29/2024 34.5 (L) 37.5 - 51.0 % Final      Platlets No results found for: \"LABPLAT\"   MCV MCV   Date Value Ref Range Status   07/31/2024 91.8 79.0 - 97.0 fL Final   07/30/2024 88.6 79.0 - 97.0 fL Final   07/29/2024 87.6 79.0 - 97.0 fL Final          Sodium Sodium   Date Value Ref Range Status   07/31/2024 148 (H) 136 - 145 mmol/L Final   07/30/2024 151 (H) 136 - 145 mmol/L Final   07/29/2024 148 (H) 136 - 145 mmol/L Final      Potassium Potassium   Date Value Ref Range Status   07/31/2024 2.4 (C) 3.5 - 5.2 mmol/L Final   07/30/2024 2.5 (C) 3.5 - 5.2 mmol/L Final   07/29/2024 3.6 3.5 - 5.2 mmol/L Final     Comment:     Slight hemolysis detected by analyzer. Result may be falsely elevated.      Chloride Chloride   Date Value Ref Range Status   07/31/2024 107 98 - 107 mmol/L Final   07/30/2024 108 (H) 98 - 107 mmol/L Final   07/29/2024 100 98 - 107 mmol/L Final      CO2 CO2   Date Value Ref Range Status   07/31/2024 28.9 22.0 - 29.0 mmol/L Final   07/30/2024 28.6 22.0 - 29.0 mmol/L Final   07/29/2024 32.0 (H) 22.0 - 29.0 mmol/L Final      BUN BUN   Date Value Ref Range Status   07/31/2024 15 6 - 20 mg/dL Final   07/30/2024 21 (H) 6 - 20 mg/dL Final   07/29/2024 22 (H) 6 - 20 mg/dL Final      Creatinine Creatinine   Date Value Ref Range Status   07/31/2024 0.47 (L) 0.76 - 1.27 mg/dL Final   07/30/2024 0.54 (L) 0.76 - 1.27 mg/dL Final   07/29/2024 0.56 (L) 0.76 - 1.27 mg/dL Final      Calcium Calcium   Date Value Ref Range Status   07/31/2024 11.0 (H) 8.6 - 10.5 mg/dL Final " "  07/30/2024 11.5 (H) 8.6 - 10.5 mg/dL Final   07/29/2024 12.1 (H) 8.6 - 10.5 mg/dL Final      PO4 No results found for: \"CAPO4\"   Albumin Albumin   Date Value Ref Range Status   07/29/2024 3.0 (L) 3.5 - 5.2 g/dL Final      Magnesium Magnesium   Date Value Ref Range Status   07/31/2024 1.8 1.6 - 2.6 mg/dL Final   07/30/2024 1.8 1.6 - 2.6 mg/dL Final      Uric Acid No results found for: \"URICACID\"     Medication Review:   acetaminophen, 650 mg, Rectal, Once  budesonide-formoterol, 2 puff, Inhalation, BID - RT  heparin (porcine), 5,000 Units, Subcutaneous, Q8H  metoprolol succinate XL, 75 mg, Oral, Q12H  Morphine, 15 mg, Oral, BID  piperacillin-tazobactam, 4.5 g, Intravenous, Once  piperacillin-tazobactam, 4.5 g, Intravenous, Q8H  polyethylene glycol, 17 g, Oral, Daily  potassium chloride, 40 mEq, Oral, BID  potassium chloride, 10 mEq, Intravenous, Q1H  sennosides-docusate, 2 tablet, Oral, BID  sertraline, 25 mg, Oral, Daily  sodium chloride, 10 mL, Intravenous, Q12H  tiotropium bromide monohydrate, 2 puff, Inhalation, Daily - RT  vancomycin, 1,250 mg, Intravenous, Q12H          Assessment & Plan       Hypercalcemia      Assessment & Plan  Hypercalcemia-  calcium better to 11.8->12.1->11.5->11.0 today.  Normal renal function.  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  Agree with IVF's for now.  Could give pamidronate, will monitor for now.  Agree with oncology that palliative care likely best course for pt at this time.  Will sign off, call if any changes.     Hypernatremia-  elevated at 148->151->148.  Encourage po.    Hypokalemia-  replaced today     Adenocarcinoma of lung-  mets to bone.     Fevers-  had previous fevers attributed to met adenocarcinoma per notes.  Also on broad spectrum abx now for possible sepsis.    Matthew Oviedo MD  07/31/24  11:13 EDT          "

## 2024-07-31 NOTE — NURSING NOTE
"Updated by provider regarding patients current condition. At time of visit patient laying in bed alert and oriented to self only- no family at bedside. Patient does have updated living will on file- HCS is sister, Sergo. Attempts made x3 to contact sister- no answer, no option to leave voice mail. Call placed to patients backup HCS, brother-in-law, Nile. Introduced self and explained role and purpose of visit. Patients KEVEN reports they \"decided to just do comfort\" and \"have him go back to [facility]\" and \"be comfortable.\" Patients KEVEN reports they are meeting with a  home today on his behalf. Discussed transition to comfort measures only while inpatient, patients KEVEN requests transition. Provided education on [place] [facility]- unable to complete document until tomorrow when they visit- updated provider and primary rn. Discussed with unit , reportedly patient can return to [facility] any time with comfort measures only. Palliative care will continue to follow to support and assist.    Kimmie AGUIRRE, RN, PN  Palliative Care    "

## 2024-07-31 NOTE — PROGRESS NOTES
"[facility] Clinical Pharmacy Services: Vancomycin Pharmacokinetic Initial Consult Note    Oswaldo Bowen is a 44 y.o. male who is on day 3/7 of pharmacy to dose vancomycin for Sepsis.    Consult Information  Consulting Provider: Alycia  Planned Duration of Therapy: 7 days  Was Patient Receiving Prior to Admission/Consult?: No  Loading Dose Ordered or Given: 1250 mg on  at 1200  PK/PD Target: -600 mg/L.hr   Other Antimicrobials: Piperacillin/Tazobactam    Imaging Reviewed?: No    Microbiology Data  MRSA PCR performed: No; Result: Not ordered due to excluded indication or presence of suspected abscess  Culture/Source:   Blood cx: pending  Urine cx: pending    Vitals/Labs  Ht: 182.9 cm (72\"); Wt: 67.2 kg (148 lb 2.4 oz)  Temp (24hrs), Av.3 °F (36.8 °C), Min:97.9 °F (36.6 °C), Max:98.6 °F (37 °C)   Estimated Creatinine Clearance: 190.6 mL/min (A) (by C-G formula based on SCr of 0.47 mg/dL (L)).        Results from last 7 days   Lab Units 24  0451 24  1149 24  0453 24  1029   VANCOMYCIN RM mcg/mL  --  12.00  --   --    CREATININE mg/dL 0.47*  --  0.54* 0.56*   WBC 10*3/mm3 16.62*  --  20.07* 18.96*     Assessment/Plan:    Vancomycin Dose:  1250 mg IV every 12 hours; which provides the following predicted parameters:  AUC24,ss: 543 mg/L.hr  PAUC*: 97 %  Ctrough,ss: 15.6 mg/L  Pconc*: 15 %  Tox.: 11 %  No levels ordered at this time.   Patient has order for Basic Metabolic Panel    Pharmacy will follow patient's kidney function and will adjust doses and obtain levels as necessary. Thank you for involving pharmacy in this patient's care. Please contact pharmacy with any questions or concerns.                           Julia Agarwal [facility]  Clinical Pharmacist  "

## 2024-07-31 NOTE — THERAPY TREATMENT NOTE
Acute Care - Speech Language Pathology   Swallow Treatment Note  [facility]     Patient Name: Oswaldo Bowen  : 1980  MRN: [id number]  Today's Date: 2024               Admit Date: 2024    Visit Dx:     ICD-10-CM ICD-9-CM   1. Sepsis, due to unspecified organism, unspecified whether acute organ dysfunction present  A41.9 038.9     995.91   2. Dysphagia, oropharyngeal  R13.12 787.22   3. Difficulty in walking  R26.2 719.7     Patient Active Problem List   Diagnosis    Closed intertrochanteric fracture of right femur    Pulmonary nodule    Sepsis    Anemia    Hypercalcemia     Past Medical History:   Diagnosis Date    Anemia     Cancer     Depression     Hypertension     Hypomagnesemia      Past Surgical History:   Procedure Laterality Date    BRONCHOSCOPY N/A 2024    Procedure: BRONCHOSCOPY WITH ENDOBRONCHIAL ULTRASOUND, FINE NEEDLE ASPIRATE, BIOPSIES, BRUSHINGS, BRONCHOALVEOLAR LAVAGE;  Surgeon: Kendell Stern MD;  Location: [facility] ENDOSCOPY;  Service: Pulmonary;  Laterality: N/A;  LUNG NODULE, MUCOUS PLUGGING    BRONCHOSCOPY WITH ION ROBOTIC ASSIST N/A 2024    Procedure: BRONCHOSCOPY NAVIGATION WITH ENDOBRONCHIAL ULTRASOUND AND ION ROBOT. REBUS, EBUS, BRUSHINGS, WASHINGS, BAL, BIOPSIES AND NEEDLE ASPIRATE;  Surgeon: John Elizalde MD;  Location: [facility] MAIN OR;  Service: Robotics - Pulmonary;  Laterality: N/A;    HIP INTERTROCHANTERIC NAILING Right 2024    Procedure: HIP INTERTROCHANTERIC NAILING;  Surgeon: Molina Clayton MD;  Location: [facility] MAIN OR;  Service: Orthopedics;  Laterality: Right;    US GUIDED FINE NEEDLE ASPIRATION  2024     SPEECH PATHOLOGY DYSPHAGIA TREATMENT    Subjective/Behavioral Observations: Patient seen for dysphagia therapy.     Current Diet:  Full liquid diet - nectar consistency      Treatment received: Patient seen at bedside.  Patient tolerating therapeutic trials of purée and nectar thick liquids without clinical sign or symptom of aspiration.   Patient with reduced bolus preparation, wanting to eat/drink while lying back.  Encouraged upright positioning.  Some labial spillage with nectar thick liquids on left.  Does not self regulate bolus voluem with straw trials.     Results of treatment: As stated    Progress toward goals: Limited    Barriers to Achieving goals: Medical status      Plan of care:/changes in plan: Puree diet - Nectar thick liquids. , feet only when awake and alert, 90 degrees upright for all intake.  Patient will require repositioning throughout.  Patient may need nectar thick liquids spoonfed.  One-to-one supervision with meals  Oral meds crushed in applesauce       EDUCATION  The patient has been educated in the following areas:   Dysphagia (Swallowing Impairment).       Martha Abebe, SLP  7/31/2024

## 2024-07-31 NOTE — PLAN OF CARE
Goal Outcome Evaluation:  Plan of Care Reviewed With: patient           Outcome Evaluation: Patient cofused and hard to understand at times. On room air. On comfort measures, pain medication given per mar and brown placed. Continuing with plan of care

## 2024-07-31 NOTE — PROGRESS NOTES
[facility]   Hospitalist Progress Note  Date: 2024  Patient Name: Oswaldo Bowen  : 1980  MRN: [id number]  Date of admission: 2024  Consultants:   -Nephrology: Dr. Matthew Oviedo  -Hematology/Oncology: Dr. Nile Valenzuela    Subjective   Subjective     Chief Complaint: Fever    Summary:   Oswaldo Bowen is a 44 y.o. 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  Patient was discharged to rehab facility at that time with goal of patient improving his level of debility in hopes of possibly initiating chemo-immunotherapy as an outpatient.  Patient did not progress well at rehab, remains very frail and weak.  Labs were obtained on 2024 and patient noted to have elevated WBC, hypercalcemia, elevated lactic acid when compared to prior labs.  Patient also found to have fever and was brought to ED for evaluation on 2024.  Hospitalist service contacted for further evaluation management.  Nephrology and hematology/oncology consulted.  IV fluids and broad-spectrum antibiotic started.  Palliative care consulted.    Interval Followup:   No acute issues overnight.  Tmax: 98.6 °F.  Sodium level improved on antibiotics.  Patient still with significant hypokalemia.  Calcium level has improved.  Patient continues on IV fluids.  Receiving [place] for pain management.    Antibiotics:   -Vancomycin  -Zosyn    Objective   Objective     Vitals:   Temp:  [98 °F (36.7 °C)-98.6 °F (37 °C)] 98.2 °F (36.8 °C)  Heart Rate:  [] 91  Resp:  [16-20] 20  BP: (132-164)/(79-88) 141/88  Physical Exam   Gen: Frail, lying in bed, chronically ill-appearing male, cachectic, alert  Resp: CTAB, No w/r/r, normal respiratory  effort  Card: RRR, No m/r/g  Abd: Soft, Nontender, Nondistended, + bowel sounds    Result Review    Result Review:  I have personally reviewed the results as below and agree with these findings:  []  Laboratory:   CMP          7/29/2024    10:29 7/30/2024    04:53 7/31/2024    04:51   CMP   Glucose 122  119  138    BUN 22  21  15    Creatinine 0.56  0.54  0.47    EGFR 124.6  126.0  131.4    Sodium 148  151  148    Potassium 3.6  2.5  2.4    Chloride 100  108  107    Calcium 12.1  11.5  11.0    Total Protein 7.7      Albumin 3.0      Globulin 4.7      Total Bilirubin 0.6      Alkaline Phosphatase 161      AST (SGOT) 28      ALT (SGPT) 14      Albumin/Globulin Ratio 0.6      BUN/Creatinine Ratio 39.3  38.9  31.9    Anion Gap 16.0  14.4  12.1      CBC          7/29/2024    10:29 7/30/2024    04:53 7/31/2024    04:51   CBC   WBC 18.96  20.07  16.62    RBC 3.94  3.15  2.92    Hemoglobin 9.8  7.7  7.1    Hematocrit 34.5  27.9  26.8    MCV 87.6  88.6  91.8    MCH 24.9  24.4  24.3    MCHC 28.4  27.6  26.5    RDW 16.7  17.0  17.1    Platelets 414  342  255    Phosphorus and magnesium within normal limits.  [x]  Microbiology: Blood culture (07/29/2024): No growth to date.  [x]  Radiology:   [x]  EKG/Telemetry:    []  Cardiology/Vascular:    []  Pathology:  []  Old records:  []  Other:    Assessment & Plan   Assessment / Plan     Assessment:  Sepsis of unknown origin  Hypercalcemia likely secondary to malignancy  Elevated lactic acid  Hypernatremia  Metastatic adenocarcinoma of the left lung  Hypokalemia  Hypophosphatemia  Acute on chronic anemia  Significant pain secondary to metastatic adenocarcinoma of the left lung  Depression  Severe protein calorie malnutrition evidenced by cachexia  Intellectual disability    Plan:  -Nephrology and Hematology/Oncology consulted and following, appreciate assistance and recommendations in the care of this patient.  -Continue IV fluids  -Replace potassium  -Hypercalcemia likely related to  bone metastases.  If patient's calcium level increases can consider dose of Aredia  -Continue vancomycin and Zosyn.  Monitor vancomycin level.  -CT imaging personally reviewed.  Widespread osseous metastatic disease noted.  Pulmonary metastatic disease.  Slightly progressed.  -Continue appropriate home medications  -Palliative care consulted.  Patient's current condition/prognosis were discussed at length with patient's sister.  Continue with current treatment plan.  Determination regarding pursuit of comfort care still pending.  -Management discussed with nephrology.  Continue with IV fluids.  -Monitor electrolytes and renal function with BMP, phosphorus level and magnesium level in the AM  -Monitor WBC and Hgb with CBC in the AM  -Clinical course will dictate further management     DVT Prophylaxis: Heparin  Diet:   Diet Order   Procedures    Diet: Liquid; Full Liquid; Fluid Consistency: Nectar Thick     Dispo: Palliative care consulted     Personally reviewed patients labs and imaging, discussed with patient and nurse at bedside. Discussed management with the following consultants: Nephrology and Hematology/Oncology.     Part of this note may be an electronic transcription/translation of spoken language to printed text using the Dragon dictation system.    VTE Prophylaxis:  Pharmacologic VTE prophylaxis orders are present.        CODE STATUS:   Medical Intervention Limits: No intubation (DNI)  Code Status (Patient has no pulse and is not breathing): No CPR (Do Not Attempt to Resuscitate)  Medical Interventions (Patient has pulse or is breathing): Limited Support        Electronically signed by Arturo Tong MD, 7/31/2024, 11:11 EDT.

## 2024-08-01 VITALS
WEIGHT: 148.15 LBS | TEMPERATURE: 99.3 F | RESPIRATION RATE: 16 BRPM | DIASTOLIC BLOOD PRESSURE: 102 MMHG | OXYGEN SATURATION: 93 % | HEIGHT: 72 IN | HEART RATE: 121 BPM | BODY MASS INDEX: 20.07 KG/M2 | SYSTOLIC BLOOD PRESSURE: 159 MMHG

## 2024-08-01 PROCEDURE — 99239 HOSP IP/OBS DSCHRG MGMT >30: CPT | Performed by: INTERNAL MEDICINE

## 2024-08-01 RX ORDER — MORPHINE SULFATE 20 MG/ML
10 SOLUTION ORAL
Qty: 30 ML | Refills: 0 | Status: SHIPPED | OUTPATIENT
Start: 2024-08-01 | End: 2024-08-05

## 2024-08-01 RX ORDER — ALBUTEROL SULFATE 90 UG/1
2 AEROSOL, METERED RESPIRATORY (INHALATION) EVERY 4 HOURS PRN
Start: 2024-08-01

## 2024-08-01 RX ORDER — LORAZEPAM 2 MG/ML
0.5 CONCENTRATE ORAL
Qty: 30 ML | Refills: 0 | Status: SHIPPED | OUTPATIENT
Start: 2024-08-01 | End: 2024-08-07

## 2024-08-01 RX ORDER — ATROPINE SULFATE 10 MG/ML
2 SOLUTION/ DROPS OPHTHALMIC
Qty: 30 ML | Refills: 0 | Status: SHIPPED | OUTPATIENT
Start: 2024-08-01

## 2024-08-01 RX ORDER — HALOPERIDOL 2 MG/ML
0.5 SOLUTION ORAL
Qty: 10 ML | Refills: 0 | Status: SHIPPED | OUTPATIENT
Start: 2024-08-01

## 2024-08-01 RX ORDER — MORPHINE SULFATE 15 MG/1
45 TABLET, FILM COATED, EXTENDED RELEASE ORAL 2 TIMES DAILY
Qty: 18 TABLET | Refills: 0 | Status: SHIPPED | OUTPATIENT
Start: 2024-08-01 | End: 2024-08-04

## 2024-08-01 RX ADMIN — SENNOSIDES AND DOCUSATE SODIUM 2 TABLET: 50; 8.6 TABLET ORAL at 08:25

## 2024-08-01 RX ADMIN — MORPHINE SULFATE 15 MG: 15 TABLET, FILM COATED, EXTENDED RELEASE ORAL at 08:25

## 2024-08-01 RX ADMIN — Medication 10 ML: at 08:23

## 2024-08-01 RX ADMIN — ACETAMINOPHEN 650 MG: 325 TABLET ORAL at 08:25

## 2024-08-01 RX ADMIN — MORPHINE SULFATE 10 MG: 10 SOLUTION ORAL at 01:18

## 2024-08-01 RX ADMIN — MORPHINE SULFATE 10 MG: 10 SOLUTION ORAL at 09:19

## 2024-08-01 RX ADMIN — MORPHINE SULFATE 10 MG: 10 SOLUTION ORAL at 12:32

## 2024-08-01 NOTE — PLAN OF CARE
Goal Outcome Evaluation:  Plan of Care Reviewed With: patient        Progress: no change  Outcome Evaluation: [facility] two person skin assessment performed with GRIS arnold; no new skin issues noted. pt is alert and oriented to self and arouses to voice. pt is comfort measures only, and has been medicated prn x2. no new issues/needs at this time.

## 2024-08-01 NOTE — DISCHARGE SUMMARY
[facility]         HOSPITALIST  DISCHARGE SUMMARY    Patient Name: Oswaldo Bowen    : 1980    MRN: [id number]    Date of Admission: 2024  Date of Discharge:  24  Primary Care Physician: Provider, No Known    Consults       Date and Time Order Name Status Description    2024 12:14 PM Inpatient Nephrology Consult      2024 12:14 PM Inpatient Hematology & Oncology Consult Completed     2024  7:13 AM Inpatient Pulmonology Consult Completed             Final Diagnosis:  Sepsis of unknown origin  Hypercalcemia likely secondary to malignancy  Elevated lactic acid  Hypernatremia  Metastatic adenocarcinoma of the left lung  Hypokalemia  Hypophosphatemia  Acute on chronic anemia  Significant pain secondary to metastatic adenocarcinoma of the left lung  Depression  Severe protein calorie malnutrition evidenced by cachexia  Intellectual disability       Hospital Course     Hospital Course:  Oswaldo Bowen is a 44 y.o. 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  Patient was discharged to rehab facility at that time with goal of patient improving his level of debility in hopes of possibly initiating chemo-immunotherapy as an outpatient.  Patient did not progress well at rehab, remains very frail and weak.  Labs were obtained on 2024 and patient noted to have elevated WBC, hypercalcemia, elevated lactic acid when compared to prior labs.  Patient also found to have fever and was brought to ED for evaluation on 2024.  Hospitalist service contacted for further evaluation management.  Nephrology and hematology/oncology consulted.  IV fluids and broad-spectrum antibiotic  started.  Palliative care consulted.     Discharging to [place] with hospice/comfort measures      CODE STATUS:  Code Status and Medical Interventions: No CPR (Do Not Attempt to Resuscitate); Comfort Measures   Ordered at: 07/31/24 1245     Code Status (Patient has no pulse and is not breathing):    No CPR (Do Not Attempt to Resuscitate)     Medical Interventions (Patient has pulse or is breathing):    Comfort Measures           Day of Discharge     Vital Signs:  Temp:  [97.7 °F (36.5 °C)-99.3 °F (37.4 °C)] 99.3 °F (37.4 °C)  Heart Rate:  [102-121] 121  Resp:  [16] 16  BP: (145-159)/() 159/102    Physical Exam  Frail ill appearing, cachectic, in bed, awake, not alert or oriented, no acute distress  Room air, mild ronchi, no acute wheezing    Discharge Details        Discharge Medications        New Medications        Instructions Start Date   atropine 1 % ophthalmic solution   2 drops, Both Eyes, Every 1 Hour PRN      haloperidol 2 MG/ML solution  Commonly known as: HALDOL   0.5 mg, Oral, Every 1 Hour PRN      LORazepam 2 MG/ML concentrated solution  Commonly known as: ATIVAN   0.5 mg, Oral, Every 1 Hour PRN             Changes to Medications        Instructions Start Date   albuterol sulfate  (90 Base) MCG/ACT inhaler  Commonly known as: PROVENTIL HFA;VENTOLIN HFA;PROAIR HFA  What changed:   when to take this  reasons to take this   2 puffs, Inhalation, Every 4 Hours PRN      Morphine 15 MG 12 hr tablet  Commonly known as: MS CONTIN  What changed: Another medication with the same name was added. Make sure you understand how and when to take each.   45 mg, Oral, 2 Times Daily      morphine 20 MG/ML concentrated solution 20mg/ml  What changed: You were already taking a medication with the same name, and this prescription was added. Make sure you understand how and when to take each.   10 mg, Oral, Every 1 Hour PRN             Continue These Medications        Instructions Start Date   acetaminophen  325 MG tablet  Commonly known as: TYLENOL   650 mg, Oral, Every 6 Hours PRN      Advair Diskus 250-50 MCG/ACT DISKUS  Generic drug: Fluticasone-Salmeterol   1 puff, Inhalation, Daily - RT      aluminum-magnesium hydroxide-simethicone 200-200-20 MG/5ML suspension  Commonly known as: MAALOX/MYLANTA   30 mL, Oral, Every 4 Hours PRN      bisacodyl 10 MG suppository  Commonly known as: DULCOLAX   10 mg, Rectal, Daily PRN      Lidocaine 4 %   1 patch, Transdermal, Daily PRN, Remove & Discard patch within 12 hours or as directed by MD      loperamide 2 MG capsule  Commonly known as: IMODIUM   2 mg, Oral, 4 Times Daily PRN      nicotine 21 MG/24HR patch  Commonly known as: NICODERM CQ   1 patch, Transdermal, Daily PRN      ondansetron ODT 4 MG disintegrating tablet  Commonly known as: ZOFRAN-ODT   4 mg, Oral, Every 6 Hours PRN      sennosides-docusate 8.6-50 MG per tablet  Commonly known as: PERICOLACE   2 tablets, Oral, 3 times daily             Stop These Medications      ammonium lactate 12 % lotion  Commonly known as: LAC-HYDRIN     amoxicillin-clavulanate 875-125 MG per tablet  Commonly known as: AUGMENTIN     cyanocobalamin 1000 MCG tablet  Commonly known as: VITAMIN B-12     fleet enema 7-19 GM/118ML enema     HYDROcodone-acetaminophen  MG per tablet  Commonly known as: NORCO     metoprolol succinate XL 25 MG 24 hr tablet  Commonly known as: TOPROL-XL     Milk of Magnesia 400 MG/5ML suspension  Generic drug: magnesium hydroxide     naloxone 4 MG/0.1ML nasal spray  Commonly known as: NARCAN     polyethylene glycol 17 g packet  Commonly known as: MIRALAX     Psyllium 51.7 % packet  Commonly known as: METAMUCIL MULTIHEALTH FIBER     sertraline 25 MG tablet  Commonly known as: ZOLOFT     Umeclidinium Bromide 62.5 MCG/ACT aerosol powder   Commonly known as: INCRUSE ELLIPTA                Discharge Disposition:  Rehab Facility or Unit (DC - External)    Diet: continue on diet / dietary restrictions from  hospitalization .  Comfort feeds    Diet: Regular/House; Texture: Pureed (NDD 1); Fluid Consistency: Nectar Thick          Discharge Activity: advance as tolerated          Pertinent  and/or Most Recent Results       LAB RESULTS:      Lab 07/31/24  0451 07/30/24  0453 07/29/24  1811 07/29/24  1351 07/29/24  1029 07/27/24  1428   WBC 16.62* 20.07*  --   --  18.96* 19.24*   HEMOGLOBIN 7.1* 7.7*  --   --  9.8* 8.2*   HEMATOCRIT 26.8* 27.9*  --   --  34.5* 27.0*   PLATELETS 255 342  --   --  414 421   NEUTROS ABS 14.10*  --   --   --  16.18* 16.58*   IMMATURE GRANS (ABS) 0.27*  --   --   --  0.35* 0.33*   LYMPHS ABS 1.21  --   --   --  1.43 1.19   MONOS ABS 0.87  --   --   --  0.96* 1.07*   EOS ABS 0.12  --   --   --  0.01 0.04   MCV 91.8 88.6  --   --  87.6 82.6   PROCALCITONIN  --  0.33* 0.33*  --  0.31*  --    LACTATE  --  2.4*  --  2.0 2.6*  --          Lab 07/31/24  0451 07/30/24  0453 07/29/24  1029 07/27/24  1428   SODIUM 148* 151* 148* 148*   POTASSIUM 2.4* 2.5* 3.6 3.3*   CHLORIDE 107 108* 100 98   CO2 28.9 28.6 32.0* 31.3*   ANION GAP 12.1 14.4 16.0* 18.7*   BUN 15 21* 22* 18   CREATININE 0.47* 0.54* 0.56* 0.42*   EGFR 131.4 126.0 124.6 136.0   GLUCOSE 138* 119* 122* 72   CALCIUM 11.0* 11.5* 12.1* 11.8*   MAGNESIUM 1.8 1.8  --   --    PHOSPHORUS 3.1 2.2*  --   --          Lab 07/29/24  1029   TOTAL PROTEIN 7.7   ALBUMIN 3.0*   GLOBULIN 4.7   ALT (SGPT) 14   AST (SGOT) 28   BILIRUBIN 0.6   ALK PHOS 161*                 Lab 07/29/24  1029   FOLATE 16.60   VITAMIN B 12 >2,000*         Brief Urine Lab Results  (Last result in the past 365 days)        Color   Clarity   Blood   Leuk Est   Nitrite   Protein   CREAT   Urine HCG        07/29/24 1102 Dark Yellow   Clear   Negative   Negative   Negative   Trace                 Microbiology Results (last 10 days)       Procedure Component Value - Date/Time    COVID-19, FLU A/B, RSV PCR 1 HR TAT - Swab, Nasopharynx [[id number]]  (Normal) Collected: 07/29/24 1049    Lab  Status: Final result Specimen: Swab from Nasopharynx Updated: 07/29/24 1145     COVID19 Not Detected     Influenza A PCR Not Detected     Influenza B PCR Not Detected     RSV, PCR Not Detected    Narrative:      Fact sheet for providers: https://www.fda.gov/media/206462/download    Fact sheet for patients: https://www.fda.gov/media/328431/download    Test performed by PCR.    Blood Culture - Blood, Arm, Left [[id number]]  (Normal) Collected: 07/29/24 1029    Lab Status: Preliminary result Specimen: Blood from Arm, Left Updated: 08/01/24 1045     Blood Culture No growth at 3 days    Blood Culture - Blood, Arm, Right [[id number]]  (Normal) Collected: 07/29/24 1029    Lab Status: Preliminary result Specimen: Blood from Arm, Right Updated: 08/01/24 1045     Blood Culture No growth at 3 days            RADIOLOGY:    CT Abdomen Pelvis With Contrast    Result Date: 7/30/2024  Impression: 1. Widespread osseous metastatic disease is again noted, with progression since 6/28/2024. 2. The dominant left upper lobe lung mass measuring 2.6 cm appears unchanged. 3. Pulmonary metastatic disease appears slightly progressed. Multiple small 5 mm or less noncalcified pulmonary nodules are stable to new. 4. Increased size of AP window mediastinal adenopathy. 5. No evidence of soft tissue organ metastasis in the abdomen or pelvis. Electronically Signed: Chayo Gallardo MD  7/30/2024 1:10 PM EDT  Workstation ID: [id number]    CT Chest With Contrast Diagnostic    Result Date: 7/30/2024  Impression: 1. Widespread osseous metastatic disease is again noted, with progression since 6/28/2024. 2. The dominant left upper lobe lung mass measuring 2.6 cm appears unchanged. 3. Pulmonary metastatic disease appears slightly progressed. Multiple small 5 mm or less noncalcified pulmonary nodules are stable to new. 4. Increased size of AP window mediastinal adenopathy. 5. No evidence of soft tissue organ metastasis in the abdomen or pelvis. Electronically  Signed: Chayo Gallardo MD  7/30/2024 1:10 PM EDT  Workstation ID: [id number]    XR Chest 1 View    Result Date: 7/29/2024  Impression: Impression: 1.No acute cardiopulmonary abnormality is identified. 2.Osseous metastatic disease. Pathologic fracture of the right mid to distal clavicle is new since chest CT from 6/28/2024. 3.Further evaluation with chest CT may be considered if indicated. Electronically Signed: Everardo Mcdaniels MD  7/29/2024 10:47 AM EDT  Workstation ID: [id number]                 Labs Pending at Discharge:  Pending Labs       Order Current Status    Blood Culture - Blood, Arm, Left Preliminary result    Blood Culture - Blood, Arm, Right Preliminary result              Time spent on Discharge including face to face service:  >35 minutes

## 2024-08-01 NOTE — PLAN OF CARE
Goal Outcome Evaluation:              Outcome Evaluation: comfort measures only. aox1-2. skin care provided. frequently turned and repositioned. fall risk measures in place. medicated for c/o pain per mar. discharge to [place] via ems.

## 2024-08-01 NOTE — NURSING NOTE
Received call from patients Sergo phan, to complete MOST form. Met with patients  at bedside. At time of visit patient is comfort measures only, patient appears comfortable during visit- no verbal/nonverbal signs/symptoms of discomfort noted. Patient with a current PPS of 20%. Patient has placement at [facility] for continued comfort measures only/end of life care. Completed MOST with patients . This document will serve as EMS DNR and to be given to facility at discharge. Updated primary rn. Palliative care will continue to follow up as needed.    Kimmie AGUIRRE, RN, PN  Palliative Care

## 2024-08-01 NOTE — ACP (ADVANCE CARE PLANNING)
Completed [place] MOST with patients sister, Sergo. Patients HCS is [facility]- patient is DNR/Comfort. No artificial feeding/no abx. [place] MOST signed by provider- sent with patient on discharge to facility.    Kimmie AGUIRRE, RN, CHPN  Palliative Care

## 2024-08-01 NOTE — CONSULTS
Discharge Planning Assessment   [facility]     Patient Name: Oswaldo Bowen  MRN: [id number]  Today's Date: 8/1/2024    Admit Date: 7/29/2024   Discharge Plan       Row Name 08/01/24 1122       Plan    Patient/Family in Agreement with Plan yes    Final Discharge Disposition Code 64 - nursing facility under Medicaid    Final Note Pt returning to [place] today under comfort measures only.             Continued Care and Services - Admitted Since 7/29/2024       Destination       Service Provider Request Status Selected Services Address Phone Fax Patient Preferred    [facility] Accepted N/A [address], [place] [address] [phone] --             Expected Discharge Date and Time       Expected Discharge Date Expected Discharge Time    Aug 1, 2024    SARITHA Melo

## 2024-08-03 LAB
BACTERIA SPEC AEROBE CULT: NORMAL
BACTERIA SPEC AEROBE CULT: NORMAL
